# Patient Record
Sex: FEMALE | Race: WHITE | NOT HISPANIC OR LATINO | Employment: OTHER | URBAN - METROPOLITAN AREA
[De-identification: names, ages, dates, MRNs, and addresses within clinical notes are randomized per-mention and may not be internally consistent; named-entity substitution may affect disease eponyms.]

---

## 2018-06-21 DIAGNOSIS — Z12.31 SCREENING MAMMOGRAM, ENCOUNTER FOR: Primary | ICD-10-CM

## 2018-07-18 ENCOUNTER — HOSPITAL ENCOUNTER (OUTPATIENT)
Dept: RADIOLOGY | Facility: HOSPITAL | Age: 73
Discharge: HOME | End: 2018-07-18
Attending: FAMILY MEDICINE
Payer: MEDICARE

## 2018-07-18 DIAGNOSIS — Z12.31 SCREENING MAMMOGRAM, ENCOUNTER FOR: ICD-10-CM

## 2018-07-18 PROCEDURE — 77067 SCR MAMMO BI INCL CAD: CPT | Mod: TC

## 2018-07-18 PROCEDURE — 77063 BREAST TOMOSYNTHESIS BI: CPT | Mod: 26,,, | Performed by: RADIOLOGY

## 2018-07-18 PROCEDURE — 77067 SCR MAMMO BI INCL CAD: CPT | Mod: 26,,, | Performed by: RADIOLOGY

## 2018-08-14 ENCOUNTER — RESEARCH ENCOUNTER (OUTPATIENT)
Dept: RESEARCH | Facility: HOSPITAL | Age: 73
End: 2018-08-14

## 2018-08-14 ENCOUNTER — INITIAL CONSULT (OUTPATIENT)
Dept: CARDIOLOGY | Facility: CLINIC | Age: 73
End: 2018-08-14
Payer: MEDICARE

## 2018-08-14 VITALS
OXYGEN SATURATION: 98 % | WEIGHT: 143.31 LBS | HEIGHT: 63 IN | SYSTOLIC BLOOD PRESSURE: 213 MMHG | DIASTOLIC BLOOD PRESSURE: 72 MMHG | BODY MASS INDEX: 25.39 KG/M2 | HEART RATE: 51 BPM

## 2018-08-14 DIAGNOSIS — I35.0 AORTIC VALVE STENOSIS, ETIOLOGY OF CARDIAC VALVE DISEASE UNSPECIFIED: ICD-10-CM

## 2018-08-14 DIAGNOSIS — I10 ESSENTIAL HYPERTENSION: ICD-10-CM

## 2018-08-14 DIAGNOSIS — Z98.890 HISTORY OF THORACIC AORTIC ANEURYSM REPAIR: ICD-10-CM

## 2018-08-14 DIAGNOSIS — Z86.79 HISTORY OF THORACIC AORTIC ANEURYSM REPAIR: ICD-10-CM

## 2018-08-14 PROCEDURE — 99204 OFFICE O/P NEW MOD 45 MIN: CPT | Mod: S$PBB,,, | Performed by: INTERNAL MEDICINE

## 2018-08-14 PROCEDURE — 99999 PR PBB SHADOW E&M-EST. PATIENT-LVL III: CPT | Mod: PBBFAC,,, | Performed by: INTERNAL MEDICINE

## 2018-08-14 NOTE — ASSESSMENT & PLAN NOTE
BP is uncontrolled and resistant despite three medications, including CCB, ARB and HCTZ. She walks regularly. Avoids sodium.  She appears to be an appropriate candidate for the CALM study of endovascular baroreceptor therapy.     I discussed the potential risks and benefits of the  CALM trial. Patients with hypertension and systolic blood pressure > 145 mmHg on three drugs are eligible. Patients are randomized to device or standard medical therapy.

## 2018-08-14 NOTE — ASSESSMENT & PLAN NOTE
Murmur sounds like AS combined with AR. Patient with mild LEMONS and edema subjectively. Would consider echo.

## 2018-08-14 NOTE — PROGRESS NOTES
Date Consent signed: 14 August 2018    Sponsor: [employer]    Study Title/IRB Number: CALM-2 / 2017.454.A    Principle Investigator: Dr. Isaiah Wiggins, Dr. Denis Gonzalez    Present for Discussion: Patient, CRC     Is LAR Consenting for Subject: No    Prior to the Informed Consent (IC) being signed, or any study protocol required data collection, testing, procedure, or intervention being performed, the following was done and/or discussed:   Patient was given a copy of the IC for review    Purpose of the study and qualifications to participate    Study design, Follow up schedule, and tests or procedures done at each visit   Confidentiality and HIPAA Authorization for Release of Medical Records for the research trial/ subject's rights/research related injury   Risk, Benefits, Alternative Treatments, Compensation and Costs   Participation in the research trial is voluntary and patient may withdraw at anytime   Contact information for study related questions    Patient verbalizes understanding of the above: Yes  Contact information for CRC and PI given to patient: Yes  Patient able to adequately summarize: the purpose of the study, the risks associated with the study, and all procedures, testing, and follow-ups associated with the study: Yes    Mrs. Buffy Dominique  signed the informed consent form for the CALM-2 research study with an IRB approval date of 6/21/2018.  Each page of the consent form was reviewed with Mrs. Dominique and all questions answered satisfactorily. Mrs. Dominique signed the consent form and received a copy of same. The original consent was scanned into electronic medical records (EPIC) and filed into the subject's research study binder.

## 2018-08-14 NOTE — PROGRESS NOTES
Subjective:   Referring provider: Self, Aaareferral  Patient ID: Buffy Dominique is 72 y.o. female who presents for Evaluation of Hypertension        Ms. Dominique presents seeking information about clinical trial for her HTN. She has had HTN for years. She reports that after her thoracic aneurysm repair it got better. But it has climbed back up over a few years. She denies chest pain, palpitations, headache. Followed by Dr. Ames for her HTN. Plans to evaluate for KEE as cause for HTN.    She reports mild shortness of breath for the past month when walking more than 1/4 mile. She has also noted some foot swelling.    Hypertension   This is a chronic problem. The current episode started more than 1 year ago. The problem has been gradually worsening since onset. The problem is resistant. Pertinent negatives include no blurred vision, chest pain, headaches, malaise/fatigue, orthopnea, palpitations or shortness of breath. There are no associated agents to hypertension. Past treatments include calcium channel blockers, diuretics and angiotensin blockers. The current treatment provides no improvement. Hypertensive end-organ damage includes kidney disease. There is no history of angina, CAD/MI, CVA or heart failure. There is no history of chronic renal disease, coarctation of the aorta, hyperaldosteronism, hypercortisolism, hyperparathyroidism, a hypertension causing med, pheochromocytoma, renovascular disease, sleep apnea or a thyroid problem.     Review of Systems   Constitution: Negative for decreased appetite, fever, malaise/fatigue, weight gain and weight loss.   HENT: Negative for congestion, hoarse voice and sore throat.    Eyes: Negative for blurred vision, vision loss in left eye, vision loss in right eye, visual disturbance and visual halos.   Cardiovascular: Positive for dyspnea on exertion and leg swelling. Negative for chest pain, claudication, irregular heartbeat, near-syncope, orthopnea and palpitations.  "  Respiratory: Negative for cough, shortness of breath and snoring.    Hematologic/Lymphatic: Negative for bleeding problem. Does not bruise/bleed easily.   Skin: Negative for color change and itching.   Musculoskeletal: Negative for falls, muscle cramps and myalgias.   Gastrointestinal: Negative for abdominal pain, change in bowel habit, bowel incontinence, heartburn, nausea and vomiting.   Genitourinary: Negative for flank pain.   Neurological: Negative for excessive daytime sleepiness, dizziness, focal weakness, headaches, light-headedness, loss of balance, sensory change and vertigo.   Psychiatric/Behavioral: Negative for depression. The patient does not have insomnia.      Family History   Problem Relation Age of Onset    Cancer Father      Past Surgical History:   Procedure Laterality Date    CATARACT EXTRACTION, BILATERAL Bilateral 2014    cataract removal Right 2014    hiatial hernia  2017    HYSTERECTOMY      melanoma      on face    OOPHORECTOMY      THORACIC AORTIC ANEURYSM REPAIR  2011     Family History   Problem Relation Age of Onset    Cancer Father      BP (!) 213/72 (BP Location: Right arm, Patient Position: Sitting, BP Method: Large (Automatic))   Pulse (!) 51   Ht 5' 3" (1.6 m)   Wt 65 kg (143 lb 4.8 oz)   LMP  (LMP Unknown)   SpO2 98%   BMI 25.38 kg/m²     Objective:   Physical Exam   Constitutional: She is oriented to person, place, and time. She appears well-developed and well-nourished.   Eyes: Pupils are equal, round, and reactive to light.   Neck: Neck supple. No JVD present. Carotid bruit is not present.   Cardiovascular: Normal rate, regular rhythm, S1 normal, S2 normal and normal pulses. Exam reveals no gallop and no friction rub.   Murmur heard.   Harsh midsystolic murmur is present with a grade of 2/6 at the upper right sternal border radiating to the neck.  High-pitched blowing decrescendo early diastolic murmur is present with a grade of 2/6 at the upper right sternal " border radiating to the apex.  Pulses:       Femoral pulses are 2+ on the right side, and 2+ on the left side.       Popliteal pulses are 2+ on the right side, and 2+ on the left side.        Dorsalis pedis pulses are 2+ on the right side, and 2+ on the left side.        Posterior tibial pulses are 2+ on the right side, and 2+ on the left side.   Pulmonary/Chest: Effort normal and breath sounds normal. No respiratory distress. She has no wheezes. She has no rales.   Abdominal: Soft. Bowel sounds are normal.   Musculoskeletal:   No kyphoscoliosis   Neurological: She is alert and oriented to person, place, and time.   Skin: Skin is warm and dry.   Psychiatric: She has a normal mood and affect. Thought content normal.     Assessment:     1. Essential hypertension      Plan:     Essential hypertension  BP is uncontrolled and resistant despite three medications, including CCB, ARB and HCTZ. She walks regularly. Avoids sodium.  She appears to be an appropriate candidate for the CALM study of endovascular baroreceptor therapy.     I discussed the potential risks and benefits of the  CALM trial. Patients with hypertension and systolic blood pressure > 145 mmHg on three drugs are eligible. Patients are randomized to device or standard medical therapy.        AS (aortic stenosis)  Murmur sounds like AS combined with AR. Patient with mild LEMONS and edema subjectively. Would consider echo.      Follow-up in about 4 weeks (around 9/11/2018).

## 2018-08-20 DIAGNOSIS — Z00.6 EXAMINATION OF PARTICIPANT IN CLINICAL TRIAL: ICD-10-CM

## 2018-08-20 DIAGNOSIS — I10 ESSENTIAL HYPERTENSION: Primary | ICD-10-CM

## 2018-08-21 ENCOUNTER — CLINICAL SUPPORT (OUTPATIENT)
Dept: CARDIOLOGY | Facility: CLINIC | Age: 73
End: 2018-08-21
Attending: INTERNAL MEDICINE
Payer: MEDICARE

## 2018-08-21 ENCOUNTER — LAB VISIT (OUTPATIENT)
Dept: LAB | Facility: HOSPITAL | Age: 73
End: 2018-08-21
Attending: INTERNAL MEDICINE
Payer: MEDICARE

## 2018-08-21 ENCOUNTER — HOSPITAL ENCOUNTER (OUTPATIENT)
Dept: CARDIOLOGY | Facility: CLINIC | Age: 73
Discharge: HOME | End: 2018-08-21
Payer: MEDICARE

## 2018-08-21 DIAGNOSIS — I10 ESSENTIAL HYPERTENSION: ICD-10-CM

## 2018-08-21 DIAGNOSIS — Z00.6 EXAMINATION OF PARTICIPANT IN CLINICAL TRIAL: ICD-10-CM

## 2018-08-21 LAB
ALBUMIN/CREAT UR: 8.9 UG/MG
CREAT UR-MCNC: 45 MG/DL
MICROALBUMIN UR DL<=1MG/L-MCNC: 4 UG/ML
SODIUM UR-SCNC: 76 MMOL/L

## 2018-08-21 PROCEDURE — 93880 EXTRACRANIAL BILAT STUDY: CPT | Mod: ,,, | Performed by: INTERNAL MEDICINE

## 2018-08-21 PROCEDURE — 93010 ELECTROCARDIOGRAM REPORT: CPT | Mod: S$PBB,,, | Performed by: INTERNAL MEDICINE

## 2018-08-21 PROCEDURE — 93975 VASCULAR STUDY: CPT | Mod: ,,, | Performed by: INTERNAL MEDICINE

## 2018-08-21 PROCEDURE — 82043 UR ALBUMIN QUANTITATIVE: CPT

## 2018-08-21 PROCEDURE — 93005 ELECTROCARDIOGRAM TRACING: CPT | Mod: PBBFAC | Performed by: INTERNAL MEDICINE

## 2018-08-21 PROCEDURE — 84300 ASSAY OF URINE SODIUM: CPT

## 2018-08-22 LAB — INTERNAL CAROTID STENOSIS: ABNORMAL

## 2018-08-30 ENCOUNTER — RESEARCH ENCOUNTER (OUTPATIENT)
Dept: RESEARCH | Facility: HOSPITAL | Age: 73
End: 2018-08-30

## 2018-08-30 NOTE — PROGRESS NOTES
Date: 30 August 2018     Study Title/IRB Number: CALM-2 / 2017.454.A     Principle Investigator: CAMPBELL Wiggins MD & Denis Gonzalez MD     Visit: Screening Eligibility          Mrs. Buffy Dominique is enrolled in the CALM-2 research trial. All procedures were completed per protocol.       returned to the clinic on today to return the 24HR ABPM. Her overall 24HR blood pressure was 127/61. Per protocol, overall BP must be >/=145 to proceed in the research study.    Mrs. Dominique is considered a Screen Fail. She verbalized understanding.     All required data has been entered in the EDC.

## 2018-11-09 ENCOUNTER — DOCUMENTATION ONLY (OUTPATIENT)
Dept: INTERNAL MEDICINE | Facility: CLINIC | Age: 73
End: 2018-11-09

## 2018-11-09 ENCOUNTER — OFFICE VISIT (OUTPATIENT)
Dept: INTERNAL MEDICINE | Facility: CLINIC | Age: 73
End: 2018-11-09
Payer: MEDICARE

## 2018-11-09 VITALS
DIASTOLIC BLOOD PRESSURE: 68 MMHG | OXYGEN SATURATION: 95 % | WEIGHT: 141 LBS | SYSTOLIC BLOOD PRESSURE: 162 MMHG | HEIGHT: 63 IN | BODY MASS INDEX: 24.98 KG/M2 | HEART RATE: 51 BPM

## 2018-11-09 DIAGNOSIS — Z12.11 COLON CANCER SCREENING: ICD-10-CM

## 2018-11-09 DIAGNOSIS — R01.1 MURMUR: ICD-10-CM

## 2018-11-09 DIAGNOSIS — M89.9 DISORDER OF BONE: ICD-10-CM

## 2018-11-09 DIAGNOSIS — E78.00 PURE HYPERCHOLESTEROLEMIA: ICD-10-CM

## 2018-11-09 DIAGNOSIS — Z85.820 HISTORY OF MELANOMA: ICD-10-CM

## 2018-11-09 DIAGNOSIS — I10 ESSENTIAL HYPERTENSION: Primary | ICD-10-CM

## 2018-11-09 DIAGNOSIS — Z98.890 HISTORY OF THORACIC AORTIC ANEURYSM REPAIR: ICD-10-CM

## 2018-11-09 DIAGNOSIS — Z98.890 HISTORY OF REPAIR OF THORACIC AORTIC ANEURYSM: ICD-10-CM

## 2018-11-09 DIAGNOSIS — Z86.79 HISTORY OF REPAIR OF THORACIC AORTIC ANEURYSM: ICD-10-CM

## 2018-11-09 DIAGNOSIS — Z86.79 HISTORY OF THORACIC AORTIC ANEURYSM REPAIR: ICD-10-CM

## 2018-11-09 DIAGNOSIS — Z13.820 SCREENING FOR OSTEOPOROSIS: ICD-10-CM

## 2018-11-09 PROBLEM — E78.5 HLD (HYPERLIPIDEMIA): Status: ACTIVE | Noted: 2018-11-09

## 2018-11-09 PROCEDURE — 99499 UNLISTED E&M SERVICE: CPT | Mod: S$GLB,,, | Performed by: INTERNAL MEDICINE

## 2018-11-09 PROCEDURE — 3077F SYST BP >= 140 MM HG: CPT | Mod: CPTII,S$GLB,, | Performed by: INTERNAL MEDICINE

## 2018-11-09 PROCEDURE — 99204 OFFICE O/P NEW MOD 45 MIN: CPT | Mod: S$GLB,,, | Performed by: INTERNAL MEDICINE

## 2018-11-09 PROCEDURE — 1101F PT FALLS ASSESS-DOCD LE1/YR: CPT | Mod: CPTII,S$GLB,, | Performed by: INTERNAL MEDICINE

## 2018-11-09 PROCEDURE — 99999 PR PBB SHADOW E&M-EST. PATIENT-LVL IV: CPT | Mod: PBBFAC,,, | Performed by: INTERNAL MEDICINE

## 2018-11-09 PROCEDURE — 3078F DIAST BP <80 MM HG: CPT | Mod: CPTII,S$GLB,, | Performed by: INTERNAL MEDICINE

## 2018-11-09 RX ORDER — HYDROCHLOROTHIAZIDE 12.5 MG/1
25 CAPSULE ORAL DAILY
Qty: 180 CAPSULE | Refills: 3 | Status: SHIPPED | OUTPATIENT
Start: 2018-11-09 | End: 2019-02-01 | Stop reason: SDUPTHER

## 2018-11-09 NOTE — PROGRESS NOTES
"Subjective:       Patient ID: Buffy Dominique is a 73 y.o. female.    Chief Complaint: Establish Care (establishing care as a new patient.) and Spasms (she stated on Wed, may have a possible muscle strain in lower back area.)    Spasms   Pertinent negatives include no chest pain, headaches or neck pain.   Hypertension   This is a recurrent problem. The current episode started more than 1 year ago. The problem is unchanged. The problem is resistant. Pertinent negatives include no anxiety, blurred vision, chest pain, headaches, malaise/fatigue, neck pain, orthopnea, palpitations, peripheral edema, PND, shortness of breath or sweats. There are no associated agents to hypertension. There are no known risk factors for coronary artery disease. Past treatments include nothing. There are no compliance problems.       Buffy Dominique is a 73 y.o. female here to establish care for the following chronic issues:    HTN  amlod-olmesartan 1-40mg  bystolic 20mg  hctz 12.5mg  Saw nephrology, renal us ordered    HLD  atorva 40mg  Fenofibrate  Can't take either due to size of pill.    Previously saw Dr. Isaiah Mehta.    3 days ago pulled muscle in her back. Couldn't walk for 2 days but getting better.   Drove for a living x 40 years.    Aortic aneurysm repaired in 2011 done in [place].      Aortic stenosis listed in chart tentatively based on murmur aug 2018.    Hx melanoma  Derm Dr. Damon at .    Quit smoking 1/26/09. Was diagnosed with early stage copd but actually bronchtis. This scared her so quit.  Review of Systems   Constitutional: Negative for malaise/fatigue.   Eyes: Negative for blurred vision.   Respiratory: Negative for shortness of breath.    Cardiovascular: Negative for chest pain, palpitations, orthopnea and PND.   Musculoskeletal: Negative for neck pain.   Neurological: Negative for headaches.       Objective:   BP (!) 162/68   Pulse (!) 51   Ht 5' 3" (1.6 m)   Wt 64 kg (141 lb)   LMP  (LMP Unknown)   SpO2 95%   " BMI 24.98 kg/m²      Physical Exam   Constitutional: She is oriented to person, place, and time. She appears well-developed and well-nourished.   HENT:   Head: Normocephalic and atraumatic.   Eyes: Conjunctivae and EOM are normal. Pupils are equal, round, and reactive to light.   Neck: Neck supple. No thyromegaly present.   Cardiovascular: Normal rate and regular rhythm.   Murmur heard.  High-pitched blowing decrescendo early diastolic murmur is present with a grade of 2/6 at the upper right sternal border radiating to the apex.   Pulmonary/Chest: Effort normal and breath sounds normal. No respiratory distress. She has no wheezes.   Abdominal: Soft. Bowel sounds are normal. She exhibits no distension. There is no tenderness.   Musculoskeletal: Normal range of motion.   Neurological: She is alert and oriented to person, place, and time.   Skin: Skin is warm and dry. No rash noted.   Psychiatric: She has a normal mood and affect. Judgment and thought content normal.   Vitals reviewed.      Assessment:       1. Essential hypertension    2. Pure hypercholesterolemia    3. History of repair of thoracic aortic aneurysm    4. History of thoracic aortic aneurysm repair    5. History of melanoma    6. Murmur    7. Screening for osteoporosis    8. Colon cancer screening    9. Disorder of bone        Plan:       Buffy was seen today for establish care and spasms.    Diagnoses and all orders for this visit:    Essential hypertension  -     Increase hydroCHLOROthiazide (MICROZIDE) 12.5 mg capsule; Take 2 capsules (25 mg total) by mouth once daily.  amlod-olmesartan 1-40mg  bystolic 20mg    Pure hypercholesterolemia  Cannot tolerate statin due to size    History of repair of thoracic aortic aneurysm    History of melanoma  -     Ambulatory Referral to Dermatology    Murmur  -     TTE    Screening for osteoporosis  -     DXA Bone Density Spine And Hip; Future    Colon cancer screening  -     Fecal Immunochemical Test (iFOBT);  Future    Disorder of bone  -     DXA Bone Density Spine And Hip; Future

## 2018-11-16 ENCOUNTER — HOSPITAL ENCOUNTER (OUTPATIENT)
Dept: RADIOLOGY | Facility: CLINIC | Age: 73
Discharge: HOME | End: 2018-11-16
Attending: INTERNAL MEDICINE
Payer: MEDICARE

## 2018-11-16 DIAGNOSIS — M89.9 DISORDER OF BONE: ICD-10-CM

## 2018-11-16 DIAGNOSIS — Z13.820 SCREENING FOR OSTEOPOROSIS: ICD-10-CM

## 2018-11-16 PROCEDURE — 77080 DXA BONE DENSITY AXIAL: CPT | Mod: 26,,, | Performed by: INTERNAL MEDICINE

## 2018-11-16 PROCEDURE — 77080 DXA BONE DENSITY AXIAL: CPT | Mod: TC

## 2018-11-23 ENCOUNTER — HOSPITAL ENCOUNTER (OUTPATIENT)
Dept: CARDIOLOGY | Facility: CLINIC | Age: 73
Discharge: HOME | End: 2018-11-23
Attending: INTERNAL MEDICINE
Payer: MEDICARE

## 2018-11-23 VITALS
WEIGHT: 141 LBS | HEART RATE: 68 BPM | HEIGHT: 63 IN | BODY MASS INDEX: 24.98 KG/M2 | DIASTOLIC BLOOD PRESSURE: 60 MMHG | SYSTOLIC BLOOD PRESSURE: 144 MMHG

## 2018-11-23 DIAGNOSIS — I35.1 AORTIC VALVE INSUFFICIENCY, ETIOLOGY OF CARDIAC VALVE DISEASE UNSPECIFIED: Primary | ICD-10-CM

## 2018-11-23 DIAGNOSIS — R01.1 MURMUR: ICD-10-CM

## 2018-11-23 DIAGNOSIS — I35.1 NONRHEUMATIC AORTIC VALVE INSUFFICIENCY: ICD-10-CM

## 2018-11-23 LAB
ASCENDING AORTA: 3.07 CM
AV INDEX (PROSTH): 0.82
AV MEAN GRADIENT: 3.2 MMHG
AV PEAK GRADIENT: 7.84 MMHG
AV VALVE AREA: 2.59 CM2
BSA FOR ECHO PROCEDURE: 1.69 M2
CV ECHO LV RWT: 0.38 CM
DOP CALC AO PEAK VEL: 1.4 M/S
DOP CALC AO VTI: 33.17 CM
DOP CALC LVOT AREA: 3.14 CM2
DOP CALC LVOT DIAMETER: 2 CM
DOP CALC LVOT STROKE VOLUME: 85.85 CM3
DOP CALCLVOT PEAK VEL VTI: 27.34 CM
E WAVE DECELERATION TIME: 185.13 MSEC
E/A RATIO: 1.16
E/E' RATIO: 16.86
ECHO LV POSTERIOR WALL: 0.8 CM (ref 0.6–1.1)
FRACTIONAL SHORTENING: 40 % (ref 28–44)
INTERVENTRICULAR SEPTUM: 0.8 CM (ref 0.6–1.1)
IVRT: 0.07 MSEC
LA MAJOR: 5.35 CM
LA MINOR: 5.4 CM
LA WIDTH: 4.31 CM
LEFT ATRIUM SIZE: 3.8 CM
LEFT ATRIUM VOLUME INDEX: 44.3 ML/M2
LEFT ATRIUM VOLUME: 74.83 CM3
LEFT INTERNAL DIMENSION IN SYSTOLE: 2.5 CM (ref 2.1–4)
LEFT VENTRICLE DIASTOLIC VOLUME INDEX: 44.17 ML/M2
LEFT VENTRICLE DIASTOLIC VOLUME: 74.64 ML
LEFT VENTRICLE MASS INDEX: 59.9 G/M2
LEFT VENTRICLE SYSTOLIC VOLUME INDEX: 15.5 ML/M2
LEFT VENTRICLE SYSTOLIC VOLUME: 26.12 ML
LEFT VENTRICULAR INTERNAL DIMENSION IN DIASTOLE: 4.2 CM (ref 3.5–6)
LEFT VENTRICULAR MASS: 101.29 G
LV LATERAL E/E' RATIO: 14.75
LV SEPTAL E/E' RATIO: 19.67
MV PEAK A VEL: 1.02 M/S
MV PEAK E VEL: 1.18 M/S
PISA TR MAX VEL: 2.67 M/S
PULM VEIN S/D RATIO: 1.36
PV PEAK D VEL: 0.53 M/S
PV PEAK S VEL: 0.72 M/S
RA MAJOR: 4.53 CM
RA PRESSURE: 3 MMHG
RA WIDTH: 3.96 CM
RIGHT VENTRICULAR END-DIASTOLIC DIMENSION: 3.44 CM
RV TISSUE DOPPLER FREE WALL SYSTOLIC VELOCITY 1 (APICAL 4 CHAMBER VIEW): 11.77 M/S
SINUS: 3.36 CM
STJ: 2.48 CM
TDI LATERAL: 0.08
TDI SEPTAL: 0.06
TDI: 0.07
TR MAX PG: 28.52 MMHG
TRICUSPID ANNULAR PLANE SYSTOLIC EXCURSION: 2.08 CM
TV REST PULMONARY ARTERY PRESSURE: 31.52 MMHG

## 2018-11-23 PROCEDURE — 93306 TTE W/DOPPLER COMPLETE: CPT | Mod: S$GLB,,, | Performed by: INTERNAL MEDICINE

## 2018-11-30 ENCOUNTER — PATIENT MESSAGE (OUTPATIENT)
Dept: ADMINISTRATIVE | Facility: OTHER | Age: 73
End: 2018-11-30

## 2018-11-30 ENCOUNTER — OFFICE VISIT (OUTPATIENT)
Dept: CARDIOLOGY | Facility: CLINIC | Age: 73
End: 2018-11-30
Payer: MEDICARE

## 2018-11-30 ENCOUNTER — TELEPHONE (OUTPATIENT)
Dept: CARDIOLOGY | Facility: CLINIC | Age: 73
End: 2018-11-30

## 2018-11-30 ENCOUNTER — HOSPITAL ENCOUNTER (OUTPATIENT)
Dept: CARDIOLOGY | Facility: CLINIC | Age: 73
Discharge: HOME | End: 2018-11-30
Payer: MEDICARE

## 2018-11-30 VITALS
DIASTOLIC BLOOD PRESSURE: 69 MMHG | HEART RATE: 50 BPM | HEIGHT: 63 IN | BODY MASS INDEX: 25.39 KG/M2 | SYSTOLIC BLOOD PRESSURE: 155 MMHG | WEIGHT: 143.31 LBS

## 2018-11-30 DIAGNOSIS — I35.1 NONRHEUMATIC AORTIC VALVE INSUFFICIENCY: ICD-10-CM

## 2018-11-30 DIAGNOSIS — I10 HYPERTENSION, UNSPECIFIED TYPE: ICD-10-CM

## 2018-11-30 DIAGNOSIS — Z86.79 HISTORY OF THORACIC AORTIC ANEURYSM REPAIR: ICD-10-CM

## 2018-11-30 DIAGNOSIS — I10 ESSENTIAL HYPERTENSION: ICD-10-CM

## 2018-11-30 DIAGNOSIS — E78.00 PURE HYPERCHOLESTEROLEMIA: ICD-10-CM

## 2018-11-30 DIAGNOSIS — I10 HYPERTENSION, UNSPECIFIED TYPE: Primary | ICD-10-CM

## 2018-11-30 DIAGNOSIS — I08.0 MITRAL VALVE INSUFFICIENCY AND AORTIC VALVE INSUFFICIENCY: Primary | ICD-10-CM

## 2018-11-30 DIAGNOSIS — Z98.890 HISTORY OF THORACIC AORTIC ANEURYSM REPAIR: ICD-10-CM

## 2018-11-30 PROCEDURE — 93005 ELECTROCARDIOGRAM TRACING: CPT | Mod: S$GLB,,, | Performed by: INTERNAL MEDICINE

## 2018-11-30 PROCEDURE — 99499 UNLISTED E&M SERVICE: CPT | Mod: S$GLB,,, | Performed by: INTERNAL MEDICINE

## 2018-11-30 PROCEDURE — 99203 OFFICE O/P NEW LOW 30 MIN: CPT | Mod: S$GLB,,, | Performed by: INTERNAL MEDICINE

## 2018-11-30 PROCEDURE — 99999 PR PBB SHADOW E&M-EST. PATIENT-LVL III: CPT | Mod: PBBFAC,,, | Performed by: INTERNAL MEDICINE

## 2018-11-30 PROCEDURE — 3078F DIAST BP <80 MM HG: CPT | Mod: CPTII,S$GLB,, | Performed by: INTERNAL MEDICINE

## 2018-11-30 PROCEDURE — 3077F SYST BP >= 140 MM HG: CPT | Mod: CPTII,S$GLB,, | Performed by: INTERNAL MEDICINE

## 2018-11-30 PROCEDURE — 1101F PT FALLS ASSESS-DOCD LE1/YR: CPT | Mod: CPTII,S$GLB,, | Performed by: INTERNAL MEDICINE

## 2018-11-30 PROCEDURE — 93010 ELECTROCARDIOGRAM REPORT: CPT | Mod: S$GLB,,, | Performed by: INTERNAL MEDICINE

## 2018-11-30 NOTE — LETTER
November 30, 2018      Odalis Kim MD  [address]  [place] [address]           [facility] - Cardiology  [address]  [place] [address]  Phone: [phone]          Patient: Buffy Dominique   MR Number: [id number]   YOB: 1945   Date of Visit: 11/30/2018       Dear Dr. Odalis Kim:    Thank you for referring Buffy Dominique to me for evaluation. Attached you will find relevant portions of my assessment and plan of care.    If you have questions, please do not hesitate to call me. I look forward to following Buffy Dominique along with you.    Sincerely,    Yuan Cavazos III, MD    Enclosure  CC:  No Recipients    If you would like to receive this communication electronically, please contact [email or web address] or [phone] to request more information on [employer] access.    For providers and/or their staff who would like to refer a patient to [facility], please contact us through our one-stop-shop provider referral line, [facility], at [phone].    If you feel you have received this communication in error or would no longer like to receive these types of communications, please e-mail [email or web address]

## 2018-11-30 NOTE — PROGRESS NOTES
Cardiology Clinic Note  Reason for Visit: aortic stenosis    HPI:     Buffy Dominique is a 73 y.o. F with HTN, HLD, thoracic AAA s/p repair in 2011, and severe aortic regurgitation, who was referred for AI.    She reports that she is very active. She walks three miles per day and cleans the area around her [place]. She never has chest pain, shortness of breath, edema, orthopnea, PND, or syncopal episodes. She occasionally has lightheadedness with quick motions from sitting to standing. No palpitations.     She reports BP usually high in the morning before medications but 110/60s after they are taken. She does not regularly check her BP, but she is interested in starting the digital hypertension program.    Prior cardiovascular issues:  None    ROS:    Constitution: Negative for fever or chills.  HENT: Negative for  headaches.  Eyes: Negative for blurred vision.   Cardiovascular: See above  Pulmonary: Negative for SOB. Negative for cough.   Gastrointestinal: Negative for nausea/vomiting.   : Negative for dysuria.   Skin: Negative for rashes.  Neurological: Negative for focal weakness.  Psychological: Negative for depression.  PMH:     Past Medical History:   Diagnosis Date    CKD (chronic kidney disease) stage 3, GFR 30-59 ml/min     HLD (hyperlipidemia)     HTN (hypertension)     Hyperparathyroidism      Past Surgical History:   Procedure Laterality Date    CATARACT EXTRACTION, BILATERAL Bilateral 2014    cataract removal Right 2014    hiatial hernia  2017    HYSTERECTOMY      melanoma      on face    OOPHORECTOMY      THORACIC AORTIC ANEURYSM REPAIR  2011     Allergies:   Review of patient's allergies indicates:  No Known Allergies  Medications:     Current Outpatient Medications on File Prior to Visit   Medication Sig Dispense Refill    amlodipine-olmesartan (PATTIE) 10-40 mg per tablet 1 tablet once daily.      BYSTOLIC 5 mg Tab 20 mg once daily.       hydroCHLOROthiazide (MICROZIDE) 12.5  "mg capsule Take 2 capsules (25 mg total) by mouth once daily. 180 capsule 3     No current facility-administered medications on file prior to visit.      Social History:     Social History     Tobacco Use    Smoking status: Former Smoker     Packs/day: 1.00     Years: 30.00     Pack years: 30.00     Types: Cigarettes     Last attempt to quit: 2009     Years since quittin.8    Smokeless tobacco: Never Used   Substance Use Topics    Alcohol use: No     Comment: glass of wine once or twice a year     Family History:     Family History   Problem Relation Age of Onset    Cancer Father     Hypertension Maternal Grandmother      Physical Exam:   BP (!) 155/69 (BP Location: Left arm, Patient Position: Sitting, BP Method: Medium (Automatic))   Pulse (!) 50   Ht 5' 3" (1.6 m)   Wt 65 kg (143 lb 4.8 oz)   LMP  (LMP Unknown)   BMI 25.38 kg/m²      Constitutional: No apparent distress, conversant  HEENT: Sclera anicteric, extraocular movements intact  Neck: No jugular venous distension, no carotid bruits  CV: Regular rate and rhythm, II/VI holodiastolic murmur at LUSB, II/VI systolic murmur at LUSB  Pulm: Clear to auscultation bilaterally  GI: Abdomen soft, no palpable masses  Extremities: No lower extremity edema, warm with palpable pulses  Skin: No ecchymosis, erythema, or ulcers  Psych: Alert and oriented to person place location, appropriate affect  Neuro: No focal deficits    Labs:     Blood Tests:  Lab Results   Component Value Date     (L) 2018    K 3.9 2018    CL 98 2018    CO2 28 2018    BUN 21 2018    CREATININE 1.0 2018    GLU 96 2018    HGBA1C 5.3 2018    MG 1.9 2009    AST 20 2018    ALT 16 2018    ALBUMIN 4.2 2018    PROT 7.8 2018    BILITOT 1.5 (H) 2018    WBC 6.58 2018    HGB 14.0 2018    HCT 40.8 2018    MCV 92 2018     2018    INR 0.9 2018    TSH 1.20 2010 "       Lab Results   Component Value Date    CHOL 192 2018    HDL 59 2018    TRIG 110 2018       Lab Results   Component Value Date    LDLCALC 111.0 2018       Urine Tests:  Lab Results   Component Value Date    CREATRANDUR 45.0 2018       Imaging:     Echocardiogram  TTE 18  · Normal left ventricular systolic function. The estimated ejection fraction is 60%  · No wall motion abnormalities.  · Indeterminate left ventricular diastolic function.  · Severe aortic regurgitation.  · Mild mitral regurgitation.  · Normal right ventricular systolic function.  · Mild tricuspid regurgitation.  · The estimated PA systolic pressure is 31.52 mm Hg  · Normal central venous pressure (3 mm Hg).  · Moderate left atrial enlargement.    Stress testing  None    Cath Lab  None    Other  None    EK18 - sinus bradycardia, otherwise normal ECG    Assessment:     1. Mitral valve insufficiency and aortic valve insufficiency    2. Nonrheumatic aortic valve insufficiency    3. Essential hypertension    4. History of thoracic aortic aneurysm repair    5. Pure hypercholesterolemia        Plan:     Nonrheumatic aortic valve insufficiency  No symptoms. On echo in 2018, LVEF 60%, LVEDD 4.2cm, LVESD 2.5cm   With active lifestyle and no symptoms, normal LVEF, and no LV dilatation, continue to monitor.  Repeat echocardiogram in 6 months  Obtain records from cardiologist at     Essential hypertension  Enroll in digital hypertension program  Continue amlodipine-olmesartan 10-40mg daily, HCTZ 25mg daily, nebivolol 20mg daily    History of thoracic aortic aneurysm repair  Performed in [place]. Obtain records.    Pure hypercholesterolemia  Lipid panel in 2018 shows , , HDL 59,   ASCVD risk >20%. Should be on moderate to high intensity statin. Will discuss at next visit.    Signed:  Mickey Cavazos MD  Cardiology     2018 12:23 AM    Follow-up:     Future Appointments   Date Time  Provider Department Center   2/20/2019  9:45 AM Myesha Haddad MD [facility]

## 2019-01-07 ENCOUNTER — RESEARCH ENCOUNTER (OUTPATIENT)
Dept: RESEARCH | Facility: HOSPITAL | Age: 74
End: 2019-01-07

## 2019-01-07 NOTE — PROGRESS NOTES
Date Consent signed: 07 January 2019     Sponsor: [employer]     Study Title/IRB Number: CALM-2 / 2017.454.A     Principle Investigator: Dr. RAYSHAWN Collazo, Dr. Denis Gonzalez     Present for Discussion: Patient, CRC      Is LAR Consenting for Subject: No     Prior to the Informed Consent (IC) being signed, or any study protocol required data collection, testing, procedure, or intervention being performed, the following was done and/or discussed:  · Patient was given a copy of the IC for review   · Purpose of the study and qualifications to participate   · Study design, Follow up schedule, and tests or procedures done at each visit  · Confidentiality and HIPAA Authorization for Release of Medical Records for the research trial/ subject's rights/research related injury  · Risk, Benefits, Alternative Treatments, Compensation and Costs  · Participation in the research trial is voluntary and patient may withdraw at anytime  · Contact information for study related questions     Patient verbalizes understanding of the above: Yes  Contact information for CRC and PI given to patient: Yes  Patient able to adequately summarize: the purpose of the study, the risks associated with the study, and all procedures, testing, and follow-ups associated with the study: Yes     Mrs. Buffy Dominique  signed the informed consent form for the CALM-2 research study with an IRB approval date of 9/21/2018.  Each page of the consent form was reviewed with Mrs. Dominique and all questions answered satisfactorily. Mrs. Dominique signed the consent form and received a copy of same. The original consent was scanned into electronic medical records (EPIC) and filed into the subject's research study binder.

## 2019-01-09 ENCOUNTER — RESEARCH ENCOUNTER (OUTPATIENT)
Dept: RESEARCH | Facility: HOSPITAL | Age: 74
End: 2019-01-09

## 2019-01-09 NOTE — PROGRESS NOTES
Date: 09 January 2019     Study Title/IRB Number: CALM-2 / 2017.454.A     Principle Investigator: Pepito Collazo MD & Denis Gonzalez MD     Visit: Screening Eligibility          Mrs. Buffy Dominique is enrolled in the CALM-2 research trial. All procedures were completed per protocol.       returned to the clinic on today to return the 24HR ABPM. Her overall 24HR blood pressure was 140/60. Per protocol, overall BP must be >/=145 to proceed in the research study.     Mrs. Dominique is considered a Screen Fail. She verbalized understanding.     All required data has been entered in the EDC

## 2019-01-22 ENCOUNTER — TELEPHONE (OUTPATIENT)
Dept: INTERNAL MEDICINE | Facility: CLINIC | Age: 74
End: 2019-01-22

## 2019-01-22 NOTE — TELEPHONE ENCOUNTER
Spoke to patient. Patient states that she is currently having symptoms like excessive cough, stuffiness, nasal congestion and slight dyspnea (mainly at night when laying down). Patient is requesting a new Rx on prednisone since medicine has worked well for her in the past. Patient is currently taking Sudafed to help relieve but hardly no relief, patient says.    Please advise.

## 2019-01-22 NOTE — TELEPHONE ENCOUNTER
"----- Message from Carolyn Bridges sent at 1/22/2019  9:26 AM CST -----  Contact: self/[phone]  RX request - refill or new RX.  Is this a refill or new RX:  New rx  RX name and strength: Prednisone   Directions:   Is this a 30 day or 90 day RX:    Local pharmacy or mail order pharmacy:  Local pharmacy  Pharmacy name and phone # (DON'T enter "on file" or "in chart"): [facility] in [facility]. Ph #  Comments:  Patient stated that she have a bad cough        "

## 2019-01-23 NOTE — TELEPHONE ENCOUNTER
Patient stated that she does not feel she needs to come in at the moment. Patient refused to schedule appointment.  Just an FYI.

## 2019-02-01 DIAGNOSIS — I10 ESSENTIAL HYPERTENSION: ICD-10-CM

## 2019-02-01 RX ORDER — HYDROCHLOROTHIAZIDE 12.5 MG/1
25 CAPSULE ORAL DAILY
Qty: 180 CAPSULE | Refills: 3 | Status: SHIPPED | OUTPATIENT
Start: 2019-02-01 | End: 2020-02-04

## 2019-02-01 RX ORDER — AMLODIPINE AND OLMESARTAN MEDOXOMIL 10; 40 MG/1; MG/1
1 TABLET ORAL DAILY
Qty: 90 TABLET | Refills: 3 | Status: SHIPPED | OUTPATIENT
Start: 2019-02-01 | End: 2020-02-11

## 2019-02-01 NOTE — TELEPHONE ENCOUNTER
----- Message from Herminio Thorne sent at 2/1/2019 11:36 AM CST -----  Contact: self/[phone]  Type: Rx    Name of medication(s): amlodipine-olmesartan (PATTIE) 10-40 mg per tablet,BYSTOLIC 5 mg Tab,hydroCHLOROthiazide (MICROZIDE) 12.5 mg capsule    Is this a refill? New rx? Refill 90 day prescription     Who prescribed medication? Dr. Kim     Pharmacy Name, Phone, & Location:[facility] [address] [place] [address] [address] AT [facility]    Comments:

## 2019-02-01 NOTE — TELEPHONE ENCOUNTER
Spoke to patient and she was informed that the medications she needed to be refilled were, at her local pharmacy. Patient verbalized understanding.

## 2019-02-05 ENCOUNTER — PATIENT MESSAGE (OUTPATIENT)
Dept: INTERNAL MEDICINE | Facility: CLINIC | Age: 74
End: 2019-02-05

## 2019-02-06 ENCOUNTER — TELEPHONE (OUTPATIENT)
Dept: INTERNAL MEDICINE | Facility: CLINIC | Age: 74
End: 2019-02-06

## 2019-02-06 DIAGNOSIS — I10 ESSENTIAL HYPERTENSION: Primary | ICD-10-CM

## 2019-02-06 RX ORDER — NEBIVOLOL HYDROCHLORIDE 5 MG/1
20 TABLET ORAL DAILY
Qty: 120 TABLET | Refills: 11 | Status: SHIPPED | OUTPATIENT
Start: 2019-02-06 | End: 2019-07-02

## 2019-02-06 NOTE — TELEPHONE ENCOUNTER
Generally this is a once a day medication. Per last notes she is on 20mg once daily. Please clarify with patient.

## 2019-02-06 NOTE — TELEPHONE ENCOUNTER
Spoke with patient. Patient states that she is currently on Bystolic medication and is taking 20 mg once daily.

## 2019-02-06 NOTE — TELEPHONE ENCOUNTER
----- Message from Reema Robins sent at 2/6/2019  8:20 AM CST -----  Contact: self/[phone]  Pt called in regards to giving the dr the name of the dr that prescribed her Rx for Bystolic he was Dr Isaiah Mehta.      Please advise

## 2019-02-06 NOTE — TELEPHONE ENCOUNTER
Spoke to patient. Patient states that [facility] pharmacy did not give her the Bystolic tablets since the Rx was transferred there from [facility]. Pharmacy informed patient that the Rx was . Patient verbalized that pharmacy will need a new Rx sent over stating that it is 5 mg taken BID. They did refill the other 2 medications.     Please advise.

## 2019-02-10 ENCOUNTER — PATIENT MESSAGE (OUTPATIENT)
Dept: INTERNAL MEDICINE | Facility: CLINIC | Age: 74
End: 2019-02-10

## 2019-02-11 ENCOUNTER — TELEPHONE (OUTPATIENT)
Dept: INTERNAL MEDICINE | Facility: CLINIC | Age: 74
End: 2019-02-11

## 2019-02-11 NOTE — TELEPHONE ENCOUNTER
Spoke with patient. Patient informed that she has not received her Bystolic medication since patient claims it says, the Rx says 5 mg in which her insurance does not cover. She would like the Rx to say 20 mg (pharmacy will cover) taken once daily.     Please advise.

## 2019-02-11 NOTE — TELEPHONE ENCOUNTER
----- Message from Kalyn Garner sent at 2/11/2019  2:49 PM CST -----  Contact: self [phone]  Type: Rx Clarification/ Additional Information/ Questions    Medication: BYSTOLIC 5 mg Tab    What questions do you have about the medication, if any?     What information is needed? Requesting a new script to read Bystolic 20mg once a day    Pharmacy number: [facility] [id number]     Comments: Patient has not been able to get her medication because of the mg,  [facility] is saying that her insurance [employer] won't cover the 5mg  But will cover the 20mg.    Thank you    Thank you

## 2019-02-14 ENCOUNTER — TELEPHONE (OUTPATIENT)
Dept: INTERNAL MEDICINE | Facility: CLINIC | Age: 74
End: 2019-02-14

## 2019-02-14 NOTE — TELEPHONE ENCOUNTER
"----- Message from Farhana Mc sent at 2/14/2019 12:01 PM CST -----  Contact: [id number] [phone]      ----- Message -----  From: Farhana Mc  Sent: 2/14/2019  12:00 PM  To: [facility] Staff    Prior Authorization Needed    Rx: BYSTOLIC 5 mg Tab    To submit the PA:    1: Go to " [email or web address] " and click "Enter a Key"    2. Enter the patient's last name and date of birth and the key.      KEY: [id number]    3. Complete the forms and click "send to Plan"    Note chart when prior authorization has been submitted.    Please notify pharmacy when prior authorization has been approved.    Thank You    "

## 2019-02-15 NOTE — TELEPHONE ENCOUNTER
Spoke with patient's pharmacy. [facility] was informed that Rx was sent in for 5 mg (take 20 mg daily). Pharmacist stated that the Rx was verified. Informed pharmacy that pt verbalized that the 5 mg would not cove Rx for [facility] medicine.    Pharmacy verbalized understanding.

## 2019-02-18 ENCOUNTER — TELEPHONE (OUTPATIENT)
Dept: INTERNAL MEDICINE | Facility: CLINIC | Age: 74
End: 2019-02-18

## 2019-02-18 NOTE — TELEPHONE ENCOUNTER
----- Message from Farhana Mc sent at 2/18/2019  7:14 AM CST -----  Contact: Gege @ [facility] direct# [phone]       ----- Message -----  From: Mare Gong  Sent: 2/15/2019   1:57 PM  To: [facility] Staff    [facility] is calling in regards to wanting to get a prior authorization on the patient's medication for BYSTOLIC 5 mg Tab.

## 2019-04-24 ENCOUNTER — OFFICE VISIT (OUTPATIENT)
Dept: INTERNAL MEDICINE | Facility: CLINIC | Age: 74
End: 2019-04-24
Payer: MEDICARE

## 2019-04-24 ENCOUNTER — HOSPITAL ENCOUNTER (OUTPATIENT)
Dept: RADIOLOGY | Facility: HOSPITAL | Age: 74
Discharge: HOME | End: 2019-04-24
Attending: PHYSICIAN ASSISTANT
Payer: MEDICARE

## 2019-04-24 VITALS — DIASTOLIC BLOOD PRESSURE: 62 MMHG | TEMPERATURE: 98 F | SYSTOLIC BLOOD PRESSURE: 122 MMHG

## 2019-04-24 DIAGNOSIS — R68.89 FLU-LIKE SYMPTOMS: Primary | ICD-10-CM

## 2019-04-24 DIAGNOSIS — R05.9 COUGH: ICD-10-CM

## 2019-04-24 DIAGNOSIS — Z87.891 FORMER SMOKER: ICD-10-CM

## 2019-04-24 DIAGNOSIS — R06.02 SOB (SHORTNESS OF BREATH): ICD-10-CM

## 2019-04-24 DIAGNOSIS — R68.89 FLU-LIKE SYMPTOMS: ICD-10-CM

## 2019-04-24 LAB
INFLUENZA A, MOLECULAR: POSITIVE
INFLUENZA B, MOLECULAR: NEGATIVE
SPECIMEN SOURCE: ABNORMAL

## 2019-04-24 PROCEDURE — 3074F PR MOST RECENT SYSTOLIC BLOOD PRESSURE < 130 MM HG: ICD-10-PCS | Mod: CPTII,S$GLB,, | Performed by: PHYSICIAN ASSISTANT

## 2019-04-24 PROCEDURE — 1101F PT FALLS ASSESS-DOCD LE1/YR: CPT | Mod: CPTII,S$GLB,, | Performed by: PHYSICIAN ASSISTANT

## 2019-04-24 PROCEDURE — 3078F DIAST BP <80 MM HG: CPT | Mod: CPTII,S$GLB,, | Performed by: PHYSICIAN ASSISTANT

## 2019-04-24 PROCEDURE — 1101F PR PT FALLS ASSESS DOC 0-1 FALLS W/OUT INJ PAST YR: ICD-10-PCS | Mod: CPTII,S$GLB,, | Performed by: PHYSICIAN ASSISTANT

## 2019-04-24 PROCEDURE — 71046 X-RAY EXAM CHEST 2 VIEWS: CPT | Mod: 26,,, | Performed by: RADIOLOGY

## 2019-04-24 PROCEDURE — 71046 XR CHEST PA AND LATERAL: ICD-10-PCS | Mod: 26,,, | Performed by: RADIOLOGY

## 2019-04-24 PROCEDURE — 3074F SYST BP LT 130 MM HG: CPT | Mod: CPTII,S$GLB,, | Performed by: PHYSICIAN ASSISTANT

## 2019-04-24 PROCEDURE — 71046 X-RAY EXAM CHEST 2 VIEWS: CPT | Mod: TC

## 2019-04-24 PROCEDURE — 99214 OFFICE O/P EST MOD 30 MIN: CPT | Mod: S$GLB,,, | Performed by: PHYSICIAN ASSISTANT

## 2019-04-24 PROCEDURE — 87502 INFLUENZA DNA AMP PROBE: CPT

## 2019-04-24 PROCEDURE — 99999 PR PBB SHADOW E&M-EST. PATIENT-LVL III: ICD-10-PCS | Mod: PBBFAC,,, | Performed by: PHYSICIAN ASSISTANT

## 2019-04-24 PROCEDURE — 99999 PR PBB SHADOW E&M-EST. PATIENT-LVL III: CPT | Mod: PBBFAC,,, | Performed by: PHYSICIAN ASSISTANT

## 2019-04-24 PROCEDURE — 99214 PR OFFICE/OUTPT VISIT, EST, LEVL IV, 30-39 MIN: ICD-10-PCS | Mod: S$GLB,,, | Performed by: PHYSICIAN ASSISTANT

## 2019-04-24 PROCEDURE — 3078F PR MOST RECENT DIASTOLIC BLOOD PRESSURE < 80 MM HG: ICD-10-PCS | Mod: CPTII,S$GLB,, | Performed by: PHYSICIAN ASSISTANT

## 2019-04-24 RX ORDER — PROMETHAZINE HYDROCHLORIDE AND DEXTROMETHORPHAN HYDROBROMIDE 6.25; 15 MG/5ML; MG/5ML
5 SYRUP ORAL
Qty: 118 ML | Refills: 0 | Status: SHIPPED | OUTPATIENT
Start: 2019-04-24 | End: 2019-04-25 | Stop reason: RX

## 2019-04-24 RX ORDER — BENZONATATE 100 MG/1
100 CAPSULE ORAL 3 TIMES DAILY PRN
Qty: 20 CAPSULE | Refills: 0 | Status: SHIPPED | OUTPATIENT
Start: 2019-04-24 | End: 2019-05-04

## 2019-04-24 NOTE — PROGRESS NOTES
Subjective:       Patient ID: Buffy Dominique is a 73 y.o. female.    Chief Complaint: Cough; Fever; and Generalized Body Aches    HPI     Established pt of Odalis Kim MD (new to me)    Here for urgent care/same day visit.     C/o cough (thick white phlegm), congestion, mild sob d/t excessive coughing, low energy and body aches. She has tried only Aspirin and Aleve for the symptoms without much relief.     Review of patient's allergies indicates:  No Known Allergies    Past Medical History:   Diagnosis Date    CKD (chronic kidney disease) stage 3, GFR 30-59 ml/min     HLD (hyperlipidemia)     HTN (hypertension)     Hyperparathyroidism      Social History     Tobacco Use    Smoking status: Former Smoker     Packs/day: 1.00     Years: 30.00     Pack years: 30.00     Types: Cigarettes     Last attempt to quit: 1/26/2009     Years since quitting: 10.2    Smokeless tobacco: Never Used   Substance Use Topics    Alcohol use: No     Comment: glass of wine once or twice a year    Drug use: No       Patient Active Problem List   Diagnosis    Essential hypertension    AS (aortic stenosis)    History of thoracic aortic aneurysm repair    HLD (hyperlipidemia)    Nonrheumatic aortic valve insufficiency           Review of Systems   Constitutional: Negative for chills, fever (low grade 99F) and unexpected weight change.   HENT: Negative for sore throat and trouble swallowing.    Respiratory: Positive for cough and shortness of breath. Negative for wheezing.    Cardiovascular: Negative for chest pain and leg swelling.   Gastrointestinal: Negative for abdominal pain, nausea and vomiting.   Skin: Negative for rash.   Neurological: Negative for weakness, light-headedness and headaches.       Objective: /62   Pulse (!) (P) 54   Temp 97.7 °F (36.5 °C)   Wt (P) 59.2 kg (130 lb 8.2 oz)   LMP  (LMP Unknown)   SpO2 (!) (P) 93%   BMI (P) 23.12 kg/m²         Physical Exam   Constitutional: She appears  well-developed and well-nourished. No distress.   HENT:   Head: Normocephalic and atraumatic.   Nose: Right sinus exhibits no maxillary sinus tenderness. Left sinus exhibits no maxillary sinus tenderness.   Mouth/Throat: Oropharynx is clear and moist. No oropharyngeal exudate.   Cardiovascular: Normal rate and regular rhythm. Exam reveals no friction rub.   No murmur heard.  Pulmonary/Chest: Effort normal. She has no wheezes. She has rhonchi. She has no rales.   Abdominal: Soft. Bowel sounds are normal. There is no tenderness.   Lymphadenopathy:     She has no cervical adenopathy.   Neurological: She is alert.   Skin: Skin is warm and dry. No rash noted.   Vitals reviewed.      Assessment:       1. Flu-like symptoms    2. Cough    3. SOB (shortness of breath)    4. Former smoker        Plan:         Buffy was seen today for cough, fever and generalized body aches.    Diagnoses and all orders for this visit:    Flu-like symptoms  -     benzonatate (TESSALON) 100 MG capsule; Take 1 capsule (100 mg total) by mouth 3 (three) times daily as needed.  -     promethazine-dextromethorphan (PROMETHAZINE-DM) 6.25-15 mg/5 mL Syrp; Take 5 mLs by mouth every 6 to 8 hours as needed.  -     X-Ray Chest PA And Lateral; Future  -     Influenza A & B by Molecular    Cough  -     benzonatate (TESSALON) 100 MG capsule; Take 1 capsule (100 mg total) by mouth 3 (three) times daily as needed.  -     promethazine-dextromethorphan (PROMETHAZINE-DM) 6.25-15 mg/5 mL Syrp; Take 5 mLs by mouth every 6 to 8 hours as needed.  -     X-Ray Chest PA And Lateral; Future  -     Influenza A & B by Molecular    SOB (shortness of breath)  -     X-Ray Chest PA And Lateral; Future    Former smoker  -     X-Ray Chest PA And Lateral; Future      Will follow up CXR and flu swab  Rx as above for symptomatic relief  OTC tylenol prn  Stay well hydrated  RTC/ED Precautions discussed    Kelsey Gamez PA-C

## 2019-04-24 NOTE — PATIENT INSTRUCTIONS
I WILL CALL YOU ABOUT YOUR RESULTS.    COUGH MEDICATIONS HAVE BEEN SENT TO YOUR PHARMACY    TYLENOL AS NEED FOR BODY ACHES    I RECOMMEND OVER THE COUNTER MUCINEX AS WELL

## 2019-04-25 RX ORDER — PROMETHAZINE HYDROCHLORIDE AND CODEINE PHOSPHATE 6.25; 1 MG/5ML; MG/5ML
5 SOLUTION ORAL EVERY 6 HOURS PRN
Qty: 118 ML | Refills: 0 | Status: SHIPPED | OUTPATIENT
Start: 2019-04-25 | End: 2019-05-05

## 2019-04-25 NOTE — TELEPHONE ENCOUNTER
----- Message from Gertrudis Faust sent at 4/25/2019 12:39 PM CDT -----  Contact: Patient [phone]  Rx is on back order promethazine-dextromethorphan (PROMETHAZINE-DM) 6.25-15 mg/5 mL Syrp. At pharmacy    Patient is calling for status of substitute medication.  [facility] [address] [place] [address] [address] [facility] [address] [phone] (Phone)  [phone] (Fax)    Patient is asking for a callback    Please call and advise  Thank you

## 2019-04-26 ENCOUNTER — TELEPHONE (OUTPATIENT)
Dept: INTERNAL MEDICINE | Facility: CLINIC | Age: 74
End: 2019-04-26

## 2019-04-26 NOTE — TELEPHONE ENCOUNTER
----- Message from Jessica Brian MA sent at 4/25/2019  4:16 PM CDT -----  Contact: [phone]      ----- Message -----  From: Nawaf Veloz  Sent: 4/25/2019   4:07 PM  To: Julio Jacobo Staff    Patient  stated pharmacy called her to inform her insurance company wouldn't cover medication promethazine-codeine 6.25-10 mg/5 ml (PHENERGAN WITH CODEINE) 6.25-10 mg/5 mL syrup . Please call and advise, Thanks

## 2019-04-26 NOTE — TELEPHONE ENCOUNTER
Spoke with pt, she says she can not afford cough syrup for $25. Pt says tessalon is helping a little

## 2019-05-21 ENCOUNTER — PATIENT MESSAGE (OUTPATIENT)
Dept: CARDIOLOGY | Facility: CLINIC | Age: 74
End: 2019-05-21

## 2019-06-27 ENCOUNTER — TELEPHONE (OUTPATIENT)
Dept: INTERNAL MEDICINE | Facility: CLINIC | Age: 74
End: 2019-06-27

## 2019-06-27 DIAGNOSIS — Z12.31 BREAST CANCER SCREENING BY MAMMOGRAM: Primary | ICD-10-CM

## 2019-06-27 NOTE — TELEPHONE ENCOUNTER
----- Message from Krystal Salazar sent at 6/27/2019 10:54 AM CDT -----  Contact: [phone]  Caller is requesting to schedule their annual screening mammogram appointment. Order is not listed in Epic.  Please enter order and contact patient to schedule.  Where would they like the mammogram performed?:    Would the patient rather receive a phone call back or a response via [facility]?: call back    Additional information:  Please advise, thanks

## 2019-07-02 ENCOUNTER — PATIENT MESSAGE (OUTPATIENT)
Dept: ADMINISTRATIVE | Facility: OTHER | Age: 74
End: 2019-07-02

## 2019-07-02 ENCOUNTER — HOSPITAL ENCOUNTER (OUTPATIENT)
Dept: RADIOLOGY | Facility: HOSPITAL | Age: 74
Discharge: HOME | End: 2019-07-02
Attending: INTERNAL MEDICINE
Payer: MEDICARE

## 2019-07-02 ENCOUNTER — OFFICE VISIT (OUTPATIENT)
Dept: INTERNAL MEDICINE | Facility: CLINIC | Age: 74
End: 2019-07-02
Payer: MEDICARE

## 2019-07-02 VITALS
DIASTOLIC BLOOD PRESSURE: 58 MMHG | SYSTOLIC BLOOD PRESSURE: 172 MMHG | HEIGHT: 63 IN | BODY MASS INDEX: 23.39 KG/M2 | WEIGHT: 132 LBS

## 2019-07-02 DIAGNOSIS — R42 DIZZINESS: ICD-10-CM

## 2019-07-02 DIAGNOSIS — Z00.00 HEALTH CARE MAINTENANCE: ICD-10-CM

## 2019-07-02 DIAGNOSIS — M79.641 BILATERAL HAND PAIN: ICD-10-CM

## 2019-07-02 DIAGNOSIS — Z12.9 SCREENING FOR CANCER: ICD-10-CM

## 2019-07-02 DIAGNOSIS — Z87.891 PERSONAL HISTORY OF NICOTINE DEPENDENCE: ICD-10-CM

## 2019-07-02 DIAGNOSIS — M79.642 BILATERAL HAND PAIN: ICD-10-CM

## 2019-07-02 DIAGNOSIS — I35.1 SEVERE AORTIC REGURGITATION: ICD-10-CM

## 2019-07-02 DIAGNOSIS — I10 ESSENTIAL HYPERTENSION: Primary | ICD-10-CM

## 2019-07-02 PROCEDURE — 3078F DIAST BP <80 MM HG: CPT | Mod: CPTII,S$GLB,, | Performed by: INTERNAL MEDICINE

## 2019-07-02 PROCEDURE — 99999 PR PBB SHADOW E&M-EST. PATIENT-LVL IV: ICD-10-PCS | Mod: PBBFAC,,, | Performed by: INTERNAL MEDICINE

## 2019-07-02 PROCEDURE — 3077F SYST BP >= 140 MM HG: CPT | Mod: CPTII,S$GLB,, | Performed by: INTERNAL MEDICINE

## 2019-07-02 PROCEDURE — 99214 PR OFFICE/OUTPT VISIT, EST, LEVL IV, 30-39 MIN: ICD-10-PCS | Mod: S$GLB,,, | Performed by: INTERNAL MEDICINE

## 2019-07-02 PROCEDURE — 99499 RISK ADDL DX/OHS AUDIT: ICD-10-PCS | Mod: S$GLB,,, | Performed by: INTERNAL MEDICINE

## 2019-07-02 PROCEDURE — 3077F PR MOST RECENT SYSTOLIC BLOOD PRESSURE >= 140 MM HG: ICD-10-PCS | Mod: CPTII,S$GLB,, | Performed by: INTERNAL MEDICINE

## 2019-07-02 PROCEDURE — 99999 PR PBB SHADOW E&M-EST. PATIENT-LVL IV: CPT | Mod: PBBFAC,,, | Performed by: INTERNAL MEDICINE

## 2019-07-02 PROCEDURE — 1101F PT FALLS ASSESS-DOCD LE1/YR: CPT | Mod: CPTII,S$GLB,, | Performed by: INTERNAL MEDICINE

## 2019-07-02 PROCEDURE — 99214 OFFICE O/P EST MOD 30 MIN: CPT | Mod: S$GLB,,, | Performed by: INTERNAL MEDICINE

## 2019-07-02 PROCEDURE — 73130 X-RAY EXAM OF HAND: CPT | Mod: 26,50,, | Performed by: RADIOLOGY

## 2019-07-02 PROCEDURE — 3078F PR MOST RECENT DIASTOLIC BLOOD PRESSURE < 80 MM HG: ICD-10-PCS | Mod: CPTII,S$GLB,, | Performed by: INTERNAL MEDICINE

## 2019-07-02 PROCEDURE — 99499 UNLISTED E&M SERVICE: CPT | Mod: S$GLB,,, | Performed by: INTERNAL MEDICINE

## 2019-07-02 PROCEDURE — 73130 X-RAY EXAM OF HAND: CPT | Mod: 50,TC

## 2019-07-02 PROCEDURE — 1101F PR PT FALLS ASSESS DOC 0-1 FALLS W/OUT INJ PAST YR: ICD-10-PCS | Mod: CPTII,S$GLB,, | Performed by: INTERNAL MEDICINE

## 2019-07-02 PROCEDURE — 73130 XR HAND COMPLETE 3 VIEWS BILATERAL: ICD-10-PCS | Mod: 26,50,, | Performed by: RADIOLOGY

## 2019-07-02 RX ORDER — GABAPENTIN 100 MG/1
100 CAPSULE ORAL NIGHTLY
Qty: 90 CAPSULE | Refills: 2 | Status: SHIPPED | OUTPATIENT
Start: 2019-07-02 | End: 2020-11-20 | Stop reason: SDUPTHER

## 2019-07-02 RX ORDER — NEBIVOLOL HYDROCHLORIDE 20 MG/1
TABLET ORAL
Refills: 11 | COMMUNITY
Start: 2019-05-15 | End: 2019-11-09 | Stop reason: SDUPTHER

## 2019-07-02 NOTE — PROGRESS NOTES
"Subjective:       Patient ID: Buffy Dominique is a 73 y.o. female.    Chief Complaint: Arthritis (reports of arthritic pain in GAURAV hands (patient did have carpal tunnel surgery, 2 years), shoulders and ankles. is requesting muscle relaxant to take (mainly at night). pain intensifies at night. ); Follow-up (routine F/U on HTN. readings are normally 150-160s sometimes even 180s systolic. no unusual sx when BP is elevated.); and Medication Problem (every now & then, patient gets dizzy spells (recurrent) from after taking HTN medication. is it a possible side effect?)    HPI   74 yo F here for evaluation of HTN with elevated readings and dizzy spells when taking medication.    Amlodipine-olmesartan 10-40mg  bystolic 20mg daily   hctz 25mg daily (increased from 12.5mg in Nov 2018).    She has arthritis in hands, shoulders, and ankles. Takes aleve OTC about twice a day.  Hands are worse. Trouble closing fingers.   Left shoulder bothering her a lot last week.     Severe aortic regurg    Review of Systems   Constitutional: Negative for fever.   HENT: Negative.    Eyes: Negative.    Respiratory: Negative for shortness of breath.    Cardiovascular: Negative for chest pain and leg swelling.   Gastrointestinal: Negative for abdominal pain, diarrhea, nausea and vomiting.   Genitourinary: Negative.    Musculoskeletal: Negative for arthralgias.   Skin: Negative for rash.   Psychiatric/Behavioral: Negative.        Objective:   BP (!) 172/58   Ht 5' 3" (1.6 m)   Wt 59.9 kg (132 lb)   LMP  (LMP Unknown)   BMI 23.38 kg/m²      Physical Exam   Constitutional: She is oriented to person, place, and time. She appears well-developed and well-nourished. No distress.   HENT:   Head: Normocephalic and atraumatic.   Cardiovascular: Normal rate and regular rhythm.   Murmur heard.  Pulmonary/Chest: Effort normal. No respiratory distress. She has no wheezes. She has no rales.   Musculoskeletal:   Right digits 1-4 DIP swollen   Neurological: She is " "alert and oriented to person, place, and time.   Skin: Skin is warm and dry. She is not diaphoretic.   Psychiatric: She has a normal mood and affect. Her behavior is normal.       Assessment:       1. Essential hypertension    2. Dizziness    3. Health care maintenance    4. Severe aortic regurgitation    5. Bilateral hand pain    6. Screening for cancer    7. Personal history of nicotine dependence         Plan:       Buffy was seen today for arthritis, follow-up and medication problem.    Diagnoses and all orders for this visit:    Essential hypertension  -     CBC auto differential; Future  -     Comprehensive metabolic panel; Future  -     Magnesium; Future  -     Hypertension Digital Medicine (HDMP) Enrollment Order  -     Hypertension Digital Medicine ([facility]): Assign Onboarding Questionnaires    Dizziness  -     CBC auto differential; Future  -     Comprehensive metabolic panel; Future  -     Magnesium; Future    Health care maintenance  -     Hepatitis C antibody; Future    Severe aortic regurgitation    Bilateral hand pain  -     Sedimentation rate; Future  -     C-reactive protein; Future  -     X-Ray Hand 3 View Bilateral; Future  -     Ambulatory consult to Rheumatology  -     gabapentin (NEURONTIN) 100 MG capsule; Take 1 capsule (100 mg total) by mouth every evening. Increase by 100mg every 4 days to 300mg qhs.    Screening for cancer  Personal history of nicotine dependence   -     CT Chest Lung Screening Low Dose; Future          Will enroll in digital htn  Advised to stop aleve    Wants to donate body to [facility]; will file paperwork.  States she would not want to be kept alive artificially if "vegetable". Discussed importance of sharing wishes with family members and completing advance directive paperwork. Paperwork provided.   Having stress echo this week with Dr. Cavazos.   "

## 2019-07-05 ENCOUNTER — HOSPITAL ENCOUNTER (OUTPATIENT)
Dept: RADIOLOGY | Facility: HOSPITAL | Age: 74
Discharge: HOME | End: 2019-07-05
Attending: INTERNAL MEDICINE
Payer: MEDICARE

## 2019-07-05 ENCOUNTER — HOSPITAL ENCOUNTER (OUTPATIENT)
Dept: CARDIOLOGY | Facility: CLINIC | Age: 74
Discharge: HOME | End: 2019-07-05
Attending: INTERNAL MEDICINE
Payer: MEDICARE

## 2019-07-05 VITALS
DIASTOLIC BLOOD PRESSURE: 75 MMHG | WEIGHT: 132 LBS | BODY MASS INDEX: 23.39 KG/M2 | SYSTOLIC BLOOD PRESSURE: 181 MMHG | TEMPERATURE: 124 F | HEIGHT: 63 IN

## 2019-07-05 DIAGNOSIS — I08.0 MITRAL VALVE INSUFFICIENCY AND AORTIC VALVE INSUFFICIENCY: ICD-10-CM

## 2019-07-05 DIAGNOSIS — Z87.891 PERSONAL HISTORY OF NICOTINE DEPENDENCE: ICD-10-CM

## 2019-07-05 DIAGNOSIS — Z12.9 SCREENING FOR CANCER: ICD-10-CM

## 2019-07-05 LAB
ASCENDING AORTA: 2.79 CM
AV INDEX (PROSTH): 0.78
AV MEAN GRADIENT: 6 MMHG
AV PEAK GRADIENT: 10 MMHG
AV VALVE AREA: 2.35 CM2
AV VELOCITY RATIO: 0.8
BSA FOR ECHO PROCEDURE: 1.63 M2
CV ECHO LV RWT: 0.4 CM
DOP CALC AO PEAK VEL: 1.62 M/S
DOP CALC AO VTI: 41.28 CM
DOP CALC LVOT AREA: 3 CM2
DOP CALC LVOT DIAMETER: 1.96 CM
DOP CALC LVOT PEAK VEL: 1.3 M/S
DOP CALC LVOT STROKE VOLUME: 96.8 CM3
DOP CALCLVOT PEAK VEL VTI: 32.1 CM
E WAVE DECELERATION TIME: 180.71 MSEC
E/A RATIO: 1.21
E/E' RATIO: 15.6 M/S
ECHO LV POSTERIOR WALL: 0.91 CM (ref 0.6–1.1)
FRACTIONAL SHORTENING: 27 % (ref 28–44)
INTERVENTRICULAR SEPTUM: 0.98 CM (ref 0.6–1.1)
IVRT: 0.1 MSEC
LA MAJOR: 5.53 CM
LA MINOR: 5.54 CM
LA WIDTH: 3.78 CM
LEFT ATRIUM SIZE: 4.44 CM
LEFT ATRIUM VOLUME INDEX: 48.7 ML/M2
LEFT ATRIUM VOLUME: 78.96 CM3
LEFT INTERNAL DIMENSION IN SYSTOLE: 3.34 CM (ref 2.1–4)
LEFT VENTRICLE DIASTOLIC VOLUME INDEX: 59.46 ML/M2
LEFT VENTRICLE DIASTOLIC VOLUME: 96.37 ML
LEFT VENTRICLE MASS INDEX: 90 G/M2
LEFT VENTRICLE SYSTOLIC VOLUME INDEX: 27.9 ML/M2
LEFT VENTRICLE SYSTOLIC VOLUME: 45.29 ML
LEFT VENTRICULAR INTERNAL DIMENSION IN DIASTOLE: 4.58 CM (ref 3.5–6)
LEFT VENTRICULAR MASS: 146.01 G
LV LATERAL E/E' RATIO: 16.71 M/S
LV SEPTAL E/E' RATIO: 14.63 M/S
MV PEAK A VEL: 0.97 M/S
MV PEAK E VEL: 1.17 M/S
PISA TR MAX VEL: 3.03 M/S
PULM VEIN S/D RATIO: 1.31
PV PEAK D VEL: 0.49 M/S
PV PEAK S VEL: 0.64 M/S
RA MAJOR: 4.59 CM
RA PRESSURE: 3 MMHG
RA WIDTH: 3.38 CM
RIGHT VENTRICULAR END-DIASTOLIC DIMENSION: 3.49 CM
RV TISSUE DOPPLER FREE WALL SYSTOLIC VELOCITY 1 (APICAL 4 CHAMBER VIEW): 11.8 CM/S
SINUS: 3.02 CM
STJ: 2.83 CM
TDI LATERAL: 0.07 M/S
TDI SEPTAL: 0.08 M/S
TDI: 0.08 M/S
TR MAX PG: 37 MMHG
TRICUSPID ANNULAR PLANE SYSTOLIC EXCURSION: 2.41 CM
TV REST PULMONARY ARTERY PRESSURE: 40 MMHG

## 2019-07-05 PROCEDURE — G0297 LDCT FOR LUNG CA SCREEN: HCPCS | Mod: TC

## 2019-07-05 PROCEDURE — 93306 TTE W/DOPPLER COMPLETE: CPT | Mod: S$GLB,,, | Performed by: INTERNAL MEDICINE

## 2019-07-05 PROCEDURE — G0297 LDCT FOR LUNG CA SCREEN: HCPCS | Mod: 26,,, | Performed by: RADIOLOGY

## 2019-07-05 PROCEDURE — 93306 TRANSTHORACIC ECHO (TTE) COMPLETE (CUPID ONLY): ICD-10-PCS | Mod: S$GLB,,, | Performed by: INTERNAL MEDICINE

## 2019-07-05 PROCEDURE — G0297 CT CHEST LUNG SCREENING LOW DOSE: ICD-10-PCS | Mod: 26,,, | Performed by: RADIOLOGY

## 2019-07-08 ENCOUNTER — PATIENT OUTREACH (OUTPATIENT)
Dept: OTHER | Facility: OTHER | Age: 74
End: 2019-07-08

## 2019-07-08 ENCOUNTER — PATIENT MESSAGE (OUTPATIENT)
Dept: CARDIOLOGY | Facility: CLINIC | Age: 74
End: 2019-07-08

## 2019-07-08 ENCOUNTER — TELEPHONE (OUTPATIENT)
Dept: RHEUMATOLOGY | Facility: CLINIC | Age: 74
End: 2019-07-08

## 2019-07-08 NOTE — PROGRESS NOTES
Digital Medicine Enrollment Call    Introduced Mrs. Buffy Dominique to Digital Medicine.     Discussed program expectations and requirements.    Introduced digital medicine care team.     Reviewed the importance of self-monitoring for digital medicine participation.     Reviewed that the Digital Medicine team is not available for emergencies and instructed the patient to call 911 or [facility] On [facility] ([phone] or [phone]) if one arises.    Pt states that she takes her BP measurements before taking her BP medications. Pt advised of the importance of the proper BP technique. She states that she will take her BP medications first, have breakfast with her coffee, wait at least an hour, then take her BP measurements going forward.        Last 5 Patient Entered Readings                                      Current 30 Day Average: 143/65     Recent Readings 7/8/2019 7/7/2019 7/6/2019 7/6/2019 7/6/2019    SBP (mmHg) 164 160 113 138 141    DBP (mmHg) 64 70 52 57 54    Pulse 44 43 45 46 45

## 2019-07-08 NOTE — TELEPHONE ENCOUNTER
----- Message from Deirdre Rajan sent at 7/8/2019  9:36 AM CDT -----  Contact: Self  Pt stated that her insurance transportation does not run until 8:30 and would like to see if she can get her appointment on 07/12 pushed backed a little to allow time to get there, she stated that she could not reschedule for a an appt in October due to her hand pain.    Contact pt @ [phone]

## 2019-07-08 NOTE — LETTER
July 8, 2019     Buffy Dominique  [address]  [facility] [address]       Dear Buffy,    Welcome to [facility]! Our goal is to make care effective, proactive and convenient by using data you send us from home to better treat your chronic conditions.          My name is Rosana Veloz, and I am your dedicated Digital Medicine clinician. As an expert in medication management, I will help ensure that the medications you are taking continue to provide the intended benefits and help you reach your goals. You can reach me directly at [phone] or by sending me a message directly through your [facility] account.      I am Diana Robbins and I will be your health . My job is to help you identify lifestyle changes to improve your disease control. We will talk about nutrition, exercise, and other ways you may be able to adjust your current habits to better your health. Additionally, we will help ensure you are completing the tests and screenings that are necessary to help manage your conditions. You can reach me directly at  or by sending me a message directly through your [facility] account.    Most importantly, YOU are at the center of this team. Together, we will work to improve your overall health and encourage you to meet your goals for a healthier lifestyle.     What we expect from YOU:  · Please take frequent home blood pressure measurements. We ask that you take at least 1 blood pressure reading per week, but more information will better help us get you know you. Be sure you rest for a few minutes before taking the reading in a quiet, comfortable place.     Be available to receive phone calls or [facility] messages, when appropriate, from your care team. Please let us know if there are any specific days or times that work best for us to reach you via phone.     Complete routine tests and screenings. Dont worry, we will help keep you on track!           What you should expect from your  Digital Medicine Care Team:   We will work with you to create a personalized plan of care and provide you with encouragement and education, including regarding lifestyle changes, that could help you manage your disease states.     We will adjust your current medications, if needed, and continue to monitor your long-term progress.     We will provide you and your physician with monthly progress reports after you have been in the program for more than 30 days.     We will send you reminders through [facility] and text messages to help ensure you do not miss any testing deadlines to help manage your disease states.    You will be able to reach us by phone or through your [facility] account by clicking our names under Care Team on the right side of the home screen.    I look forward to working with you to achieve your blood pressure goals!    We look forward to working with you to help manage your health,    Sincerely,    Your Digital Medicine Team    Please visit our websites to learn more:   · Hypertension: [email or web address]      Remember, we are not available for emergencies. If you have an emergency, please contact your doctors office directly or call [facility] on-call ([phone] or [phone]) or 911.

## 2019-07-09 ENCOUNTER — PATIENT OUTREACH (OUTPATIENT)
Dept: OTHER | Facility: OTHER | Age: 74
End: 2019-07-09

## 2019-07-09 NOTE — PROGRESS NOTES
Last 5 Patient Entered Readings                                      Current 30 Day Average: 147/66     Recent Readings 7/9/2019 7/8/2019 7/8/2019 7/8/2019 7/7/2019    SBP (mmHg) 163 162 152 164 160    DBP (mmHg) 70 70 65 64 70    Pulse 45 46 46 44 43        Digital Medicine: Health  Introduction    Introduced Ms. Buffy Dominique to Digital Medicine. Discussed health  role and recommended lifestyle modifications.    Lifestyle Assessment:  Current Dietary Habits(i.e. low sodium, food labels, dining out): Ms. Dominique states that she typically eats frozen meals in the summer because turning on the oven makes her mobile home too hot. Discussed sodium guidelines and reading nutritional labels. Encouraged her to choose the lower sodium frozen meal options. She states that she does not add any salt to her food but does eat packaged foods and she is not sure how much salt they have in them. Discussed being cognizant of reading nutritional labels at the store to look out for sodium content.    Exercise: Ms. Dominique states that [place] and really loves to spend time outdoors so she does a good amount of walking throughout the park to  trash and litter throughout the day. Recently she reports that she has not been able to do this as much because of the heat. She states that her good friend/neighbor and her like to go to the [facility] [place] to go to swimming classes there as well as to take part in the social activities. However, she states that her friend has similar health problems and has been sick lately so they have not been in attendance recently. She does not drive anymore and therefore her friend is her transportation to the [facility] so she cannot go alone. She also described that she is not near any public transportation to get her there either. She does admit that she has been trying to stay busy and she does not like to sit around, so in the absence of these  activities at [place], she has been walking her [employer] and going to participate in her [employer] activities.   Alcohol/Tobacco: Deferred.   Medication Adherence: has been compliant with the medicaiton regimen      Reviewed AHA/AACE recommendations:  Limit sodium intake to <2000mg/day  Perform 150 minutes of physical activity per week    Reviewed the importance of self-monitoring, medication adherence, and that the health  can be used as a resource for lifestyle modifications to help reduce or maintain a healthy lifestyle.  Reviewed that the Digital Medicine team is not available for emergencies and instructed the patient to call 911 or [facility] On Call ([phone] or [phone]) if one arises.

## 2019-07-12 DIAGNOSIS — Z12.9 SCREENING FOR CANCER: ICD-10-CM

## 2019-07-12 DIAGNOSIS — Z87.891 PERSONAL HISTORY OF NICOTINE DEPENDENCE: ICD-10-CM

## 2019-07-18 ENCOUNTER — PATIENT OUTREACH (OUTPATIENT)
Dept: OTHER | Facility: OTHER | Age: 74
End: 2019-07-18

## 2019-07-18 NOTE — PROGRESS NOTES
Last 5 Patient Entered Readings                                      Current 30 Day Average: 146/69     Recent Readings 7/18/2019 7/18/2019 7/18/2019 7/17/2019 7/17/2019    SBP (mmHg) 135 167 190 152 160    DBP (mmHg) 88 70 63 67 71    Pulse 48 49 53 52 52        Digital Medicine: Health  Follow Up - High BP Reading Alert     Follow up with Ms. Dominique due to a high blood pressure reading alert.  She states that her high BP this morning (190/63 mmHg) was due to stress. She reports that her water line broke which is in the closet of her mobile home and she was dealing with fixing that and drying her clothes. She states that it is now fixed and she is much calmer and she even took a walk in the park to get some exercise. She reports that she will take another reading later today when she is more relaxed as well.   Will follow up as scheduled on 7/30 to discuss lifestyle modifications.

## 2019-07-19 ENCOUNTER — HOSPITAL ENCOUNTER (OUTPATIENT)
Dept: RADIOLOGY | Facility: HOSPITAL | Age: 74
Discharge: HOME | End: 2019-07-19
Attending: INTERNAL MEDICINE
Payer: MEDICARE

## 2019-07-19 DIAGNOSIS — Z12.31 BREAST CANCER SCREENING BY MAMMOGRAM: ICD-10-CM

## 2019-07-19 PROCEDURE — 77067 SCR MAMMO BI INCL CAD: CPT | Mod: 26,,, | Performed by: RADIOLOGY

## 2019-07-19 PROCEDURE — 77067 SCR MAMMO BI INCL CAD: CPT | Mod: TC

## 2019-07-19 PROCEDURE — 77063 MAMMO DIGITAL SCREENING BILAT WITH TOMOSYNTHESIS_CAD: ICD-10-PCS | Mod: 26,,, | Performed by: RADIOLOGY

## 2019-07-19 PROCEDURE — 77067 MAMMO DIGITAL SCREENING BILAT WITH TOMOSYNTHESIS_CAD: ICD-10-PCS | Mod: 26,,, | Performed by: RADIOLOGY

## 2019-07-19 PROCEDURE — 77063 BREAST TOMOSYNTHESIS BI: CPT | Mod: 26,,, | Performed by: RADIOLOGY

## 2019-07-30 ENCOUNTER — PATIENT OUTREACH (OUTPATIENT)
Dept: OTHER | Facility: OTHER | Age: 74
End: 2019-07-30

## 2019-07-30 NOTE — PROGRESS NOTES
Last 5 Patient Entered Readings                                      Current 30 Day Average: 143/68     Recent Readings 7/30/2019 7/29/2019 7/29/2019 7/28/2019 7/27/2019    SBP (mmHg) 141 126 159 141 139    DBP (mmHg) 61 57 72 77 63    Pulse 48 49 50 50 50        Digital Medicine: Health  Follow Up    Lifestyle Modifications:    1.Dietary Modifications (Sodium intake <2,000mg/day, food labels, dining out): Ms. Dominique states that she is trying to eat better and has been cutting out salt completely. She states that she has been trying her best with her diet and is happy to be seeing some of her readings coming down.     2.Physical Activity: Ms. Dominique states that she has been doing more physical activity lately as she has been walking with Mina in the early mornings. She states that her friend is doing okay but is still somewhat ill but hopefully she will recuperate soon so that they can return to their classes at [employer]. She states that she has been carrying mina during their walks so she is walking with a weight almost.     3.Medication Therapy: Patient has been compliant with the medication regimen.    4.Patient has the following medication side effects/concerns: None.   (Frequency/Alleviating factors/Precipitating factors, etc.)     Ms. 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 She states that this is always what she has done for work and that she really hopes to get this job because she does not like sitting around at home. She states that she is flying to [place] Monday for orientation and will be there through Friday. Discussed that I will follow up with Ms. Dominique to see how the job interview went.     Follow up with Ms. Buffy Dominique completed. No further questions or concerns. Will continue to follow up to achieve health goals.

## 2019-08-09 ENCOUNTER — PATIENT OUTREACH (OUTPATIENT)
Dept: OTHER | Facility: OTHER | Age: 74
End: 2019-08-09

## 2019-08-09 NOTE — PROGRESS NOTES
Last 5 Patient Entered Readings                                      Current 30 Day Average: 145/68     Recent Readings 8/9/2019 8/8/2019 8/7/2019 8/6/2019 8/6/2019    SBP (mmHg) 137 123 156 123 148    DBP (mmHg) 53 41 62 66 62    Pulse 51 50 53 51 53          Digital Medicine: Health  Follow Up    Left voicemail to follow up with Ms. Buffy Dominique regarding how her interview went for the new job.   Current BP average 145/68 mmHg is not at goal, <130/80 mmHg.  Will follow up on 8/23 to continue to discuss health goals.

## 2019-08-11 ENCOUNTER — PATIENT MESSAGE (OUTPATIENT)
Dept: INTERNAL MEDICINE | Facility: CLINIC | Age: 74
End: 2019-08-11

## 2019-08-12 ENCOUNTER — PATIENT MESSAGE (OUTPATIENT)
Dept: INTERNAL MEDICINE | Facility: CLINIC | Age: 74
End: 2019-08-12

## 2019-08-12 NOTE — TELEPHONE ENCOUNTER
Pt is unavailable to come on the date already scheduled. Wanted to come in on 8/28/19 but all you have is a 14 day change. Unsure if this is okay

## 2019-08-13 NOTE — TELEPHONE ENCOUNTER
Karen booked appointment, patient is scheduled for HTN F/U on 8/28. Appointment letter will be mailed as a reminder.

## 2019-08-23 NOTE — PROGRESS NOTES
"Last 5 Patient Entered Readings                                      Current 30 Day Average: 144/66     Recent Readings 8/23/2019 8/22/2019 8/21/2019 8/20/2019 8/19/2019    SBP (mmHg) 146 140 144 121 167    DBP (mmHg) 61 72 58 62 71    Pulse 58 55 48 60 47        Digital Medicine: Health  Follow Up    Lifestyle Modifications:    1.Dietary Modifications (Sodium intake <2,000mg/day, food labels, dining out): Ms. Dominique reports that she is not adding any salt at all to her foods. She states that she is "daniel getting the hang of it". She reports that she has been trying to look at her foods and the nutrition labels and google them to see the sodium content. She states that she knows she is not doing as well as she knows that she should. She states that she still cant do whole grains like she knows that she should. She reports that she would like to tackle the salt first and then work on the rest of her diet. She reports that she is doing this on her own but would like to at some point meet with a nutritionist. She describes that she has been trying very hard to continue focusing on her diet while she has been on the road.     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 She states that she is familiar with trying to get exercise in while she is on the road and has tried to keep active now that she is working again.     3.Medication Therapy: Patient has been compliant with the medication regimen.    4.Patient has the following medication side effects/concerns: None.   (Frequency/Alleviating factors/Precipitating factors, etc.)     Ms. 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 Congratulated her on getting the job that she really wanted. She reports that sometimes she messes up and eats something she knows she shouldn't. She states that she feels that eating better is hard sometimes. She " attributes her higher reading on 8/14 (186/82 mmHg) to the fact that she had eaten some chips that day. She states that she had a few days at home recently and is ready to go back and work now. She describes that she used to be afraid of her pressure because her biggest fear is having a stroke, but she feels more comfortable being in the program and knowing the her pressure is coming down.     Follow up with Ms. Buffy Trip completed. No further questions or concerns. Will continue to follow up to achieve health goals.

## 2019-09-13 ENCOUNTER — PATIENT OUTREACH (OUTPATIENT)
Dept: OTHER | Facility: OTHER | Age: 74
End: 2019-09-13

## 2019-09-23 NOTE — PROGRESS NOTES
"Digital Medicine: Health  Follow-Up    Ms. Dominique states that the couple higher readings she had were when she was on the road. She states that she is getting plenty of sleep but felt very fatigued earlier today. Encouraged calling [facility] on call if she experiences symptoms like this. She states that she will get her flu shot soon. She reports that she tried to go to [employer] but it was too busy. She states that she has been maintaining her weight. She requests an email with the healthy  resource. Told Ms. Dominique Happy Birthday as today is her birthday. She reports that she celebrated her birthday over the weekend and is just resting now before she goes out for another job.     The history is provided by the patient.     Follow Up  Follow-up reason(s): reading review and goal follow-up      Readings are trending up due to lifestyle change and eating "junk" on the road .    Routine Education Topics: meal planning            Diet:   Patient reports eating or drinking the following: chips     Ms. Dominique states that she is trying to say no to snacks, and she is drinking water a lot on the road. Discussed no salt chip options, she reports that she might go get ahead of time and get no salt chips to keep in the truck. She states that she will also eat peanuts with no salt.     Intervention(s): meal planning and reducing sodium intake    Assigning the following patient goals: maintain low sodium diet    Physical Activity:   When asked if exercising, patient responded: yes    Patient participates in the following activities: walking    Ms. Dominique states that her quinn Armendariz rides with her in the truck, she reports that she loves to ride and it is great for both of them as she will take walks with Kala.     Intervention(s): exercise ideas     Assigning the following patient goal(s): participate in exercise weekly    Medication Adherence:   She misses doses: never        SDOH    INTERVENTION(S)  recommended diet " modifications and encouragement/support    PLAN  patient amenable to changes    Ms. Dominique states that she will work on getting no salt snack options for when she is on the road. She reports that she would like to keep walking with Kala and will work on getting her flu shot soon before she has to go back out in the truck.       There are no preventive care reminders to display for this patient.    Last 5 Patient Entered Readings                                      Current 30 Day Average: 136/65     Recent Readings 9/23/2019 9/17/2019 9/15/2019 9/14/2019 9/12/2019    SBP (mmHg) 128 154 136 112 169    DBP (mmHg) 61 66 63 56 78    Pulse 51 49 47 52 52

## 2019-10-16 ENCOUNTER — PATIENT OUTREACH (OUTPATIENT)
Dept: OTHER | Facility: OTHER | Age: 74
End: 2019-10-16

## 2019-11-01 ENCOUNTER — PATIENT MESSAGE (OUTPATIENT)
Dept: INTERNAL MEDICINE | Facility: CLINIC | Age: 74
End: 2019-11-01

## 2019-11-02 ENCOUNTER — PATIENT MESSAGE (OUTPATIENT)
Dept: INTERNAL MEDICINE | Facility: CLINIC | Age: 74
End: 2019-11-02

## 2019-11-04 NOTE — TELEPHONE ENCOUNTER
Spoke to patient on earlier last week regarding appointment. Patient is scheduled for visit on 11-20 at 8:30 am. Will have Juanis book. Just a reminder.

## 2019-11-05 ENCOUNTER — PATIENT OUTREACH (OUTPATIENT)
Dept: ADMINISTRATIVE | Facility: HOSPITAL | Age: 74
End: 2019-11-05

## 2019-11-05 NOTE — PROGRESS NOTES
Spoke with patient, remains active and monitored by [facility]. She was instructed to take all scheduled antihypertensive medications the morning of her appointment.

## 2019-11-07 ENCOUNTER — PATIENT OUTREACH (OUTPATIENT)
Dept: OTHER | Facility: OTHER | Age: 74
End: 2019-11-07

## 2019-11-07 NOTE — PROGRESS NOTES
Digital Medicine: Health  Follow-Up    Ms. Dominique states that she has been doing more hard night driving lately which she admits has been a bit stressful but she states that she has had to due to the weather. She states that this might be why she has been feeling some more dizziness lately but states that this might also be a side effect of her hypertension drugs which she has dealt with before. She states that it is not too much of a bother because she just waits until the dizziness goes away before she drives. She states that she knows that this is a side effect but does not want to change the medications that she is on as she states that they are working well. She states that she has been trying very hard to take readings while on the road. She reports that she has been pretty happy overall with how her readings have been looking. She reports that she has an appointment with her primary care doctor on the 20th and looks forward to checking in then.     The history is provided by the patient.     Follow Up  Follow-up reason(s): reading review and goal follow-up      Readings are trending down due to lifestyle change.        INTERVENTION(S)  encouragement/support    PLAN  patient amenable to changes      There are no preventive care reminders to display for this patient.    Last 5 Patient Entered Readings                                      Current 30 Day Average: 137/65     Recent Readings 11/4/2019 11/2/2019 10/30/2019 10/27/2019 10/26/2019    SBP (mmHg) 137 100 133 152 113    DBP (mmHg) 67 61 64 64 56    Pulse 50 49 56 53 56                      Diet Screening   Patient reports eating or drinking the following: fresh vegetables and homemade soup     Ms. Dominique states that she is currently eating some homemade vegetable soup with no salt! She reports that she has been trying very hard with her diet and has been doing well overall eating more vegetables and less salt. She states that occasionally she will fall  off and have a few chips and will see the consequences in her readings but she states that she has been doing much better than before.     Assigning the following patient goals: maintain low sodium diet    Physical Activity Screening   When asked if exercising, patient responded: no    Ms. Dominique states that she is not doing as much activity right now but she has also not been gaining weight. 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 Discussed that she might want to start taking pictures of all of the cool places she is getting to visit while driving and that she might take a walk around when she stops to take those pictures.     Medication Adherence Screening   She misses doses: never

## 2019-11-11 RX ORDER — NEBIVOLOL HYDROCHLORIDE 20 MG/1
TABLET ORAL
Qty: 90 TABLET | Refills: 0 | Status: SHIPPED | OUTPATIENT
Start: 2019-11-11 | End: 2020-01-07 | Stop reason: SDUPTHER

## 2019-12-26 ENCOUNTER — PATIENT OUTREACH (OUTPATIENT)
Dept: OTHER | Facility: OTHER | Age: 74
End: 2019-12-26

## 2019-12-26 NOTE — PROGRESS NOTES
"Digital Medicine: Health  Follow-Up    Ms. Dominique states that she has some other issues that she would like to discuss with her primary care doctor. She states that she has a hemorrhoid that has been giving her a lot of issues lately. She thinks that she may have gained a few pounds over the holidays. She states that it has been very cold where she is lately. She states that she is trying to get in her regular readings everyday. She states that she has been trying to take her medicine early in the morning to be able to get in all of her readings. She would really like to get back to working on her health goals now that the holidays are over.     The history is provided by the patient. No  was used.     Follow Up  Follow-up reason(s): reading review      Readings are trending up due to lifestyle change and Holiday festivities.        INTERVENTION(S)  recommended diet modifications, encouragement/support and goal setting    PLAN  patient amenable to changes and continue monitoring    She states that she will start to work very hard again because "a stroke is her biggest fear".       There are no preventive care reminders to display for this patient.    Last 5 Patient Entered Readings                                      Current 30 Day Average: 142/73     Recent Readings 12/25/2019 12/23/2019 12/23/2019 12/22/2019 12/19/2019    SBP (mmHg) 166 154 163 166 141    DBP (mmHg) 74 63 77 79 70    Pulse 59 50 54 50 49                      Diet Screening       Barriers to a Healthy Diet: holidays/special events    Ms. Dominique states that she has been eating a lot of things that she "knows she should not eat". Holiday. She states that now that the holidays are over now and will start eating well again. She had a few drinks one night as well. She states that she has really been working on limiting her sodium lately and saw a huge difference in her readings because of this.     Intervention(s): reducing sodium " "intake    Assigning the following patient goals: maintain low sodium diet    Physical Activity Screening   When asked if exercising, patient responded: no    Ms. Dominique states that she is not getting back to the gym like she would like because of working. She states that she always boone far away so that she can get a walk in. She states that "she has always been a walker".         "

## 2020-01-07 ENCOUNTER — PATIENT OUTREACH (OUTPATIENT)
Dept: OTHER | Facility: OTHER | Age: 75
End: 2020-01-07

## 2020-01-07 DIAGNOSIS — I10 ESSENTIAL HYPERTENSION: Primary | ICD-10-CM

## 2020-01-07 RX ORDER — NEBIVOLOL 20 MG/1
1 TABLET ORAL DAILY
Qty: 90 TABLET | Refills: 1 | Status: SHIPPED | OUTPATIENT
Start: 2020-01-07 | End: 2020-08-28 | Stop reason: SDUPTHER

## 2020-01-07 NOTE — PROGRESS NOTES
Digital Medicine: Clinician Introduction    Buffy Dominique is a 74 y.o. female who is newly enrolled in the Digital Medicine Clinic.    The following information was reviewed and updated:  Preferred pharmacy   [id number] - [facility], [place] - [address]  [address] [facility] [address]  Phone: [phone] Fax: [phone]    [id number] - [place], [place] - [address] AT [facility]  [address] [facility] [address]  Phone: [phone] Fax: [phone]    [id number] - [place], [place] - [address] AT [facility] [address]  [address] [place] [address]  Phone: [phone] Fax: [phone]      Review of patient's allergies indicates:  No Known Allergies    Called patient to introduce myself as clinical pharmacist in the Hypertension Digital Medicine program. Ms. Baer reports that overall things are going well. She admits that she has not watched what she was eating during the holidays and plans to get back on track now that the holidays are over. She attributes this to the reason that her readings are higher than her past usual. She also expressed concerns that her pharmacy did not give her a 90-day refill on her last prescription. She travels a lot across the country and although she can request the refill when out of state it is not always convenient for her.    The history is provided by the patient. No  was used.     HYPERTENSION  Our goal is to get BP to consistently below 130/80mmHg and make the process convenient so patient can avoid extra trips to the office. Getting your blood pressure below 130/80mmHg (definition of control) will reduce your risk for heart attack, kidney failure, stroke and death (as well as kidney failure, eye disease, & dementia)      Reviewed non-pharmacologic therapies and impact on BP      Explained that we expect patient to obtain several blood pressures  per week at random times of day.  Instructed patient not to allow anyone else to use phone and monitoring device.  Confirmed appropriate BP monitoring technique.      Explained to patient that the digital medicine team is not available for emergencies.  Patient will call [facility] on-call ([phone] or [phone]) or 911 if needed.    Patient's BP goal is 130/80. Patients BP average is 142/72 mmHg, which is above goal, per 2017 ACC/AHA Hypertension Guidelines.      Med Review complete.    Allergies reviewed.      Last 5 Patient Entered Readings                                      Current 30 Day Average: 142/72     Recent Readings 1/4/2020 1/2/2020 1/2/2020 1/2/2020 1/2/2020    SBP (mmHg) 131 155 144 137 136    DBP (mmHg) 65 65 57 87 75    Pulse 49 49 120 49 49            INTERVENTION(S)  reviewed appropriate dose schedule and encouragement/support    PLAN  patient verbalizes understanding, Health  follow up and continue monitoring    Current 30-day BP avg of 142/72 is uncontrolled, does not meet goal of <130/80.  Reviewed readings: BP beginning to trend downwards. DBP is consistently controlled; SBP is consistently elevated. 15% of readings over past month have met BP goal.   Discussed impact of excess sodium on BP readings.   Verified BP Rxs are for 90-day supply.    Continue current regimen.  Patient implementing TLC for better BP control.   New Rx with refills for Bystolic sent to retail pharmacy.  Request Health  [place] follow-up on patient getting back on dietary track.    Follow-up in 4 weeks.      There are no preventive care reminders to display for this patient.    Current Medication Regimen:  Hypertension Medications             amlodipine-olmesartan (PATTIE) 10-40 mg per tablet Take 1 tablet by mouth once daily.    BYSTOLIC 20 mg Tab TAKE 1 TABLET BY MOUTH EVERY DAY    hydroCHLOROthiazide (MICROZIDE) 12.5 mg capsule Take 2 capsules (25 mg total) by mouth once daily.            Reviewed the  importance of self-monitoring, medication adherence, and that the health  can be used as a resource for lifestyle modifications to help reduce or maintain a healthy lifestyle.    Sent link to [facility] webpages and my contact information via [employer] for future questions. Follow up scheduled.         Screenings

## 2020-01-27 ENCOUNTER — PATIENT OUTREACH (OUTPATIENT)
Dept: OTHER | Facility: OTHER | Age: 75
End: 2020-01-27

## 2020-01-27 NOTE — PROGRESS NOTES
"Digital Medicine: Health  Follow-Up    Ms. Dominique states that she is still loving her work. She is feeling really good. She states that the higher readings might be due to drinking coffee while she has tested her BP. She drinks decaf coffee mostly. Advised her to wait an hour after drinking a beverage before taking her reading. She states that she is learning more about her body every day and understanding what she needs.     The history is provided by the patient. No  was used.     Follow Up  Follow-up reason(s): reading review and goal follow-up      Readings are trending up due to inaccurate technique.        INTERVENTION(S)  recommend physical activity, reviewed monitoring technique, encouragement/support and wristweights available at [facility] and exercises to use with them sent over email     PLAN  patient amenable to changes and continue monitoring    Ms. Dominique states that her goal is to gain some muscle strength. 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       There are no preventive care reminders to display for this patient.    Last 5 Patient Entered Readings                                      Current 30 Day Average: 143/74     Recent Readings 1/26/2020 1/25/2020 1/24/2020 1/23/2020 1/23/2020    SBP (mmHg) 137 141 149 146 141    DBP (mmHg) 70 67 69 71 76    Pulse 52 50 48 47 50                      Diet Screening       Ms. Dominique states that she knows when she eats "the wrong thing" and tries to avoid this. She states that she lost some weight recently. She reports that she feels like she has lost some strength as well.     Physical Activity Screening   When asked if exercising, patient responded: yes    Patient participates in the following activities: swimming/water aerobics    She identified the following barriers to physical activity: lack of time    Intermittently goes to [facility] to use the pool when she is home. Suggested using hand " weights to build up some muscle again.She is interested in wrist weights that she could use while she is driving. Will look on [employer] website and see if there are inexpensive wrist weight options and exercises and will send these over email for Ms. Trip.     Intervention(s): exercise ideas and exercise resources

## 2020-02-02 DIAGNOSIS — I10 ESSENTIAL HYPERTENSION: ICD-10-CM

## 2020-02-04 RX ORDER — HYDROCHLOROTHIAZIDE 12.5 MG/1
CAPSULE ORAL
Qty: 180 CAPSULE | Refills: 0 | Status: SHIPPED | OUTPATIENT
Start: 2020-02-04 | End: 2020-05-06

## 2020-02-04 NOTE — TELEPHONE ENCOUNTER
Please schedule patient for Labs (SCr/K/Na/LIPID)    Please also check with your provider if any further labs need to be added and scheduled together.    Thanks !

## 2020-02-04 NOTE — PROGRESS NOTES
Refill Authorization Note     is requesting a refill authorization.    Brief assessment and rationale for refill: APPROVE: needs labs     Medication-related problems identified: Requires labs    Medication Therapy Plan: NTBO(SCr/K/Na/LIPID)                              Comments:   Requested Prescriptions   Pending Prescriptions Disp Refills    hydroCHLOROthiazide (MICROZIDE) 12.5 mg capsule [Pharmacy Med Name: HYDROCHLOROTHIAZIDE 12.5MG CAPSULES] 180 capsule 0     Sig: TAKE 2 CAPSULES(25 MG) BY MOUTH EVERY DAY       Cardiovascular: Diuretics - Thiazide Failed - 2/2/2020  5:09 AM        Failed - Last BP in normal range within 360 days     BP Readings from Last 3 Encounters:   07/05/19 (!) 181/75   07/02/19 (!) 172/58   04/24/19 122/62              Failed - Ca in normal range and within 360 days     Calcium   Date Value Ref Range Status   07/02/2019 10.7 (H) 8.7 - 10.5 mg/dL Final   08/21/2018 10.4 8.7 - 10.5 mg/dL Final   01/01/2010 8.9 8.7 - 10.5 mg/dl Final              Failed - Cr is 1.4 or below and within 180 days     Creatinine   Date Value Ref Range Status   07/02/2019 1.0 0.5 - 1.4 mg/dL Final   08/21/2018 1.0 0.5 - 1.4 mg/dL Final   01/01/2010 0.8 0.5 - 1.4 mg/dl Final              Failed - K in normal range and within 180 days     Potassium   Date Value Ref Range Status   07/02/2019 4.8 3.5 - 5.1 mmol/L Final   08/21/2018 3.9 3.5 - 5.1 mmol/L Final   01/01/2010 3.9 3.5 - 5.1 mMol/l Final              Failed - Na in normal range and within 180 days     Sodium   Date Value Ref Range Status   07/02/2019 133 (L) 136 - 145 mmol/L Final   08/21/2018 135 (L) 136 - 145 mmol/L Final   01/01/2010 136 136 - 145 mMol/l Final              Failed - eGFR within 180 days     eGFR if non    Date Value Ref Range Status   07/02/2019 56 (A) >60 mL/min/1.73 m^2 Final     Comment:     Calculation used to obtain the estimated glomerular filtration  rate (eGFR) is the CKD-EPI equation.      08/21/2018 56.4  (A) >60 mL/min/1.73 m^2 Final     Comment:     Calculation used to obtain the estimated glomerular filtration  rate (eGFR) is the CKD-EPI equation.      01/01/2010 >60 >60 mL/min Final     eGFR if    Date Value Ref Range Status   07/02/2019 >60 >60 mL/min/1.73 m^2 Final   08/21/2018 >60.0 >60 mL/min/1.73 m^2 Final   01/01/2010 >60 >60 mL/min Final     Comment:     Estimated glomerular filtration rate (eGFR) is normalized to an  average body surface area of 1.73 square meters.  The calculation  used to obtain the eGFR is the adjusted MDRD equation, which factors  patient sex, age, race, and creatinine result.  Since race is unknown  in our information system, the eGFR values for -American  and Non--American patients are given for each creatinine  result.                Passed - Patient is at least 18 years old        Passed - Office visit in past 12 months or future 90 days     Recent Outpatient Visits            7 months ago Essential hypertension    [facility] Odalis Kim MD    9 months ago Flu-like symptoms    [facility] Kelsey Gamez PA-C    1 year ago Mitral valve insufficiency and aortic valve insufficiency    [facility] - Cardiology Yuan Cavazos III, MD    1 year ago Essential hypertension    [facility] Odalis Kim MD    1 year ago Essential hypertension    [facility] Jaison Ames MD

## 2020-02-10 ENCOUNTER — PATIENT OUTREACH (OUTPATIENT)
Dept: OTHER | Facility: OTHER | Age: 75
End: 2020-02-10

## 2020-02-11 RX ORDER — AMLODIPINE AND OLMESARTAN MEDOXOMIL 10; 40 MG/1; MG/1
TABLET ORAL
Qty: 90 TABLET | Refills: 1 | Status: SHIPPED | OUTPATIENT
Start: 2020-02-11 | End: 2020-04-09 | Stop reason: RX

## 2020-02-11 NOTE — PROGRESS NOTES
Refill Authorization Note     is requesting a refill authorization.    Brief assessment and rationale for refill: APPROVE: prr          Medication Therapy Plan: BP-elevated at Hospital Encounter(Cardiology/Radiology), BP-elevated at LOV, BP-wnl at OV(Franco-flu like symptoms); Approve 6 more months                              Comments:   Requested Prescriptions   Pending Prescriptions Disp Refills    amlodipine-olmesartan (PATTIE) 10-40 mg per tablet [Pharmacy Med Name: AMLODIPINE/OLMES MEDOXOM 10-40MG T] 90 tablet 1     Sig: TAKE 1 TABLET BY MOUTH EVERY DAY       Cardiovascular: CCB + ARB Combos Failed - 2/11/2020  3:38 PM        Failed - Last BP in normal range within 360 days.     BP Readings from Last 3 Encounters:   07/05/19 (!) 181/75   07/02/19 (!) 172/58   04/24/19 122/62              Passed - Patient is at least 18 years old        Passed - Office visit in past 12 months or future 90 days.     Recent Outpatient Visits            7 months ago Essential hypertension    [facility] - Internal Medicine Odalis Kim MD    9 months ago Flu-like symptoms    [facility] Internal Medicine Kelsey Gamez PA-C    1 year ago Mitral valve insufficiency and aortic valve insufficiency    [facility] - Cardiology Yuan Cavazos III, MD    1 year ago Essential hypertension    [facility] Medicine Odalis Kim MD    1 year ago Essential hypertension    [facility] - Candie Jaison Ames MD                    Passed - K in normal range and within 360 days     Potassium   Date Value Ref Range Status   07/02/2019 4.8 3.5 - 5.1 mmol/L Final   08/21/2018 3.9 3.5 - 5.1 mmol/L Final   01/01/2010 3.9 3.5 - 5.1 mMol/l Final              Passed - Cr is 1.4 or below and within 360 days     Creatinine   Date Value Ref Range Status   07/02/2019 1.0 0.5 - 1.4 mg/dL Final   08/21/2018 1.0 0.5 - 1.4 mg/dL Final   01/01/2010 0.8 0.5 - 1.4 mg/dl Final              Passed - eGFR within 360 days      eGFR if non    Date Value Ref Range Status   07/02/2019 56 (A) >60 mL/min/1.73 m^2 Final     Comment:     Calculation used to obtain the estimated glomerular filtration  rate (eGFR) is the CKD-EPI equation.      08/21/2018 56.4 (A) >60 mL/min/1.73 m^2 Final     Comment:     Calculation used to obtain the estimated glomerular filtration  rate (eGFR) is the CKD-EPI equation.      01/01/2010 >60 >60 mL/min Final     eGFR if    Date Value Ref Range Status   07/02/2019 >60 >60 mL/min/1.73 m^2 Final   08/21/2018 >60.0 >60 mL/min/1.73 m^2 Final   01/01/2010 >60 >60 mL/min Final     Comment:     Estimated glomerular filtration rate (eGFR) is normalized to an  average body surface area of 1.73 square meters.  The calculation  used to obtain the eGFR is the adjusted MDRD equation, which factors  patient sex, age, race, and creatinine result.  Since race is unknown  in our information system, the eGFR values for -American  and Non--American patients are given for each creatinine  result.

## 2020-02-24 ENCOUNTER — PATIENT OUTREACH (OUTPATIENT)
Dept: OTHER | Facility: OTHER | Age: 75
End: 2020-02-24

## 2020-02-24 NOTE — PROGRESS NOTES
"Digital Medicine: Clinician Follow-Up    Buffy Dominique submitted a reading of 86/43 at 2/23/2020 10:14 AM. "I'm alright, doing okay. I haven't checked it yet this morning cause I've been drinking coffee." She denies any signs and symptoms of hypotension - no lightheadedness, dizziness, nausea or fatigue. She shares that she took sinus medication on yesterday. She reports drinking water regularly. She is currently in [place].     The history is provided by the patient. No  was used.     Follow Up  Follow-up reason(s): reading review      Alert received.   Care Team received low BP alert.  Patient is not experiencing symptoms.      INTERVENTION(S)  encouragement/support    PLAN  patient verbalizes understanding and continue monitoring    Current 30-day BP avg of 134/69 is close to goal of <130/80.  Reviewed readings: SBP/DBP is starting to trend downwards. DBP remains well-controlled; SBP continues to vary.   Discussed adequate hydration due to diuretic use.    Continue current regimen.  Patient understands to contact [facility] if any additional questions or concerns.    Follow-up in 12 weeks.       There are no preventive care reminders to display for this patient.    Last 5 Patient Entered Readings                                      Current 30 Day Average: 134/69     Recent Readings 2/23/2020 2/23/2020 2/22/2020 2/21/2020 2/21/2020    SBP (mmHg) 86 85 115 159 151    DBP (mmHg) 43 65 64 104 84    Pulse 53 60 47 117 79             Hypertension Medications             amlodipine-olmesartan (PATTIE) 10-40 mg per tablet TAKE 1 TABLET BY MOUTH EVERY DAY    hydroCHLOROthiazide (MICROZIDE) 12.5 mg capsule TAKE 2 CAPSULES(25 MG) BY MOUTH EVERY DAY    nebivolol (BYSTOLIC) 20 mg Tab Take 1 tablet by mouth once daily.                 Screenings  "

## 2020-02-27 ENCOUNTER — PATIENT OUTREACH (OUTPATIENT)
Dept: OTHER | Facility: OTHER | Age: 75
End: 2020-02-27

## 2020-02-27 NOTE — PROGRESS NOTES
Digital Medicine: Health  Follow-Up    Ms. Dominique states that she ate food last night that raised her pressure, had pork and rice and gravy. She states that she knows that she should not have done this. She states that she is under a lot of stress right now at her work as she is still in [place], [place]. She reports that her phone has been giving her problems. She hopes that things will be better this afternoon. Inquired about how she manages stress. She says that she gets out and walks around to de stress, she also crochets. She states that she needs to call and make an appointment with her primary because she believes that she has a hemorrhoid which has been giving her gastrointestinal problems. She states that she bought over the counter creams but will call to make an appointment. She reports that she keeps a positive mindset about everything.     The history is provided by the patient. No  was used.     Follow Up  Follow-up reason(s): reading review and goal follow-up          INTERVENTION(S)  recommended diet modifications and encouragement/support    PLAN  continue monitoring      There are no preventive care reminders to display for this patient.    Last 5 Patient Entered Readings                                      Current 30 Day Average: 135/70     Recent Readings 2/27/2020 2/27/2020 2/26/2020 2/26/2020 2/24/2020    SBP (mmHg) 126 153 163 163 139    DBP (mmHg) 70 82 62 84 59    Pulse 46 47 45 46 50                      Diet Screening       She states that the food in [place] has been hard to resist. She had brisket and veggies last night at a [facility] place. She states that she tries to make sure not to do this too often.     Physical Activity Screening   When asked if exercising, patient responded: yes    Patient participates in the following activities: walking    She reports that the weather has been great in [place] so she has been able to walk around. She reports that she bought some hand  weights and feels that her hands are a bit stronger.     Intervention(s): goal tracking

## 2020-03-04 ENCOUNTER — PATIENT OUTREACH (OUTPATIENT)
Dept: OTHER | Facility: OTHER | Age: 75
End: 2020-03-04

## 2020-03-04 NOTE — PROGRESS NOTES
Ms. Dominique called and left a message saying that she had a high reading today but she is feeling fine and she is alright. She states that she ate a bunch of cheesy crackers to stay up and drove through the night last night. She states that she will take another reading later on today. Will follow up with patient tomorrow if readings continue to be high.

## 2020-03-18 ENCOUNTER — PATIENT OUTREACH (OUTPATIENT)
Dept: OTHER | Facility: OTHER | Age: 75
End: 2020-03-18

## 2020-03-18 NOTE — PROGRESS NOTES
Digital Medicine: Health  Follow-Up    Ms. 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 She reports that she was planning to come home sooner but she may have been exposed to someone who had the virus while they were loading her truck and she does not want to go home and be in contact with her son who has a weakened immune system since he was just treated for cancer. She reports that she feels alright and that her company gave her some extra money this month in case she has to get tested for the virus. She states that she is trying to stay positive. She states that she needs to call [facility] to  her amlodipine because she is almost out. Encouraged her to call [facility] soon to continue to adhere to her medications.     The history is provided by the patient. No  was used.     Follow Up  Follow-up reason(s): reading review and goal follow-up          INTERVENTION(S)  recommended diet modifications, recommend physical activity and encouragement/support    PLAN  patient amenable to changes and continue monitoring      There are no preventive care reminders to display for this patient.    Last 5 Patient Entered Readings                                      Current 30 Day Average: 135/70     Recent Readings 3/17/2020 3/17/2020 3/16/2020 3/15/2020 3/14/2020    SBP (mmHg) 145 157 100 133 168    DBP (mmHg) 73 85 52 54 75    Pulse 50 51 47 52 51                      Diet Screening   Patient reports eating or drinking the following: fast food, packaged/processed foods, restaurant food and chips     Ms. Dominique states that because of the virus, restaurants have been closing on the interstates so she has had more limited food options than usual and has not been able to eats as healthily as she would like. She states that she has tried to go to [employer] so she has more options of what to eat. Discussed choosing healthier options at [employer] such as  vegetables instead of deli meat and wheat bread rather than white bread.     Intervention(s): sodium reduction while dining out    Physical Activity Screening   When asked if exercising, patient responded: yes    Patient participates in the following activities: walking and using hand weights and tennis ball     Patient states that she has been trying to walk some while she has been out on the road, however she does walk less when she is driving than when she does at home. She reports that she has been using her hand weights and a tennis ball which has been improving her hand strength and she is happy with the results so far.     Intervention(s): goal tracking

## 2020-04-08 ENCOUNTER — PATIENT MESSAGE (OUTPATIENT)
Dept: ADMINISTRATIVE | Facility: OTHER | Age: 75
End: 2020-04-08

## 2020-04-08 ENCOUNTER — PATIENT OUTREACH (OUTPATIENT)
Dept: OTHER | Facility: OTHER | Age: 75
End: 2020-04-08

## 2020-04-08 DIAGNOSIS — I10 ESSENTIAL HYPERTENSION: Primary | ICD-10-CM

## 2020-04-09 RX ORDER — AMLODIPINE BESYLATE 10 MG/1
10 TABLET ORAL DAILY
Qty: 90 TABLET | Refills: 3 | Status: SHIPPED | OUTPATIENT
Start: 2020-04-09 | End: 2021-05-03 | Stop reason: SDUPTHER

## 2020-04-09 RX ORDER — OLMESARTAN MEDOXOMIL 40 MG/1
40 TABLET ORAL DAILY
Qty: 90 TABLET | Refills: 3 | Status: SHIPPED | OUTPATIENT
Start: 2020-04-09 | End: 2021-07-02 | Stop reason: SDUPTHER

## 2020-04-09 NOTE — PROGRESS NOTES
Called [id number] in [place], [place]. Patitent in need of hypertension medication amlodipine-olmesartan 10-40. Presently out for past 3 days. [facility] confirmed that they do not have combination tablet available as brand or generic. Updating Rx to send two separate Rxs in order for patient to resume therapy. Rx can be changed back to combination tablet when she returns to [facility].    Called patient and informed her that Rx updates have been sent directly to store. She will have 2 separate Rxs. Please resume therapy to get BP back under control. Patient understands to call if she has any other issues.

## 2020-04-23 NOTE — PROGRESS NOTES
4/23/20 chart review:  BP is moving back in the right direction with resuming her therapy of amlodipine and olmesartan. Follow-up in 12 weeks or sooner if needed.   Last 5 Patient Entered Readings                                      Current 30 Day Average: 132/73     Recent Readings 4/23/2020 4/23/2020 4/22/2020 4/21/2020 4/21/2020    SBP (mmHg) 125 143 136 111 130    DBP (mmHg) 68 77 64 56 80    Pulse 43 45 46 90 94

## 2020-05-02 DIAGNOSIS — I10 ESSENTIAL HYPERTENSION: ICD-10-CM

## 2020-05-06 RX ORDER — HYDROCHLOROTHIAZIDE 12.5 MG/1
CAPSULE ORAL
Qty: 180 CAPSULE | Refills: 0 | Status: SHIPPED | OUTPATIENT
Start: 2020-05-06 | End: 2020-08-03

## 2020-05-06 NOTE — TELEPHONE ENCOUNTER
Refill Routing Note    Medication(s) are not appropriate for processing by [facility]:       Required laboratory values are abnormal and Required vitals are abnormal        Medication Therapy Plan: Mild elevation in Ca and mild depression in Na 7/19; BP elevated at LOV but current 30-day average near goal; Defer to you in setting of abnormal vitals/labs  Medication reconciliation completed: No     Last 5 Patient Entered Readings                                      Current 30 Day Average: 135/74     Recent Readings 5/5/2020 5/5/2020 5/5/2020 5/3/2020 5/3/2020    SBP (mmHg) 142 135 176 137 136    DBP (mmHg) 66 65 127 65 98    Pulse 45 47 48 47 49             Automatic Epic Protocol Generated Data:    Requested Prescriptions   Pending Prescriptions Disp Refills    hydroCHLOROthiazide (MICROZIDE) 12.5 mg capsule [Pharmacy Med Name: HYDROCHLOROTHIAZIDE 12.5MG CAPSULES] 180 capsule 0     Sig: TAKE 2 CAPSULES(25 MG) BY MOUTH EVERY DAY       Cardiovascular: Diuretics - Thiazide Failed - 5/2/2020  5:20 AM        Failed - Last BP in normal range within 360 days.     BP Readings from Last 3 Encounters:   07/05/19 (!) 181/75   07/02/19 (!) 172/58   04/24/19 122/62              Failed - Ca in normal range and within 360 days     Calcium   Date Value Ref Range Status   07/02/2019 10.7 (H) 8.7 - 10.5 mg/dL Final   08/21/2018 10.4 8.7 - 10.5 mg/dL Final   01/01/2010 8.9 8.7 - 10.5 mg/dl Final              Failed - Cr is 1.3 or below and within 180 days     Creatinine   Date Value Ref Range Status   07/02/2019 1.0 0.5 - 1.4 mg/dL Final   08/21/2018 1.0 0.5 - 1.4 mg/dL Final   01/01/2010 0.8 0.5 - 1.4 mg/dl Final              Failed - K in normal range and within 180 days     Potassium   Date Value Ref Range Status   07/02/2019 4.8 3.5 - 5.1 mmol/L Final   08/21/2018 3.9 3.5 - 5.1 mmol/L Final   01/01/2010 3.9 3.5 - 5.1 mMol/l Final              Failed - Na is between 130 and 148 and within 180 days     Sodium   Date  Value Ref Range Status   07/02/2019 133 (L) 136 - 145 mmol/L Final   08/21/2018 135 (L) 136 - 145 mmol/L Final   01/01/2010 136 136 - 145 mMol/l Final              Failed - eGFR within 180 days     eGFR if non    Date Value Ref Range Status   07/02/2019 56 (A) >60 mL/min/1.73 m^2 Final     Comment:     Calculation used to obtain the estimated glomerular filtration  rate (eGFR) is the CKD-EPI equation.      08/21/2018 56.4 (A) >60 mL/min/1.73 m^2 Final     Comment:     Calculation used to obtain the estimated glomerular filtration  rate (eGFR) is the CKD-EPI equation.      01/01/2010 >60 >60 mL/min Final     eGFR if    Date Value Ref Range Status   07/02/2019 >60 >60 mL/min/1.73 m^2 Final   08/21/2018 >60.0 >60 mL/min/1.73 m^2 Final   01/01/2010 >60 >60 mL/min Final     Comment:     Estimated glomerular filtration rate (eGFR) is normalized to an  average body surface area of 1.73 square meters.  The calculation  used to obtain the eGFR is the adjusted MDRD equation, which factors  patient sex, age, race, and creatinine result.  Since race is unknown  in our information system, the eGFR values for -American  and Non--American patients are given for each creatinine  result.                Passed - Patient is at least 18 years old        Passed - Office visit in past 12 months or future 90 days.     Recent Outpatient Visits            10 months ago Essential hypertension    [facility] - Internal Medicine Odalis Kim MD    1 year ago Flu-like symptoms    [facility] - Internal Medicine Kelsey Gamez PA-C    1 year ago Mitral valve insufficiency and aortic valve insufficiency    [facility] - Cardiology Yuan Cavazos III, MD    1 year ago Essential hypertension    [facility] - Internal Medicine Odalis Kim MD    1 year ago Essential hypertension    [facility] - Candie Ames MD                       Appointments  past 12m or future 3m with  PCP    Date Provider   Last Visit   7/2/2019 Odalis Kim MD   Next Visit   Visit date not found Odalis Kim MD   ED visits in past 90 days: [unfilled]     Note composed:9:26 PM 05/05/2020

## 2020-05-11 ENCOUNTER — PATIENT OUTREACH (OUTPATIENT)
Dept: OTHER | Facility: OTHER | Age: 75
End: 2020-05-11

## 2020-05-11 NOTE — PROGRESS NOTES
Digital Medicine: Clinician Follow-Up    Called Buffy Dominique for hypertension follow-up. She left a message stating she was having issues with her device connecting on yesterday. She reports that her internet connection is sporadic due to traveling. She is currently in the [place] area. She confirmed that device worked later that evening and reports that everything is fine. She also confirmed picking up her prescription and having everything that she needed.    The history is provided by the patient. No  was used.     Follow Up  Follow-up reason(s): reading review and routine education          INTERVENTION(S)  encouragement/support    PLAN  continue monitoring    Current BP avg of 133/72 is close to goal of <130/80.    Continue current regimen.     Follow-up in 12 weeks or sooner if needed.       There are no preventive care reminders to display for this patient.    Last 5 Patient Entered Readings                                      Current 30 Day Average: 133/72     Recent Readings 5/10/2020 5/10/2020 5/9/2020 5/9/2020 5/8/2020    SBP (mmHg) 124 150 159 173 122    DBP (mmHg) 54 129 73 79 57    Pulse 50 50 45 46 48             Hypertension Medications             amLODIPine (NORVASC) 10 MG tablet Take 1 tablet (10 mg total) by mouth once daily.    hydroCHLOROthiazide (MICROZIDE) 12.5 mg capsule TAKE 2 CAPSULES(25 MG) BY MOUTH EVERY DAY    nebivolol (BYSTOLIC) 20 mg Tab Take 1 tablet by mouth once daily.    olmesartan (BENICAR) 40 MG tablet Take 1 tablet (40 mg total) by mouth once daily. (For BLOOD PRESSURE)                 Screenings

## 2020-05-12 ENCOUNTER — PATIENT OUTREACH (OUTPATIENT)
Dept: OTHER | Facility: OTHER | Age: 75
End: 2020-05-12

## 2020-05-12 NOTE — PROGRESS NOTES
Digital Medicine: Clinician Follow-Up    Buffy Dominique called to report that she is having issues with her [employer] BP device connecting to her cellular phone. The device is fully charged and bluetooth is enabled on her cellular. Requesting help to resolve.     The history is provided by the patient. No  was used.     Follow Up  Follow-up reason(s): reading review          INTERVENTION(S)  encouragement/support    PLAN  patient verbalizes understanding and continue monitoring    Submitted CRM for Technical Support to resolve connectivity issue of [employer] BP device.     Follow-up in 2-4 weeks.       There are no preventive care reminders to display for this patient.    Last 5 Patient Entered Readings                                      Current 30 Day Average: 132/72     Recent Readings 5/10/2020 5/10/2020 5/9/2020 5/9/2020 5/8/2020    SBP (mmHg) 124 150 159 173 122    DBP (mmHg) 54 129 73 79 57    Pulse 50 50 45 46 48             Hypertension Medications             amLODIPine (NORVASC) 10 MG tablet Take 1 tablet (10 mg total) by mouth once daily.    hydroCHLOROthiazide (MICROZIDE) 12.5 mg capsule TAKE 2 CAPSULES(25 MG) BY MOUTH EVERY DAY    nebivolol (BYSTOLIC) 20 mg Tab Take 1 tablet by mouth once daily.    olmesartan (BENICAR) 40 MG tablet Take 1 tablet (40 mg total) by mouth once daily. (For BLOOD PRESSURE)                 Screenings

## 2020-06-01 ENCOUNTER — PATIENT OUTREACH (OUTPATIENT)
Dept: OTHER | Facility: OTHER | Age: 75
End: 2020-06-01

## 2020-06-01 NOTE — PROGRESS NOTES
"Digital Medicine: Clinician Follow-Up    Buffy Dominique returned missed call for hypertension follow-up.     The history is provided by the patient. No  was used.     Follow Up  Follow-up reason(s): reading review      Readings are missing.   tech support needed.Patient reports that she cannot connect BP device to phone. Bluetooth is on but device will not connect. "It just keeps on circling." She is not comfortable with removing application then re-installing it. 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 Requesting assistance to resolve issues with device.       INTERVENTION(S)  encouragement/support    PLAN  patient verbalizes understanding and continue monitoring    Current BP avg of 133/70 is close to goal of <130/80.    Submitted CRM ticket for Technical Support. Informed patient that it may take 1-2 weeks for someone to reach her due to backlog.     Follow-up in 4 weeks.       There are no preventive care reminders to display for this patient.    Last 5 Patient Entered Readings                                      Current 30 Day Average: 133/70     Recent Readings 5/27/2020 5/24/2020 5/23/2020 5/22/2020 5/20/2020    SBP (mmHg) 115 131 128 125 158    DBP (mmHg) 53 56 62 58 73    Pulse 43 46 48 44 48             Hypertension Medications             amLODIPine (NORVASC) 10 MG tablet Take 1 tablet (10 mg total) by mouth once daily.    hydroCHLOROthiazide (MICROZIDE) 12.5 mg capsule TAKE 2 CAPSULES(25 MG) BY MOUTH EVERY DAY    nebivolol (BYSTOLIC) 20 mg Tab Take 1 tablet by mouth once daily.    olmesartan (BENICAR) 40 MG tablet Take 1 tablet (40 mg total) by mouth once daily. (For BLOOD PRESSURE)                 Screenings  "

## 2020-07-01 ENCOUNTER — PATIENT OUTREACH (OUTPATIENT)
Dept: OTHER | Facility: OTHER | Age: 75
End: 2020-07-01

## 2020-07-01 NOTE — PROGRESS NOTES
Digital Medicine: Clinician Follow-Up    Called Buffy Dominique for hypertension follow-up: Patient left voice mail requesting call back.    The history is provided by the patient. No  was used.   Follow Up  Follow-up reason(s): reading review and routine education    - Patient states that she has a new phone number and her old number should be removed. She provides [phone] as her primary mobile number which should be used to contact her. She is currently in [place] for work. She reports taking a reading on yesterday, 6/30/20 of 109/56 which did not crossover to program. She admits that her MyChart [facility] application has not been uploaded to her new phone yet. She plans to do that today. Everything with her BP medications are fine.       INTERVENTION(S)  encouragement/support    PLAN  patient verbalizes understanding and continue monitoring    Current 30-day BP avg of 128/68 is meeting BP goal of <130/80.  Reviewed readings: intermittently controlled, 36% of readings are meeting goal.   Advised patient to download MyChart [facility] application on new phone.   Placed CRM in regards to data crossover.     Continue current regimen.    Follow-up in 12 weeks or sooner if needed.       There are no preventive care reminders to display for this patient.    Last 5 Patient Entered Readings                                      Current 30 Day Average: 128/68     Recent Readings 6/28/2020 6/26/2020 6/24/2020 6/21/2020 6/21/2020    SBP (mmHg) 117 141 119 132 137    DBP (mmHg) 52 65 60 42 75    Pulse 47 44 47 52 51             Hypertension Medications             amLODIPine (NORVASC) 10 MG tablet Take 1 tablet (10 mg total) by mouth once daily.    hydroCHLOROthiazide (MICROZIDE) 12.5 mg capsule TAKE 2 CAPSULES(25 MG) BY MOUTH EVERY DAY    nebivolol (BYSTOLIC) 20 mg Tab Take 1 tablet by mouth once daily.    olmesartan (BENICAR) 40 MG tablet Take 1 tablet (40 mg total) by mouth once daily. (For BLOOD PRESSURE)                  Screenings

## 2020-08-17 ENCOUNTER — PATIENT OUTREACH (OUTPATIENT)
Dept: OTHER | Facility: OTHER | Age: 75
End: 2020-08-17

## 2020-08-17 NOTE — PROGRESS NOTES
Digital Medicine: Clinician Follow-Up    Patient left voicemail requesting callback in regards to her BP medications. Called Buffy Dominique for hypertension follow-up: /72.    The history is provided by the patient.   Follow-up reason(s): addressing patient questions/concerns and routine follow up.     Hypertension    Patient readings are stable   Additional Follow-up details: Buffy states that she has not received an alert from her local [facility] pharmacy to inform her that her medications are ready for pick-up. She is concerned that they do not have her new cellular phone number. She is presently in [place]. Confirmed that our system has the correct cellular number on file to reach her. Instructed patient to contact her pharmacy to make the update to her phone number on file to ensure they have the correct information for her. She also inquired about Jose Alfredo, combination amlodipine-olmesartan BP medication. She previously used this combination tablet but was switch to two separate tablets due to Jose Alfredo not being available in her [place] pharmacy. She would like to go back to the combination tablet. Instructed patient to ask her pharmacy where she plans to pick-up her BP medications if they have the combination tablet available. If so, call [facility] Clinician back and let her know so an updated prescription can be sent to her pharmacy. Confirmed that all of her current prescriptions on file are up to date with refills. She can request any [facility] to fill based on their uniform system to see active prescriptions across their database. Patient expressed understanding and agreed.      Last 5 Patient Entered Readings                                      Current 30 Day Average: 131/72     Recent Readings 8/13/2020 8/13/2020 8/13/2020 8/12/2020 8/12/2020    SBP (mmHg) 127 128 106 134 134    DBP (mmHg) 70 106 78 61 67    Pulse 48 159 75 49 48               Depression Screening  Did not address depression screening.    Sleep  Apnea Screening    Did not address sleep apnea screening.     Medication Affordability Screening  Did not address medication affordability screening.     Medication Adherence-Medication adherence was assessed.          ASSESSMENT(S)  Patients BP average is 131/72 mmHg, which is at goal. Patient's BP goal is less than or equal to 130/80 per 2017 ACC/AHA Hypertension Guidelines.    BP avg continues to meet BP goal; however, daily readings are meeting goal 36% of the time where SBP is intermittently controlled.       Hypertension Plan  Continue current therapy. Patient will advise on combination tablet after checking with her local pharmacy in [place].       Addressed any questions or concerns and patient has my contact information if needed prior to next outreach. Patient verbalizes understanding.            There are no preventive care reminders to display for this patient.      Hypertension Medications             amLODIPine (NORVASC) 10 MG tablet Take 1 tablet (10 mg total) by mouth once daily.    hydroCHLOROthiazide (MICROZIDE) 12.5 mg capsule TAKE 2 CAPSULES(25 MG) BY MOUTH EVERY DAY    nebivolol (BYSTOLIC) 20 mg Tab Take 1 tablet by mouth once daily.    olmesartan (BENICAR) 40 MG tablet Take 1 tablet (40 mg total) by mouth once daily. (For BLOOD PRESSURE)

## 2020-08-28 ENCOUNTER — PATIENT OUTREACH (OUTPATIENT)
Dept: OTHER | Facility: OTHER | Age: 75
End: 2020-08-28

## 2020-08-28 DIAGNOSIS — I10 ESSENTIAL HYPERTENSION: ICD-10-CM

## 2020-08-28 RX ORDER — NEBIVOLOL HYDROCHLORIDE 20 MG/1
1 TABLET ORAL DAILY
Qty: 90 TABLET | Refills: 1 | Status: SHIPPED | OUTPATIENT
Start: 2020-08-28 | End: 2021-04-08 | Stop reason: SDUPTHER

## 2020-08-28 NOTE — PROGRESS NOTES
Digital Medicine: Clinician Follow-Up    Patient left voice expressing concern about prescription refill needed for a hypertension medication. She is presently in [place]. Called Buffy Dominique for clarification on medication needed.     The history is provided by the patient.      Review of patient's allergies indicates:  No Known Allergies  Follow-up reason(s): addressing patient questions/concerns and routine follow up.     Hypertension    Patient readings are stable   Additional Follow-up details: Patient reports she is attempting to refill her prescription for Bystolic and is out of refills. She is presently in [place], [place] and is planning to have medications refilled at pharmacy there. She is requesting updated prescription be sent to her [facility] pharmacy.      Last 5 Patient Entered Readings                                      Current 30 Day Average: 130/74     Recent Readings 8/26/2020 8/25/2020 8/23/2020 8/20/2020 8/19/2020    SBP (mmHg) 140 114 129 96 130    DBP (mmHg) 66 49 60 58 54    Pulse 114 52 47 87 45               Depression Screening  Did not address depression screening.    Sleep Apnea Screening    Did not address sleep apnea screening.     Medication Affordability Screening  Did not address medication affordability screening.     Medication Adherence-Medication adherence was assessed.          ASSESSMENT(S)  Patients BP average is 130/74 mmHg, which is at goal. Patient's BP goal is less than or equal to 130/80 per 2017 ACC/AHA Hypertension Guidelines.    Updated prescription for 90-day supply, 1 refill sent to pharmacy. Home BP readings meeting goal 38% of the time. No medication changes recommended at this time.      Hypertension Plan  Continue current therapy.       Addressed any questions or concerns and patient has my contact information if needed prior to next outreach. Patient verbalizes understanding.            There are no preventive care reminders to display for this  patient.      Hypertension Medications             amLODIPine (NORVASC) 10 MG tablet Take 1 tablet (10 mg total) by mouth once daily.    hydroCHLOROthiazide (MICROZIDE) 12.5 mg capsule TAKE 2 CAPSULES(25 MG) BY MOUTH EVERY DAY    nebivoloL (BYSTOLIC) 20 mg Tab Take 1 tablet by mouth once daily.    olmesartan (BENICAR) 40 MG tablet Take 1 tablet (40 mg total) by mouth once daily. (For BLOOD PRESSURE)

## 2020-10-07 ENCOUNTER — PATIENT OUTREACH (OUTPATIENT)
Dept: OTHER | Facility: OTHER | Age: 75
End: 2020-10-07

## 2020-10-07 NOTE — PROGRESS NOTES
Digital Medicine: Health  Follow-Up    The history is provided by the patient.             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 She reports that she still loves her job and has enjoyed seeing the change in the seasons.             Diet-no change to diet  No 24 hour dietary recall  No change to diet.  Patient reports eating or drinking the following: Patient states that she has been trying to eat healthy but every once in a while has a salty snack.       Physical Activity-no change to routine  No change to exercise routine.     Medication Adherence-Medication adherence was asssessed.  Patient continue taking medication as prescribed.          Patient states that she has improved on taking medication, she now has a routine with taking medicine that she sticks to.   Substance, Sleep, Stress-Not assessed      Continue current diet/physical activity routine.       Addressed patient questions and patient has my contact information if needed prior to next outreach. Patient verbalizes understanding.      Explained the importance of self-monitoring and medication adherence. Encouraged the patient to communicate with their health  for lifestyle modifications to help improve or maintain a healthy lifestyle.               There are no preventive care reminders to display for this patient.      Last 5 Patient Entered Readings                                      Current 30 Day Average: 138/68     Recent Readings 10/5/2020 10/3/2020 10/3/2020 10/1/2020 10/1/2020    SBP (mmHg) 132 145 132 141 102    DBP (mmHg) 64 66 73 68 85    Pulse 49 46 45 48 48

## 2020-10-30 ENCOUNTER — PATIENT MESSAGE (OUTPATIENT)
Dept: ADMINISTRATIVE | Facility: HOSPITAL | Age: 75
End: 2020-10-30

## 2020-11-04 ENCOUNTER — PATIENT OUTREACH (OUTPATIENT)
Dept: OTHER | Facility: OTHER | Age: 75
End: 2020-11-04

## 2020-11-04 NOTE — PROGRESS NOTES
Digital Medicine: Clinician Follow-Up    Buffy Dominique called to say that she received her flu and shingles shots today. She plans to go back and get her second shingles shot in 4 weeks.     Completed hypertension Digital medicine follow-up as well: BP avg 134/71.     She reports compliance to her blood pressure regimen. She is liking her single pills versus the combination tablets for her blood pressure. She has no desire to have combination tablets prescribed at this time. She expressed gratitude for my help and her appreciation of the program.     The history is provided by the patient.   Follow-up reason(s): routine follow up.     Hypertension    Patient's blood pressure is stable.         Last 5 Patient Entered Readings                                      Current 30 Day Average: 134/71     Recent Readings 10/31/2020 10/31/2020 10/31/2020 10/31/2020 10/28/2020    SBP (mmHg) 141 142 143 141 143    DBP (mmHg) 54 74 90 102 66    Pulse 48 46 46 140 51                 Depression Screening  Did not address depression screening.    Sleep Apnea Screening    Did not address sleep apnea screening.     Medication Affordability Screening  Did not address medication affordability screening.     Medication Adherence-Medication adherence was assessed.          ASSESSMENT(S)  Patients BP average is 134/71 mmHg, which is above goal. Patient's BP goal is less than or equal to 130/80.     Home BP readings are meeting goal 17%. BG avg is near goal. No medication changes suggested at this time to avoid causing hypotension. Continue to monitor - f/u in 12 weeks.       Hypertension Plan  Continue current therapy.       Addressed patient questions and patient has my contact information if needed prior to next outreach. Patient verbalizes understanding.             There are no preventive care reminders to display for this patient.  There are no preventive care reminders to display for this patient.      Hypertension Medications              amLODIPine (NORVASC) 10 MG tablet Take 1 tablet (10 mg total) by mouth once daily.    hydroCHLOROthiazide (MICROZIDE) 12.5 mg capsule TAKE 2 CAPSULES(25 MG) BY MOUTH EVERY DAY    nebivoloL (BYSTOLIC) 20 mg Tab Take 1 tablet by mouth once daily.    olmesartan (BENICAR) 40 MG tablet Take 1 tablet (40 mg total) by mouth once daily. (For BLOOD PRESSURE)

## 2020-11-20 ENCOUNTER — PATIENT OUTREACH (OUTPATIENT)
Dept: OTHER | Facility: OTHER | Age: 75
End: 2020-11-20

## 2020-11-20 DIAGNOSIS — M79.642 BILATERAL HAND PAIN: ICD-10-CM

## 2020-11-20 DIAGNOSIS — M79.641 BILATERAL HAND PAIN: ICD-10-CM

## 2020-11-20 DIAGNOSIS — I10 ESSENTIAL HYPERTENSION: ICD-10-CM

## 2020-11-20 RX ORDER — HYDROCHLOROTHIAZIDE 12.5 MG/1
25 CAPSULE ORAL DAILY
Qty: 180 CAPSULE | Refills: 1 | Status: SHIPPED | OUTPATIENT
Start: 2020-11-20 | End: 2021-04-08

## 2020-11-20 NOTE — TELEPHONE ENCOUNTER
----- Message from Rosana Veloz PharmD sent at 11/20/2020  4:39 PM CST -----  Hi Dr. Kim,    Patient is requesting a renewed prescription for gabapentin.    Kind regards,  Rosana Veloz, PharmD   Digital Medicine Clinical Pharmacist  [phone]

## 2020-11-20 NOTE — PROGRESS NOTES
Digital Medicine: Clinician Follow-Up    Called Buffy Enriquezal for Digital Medicine Hypertension routine follow-up: BP avg 134/69. Patient left voice mail stating her HCTZ prescription was  and requesting a new one be sent to her pharmacy.     Patient reports HCTZ prescription is  and she needs a renewed one. She has about 2 weeks supply remaining. She is presently in [place], [place] with plans to travel to [place]. Patient is a .     Patient is also requesting a renewed prescription for gabapentin from PCP Julio.     The history is provided by the patient.      Review of patient's allergies indicates:  No Known Allergies  Completed Medication Reconciliation    Follow-up reason(s): addressing patient questions/concerns and routine follow up.     Hypertension    Readings are trending down         Last 5 Patient Entered Readings                                      Current 30 Day Average: 134/69     Recent Readings 2020 2020 2020 11/10/2020 2020    SBP (mmHg) 130 136 140 140 127    DBP (mmHg) 49 60 78 64 63    Pulse 50 52 54 52 49                 Depression Screening  Did not address depression screening.    Sleep Apnea Screening    Did not address sleep apnea screening.     Medication Affordability Screening  Did not address medication affordability screening.     Medication Adherence-Medication adherence was assessed.          ASSESSMENT(S)  Patients BP average is 134/69 mmHg, which is above goal. Patient's BP goal is less than or equal to 130/80.     Home BP readings meeting goal 16% of the time. Trending downwards, intermittently controlled. BP avg at goal. No medication changes suggested at this time. Continue to monitor.    - Renewed HCTZ prescription.  - Sent message to PCP regarding request for gabapentin.    F/u in 3-6 months.       Hypertension Plan  Continue current therapy.  Continue current diet/physical activity routine.  Await MD intervention.       Addressed  patient questions and patient has my contact information if needed prior to next outreach. Patient verbalizes understanding.             There are no preventive care reminders to display for this patient.  There are no preventive care reminders to display for this patient.      Hypertension Medications             amLODIPine (NORVASC) 10 MG tablet Take 1 tablet (10 mg total) by mouth once daily.    hydroCHLOROthiazide (MICROZIDE) 12.5 mg capsule Take 2 capsules (25 mg total) by mouth once daily.    nebivoloL (BYSTOLIC) 20 mg Tab Take 1 tablet by mouth once daily.    olmesartan (BENICAR) 40 MG tablet Take 1 tablet (40 mg total) by mouth once daily. (For BLOOD PRESSURE)

## 2020-11-23 RX ORDER — GABAPENTIN 100 MG/1
100 CAPSULE ORAL NIGHTLY
Qty: 90 CAPSULE | Refills: 2 | Status: SHIPPED | OUTPATIENT
Start: 2020-11-23 | End: 2021-10-20

## 2020-12-01 ENCOUNTER — PATIENT OUTREACH (OUTPATIENT)
Dept: OTHER | Facility: OTHER | Age: 75
End: 2020-12-01

## 2020-12-01 NOTE — PROGRESS NOTES
"Digital Medicine: Health  Follow-Up    The history is provided by the patient.             Reason for review: Blood pressure not at goal        Topics Covered on Call: physical activity and Diet    Additional Follow-up details: Patient is at home right now. She states that the BP was high a few times due to dietary indiscretions. Patient states that she has started taking a daily multivitamin. She states that she lost a few pounds recently. She reports that she got her flu shot and shingles shot at [facility] in [facility]. She states that she has been very cautious with COVID and has been isolating and wearing her mask.             Diet-Change      Dietary Improvements:Patient states that she has not been putting salt on her food. She reports that she has been staying away from sausage. 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 Patient states that she has been drinking more water.           Dietary Indiscretions:Patient states that she had some ham over thanksgiving which she states she "knows I'm not supposed to have". She states that she tries to stay away from ham the rest of the time.       Physical Activity-no change to routine  No change to exercise routine.       Additional physical activity details: Patient states that she was started back on gabapentin which helps her. She states that she has not been exercising much.       Medication Adherence-Medication Adherence not addressed.      Substance, Sleep, Stress-Not assessed      Continue current diet/physical activity routine.       Addressed patient questions and patient has my contact information if needed prior to next outreach. Patient verbalizes understanding.      Explained the importance of self-monitoring and medication adherence. Encouraged the patient to communicate with their health  for lifestyle modifications to help improve or maintain a " healthy lifestyle.               There are no preventive care reminders to display for this patient.      Last 5 Patient Entered Readings                                      Current 30 Day Average: 136/70     Recent Readings 12/1/2020 12/1/2020 12/1/2020 11/30/2020 11/30/2020    SBP (mmHg) 114 131 141 156 165    DBP (mmHg) 54 63 73 69 74    Pulse 47 47 48 51 51

## 2020-12-18 ENCOUNTER — PATIENT MESSAGE (OUTPATIENT)
Dept: ADMINISTRATIVE | Facility: OTHER | Age: 75
End: 2020-12-18

## 2020-12-18 ENCOUNTER — PATIENT MESSAGE (OUTPATIENT)
Dept: ADMINISTRATIVE | Facility: HOSPITAL | Age: 75
End: 2020-12-18

## 2021-01-07 ENCOUNTER — PATIENT MESSAGE (OUTPATIENT)
Dept: INTERNAL MEDICINE | Facility: CLINIC | Age: 76
End: 2021-01-07

## 2021-01-12 ENCOUNTER — TELEPHONE (OUTPATIENT)
Dept: INTERNAL MEDICINE | Facility: CLINIC | Age: 76
End: 2021-01-12

## 2021-01-12 ENCOUNTER — OFFICE VISIT (OUTPATIENT)
Dept: INTERNAL MEDICINE | Facility: CLINIC | Age: 76
End: 2021-01-12
Payer: MEDICARE

## 2021-01-12 DIAGNOSIS — Z12.31 ENCOUNTER FOR SCREENING MAMMOGRAM FOR BREAST CANCER: ICD-10-CM

## 2021-01-12 DIAGNOSIS — I10 ESSENTIAL HYPERTENSION: Primary | ICD-10-CM

## 2021-01-12 DIAGNOSIS — K64.9 HEMORRHOIDS, UNSPECIFIED HEMORRHOID TYPE: ICD-10-CM

## 2021-01-12 DIAGNOSIS — I35.0 AORTIC VALVE STENOSIS, ETIOLOGY OF CARDIAC VALVE DISEASE UNSPECIFIED: ICD-10-CM

## 2021-01-12 DIAGNOSIS — E78.2 MIXED HYPERLIPIDEMIA: ICD-10-CM

## 2021-01-12 PROCEDURE — 99499 NO LOS: ICD-10-PCS | Mod: 95,,, | Performed by: INTERNAL MEDICINE

## 2021-01-12 PROCEDURE — 99499 UNLISTED E&M SERVICE: CPT | Mod: 95,,, | Performed by: INTERNAL MEDICINE

## 2021-01-27 ENCOUNTER — TELEPHONE (OUTPATIENT)
Dept: ORTHOPEDICS | Facility: CLINIC | Age: 76
End: 2021-01-27

## 2021-01-27 ENCOUNTER — PATIENT OUTREACH (OUTPATIENT)
Dept: ADMINISTRATIVE | Facility: OTHER | Age: 76
End: 2021-01-27

## 2021-01-27 ENCOUNTER — OFFICE VISIT (OUTPATIENT)
Dept: ORTHOPEDICS | Facility: CLINIC | Age: 76
End: 2021-01-27
Payer: MEDICARE

## 2021-01-27 ENCOUNTER — HOSPITAL ENCOUNTER (OUTPATIENT)
Dept: RADIOLOGY | Facility: HOSPITAL | Age: 76
Discharge: HOME | End: 2021-01-27
Attending: PHYSICIAN ASSISTANT
Payer: MEDICARE

## 2021-01-27 VITALS — WEIGHT: 127 LBS | HEIGHT: 63 IN | BODY MASS INDEX: 22.5 KG/M2

## 2021-01-27 DIAGNOSIS — M25.512 LEFT SHOULDER PAIN, UNSPECIFIED CHRONICITY: ICD-10-CM

## 2021-01-27 DIAGNOSIS — M25.512 LEFT SHOULDER PAIN, UNSPECIFIED CHRONICITY: Primary | ICD-10-CM

## 2021-01-27 PROCEDURE — 73030 XR SHOULDER TRAUMA 3 VIEW LEFT: ICD-10-PCS | Mod: 26,LT,, | Performed by: RADIOLOGY

## 2021-01-27 PROCEDURE — 20610 DRAIN/INJ JOINT/BURSA W/O US: CPT | Mod: LT,S$GLB,, | Performed by: PHYSICIAN ASSISTANT

## 2021-01-27 PROCEDURE — 99999 PR PBB SHADOW E&M-EST. PATIENT-LVL III: CPT | Mod: PBBFAC,,, | Performed by: PHYSICIAN ASSISTANT

## 2021-01-27 PROCEDURE — 73030 X-RAY EXAM OF SHOULDER: CPT | Mod: TC,LT

## 2021-01-27 PROCEDURE — 99203 OFFICE O/P NEW LOW 30 MIN: CPT | Mod: 25,S$GLB,, | Performed by: PHYSICIAN ASSISTANT

## 2021-01-27 PROCEDURE — 73030 X-RAY EXAM OF SHOULDER: CPT | Mod: 26,LT,, | Performed by: RADIOLOGY

## 2021-01-27 PROCEDURE — 1159F MED LIST DOCD IN RCRD: CPT | Mod: S$GLB,,, | Performed by: PHYSICIAN ASSISTANT

## 2021-01-27 PROCEDURE — 20610 PR DRAIN/INJECT LARGE JOINT/BURSA: ICD-10-PCS | Mod: LT,S$GLB,, | Performed by: PHYSICIAN ASSISTANT

## 2021-01-27 PROCEDURE — 99203 PR OFFICE/OUTPT VISIT, NEW, LEVL III, 30-44 MIN: ICD-10-PCS | Mod: 25,S$GLB,, | Performed by: PHYSICIAN ASSISTANT

## 2021-01-27 PROCEDURE — 1125F AMNT PAIN NOTED PAIN PRSNT: CPT | Mod: S$GLB,,, | Performed by: PHYSICIAN ASSISTANT

## 2021-01-27 PROCEDURE — 99999 PR PBB SHADOW E&M-EST. PATIENT-LVL III: ICD-10-PCS | Mod: PBBFAC,,, | Performed by: PHYSICIAN ASSISTANT

## 2021-01-27 PROCEDURE — 1125F PR PAIN SEVERITY QUANTIFIED, PAIN PRESENT: ICD-10-PCS | Mod: S$GLB,,, | Performed by: PHYSICIAN ASSISTANT

## 2021-01-27 PROCEDURE — 1159F PR MEDICATION LIST DOCUMENTED IN MEDICAL RECORD: ICD-10-PCS | Mod: S$GLB,,, | Performed by: PHYSICIAN ASSISTANT

## 2021-01-27 RX ORDER — BETAMETHASONE SODIUM PHOSPHATE AND BETAMETHASONE ACETATE 3; 3 MG/ML; MG/ML
6 INJECTION, SUSPENSION INTRA-ARTICULAR; INTRALESIONAL; INTRAMUSCULAR; SOFT TISSUE
Status: COMPLETED | OUTPATIENT
Start: 2021-01-27 | End: 2021-01-27

## 2021-01-27 RX ORDER — A/SINGAPORE/GP1908/2015 IVR-180 (AN A/MICHIGAN/45/2015 (H1N1)PDM09-LIKE VIRUS, A/HONG KONG/4801/2014, NYMC X-263B (H3N2) (AN A/HONG KONG/4801/2014-LIKE VIRUS), AND B/BRISBANE/60/2008, WILD TYPE (A B/BRISBANE/60/2008-LIKE VIRUS) 15; 15; 15 UG/.5ML; UG/.5ML; UG/.5ML
INJECTION, SUSPENSION INTRAMUSCULAR
COMMUNITY
Start: 2020-11-04 | End: 2021-10-20

## 2021-01-27 RX ADMIN — BETAMETHASONE SODIUM PHOSPHATE AND BETAMETHASONE ACETATE 6 MG: 3; 3 INJECTION, SUSPENSION INTRA-ARTICULAR; INTRALESIONAL; INTRAMUSCULAR; SOFT TISSUE at 07:01

## 2021-02-24 ENCOUNTER — TELEPHONE (OUTPATIENT)
Dept: SURGERY | Facility: CLINIC | Age: 76
End: 2021-02-24

## 2021-04-16 ENCOUNTER — PATIENT MESSAGE (OUTPATIENT)
Dept: RESEARCH | Facility: HOSPITAL | Age: 76
End: 2021-04-16

## 2021-05-03 DIAGNOSIS — I10 ESSENTIAL HYPERTENSION: ICD-10-CM

## 2021-05-03 RX ORDER — AMLODIPINE BESYLATE 10 MG/1
10 TABLET ORAL DAILY
Qty: 90 TABLET | Refills: 3 | Status: SHIPPED | OUTPATIENT
Start: 2021-05-03 | End: 2021-11-09 | Stop reason: SDUPTHER

## 2021-05-18 ENCOUNTER — PATIENT MESSAGE (OUTPATIENT)
Dept: ADMINISTRATIVE | Facility: HOSPITAL | Age: 76
End: 2021-05-18

## 2021-05-18 ENCOUNTER — TELEPHONE (OUTPATIENT)
Dept: INTERNAL MEDICINE | Facility: CLINIC | Age: 76
End: 2021-05-18

## 2021-06-16 ENCOUNTER — OFFICE VISIT (OUTPATIENT)
Dept: ORTHOPEDICS | Facility: CLINIC | Age: 76
End: 2021-06-16
Payer: MEDICARE

## 2021-06-16 ENCOUNTER — PATIENT OUTREACH (OUTPATIENT)
Dept: ADMINISTRATIVE | Facility: OTHER | Age: 76
End: 2021-06-16

## 2021-06-16 ENCOUNTER — HOSPITAL ENCOUNTER (OUTPATIENT)
Dept: RADIOLOGY | Facility: HOSPITAL | Age: 76
Discharge: HOME | End: 2021-06-16
Attending: PHYSICIAN ASSISTANT
Payer: MEDICARE

## 2021-06-16 VITALS — HEIGHT: 63 IN | BODY MASS INDEX: 22.46 KG/M2 | WEIGHT: 126.75 LBS

## 2021-06-16 DIAGNOSIS — M25.511 RIGHT SHOULDER PAIN, UNSPECIFIED CHRONICITY: Primary | ICD-10-CM

## 2021-06-16 DIAGNOSIS — M25.511 RIGHT SHOULDER PAIN, UNSPECIFIED CHRONICITY: ICD-10-CM

## 2021-06-16 DIAGNOSIS — Z12.11 ENCOUNTER FOR FIT (FECAL IMMUNOCHEMICAL TEST) SCREENING: Primary | ICD-10-CM

## 2021-06-16 DIAGNOSIS — M77.8 TENDINITIS OF RIGHT SHOULDER: Primary | ICD-10-CM

## 2021-06-16 PROCEDURE — 20610 PR DRAIN/INJECT LARGE JOINT/BURSA: ICD-10-PCS | Mod: RT,S$GLB,, | Performed by: PHYSICIAN ASSISTANT

## 2021-06-16 PROCEDURE — 73030 X-RAY EXAM OF SHOULDER: CPT | Mod: TC,RT

## 2021-06-16 PROCEDURE — 99213 PR OFFICE/OUTPT VISIT, EST, LEVL III, 20-29 MIN: ICD-10-PCS | Mod: 25,S$GLB,, | Performed by: PHYSICIAN ASSISTANT

## 2021-06-16 PROCEDURE — 99999 PR PBB SHADOW E&M-EST. PATIENT-LVL III: ICD-10-PCS | Mod: PBBFAC,,, | Performed by: PHYSICIAN ASSISTANT

## 2021-06-16 PROCEDURE — 1159F MED LIST DOCD IN RCRD: CPT | Mod: S$GLB,,, | Performed by: PHYSICIAN ASSISTANT

## 2021-06-16 PROCEDURE — 99999 PR PBB SHADOW E&M-EST. PATIENT-LVL III: CPT | Mod: PBBFAC,,, | Performed by: PHYSICIAN ASSISTANT

## 2021-06-16 PROCEDURE — 20610 DRAIN/INJ JOINT/BURSA W/O US: CPT | Mod: RT,S$GLB,, | Performed by: PHYSICIAN ASSISTANT

## 2021-06-16 PROCEDURE — 1125F PR PAIN SEVERITY QUANTIFIED, PAIN PRESENT: ICD-10-PCS | Mod: S$GLB,,, | Performed by: PHYSICIAN ASSISTANT

## 2021-06-16 PROCEDURE — 73030 X-RAY EXAM OF SHOULDER: CPT | Mod: 26,RT,, | Performed by: RADIOLOGY

## 2021-06-16 PROCEDURE — 73030 XR SHOULDER COMPLETE 2 OR MORE VIEWS RIGHT: ICD-10-PCS | Mod: 26,RT,, | Performed by: RADIOLOGY

## 2021-06-16 PROCEDURE — 1125F AMNT PAIN NOTED PAIN PRSNT: CPT | Mod: S$GLB,,, | Performed by: PHYSICIAN ASSISTANT

## 2021-06-16 PROCEDURE — 3288F FALL RISK ASSESSMENT DOCD: CPT | Mod: CPTII,S$GLB,, | Performed by: PHYSICIAN ASSISTANT

## 2021-06-16 PROCEDURE — 1101F PR PT FALLS ASSESS DOC 0-1 FALLS W/OUT INJ PAST YR: ICD-10-PCS | Mod: CPTII,S$GLB,, | Performed by: PHYSICIAN ASSISTANT

## 2021-06-16 PROCEDURE — 3288F PR FALLS RISK ASSESSMENT DOCUMENTED: ICD-10-PCS | Mod: CPTII,S$GLB,, | Performed by: PHYSICIAN ASSISTANT

## 2021-06-16 PROCEDURE — 99213 OFFICE O/P EST LOW 20 MIN: CPT | Mod: 25,S$GLB,, | Performed by: PHYSICIAN ASSISTANT

## 2021-06-16 PROCEDURE — 1159F PR MEDICATION LIST DOCUMENTED IN MEDICAL RECORD: ICD-10-PCS | Mod: S$GLB,,, | Performed by: PHYSICIAN ASSISTANT

## 2021-06-16 PROCEDURE — 1101F PT FALLS ASSESS-DOCD LE1/YR: CPT | Mod: CPTII,S$GLB,, | Performed by: PHYSICIAN ASSISTANT

## 2021-06-16 RX ORDER — METHYLPREDNISOLONE ACETATE 80 MG/ML
80 INJECTION, SUSPENSION INTRA-ARTICULAR; INTRALESIONAL; INTRAMUSCULAR; SOFT TISSUE
Status: COMPLETED | OUTPATIENT
Start: 2021-06-16 | End: 2021-06-16

## 2021-06-16 RX ADMIN — METHYLPREDNISOLONE ACETATE 80 MG: 80 INJECTION, SUSPENSION INTRA-ARTICULAR; INTRALESIONAL; INTRAMUSCULAR; SOFT TISSUE at 03:06

## 2021-07-06 ENCOUNTER — PATIENT MESSAGE (OUTPATIENT)
Dept: ADMINISTRATIVE | Facility: HOSPITAL | Age: 76
End: 2021-07-06

## 2021-08-10 ENCOUNTER — TELEPHONE (OUTPATIENT)
Dept: INTERNAL MEDICINE | Facility: CLINIC | Age: 76
End: 2021-08-10

## 2021-08-11 ENCOUNTER — TELEPHONE (OUTPATIENT)
Dept: INTERNAL MEDICINE | Facility: CLINIC | Age: 76
End: 2021-08-11

## 2021-09-22 ENCOUNTER — LAB VISIT (OUTPATIENT)
Dept: LAB | Facility: HOSPITAL | Age: 76
End: 2021-09-22
Attending: INTERNAL MEDICINE
Payer: MEDICARE

## 2021-09-22 DIAGNOSIS — E78.2 MIXED HYPERLIPIDEMIA: ICD-10-CM

## 2021-09-22 DIAGNOSIS — I10 ESSENTIAL HYPERTENSION: ICD-10-CM

## 2021-09-22 LAB
ALBUMIN SERPL BCP-MCNC: 3.5 G/DL (ref 3.5–5.2)
ALP SERPL-CCNC: 68 U/L (ref 55–135)
ALT SERPL W/O P-5'-P-CCNC: 8 U/L (ref 10–44)
ANION GAP SERPL CALC-SCNC: 12 MMOL/L (ref 8–16)
AST SERPL-CCNC: 16 U/L (ref 10–40)
BASOPHILS # BLD AUTO: 0.05 K/UL (ref 0–0.2)
BASOPHILS NFR BLD: 0.6 % (ref 0–1.9)
BILIRUB SERPL-MCNC: 1.5 MG/DL (ref 0.1–1)
BUN SERPL-MCNC: 16 MG/DL (ref 8–23)
CALCIUM SERPL-MCNC: 9.7 MG/DL (ref 8.7–10.5)
CHLORIDE SERPL-SCNC: 93 MMOL/L (ref 95–110)
CHOLEST SERPL-MCNC: 152 MG/DL (ref 120–199)
CHOLEST/HDLC SERPL: 2.7 {RATIO} (ref 2–5)
CO2 SERPL-SCNC: 23 MMOL/L (ref 23–29)
CREAT SERPL-MCNC: 1.1 MG/DL (ref 0.5–1.4)
DIFFERENTIAL METHOD: ABNORMAL
EOSINOPHIL # BLD AUTO: 0.3 K/UL (ref 0–0.5)
EOSINOPHIL NFR BLD: 3.6 % (ref 0–8)
ERYTHROCYTE [DISTWIDTH] IN BLOOD BY AUTOMATED COUNT: 12.1 % (ref 11.5–14.5)
EST. GFR  (AFRICAN AMERICAN): 56.8 ML/MIN/1.73 M^2
EST. GFR  (NON AFRICAN AMERICAN): 49.2 ML/MIN/1.73 M^2
GLUCOSE SERPL-MCNC: 117 MG/DL (ref 70–110)
HCT VFR BLD AUTO: 31.3 % (ref 37–48.5)
HDLC SERPL-MCNC: 57 MG/DL (ref 40–75)
HDLC SERPL: 37.5 % (ref 20–50)
HGB BLD-MCNC: 10.9 G/DL (ref 12–16)
IMM GRANULOCYTES # BLD AUTO: 0.04 K/UL (ref 0–0.04)
IMM GRANULOCYTES NFR BLD AUTO: 0.4 % (ref 0–0.5)
LDLC SERPL CALC-MCNC: 85.2 MG/DL (ref 63–159)
LYMPHOCYTES # BLD AUTO: 1.5 K/UL (ref 1–4.8)
LYMPHOCYTES NFR BLD: 16.5 % (ref 18–48)
MCH RBC QN AUTO: 31.7 PG (ref 27–31)
MCHC RBC AUTO-ENTMCNC: 34.8 G/DL (ref 32–36)
MCV RBC AUTO: 91 FL (ref 82–98)
MONOCYTES # BLD AUTO: 0.9 K/UL (ref 0.3–1)
MONOCYTES NFR BLD: 10.5 % (ref 4–15)
NEUTROPHILS # BLD AUTO: 6.1 K/UL (ref 1.8–7.7)
NEUTROPHILS NFR BLD: 68.4 % (ref 38–73)
NONHDLC SERPL-MCNC: 95 MG/DL
NRBC BLD-RTO: 0 /100 WBC
PLATELET # BLD AUTO: 267 K/UL (ref 150–450)
PMV BLD AUTO: 8.9 FL (ref 9.2–12.9)
POTASSIUM SERPL-SCNC: 4.9 MMOL/L (ref 3.5–5.1)
PROT SERPL-MCNC: 6.6 G/DL (ref 6–8.4)
RBC # BLD AUTO: 3.44 M/UL (ref 4–5.4)
SODIUM SERPL-SCNC: 128 MMOL/L (ref 136–145)
TRIGL SERPL-MCNC: 49 MG/DL (ref 30–150)
TSH SERPL DL<=0.005 MIU/L-ACNC: 1.32 UIU/ML (ref 0.4–4)
WBC # BLD AUTO: 8.93 K/UL (ref 3.9–12.7)

## 2021-09-22 PROCEDURE — 85025 COMPLETE CBC W/AUTO DIFF WBC: CPT | Performed by: INTERNAL MEDICINE

## 2021-09-22 PROCEDURE — 84443 ASSAY THYROID STIM HORMONE: CPT | Performed by: INTERNAL MEDICINE

## 2021-09-22 PROCEDURE — 36415 COLL VENOUS BLD VENIPUNCTURE: CPT | Performed by: INTERNAL MEDICINE

## 2021-09-22 PROCEDURE — 80053 COMPREHEN METABOLIC PANEL: CPT | Performed by: INTERNAL MEDICINE

## 2021-09-22 PROCEDURE — 80061 LIPID PANEL: CPT | Performed by: INTERNAL MEDICINE

## 2021-09-23 ENCOUNTER — TELEPHONE (OUTPATIENT)
Dept: INTERNAL MEDICINE | Facility: CLINIC | Age: 76
End: 2021-09-23

## 2021-09-23 DIAGNOSIS — E87.1 HYPONATREMIA: Primary | ICD-10-CM

## 2021-09-23 NOTE — TELEPHONE ENCOUNTER
Please call pt. Labs show her sodium level is low and blood counts are low. How much fluid is she drinking daily? How is she feeling? Any changes to her medications lately?

## 2021-09-24 ENCOUNTER — LAB VISIT (OUTPATIENT)
Dept: LAB | Facility: HOSPITAL | Age: 76
End: 2021-09-24
Attending: INTERNAL MEDICINE
Payer: MEDICARE

## 2021-09-24 DIAGNOSIS — E87.1 HYPONATREMIA: ICD-10-CM

## 2021-09-24 PROCEDURE — 83935 ASSAY OF URINE OSMOLALITY: CPT | Performed by: INTERNAL MEDICINE

## 2021-09-24 PROCEDURE — 84300 ASSAY OF URINE SODIUM: CPT | Performed by: INTERNAL MEDICINE

## 2021-09-24 NOTE — TELEPHONE ENCOUNTER
Low sodium might be due to her hctz medication or due to low fluid intake. I  Would like to repeat along with some additional labs - blood and urine - and please schedule appt after to further evaluate. Increase fluid intake. OK to see Berenice or convert virtual to in person.

## 2021-09-24 NOTE — TELEPHONE ENCOUNTER
Pt says she does not drink much fluids at all. She says she may drink 30 oz of water a day. Pt says she feels fine. The only med adjustment is bystolic, it was lowered to 5mg.

## 2021-09-24 NOTE — TELEPHONE ENCOUNTER
Pt says she travels for work and is leaving morales morning. Pt is not aware of where she is going. She will not know until morales morning. Pt says normally when her job requires her to travel it is for 3 weeks.

## 2021-09-25 LAB
OSMOLALITY UR: 432 MOSM/KG (ref 50–1200)
SODIUM UR-SCNC: 39 MMOL/L (ref 20–250)

## 2021-10-08 ENCOUNTER — LAB VISIT (OUTPATIENT)
Dept: LAB | Facility: HOSPITAL | Age: 76
End: 2021-10-08
Attending: INTERNAL MEDICINE
Payer: MEDICARE

## 2021-10-08 ENCOUNTER — PATIENT MESSAGE (OUTPATIENT)
Dept: INTERNAL MEDICINE | Facility: CLINIC | Age: 76
End: 2021-10-08

## 2021-10-08 DIAGNOSIS — I10 ESSENTIAL HYPERTENSION: ICD-10-CM

## 2021-10-08 LAB
ANION GAP SERPL CALC-SCNC: 11 MMOL/L (ref 8–16)
BUN SERPL-MCNC: 11 MG/DL (ref 8–23)
CALCIUM SERPL-MCNC: 10 MG/DL (ref 8.7–10.5)
CHLORIDE SERPL-SCNC: 96 MMOL/L (ref 95–110)
CO2 SERPL-SCNC: 25 MMOL/L (ref 23–29)
CREAT SERPL-MCNC: 0.9 MG/DL (ref 0.5–1.4)
EST. GFR  (AFRICAN AMERICAN): >60 ML/MIN/1.73 M^2
EST. GFR  (NON AFRICAN AMERICAN): >60 ML/MIN/1.73 M^2
GLUCOSE SERPL-MCNC: 88 MG/DL (ref 70–110)
POTASSIUM SERPL-SCNC: 4.3 MMOL/L (ref 3.5–5.1)
SODIUM SERPL-SCNC: 132 MMOL/L (ref 136–145)

## 2021-10-08 PROCEDURE — 80048 BASIC METABOLIC PNL TOTAL CA: CPT | Performed by: INTERNAL MEDICINE

## 2021-10-08 PROCEDURE — 36415 COLL VENOUS BLD VENIPUNCTURE: CPT | Performed by: INTERNAL MEDICINE

## 2021-10-11 ENCOUNTER — OFFICE VISIT (OUTPATIENT)
Dept: URGENT CARE | Facility: CLINIC | Age: 76
End: 2021-10-11
Payer: MEDICARE

## 2021-10-11 VITALS
HEIGHT: 63 IN | HEART RATE: 67 BPM | WEIGHT: 125 LBS | SYSTOLIC BLOOD PRESSURE: 174 MMHG | OXYGEN SATURATION: 96 % | DIASTOLIC BLOOD PRESSURE: 62 MMHG | RESPIRATION RATE: 16 BRPM | TEMPERATURE: 98 F | BODY MASS INDEX: 22.15 KG/M2

## 2021-10-11 DIAGNOSIS — R60.9 EDEMA, UNSPECIFIED TYPE: Primary | ICD-10-CM

## 2021-10-11 LAB
BILIRUB UR QL STRIP: NEGATIVE
GLUCOSE SERPL-MCNC: NORMAL MG/DL (ref 70–110)
GLUCOSE UR QL STRIP: NEGATIVE
KETONES UR QL STRIP: NEGATIVE
LEUKOCYTE ESTERASE UR QL STRIP: NEGATIVE
PH, POC UA: 5 (ref 5–8)
POC ANION GAP: NORMAL MMOL/L (ref 10–20)
POC BLOOD, URINE: NEGATIVE
POC BUN: NORMAL MMOL/L (ref 8–26)
POC CHLORIDE: NORMAL MMOL/L (ref 98–109)
POC CREATININE: NORMAL MG/DL (ref 0.6–1.3)
POC HEMATOCRIT: NORMAL %PCV (ref 37–47)
POC HEMOGLOBIN: NORMAL G/DL (ref 12.5–16)
POC ICA: NORMAL MMOL/L (ref 1.12–1.32)
POC NITRATES, URINE: NEGATIVE
POC POTASSIUM: NORMAL MMOL/L (ref 3.5–4.9)
POC SODIUM: NORMAL MMOL/L (ref 138–146)
POC TCO2: NORMAL MMOL/L (ref 24–29)
PROT UR QL STRIP: NEGATIVE
SP GR UR STRIP: 1.01 (ref 1–1.03)
UROBILINOGEN UR STRIP-ACNC: NORMAL (ref 0.1–1.1)

## 2021-10-11 PROCEDURE — 80047 BASIC METABLC PNL IONIZED CA: CPT | Mod: QW,S$GLB,, | Performed by: FAMILY MEDICINE

## 2021-10-11 PROCEDURE — 81003 URINALYSIS AUTO W/O SCOPE: CPT | Mod: QW,S$GLB,, | Performed by: FAMILY MEDICINE

## 2021-10-11 PROCEDURE — 3078F PR MOST RECENT DIASTOLIC BLOOD PRESSURE < 80 MM HG: ICD-10-PCS | Mod: CPTII,S$GLB,, | Performed by: FAMILY MEDICINE

## 2021-10-11 PROCEDURE — 1160F PR REVIEW ALL MEDS BY PRESCRIBER/CLIN PHARMACIST DOCUMENTED: ICD-10-PCS | Mod: CPTII,S$GLB,, | Performed by: FAMILY MEDICINE

## 2021-10-11 PROCEDURE — 1159F PR MEDICATION LIST DOCUMENTED IN MEDICAL RECORD: ICD-10-PCS | Mod: CPTII,S$GLB,, | Performed by: FAMILY MEDICINE

## 2021-10-11 PROCEDURE — 3077F SYST BP >= 140 MM HG: CPT | Mod: CPTII,S$GLB,, | Performed by: FAMILY MEDICINE

## 2021-10-11 PROCEDURE — 99214 OFFICE O/P EST MOD 30 MIN: CPT | Mod: 25,S$GLB,, | Performed by: FAMILY MEDICINE

## 2021-10-11 PROCEDURE — 99214 PR OFFICE/OUTPT VISIT, EST, LEVL IV, 30-39 MIN: ICD-10-PCS | Mod: 25,S$GLB,, | Performed by: FAMILY MEDICINE

## 2021-10-11 PROCEDURE — 1160F RVW MEDS BY RX/DR IN RCRD: CPT | Mod: CPTII,S$GLB,, | Performed by: FAMILY MEDICINE

## 2021-10-11 PROCEDURE — 3077F PR MOST RECENT SYSTOLIC BLOOD PRESSURE >= 140 MM HG: ICD-10-PCS | Mod: CPTII,S$GLB,, | Performed by: FAMILY MEDICINE

## 2021-10-11 PROCEDURE — 3078F DIAST BP <80 MM HG: CPT | Mod: CPTII,S$GLB,, | Performed by: FAMILY MEDICINE

## 2021-10-11 PROCEDURE — 1159F MED LIST DOCD IN RCRD: CPT | Mod: CPTII,S$GLB,, | Performed by: FAMILY MEDICINE

## 2021-10-11 PROCEDURE — 80047 POCT CHEMISTRY PANEL: ICD-10-PCS | Mod: QW,S$GLB,, | Performed by: FAMILY MEDICINE

## 2021-10-11 PROCEDURE — 81003 POCT URINALYSIS, DIPSTICK, AUTOMATED, W/O SCOPE: ICD-10-PCS | Mod: QW,S$GLB,, | Performed by: FAMILY MEDICINE

## 2021-10-12 ENCOUNTER — TELEPHONE (OUTPATIENT)
Dept: INTERNAL MEDICINE | Facility: CLINIC | Age: 76
End: 2021-10-12

## 2021-10-12 DIAGNOSIS — I10 ESSENTIAL HYPERTENSION: Primary | ICD-10-CM

## 2021-10-14 RX ORDER — SPIRONOLACTONE 25 MG/1
12.5 TABLET ORAL DAILY
Qty: 45 TABLET | Refills: 3 | Status: SHIPPED | OUTPATIENT
Start: 2021-10-14 | End: 2021-10-20

## 2021-10-16 ENCOUNTER — PATIENT MESSAGE (OUTPATIENT)
Dept: INTERNAL MEDICINE | Facility: CLINIC | Age: 76
End: 2021-10-16

## 2021-10-20 ENCOUNTER — LAB VISIT (OUTPATIENT)
Dept: LAB | Facility: HOSPITAL | Age: 76
End: 2021-10-20
Attending: INTERNAL MEDICINE
Payer: MEDICARE

## 2021-10-20 ENCOUNTER — OFFICE VISIT (OUTPATIENT)
Dept: INTERNAL MEDICINE | Facility: CLINIC | Age: 76
End: 2021-10-20
Payer: MEDICARE

## 2021-10-20 VITALS
SYSTOLIC BLOOD PRESSURE: 156 MMHG | BODY MASS INDEX: 22.11 KG/M2 | HEIGHT: 63 IN | WEIGHT: 124.75 LBS | OXYGEN SATURATION: 97 % | HEART RATE: 67 BPM | DIASTOLIC BLOOD PRESSURE: 64 MMHG

## 2021-10-20 DIAGNOSIS — I10 ESSENTIAL HYPERTENSION: ICD-10-CM

## 2021-10-20 DIAGNOSIS — M06.4 INFLAMMATORY POLYARTHRITIS: ICD-10-CM

## 2021-10-20 DIAGNOSIS — M06.4 INFLAMMATORY POLYARTHRITIS: Primary | ICD-10-CM

## 2021-10-20 LAB
ALBUMIN SERPL BCP-MCNC: 3.3 G/DL (ref 3.5–5.2)
ALP SERPL-CCNC: 79 U/L (ref 55–135)
ALT SERPL W/O P-5'-P-CCNC: 13 U/L (ref 10–44)
ANION GAP SERPL CALC-SCNC: 11 MMOL/L (ref 8–16)
AST SERPL-CCNC: 24 U/L (ref 10–40)
BASOPHILS # BLD AUTO: 0.07 K/UL (ref 0–0.2)
BASOPHILS NFR BLD: 0.6 % (ref 0–1.9)
BILIRUB SERPL-MCNC: 0.5 MG/DL (ref 0.1–1)
BUN SERPL-MCNC: 10 MG/DL (ref 8–23)
CALCIUM SERPL-MCNC: 9.7 MG/DL (ref 8.7–10.5)
CHLORIDE SERPL-SCNC: 98 MMOL/L (ref 95–110)
CO2 SERPL-SCNC: 25 MMOL/L (ref 23–29)
CREAT SERPL-MCNC: 0.8 MG/DL (ref 0.5–1.4)
CRP SERPL-MCNC: 54.1 MG/L (ref 0–8.2)
DIFFERENTIAL METHOD: ABNORMAL
EOSINOPHIL # BLD AUTO: 1.6 K/UL (ref 0–0.5)
EOSINOPHIL NFR BLD: 13.8 % (ref 0–8)
ERYTHROCYTE [DISTWIDTH] IN BLOOD BY AUTOMATED COUNT: 12.3 % (ref 11.5–14.5)
ERYTHROCYTE [SEDIMENTATION RATE] IN BLOOD BY WESTERGREN METHOD: 59 MM/HR (ref 0–36)
EST. GFR  (AFRICAN AMERICAN): >60 ML/MIN/1.73 M^2
EST. GFR  (NON AFRICAN AMERICAN): >60 ML/MIN/1.73 M^2
GLUCOSE SERPL-MCNC: 86 MG/DL (ref 70–110)
HCT VFR BLD AUTO: 33.9 % (ref 37–48.5)
HGB BLD-MCNC: 11 G/DL (ref 12–16)
IMM GRANULOCYTES # BLD AUTO: 0.06 K/UL (ref 0–0.04)
IMM GRANULOCYTES NFR BLD AUTO: 0.5 % (ref 0–0.5)
LYMPHOCYTES # BLD AUTO: 1.8 K/UL (ref 1–4.8)
LYMPHOCYTES NFR BLD: 15.4 % (ref 18–48)
MCH RBC QN AUTO: 30.2 PG (ref 27–31)
MCHC RBC AUTO-ENTMCNC: 32.4 G/DL (ref 32–36)
MCV RBC AUTO: 93 FL (ref 82–98)
MONOCYTES # BLD AUTO: 1 K/UL (ref 0.3–1)
MONOCYTES NFR BLD: 8.6 % (ref 4–15)
NEUTROPHILS # BLD AUTO: 7.1 K/UL (ref 1.8–7.7)
NEUTROPHILS NFR BLD: 61.1 % (ref 38–73)
NRBC BLD-RTO: 0 /100 WBC
PLATELET # BLD AUTO: 332 K/UL (ref 150–450)
PMV BLD AUTO: 8.3 FL (ref 9.2–12.9)
POTASSIUM SERPL-SCNC: 4.3 MMOL/L (ref 3.5–5.1)
PROT SERPL-MCNC: 6.8 G/DL (ref 6–8.4)
RBC # BLD AUTO: 3.64 M/UL (ref 4–5.4)
SODIUM SERPL-SCNC: 134 MMOL/L (ref 136–145)
URATE SERPL-MCNC: 6.5 MG/DL (ref 2.4–5.7)
WBC # BLD AUTO: 11.62 K/UL (ref 3.9–12.7)

## 2021-10-20 PROCEDURE — 84550 ASSAY OF BLOOD/URIC ACID: CPT | Performed by: INTERNAL MEDICINE

## 2021-10-20 PROCEDURE — 99999 PR PBB SHADOW E&M-EST. PATIENT-LVL III: CPT | Mod: PBBFAC,,, | Performed by: INTERNAL MEDICINE

## 2021-10-20 PROCEDURE — 3288F PR FALLS RISK ASSESSMENT DOCUMENTED: ICD-10-PCS | Mod: CPTII,S$GLB,, | Performed by: INTERNAL MEDICINE

## 2021-10-20 PROCEDURE — 86235 NUCLEAR ANTIGEN ANTIBODY: CPT | Mod: 59 | Performed by: INTERNAL MEDICINE

## 2021-10-20 PROCEDURE — 1159F PR MEDICATION LIST DOCUMENTED IN MEDICAL RECORD: ICD-10-PCS | Mod: CPTII,S$GLB,, | Performed by: INTERNAL MEDICINE

## 2021-10-20 PROCEDURE — 1159F MED LIST DOCD IN RCRD: CPT | Mod: CPTII,S$GLB,, | Performed by: INTERNAL MEDICINE

## 2021-10-20 PROCEDURE — 99214 PR OFFICE/OUTPT VISIT, EST, LEVL IV, 30-39 MIN: ICD-10-PCS | Mod: S$GLB,,, | Performed by: INTERNAL MEDICINE

## 2021-10-20 PROCEDURE — 85652 RBC SED RATE AUTOMATED: CPT | Performed by: INTERNAL MEDICINE

## 2021-10-20 PROCEDURE — 99999 PR PBB SHADOW E&M-EST. PATIENT-LVL III: ICD-10-PCS | Mod: PBBFAC,,, | Performed by: INTERNAL MEDICINE

## 2021-10-20 PROCEDURE — 80053 COMPREHEN METABOLIC PANEL: CPT | Performed by: INTERNAL MEDICINE

## 2021-10-20 PROCEDURE — 86038 ANTINUCLEAR ANTIBODIES: CPT | Performed by: INTERNAL MEDICINE

## 2021-10-20 PROCEDURE — 3078F DIAST BP <80 MM HG: CPT | Mod: CPTII,S$GLB,, | Performed by: INTERNAL MEDICINE

## 2021-10-20 PROCEDURE — 1101F PR PT FALLS ASSESS DOC 0-1 FALLS W/OUT INJ PAST YR: ICD-10-PCS | Mod: CPTII,S$GLB,, | Performed by: INTERNAL MEDICINE

## 2021-10-20 PROCEDURE — 86039 ANTINUCLEAR ANTIBODIES (ANA): CPT | Performed by: INTERNAL MEDICINE

## 2021-10-20 PROCEDURE — 85025 COMPLETE CBC W/AUTO DIFF WBC: CPT | Performed by: INTERNAL MEDICINE

## 2021-10-20 PROCEDURE — 36415 COLL VENOUS BLD VENIPUNCTURE: CPT | Performed by: INTERNAL MEDICINE

## 2021-10-20 PROCEDURE — 3077F SYST BP >= 140 MM HG: CPT | Mod: CPTII,S$GLB,, | Performed by: INTERNAL MEDICINE

## 2021-10-20 PROCEDURE — 3077F PR MOST RECENT SYSTOLIC BLOOD PRESSURE >= 140 MM HG: ICD-10-PCS | Mod: CPTII,S$GLB,, | Performed by: INTERNAL MEDICINE

## 2021-10-20 PROCEDURE — 3078F PR MOST RECENT DIASTOLIC BLOOD PRESSURE < 80 MM HG: ICD-10-PCS | Mod: CPTII,S$GLB,, | Performed by: INTERNAL MEDICINE

## 2021-10-20 PROCEDURE — 3288F FALL RISK ASSESSMENT DOCD: CPT | Mod: CPTII,S$GLB,, | Performed by: INTERNAL MEDICINE

## 2021-10-20 PROCEDURE — 86140 C-REACTIVE PROTEIN: CPT | Performed by: INTERNAL MEDICINE

## 2021-10-20 PROCEDURE — 1101F PT FALLS ASSESS-DOCD LE1/YR: CPT | Mod: CPTII,S$GLB,, | Performed by: INTERNAL MEDICINE

## 2021-10-20 PROCEDURE — 1125F PR PAIN SEVERITY QUANTIFIED, PAIN PRESENT: ICD-10-PCS | Mod: CPTII,S$GLB,, | Performed by: INTERNAL MEDICINE

## 2021-10-20 PROCEDURE — 1125F AMNT PAIN NOTED PAIN PRSNT: CPT | Mod: CPTII,S$GLB,, | Performed by: INTERNAL MEDICINE

## 2021-10-20 PROCEDURE — 99214 OFFICE O/P EST MOD 30 MIN: CPT | Mod: S$GLB,,, | Performed by: INTERNAL MEDICINE

## 2021-10-20 RX ORDER — CHLORTHALIDONE 25 MG/1
25 TABLET ORAL DAILY
Qty: 30 TABLET | Refills: 11 | Status: SHIPPED | OUTPATIENT
Start: 2021-10-20 | End: 2021-11-15

## 2021-10-21 LAB
ANA PATTERN 1: NORMAL
ANA SER QL IF: POSITIVE
ANA TITR SER IF: NORMAL {TITER}

## 2021-10-25 ENCOUNTER — PATIENT MESSAGE (OUTPATIENT)
Dept: INTERNAL MEDICINE | Facility: CLINIC | Age: 76
End: 2021-10-25
Payer: MEDICARE

## 2021-10-25 ENCOUNTER — TELEPHONE (OUTPATIENT)
Dept: INTERNAL MEDICINE | Facility: CLINIC | Age: 76
End: 2021-10-25
Payer: MEDICARE

## 2021-10-25 DIAGNOSIS — M13.0 POLYARTHRITIS: Primary | ICD-10-CM

## 2021-10-25 DIAGNOSIS — I10 ESSENTIAL HYPERTENSION: ICD-10-CM

## 2021-10-25 LAB
ANTI SM ANTIBODY: 0.1 RATIO (ref 0–0.99)
ANTI SM/RNP ANTIBODY: 0.1 RATIO (ref 0–0.99)
ANTI-SM INTERPRETATION: NEGATIVE
ANTI-SM/RNP INTERPRETATION: NEGATIVE
ANTI-SSA ANTIBODY: 0.12 RATIO (ref 0–0.99)
ANTI-SSA INTERPRETATION: NEGATIVE
ANTI-SSB ANTIBODY: 0.04 RATIO (ref 0–0.99)
ANTI-SSB INTERPRETATION: NEGATIVE
DSDNA AB SER-ACNC: NORMAL [IU]/ML

## 2021-10-25 RX ORDER — NEBIVOLOL 5 MG/1
5 TABLET ORAL DAILY
Qty: 90 TABLET | Refills: 2 | Status: SHIPPED | OUTPATIENT
Start: 2021-10-25 | End: 2022-07-28

## 2021-11-03 ENCOUNTER — TELEPHONE (OUTPATIENT)
Dept: INTERNAL MEDICINE | Facility: CLINIC | Age: 76
End: 2021-11-03
Payer: MEDICARE

## 2021-11-15 ENCOUNTER — TELEPHONE (OUTPATIENT)
Dept: INTERNAL MEDICINE | Facility: CLINIC | Age: 76
End: 2021-11-15
Payer: MEDICARE

## 2021-11-15 RX ORDER — SPIRONOLACTONE 25 MG/1
12.5 TABLET ORAL DAILY
Qty: 30 TABLET | Refills: 11 | Status: SHIPPED | OUTPATIENT
Start: 2021-11-15 | End: 2021-11-23

## 2021-12-01 ENCOUNTER — PATIENT OUTREACH (OUTPATIENT)
Dept: ADMINISTRATIVE | Facility: OTHER | Age: 76
End: 2021-12-01
Payer: MEDICARE

## 2021-12-02 ENCOUNTER — OFFICE VISIT (OUTPATIENT)
Dept: RHEUMATOLOGY | Facility: CLINIC | Age: 76
End: 2021-12-02
Payer: MEDICARE

## 2021-12-02 ENCOUNTER — LAB VISIT (OUTPATIENT)
Dept: LAB | Facility: HOSPITAL | Age: 76
End: 2021-12-02
Attending: INTERNAL MEDICINE
Payer: MEDICARE

## 2021-12-02 VITALS
SYSTOLIC BLOOD PRESSURE: 131 MMHG | BODY MASS INDEX: 21.28 KG/M2 | DIASTOLIC BLOOD PRESSURE: 58 MMHG | HEART RATE: 56 BPM | WEIGHT: 120.13 LBS

## 2021-12-02 DIAGNOSIS — I10 ESSENTIAL HYPERTENSION: ICD-10-CM

## 2021-12-02 DIAGNOSIS — M13.0 POLYARTHRITIS: ICD-10-CM

## 2021-12-02 LAB
ANION GAP SERPL CALC-SCNC: 11 MMOL/L (ref 8–16)
BUN SERPL-MCNC: 21 MG/DL (ref 8–23)
CALCIUM SERPL-MCNC: 9.8 MG/DL (ref 8.7–10.5)
CHLORIDE SERPL-SCNC: 97 MMOL/L (ref 95–110)
CO2 SERPL-SCNC: 25 MMOL/L (ref 23–29)
CREAT SERPL-MCNC: 0.9 MG/DL (ref 0.5–1.4)
EST. GFR  (AFRICAN AMERICAN): >60 ML/MIN/1.73 M^2
EST. GFR  (NON AFRICAN AMERICAN): >60 ML/MIN/1.73 M^2
GLUCOSE SERPL-MCNC: 86 MG/DL (ref 70–110)
POTASSIUM SERPL-SCNC: 4.6 MMOL/L (ref 3.5–5.1)
SODIUM SERPL-SCNC: 133 MMOL/L (ref 136–145)

## 2021-12-02 PROCEDURE — 80048 BASIC METABOLIC PNL TOTAL CA: CPT | Performed by: INTERNAL MEDICINE

## 2021-12-02 PROCEDURE — 99999 PR PBB SHADOW E&M-EST. PATIENT-LVL IV: CPT | Mod: PBBFAC,GC,, | Performed by: STUDENT IN AN ORGANIZED HEALTH CARE EDUCATION/TRAINING PROGRAM

## 2021-12-02 PROCEDURE — 99204 PR OFFICE/OUTPT VISIT, NEW, LEVL IV, 45-59 MIN: ICD-10-PCS | Mod: GC,S$GLB,, | Performed by: STUDENT IN AN ORGANIZED HEALTH CARE EDUCATION/TRAINING PROGRAM

## 2021-12-02 PROCEDURE — 36415 COLL VENOUS BLD VENIPUNCTURE: CPT | Performed by: INTERNAL MEDICINE

## 2021-12-02 PROCEDURE — 99999 PR PBB SHADOW E&M-EST. PATIENT-LVL IV: ICD-10-PCS | Mod: PBBFAC,GC,, | Performed by: STUDENT IN AN ORGANIZED HEALTH CARE EDUCATION/TRAINING PROGRAM

## 2021-12-02 PROCEDURE — 99204 OFFICE O/P NEW MOD 45 MIN: CPT | Mod: GC,S$GLB,, | Performed by: STUDENT IN AN ORGANIZED HEALTH CARE EDUCATION/TRAINING PROGRAM

## 2021-12-02 RX ORDER — PREDNISONE 5 MG/1
15 TABLET ORAL DAILY
Qty: 90 TABLET | Refills: 0 | Status: SHIPPED | OUTPATIENT
Start: 2021-12-02 | End: 2021-12-13 | Stop reason: SDUPTHER

## 2021-12-03 ENCOUNTER — HOSPITAL ENCOUNTER (OUTPATIENT)
Dept: RADIOLOGY | Facility: HOSPITAL | Age: 76
Discharge: HOME | End: 2021-12-03
Attending: STUDENT IN AN ORGANIZED HEALTH CARE EDUCATION/TRAINING PROGRAM
Payer: MEDICARE

## 2021-12-03 DIAGNOSIS — M13.0 POLYARTHRITIS: ICD-10-CM

## 2021-12-03 PROCEDURE — 73620 X-RAY EXAM OF FOOT: CPT | Mod: TC,50

## 2021-12-03 PROCEDURE — 73620 XR FOOT AP BILAT: ICD-10-PCS | Mod: 26,50,, | Performed by: RADIOLOGY

## 2021-12-03 PROCEDURE — 73110 XR WRIST COMPLETE 3 VIEWS BILATERAL: ICD-10-PCS | Mod: 26,50,, | Performed by: RADIOLOGY

## 2021-12-03 PROCEDURE — 73110 X-RAY EXAM OF WRIST: CPT | Mod: 26,50,, | Performed by: RADIOLOGY

## 2021-12-03 PROCEDURE — 73130 X-RAY EXAM OF HAND: CPT | Mod: TC,50

## 2021-12-03 PROCEDURE — 73130 X-RAY EXAM OF HAND: CPT | Mod: 26,50,, | Performed by: RADIOLOGY

## 2021-12-03 PROCEDURE — 73620 X-RAY EXAM OF FOOT: CPT | Mod: 26,50,, | Performed by: RADIOLOGY

## 2021-12-03 PROCEDURE — 73110 X-RAY EXAM OF WRIST: CPT | Mod: TC,50

## 2021-12-03 PROCEDURE — 73130 XR HAND COMPLETE 3 VIEWS BILATERAL: ICD-10-PCS | Mod: 26,50,, | Performed by: RADIOLOGY

## 2021-12-06 ENCOUNTER — PATIENT MESSAGE (OUTPATIENT)
Dept: ADMINISTRATIVE | Facility: OTHER | Age: 76
End: 2021-12-06
Payer: MEDICARE

## 2021-12-06 PROBLEM — M13.0 POLYARTHRITIS: Status: ACTIVE | Noted: 2021-12-06

## 2021-12-06 NOTE — PROGRESS NOTES
"Digital Medicine: Clinician Follow-Up    Called patient for hypertension follow-up. She sent the following message: "My B/P was really high yesterday and the reason is because I am out of one of my meds Amlodpine. Can't seem to get it out here on the road because these [facility] don't stock it and have to order it. This takes 2 days and I am never in one place for 2 days. 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 She has been on the road for over a month and was unable to refill her medication before leaving due to insurance restrictions. She denies s/sx of hypertension with elevated reading - no SOB, CP, blurred vision or severe headache. "I feel just fine. I have only been without it for 2 days." She does not want to switch from combination to single tablets for her BP medication but is willing to do so temporarily if needed to ensure she can get it refilled. She feels safer in the [place] area where she feels "the COVID-19 situation is not as bad as it is in [place]." She plans to remain in that area for a while as opposed to coming back until things get better.     The history is provided by the patient. No  was used.     Follow Up  Follow-up reason(s): reading review and routine education      Readings are trending up due to medication adherence and Out of amlodipine-olmesartan tablet.    Medication adherence issues: missed doses        INTERVENTION(S)  encouragement/support    PLAN  patient verbalizes understanding and continue monitoring    Explained to patient that although she is out of one tablet she is actually missing 2 BP medications - amlodipine and olmesartan for her BP management.   Offered to call pharmacy in present area to switch her from combination tablet to single tablets to ensure she can continue her regimen. Patient refused - she does not want to take " [facility]    Dr. Sergio Dennis, DO     Your procedure is scheduled for 12/15/2021 at 0830 AM. [facility] 0[facility] AM The Pre-Surgical staff at [facility] will review your health history, perform an assessment and answer any questions you have.    PROCEDURE INSTRUCTIONS  1. Do not eat any solid food or drink liquids for at least 6 hours prior to your procedure.    2. Stop the following medication: None    3. If you are going home following your procedure, you CANNOT drive after surgery. Make arrangements for transportation home.    4. Notify your doctor if you develop signs of illness such as fever, or cold/flu symptoms.    5. Wear comfortable clothing that is also appropriate for discharge.    6. Day of Procedure please take any Blood Pressure Medication and/or Pain Medication with a small sip of water.    7. Blood sugar must be under 200 to proceed with procedure, your blood sugar will be checked right before scheduled procedure time.         single tabs as she likes the one tab for 2 medications. Explained the concerns of elevated BP while on the road and that switch would be temporary. Medication could be switched back before the next refill. Patient was more willing to this concept. Once she determines her next destination, she will call [facility] in advance to determine if they have medication in stock. If they do, she will activate refill there for pick-up. However if they do not, she will alert [facility] clinician in order for clinician to contact [facility] and modify Rx to 2 separate tablets in order for Rx to be refilled. We will convert Rx back to combination tablet prior to next refill.    Patient was advised NOT to take 2 Bystolic tablets for BP management due to existing low HR with BP readings. Patient expressed understanding and agreed not to take increased dose of Bystolic.       There are no preventive care reminders to display for this patient.    Last 5 Patient Entered Readings                                      Current 30 Day Average: 134/72     Recent Readings 4/7/2020 4/7/2020 4/6/2020 4/6/2020 4/6/2020    SBP (mmHg) 210 120 139 150 197    DBP (mmHg) 82 80 58 73 80    Pulse 57 80 49 50 126             Hypertension Medications             amlodipine-olmesartan (PATTIE) 10-40 mg per tablet TAKE 1 TABLET BY MOUTH EVERY DAY    hydroCHLOROthiazide (MICROZIDE) 12.5 mg capsule TAKE 2 CAPSULES(25 MG) BY MOUTH EVERY DAY    nebivolol (BYSTOLIC) 20 mg Tab Take 1 tablet by mouth once daily.                         Medication Affordability Screening    This is preventing medication adherence.

## 2021-12-09 ENCOUNTER — PATIENT MESSAGE (OUTPATIENT)
Dept: RHEUMATOLOGY | Facility: CLINIC | Age: 76
End: 2021-12-09
Payer: MEDICARE

## 2021-12-13 DIAGNOSIS — M05.79 RHEUMATOID ARTHRITIS INVOLVING MULTIPLE SITES WITH POSITIVE RHEUMATOID FACTOR: Primary | ICD-10-CM

## 2021-12-13 RX ORDER — FOLIC ACID 1 MG/1
1 TABLET ORAL DAILY
Qty: 90 TABLET | Refills: 3 | Status: SHIPPED | OUTPATIENT
Start: 2021-12-13 | End: 2022-07-28

## 2021-12-13 RX ORDER — METHOTREXATE 2.5 MG/1
TABLET ORAL
Qty: 60 TABLET | Refills: 3 | Status: SHIPPED | OUTPATIENT
Start: 2021-12-13 | End: 2022-01-26

## 2021-12-13 RX ORDER — PREDNISONE 2.5 MG/1
2.5 TABLET ORAL DAILY
Qty: 30 TABLET | Refills: 0 | Status: SHIPPED | OUTPATIENT
Start: 2021-12-13 | End: 2022-01-26

## 2021-12-13 RX ORDER — PREDNISONE 5 MG/1
TABLET ORAL
Qty: 100 TABLET | Refills: 0 | Status: SHIPPED | OUTPATIENT
Start: 2021-12-13 | End: 2022-01-26

## 2021-12-26 ENCOUNTER — PATIENT MESSAGE (OUTPATIENT)
Dept: RHEUMATOLOGY | Facility: CLINIC | Age: 76
End: 2021-12-26
Payer: MEDICARE

## 2021-12-27 ENCOUNTER — PATIENT MESSAGE (OUTPATIENT)
Dept: ADMINISTRATIVE | Facility: OTHER | Age: 76
End: 2021-12-27
Payer: MEDICARE

## 2021-12-30 ENCOUNTER — PATIENT MESSAGE (OUTPATIENT)
Dept: RHEUMATOLOGY | Facility: CLINIC | Age: 76
End: 2021-12-30
Payer: MEDICARE

## 2022-01-03 ENCOUNTER — PATIENT MESSAGE (OUTPATIENT)
Dept: RHEUMATOLOGY | Facility: CLINIC | Age: 77
End: 2022-01-03
Payer: MEDICARE

## 2022-01-05 ENCOUNTER — HOSPITAL ENCOUNTER (EMERGENCY)
Facility: HOSPITAL | Age: 77
Discharge: HOME | End: 2022-01-05
Attending: EMERGENCY MEDICINE
Payer: MEDICARE

## 2022-01-05 VITALS
WEIGHT: 178 LBS | SYSTOLIC BLOOD PRESSURE: 151 MMHG | DIASTOLIC BLOOD PRESSURE: 65 MMHG | BODY MASS INDEX: 31.54 KG/M2 | RESPIRATION RATE: 20 BRPM | HEART RATE: 68 BPM | HEIGHT: 63 IN | OXYGEN SATURATION: 99 % | TEMPERATURE: 99 F

## 2022-01-05 DIAGNOSIS — R53.83 FATIGUE, UNSPECIFIED TYPE: Primary | ICD-10-CM

## 2022-01-05 DIAGNOSIS — K12.1 STOMATITIS: ICD-10-CM

## 2022-01-05 DIAGNOSIS — N30.00 ACUTE CYSTITIS WITHOUT HEMATURIA: ICD-10-CM

## 2022-01-05 DIAGNOSIS — R07.89 CHEST HEAVINESS: ICD-10-CM

## 2022-01-05 LAB
ALBUMIN SERPL BCP-MCNC: 3.6 G/DL (ref 3.5–5.2)
ALP SERPL-CCNC: 53 U/L (ref 55–135)
ALT SERPL W/O P-5'-P-CCNC: 17 U/L (ref 10–44)
ANION GAP SERPL CALC-SCNC: 12 MMOL/L (ref 8–16)
AST SERPL-CCNC: 26 U/L (ref 10–40)
BACTERIA #/AREA URNS HPF: ABNORMAL /HPF
BASOPHILS # BLD AUTO: 0.03 K/UL (ref 0–0.2)
BASOPHILS NFR BLD: 0.6 % (ref 0–1.9)
BILIRUB SERPL-MCNC: 1.4 MG/DL (ref 0.1–1)
BILIRUB UR QL STRIP: ABNORMAL
BUN SERPL-MCNC: 22 MG/DL (ref 8–23)
CALCIUM SERPL-MCNC: 9.1 MG/DL (ref 8.7–10.5)
CHLORIDE SERPL-SCNC: 96 MMOL/L (ref 95–110)
CLARITY UR: ABNORMAL
CO2 SERPL-SCNC: 22 MMOL/L (ref 23–29)
COLOR UR: YELLOW
CREAT SERPL-MCNC: 1 MG/DL (ref 0.5–1.4)
CTP QC/QA: YES
CTP QC/QA: YES
DIFFERENTIAL METHOD: ABNORMAL
EOSINOPHIL # BLD AUTO: 0.2 K/UL (ref 0–0.5)
EOSINOPHIL NFR BLD: 3.5 % (ref 0–8)
ERYTHROCYTE [DISTWIDTH] IN BLOOD BY AUTOMATED COUNT: 15.8 % (ref 11.5–14.5)
EST. GFR  (AFRICAN AMERICAN): >60 ML/MIN/1.73 M^2
EST. GFR  (NON AFRICAN AMERICAN): 55 ML/MIN/1.73 M^2
GLUCOSE SERPL-MCNC: 84 MG/DL (ref 70–110)
GLUCOSE UR QL STRIP: NEGATIVE
HCT VFR BLD AUTO: 38.7 % (ref 37–48.5)
HGB BLD-MCNC: 13 G/DL (ref 12–16)
HGB UR QL STRIP: NEGATIVE
HYALINE CASTS #/AREA URNS LPF: 5 /LPF
IMM GRANULOCYTES # BLD AUTO: 0.02 K/UL (ref 0–0.04)
IMM GRANULOCYTES NFR BLD AUTO: 0.4 % (ref 0–0.5)
KETONES UR QL STRIP: ABNORMAL
LEUKOCYTE ESTERASE UR QL STRIP: ABNORMAL
LYMPHOCYTES # BLD AUTO: 1.5 K/UL (ref 1–4.8)
LYMPHOCYTES NFR BLD: 29.8 % (ref 18–48)
MCH RBC QN AUTO: 32.5 PG (ref 27–31)
MCHC RBC AUTO-ENTMCNC: 33.6 G/DL (ref 32–36)
MCV RBC AUTO: 97 FL (ref 82–98)
MICROSCOPIC COMMENT: ABNORMAL
MONOCYTES # BLD AUTO: 0.7 K/UL (ref 0.3–1)
MONOCYTES NFR BLD: 12.8 % (ref 4–15)
NEUTROPHILS # BLD AUTO: 2.7 K/UL (ref 1.8–7.7)
NEUTROPHILS NFR BLD: 52.9 % (ref 38–73)
NITRITE UR QL STRIP: NEGATIVE
NRBC BLD-RTO: 0 /100 WBC
PH UR STRIP: 5 [PH] (ref 5–8)
PLATELET # BLD AUTO: 335 K/UL (ref 150–450)
PMV BLD AUTO: 9.6 FL (ref 9.2–12.9)
POC MOLECULAR INFLUENZA A AGN: NEGATIVE
POC MOLECULAR INFLUENZA B AGN: NEGATIVE
POTASSIUM SERPL-SCNC: 5 MMOL/L (ref 3.5–5.1)
PROT SERPL-MCNC: 7 G/DL (ref 6–8.4)
PROT UR QL STRIP: ABNORMAL
RBC # BLD AUTO: 4 M/UL (ref 4–5.4)
SARS-COV-2 RDRP RESP QL NAA+PROBE: NEGATIVE
SODIUM SERPL-SCNC: 130 MMOL/L (ref 136–145)
SP GR UR STRIP: 1.02 (ref 1–1.03)
SQUAMOUS #/AREA URNS HPF: 15 /HPF
TROPONIN I SERPL DL<=0.01 NG/ML-MCNC: 0.01 NG/ML (ref 0–0.03)
URN SPEC COLLECT METH UR: ABNORMAL
UROBILINOGEN UR STRIP-ACNC: ABNORMAL EU/DL
WBC # BLD AUTO: 5.17 K/UL (ref 3.9–12.7)
WBC #/AREA URNS HPF: 23 /HPF (ref 0–5)

## 2022-01-05 PROCEDURE — 87088 URINE BACTERIA CULTURE: CPT | Performed by: NURSE PRACTITIONER

## 2022-01-05 PROCEDURE — 87186 SC STD MICRODIL/AGAR DIL: CPT | Performed by: NURSE PRACTITIONER

## 2022-01-05 PROCEDURE — 81000 URINALYSIS NONAUTO W/SCOPE: CPT | Performed by: NURSE PRACTITIONER

## 2022-01-05 PROCEDURE — 96360 HYDRATION IV INFUSION INIT: CPT

## 2022-01-05 PROCEDURE — 87086 URINE CULTURE/COLONY COUNT: CPT | Performed by: NURSE PRACTITIONER

## 2022-01-05 PROCEDURE — 87077 CULTURE AEROBIC IDENTIFY: CPT | Performed by: NURSE PRACTITIONER

## 2022-01-05 PROCEDURE — 99284 EMERGENCY DEPT VISIT MOD MDM: CPT | Mod: 25

## 2022-01-05 PROCEDURE — U0002 COVID-19 LAB TEST NON-CDC: HCPCS | Performed by: PHYSICIAN ASSISTANT

## 2022-01-05 PROCEDURE — 80053 COMPREHEN METABOLIC PANEL: CPT | Performed by: EMERGENCY MEDICINE

## 2022-01-05 PROCEDURE — 25000003 PHARM REV CODE 250: Performed by: NURSE PRACTITIONER

## 2022-01-05 PROCEDURE — 85025 COMPLETE CBC W/AUTO DIFF WBC: CPT | Performed by: NURSE PRACTITIONER

## 2022-01-05 PROCEDURE — 84484 ASSAY OF TROPONIN QUANT: CPT | Performed by: NURSE PRACTITIONER

## 2022-01-05 RX ORDER — CEPHALEXIN 500 MG/1
500 CAPSULE ORAL EVERY 12 HOURS
Qty: 20 CAPSULE | Refills: 0 | Status: SHIPPED | OUTPATIENT
Start: 2022-01-05 | End: 2022-01-12

## 2022-01-05 RX ORDER — SUCRALFATE 1 G/1
1 TABLET ORAL 4 TIMES DAILY
Qty: 20 TABLET | Refills: 0 | Status: ON HOLD | OUTPATIENT
Start: 2022-01-05 | End: 2022-05-08 | Stop reason: HOSPADM

## 2022-01-05 RX ADMIN — SODIUM CHLORIDE 500 ML: 0.9 INJECTION, SOLUTION INTRAVENOUS at 12:01

## 2022-01-05 NOTE — ED PROVIDER NOTES
"Encounter Date: 1/5/2022       History     Chief Complaint   Patient presents with    COVID-19 Concerns     Headache nausea and weakness x 1 day. Patient requested to be tested.     Mouth Lesions     Patient stated that she is taking methotrexate and it is causing mouth sores and pain with eating     76-year-old female with past medical history chronic kidney disease, hypertension hyperlipidemia, hyperparathyroidism presents the for generalized weakness. She reports that starting yesterday she has had intermittent headache, nausea, and generalized weakness.  She states that she has not been eating or drinking very much due to mouth ulcerations.  The patient was recently prescribed methotrexate for polyarthralgia but discontinued it about a week ago due to the oral lesions.  She denies n/v/d and fever.  No cough.  She has felt intermittent chest "heaviness."  No SOB. No exacerbating or relieving modifiers of the chest heaviness.  She last experienced chest heaviness this morning while at rest. No current chest "heaviness."   has been prescribed magic mouthwash but states that after using it once, it did not help so she quit using it.  No known sick contacts.  She denies urinary symptoms.  No other complaints at this time.      The history is provided by the patient and medical records.     Review of patient's allergies indicates:  No Known Allergies  Past Medical History:   Diagnosis Date    CKD (chronic kidney disease) stage 3, GFR 30-59 ml/min     HLD (hyperlipidemia)     HTN (hypertension)     Hyperparathyroidism      Past Surgical History:   Procedure Laterality Date    CATARACT EXTRACTION, BILATERAL Bilateral 2014    cataract removal Right 2014    hiatial hernia  2017    HYSTERECTOMY      melanoma      on face    OOPHORECTOMY      THORACIC AORTIC ANEURYSM REPAIR  2011     Family History   Problem Relation Age of Onset    Cancer Father     Hypertension Maternal Grandmother      Social " History     Tobacco Use    Smoking status: Former Smoker     Packs/day: 1.00     Years: 30.00     Pack years: 30.00     Types: Cigarettes     Quit date: 2009     Years since quittin.9    Smokeless tobacco: Never Used   Substance Use Topics    Alcohol use: No     Comment: glass of wine once or twice a year    Drug use: No     Review of Systems   Constitutional: Positive for appetite change and fatigue. Negative for fever.   HENT: Positive for mouth sores. Negative for congestion.    Respiratory: Negative for cough and shortness of breath.    Cardiovascular: Positive for chest pain. Negative for palpitations.   Gastrointestinal: Positive for nausea. Negative for abdominal pain, diarrhea and vomiting.   Genitourinary: Negative for decreased urine volume and dysuria.   Musculoskeletal: Negative for back pain.   Skin: Negative for rash.   Allergic/Immunologic: Positive for immunocompromised state (recently on methotrexate).   Neurological: Positive for weakness (generalized) and headaches. Negative for dizziness, light-headedness and numbness.   Psychiatric/Behavioral: Negative for confusion.       Physical Exam     Initial Vitals [22 1022]   BP Pulse Resp Temp SpO2   122/81 80 20 98.7 °F (37.1 °C) 97 %      MAP       --         Physical Exam    Nursing note and vitals reviewed.  Constitutional: Vital signs are normal. She appears well-developed and well-nourished. She is active and cooperative. She is easily aroused.  Non-toxic appearance. She does not have a sickly appearance. She does not appear ill. No distress.   HENT:   Head: Normocephalic and atraumatic.   Right Ear: External ear normal.   Left Ear: External ear normal.   Nose: Nose normal.   Mouth/Throat: Uvula is midline and mucous membranes are normal. Oral lesions present. No posterior oropharyngeal edema, posterior oropharyngeal erythema or tonsillar abscesses.   Oral ulcerations on buccal mucosa and soft palate. No bleeding.    Eyes:  Conjunctivae and EOM are normal.   Neck: Neck supple.   Normal range of motion.  Cardiovascular: Normal rate, regular rhythm and normal heart sounds.   Pulmonary/Chest: Effort normal and breath sounds normal.   Abdominal: Abdomen is soft. Normal appearance and bowel sounds are normal.   Musculoskeletal:      Cervical back: Normal range of motion and neck supple.     Neurological: She is alert, oriented to person, place, and time and easily aroused. She has normal strength. GCS eye subscore is 4. GCS verbal subscore is 5. GCS motor subscore is 6.   Skin: Skin is warm, dry and intact. No bruising and no rash noted. No pallor.   Psychiatric: She has a normal mood and affect. Her speech is normal and behavior is normal. Judgment and thought content normal. Cognition and memory are normal.         ED Course   Procedures  Labs Reviewed   CBC W/ AUTO DIFFERENTIAL - Abnormal; Notable for the following components:       Result Value    MCH 32.5 (*)     RDW 15.8 (*)     All other components within normal limits   URINALYSIS, REFLEX TO URINE CULTURE - Abnormal; Notable for the following components:    Appearance, UA Hazy (*)     Protein, UA Trace (*)     Ketones, UA Trace (*)     Bilirubin (UA) 1+ (*)     Urobilinogen, UA 4.0-6.0 (*)     Leukocytes, UA 1+ (*)     All other components within normal limits    Narrative:     Specimen Source->Urine   URINALYSIS MICROSCOPIC - Abnormal; Notable for the following components:    WBC, UA 23 (*)     Bacteria Many (*)     Hyaline Casts, UA 5 (*)     All other components within normal limits    Narrative:     Specimen Source->Urine   COMPREHENSIVE METABOLIC PANEL - Abnormal; Notable for the following components:    Sodium 130 (*)     CO2 22 (*)     Total Bilirubin 1.4 (*)     Alkaline Phosphatase 53 (*)     eGFR if non  55 (*)     All other components within normal limits   CULTURE, URINE   TROPONIN I   SARS-COV-2 RDRP GENE    Narrative:     This test utilizes isothermal  nucleic acid amplification   technology to detect the SARS-CoV-2 RdRp nucleic acid segment.   The analytical sensitivity (limit of detection) is 125 genome   equivalents/mL.   A POSITIVE result implies infection with the SARS-CoV-2 virus;   the patient is presumed to be contagious.     A NEGATIVE result means that SARS-CoV-2 nucleic acids are not   present above the limit of detection. A NEGATIVE result should be   treated as presumptive. It does not rule out the possibility of   COVID-19 and should not be the sole basis for treatment decisions.   If COVID-19 is strongly suspected based on clinical and exposure   history, re-testing using an alternate molecular assay should be   considered.   This test is only for use under the Food and Drug   Administration s Emergency Use Authorization (EUA).   Commercial kits are provided by [employer].   Performance characteristics of the EUA have been independently   verified by [facility] Department of   Pathology and Laboratory Medicine.   _________________________________________________________________   The authorized Fact Sheet for Healthcare Providers and the authorized Fact   Sheet for Patients of the ID NOW COVID-19 are available on the FDA   website:     https://www.fda.gov/media/932391/download  https://www.fda.gov/media/932670/download       POCT INFLUENZA A/B MOLECULAR          Imaging Results    None          Medications   sodium chloride 0.9% bolus 500 mL (0 mLs Intravenous Stopped 1/5/22 1245)     Medical Decision Making:   Clinical Tests:   Lab Tests: Ordered and Reviewed  Medical Tests: Ordered and Reviewed  ED Management:  Labs, EKG, NS bolus.     The patient had no report of chest heaviness/pain while in the ED.  UA reveals infection. Culture sent. WBC normal. Pt is afebrile and generally well-appearing. I do not suspect sepsis or UTI.  WBC normal.  Normal renal function. Troponin negative.  I feel the patient's fatigue is likely due to  decreased oral intake due to the ulcerations in her mouth.  She has stopped taking the methotrexate at the advice of her physician.  I will prescribe carafate to swish and swallow to hopefully help with pain.  Pt reports no relief from the magic mouthwash.  Pt is tolerating oral fluids while in the ED and she reports that she would like to be discharged.   Encouraged increased fluid intake.  Pt is to f/u with her PCP within 3 days.  Reviewed return precautions including fever, inability to tolerate oral fluids, worsening of today's complaints, or if her CP returns.  The patient felt comfortable with this plan and reported that she is ready for discharge.  Advised orajel for the ulceration on her lip.   RX Keflex and Carafate.                       Clinical Impression:   Final diagnoses:  [R07.89] Chest heaviness  [R53.83] Fatigue, unspecified type (Primary)  [K12.1] Stomatitis  [N30.00] Acute cystitis without hematuria          ED Disposition Condition    Discharge Stable        ED Prescriptions     Medication Sig Dispense Start Date End Date Auth. Provider    sucralfate (CARAFATE) 1 gram tablet Take 1 tablet (1 g total) by mouth 4 (four) times daily. Crush in water. Swish and swallow. 20 tablet 1/5/2022  JORDAN Stanley    cephALEXin (KEFLEX) 500 MG capsule Take 1 capsule (500 mg total) by mouth every 12 (twelve) hours. for 7 days 20 capsule 1/5/2022 1/12/2022 JORDAN Stanley        Follow-up Information     Follow up With Specialties Details Why Contact Info    Odalis Kim MD Internal Medicine Schedule an appointment as soon as possible for a visit in 3 days  [address]  [place] [address]  [phone]             JORDAN Stanley  01/05/22 3848

## 2022-01-05 NOTE — DISCHARGE INSTRUCTIONS
Increase your water intake.      Return to the ED if your condition changes, progresses, or if you have any concerns.      Thank you for choosing [facility]! We appreciate you coming to us for your medical care. We hope you feel better soon! Please come back to [facility] for all of your future medical needs.      Our goal in the emergency department is to always give you outstanding care and exceptional service. You may receive a survey by mail or e-mail in the next week regarding your experience in our ED. We would greatly appreciate your completing and returning the survey. Your feedback provides us with a way to recognize our staff who give very good care and it helps us learn how to improve when your experience was below our aspiration of excellence.      Sincerely,  MIQUEL Bloom  [facility]

## 2022-01-06 RX ORDER — LEUCOVORIN CALCIUM 15 MG/1
15 TABLET ORAL EVERY 6 HOURS
Qty: 10 TABLET | Refills: 0 | Status: SHIPPED | OUTPATIENT
Start: 2022-01-06 | End: 2022-01-09

## 2022-01-06 NOTE — TELEPHONE ENCOUNTER
She is still having mouth blisters, worsening. Increase folic acid to 5 mg daily and add leucovorin 15 mg q6h for 10 doses.

## 2022-01-07 ENCOUNTER — PATIENT MESSAGE (OUTPATIENT)
Dept: ADMINISTRATIVE | Facility: OTHER | Age: 77
End: 2022-01-07
Payer: MEDICARE

## 2022-01-08 LAB — BACTERIA UR CULT: ABNORMAL

## 2022-01-14 ENCOUNTER — PATIENT MESSAGE (OUTPATIENT)
Dept: RHEUMATOLOGY | Facility: CLINIC | Age: 77
End: 2022-01-14
Payer: MEDICARE

## 2022-01-18 ENCOUNTER — HOSPITAL ENCOUNTER (EMERGENCY)
Facility: HOSPITAL | Age: 77
Discharge: HOME | End: 2022-01-18
Attending: EMERGENCY MEDICINE
Payer: MEDICARE

## 2022-01-18 ENCOUNTER — OFFICE VISIT (OUTPATIENT)
Dept: SURGERY | Facility: CLINIC | Age: 77
End: 2022-01-18
Payer: MEDICARE

## 2022-01-18 ENCOUNTER — TELEPHONE (OUTPATIENT)
Dept: INTERNAL MEDICINE | Facility: CLINIC | Age: 77
End: 2022-01-18
Payer: MEDICARE

## 2022-01-18 VITALS
BODY MASS INDEX: 20.68 KG/M2 | WEIGHT: 116.69 LBS | DIASTOLIC BLOOD PRESSURE: 63 MMHG | HEIGHT: 63 IN | HEART RATE: 65 BPM | SYSTOLIC BLOOD PRESSURE: 139 MMHG

## 2022-01-18 VITALS
OXYGEN SATURATION: 97 % | HEART RATE: 67 BPM | DIASTOLIC BLOOD PRESSURE: 64 MMHG | RESPIRATION RATE: 20 BRPM | SYSTOLIC BLOOD PRESSURE: 154 MMHG | BODY MASS INDEX: 19.49 KG/M2 | HEIGHT: 63 IN | WEIGHT: 110 LBS | TEMPERATURE: 98 F

## 2022-01-18 DIAGNOSIS — K64.4 EXTERNAL HEMORRHOID: ICD-10-CM

## 2022-01-18 DIAGNOSIS — K62.3 RECTAL PROLAPSE: Primary | ICD-10-CM

## 2022-01-18 DIAGNOSIS — Z87.891 PERSONAL HISTORY OF NICOTINE DEPENDENCE: ICD-10-CM

## 2022-01-18 DIAGNOSIS — K62.3 RECTAL PROLAPSE: ICD-10-CM

## 2022-01-18 PROCEDURE — 1159F MED LIST DOCD IN RCRD: CPT | Mod: CPTII,S$GLB,, | Performed by: STUDENT IN AN ORGANIZED HEALTH CARE EDUCATION/TRAINING PROGRAM

## 2022-01-18 PROCEDURE — 3078F PR MOST RECENT DIASTOLIC BLOOD PRESSURE < 80 MM HG: ICD-10-PCS | Mod: CPTII,S$GLB,, | Performed by: STUDENT IN AN ORGANIZED HEALTH CARE EDUCATION/TRAINING PROGRAM

## 2022-01-18 PROCEDURE — 3288F PR FALLS RISK ASSESSMENT DOCUMENTED: ICD-10-PCS | Mod: CPTII,S$GLB,, | Performed by: STUDENT IN AN ORGANIZED HEALTH CARE EDUCATION/TRAINING PROGRAM

## 2022-01-18 PROCEDURE — 3075F SYST BP GE 130 - 139MM HG: CPT | Mod: CPTII,S$GLB,, | Performed by: STUDENT IN AN ORGANIZED HEALTH CARE EDUCATION/TRAINING PROGRAM

## 2022-01-18 PROCEDURE — 99284 PR EMERGENCY DEPT VISIT,LEVEL IV: ICD-10-PCS | Mod: ,,, | Performed by: EMERGENCY MEDICINE

## 2022-01-18 PROCEDURE — 3075F PR MOST RECENT SYSTOLIC BLOOD PRESS GE 130-139MM HG: ICD-10-PCS | Mod: CPTII,S$GLB,, | Performed by: STUDENT IN AN ORGANIZED HEALTH CARE EDUCATION/TRAINING PROGRAM

## 2022-01-18 PROCEDURE — 3288F FALL RISK ASSESSMENT DOCD: CPT | Mod: CPTII,S$GLB,, | Performed by: STUDENT IN AN ORGANIZED HEALTH CARE EDUCATION/TRAINING PROGRAM

## 2022-01-18 PROCEDURE — 1101F PR PT FALLS ASSESS DOC 0-1 FALLS W/OUT INJ PAST YR: ICD-10-PCS | Mod: CPTII,S$GLB,, | Performed by: STUDENT IN AN ORGANIZED HEALTH CARE EDUCATION/TRAINING PROGRAM

## 2022-01-18 PROCEDURE — 1125F PR PAIN SEVERITY QUANTIFIED, PAIN PRESENT: ICD-10-PCS | Mod: CPTII,S$GLB,, | Performed by: STUDENT IN AN ORGANIZED HEALTH CARE EDUCATION/TRAINING PROGRAM

## 2022-01-18 PROCEDURE — 1159F PR MEDICATION LIST DOCUMENTED IN MEDICAL RECORD: ICD-10-PCS | Mod: CPTII,S$GLB,, | Performed by: STUDENT IN AN ORGANIZED HEALTH CARE EDUCATION/TRAINING PROGRAM

## 2022-01-18 PROCEDURE — 99204 OFFICE O/P NEW MOD 45 MIN: CPT | Mod: S$GLB,,, | Performed by: STUDENT IN AN ORGANIZED HEALTH CARE EDUCATION/TRAINING PROGRAM

## 2022-01-18 PROCEDURE — 99284 EMERGENCY DEPT VISIT MOD MDM: CPT | Mod: ,,, | Performed by: EMERGENCY MEDICINE

## 2022-01-18 PROCEDURE — 1101F PT FALLS ASSESS-DOCD LE1/YR: CPT | Mod: CPTII,S$GLB,, | Performed by: STUDENT IN AN ORGANIZED HEALTH CARE EDUCATION/TRAINING PROGRAM

## 2022-01-18 PROCEDURE — 99282 EMERGENCY DEPT VISIT SF MDM: CPT

## 2022-01-18 PROCEDURE — 3078F DIAST BP <80 MM HG: CPT | Mod: CPTII,S$GLB,, | Performed by: STUDENT IN AN ORGANIZED HEALTH CARE EDUCATION/TRAINING PROGRAM

## 2022-01-18 PROCEDURE — 99999 PR PBB SHADOW E&M-EST. PATIENT-LVL IV: CPT | Mod: PBBFAC,,, | Performed by: STUDENT IN AN ORGANIZED HEALTH CARE EDUCATION/TRAINING PROGRAM

## 2022-01-18 PROCEDURE — 1125F AMNT PAIN NOTED PAIN PRSNT: CPT | Mod: CPTII,S$GLB,, | Performed by: STUDENT IN AN ORGANIZED HEALTH CARE EDUCATION/TRAINING PROGRAM

## 2022-01-18 PROCEDURE — 99999 PR PBB SHADOW E&M-EST. PATIENT-LVL IV: ICD-10-PCS | Mod: PBBFAC,,, | Performed by: STUDENT IN AN ORGANIZED HEALTH CARE EDUCATION/TRAINING PROGRAM

## 2022-01-18 PROCEDURE — 99204 PR OFFICE/OUTPT VISIT, NEW, LEVL IV, 45-59 MIN: ICD-10-PCS | Mod: S$GLB,,, | Performed by: STUDENT IN AN ORGANIZED HEALTH CARE EDUCATION/TRAINING PROGRAM

## 2022-01-18 NOTE — TELEPHONE ENCOUNTER
----- Message from Selina Schwarz RN sent at 1/18/2022  2:21 PM CST -----  Regarding: Lung Screening  This patient was seen today in the colon-rectal surgery clinic. Her lung screening order was entered, please schedule when appropriate. Thank you.

## 2022-01-18 NOTE — ED NOTES
Patient discharged home  Discharge instructions given  Patient verbalizes understanding   Patient denies pain, chest pain and shortness of breath   All belongings sent home with patient   NAD  Ambulatory on d/c

## 2022-01-18 NOTE — ED TRIAGE NOTES
Buffy Dominique, a 76 y.o. female presents to the ED w/ complaint of bleeding hemorrhoid worsening x1 week. Pt reports slight bleeding started this morning when wiping. Pt reports one episode of diarrhea this morning.     Triage note:  Chief Complaint   Patient presents with    Rectal Pain     Review of patient's allergies indicates:  No Known Allergies  Past Medical History:   Diagnosis Date    CKD (chronic kidney disease) stage 3, GFR 30-59 ml/min     HLD (hyperlipidemia)     HTN (hypertension)     Hyperparathyroidism      LOC: The patient is awake, alert, and oriented to self, place, time, and situation. Pt is calm and cooperative. Affect is appropriate.  Speech is appropriate and clear.     APPEARANCE: Patient resting comfortably in no acute distress.  Patient is clean and well groomed.    SKIN: The skin is warm and dry; color consistent with ethnicity.  Patient has normal skin turgor and moist mucus membranes.  Skin intact; no breakdown or bruising noted.     MUSCULOSKELETAL: Patient moving upper and lower extremities without difficulty; denies pain in the extremities or back.  Denies weakness. Stable gait.    RESPIRATORY: Airway is open and patent. Respirations spontaneous, even, easy, and non-labored.  Patient has a normal effort and rate.  No accessory muscle use noted. Denies cough and SOB.     CARDIAC:  Normal rate noted.  No peripheral edema noted. No complaints of chest pain.     ABDOMEN: Soft and non tender to palpation.  No distention noted. Pt denies abdominal pain; denies nausea, vomiting, diarrhea, or constipation.    NEUROLOGIC: Eyes open spontaneously.  Behavior appropriate to situation.  Follows commands; facial expression symmetrical.  Purposeful motor response noted; normal sensation in all extremities. Pt denies headache; denies lightheadedness or dizziness; denies visual disturbances; denies loss of balance; denies unilateral weakness.

## 2022-01-18 NOTE — ED PROVIDER NOTES
"Encounter Date: 2022    SCRIBE #1 NOTE: IDev, am scribing for, and in the presence of, Ton Dunn Jr., MD.       History     Chief Complaint   Patient presents with    Rectal Pain     Buffy Dominique is a 76 y.o. female with a PMHx of hemorrhoid and HTN who presents with a chief complaint of rectal pain onset one week ago. Patient notes she has a hemorrhoid " as big as an apple." She reports she had blood in her underwear with a gel liquid substance with one episode of diarrhea  this AM. Patient has not seen a colorectal surgeon in the past although se was referred to one.Patient reports no other identifying, alleviating, or exacerbating factors and denies fever, chills, nausea, vomiting, or any other associated symptoms at this time.    The history is provided by the patient and medical records. No  was used.     Review of patient's allergies indicates:   Allergen Reactions    Methotrexate analogues      Mouth Ulcers      Past Medical History:   Diagnosis Date    CKD (chronic kidney disease) stage 3, GFR 30-59 ml/min     HLD (hyperlipidemia)     HTN (hypertension)     Hyperparathyroidism      Past Surgical History:   Procedure Laterality Date    CATARACT EXTRACTION, BILATERAL Bilateral 2014    cataract removal Right 2014    hiatial hernia  2017    HYSTERECTOMY      melanoma      on face    OOPHORECTOMY      THORACIC AORTIC ANEURYSM REPAIR  2011     Family History   Problem Relation Age of Onset    Cancer Father     Hypertension Maternal Grandmother     Colon cancer Neg Hx     Inflammatory bowel disease Neg Hx      Social History     Tobacco Use    Smoking status: Former Smoker     Packs/day: 1.00     Years: 30.00     Pack years: 30.00     Types: Cigarettes     Quit date: 2009     Years since quittin.9    Smokeless tobacco: Never Used   Substance Use Topics    Alcohol use: No     Comment: glass of wine once or twice a year    Drug use: No "     Review of Systems    Physical Exam     Initial Vitals [01/18/22 0946]   BP Pulse Resp Temp SpO2   (!) 154/64 67 20 98.4 °F (36.9 °C) 97 %      MAP       --         Physical Exam    Constitutional: She appears well-developed and well-nourished.   HENT:   Head: Normocephalic and atraumatic.   Eyes: Conjunctivae, EOM and lids are normal. Pupils are equal, round, and reactive to light.   Neck: Trachea normal.   Normal range of motion.   Full passive range of motion without pain.     Abdominal: Normal appearance.   Genitourinary:    Genitourinary Comments: External hemorrhoid and rectal prolapse with scant of blood non tender to palpation.         Musculoskeletal:      Cervical back: Full passive range of motion without pain and normal range of motion.     Neurological: She is alert.   Skin: Skin is intact.   Psychiatric: She has a normal mood and affect. Her speech is normal and behavior is normal. Judgment and thought content normal.         ED Course   Procedures  Labs Reviewed - No data to display       Imaging Results    None          Medications - No data to display  Medical Decision Making:   History:   Old Medical Records: I decided to obtain old medical records.  Initial Assessment:   Buffy Dominique is a 76 y.o. female with a PMHx of hemorrhoid and HTN who presents with a chief complaint of rectal pain onset one week ago. /64. PE noted for external hemorrhoid and rectal prolapse with secant of blood non tender to palpation.      Differential Diagnosis:   DDx includes but is not limited to:Dx hemorrhoid and rectal prolapse.    ED Management:  Plan: I was able to reduce rectal prolapse at bedside. Patient tolerated well and reports of improvement. I will discharge and follow up.              Scribe Attestation:   Scribe #1: I performed the above scribed service and the documentation accurately describes the services I performed. I attest to the accuracy of the note.                 Clinical Impression:    Final diagnoses:  [K64.4] External hemorrhoid  [K62.3] Rectal prolapse (Primary)          ED Disposition Condition    Discharge Stable        ED Prescriptions     None        Follow-up Information     Follow up With Specialties Details Why Contact Info    Odalis Kim MD Internal Medicine In 1 week  [address]  [place] [address]  [phone]      [facility] - Emergency Dept Emergency Medicine  If symptoms worsen [address] [facility] [id number] [phone]           Ton Dunn Jr., MD  01/19/22 7744

## 2022-01-18 NOTE — PROGRESS NOTES
[facility]  Colon and Rectal Surgery    [address]  [place], [place]  Tel: [phone]  Fax: [phone]  [email or web address]   MD Saturnino Urena MD Brian Kann, MD W. Forrest Johnston, MD Matthew Giglia, MD Jennifer Paruch, MD William Kethman, MD     Patient name: Buffy Dominique   YOB: 1945   MRN: [id number]  Date of visit: 01/18/2022    Dear Ivory Dunn and Julio,    It was a pleasure seeing Ms. Dominique in the Colon and Rectal Surgery clinic here at [facility].     As you know, Ms. Dominique is a 76 year old woman with a history of HTN, HLD, and CKD who presents for evaluation of rectal prolapse. She was seen in the ED earlier today for rectal pain for 1 week with blood and mucus in her underwear. She denies any fevers, chills, nausea, vomiting, chest pain, or shortness of breath. The prolapse was reduced in the ED prior to discharge with follow-up with us and it is back out. She had a hysterectomy in the early 1990s and before a week ago has never had this issue. She had 3 vaginal deliveries, she denies any prolapse of tissue from her vagina. She has had issues with fecal incontinence - less control. She does have some urinary leakage but not a significant amount. She has had issues with hemorrhoids in the past. She denies a family history of IBD or CRC. She used to smoke tobacco but quit in 2009. She does not take any fiber or stool softeners. She was in the ER 2 weeks ago due to oral ulcers related to Methotrexate - she was asymptomatic but was started on antibiotics for this. The oral ulcers are much better but not completely resolved. She feels like her bowel movements are soft but firm - doesn't feel like anything in particular exacerbated her prolapse. She was on Prednisone with Methotrexate/Folic acid for arthritis - Prednisone was last stopped about a month ago. She has had unintentional weight loss over the past several years -      Laparoscopic hiatal hernia repair and hysterectomy    Last colonoscopy: 5-7 years ago maybe - doesn't recall exactly when but it was reportedly normal    The patient was informed of the availability of a certified  without charge. A certified  was not necessary for this visit.    Review of Systems  See pertinent review of systems above    Past Medical History:   Diagnosis Date    CKD (chronic kidney disease) stage 3, GFR 30-59 ml/min     HLD (hyperlipidemia)     HTN (hypertension)     Hyperparathyroidism      Past Surgical History:   Procedure Laterality Date    CATARACT EXTRACTION, BILATERAL Bilateral 2014    cataract removal Right 2014    hiatial hernia  2017    HYSTERECTOMY      melanoma      on face    OOPHORECTOMY      THORACIC AORTIC ANEURYSM REPAIR  2011     Family History   Problem Relation Age of Onset    Cancer Father     Hypertension Maternal Grandmother     Colon cancer Neg Hx     Inflammatory bowel disease Neg Hx      Social History     Tobacco Use    Smoking status: Former Smoker     Packs/day: 1.00     Years: 30.00     Pack years: 30.00     Types: Cigarettes     Quit date: 2009     Years since quittin.9    Smokeless tobacco: Never Used   Substance Use Topics    Alcohol use: No     Comment: glass of wine once or twice a year    Drug use: No     Review of patient's allergies indicates:   Allergen Reactions    Methotrexate analogues      Mouth Ulcers        Current Outpatient Medications on File Prior to Visit   Medication Sig Dispense Refill    amLODIPine (NORVASC) 10 MG tablet Take 1 tablet (10 mg total) by mouth once daily. 90 tablet 1    folic acid (FOLVITE) 1 MG tablet Take 1 tablet (1 mg total) by mouth once daily. 90 tablet 3    multivit-min/iron/folic/lutein (CENTRUM SILVER WOMEN ORAL) Take 1 tablet by mouth once daily.      nebivoloL (BYSTOLIC) 5 MG Tab Take 1 tablet (5 mg total) by mouth once daily. 90 tablet 2    olmesartan  "(BENICAR) 40 MG tablet TAKE 1 TABLET(40 MG) BY MOUTH EVERY DAY FOR BLOOD PRESSURE 90 tablet 3    predniSONE (DELTASONE) 2.5 MG tablet Take 1 tablet (2.5 mg total) by mouth once daily. 30 tablet 0    predniSONE (DELTASONE) 5 MG tablet Along with 2.5mg tabs. Take 15mg daily for 4 weeks. Then 12.5mg daily for 1 week. Then 10mg daily for 1 week. Then 7.5mg daily for 1 week. Then  mg daily for 1 week. Then 2.5mg daily for 1 week. Then stop. 100 tablet 0    spironolactone (ALDACTONE) 25 MG tablet Take 1 tablet (25 mg total) by mouth once daily. 90 tablet 0    sucralfate (CARAFATE) 1 gram tablet Take 1 tablet (1 g total) by mouth 4 (four) times daily. Crush in water. Swish and swallow. 20 tablet 0    methotrexate 2.5 MG Tab Start 5 tabs weekly for 4 weeks. Then 7 tabs weekly for 4 weeks. Then 9 tabs weekly and continue. (Patient not taking: Reported on 1/18/2022) 60 tablet 3     No current facility-administered medications on file prior to visit.     Physical Examination  /63 (BP Location: Left arm, Patient Position: Sitting, BP Method: Large (Automatic))   Pulse 65   Ht 5' 3" (1.6 m)   Wt 52.9 kg (116 lb 11.2 oz)   LMP  (LMP Unknown)   BMI 20.67 kg/m²      A chaperone was present for the physical examination.    Constitutional: well developed, no cough, no dyspnea, alert, and no acute distress    Head: Normocephalic, no lesions, without obvious abnormality  Eye: Normal external eye, conjunctiva, and lids  Cardiovascular: regular rate and regular rhythm  Respiratory: normal air entry  Gastrointestinal: soft, non-tender, without masses or organomegaly    Perianal Skin: Reduced tone visible actinic keratosis (have been there for a long time) around the perianal skin, no evidence of prolapse on exam today, vaginal atrophy present   Vaginal exam: no masses, mild rectocele present  Digital Rectal Exam:  Low resting tone  Low squeeze pressure   Relaxation with bear down is present  Mass(es) appreciated: " none  Rectocele is present  Tenderness is absent    Musculoskeletal: full range of motion without pain  Neurologic: alert, oriented, normal speech, no focal findings or movement disorder noted  Psychiatric: appropriate, normal mood    Assessment and Plan of Care    Thank you again for referring Ms. Dominique to my care. In summary, Ms. Dominique is a 76 year old woman presenting with rectal prolapse - reduced. We discussed treatment options and have provided the following recommendations:    1. Discussed techniques to reduce recurrence of her prolapse - recommended reduced straining, fiber supplementation, hydration   2. Concurrent urinary symptoms are minimally bothersome for her - declines referral to Urogynecology  3. Case request for colonoscopy placed - recommend this before proceeding with repair  4. Refer for PFPT placed  5. CT Chest for lung cancer screening recommended at 6 months (in 2019) - ordered for her  6. Follow-up in 1 month to discuss options    Please do not hesitate to contact me if you have any questions.      Sharath Myles MD - Staff Surgeon  Department of Colon & Rectal Surgery  [facility]

## 2022-01-19 ENCOUNTER — HOSPITAL ENCOUNTER (OUTPATIENT)
Dept: RADIOLOGY | Facility: HOSPITAL | Age: 77
Discharge: HOME | End: 2022-01-19
Attending: STUDENT IN AN ORGANIZED HEALTH CARE EDUCATION/TRAINING PROGRAM
Payer: MEDICARE

## 2022-01-19 DIAGNOSIS — K62.3 RECTAL PROLAPSE: ICD-10-CM

## 2022-01-19 DIAGNOSIS — Z87.891 PERSONAL HISTORY OF NICOTINE DEPENDENCE: ICD-10-CM

## 2022-01-19 PROCEDURE — 71271 CT CHEST LUNG SCREENING LOW DOSE: ICD-10-PCS | Mod: 26,,, | Performed by: RADIOLOGY

## 2022-01-19 PROCEDURE — 71271 CT THORAX LUNG CANCER SCR C-: CPT | Mod: 26,,, | Performed by: RADIOLOGY

## 2022-01-19 PROCEDURE — 71271 CT THORAX LUNG CANCER SCR C-: CPT | Mod: TC

## 2022-01-23 ENCOUNTER — PATIENT MESSAGE (OUTPATIENT)
Dept: RHEUMATOLOGY | Facility: CLINIC | Age: 77
End: 2022-01-23
Payer: MEDICARE

## 2022-01-24 ENCOUNTER — TELEPHONE (OUTPATIENT)
Dept: SURGERY | Facility: CLINIC | Age: 77
End: 2022-01-24
Payer: MEDICARE

## 2022-01-24 ENCOUNTER — LAB VISIT (OUTPATIENT)
Dept: LAB | Facility: HOSPITAL | Age: 77
End: 2022-01-24
Payer: MEDICARE

## 2022-01-24 DIAGNOSIS — M05.79 RHEUMATOID ARTHRITIS INVOLVING MULTIPLE SITES WITH POSITIVE RHEUMATOID FACTOR: ICD-10-CM

## 2022-01-24 DIAGNOSIS — R91.1 PULMONARY NODULE: Primary | ICD-10-CM

## 2022-01-24 LAB
ALBUMIN SERPL BCP-MCNC: 3.8 G/DL (ref 3.5–5.2)
ALP SERPL-CCNC: 75 U/L (ref 55–135)
ALT SERPL W/O P-5'-P-CCNC: 12 U/L (ref 10–44)
ANION GAP SERPL CALC-SCNC: 10 MMOL/L (ref 8–16)
AST SERPL-CCNC: 19 U/L (ref 10–40)
BASOPHILS # BLD AUTO: 0.09 K/UL (ref 0–0.2)
BASOPHILS NFR BLD: 0.9 % (ref 0–1.9)
BILIRUB SERPL-MCNC: 0.7 MG/DL (ref 0.1–1)
BUN SERPL-MCNC: 13 MG/DL (ref 8–23)
CALCIUM SERPL-MCNC: 9.8 MG/DL (ref 8.7–10.5)
CHLORIDE SERPL-SCNC: 98 MMOL/L (ref 95–110)
CO2 SERPL-SCNC: 24 MMOL/L (ref 23–29)
CREAT SERPL-MCNC: 0.8 MG/DL (ref 0.5–1.4)
DIFFERENTIAL METHOD: ABNORMAL
EOSINOPHIL # BLD AUTO: 0.3 K/UL (ref 0–0.5)
EOSINOPHIL NFR BLD: 3.3 % (ref 0–8)
ERYTHROCYTE [DISTWIDTH] IN BLOOD BY AUTOMATED COUNT: 13.6 % (ref 11.5–14.5)
EST. GFR  (AFRICAN AMERICAN): >60 ML/MIN/1.73 M^2
EST. GFR  (NON AFRICAN AMERICAN): >60 ML/MIN/1.73 M^2
GLUCOSE SERPL-MCNC: 94 MG/DL (ref 70–110)
HCT VFR BLD AUTO: 38.6 % (ref 37–48.5)
HGB BLD-MCNC: 12.7 G/DL (ref 12–16)
IMM GRANULOCYTES # BLD AUTO: 0.1 K/UL (ref 0–0.04)
IMM GRANULOCYTES NFR BLD AUTO: 1 % (ref 0–0.5)
LYMPHOCYTES # BLD AUTO: 2.2 K/UL (ref 1–4.8)
LYMPHOCYTES NFR BLD: 22.2 % (ref 18–48)
MCH RBC QN AUTO: 32 PG (ref 27–31)
MCHC RBC AUTO-ENTMCNC: 32.9 G/DL (ref 32–36)
MCV RBC AUTO: 97 FL (ref 82–98)
MONOCYTES # BLD AUTO: 0.9 K/UL (ref 0.3–1)
MONOCYTES NFR BLD: 9.2 % (ref 4–15)
NEUTROPHILS # BLD AUTO: 6.4 K/UL (ref 1.8–7.7)
NEUTROPHILS NFR BLD: 63.4 % (ref 38–73)
NRBC BLD-RTO: 0 /100 WBC
PLATELET # BLD AUTO: 303 K/UL (ref 150–450)
PMV BLD AUTO: 8.2 FL (ref 9.2–12.9)
POTASSIUM SERPL-SCNC: 3.8 MMOL/L (ref 3.5–5.1)
PROT SERPL-MCNC: 7.3 G/DL (ref 6–8.4)
RBC # BLD AUTO: 3.97 M/UL (ref 4–5.4)
SODIUM SERPL-SCNC: 132 MMOL/L (ref 136–145)
WBC # BLD AUTO: 10.05 K/UL (ref 3.9–12.7)

## 2022-01-24 PROCEDURE — 36415 COLL VENOUS BLD VENIPUNCTURE: CPT | Performed by: STUDENT IN AN ORGANIZED HEALTH CARE EDUCATION/TRAINING PROGRAM

## 2022-01-24 PROCEDURE — 85025 COMPLETE CBC W/AUTO DIFF WBC: CPT | Performed by: STUDENT IN AN ORGANIZED HEALTH CARE EDUCATION/TRAINING PROGRAM

## 2022-01-24 PROCEDURE — 80053 COMPREHEN METABOLIC PANEL: CPT | Performed by: STUDENT IN AN ORGANIZED HEALTH CARE EDUCATION/TRAINING PROGRAM

## 2022-01-26 RX ORDER — PREDNISONE 10 MG/1
10 TABLET ORAL DAILY PRN
Qty: 20 TABLET | Refills: 0 | Status: SHIPPED | OUTPATIENT
Start: 2022-01-26 | End: 2022-03-10

## 2022-01-26 RX ORDER — ADALIMUMAB 40MG/0.8ML
40 KIT SUBCUTANEOUS
Qty: 6 PEN | Refills: 0 | Status: SHIPPED | OUTPATIENT
Start: 2022-01-26 | End: 2022-03-10 | Stop reason: SDUPTHER

## 2022-01-26 NOTE — TELEPHONE ENCOUNTER
Called patient back. She had questions about the logistics of taking Humira every 2 weeks and how the pharmacy would get this medication to her. All questions answered and she would like to start Humira. She is having some pain and stiffness in the hands this morning so I advised she take 10 mg prednisone today.    Ordered Humira to [facility]  Ordered a few tablets of prednisone 10 mg.

## 2022-01-27 ENCOUNTER — TELEPHONE (OUTPATIENT)
Dept: INTERNAL MEDICINE | Facility: CLINIC | Age: 77
End: 2022-01-27

## 2022-01-27 NOTE — TELEPHONE ENCOUNTER
Can you please reach out to patient to help schedule pulmonary. It is very important that she follow up with them for her abnormal CT scan.

## 2022-02-03 ENCOUNTER — OFFICE VISIT (OUTPATIENT)
Dept: PULMONOLOGY | Facility: CLINIC | Age: 77
End: 2022-02-03
Payer: MEDICARE

## 2022-02-03 VITALS
HEIGHT: 63 IN | HEART RATE: 64 BPM | WEIGHT: 116.63 LBS | BODY MASS INDEX: 20.66 KG/M2 | OXYGEN SATURATION: 96 % | DIASTOLIC BLOOD PRESSURE: 82 MMHG | SYSTOLIC BLOOD PRESSURE: 140 MMHG

## 2022-02-03 DIAGNOSIS — Z87.891 FORMER TOBACCO USE: ICD-10-CM

## 2022-02-03 DIAGNOSIS — K62.3 RECTAL PROLAPSE: ICD-10-CM

## 2022-02-03 DIAGNOSIS — M05.9 SEROPOSITIVE RHEUMATOID ARTHRITIS: ICD-10-CM

## 2022-02-03 DIAGNOSIS — R91.8 MULTIPLE PULMONARY NODULES: Primary | ICD-10-CM

## 2022-02-03 PROCEDURE — 1126F AMNT PAIN NOTED NONE PRSNT: CPT | Mod: CPTII,S$GLB,, | Performed by: STUDENT IN AN ORGANIZED HEALTH CARE EDUCATION/TRAINING PROGRAM

## 2022-02-03 PROCEDURE — 1101F PT FALLS ASSESS-DOCD LE1/YR: CPT | Mod: CPTII,S$GLB,, | Performed by: STUDENT IN AN ORGANIZED HEALTH CARE EDUCATION/TRAINING PROGRAM

## 2022-02-03 PROCEDURE — 3077F PR MOST RECENT SYSTOLIC BLOOD PRESSURE >= 140 MM HG: ICD-10-PCS | Mod: CPTII,S$GLB,, | Performed by: STUDENT IN AN ORGANIZED HEALTH CARE EDUCATION/TRAINING PROGRAM

## 2022-02-03 PROCEDURE — 99204 PR OFFICE/OUTPT VISIT, NEW, LEVL IV, 45-59 MIN: ICD-10-PCS | Mod: S$GLB,,, | Performed by: STUDENT IN AN ORGANIZED HEALTH CARE EDUCATION/TRAINING PROGRAM

## 2022-02-03 PROCEDURE — 3288F PR FALLS RISK ASSESSMENT DOCUMENTED: ICD-10-PCS | Mod: CPTII,S$GLB,, | Performed by: STUDENT IN AN ORGANIZED HEALTH CARE EDUCATION/TRAINING PROGRAM

## 2022-02-03 PROCEDURE — 1126F PR PAIN SEVERITY QUANTIFIED, NO PAIN PRESENT: ICD-10-PCS | Mod: CPTII,S$GLB,, | Performed by: STUDENT IN AN ORGANIZED HEALTH CARE EDUCATION/TRAINING PROGRAM

## 2022-02-03 PROCEDURE — 3288F FALL RISK ASSESSMENT DOCD: CPT | Mod: CPTII,S$GLB,, | Performed by: STUDENT IN AN ORGANIZED HEALTH CARE EDUCATION/TRAINING PROGRAM

## 2022-02-03 PROCEDURE — 1101F PR PT FALLS ASSESS DOC 0-1 FALLS W/OUT INJ PAST YR: ICD-10-PCS | Mod: CPTII,S$GLB,, | Performed by: STUDENT IN AN ORGANIZED HEALTH CARE EDUCATION/TRAINING PROGRAM

## 2022-02-03 PROCEDURE — 3079F PR MOST RECENT DIASTOLIC BLOOD PRESSURE 80-89 MM HG: ICD-10-PCS | Mod: CPTII,S$GLB,, | Performed by: STUDENT IN AN ORGANIZED HEALTH CARE EDUCATION/TRAINING PROGRAM

## 2022-02-03 PROCEDURE — 99204 OFFICE O/P NEW MOD 45 MIN: CPT | Mod: S$GLB,,, | Performed by: STUDENT IN AN ORGANIZED HEALTH CARE EDUCATION/TRAINING PROGRAM

## 2022-02-03 PROCEDURE — 99999 PR PBB SHADOW E&M-EST. PATIENT-LVL IV: ICD-10-PCS | Mod: PBBFAC,,, | Performed by: STUDENT IN AN ORGANIZED HEALTH CARE EDUCATION/TRAINING PROGRAM

## 2022-02-03 PROCEDURE — 3079F DIAST BP 80-89 MM HG: CPT | Mod: CPTII,S$GLB,, | Performed by: STUDENT IN AN ORGANIZED HEALTH CARE EDUCATION/TRAINING PROGRAM

## 2022-02-03 PROCEDURE — 99999 PR PBB SHADOW E&M-EST. PATIENT-LVL IV: CPT | Mod: PBBFAC,,, | Performed by: STUDENT IN AN ORGANIZED HEALTH CARE EDUCATION/TRAINING PROGRAM

## 2022-02-03 PROCEDURE — 3077F SYST BP >= 140 MM HG: CPT | Mod: CPTII,S$GLB,, | Performed by: STUDENT IN AN ORGANIZED HEALTH CARE EDUCATION/TRAINING PROGRAM

## 2022-02-03 PROCEDURE — 1159F PR MEDICATION LIST DOCUMENTED IN MEDICAL RECORD: ICD-10-PCS | Mod: CPTII,S$GLB,, | Performed by: STUDENT IN AN ORGANIZED HEALTH CARE EDUCATION/TRAINING PROGRAM

## 2022-02-03 PROCEDURE — 1159F MED LIST DOCD IN RCRD: CPT | Mod: CPTII,S$GLB,, | Performed by: STUDENT IN AN ORGANIZED HEALTH CARE EDUCATION/TRAINING PROGRAM

## 2022-02-03 RX ORDER — CHLORTHALIDONE 25 MG/1
25 TABLET ORAL DAILY
Status: ON HOLD | COMMUNITY
Start: 2022-01-14 | End: 2022-05-08 | Stop reason: HOSPADM

## 2022-02-03 NOTE — ASSESSMENT & PLAN NOTE
Substantial tobacco use history.  Quit in 2009. Undergoing screening CT chest.  Now 13 years to post smoking, so if nodules are stable, will no longer need screening CT chest after 15 years post cessation.  Congratulated on quitting encouraged ongoing cessation

## 2022-02-03 NOTE — ASSESSMENT & PLAN NOTE
Adverse reaction to methotrexate with blisters and skin manifestations.  Now on adalimumab.  His this focal nodule would be an unusual appearance of methotrexate induced lung injury.  Would also be atypical of RA associated lung disease

## 2022-02-03 NOTE — ASSESSMENT & PLAN NOTE
Reports surgical planning.  No dates secured at this time.  Wall there are no PFTs on this patient, her lack of functional limitation and impressive exercise capacity given her tobacco use history place her in a lower risk category.  That being says she has not done investigated supracentimter pulmonary nodule.  Follow-up imaging and pulmonary function test ordered which will better help quantify risk

## 2022-02-03 NOTE — PROGRESS NOTES
"Subjective:   Patient ID:  Buffy Dominique is a 76 y.o. female    Reason for visit:  pulmonary nodules on screening CT and preoperative clearance for surgery    76-year-old female former smoker with history of HTN, CKD and seropositive RA here in clinic for evaluation pulmonary nodules discovered on screening CT scan and for preoperative clearance for surgical intervention of rectal prolapse.  Patient quit smoking in 2009 after extensive history of tobacco abuse.  She has no respiratory complaints currently, stating that she is able to walk around her cul-de-sac (0.25 miles) about 5 times before her legs get tired.  Additionally, she is able to traverse 2 flights of stairs before stopping due to leg fatigue.  Neither of those instances that she experienced dyspnea.  Denies chronic cough, sputum production or chest pain.  Does endorse seasonal allergies with symptoms of sinus congestion that resolved spontaneously.  No postnasal symptoms.  Patient does endorse chronic diarrhea but attributes this to her rectal prolapse.       Review of Systems   Constitutional: Negative for chills, diaphoresis, fever, malaise/fatigue and weight loss.   HENT: Negative for congestion, sinus pain and sore throat.    Eyes: Negative for redness.   Respiratory: Negative for cough, hemoptysis, sputum production, shortness of breath and wheezing.    Cardiovascular: Negative for chest pain, palpitations, orthopnea, leg swelling and PND.   Gastrointestinal: Positive for diarrhea. Negative for abdominal pain, constipation, heartburn, nausea and vomiting.   Skin: Negative for rash.   Neurological: Negative for dizziness, weakness and headaches.   Endo/Heme/Allergies: Positive for environmental allergies.      Objective:     Vitals:    02/03/22 1302   BP: (!) 140/82   BP Location: Right arm   Patient Position: Sitting   Pulse: 64   SpO2: 96%   Weight: 52.9 kg (116 lb 10 oz)   Height: 5' 3" (1.6 m)         Physical Exam  Vitals and nursing note " reviewed.   Constitutional:       General: She is not in acute distress.     Appearance: Normal appearance. She is normal weight. She is not ill-appearing, toxic-appearing or diaphoretic.   HENT:      Head: Normocephalic and atraumatic.      Nose: No congestion or rhinorrhea.      Mouth/Throat:      Mouth: Mucous membranes are moist.      Pharynx: Oropharynx is clear. No oropharyngeal exudate or posterior oropharyngeal erythema.   Eyes:      General: No scleral icterus.     Extraocular Movements: Extraocular movements intact.      Conjunctiva/sclera: Conjunctivae normal.   Cardiovascular:      Rate and Rhythm: Normal rate and regular rhythm.      Heart sounds: No murmur heard.      Pulmonary:      Effort: No tachypnea, accessory muscle usage, prolonged expiration, respiratory distress or retractions.      Breath sounds: No stridor. No decreased breath sounds, wheezing, rhonchi or rales.   Abdominal:      General: There is no distension.      Palpations: Abdomen is soft.      Tenderness: There is no abdominal tenderness. There is no guarding.   Musculoskeletal:         General: No swelling.      Right lower leg: No edema.      Left lower leg: No edema.   Skin:     General: Skin is warm and dry.      Coloration: Skin is not jaundiced.      Findings: No rash.   Neurological:      General: No focal deficit present.      Mental Status: She is alert and oriented to person, place, and time. Mental status is at baseline.      Cranial Nerves: No cranial nerve deficit.          Personal Diagnostic Review and Interpretation  01/19/2022 CT chest:  Similarly appearing bilateral solid pulmonary nodules; new sub solid 1.2 x 0.6 cm opacity in the RLL    07/05/2019 CT chest:  Scattered bilateral, solid, subcentimeter pulmonary nodules; largest nodule 0.7 cm in LLL    Pertinent Studies Reviewed and Interpreted:     Pulmonary Function Tests:   No PFTs in epic    6 Minute Walk Tests:   No 6 minute walk in epic    Echocardiograms:    07/05/2019 echo:  LVEF 60% local WM a, G2 DD with severe LAE; PASP 40 mmHg    Assessment:     1. Multiple pulmonary nodules  Ambulatory referral/consult to Pulmonology    CT Chest Without Contrast    Complete PFT w/ bronchodilator   2. Seropositive rheumatoid arthritis  CT Chest Without Contrast   3. Former tobacco use  CT Chest Without Contrast    Complete PFT w/ bronchodilator   4. Rectal prolapse         Current Outpatient Medications   Medication Instructions    amLODIPine (NORVASC) 10 mg, Oral, Daily    chlorthalidone (HYGROTEN) 25 mg, Oral, Daily    folic acid (FOLVITE) 1 mg, Oral, Daily    HUMIRA PEN 40 mg, Subcutaneous, Every 14 days    multivit-min/iron/folic/lutein (CENTRUM SILVER WOMEN ORAL) 1 tablet, Oral, Daily    nebivoloL (BYSTOLIC) 5 mg, Oral, Daily    olmesartan (BENICAR) 40 MG tablet TAKE 1 TABLET(40 MG) BY MOUTH EVERY DAY FOR BLOOD PRESSURE    predniSONE (DELTASONE) 10 mg, Oral, Daily PRN    spironolactone (ALDACTONE) 25 mg, Oral, Daily    sucralfate (CARAFATE) 1 g, Oral, 4 times daily, Crush in water. Swish and swallow.      Buffy Dominique had no medications administered during this visit.     Orders Placed This Encounter   Procedures    CT Chest Without Contrast     Standing Status:   Future     Standing Expiration Date:   5/3/2022     Order Specific Question:   May the Radiologist modify the order per protocol to meet the clinical needs of the patient?     Answer:   Yes    Complete PFT w/ bronchodilator     Standing Status:   Future     Standing Expiration Date:   2/3/2023     Order Specific Question:   Release to patient     Answer:   Immediate      Plan:       Rectal prolapse  Reports surgical planning.  No dates secured at this time.  Wall there are no PFTs on this patient, her lack of functional limitation and impressive exercise capacity given her tobacco use history place her in a lower risk category.  That being says she has not done investigated supracentimter pulmonary  nodule.  Follow-up imaging and pulmonary function test ordered which will better help quantify risk    Seropositive rheumatoid arthritis  Adverse reaction to methotrexate with blisters and skin manifestations.  Now on adalimumab.  His this focal nodule would be an unusual appearance of methotrexate induced lung injury.  Would also be atypical of RA associated lung disease    Former tobacco use  Substantial tobacco use history.  Quit in 2009. Undergoing screening CT chest.  Now 13 years to post smoking, so if nodules are stable, will no longer need screening CT chest after 15 years post cessation.  Congratulated on quitting encouraged ongoing cessation    Multiple pulmonary nodules  Scattered subcentimeter nodules bilaterally which appear largely stable between her 2 screening CTs.  New appearance of 1.2 cm partially solid nodule.  Repeat CT chest in 3 months.  May need invasive sampling at that time and this was discussed with patient      45 minutes of total time spent on the encounter, which includes face-to-face time and non-face-to-face time preparing to see the patient (eg, review of tests), obtaining and/or reviewing separately obtained history, documenting clinical information in the electronic or other health record, independently interpreting results (not separately reported), and communicating results to the patient/family/caregiver, or care coordination (not separately reported).     Portions of the record may have been created with voice-recognition software. Occasional wrong-word or sound-a-like substitutions may have occurred due to the inherent limitations of voice-recognition software. Read the chart carefully and recognize, using context, where substitutions have occurred.

## 2022-02-09 ENCOUNTER — PATIENT MESSAGE (OUTPATIENT)
Dept: RHEUMATOLOGY | Facility: CLINIC | Age: 77
End: 2022-02-09
Payer: MEDICARE

## 2022-02-10 ENCOUNTER — SPECIALTY PHARMACY (OUTPATIENT)
Dept: PHARMACY | Facility: CLINIC | Age: 77
End: 2022-02-10
Payer: MEDICARE

## 2022-02-10 NOTE — TELEPHONE ENCOUNTER
DOCUMENTATION ONLY:  Prior authorization for [id number] approved from 2/10/2022 to 12/31/2022    Case Id: [id number]    Co-pay: 0.00    BI complete. LIS level 1. FA not required.

## 2022-02-10 NOTE — TELEPHONE ENCOUNTER
Urgent Humira PA submitted via [facility]. Key: [id number] - PA Case ID: [id number]

## 2022-02-14 ENCOUNTER — TELEPHONE (OUTPATIENT)
Dept: SURGERY | Facility: CLINIC | Age: 77
End: 2022-02-14
Payer: MEDICARE

## 2022-02-16 ENCOUNTER — SPECIALTY PHARMACY (OUTPATIENT)
Dept: PHARMACY | Facility: CLINIC | Age: 77
End: 2022-02-16
Payer: MEDICARE

## 2022-02-16 NOTE — TELEPHONE ENCOUNTER
Reached patient regarding Humira initial. Patient is out of town for work, asked for call back in 2-3 weeks. Will pend out accordingly. Patient aware she can call [facility] if she wants to set up sooner.

## 2022-02-21 ENCOUNTER — TELEPHONE (OUTPATIENT)
Dept: SURGERY | Facility: CLINIC | Age: 77
End: 2022-02-21
Payer: MEDICARE

## 2022-02-21 DIAGNOSIS — R19.7 DIARRHEA, UNSPECIFIED TYPE: Primary | ICD-10-CM

## 2022-02-21 NOTE — TELEPHONE ENCOUNTER
Pt states that they have been having diarrhea for three days. Pt states that they are taking imodium but it is not helping. Pt denies signs and symptoms such as pain or fever.

## 2022-02-22 ENCOUNTER — TELEPHONE (OUTPATIENT)
Dept: SURGERY | Facility: CLINIC | Age: 77
End: 2022-02-22
Payer: MEDICARE

## 2022-02-22 ENCOUNTER — PATIENT MESSAGE (OUTPATIENT)
Dept: SURGERY | Facility: CLINIC | Age: 77
End: 2022-02-22
Payer: MEDICARE

## 2022-02-22 RX ORDER — DIPHENOXYLATE HYDROCHLORIDE AND ATROPINE SULFATE 2.5; .025 MG/1; MG/1
1 TABLET ORAL 4 TIMES DAILY PRN
Qty: 10 TABLET | Refills: 0 | Status: SHIPPED | OUTPATIENT
Start: 2022-02-22 | End: 2022-02-23 | Stop reason: SDUPTHER

## 2022-02-22 NOTE — TELEPHONE ENCOUNTER
Called the pt to let them know that Dr. Myles recommends a colonoscopy as soon as possible. Pt will call to make an appt with endoscopy.

## 2022-02-23 DIAGNOSIS — R19.7 DIARRHEA, UNSPECIFIED TYPE: ICD-10-CM

## 2022-02-23 RX ORDER — DIPHENOXYLATE HYDROCHLORIDE AND ATROPINE SULFATE 2.5; .025 MG/1; MG/1
1 TABLET ORAL 4 TIMES DAILY PRN
Qty: 10 TABLET | Refills: 0 | OUTPATIENT
Start: 2022-02-23 | End: 2022-03-05

## 2022-02-23 RX ORDER — DIPHENOXYLATE HYDROCHLORIDE AND ATROPINE SULFATE 2.5; .025 MG/1; MG/1
1 TABLET ORAL 4 TIMES DAILY PRN
Qty: 10 TABLET | Refills: 0 | Status: SHIPPED | OUTPATIENT
Start: 2022-02-23 | End: 2022-03-05

## 2022-02-23 NOTE — TELEPHONE ENCOUNTER
Refilled to pharmacy in [place]    ----- Message from Xiomara Merlos sent at 2/23/2022  8:28 AM CST -----  Contact: [phone]  Pt is calling to get her medication of  diphenoxylate-atropine 2.5-0.025 mg (LOMOTIL) .      [phone]    Pt states that she is in [place] and she would need it sent there.    [facility]  Address: [address] [place] [address], [place] [address]  Phone: [phone]

## 2022-02-24 PROBLEM — M62.89 PELVIC FLOOR DYSFUNCTION: Status: ACTIVE | Noted: 2022-02-24

## 2022-03-07 ENCOUNTER — SPECIALTY PHARMACY (OUTPATIENT)
Dept: PHARMACY | Facility: CLINIC | Age: 77
End: 2022-03-07
Payer: MEDICARE

## 2022-03-07 ENCOUNTER — LAB VISIT (OUTPATIENT)
Dept: LAB | Facility: HOSPITAL | Age: 77
End: 2022-03-07
Payer: MEDICARE

## 2022-03-07 ENCOUNTER — PATIENT MESSAGE (OUTPATIENT)
Dept: PHARMACY | Facility: CLINIC | Age: 77
End: 2022-03-07
Payer: MEDICARE

## 2022-03-07 DIAGNOSIS — M05.79 RHEUMATOID ARTHRITIS INVOLVING MULTIPLE SITES WITH POSITIVE RHEUMATOID FACTOR: ICD-10-CM

## 2022-03-07 LAB
ALBUMIN SERPL BCP-MCNC: 3.8 G/DL (ref 3.5–5.2)
ALP SERPL-CCNC: 54 U/L (ref 55–135)
ALT SERPL W/O P-5'-P-CCNC: 11 U/L (ref 10–44)
ANION GAP SERPL CALC-SCNC: 10 MMOL/L (ref 8–16)
AST SERPL-CCNC: 16 U/L (ref 10–40)
BASOPHILS # BLD AUTO: 0.04 K/UL (ref 0–0.2)
BASOPHILS NFR BLD: 0.6 % (ref 0–1.9)
BILIRUB SERPL-MCNC: 1.1 MG/DL (ref 0.1–1)
BUN SERPL-MCNC: 24 MG/DL (ref 8–23)
CALCIUM SERPL-MCNC: 9.8 MG/DL (ref 8.7–10.5)
CHLORIDE SERPL-SCNC: 100 MMOL/L (ref 95–110)
CO2 SERPL-SCNC: 26 MMOL/L (ref 23–29)
CREAT SERPL-MCNC: 1 MG/DL (ref 0.5–1.4)
DIFFERENTIAL METHOD: ABNORMAL
EOSINOPHIL # BLD AUTO: 0.1 K/UL (ref 0–0.5)
EOSINOPHIL NFR BLD: 1.7 % (ref 0–8)
ERYTHROCYTE [DISTWIDTH] IN BLOOD BY AUTOMATED COUNT: 12.2 % (ref 11.5–14.5)
EST. GFR  (AFRICAN AMERICAN): >60 ML/MIN/1.73 M^2
EST. GFR  (NON AFRICAN AMERICAN): 54.9 ML/MIN/1.73 M^2
GLUCOSE SERPL-MCNC: 99 MG/DL (ref 70–110)
HCT VFR BLD AUTO: 36.8 % (ref 37–48.5)
HGB BLD-MCNC: 12 G/DL (ref 12–16)
IMM GRANULOCYTES # BLD AUTO: 0.03 K/UL (ref 0–0.04)
IMM GRANULOCYTES NFR BLD AUTO: 0.5 % (ref 0–0.5)
LYMPHOCYTES # BLD AUTO: 1.5 K/UL (ref 1–4.8)
LYMPHOCYTES NFR BLD: 23.2 % (ref 18–48)
MCH RBC QN AUTO: 32 PG (ref 27–31)
MCHC RBC AUTO-ENTMCNC: 32.6 G/DL (ref 32–36)
MCV RBC AUTO: 98 FL (ref 82–98)
MONOCYTES # BLD AUTO: 0.6 K/UL (ref 0.3–1)
MONOCYTES NFR BLD: 8.8 % (ref 4–15)
NEUTROPHILS # BLD AUTO: 4.2 K/UL (ref 1.8–7.7)
NEUTROPHILS NFR BLD: 65.2 % (ref 38–73)
NRBC BLD-RTO: 0 /100 WBC
PLATELET # BLD AUTO: 245 K/UL (ref 150–450)
PMV BLD AUTO: 8.4 FL (ref 9.2–12.9)
POTASSIUM SERPL-SCNC: 4.6 MMOL/L (ref 3.5–5.1)
PROT SERPL-MCNC: 6.5 G/DL (ref 6–8.4)
RBC # BLD AUTO: 3.75 M/UL (ref 4–5.4)
SODIUM SERPL-SCNC: 136 MMOL/L (ref 136–145)
WBC # BLD AUTO: 6.47 K/UL (ref 3.9–12.7)

## 2022-03-07 PROCEDURE — 80053 COMPREHEN METABOLIC PANEL: CPT | Performed by: STUDENT IN AN ORGANIZED HEALTH CARE EDUCATION/TRAINING PROGRAM

## 2022-03-07 PROCEDURE — 36415 COLL VENOUS BLD VENIPUNCTURE: CPT | Performed by: STUDENT IN AN ORGANIZED HEALTH CARE EDUCATION/TRAINING PROGRAM

## 2022-03-07 PROCEDURE — 85025 COMPLETE CBC W/AUTO DIFF WBC: CPT | Performed by: STUDENT IN AN ORGANIZED HEALTH CARE EDUCATION/TRAINING PROGRAM

## 2022-03-07 NOTE — TELEPHONE ENCOUNTER
Patient misfired first Humira pen. Pen is unusable and she did not get any of the dose. Patient will use remaining pen to inject dose today.     Reached out to [employer] to see if patient is eligible for replacement [[phone]]. Initiated replacement with sade Grigsby. Reference #: [id number].     Sent Snaps message to patient with update.  should reach out to patient to complete replacement process. Patient aware she can reach out to [facility] in the meantime with any questions or concerns. Will follow up with patient in about one week.

## 2022-03-07 NOTE — TELEPHONE ENCOUNTER
Specialty Pharmacy - Initial Clinical Assessment    Specialty Medication Orders Linked to Encounter    Flowsheet Row Most Recent Value   Medication #1 adalimumab (HUMIRA PEN) PnKt injection (Order#[id number], Rx#[id number])        Patient Diagnosis   M05.9 - Seropositive rheumatoid arthritis    Subjective    Buffy Dominique is a 76 y.o. female, who is followed by the specialty pharmacy service for management and education.    Recent Encounters     Date Type Provider Description    02/16/2022 Specialty Pharmacy Janeen Lui PharmD Initial Clinical Assessment    02/10/2022 Specialty Pharmacy Clyde Patricio Referral Authorization        Clinical call attempts since last clinical assessment   No call attempts found.     Current Outpatient Medications   Medication Sig    adalimumab (HUMIRA PEN) PnKt injection Inject 1 pen (40 mg total) into the skin every 14 (fourteen) days.    amLODIPine (NORVASC) 10 MG tablet Take 1 tablet (10 mg total) by mouth once daily.    chlorthalidone (HYGROTEN) 25 MG Tab Take 25 mg by mouth once daily.    folic acid (FOLVITE) 1 MG tablet Take 1 tablet (1 mg total) by mouth once daily.    multivit-min/iron/folic/lutein (CENTRUM SILVER WOMEN ORAL) Take 1 tablet by mouth once daily.    nebivoloL (BYSTOLIC) 5 MG Tab Take 1 tablet (5 mg total) by mouth once daily.    olmesartan (BENICAR) 40 MG tablet TAKE 1 TABLET(40 MG) BY MOUTH EVERY DAY FOR BLOOD PRESSURE    predniSONE (DELTASONE) 10 MG tablet Take 1 tablet (10 mg total) by mouth daily as needed (for days when you have extra joint pain and stiffness).    spironolactone (ALDACTONE) 25 MG tablet Take 1 tablet (25 mg total) by mouth once daily.    sucralfate (CARAFATE) 1 gram tablet Take 1 tablet (1 g total) by mouth 4 (four) times daily. Crush in water. Swish and swallow.   Last reviewed on 2/3/2022  1:04 PM by Jessica Zayas MA    Review of patient's allergies indicates:   Allergen Reactions    Methotrexate analogues      Mouth Ulcers     Last reviewed on  2/3/2022 1:03 PM by Jessica Zayas    Drug Interactions    Clinically relevant drug interactions identified: no         Adverse Effects    Arthralgias: Pos  Joint swelling: Pos       Assessment Questions - Documented Responses    Flowsheet Row Most Recent Value   Assessment    Medication Reconciliation completed for patient Yes   During the past 4 weeks, has patient missed any activities due to condition or medication? No   During the past 4 weeks, did patient have any of the following urgent care visits? None   Goals of Therapy Status Discussed (new start)   Status of the patients ability to self-administer: Is Able   All education points have been covered with patient? Yes, supplemental printed education provided   Welcome packet contents reviewed and discussed with patient? Yes   Assesment completed? Yes   Plan Therapy being initiated   Do you need to open a clinical intervention (i-vent)? No   Do you want to schedule first shipment? Yes   Medication #1 Assessment Info    Patient status New medication, New to [facility]   Is this medication appropriate for the patient? Yes   Is this medication effective? Not yet started        Refill Questions - Documented Responses    Flowsheet Row Most Recent Value   Patient Availability and HIPAA Verification    Does patient want to proceed with activity? Yes   HIPAA/medical authority confirmed? Yes   Relationship to patient of person spoken to? Self   Refill Screening Questions    When does the patient need to receive the medication? 03/07/22   Refill Delivery Questions    How will the patient receive the medication? Pickup   When does the patient need to receive the medication? 03/07/22   Expected Copay ($) 0   Is the patient able to afford the medication copay? Yes   Payment Method zero copay   Days supply of Refill 28   Supplies needed? No supplies needed   Refill activity completed? Yes   Refill activity plan Refill scheduled   Shipment/Pickup Date: 03/07/22     "      Objective    She has a past medical history of CKD (chronic kidney disease) stage 3, GFR 30-59 ml/min, HLD (hyperlipidemia), HTN (hypertension), and Hyperparathyroidism.    Tried/failed medications: MTX    BP Readings from Last 4 Encounters:   02/03/22 (!) 140/82   01/18/22 139/63   01/18/22 (!) 154/64   01/05/22 (!) 151/65     Ht Readings from Last 4 Encounters:   02/03/22 5' 3" (1.6 m)   01/18/22 5' 3" (1.6 m)   01/18/22 5' 3" (1.6 m)   01/05/22 5' 3" (1.6 m)     Wt Readings from Last 4 Encounters:   02/03/22 52.9 kg (116 lb 10 oz)   01/18/22 52.9 kg (116 lb 11.2 oz)   01/18/22 49.9 kg (110 lb)   01/05/22 80.7 kg (178 lb)     Recent Labs   Lab Result Units 03/07/22  0857 01/24/22  0939 01/05/22  1325   Creatinine mg/dL 1.0 0.8 1.0   ALT U/L 11 12 17   AST U/L 16 19 26     The goals of rheumatoid arthritis treatment include:  · Relieving pain and suppressing inflammation  · Achieving remission and preventing joint and organ damage  · Increasing joint mobility and strength  · Preventing infection and other complications of treatment  · Reducing long term complications of rheumatoid arthritis  · Improving or maintaining physical function and optimal well-being  · Improving or maintaining quality of life  · Maintaining optimal therapy adherence  · Minimizing and managing side effects    Goals of Therapy Status: Discussed (new start)    Assessment/Plan  Patient plans to start therapy on 03/07/22      Indication, dosage, appropriateness, effectiveness, safety and convenience of her specialty medication(s) were reviewed today.     Patient Education   Patient received education on the following:    Expectations and possible outcomes of therapy   Proper use, timely administration, and missed dose management   Duration of therapy   Side effects, including prevention, minimization, and management   Contraindications and safety precautions   New or changed medications, including prescribe and over the counter " medications and supplements   Reviews recommended vaccinations, as appropriate   Storage, safe handling, and disposal        Tasks added this encounter   No tasks added.   Tasks due within next 3 months   3/7/2022 - Clinical - Initial Assessment     Janeen Lui, PharmD  [facility]  [address]  [facility] [address]  Phone: [phone]  Fax: [phone]

## 2022-03-10 ENCOUNTER — OFFICE VISIT (OUTPATIENT)
Dept: RHEUMATOLOGY | Facility: CLINIC | Age: 77
End: 2022-03-10
Payer: MEDICARE

## 2022-03-10 VITALS
HEART RATE: 55 BPM | BODY MASS INDEX: 20.66 KG/M2 | WEIGHT: 116.63 LBS | SYSTOLIC BLOOD PRESSURE: 131 MMHG | DIASTOLIC BLOOD PRESSURE: 63 MMHG

## 2022-03-10 DIAGNOSIS — M81.0 AGE-RELATED OSTEOPOROSIS WITHOUT CURRENT PATHOLOGICAL FRACTURE: ICD-10-CM

## 2022-03-10 DIAGNOSIS — M05.9 SEROPOSITIVE RHEUMATOID ARTHRITIS: Primary | ICD-10-CM

## 2022-03-10 PROCEDURE — 99214 OFFICE O/P EST MOD 30 MIN: CPT | Mod: GC,S$GLB,, | Performed by: STUDENT IN AN ORGANIZED HEALTH CARE EDUCATION/TRAINING PROGRAM

## 2022-03-10 PROCEDURE — 99999 PR PBB SHADOW E&M-EST. PATIENT-LVL III: CPT | Mod: PBBFAC,GC,, | Performed by: STUDENT IN AN ORGANIZED HEALTH CARE EDUCATION/TRAINING PROGRAM

## 2022-03-10 PROCEDURE — 99499 UNLISTED E&M SERVICE: CPT | Mod: S$GLB,,, | Performed by: INTERNAL MEDICINE

## 2022-03-10 PROCEDURE — 99499 RISK ADDL DX/OHS AUDIT: ICD-10-PCS | Mod: S$GLB,,, | Performed by: INTERNAL MEDICINE

## 2022-03-10 PROCEDURE — 99999 PR PBB SHADOW E&M-EST. PATIENT-LVL III: ICD-10-PCS | Mod: PBBFAC,GC,, | Performed by: STUDENT IN AN ORGANIZED HEALTH CARE EDUCATION/TRAINING PROGRAM

## 2022-03-10 PROCEDURE — 99214 PR OFFICE/OUTPT VISIT, EST, LEVL IV, 30-39 MIN: ICD-10-PCS | Mod: GC,S$GLB,, | Performed by: STUDENT IN AN ORGANIZED HEALTH CARE EDUCATION/TRAINING PROGRAM

## 2022-03-10 RX ORDER — PREDNISONE 2.5 MG/1
2.5 TABLET ORAL DAILY PRN
Qty: 60 TABLET | Refills: 0 | Status: ON HOLD | OUTPATIENT
Start: 2022-03-10 | End: 2022-05-08 | Stop reason: SDUPTHER

## 2022-03-10 RX ORDER — ADALIMUMAB 40MG/0.8ML
40 KIT SUBCUTANEOUS
Qty: 6 PEN | Refills: 3 | OUTPATIENT
Start: 2022-03-10 | End: 2022-03-17

## 2022-03-10 NOTE — PROGRESS NOTES
76 year old female/former smoker with    4 months of left ankle,knee,wrist,hand,elbow pain  Right shoulder,hands,finger pain    Tylenol bid doesn't help  HTN and aneurysm -so cant take NSAIDs    Steroid shots in shoulders helped-only to return in few weeks     Swelling-left ankle,hand,wrist,knee  Sausage ? Digits     No muscle weakness    Rheumatologic ROS  Dry eyes  Chest pain-radiating to the back    Labs    KAY positive,1: 80,profile negative  homogenous  H/h 11/33.9  nml white count  nml plts  ESR 59  CRP 54  Uric acid 6.5    ,CCP 7.7    Seropositive Rheumatoid arthritis  Intolerant to mtx  Humira-just started    Pulmonary nodules  CT and Pulm and biopsy all pending  We will wait for recs    CDAI 4    Plan    Continue humira  rtc in 3 months  pulm-if anything malignant on the nodules,we need to know  Labs next time       MIRELA  Vit d  Not sure if her OP was ever treated    Vaccines  Flu,pneumonia,shingles,covid- we counselled                   Answers for HPI/ROS submitted by the patient on 3/4/2022  fever: No  eye redness: No  mouth sores: No  headaches: No  shortness of breath: No  chest pain: No  trouble swallowing: No  diarrhea: No  constipation: No  unexpected weight change: No  genital sore: No  dysuria: No  During the last 3 days, have you had a skin rash?: No  Bruises or bleeds easily: No  cough: No

## 2022-03-10 NOTE — PROGRESS NOTES
Subjective:       Patient ID: Buffy Dominique is a 76 y.o. female.    Chief Complaint: rheumatoid arthritis w/positive rheumatoid factor    HPI   Interval Hx:  Did not tolerate MTX. Changed to Humira. She took her first dose on Monday (4 days ago). Not noticed improvement yet. She was on prednisone 10 mg PRN (taking about twice a week) since 12/2021 but ran out 2 weeks ago. She complains of mostly shoulder pain which is worse in the morning and better with hot showers. She gets pain the hands L>R, describes pain mostly in left DIPs. Denies swelling. Denies morning stiffness but gets stiff in the shoulders and back with prolonged sitting. No symptoms in the hips or lower extremities.  She has pulmonary nodules w/ new 1.2 cm partially solid nodule. Followed by Pulm. Needs repeat CT in 2 months with possible invasive sampling at that time.     INITIAL HX:  Patient is a 76-year-old female with HTN, aortic stenosis, hx of thoracic aortic aneurysm s/p surgery, who presents for Rheumatology consultation for polyarthritis. Patient complains that 4 months ago, she woke up one morning with pain in the shoulders. She denies history of trauma. Now patient has pain in left ankle, left knee, left wrist, left elbow. She also has pain in both hands and both shoulders. Tylenol BID doesn't help. She can't take NSAIDs due to HTN. Ice and heat don't help. Fast Freeze ointment doesn't help. She has had 2 steroid shots in the left shoulder 3 months apart - she got relief from this but the pain came back in a couple weeks. She has never taken oral steroids.  She has morning stiffness 20 minutes, improves as the day progresses. Pain and stiffness are worse with cold weather. Pain is worse with use.  She endorses swelling in left ankle, left knee, left hand and left wrist.    She has a history of HTN and thoracic aortic aneurysm s/p surgery 2011.  In 2015 and 2016, she had carpal tunnel surgery in both hands.    Family hx: no autoimmune  disease  Social hx: smoked 1.5-2 PPD for 25 years, quit in 2009. 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    No skin rashes, malar rash, photosensitivity   No telangiectasias   No calcinosis   No psoriasis   No patchy alopecia   No oral or nasal ulcers   No dry eyes or dry mouth   No pleurisy or any cardiopulmonary complaints   +chest pain radiating to back which sometimes comes on at rest  No dysphagia, diplopia, dysphonia  No muscle weakness   No n/v/d/c   No acid reflux  No Raynaud's  No digital ulcers   No cytopenias   No renal issues   No blood clots   No fever, chills, night sweats, weight loss (5lbs in 2 months), or loss of appetite   No pregnancy losses, pre-term deliveries, or pregnancy complications   No new onset headaches   No recurrent conjunctivitis, uveitis, scleritis, or episcleritis   No chronic or bloody diarrhea. No Ulcerative Colitis or Chron's (IBD)  No vaginal or penile and urethral  discharge/STDs/ulcers   No unexplained lymphadenopathy  No parotitis   No seizures, strokes, psychosis  No sclerodactyly  No puffy hands  No perioral tightness         Review of Systems   Constitutional: Negative for fever and unexpected weight change.   HENT: Negative for mouth sores and trouble swallowing.    Eyes: Negative for redness.   Respiratory: Negative for cough and shortness of breath.    Cardiovascular: Negative for chest pain.   Gastrointestinal: Negative for constipation and diarrhea.   Genitourinary: Negative for dysuria and genital sores.   Musculoskeletal:        +shoulder pain. Hand pain L>R   Skin: Negative for rash.   Neurological: Negative for headaches.   Hematological: Does not bruise/bleed easily.         Objective:   /63   Pulse (!) 55   Wt 52.9 kg (116 lb 10 oz)   LMP  (LMP Unknown)   BMI 20.66 kg/m²      Physical Exam   Constitutional: She is oriented to person, place, and time. No distress.   HENT:   Head: Normocephalic and atraumatic.    Eyes: Pupils are equal, round, and reactive to light.   Cardiovascular: Normal rate and regular rhythm.   No murmur heard.  Pulmonary/Chest: Effort normal and breath sounds normal. No respiratory distress. She has no wheezes.   Abdominal: Soft. Bowel sounds are normal. There is no abdominal tenderness.   Musculoskeletal:         General: No deformity.      Right shoulder: Normal.      Left shoulder: Normal.      Right elbow: Normal.      Left elbow: Normal.      Right wrist: Normal.      Left wrist: Normal.      Cervical back: Normal range of motion.      Right knee: Normal.      Left knee: Normal.      Comments: No synovitis or joint tenderness. Normal ROM without pain. Normal strength in proximal and distal upper and lower extremities. Can get out of chair without using arms.   Neurological: She is alert and oriented to person, place, and time.   Skin: Skin is warm and dry.   Psychiatric: Affect and judgment normal.       Right Side Rheumatological Exam     Examination finds the shoulder, elbow, wrist, knee, 1st PIP, 1st MCP, 2nd PIP, 2nd MCP, 3rd PIP, 3rd MCP, 4th PIP, 4th MCP, 5th PIP and 5th MCP normal.    Muscle Strength (0-5 scale):  Neck Flexion:  5  Neck Extension: 5  Deltoid:  5  Biceps: 5/5   Triceps:  5  : 5/5   Iliopsoas: 5  Quadriceps:  5   Distal Lower Extremity: 5    Left Side Rheumatological Exam     Examination finds the shoulder, elbow, wrist, knee, 1st PIP, 1st MCP, 2nd PIP, 2nd MCP, 3rd PIP, 3rd MCP, 4th PIP, 4th MCP, 5th PIP and 5th MCP normal.    Muscle Strength (0-5 scale):  Neck Flexion:  5  Neck Extension: 5  Deltoid:  5  Biceps: 5/5   Triceps:  5  :  5/5   Iliopsoas: 5  Quadriceps:  5   Distal Lower Extremity: 5                  12/2/2021 3/10/2022   Tender (HENDRICKS-28) 15 / 28  0 / 28    Swollen (HENDRICKS-28) 3 / 28  0 / 28    Provider Global -- 10 mm   Patient Global -- 30 mm   ESR -- --   CRP -- --   HENDRICKS-28 (ESR) -- --   HENDRICKS-28 (CRP) -- --   CDAI Score -- 4         Assessment:       1.  Seropositive rheumatoid arthritis    2. Age-related osteoporosis without current pathological fracture            Plan:       Problem List Items Addressed This Visit        Orthopedic    Seropositive rheumatoid arthritis - Primary    Relevant Orders    CBC Auto Differential    Comprehensive Metabolic Panel    C-Reactive Protein    Sedimentation rate      Other Visit Diagnoses     Age-related osteoporosis without current pathological fracture        Relevant Orders    DXA Bone Density Spine And Hip    Vitamin D          Patient is a 76-year-old female with HTN, aortic stenosis, hx of thoracic aortic aneurysm s/p surgery, who presents for Rheumatology follow up for seropositive RA.     Presented with arthralgias and synovitis on exam.    CCP 7.7  Medications tried: MTX (oral ulcers, had to get leucovorin). Currently on Humira.  Monitoring labs reviewed from 3/7/22 and stable.  Vaccinations up to date except for COVID which she declines.    DXA 11/2018 showed osteoporosis.  She has never been on any osteoporosis medications.    KAY 1:80 low titer, homogenous.  Remainder of KAY profile is negative. Normal WBC count. No thrombocytopenia. No signs or symptoms of lupus.     Uric acid 6.5.        Plan:  1. Continue Humira  2. Can take prednisone 2.5 mg daily PRN for joint flares  3. DXA scan ordered  4. Check vitamin D level with next labs  5. Follow up with Pulmonology for nodules  6. RTC 3 months with standing labs (CBC, CMP, ESR, CRP)       Angle Rich MD  Rheumatology Fellow  PGY-4

## 2022-03-10 NOTE — PATIENT INSTRUCTIONS
Continue Humira  Prednisone as needed for flares  DXA bone density scan  Labs in 3 months  Follow up in 3 months

## 2022-03-15 RX ORDER — ADALIMUMAB 40MG/0.8ML
40 KIT SUBCUTANEOUS
Qty: 2 PEN | Refills: 0 | Status: SHIPPED | OUTPATIENT
Start: 2022-03-15 | End: 2022-03-17

## 2022-03-17 ENCOUNTER — SPECIALTY PHARMACY (OUTPATIENT)
Dept: PHARMACY | Facility: CLINIC | Age: 77
End: 2022-03-17
Payer: MEDICARE

## 2022-03-17 RX ORDER — ADALIMUMAB 40MG/0.8ML
40 KIT SUBCUTANEOUS
Qty: 6 PEN | Refills: 3 | Status: SHIPPED | OUTPATIENT
Start: 2022-03-17 | End: 2022-07-28

## 2022-03-17 NOTE — TELEPHONE ENCOUNTER
[facility] when travelling for work. Patient aware to reach out to [facility] with any questions or concerns in the future. Closing patient out at this time.

## 2022-03-28 ENCOUNTER — TELEPHONE (OUTPATIENT)
Dept: SURGERY | Facility: CLINIC | Age: 77
End: 2022-03-28
Payer: MEDICARE

## 2022-03-28 NOTE — TELEPHONE ENCOUNTER
Spoke with Ms. Baer after speaking with Dr. Myles, I informed she definitely needs to get the colonoscopy and also Pelvic floor therapy. I told her to try calling the endo unit again and if she has any problems to let us know. Pt already has number on who to call. Pt verbalized understanding. Future appointment was cancelled until further notice.

## 2022-04-05 ENCOUNTER — PES CALL (OUTPATIENT)
Dept: ADMINISTRATIVE | Facility: CLINIC | Age: 77
End: 2022-04-05
Payer: MEDICARE

## 2022-04-18 ENCOUNTER — PATIENT MESSAGE (OUTPATIENT)
Dept: ADMINISTRATIVE | Facility: OTHER | Age: 77
End: 2022-04-18
Payer: MEDICARE

## 2022-04-28 ENCOUNTER — TELEPHONE (OUTPATIENT)
Dept: PULMONOLOGY | Facility: CLINIC | Age: 77
End: 2022-04-28
Payer: MEDICARE

## 2022-04-28 NOTE — TELEPHONE ENCOUNTER
----- Message from Marce Barragan sent at 4/28/2022  9:25 AM CDT -----  Contact: Pt  Pt requesting a call back regarding rescheduling appt to either 5/2 or 5/3 pt stats that she is a  and will be out of town on 5/9..    Confirmed contact info below:  Contact Name: Buffy Dominique  Phone Number: [phone]

## 2022-05-02 ENCOUNTER — HOSPITAL ENCOUNTER (OUTPATIENT)
Dept: RADIOLOGY | Facility: HOSPITAL | Age: 77
Discharge: HOME | DRG: 872 | End: 2022-05-02
Attending: STUDENT IN AN ORGANIZED HEALTH CARE EDUCATION/TRAINING PROGRAM
Payer: MEDICARE

## 2022-05-02 DIAGNOSIS — R91.8 MULTIPLE PULMONARY NODULES: ICD-10-CM

## 2022-05-02 DIAGNOSIS — Z87.891 FORMER TOBACCO USE: ICD-10-CM

## 2022-05-02 DIAGNOSIS — M05.9 SEROPOSITIVE RHEUMATOID ARTHRITIS: ICD-10-CM

## 2022-05-02 PROCEDURE — 71250 CT THORAX DX C-: CPT | Mod: 26,,, | Performed by: RADIOLOGY

## 2022-05-02 PROCEDURE — 71250 CT THORAX DX C-: CPT | Mod: TC

## 2022-05-02 PROCEDURE — 71250 CT CHEST WITHOUT CONTRAST: ICD-10-PCS | Mod: 26,,, | Performed by: RADIOLOGY

## 2022-05-03 ENCOUNTER — RESEARCH ENCOUNTER (OUTPATIENT)
Dept: RESEARCH | Facility: HOSPITAL | Age: 77
End: 2022-05-03
Payer: MEDICARE

## 2022-05-03 ENCOUNTER — HOSPITAL ENCOUNTER (INPATIENT)
Facility: HOSPITAL | Age: 77
LOS: 6 days | Discharge: HOME | DRG: 872 | End: 2022-05-09
Attending: EMERGENCY MEDICINE | Admitting: STUDENT IN AN ORGANIZED HEALTH CARE EDUCATION/TRAINING PROGRAM
Payer: MEDICARE

## 2022-05-03 DIAGNOSIS — N30.00 ACUTE CYSTITIS WITHOUT HEMATURIA: ICD-10-CM

## 2022-05-03 DIAGNOSIS — E87.1 HYPONATREMIA: ICD-10-CM

## 2022-05-03 DIAGNOSIS — M67.911 ROTATOR CUFF DISORDER, RIGHT: ICD-10-CM

## 2022-05-03 DIAGNOSIS — M62.89 PELVIC FLOOR DYSFUNCTION: ICD-10-CM

## 2022-05-03 DIAGNOSIS — N12 PYELONEPHRITIS: Primary | ICD-10-CM

## 2022-05-03 DIAGNOSIS — M25.511 CHRONIC RIGHT SHOULDER PAIN: ICD-10-CM

## 2022-05-03 DIAGNOSIS — G89.29 CHRONIC RIGHT SHOULDER PAIN: ICD-10-CM

## 2022-05-03 DIAGNOSIS — K62.3 RECTAL PROLAPSE: ICD-10-CM

## 2022-05-03 DIAGNOSIS — R11.0 NAUSEA: ICD-10-CM

## 2022-05-03 DIAGNOSIS — A41.9 SEPSIS, DUE TO UNSPECIFIED ORGANISM, UNSPECIFIED WHETHER ACUTE ORGAN DYSFUNCTION PRESENT: ICD-10-CM

## 2022-05-03 PROBLEM — N17.9 ACUTE KIDNEY INJURY SUPERIMPOSED ON CHRONIC KIDNEY DISEASE: Status: ACTIVE | Noted: 2022-05-03

## 2022-05-03 PROBLEM — N18.9 ACUTE KIDNEY INJURY SUPERIMPOSED ON CHRONIC KIDNEY DISEASE: Status: ACTIVE | Noted: 2022-05-03

## 2022-05-03 PROBLEM — R65.20 SEVERE SEPSIS: Status: ACTIVE | Noted: 2022-05-03

## 2022-05-03 LAB
ALBUMIN SERPL BCP-MCNC: 3.5 G/DL (ref 3.5–5.2)
ALP SERPL-CCNC: 81 U/L (ref 55–135)
ALT SERPL W/O P-5'-P-CCNC: 10 U/L (ref 10–44)
ANION GAP SERPL CALC-SCNC: 13 MMOL/L (ref 8–16)
ANION GAP SERPL CALC-SCNC: 8 MMOL/L (ref 8–16)
AST SERPL-CCNC: 20 U/L (ref 10–40)
BACTERIA #/AREA URNS AUTO: ABNORMAL /HPF
BACTERIA #/AREA URNS AUTO: ABNORMAL /HPF
BASOPHILS # BLD AUTO: 0.04 K/UL (ref 0–0.2)
BASOPHILS NFR BLD: 0.3 % (ref 0–1.9)
BILIRUB SERPL-MCNC: 1.7 MG/DL (ref 0.1–1)
BILIRUB UR QL STRIP: NEGATIVE
BILIRUB UR QL STRIP: NEGATIVE
BUN SERPL-MCNC: 22 MG/DL (ref 8–23)
BUN SERPL-MCNC: 23 MG/DL (ref 8–23)
BUN SERPL-MCNC: 24 MG/DL (ref 6–30)
CALCIUM SERPL-MCNC: 9.7 MG/DL (ref 8.7–10.5)
CALCIUM SERPL-MCNC: 9.9 MG/DL (ref 8.7–10.5)
CHLORIDE SERPL-SCNC: 88 MMOL/L (ref 95–110)
CHLORIDE SERPL-SCNC: 90 MMOL/L (ref 95–110)
CHLORIDE SERPL-SCNC: 91 MMOL/L (ref 95–110)
CLARITY UR REFRACT.AUTO: ABNORMAL
CLARITY UR REFRACT.AUTO: ABNORMAL
CO2 SERPL-SCNC: 24 MMOL/L (ref 23–29)
CO2 SERPL-SCNC: 26 MMOL/L (ref 23–29)
COLOR UR AUTO: ABNORMAL
COLOR UR AUTO: YELLOW
CREAT SERPL-MCNC: 1.3 MG/DL (ref 0.5–1.4)
CREAT SERPL-MCNC: 1.4 MG/DL (ref 0.5–1.4)
CREAT SERPL-MCNC: 1.5 MG/DL (ref 0.5–1.4)
CREAT UR-MCNC: 122 MG/DL (ref 15–325)
CTP QC/QA: YES
DIFFERENTIAL METHOD: ABNORMAL
EOSINOPHIL # BLD AUTO: 0 K/UL (ref 0–0.5)
EOSINOPHIL NFR BLD: 0 % (ref 0–8)
ERYTHROCYTE [DISTWIDTH] IN BLOOD BY AUTOMATED COUNT: 11.5 % (ref 11.5–14.5)
EST. GFR  (AFRICAN AMERICAN): 42.1 ML/MIN/1.73 M^2
EST. GFR  (AFRICAN AMERICAN): 46 ML/MIN/1.73 M^2
EST. GFR  (NON AFRICAN AMERICAN): 36.5 ML/MIN/1.73 M^2
EST. GFR  (NON AFRICAN AMERICAN): 39.9 ML/MIN/1.73 M^2
GLUCOSE SERPL-MCNC: 104 MG/DL (ref 70–110)
GLUCOSE SERPL-MCNC: 117 MG/DL (ref 70–110)
GLUCOSE SERPL-MCNC: 122 MG/DL (ref 70–110)
GLUCOSE UR QL STRIP: NEGATIVE
GLUCOSE UR QL STRIP: NEGATIVE
HCT VFR BLD AUTO: 38.8 % (ref 37–48.5)
HCT VFR BLD CALC: 41 %PCV (ref 36–54)
HGB BLD-MCNC: 13.1 G/DL (ref 12–16)
HGB UR QL STRIP: ABNORMAL
HGB UR QL STRIP: ABNORMAL
HYALINE CASTS UR QL AUTO: 0 /LPF
HYALINE CASTS UR QL AUTO: 0 /LPF
IMM GRANULOCYTES # BLD AUTO: 0.08 K/UL (ref 0–0.04)
IMM GRANULOCYTES NFR BLD AUTO: 0.6 % (ref 0–0.5)
KETONES UR QL STRIP: NEGATIVE
KETONES UR QL STRIP: NEGATIVE
LACTATE SERPL-SCNC: 0.9 MMOL/L (ref 0.5–2.2)
LEUKOCYTE ESTERASE UR QL STRIP: ABNORMAL
LEUKOCYTE ESTERASE UR QL STRIP: ABNORMAL
LIPASE SERPL-CCNC: 13 U/L (ref 4–60)
LYMPHOCYTES # BLD AUTO: 1.9 K/UL (ref 1–4.8)
LYMPHOCYTES NFR BLD: 14.8 % (ref 18–48)
MCH RBC QN AUTO: 32.4 PG (ref 27–31)
MCHC RBC AUTO-ENTMCNC: 33.8 G/DL (ref 32–36)
MCV RBC AUTO: 96 FL (ref 82–98)
MICROSCOPIC COMMENT: ABNORMAL
MICROSCOPIC COMMENT: ABNORMAL
MONOCYTES # BLD AUTO: 2 K/UL (ref 0.3–1)
MONOCYTES NFR BLD: 15.6 % (ref 4–15)
NEUTROPHILS # BLD AUTO: 8.9 K/UL (ref 1.8–7.7)
NEUTROPHILS NFR BLD: 68.7 % (ref 38–73)
NITRITE UR QL STRIP: NEGATIVE
NITRITE UR QL STRIP: NEGATIVE
NON-SQ EPI CELLS #/AREA URNS AUTO: 3 /HPF
NON-SQ EPI CELLS #/AREA URNS AUTO: 4 /HPF
NRBC BLD-RTO: 0 /100 WBC
OSMOLALITY SERPL: 273 MOSM/KG (ref 275–295)
OSMOLALITY UR: 548 MOSM/KG (ref 50–1200)
PH UR STRIP: 6 [PH] (ref 5–8)
PH UR STRIP: 7 [PH] (ref 5–8)
PLATELET # BLD AUTO: 225 K/UL (ref 150–450)
PMV BLD AUTO: 8.2 FL (ref 9.2–12.9)
POC IONIZED CALCIUM: 1.14 MMOL/L (ref 1.06–1.42)
POC TCO2 (MEASURED): 23 MMOL/L (ref 23–29)
POTASSIUM BLD-SCNC: 4.9 MMOL/L (ref 3.5–5.1)
POTASSIUM SERPL-SCNC: 4.7 MMOL/L (ref 3.5–5.1)
POTASSIUM SERPL-SCNC: 4.9 MMOL/L (ref 3.5–5.1)
PROT SERPL-MCNC: 6.9 G/DL (ref 6–8.4)
PROT UR QL STRIP: ABNORMAL
PROT UR QL STRIP: ABNORMAL
PROT UR-MCNC: 18 MG/DL (ref 0–15)
PROT/CREAT UR: 0.15 MG/G{CREAT} (ref 0–0.2)
RBC # BLD AUTO: 4.04 M/UL (ref 4–5.4)
RBC #/AREA URNS AUTO: 10 /HPF (ref 0–4)
RBC #/AREA URNS AUTO: 3 /HPF (ref 0–4)
SAMPLE: ABNORMAL
SARS-COV-2 RDRP RESP QL NAA+PROBE: NEGATIVE
SODIUM BLD-SCNC: 124 MMOL/L (ref 136–145)
SODIUM SERPL-SCNC: 125 MMOL/L (ref 136–145)
SODIUM SERPL-SCNC: 125 MMOL/L (ref 136–145)
SODIUM UR-SCNC: 86 MMOL/L (ref 20–250)
SP GR UR STRIP: 1.01 (ref 1–1.03)
SP GR UR STRIP: 1.01 (ref 1–1.03)
SQUAMOUS #/AREA URNS AUTO: 13 /HPF
SQUAMOUS #/AREA URNS AUTO: 30 /HPF
TSH SERPL DL<=0.005 MIU/L-ACNC: 0.93 UIU/ML (ref 0.4–4)
URN SPEC COLLECT METH UR: ABNORMAL
URN SPEC COLLECT METH UR: ABNORMAL
WBC # BLD AUTO: 12.94 K/UL (ref 3.9–12.7)
WBC #/AREA URNS AUTO: >100 /HPF (ref 0–5)
WBC #/AREA URNS AUTO: >100 /HPF (ref 0–5)

## 2022-05-03 PROCEDURE — 99291 CRITICAL CARE FIRST HOUR: CPT | Mod: CS,,, | Performed by: EMERGENCY MEDICINE

## 2022-05-03 PROCEDURE — 83935 ASSAY OF URINE OSMOLALITY: CPT | Performed by: EMERGENCY MEDICINE

## 2022-05-03 PROCEDURE — 84156 ASSAY OF PROTEIN URINE: CPT | Performed by: EMERGENCY MEDICINE

## 2022-05-03 PROCEDURE — 25000003 PHARM REV CODE 250

## 2022-05-03 PROCEDURE — 80047 BASIC METABLC PNL IONIZED CA: CPT

## 2022-05-03 PROCEDURE — 96360 HYDRATION IV INFUSION INIT: CPT

## 2022-05-03 PROCEDURE — 93010 EKG 12-LEAD: ICD-10-PCS | Mod: ,,, | Performed by: INTERNAL MEDICINE

## 2022-05-03 PROCEDURE — 80053 COMPREHEN METABOLIC PANEL: CPT | Performed by: EMERGENCY MEDICINE

## 2022-05-03 PROCEDURE — U0002 COVID-19 LAB TEST NON-CDC: HCPCS | Performed by: EMERGENCY MEDICINE

## 2022-05-03 PROCEDURE — 99291 PR CRITICAL CARE, E/M 30-74 MINUTES: ICD-10-PCS | Mod: CS,,, | Performed by: EMERGENCY MEDICINE

## 2022-05-03 PROCEDURE — 93005 ELECTROCARDIOGRAM TRACING: CPT

## 2022-05-03 PROCEDURE — 93010 ELECTROCARDIOGRAM REPORT: CPT | Mod: ,,, | Performed by: INTERNAL MEDICINE

## 2022-05-03 PROCEDURE — 99223 PR INITIAL HOSPITAL CARE,LEVL III: ICD-10-PCS | Mod: AI,GC,, | Performed by: STUDENT IN AN ORGANIZED HEALTH CARE EDUCATION/TRAINING PROGRAM

## 2022-05-03 PROCEDURE — 11000001 HC ACUTE MED/SURG PRIVATE ROOM

## 2022-05-03 PROCEDURE — 87186 SC STD MICRODIL/AGAR DIL: CPT | Mod: 59 | Performed by: EMERGENCY MEDICINE

## 2022-05-03 PROCEDURE — 80048 BASIC METABOLIC PNL TOTAL CA: CPT | Mod: XB | Performed by: STUDENT IN AN ORGANIZED HEALTH CARE EDUCATION/TRAINING PROGRAM

## 2022-05-03 PROCEDURE — 63600175 PHARM REV CODE 636 W HCPCS

## 2022-05-03 PROCEDURE — 63600175 PHARM REV CODE 636 W HCPCS: Performed by: EMERGENCY MEDICINE

## 2022-05-03 PROCEDURE — 63600175 PHARM REV CODE 636 W HCPCS: Performed by: STUDENT IN AN ORGANIZED HEALTH CARE EDUCATION/TRAINING PROGRAM

## 2022-05-03 PROCEDURE — 87040 BLOOD CULTURE FOR BACTERIA: CPT | Performed by: EMERGENCY MEDICINE

## 2022-05-03 PROCEDURE — 87086 URINE CULTURE/COLONY COUNT: CPT | Mod: 59 | Performed by: EMERGENCY MEDICINE

## 2022-05-03 PROCEDURE — 99223 1ST HOSP IP/OBS HIGH 75: CPT | Mod: AI,GC,, | Performed by: STUDENT IN AN ORGANIZED HEALTH CARE EDUCATION/TRAINING PROGRAM

## 2022-05-03 PROCEDURE — 83605 ASSAY OF LACTIC ACID: CPT | Performed by: EMERGENCY MEDICINE

## 2022-05-03 PROCEDURE — 81001 URINALYSIS AUTO W/SCOPE: CPT | Mod: 91 | Performed by: EMERGENCY MEDICINE

## 2022-05-03 PROCEDURE — 87077 CULTURE AEROBIC IDENTIFY: CPT | Mod: 59 | Performed by: EMERGENCY MEDICINE

## 2022-05-03 PROCEDURE — 84300 ASSAY OF URINE SODIUM: CPT | Performed by: EMERGENCY MEDICINE

## 2022-05-03 PROCEDURE — 87088 URINE BACTERIA CULTURE: CPT | Performed by: EMERGENCY MEDICINE

## 2022-05-03 PROCEDURE — 83690 ASSAY OF LIPASE: CPT | Performed by: EMERGENCY MEDICINE

## 2022-05-03 PROCEDURE — 84443 ASSAY THYROID STIM HORMONE: CPT | Performed by: EMERGENCY MEDICINE

## 2022-05-03 PROCEDURE — 25000003 PHARM REV CODE 250: Performed by: EMERGENCY MEDICINE

## 2022-05-03 PROCEDURE — 83930 ASSAY OF BLOOD OSMOLALITY: CPT

## 2022-05-03 PROCEDURE — 85025 COMPLETE CBC W/AUTO DIFF WBC: CPT | Performed by: EMERGENCY MEDICINE

## 2022-05-03 PROCEDURE — 99291 CRITICAL CARE FIRST HOUR: CPT | Mod: 25

## 2022-05-03 RX ORDER — SUCRALFATE 1 G/1
1 TABLET ORAL 4 TIMES DAILY
Status: DISCONTINUED | OUTPATIENT
Start: 2022-05-03 | End: 2022-05-05

## 2022-05-03 RX ORDER — TALC
6 POWDER (GRAM) TOPICAL NIGHTLY PRN
Status: DISCONTINUED | OUTPATIENT
Start: 2022-05-03 | End: 2022-05-09 | Stop reason: HOSPADM

## 2022-05-03 RX ORDER — ACETAMINOPHEN 325 MG/1
650 TABLET ORAL
Status: COMPLETED | OUTPATIENT
Start: 2022-05-03 | End: 2022-05-03

## 2022-05-03 RX ORDER — SODIUM CHLORIDE 0.9 % (FLUSH) 0.9 %
10 SYRINGE (ML) INJECTION
Status: DISCONTINUED | OUTPATIENT
Start: 2022-05-03 | End: 2022-05-09 | Stop reason: HOSPADM

## 2022-05-03 RX ORDER — ACETAMINOPHEN 500 MG
1000 TABLET ORAL EVERY 8 HOURS PRN
Status: DISCONTINUED | OUTPATIENT
Start: 2022-05-03 | End: 2022-05-03

## 2022-05-03 RX ORDER — ACETAMINOPHEN 325 MG/1
650 TABLET ORAL EVERY 4 HOURS PRN
Status: DISCONTINUED | OUTPATIENT
Start: 2022-05-03 | End: 2022-05-09 | Stop reason: HOSPADM

## 2022-05-03 RX ORDER — ENOXAPARIN SODIUM 100 MG/ML
30 INJECTION SUBCUTANEOUS EVERY 24 HOURS
Status: DISCONTINUED | OUTPATIENT
Start: 2022-05-03 | End: 2022-05-05

## 2022-05-03 RX ORDER — PROCHLORPERAZINE MALEATE 5 MG
10 TABLET ORAL EVERY 6 HOURS PRN
Status: DISCONTINUED | OUTPATIENT
Start: 2022-05-03 | End: 2022-05-06

## 2022-05-03 RX ADMIN — CEFTRIAXONE 2 G: 2 INJECTION, SOLUTION INTRAVENOUS at 02:05

## 2022-05-03 RX ADMIN — SUCRALFATE 1 G: 1 TABLET ORAL at 06:05

## 2022-05-03 RX ADMIN — SODIUM CHLORIDE, SODIUM LACTATE, POTASSIUM CHLORIDE, AND CALCIUM CHLORIDE 500 ML: .6; .31; .03; .02 INJECTION, SOLUTION INTRAVENOUS at 04:05

## 2022-05-03 RX ADMIN — ACETAMINOPHEN 650 MG: 325 TABLET ORAL at 11:05

## 2022-05-03 RX ADMIN — ACETAMINOPHEN 650 MG: 325 TABLET ORAL at 06:05

## 2022-05-03 RX ADMIN — ENOXAPARIN SODIUM 30 MG: 30 INJECTION, SOLUTION INTRAVENOUS; SUBCUTANEOUS at 06:05

## 2022-05-03 RX ADMIN — SODIUM CHLORIDE, SODIUM LACTATE, POTASSIUM CHLORIDE, AND CALCIUM CHLORIDE 500 ML: .6; .31; .03; .02 INJECTION, SOLUTION INTRAVENOUS at 11:05

## 2022-05-03 NOTE — ASSESSMENT & PLAN NOTE
This patient does have evidence of infective focus and end-organ dysfunction (ASHLYN).  My overall impression is severe sepsis. Vital signs were reviewed and noted in progress note.  Antibiotics given-   Antibiotics (From admission, onward)            Start     Stop Route Frequency Ordered    05/04/22 1500  cefTRIAXone (ROCEPHIN) 2 g/50 mL D5W IVPB         05/08 1459 IV Every 24 hours (non-standard times) 05/03/22 1541        Cultures were taken-   Microbiology Results (last 7 days)     Procedure Component Value Units Date/Time    Blood Culture #1 **CANNOT BE ORDERED STAT** [[id number]] Collected: 05/03/22 1351    Order Status: Sent Specimen: Blood from Peripheral, Antecubital, Left Updated: 05/03/22 1523    Blood Culture #2 **CANNOT BE ORDERED STAT** [[id number]] Collected: 05/03/22 1408    Order Status: Sent Specimen: Blood from Peripheral, Forearm, Right Updated: 05/03/22 1421    Urine culture [[id number]] Collected: 05/03/22 1234    Order Status: No result Specimen: Urine Updated: 05/03/22 1308    Urine culture [[id number]] Collected: 05/03/22 1116    Order Status: No result Specimen: Urine Updated: 05/03/22 1140        Latest lactate reviewed, they are-  Recent Labs   Lab 05/03/22  1350   LACTATE 0.9     Source- acute cystitis    Source control Achieved by- IV Ceftriaxone 2 mg daily for 5 days

## 2022-05-03 NOTE — ASSESSMENT & PLAN NOTE
"Hypertension:  -BP today: BP (!) 111/52 (BP Location: Right arm, Patient Position: Sitting)   Pulse 62   Temp 99.5 °F (37.5 °C) (Oral)   Resp 16   Ht 5' 3" (1.6 m)   Wt 54.4 kg (120 lb)   LMP  (LMP Unknown)   SpO2 96%   Breastfeeding No   BMI 21.26 kg/m²   -HTN well controlled on home amlodipine 10 mg PO daily, Hygroten 25 mg PO daily, nebivolol 5 mg PO daily, olmesartan 40 mg PO daily.  Reports she no longer takes spironolactone 25 mg PO.    PLAN:  - Holding home BP meds on admission in the setting of /50 and T99.5F recorded in the ER, as well as her admitted recent reduction in PO intake and UOP  - Resume home BP meds when she is hemodynamically stable and there is no concern for sepsis    "

## 2022-05-03 NOTE — PROGRESS NOTES
T2 Resistance Panel 510 (k) Study  IRB: 2021.289  PI: Dr. Kurt Martin    Date: 05/03/2022  Subject Number:     After the informed consent process was completed, paired (in-house) blood cultures were drawn by the RN. The time of the blood draw was 13:46. The site of the blood draw was left AC. The T2 research samples were drawn immediately following the paired blood cultures using the same site/needlestick.     Tube A was drawn at 13:46.   Tube B was drawn at 13:46.   Tube C was drawn at 13:47.    Accession number of paired blood culture is [id number].    The subject did not have any Adverse Events.        The subject's research blood sample was brought to the research lab for testing. Results will not be shared with the subject.      Oumou Brooks MD  [facility] Infectious Diseases  T2 Resistance Panel Clinical Trial

## 2022-05-03 NOTE — SUBJECTIVE & OBJECTIVE
Past Medical History:   Diagnosis Date    CKD (chronic kidney disease) stage 3, GFR 30-59 ml/min     HLD (hyperlipidemia)     HTN (hypertension)     Hyperparathyroidism        Past Surgical History:   Procedure Laterality Date    CATARACT EXTRACTION, BILATERAL Bilateral 2014    cataract removal Right 2014    hiatial hernia  2017    HYSTERECTOMY      melanoma      on face    OOPHORECTOMY      THORACIC AORTIC ANEURYSM REPAIR  2011       Review of patient's allergies indicates:   Allergen Reactions    Methotrexate analogues      Mouth Ulcers        No current facility-administered medications on file prior to encounter.     Current Outpatient Medications on File Prior to Encounter   Medication Sig    adalimumab (HUMIRA PEN) PnKt injection Inject 1 pen (40 mg total) into the skin every 14 (fourteen) days.    amLODIPine (NORVASC) 10 MG tablet Take 1 tablet (10 mg total) by mouth once daily.    chlorthalidone (HYGROTEN) 25 MG Tab Take 25 mg by mouth once daily.    multivit-min/iron/folic/lutein (CENTRUM SILVER WOMEN ORAL) Take 1 tablet by mouth once daily.    nebivoloL (BYSTOLIC) 5 MG Tab Take 1 tablet (5 mg total) by mouth once daily.    olmesartan (BENICAR) 40 MG tablet TAKE 1 TABLET(40 MG) BY MOUTH EVERY DAY FOR BLOOD PRESSURE    sucralfate (CARAFATE) 1 gram tablet Take 1 tablet (1 g total) by mouth 4 (four) times daily. Crush in water. Swish and swallow.    folic acid (FOLVITE) 1 MG tablet Take 1 tablet (1 mg total) by mouth once daily.    predniSONE (DELTASONE) 2.5 MG tablet Take 1 tablet (2.5 mg total) by mouth daily as needed (for joint flare ups).    spironolactone (ALDACTONE) 25 MG tablet Take 1 tablet (25 mg total) by mouth once daily.     Family History       Problem Relation (Age of Onset)    Cancer Father    Hypertension Maternal Grandmother          Tobacco Use    Smoking status: Former Smoker     Packs/day: 1.00     Years: 30.00     Pack years: 30.00     Types: Cigarettes     Quit date: 1/26/2009     Years  since quittin.2    Smokeless tobacco: Never Used   Substance and Sexual Activity    Alcohol use: No     Comment: glass of wine once or twice a year    Drug use: No    Sexual activity: Not on file     Review of Systems   Constitutional:  Negative for activity change, appetite change, chills, fatigue and fever.   HENT:  Negative for congestion, nosebleeds and sore throat.    Eyes:  Negative for pain.   Respiratory:  Negative for cough, chest tightness, shortness of breath and wheezing.    Cardiovascular:  Negative for chest pain and palpitations.   Gastrointestinal:  Negative for abdominal pain, diarrhea, nausea and vomiting.   Genitourinary:  Positive for difficulty urinating, enuresis and flank pain. Negative for dysuria, frequency, hematuria, pelvic pain and urgency.   Musculoskeletal:  Negative for back pain.   Skin:  Negative for rash.   Neurological:  Negative for dizziness, weakness and headaches.   Objective:     Vital Signs (Most Recent):  Temp: 99.5 °F (37.5 °C) (22 1024)  Pulse: 62 (22 1434)  Resp: 16 (22 1434)  BP: (!) 111/52 (22 1434)  SpO2: 96 % (22 1434)   Vital Signs (24h Range):  Temp:  [99.5 °F (37.5 °C)] 99.5 °F (37.5 °C)  Pulse:  [] 62  Resp:  [16] 16  SpO2:  [96 %] 96 %  BP: (107-133)/(52-62) 111/52     Weight: 54.4 kg (120 lb)  Body mass index is 21.26 kg/m².    Physical Exam  Constitutional:       General: She is not in acute distress.     Appearance: Normal appearance. She is not ill-appearing, toxic-appearing or diaphoretic.   HENT:      Head: Normocephalic and atraumatic.      Right Ear: External ear normal.      Left Ear: External ear normal.      Nose: Nose normal.      Mouth/Throat:      Mouth: Mucous membranes are moist.      Pharynx: Oropharynx is clear.   Eyes:      Extraocular Movements: Extraocular movements intact.      Conjunctiva/sclera: Conjunctivae normal.   Cardiovascular:      Rate and Rhythm: Normal rate and regular rhythm.      Pulses:  Normal pulses.      Heart sounds: Murmur (systolic crescendo-decrescendo at R sternal border) heard.   Pulmonary:      Effort: Pulmonary effort is normal.      Breath sounds: Normal breath sounds.   Abdominal:      General: Abdomen is flat. There is no distension.      Palpations: Abdomen is soft. There is no mass.      Tenderness: There is no abdominal tenderness. There is no right CVA tenderness, left CVA tenderness, guarding or rebound.      Comments: No pain elicited on ballottement of either kidney   Musculoskeletal:         General: Normal range of motion.      Cervical back: Normal range of motion and neck supple.   Skin:     General: Skin is warm and dry.      Capillary Refill: Capillary refill takes less than 2 seconds.   Neurological:      General: No focal deficit present.      Mental Status: She is alert and oriented to person, place, and time.   Psychiatric:         Mood and Affect: Mood normal.         Behavior: Behavior normal.           Significant Labs: All pertinent labs within the past 24 hours have been reviewed.  CBC:   Recent Labs   Lab 05/03/22  1132 05/03/22  1137   WBC 12.94*  --    HGB 13.1  --    HCT 38.8 41     --      CMP:   Recent Labs   Lab 05/03/22  1132   *   K 4.9   CL 91*   CO2 26   *   BUN 22   CREATININE 1.3   CALCIUM 9.9   PROT 6.9   ALBUMIN 3.5   BILITOT 1.7*   ALKPHOS 81   AST 20   ALT 10   ANIONGAP 8   EGFRNONAA 39.9*     Lactic Acid:   Recent Labs   Lab 05/03/22  1350   LACTATE 0.9     Lipase:   Recent Labs   Lab 05/03/22  1132   LIPASE 13       Urine Studies:   Recent Labs   Lab 05/03/22  1234   COLORU Edna   APPEARANCEUA Cloudy*   PHUR 6.0   SPECGRAV 1.015   PROTEINUA 1+*   GLUCUA Negative   KETONESU Negative   BILIRUBINUA Negative   OCCULTUA 1+*   NITRITE Negative   LEUKOCYTESUR 3+*   RBCUA 3   WBCUA >100*   BACTERIA Few*   SQUAMEPITHEL 13   HYALINECASTS 0       Significant Imaging: I have reviewed all pertinent imaging results/findings within the  past 24 hours.  CT: I have reviewed all pertinent results/findings within the past 24 hours and my personal findings are:  CT A/P: bladder wall thickening c/w cystitis.

## 2022-05-03 NOTE — ED PROVIDER NOTES
Encounter Date: 5/3/2022       History     Chief Complaint   Patient presents with    Flank Pain     Urine dark and smells     77 yo W with pmhx Ra on humira, HTN, CKD III, HLD, hyperparathyroidism, aortic stenosis, thoracic aortic aneurysm s/p repair () presents with a chief complaint of right flank pain and dark urine.  Patient reports 2-3 days of pain in her right flank.  It radiates to bilateral upper shoulders.  Pain is intermittent.  It is described as a dull, ache.  /10 currently which is at its peak.  She has not taken any medication for the pain.  No history of similar symptoms.  She reports it is worsened with certain positions, in particular when she is in bed.  No trauma or increased use.  She reports dark and foul smelling urine during that time.  Some nausea today.  No dysuria, urinary frequency, fever, chest pain, shortness of breath, abdominal pain, vomiting.  No history of similar symptoms.        Review of patient's allergies indicates:   Allergen Reactions    Methotrexate analogues      Mouth Ulcers      Past Medical History:   Diagnosis Date    CKD (chronic kidney disease) stage 3, GFR 30-59 ml/min     HLD (hyperlipidemia)     HTN (hypertension)     Hyperparathyroidism      Past Surgical History:   Procedure Laterality Date    CATARACT EXTRACTION, BILATERAL Bilateral 2014    cataract removal Right 2014    hiatial hernia  2017    HYSTERECTOMY      melanoma      on face    OOPHORECTOMY      THORACIC AORTIC ANEURYSM REPAIR       Family History   Problem Relation Age of Onset    Cancer Father     Hypertension Maternal Grandmother     Colon cancer Neg Hx     Inflammatory bowel disease Neg Hx      Social History     Tobacco Use    Smoking status: Former Smoker     Packs/day: 1.00     Years: 30.00     Pack years: 30.00     Types: Cigarettes     Quit date: 2009     Years since quittin.2    Smokeless tobacco: Never Used   Substance Use Topics    Alcohol use: No      Comment: glass of wine once or twice a year    Drug use: No     Review of Systems   Constitutional: Negative for fever.   HENT: Negative for sore throat.    Respiratory: Negative for shortness of breath.    Cardiovascular: Negative for chest pain.   Gastrointestinal: Positive for nausea. Negative for abdominal pain and vomiting.   Genitourinary: Positive for flank pain. Negative for decreased urine volume, dysuria, frequency, hematuria and urgency.   Musculoskeletal: Positive for arthralgias (bilat shoulders). Negative for back pain.   Skin: Negative for rash.   Neurological: Negative for weakness.   Hematological: Does not bruise/bleed easily.       Physical Exam     Initial Vitals [05/03/22 1024]   BP Pulse Resp Temp SpO2   133/62 110 16 99.5 °F (37.5 °C) 96 %      MAP       --         Physical Exam    Nursing note and vitals reviewed.  Constitutional: She appears well-developed and well-nourished. She is not diaphoretic. No distress.   HENT:   Head: Normocephalic and atraumatic.   Eyes: EOM are normal. Right eye exhibits no discharge. Left eye exhibits no discharge. No scleral icterus.   Neck: Neck supple. No JVD present.   Normal range of motion.  Cardiovascular: Normal rate, regular rhythm and intact distal pulses. Exam reveals no gallop and no friction rub.    Murmur heard.  Not tachycardic despite as noted at triage   Pulmonary/Chest: Breath sounds normal. No respiratory distress. She has no wheezes. She has no rhonchi. She has no rales. She exhibits no tenderness.   Abdominal: Abdomen is soft. Bowel sounds are normal. She exhibits no distension and no mass. There is no abdominal tenderness.   No right CVA tenderness.  No left CVA tenderness. There is no rebound and no guarding.   Musculoskeletal:         General: No tenderness or edema. Normal range of motion.      Cervical back: Normal range of motion and neck supple.      Thoracic back: No tenderness.      Lumbar back: No tenderness.     Neurological: She  is alert and oriented to person, place, and time. She has normal strength. No sensory deficit.   Skin: Skin is warm and dry. Capillary refill takes less than 2 seconds.   Psychiatric: She has a normal mood and affect.         ED Course   Procedures  Labs Reviewed   URINALYSIS, REFLEX TO URINE CULTURE - Abnormal; Notable for the following components:       Result Value    Appearance, UA Cloudy (*)     Protein, UA 1+ (*)     Occult Blood UA 1+ (*)     Leukocytes, UA 3+ (*)     All other components within normal limits    Narrative:     Specimen Source->Urine   CBC W/ AUTO DIFFERENTIAL - Abnormal; Notable for the following components:    WBC 12.94 (*)     MCH 32.4 (*)     MPV 8.2 (*)     Immature Granulocytes 0.6 (*)     Gran # (ANC) 8.9 (*)     Immature Grans (Abs) 0.08 (*)     Mono # 2.0 (*)     Lymph % 14.8 (*)     Mono % 15.6 (*)     All other components within normal limits   COMPREHENSIVE METABOLIC PANEL - Abnormal; Notable for the following components:    Sodium 125 (*)     Chloride 91 (*)     Glucose 117 (*)     Total Bilirubin 1.7 (*)     eGFR if  46.0 (*)     eGFR if non  39.9 (*)     All other components within normal limits   URINALYSIS MICROSCOPIC - Abnormal; Notable for the following components:    RBC, UA 10 (*)     WBC, UA >100 (*)     Bacteria Moderate (*)     Non-Squam Epith 4 (*)     All other components within normal limits    Narrative:     Specimen Source->Urine   URINALYSIS, REFLEX TO URINE CULTURE - Abnormal; Notable for the following components:    Appearance, UA Cloudy (*)     Protein, UA 1+ (*)     Occult Blood UA 1+ (*)     Leukocytes, UA 3+ (*)     All other components within normal limits    Narrative:     Specimen Source->Urine   URINALYSIS MICROSCOPIC - Abnormal; Notable for the following components:    WBC, UA >100 (*)     Bacteria Few (*)     Non-Squam Epith 3 (*)     All other components within normal limits    Narrative:     Specimen Source->Urine    ISTAT PROCEDURE - Abnormal; Notable for the following components:    POC Glucose 122 (*)     POC Creatinine 1.5 (*)     POC Sodium 124 (*)     POC Chloride 90 (*)     All other components within normal limits   CULTURE, URINE   CULTURE, URINE   CULTURE, BLOOD   CULTURE, BLOOD   LIPASE   TSH    Narrative:     add on tsh per Chris Narayanan MD order# [id number]  05/03/2022 @ 12:16    LACTIC ACID, PLASMA   SARS-COV-2 RDRP GENE          Imaging Results          CT Abdomen Pelvis  Without Contrast (Final result)  Result time 05/03/22 12:24:49    Final result by Pepito Moon MD (05/03/22 12:24:49)                 Impression:      1. No definite evidence of radiopaque urolithiasis or obstructive uropathy, as questioned.  2. Nonspecific circumferential urinary bladder wall thickening, finding which may be seen in setting of cystitis.  3. Additional details as provided in the body of report.      Electronically signed by: Pepito Moon  Date:    05/03/2022  Time:    12:24             Narrative:    EXAMINATION:  CT ABDOMEN PELVIS WITHOUT CONTRAST    CLINICAL HISTORY:  Flank pain, kidney stone suspected;    TECHNIQUE:  Low dose axial images, sagittal and coronal reformations were obtained from the lung bases to the pubic symphysis.    COMPARISON:  None    FINDINGS:  This examination is limited due to lack of intravenous contrast.    Lower chest: Atelectasis and scar suggested at the dependent lung bases.  Patchy ground-glass type attenuation is noted in the left lower lobe.    Liver: Normal contour.  Calcified granuloma are suggested.    Gallbladder and bile ducts: Unremarkable.    Pancreas: Normal contour.    Spleen: Calcified granuloma suggested.    Adrenals: Normal contour.    Kidneys: 16 mm simple cysts mid to lower pole left kidney.    Lymph nodes: No definite pathologically enlarged lymph nodes.  Calcified lymph nodes may reflect sequela of prior granulomatous process.    Bowel and mesentery: Small hiatal hernia.   No definite evidence of acute bowel obstruction.  Colonic diverticulosis is noted without definite evidence of acute diverticulitis.  Normal caliber appendix.    Abdominal aorta: Normal caliber.  Atherosclerotic calcifications.    Inferior vena cava: Unremarkable.    Free fluid or free air: None.    Pelvis: Unremarkable.    Urinary bladder: Mild nonspecific circumferential bladder wall thickening.    Body wall: Unremarkable.    Bones: No acute findings.  Osseous demineralization is suggested.  Degenerative findings are noted involving the spine and hip joints.                                 Medications   cefTRIAXone (ROCEPHIN) 2 g/50 mL D5W IVPB (has no administration in time range)   lactated ringers bolus 500 mL (0 mLs Intravenous Stopped 5/3/22 1226)   acetaminophen tablet 650 mg (650 mg Oral Given 5/3/22 1146)     Medical Decision Making:   History:   I obtained history from: someone other than patient.  Old Medical Records: I decided to obtain old medical records.  Initial Assessment:   75 yo W with pmhx Ra on humira, HTN, CKD III, HLD, hyperparathyroidism, aortic stenosis, thoracic aortic aneurysm s/p repair (2011) presents with a chief complaint of right flank pain and dark urine.  Differential Diagnosis:   Pyelonephritis, nephrolithiasis, UTI, ASHLYN, muscular strain, electrolyte abnormalities  Clinical Tests:   Lab Tests: Ordered  Radiological Study: Ordered  ED Management:  Patient has low-grade temperature of 99.5.  She denies any fever.  She was mildly tachycardic at triage but this improved on my exam.  She does not appear septic.  However, given humira use, will investigate for any infectious etiology.  Will administer 500 cc bolus and acetaminophen.  Will obtain labs and CT renal stone.    Reassessment:  UA contaminated by squamous cells.  She was counseled on appropriate collection.  Chem 8 with hyponatremia of 124. Will continue to monitor.    Reassessment:  CBC with leukocytosis of 12.94.  CMP with  hyponatremia of 129. There is hypokalemia of 91.  Creatinine slightly worsened from baseline at 1.3.  Lipase normal.  TSH normal.  CT negative for acute processes.  Heart rate improved to 80. Blood pressure 107/53.  Repeat urinalysis consistent with UTI given 3+ leukocytes, greater than 100 wbc's, few bacteria.  They are still 13 squamous cells.  Given symptoms, will send culture and treat.  Patient has a low-grade temperature but she had tachycardia and leukocytosis which are 2/4 SIRS criteria concerning for sepsis. Source of infection: pyelonephritis.  She does not appear to be in septic shock and 30 cc/kg was not administered.  On repeat assessment, she remains well appearing.  She is being volume resuscitated and has received antibiotics.  Inpatient per case management.              Attending Attestation:         Attending Critical Care:   Critical Care Times:   Direct Patient Care (initial evaluation, reassessments, and time considering the case)................................................................15 minutes.   Additional History from reviewing old medical records or taking additional history from the family, EMS, PCP, etc.......................5 minutes.   Ordering, Reviewing, and Interpreting Diagnostic Studies...............................................................................................................5 minutes.   Documentation..................................................................................................................................................................................5 minutes.   Consultation with other Physicians. .................................................................................................................................................5 minutes.   ==============================================================  · Total Critical Care Time - exclusive of procedural time: 35  minutes.  ==============================================================  Critical care was necessary to treat or prevent imminent or life-threatening deterioration of the following conditions: sepsis.                    Clinical Impression:   Final diagnoses:  [N12] Pyelonephritis (Primary)  [E87.1] Hyponatremia  [A41.9] Sepsis, due to unspecified organism, unspecified whether acute organ dysfunction present          ED Disposition Condition    Admit               Chris Narayanan MD  05/03/22 [id number]

## 2022-05-03 NOTE — ASSESSMENT & PLAN NOTE
CKD3    Creatinine 1.3 on admit, baseline around 0.8-1.0.  Patient reports minimal UOP over last 24 hrs.  Although we don't have enough data to definitively diagnose ASHLYN per KDIGO/AKIN/RIFLE standards, her clinical picture indicates an ASHLYN is present.  Suspect it to be pre-renal in the setting of reduced PO intake due to cystitis-induced nausea.    PLAN:  - Follow up urine studies and repeat BMP; initiate light IVF if pre-renal ASHLYN and hypovolemic hyponatremia confirmed.  - Avoid nephrotoxic agents such as NSAIDs, gadolinium and IV radiocontrast.  - Renally dose meds to current GFR.  - Maintain MAP > 65.

## 2022-05-03 NOTE — HPI
"Buffy Dominique is a 76 y.o. female with  RA, AS, HTN, HLD, and CKD who presented to [facility] on 05/03/2022 for evaluation and treatment of R flank pain.  They describe their pain as dull aches, 7/10, located on their R flank with no radiation to the shoulder, although she does complain of shoulder pain since falling 2 months ago.  Her flank pain started 2-3 days ago and has been worsening since, with symptom frequency described as constant.  She says she was unable to walk this morning due to the pain.  There is associated urine described as reduced, dark, and smelly; and nausea.  They deny associated fever, chills, fatigue, diaphoresis, abdominal pain, V/D/C.  Symptoms are alleviated by nothing.  Symptoms are worsened by movement.    Of note, she has had multiple recent presentation to [facility] facilities for similar complaints.  In October she presented to [facility] urgent care where a UTI was diagnosed but not treated.  She then sought treatment at [facility] a few weeks later for the similar symptoms, and was "prescribed an antibiotic that I can not remember."  She says she has remained free of symptoms since, until her flank pain returned a few days ago.    [facility] Course (summarized here for the purpose of efficient chart review; not intended as part of HPI):    On presentation to [facility], the patient was unable to walk and felt ill.  VS notable for T 99.5, .  Initial exam unremarkable.  Initial labs notable for WBC 11.9, Crt 1.3 (baseline 0.8-1.0).  Imaging: CT A/P with L renal cyst, and thickening of bladder wall c/w cystitis.  Therapy/stabilization measures were started, notable for  bolus.  On my interview 10 minutes after 2g IV ceftriaxone had been given, the patient was feeling better.    "

## 2022-05-03 NOTE — H&P
"[facility] - [facility] Medicine  History & Physical    Patient Name: Buffy Dominique  MRN: [id number]  Patient Class: IP- Inpatient  Admission Date: 5/3/2022  Attending Physician: Chris Narayanan MD   Primary Care Provider: Odalis Kim MD         Patient information was obtained from patient, past medical records and ER records.     Subjective:     Principal Problem:Acute cystitis without hematuria    Chief Complaint:   Chief Complaint   Patient presents with    Flank Pain     Urine dark and smells        HPI: Buffy Dominique is a 76 y.o. female with  RA, AS, HTN, HLD, and CKD who presented to [facility] on 05/03/2022 for evaluation and treatment of R flank pain.  They describe their pain as dull aches, 7/10, located on their R flank with no radiation to the shoulder, although she does complain of shoulder pain since falling 2 months ago.  Her flank pain started 2-3 days ago and has been worsening since, with symptom frequency described as constant.  She says she was unable to walk this morning due to the pain.  There is associated urine described as reduced, dark, and smelly; and nausea.  They deny associated fever, chills, fatigue, diaphoresis, abdominal pain, V/D/C.  Symptoms are alleviated by nothing.  Symptoms are worsened by movement.    Of note, she has had multiple recent presentation to [facility] facilities for similar complaints.  In October she presented to [facility] urgent care where a UTI was diagnosed but not treated.  She then sought treatment at [facility] a few weeks later for the similar symptoms, and was "prescribed an antibiotic that I can not remember."  She says she has remained free of symptoms since, until her flank pain returned a few days ago.    [facility] Course (summarized here for the purpose of efficient chart review; not intended as part of HPI):    On presentation to [facility], the patient was unable to walk and felt ill.  VS notable for T 99.5, .  Initial exam unremarkable.  " Initial labs notable for WBC 11.9, Crt 1.3 (baseline 0.8-1.0).  Imaging: CT A/P with L renal cyst, and thickening of bladder wall c/w cystitis.  Therapy/stabilization measures were started, notable for  bolus.  On my interview 10 minutes after 2g IV ceftriaxone had been given, the patient was feeling better.        Past Medical History:   Diagnosis Date    CKD (chronic kidney disease) stage 3, GFR 30-59 ml/min     HLD (hyperlipidemia)     HTN (hypertension)     Hyperparathyroidism        Past Surgical History:   Procedure Laterality Date    CATARACT EXTRACTION, BILATERAL Bilateral 2014    cataract removal Right 2014    hiatial hernia  2017    HYSTERECTOMY      melanoma      on face    OOPHORECTOMY      THORACIC AORTIC ANEURYSM REPAIR  2011       Review of patient's allergies indicates:   Allergen Reactions    Methotrexate analogues      Mouth Ulcers        No current facility-administered medications on file prior to encounter.     Current Outpatient Medications on File Prior to Encounter   Medication Sig    adalimumab (HUMIRA PEN) PnKt injection Inject 1 pen (40 mg total) into the skin every 14 (fourteen) days.    amLODIPine (NORVASC) 10 MG tablet Take 1 tablet (10 mg total) by mouth once daily.    chlorthalidone (HYGROTEN) 25 MG Tab Take 25 mg by mouth once daily.    multivit-min/iron/folic/lutein (CENTRUM SILVER WOMEN ORAL) Take 1 tablet by mouth once daily.    nebivoloL (BYSTOLIC) 5 MG Tab Take 1 tablet (5 mg total) by mouth once daily.    olmesartan (BENICAR) 40 MG tablet TAKE 1 TABLET(40 MG) BY MOUTH EVERY DAY FOR BLOOD PRESSURE    sucralfate (CARAFATE) 1 gram tablet Take 1 tablet (1 g total) by mouth 4 (four) times daily. Crush in water. Swish and swallow.    folic acid (FOLVITE) 1 MG tablet Take 1 tablet (1 mg total) by mouth once daily.    predniSONE (DELTASONE) 2.5 MG tablet Take 1 tablet (2.5 mg total) by mouth daily as needed (for joint flare ups).    spironolactone  (ALDACTONE) 25 MG tablet Take 1 tablet (25 mg total) by mouth once daily.     Family History       Problem Relation (Age of Onset)    Cancer Father    Hypertension Maternal Grandmother          Tobacco Use    Smoking status: Former Smoker     Packs/day: 1.00     Years: 30.00     Pack years: 30.00     Types: Cigarettes     Quit date: 2009     Years since quittin.2    Smokeless tobacco: Never Used   Substance and Sexual Activity    Alcohol use: No     Comment: glass of wine once or twice a year    Drug use: No    Sexual activity: Not on file     Review of Systems   Constitutional:  Negative for activity change, appetite change, chills, fatigue and fever.   HENT:  Negative for congestion, nosebleeds and sore throat.    Eyes:  Negative for pain.   Respiratory:  Negative for cough, chest tightness, shortness of breath and wheezing.    Cardiovascular:  Negative for chest pain and palpitations.   Gastrointestinal:  Negative for abdominal pain, diarrhea, nausea and vomiting.   Genitourinary:  Positive for difficulty urinating, enuresis and flank pain. Negative for dysuria, frequency, hematuria, pelvic pain and urgency.   Musculoskeletal:  Negative for back pain.   Skin:  Negative for rash.   Neurological:  Negative for dizziness, weakness and headaches.   Objective:     Vital Signs (Most Recent):  Temp: 99.5 °F (37.5 °C) (22 1024)  Pulse: 62 (22 1434)  Resp: 16 (22 1434)  BP: (!) 111/52 (22 1434)  SpO2: 96 % (22 1434)   Vital Signs (24h Range):  Temp:  [99.5 °F (37.5 °C)] 99.5 °F (37.5 °C)  Pulse:  [] 62  Resp:  [16] 16  SpO2:  [96 %] 96 %  BP: (107-133)/(52-62) 111/52     Weight: 54.4 kg (120 lb)  Body mass index is 21.26 kg/m².    Physical Exam  Constitutional:       General: She is not in acute distress.     Appearance: Normal appearance. She is not ill-appearing, toxic-appearing or diaphoretic.   HENT:      Head: Normocephalic and atraumatic.      Right Ear: External  ear normal.      Left Ear: External ear normal.      Nose: Nose normal.      Mouth/Throat:      Mouth: Mucous membranes are moist.      Pharynx: Oropharynx is clear.   Eyes:      Extraocular Movements: Extraocular movements intact.      Conjunctiva/sclera: Conjunctivae normal.   Cardiovascular:      Rate and Rhythm: Normal rate and regular rhythm.      Pulses: Normal pulses.      Heart sounds: Murmur (systolic crescendo-decrescendo at R sternal border) heard.   Pulmonary:      Effort: Pulmonary effort is normal.      Breath sounds: Normal breath sounds.   Abdominal:      General: Abdomen is flat. There is no distension.      Palpations: Abdomen is soft. There is no mass.      Tenderness: There is no abdominal tenderness. There is no right CVA tenderness, left CVA tenderness, guarding or rebound.      Comments: No pain elicited on ballottement of either kidney   Musculoskeletal:         General: Normal range of motion.      Cervical back: Normal range of motion and neck supple.   Skin:     General: Skin is warm and dry.      Capillary Refill: Capillary refill takes less than 2 seconds.   Neurological:      General: No focal deficit present.      Mental Status: She is alert and oriented to person, place, and time.   Psychiatric:         Mood and Affect: Mood normal.         Behavior: Behavior normal.           Significant Labs: All pertinent labs within the past 24 hours have been reviewed.  CBC:   Recent Labs   Lab 05/03/22  1132 05/03/22  1137   WBC 12.94*  --    HGB 13.1  --    HCT 38.8 41     --      CMP:   Recent Labs   Lab 05/03/22  1132   *   K 4.9   CL 91*   CO2 26   *   BUN 22   CREATININE 1.3   CALCIUM 9.9   PROT 6.9   ALBUMIN 3.5   BILITOT 1.7*   ALKPHOS 81   AST 20   ALT 10   ANIONGAP 8   EGFRNONAA 39.9*     Lactic Acid:   Recent Labs   Lab 05/03/22  1350   LACTATE 0.9     Lipase:   Recent Labs   Lab 05/03/22  1132   LIPASE 13       Urine Studies:   Recent Labs   Lab 05/03/22  1234    COLORU Edna   APPEARANCEUA Cloudy*   PHUR 6.0   SPECGRAV 1.015   PROTEINUA 1+*   GLUCUA Negative   KETONESU Negative   BILIRUBINUA Negative   OCCULTUA 1+*   NITRITE Negative   LEUKOCYTESUR 3+*   RBCUA 3   WBCUA >100*   BACTERIA Few*   SQUAMEPITHEL 13   HYALINECASTS 0       Significant Imaging: I have reviewed all pertinent imaging results/findings within the past 24 hours.  CT: I have reviewed all pertinent results/findings within the past 24 hours and my personal findings are:  CT A/P: bladder wall thickening c/w cystitis.    Assessment/Plan:     * Acute cystitis without hematuria  This 76 y.o. F with multiple recent presentations to [facility] facilities for dysuria and flank pain now presents to [facility] with similar symptoms.   T 99.5F, , no CVA tenderness, euvolemic, WBC 11.9.  Bladder wall thickening on CT.  Suspect acute cystitis, likely recurrent.  T 99 HR 80 following  bolus.  Although she presented with 2/4 SIRS, she is responding well to     PLAN:  - Admit to inpatient for IV ABx and monitoring of sepsis  - Ceftriaxone 2g IV until BCx returns  - Follow up BCx for speciation and sensitivities and tailor ABX accordingly  - Gentle IVF replenishment    Severe sepsis  This patient does have evidence of infective focus and end-organ dysfunction (ASHLYN).  My overall impression is severe sepsis. Vital signs were reviewed and noted in progress note.  Antibiotics given-   Antibiotics (From admission, onward)            Start     Stop Route Frequency Ordered    05/04/22 1500  cefTRIAXone (ROCEPHIN) 2 g/50 mL D5W IVPB         05/08 1459 IV Every 24 hours (non-standard times) 05/03/22 1541        Cultures were taken-   Microbiology Results (last 7 days)     Procedure Component Value Units Date/Time    Blood Culture #1 **CANNOT BE ORDERED STAT** [[id number]] Collected: 05/03/22 1351    Order Status: Sent Specimen: Blood from Peripheral, Antecubital, Left Updated: 05/03/22 1523    Blood Culture #2 **CANNOT BE  "ORDERED STAT** [[id number]] Collected: 05/03/22 1408    Order Status: Sent Specimen: Blood from Peripheral, Forearm, Right Updated: 05/03/22 1421    Urine culture [[id number]] Collected: 05/03/22 1234    Order Status: No result Specimen: Urine Updated: 05/03/22 1308    Urine culture [[id number]] Collected: 05/03/22 1116    Order Status: No result Specimen: Urine Updated: 05/03/22 1140        Latest lactate reviewed, they are-  Recent Labs   Lab 05/03/22  1350   LACTATE 0.9     Source- acute cystitis    Source control Achieved by- IV Ceftriaxone 2 mg daily for 5 days    Essential hypertension  Hypertension:  -BP today: BP (!) 111/52 (BP Location: Right arm, Patient Position: Sitting)   Pulse 62   Temp 99.5 °F (37.5 °C) (Oral)   Resp 16   Ht 5' 3" (1.6 m)   Wt 54.4 kg (120 lb)   LMP  (LMP Unknown)   SpO2 96%   Breastfeeding No   BMI 21.26 kg/m²   -HTN well controlled on home amlodipine 10 mg PO daily, Hygroten 25 mg PO daily, nebivolol 5 mg PO daily, olmesartan 40 mg PO daily.  Reports she no longer takes spironolactone 25 mg PO.    PLAN:  - Holding home BP meds on admission in the setting of /50 and T99.5F recorded in the ER, as well as her admitted recent reduction in PO intake and UOP  - Resume home BP meds when she is hemodynamically stable and there is no concern for sepsis      Acute kidney injury superimposed on chronic kidney disease  CKD3    Creatinine 1.3 on admit, baseline around 0.8-1.0.  Patient reports minimal UOP over last 24 hrs.  Although we don't have enough data to definitively diagnose ASHLYN per KDIGO/AKIN/RIFLE standards, her clinical picture indicates an ASHLYN is present.  Suspect it to be pre-renal in the setting of reduced PO intake due to cystitis-induced nausea.    PLAN:  - Follow up urine studies and repeat BMP; initiate light IVF if pre-renal ASHLYN and hypovolemic hyponatremia confirmed.  - Avoid nephrotoxic agents such as NSAIDs, gadolinium and IV radiocontrast.  - Renally dose meds " to current GFR.  - Maintain MAP > 65.    Hyponatremia  Likely secondary to poor PO intake in setting of acute cystitis-driven nausea.  However, her other lab values are relatively normal and she is clinically euvolemic.  Despite low suspicion of alternate etiologies, will get urine studies to rule out primary polydipsia vs SIADH.  She is now s/p  bolus in the ED.    PLAN:  - F/u serum Na, serum osm, urine osm, urine Na   - Initiate fluid replenishment or restriction as warranted by lab results and clinical status      CKD (chronic kidney disease) stage 3, GFR 30-59 ml/min        Rectal prolapse  Continue home Carafate.      Seropositive rheumatoid arthritis  Well-controlled on q2w Humira; next dose due 5/12/22.      VTE Risk Mitigation (From admission, onward)         Ordered     enoxaparin injection 30 mg  Daily         05/03/22 1536     IP VTE HIGH RISK PATIENT  Once         05/03/22 1536     Place sequential compression device  Until discontinued         05/03/22 1536                 Hospital Medicine Daily Checklist:      Fluids / Electrolytes: lactated Ringer's     Dietary Orders (From admission, onward)     Start     Ordered    05/03/22 1532  Diet renal  (Diet/Nutrition)  Diet effective now         05/03/22 1536              Lines/Drains/Airways     Peripheral Intravenous Line  Duration                Peripheral IV - Single Lumen 05/03/22 1133 20 G Left Antecubital <1 day                VTE PPx:   Anticoagulants     Ordered     Route Frequency Start Stop    05/03/22 1536  enoxaparin  (VTE Prophylaxis Orders - High Risk)         SubQ Daily 05/03/22 1700 --          Wounds: Wound Care Consult: No      Goals of Care: The patient's current code status is Full Code.    Contact: Extended Emergency Contact Information  Primary Emergency Contact: Anatoly Rosado  Address: [facility] [address] [facility] [address]           [place] [address] [facility] [place]  Mobile Phone: [phone]  Relation: Son    Anticipated  Disposition: Home or Self Care    Follow Up:   Future Appointments   Date Time Provider Department Center   5/9/2022  1:00 PM PULMONARY FUNCTION [facility] PULMLAB [facility]   5/9/2022  2:00 PM Vahid Gomez MD [facility] PULMSVC [facility]   5/31/2022  1:30 PM LAB, APPOINTMENT [facility] LAB VNP [facility]   6/2/2022 11:00 AM Angel Rich MD [facility]  Department of Hospital Medicine   [facility] - Emergency Dept

## 2022-05-03 NOTE — ASSESSMENT & PLAN NOTE
Likely secondary to poor PO intake in setting of acute cystitis-driven nausea.  However, her other lab values are relatively normal and she is clinically euvolemic.  Despite low suspicion of alternate etiologies, will get urine studies to rule out primary polydipsia vs SIADH.  She is now s/p  bolus in the ED.    PLAN:  - F/u serum Na, serum osm, urine osm, urine Na   - Initiate fluid replenishment or restriction as warranted by lab results and clinical status

## 2022-05-03 NOTE — ASSESSMENT & PLAN NOTE
This 76 y.o. F with multiple recent presentations to [facility] facilities for dysuria and flank pain now presents to [facility] with similar symptoms.   T 99.5F, , no CVA tenderness, euvolemic, WBC 11.9.  Bladder wall thickening on CT.  Suspect acute cystitis, likely recurrent.  T 99 HR 80 following  bolus.  Although she presented with 2/4 SIRS, she is responding well to     PLAN:  - Admit to inpatient for IV ABx and monitoring of sepsis  - Ceftriaxone 2g IV until BCx returns  - Follow up BCx for speciation and sensitivities and tailor ABX accordingly  - Gentle IVF replenishment

## 2022-05-03 NOTE — ED NOTES
I-STAT Chem-8+ Results:    Value Reference Range   Sodium 124 136-145 mmol/L   Potassium  4.9 3.5-5.1 mmol/L   Chloride 90  mmol/L   Ionized Calcium 1.14 1.06-1.42 mmol/L   CO2 (measured) 23 23-29 mmol/L   Glucose 122  mg/dL   BUN 24 6-30 mg/dL   Creatinine 1.5 0.5-1.4 mg/dL   Hematocrit 41 36-54%

## 2022-05-04 LAB
ANION GAP SERPL CALC-SCNC: 9 MMOL/L (ref 8–16)
ANION GAP SERPL CALC-SCNC: 9 MMOL/L (ref 8–16)
ANISOCYTOSIS BLD QL SMEAR: SLIGHT
BASOPHILS # BLD AUTO: 0.05 K/UL (ref 0–0.2)
BASOPHILS NFR BLD: 0.4 % (ref 0–1.9)
BUN SERPL-MCNC: 23 MG/DL (ref 8–23)
BUN SERPL-MCNC: 23 MG/DL (ref 8–23)
CALCIUM SERPL-MCNC: 8.8 MG/DL (ref 8.7–10.5)
CALCIUM SERPL-MCNC: 9 MG/DL (ref 8.7–10.5)
CHLORIDE SERPL-SCNC: 89 MMOL/L (ref 95–110)
CHLORIDE SERPL-SCNC: 91 MMOL/L (ref 95–110)
CO2 SERPL-SCNC: 20 MMOL/L (ref 23–29)
CO2 SERPL-SCNC: 24 MMOL/L (ref 23–29)
CORTIS SERPL-MCNC: 15.1 UG/DL (ref 4.3–22.4)
CREAT SERPL-MCNC: 1.1 MG/DL (ref 0.5–1.4)
CREAT SERPL-MCNC: 1.1 MG/DL (ref 0.5–1.4)
DIFFERENTIAL METHOD: ABNORMAL
EOSINOPHIL # BLD AUTO: 0 K/UL (ref 0–0.5)
EOSINOPHIL NFR BLD: 0.1 % (ref 0–8)
ERYTHROCYTE [DISTWIDTH] IN BLOOD BY AUTOMATED COUNT: 11.4 % (ref 11.5–14.5)
EST. GFR  (AFRICAN AMERICAN): 56.4 ML/MIN/1.73 M^2
EST. GFR  (AFRICAN AMERICAN): 56.4 ML/MIN/1.73 M^2
EST. GFR  (NON AFRICAN AMERICAN): 48.9 ML/MIN/1.73 M^2
EST. GFR  (NON AFRICAN AMERICAN): 48.9 ML/MIN/1.73 M^2
GLUCOSE SERPL-MCNC: 89 MG/DL (ref 70–110)
GLUCOSE SERPL-MCNC: 91 MG/DL (ref 70–110)
HCT VFR BLD AUTO: 33.9 % (ref 37–48.5)
HGB BLD-MCNC: 11.8 G/DL (ref 12–16)
HYPOCHROMIA BLD QL SMEAR: ABNORMAL
IMM GRANULOCYTES # BLD AUTO: 0.11 K/UL (ref 0–0.04)
IMM GRANULOCYTES NFR BLD AUTO: 0.8 % (ref 0–0.5)
LYMPHOCYTES # BLD AUTO: 2.3 K/UL (ref 1–4.8)
LYMPHOCYTES NFR BLD: 16.2 % (ref 18–48)
MCH RBC QN AUTO: 32.8 PG (ref 27–31)
MCHC RBC AUTO-ENTMCNC: 34.8 G/DL (ref 32–36)
MCV RBC AUTO: 94 FL (ref 82–98)
MONOCYTES # BLD AUTO: 2.1 K/UL (ref 0.3–1)
MONOCYTES NFR BLD: 15 % (ref 4–15)
NEUTROPHILS # BLD AUTO: 9.5 K/UL (ref 1.8–7.7)
NEUTROPHILS NFR BLD: 67.5 % (ref 38–73)
NRBC BLD-RTO: 0 /100 WBC
OVALOCYTES BLD QL SMEAR: ABNORMAL
PLATELET # BLD AUTO: 244 K/UL (ref 150–450)
PMV BLD AUTO: 8.4 FL (ref 9.2–12.9)
POIKILOCYTOSIS BLD QL SMEAR: SLIGHT
POLYCHROMASIA BLD QL SMEAR: ABNORMAL
POTASSIUM SERPL-SCNC: 4.2 MMOL/L (ref 3.5–5.1)
POTASSIUM SERPL-SCNC: 4.5 MMOL/L (ref 3.5–5.1)
RBC # BLD AUTO: 3.6 M/UL (ref 4–5.4)
SODIUM SERPL-SCNC: 120 MMOL/L (ref 136–145)
SODIUM SERPL-SCNC: 122 MMOL/L (ref 136–145)
SPHEROCYTES BLD QL SMEAR: ABNORMAL
WBC # BLD AUTO: 14.01 K/UL (ref 3.9–12.7)

## 2022-05-04 PROCEDURE — 99232 SBSQ HOSP IP/OBS MODERATE 35: CPT | Mod: ,,, | Performed by: STUDENT IN AN ORGANIZED HEALTH CARE EDUCATION/TRAINING PROGRAM

## 2022-05-04 PROCEDURE — 25000003 PHARM REV CODE 250

## 2022-05-04 PROCEDURE — 99232 PR SUBSEQUENT HOSPITAL CARE,LEVL II: ICD-10-PCS | Mod: ,,, | Performed by: STUDENT IN AN ORGANIZED HEALTH CARE EDUCATION/TRAINING PROGRAM

## 2022-05-04 PROCEDURE — 63600175 PHARM REV CODE 636 W HCPCS

## 2022-05-04 PROCEDURE — 11000001 HC ACUTE MED/SURG PRIVATE ROOM

## 2022-05-04 PROCEDURE — 85025 COMPLETE CBC W/AUTO DIFF WBC: CPT

## 2022-05-04 PROCEDURE — 36415 COLL VENOUS BLD VENIPUNCTURE: CPT

## 2022-05-04 PROCEDURE — 82533 TOTAL CORTISOL: CPT | Performed by: STUDENT IN AN ORGANIZED HEALTH CARE EDUCATION/TRAINING PROGRAM

## 2022-05-04 PROCEDURE — 80048 BASIC METABOLIC PNL TOTAL CA: CPT | Mod: 91 | Performed by: STUDENT IN AN ORGANIZED HEALTH CARE EDUCATION/TRAINING PROGRAM

## 2022-05-04 PROCEDURE — 80048 BASIC METABOLIC PNL TOTAL CA: CPT

## 2022-05-04 PROCEDURE — 25000003 PHARM REV CODE 250: Performed by: STUDENT IN AN ORGANIZED HEALTH CARE EDUCATION/TRAINING PROGRAM

## 2022-05-04 RX ORDER — EPINEPHRINE 0.22MG
1 AEROSOL WITH ADAPTER (ML) INHALATION DAILY
Status: DISCONTINUED | OUTPATIENT
Start: 2022-05-04 | End: 2022-05-09 | Stop reason: HOSPADM

## 2022-05-04 RX ADMIN — SODIUM CHLORIDE 500 ML: 0.9 INJECTION, SOLUTION INTRAVENOUS at 12:05

## 2022-05-04 RX ADMIN — ACETAMINOPHEN 650 MG: 325 TABLET ORAL at 08:05

## 2022-05-04 RX ADMIN — SUCRALFATE 1 G: 1 TABLET ORAL at 08:05

## 2022-05-04 RX ADMIN — SUCRALFATE 1 G: 1 TABLET ORAL at 05:05

## 2022-05-04 RX ADMIN — CEFTRIAXONE 2 G: 2 INJECTION, SOLUTION INTRAVENOUS at 03:05

## 2022-05-04 RX ADMIN — SUCRALFATE 1 G: 1 TABLET ORAL at 12:05

## 2022-05-04 RX ADMIN — MELATONIN TAB 3 MG 6 MG: 3 TAB at 08:05

## 2022-05-04 RX ADMIN — ENOXAPARIN SODIUM 30 MG: 30 INJECTION, SOLUTION INTRAVENOUS; SUBCUTANEOUS at 06:05

## 2022-05-04 RX ADMIN — Medication 100 MG: at 08:05

## 2022-05-04 RX ADMIN — THERA TABS 1 TABLET: TAB at 08:05

## 2022-05-04 NOTE — ASSESSMENT & PLAN NOTE
Likely secondary to poor PO intake in setting of acute cystitis-driven nausea.  However, her other lab values are relatively normal and she is clinically euvolemic.  Despite low suspicion of alternate etiologies, given 500cc LR in ED, on second day of admission Na was lower, 122 from 125, taking chlorthalidone which could be exacerbating her Na in the setting of hypovolemia and confounding the urine studies, as they are not consistent with hypovolemic hyponatremia. Continue to hold chlorthalidone and consider dc at discharge. She had a similar presentation in 9/2021 with HCTZ.    PLAN:  - BID BMP  - s/p 500cc NS on 5/4  - Hold chlorthalidone

## 2022-05-04 NOTE — ASSESSMENT & PLAN NOTE
CKD3    Creatinine 1.3 on admit, baseline around 0.8-1.0.  Patient reports minimal UOP over last 24 hrs.  Although we don't have enough data to definitively diagnose ASHLYN per KDIGO/AKIN/RIFLE standards, her clinical picture indicates an ASHLYN is present.  Suspect it to be pre-renal in the setting of reduced PO intake due to cystitis-induced nausea.    PLAN:  - Follow up urine studies and repeat BMP; initiate light IVF if pre-renal ASHLYN and hypovolemic hyponatremia confirmed.  - Avoid nephrotoxic agents such as NSAIDs, gadolinium and IV radiocontrast.  - Renally dose meds to current GFR.  - Maintain MAP > 65  - Improved on second day of admission

## 2022-05-04 NOTE — PLAN OF CARE
[facility] - Med Surg  Initial Discharge Assessment       Primary Care Provider: Odalis Kim MD    Admission Diagnosis: Hyponatremia [E87.1]  Nausea [R11.0]  Pyelonephritis [N12]  Sepsis, due to unspecified organism, unspecified whether acute organ dysfunction present [A41.9]    Admission Date: 5/3/2022  Expected Discharge Date: 5/5/2022    Discharge Barriers Identified: None    Payor: [employer]ICARE / Plan: [employer] [facility] HEALTH / Product Type: Medicare Advantage /     Extended Emergency Contact Information  Primary Emergency Contact: Anatoly Rosado  Address: [address] [place] [address] [address]           [place] [address] [place]  Mobile Phone: [phone]  Relation: Son    Discharge Plan A: Home  Discharge Plan B: Home Health      [id number] - [place], [place] - [address] [facility]  [address] [facility] [address]  Phone: [phone] Fax: [phone]    [id number] - [facility] [address] [address]  Phone: [phone] Fax: [phone]    [id number] - [facility] [place] [address] & [address] [address]  Phone: [phone] Fax: [phone]    [id number] - [place] - [address] AT [facility] [address]  [address]  [facility] [address]  Phone: [phone] Fax: [phone]    [facility] #[id number] - [place], [place] - [facility]  [facility] [address]  Phone: [phone] Fax: [phone]      Initial Assessment (most recent)     Adult Discharge Assessment - 05/04/22 1251        Discharge Assessment    Assessment Type Discharge Planning Assessment     Confirmed/corrected address, phone number and insurance Yes     Confirmed Demographics Correct on Facesheet     Source of Information patient      Communicated MARCELLA with patient/caregiver Yes     Reason For Admission pyelonephritis     Lives With alone     Do you expect to return to your current living situation? Yes     Do you have help at home or someone to help you manage your care at home? Yes     Who are your caregiver(s) and their phone number(s)? Anatoly Rosado (son) [phone]     Prior to hospitilization cognitive status: Alert/Oriented     Current cognitive status: Alert/Oriented     Walking or Climbing Stairs Difficulty none     Dressing/Bathing Difficulty none     Equipment Currently Used at Home none     Readmission within 30 days? No     Patient currently being followed by outpatient case management? No     Do you currently have service(s) that help you manage your care at home? No     Do you take prescription medications? Yes     Do you have prescription coverage? Yes     Do you have any problems affording any of your prescribed medications? No     Is the patient taking medications as prescribed? yes     How do you get to doctors appointments? car, drives self;family or friend will provide     Are you on dialysis? No     Do you take coumadin? No     Discharge Plan A Home     Discharge Plan B Home Health     DME Needed Upon Discharge  none     Discharge Plan discussed with: Patient     Discharge Barriers Identified None                  Leslie Lu RN  Ext [phone]

## 2022-05-04 NOTE — NURSING
Notified attending physician via secure chat that the pt verbalized that she notice a streak of blood in her stool.

## 2022-05-04 NOTE — PROGRESS NOTES
"[facility] Medicine  Progress Note    Patient Name: Buffy Dominique  MRN: [id number]  Patient Class: IP- Inpatient   Admission Date: 5/3/2022  Length of Stay: 1 days  Attending Physician: Elmer Pollock MD  Primary Care Provider: Odalis Kim MD        Subjective:     Principal Problem:Acute cystitis without hematuria        HPI:  Buffy Dominique is a 76 y.o. female with  RA, AS, HTN, HLD, and CKD who presented to [facility] on 05/03/2022 for evaluation and treatment of R flank pain.  They describe their pain as dull aches, 7/10, located on their R flank with no radiation to the shoulder, although she does complain of shoulder pain since falling 2 months ago.  Her flank pain started 2-3 days ago and has been worsening since, with symptom frequency described as constant.  She says she was unable to walk this morning due to the pain.  There is associated urine described as reduced, dark, and smelly; and nausea.  They deny associated fever, chills, fatigue, diaphoresis, abdominal pain, V/D/C.  Symptoms are alleviated by nothing.  Symptoms are worsened by movement.    Of note, she has had multiple recent presentation to [facility] facilities for similar complaints.  In October she presented to [facility] urgent care where a UTI was diagnosed but not treated.  She then sought treatment at [facility] a few weeks later for the similar symptoms, and was "prescribed an antibiotic that I can not remember."  She says she has remained free of symptoms since, until her flank pain returned a few days ago.    [facility] Course (summarized here for the purpose of efficient chart review; not intended as part of HPI):    On presentation to [facility], the patient was unable to walk and felt ill.  VS notable for T 99.5, .  Initial exam unremarkable.  Initial labs notable for WBC 11.9, Crt 1.3 (baseline 0.8-1.0).  Imaging: CT A/P with L renal cyst, and thickening of bladder wall c/w cystitis.  Therapy/stabilization measures were " started, notable for  bolus.  On my interview 10 minutes after 2g IV ceftriaxone had been given, the patient was feeling better.        Overview/Hospital Course:  Patient's sodium worsened on second day of admission. Labs not consistent with hypovolemic hyponatremia, cortisol was normal, TSH was normal. Given additional IVF and trended sodium. Awaiting Ucx results, continued on IV ceftriaxone.      Interval History: NAEON, na worsened in AM, leukocytosis persists    Review of Systems   Constitutional:  Negative for fatigue and fever.   Respiratory:  Negative for cough and shortness of breath.    Cardiovascular:  Negative for chest pain and leg swelling.   Gastrointestinal:  Negative for abdominal pain.   Genitourinary:  Negative for dysuria and frequency.   Musculoskeletal:         R shoulder pain   Objective:     Vital Signs (Most Recent):  Temp: 98 °F (36.7 °C) (05/04/22 1121)  Pulse: 60 (05/04/22 1121)  Resp: 18 (05/04/22 1121)  BP: (!) 110/56 (05/04/22 1121)  SpO2: (!) 93 % (05/04/22 1121)   Vital Signs (24h Range):  Temp:  [98 °F (36.7 °C)-100.7 °F (38.2 °C)] 98 °F (36.7 °C)  Pulse:  [60-97] 60  Resp:  [16-18] 18  SpO2:  [92 %-97 %] 93 %  BP: (107-172)/(52-68) 110/56     Weight: 54.4 kg (120 lb)  Body mass index is 21.26 kg/m².    Intake/Output Summary (Last 24 hours) at 5/4/2022 1242  Last data filed at 5/3/2022 1805  Gross per 24 hour   Intake 500 ml   Output --   Net 500 ml      Physical Exam  Constitutional:       General: She is not in acute distress.     Appearance: She is not ill-appearing.   Cardiovascular:      Rate and Rhythm: Normal rate and regular rhythm.      Pulses: Normal pulses.      Heart sounds: Normal heart sounds.   Pulmonary:      Effort: Pulmonary effort is normal. No respiratory distress.      Breath sounds: Normal breath sounds.   Abdominal:      General: Abdomen is flat. Bowel sounds are normal. There is no distension.      Palpations: Abdomen is soft.   Musculoskeletal:       Right lower leg: No edema.      Left lower leg: No edema.   Skin:     General: Skin is warm.      Capillary Refill: Capillary refill takes less than 2 seconds.   Neurological:      General: No focal deficit present.      Mental Status: She is alert and oriented to person, place, and time.   Psychiatric:         Mood and Affect: Mood normal.         Behavior: Behavior normal.       Significant Labs: All pertinent labs within the past 24 hours have been reviewed.  CBC:   Recent Labs   Lab 05/03/22  1132 05/03/22  1137 05/04/22  0246   WBC 12.94*  --  14.01*   HGB 13.1  --  11.8*   HCT 38.8 41 33.9*     --  244     CMP:   Recent Labs   Lab 05/03/22  1132 05/03/22  1624 05/04/22  0246   * 125* 122*   K 4.9 4.7 4.5   CL 91* 88* 89*   CO2 26 24 24   * 104 91   BUN 22 23 23   CREATININE 1.3 1.4 1.1   CALCIUM 9.9 9.7 9.0   PROT 6.9  --   --    ALBUMIN 3.5  --   --    BILITOT 1.7*  --   --    ALKPHOS 81  --   --    AST 20  --   --    ALT 10  --   --    ANIONGAP 8 13 9   EGFRNONAA 39.9* 36.5* 48.9*       Significant Imaging: I have reviewed all pertinent imaging results/findings within the past 24 hours.      Assessment/Plan:      * Acute cystitis without hematuria  This 76 y.o. F with multiple recent presentations to [facility] facilities for dysuria and flank pain now presents to [facility] with similar symptoms, admitted for UTI and hyponatremia    UTI  T 99.5F, , no CVA tenderness, euvolemic, WBC 11.9.  Bladder wall thickening on CT.  Suspect acute cystitis, likely recurrent.  T 99 HR 80 following  bolus.  Although she presented with 2/4 SIRS, she is responding well to CTX  - Ceftriaxone 2g IV until BCx returns  - Follow up BCx for speciation and sensitivities and tailor ABX accordingly  - Gentle IVF replenishment    Severe sepsis  This patient does have evidence of infective focus and end-organ dysfunction (ASHLYN).  My overall impression is severe sepsis. Vital signs were reviewed and noted in  progress note.  Antibiotics given-   Antibiotics (From admission, onward)            Start     Stop Route Frequency Ordered    05/04/22 1500  cefTRIAXone (ROCEPHIN) 2 g/50 mL D5W IVPB         05/08 1459 IV Every 24 hours (non-standard times) 05/03/22 1541        Cultures were taken-   Microbiology Results (last 7 days)     Procedure Component Value Units Date/Time    Blood Culture #1 **CANNOT BE ORDERED STAT** [[id number]] Collected: 05/03/22 1351    Order Status: Sent Specimen: Blood from Peripheral, Antecubital, Left Updated: 05/03/22 1523    Blood Culture #2 **CANNOT BE ORDERED STAT** [[id number]] Collected: 05/03/22 1408    Order Status: Sent Specimen: Blood from Peripheral, Forearm, Right Updated: 05/03/22 1421    Urine culture [[id number]] Collected: 05/03/22 1234    Order Status: No result Specimen: Urine Updated: 05/03/22 1308    Urine culture [[id number]] Collected: 05/03/22 1116    Order Status: No result Specimen: Urine Updated: 05/03/22 1140        Latest lactate reviewed, they are-  Recent Labs   Lab 05/03/22  1350   LACTATE 0.9     Source- acute cystitis    Source control Achieved by- IV Ceftriaxone 2 mg daily for 5 days    Acute kidney injury superimposed on chronic kidney disease  CKD3    Creatinine 1.3 on admit, baseline around 0.8-1.0.  Patient reports minimal UOP over last 24 hrs.  Although we don't have enough data to definitively diagnose ASHLYN per KDIGO/AKIN/RIFLE standards, her clinical picture indicates an ASHLYN is present.  Suspect it to be pre-renal in the setting of reduced PO intake due to cystitis-induced nausea.    PLAN:  - Follow up urine studies and repeat BMP; initiate light IVF if pre-renal ASHLYN and hypovolemic hyponatremia confirmed.  - Avoid nephrotoxic agents such as NSAIDs, gadolinium and IV radiocontrast.  - Renally dose meds to current GFR.  - Maintain MAP > 65  - Improved on second day of admission    Hyponatremia  Likely secondary to poor PO intake in setting of acute cystitis-driven  "nausea.  However, her other lab values are relatively normal and she is clinically euvolemic.  Despite low suspicion of alternate etiologies, given 500cc LR in ED, on second day of admission Na was lower, 122 from 125, taking chlorthalidone which could be exacerbating her Na in the setting of hypovolemia and confounding the urine studies, as they are not consistent with hypovolemic hyponatremia. Continue to hold chlorthalidone and consider dc at discharge. She had a similar presentation in 9/2021 with HCTZ.    PLAN:  - BID BMP  - s/p 500cc NS on 5/4  - Hold chlorthalidone      CKD (chronic kidney disease) stage 3, GFR 30-59 ml/min        Rectal prolapse  Continue home Carafate.      Seropositive rheumatoid arthritis  Well-controlled on q2w Humira; next dose due 5/12/22.    Essential hypertension  Hypertension:  -BP today: BP (!) 110/56 (Patient Position: Sitting)   Pulse 60   Temp 98 °F (36.7 °C) (Oral)   Resp 18   Ht 5' 3" (1.6 m)   Wt 54.4 kg (120 lb)   LMP  (LMP Unknown)   SpO2 (!) 93%   Breastfeeding No   BMI 21.26 kg/m²   -HTN well controlled on home amlodipine 10 mg PO daily, Hygroten 25 mg PO daily, nebivolol 5 mg PO daily, olmesartan 40 mg PO daily.  Reports she no longer takes spironolactone 25 mg PO.    PLAN:  - Holding home BP meds on admission in the setting of /50 and T99.5F recorded in the ER, as well as her admitted recent reduction in PO intake and UOP  - Resume home BP meds when she is hemodynamically stable and there is no concern for sepsis        VTE Risk Mitigation (From admission, onward)         Ordered     enoxaparin injection 30 mg  Daily         05/03/22 1536     IP VTE HIGH RISK PATIENT  Once         05/03/22 1536     Place sequential compression device  Until discontinued         05/03/22 1536                Discharge Planning   MARCELLA: 5/5/2022     Code Status: Full Code   Is the patient medically ready for discharge?: No    Reason for patient still in hospital (select all " that apply): Patient trending condition, Laboratory test and Pending disposition                     Elmer Pollock MD  Department of Hospital Medicine   [facility]

## 2022-05-04 NOTE — ASSESSMENT & PLAN NOTE
This 76 y.o. F with multiple recent presentations to [facility] facilities for dysuria and flank pain now presents to [facility] with similar symptoms, admitted for UTI and hyponatremia    UTI  T 99.5F, , no CVA tenderness, euvolemic, WBC 11.9.  Bladder wall thickening on CT.  Suspect acute cystitis, likely recurrent.  T 99 HR 80 following  bolus.  Although she presented with 2/4 SIRS, she is responding well to CTX  - Ceftriaxone 2g IV until BCx returns  - Follow up BCx for speciation and sensitivities and tailor ABX accordingly  - Gentle IVF replenishment

## 2022-05-04 NOTE — ASSESSMENT & PLAN NOTE
"Hypertension:  -BP today: BP (!) 110/56 (Patient Position: Sitting)   Pulse 60   Temp 98 °F (36.7 °C) (Oral)   Resp 18   Ht 5' 3" (1.6 m)   Wt 54.4 kg (120 lb)   LMP  (LMP Unknown)   SpO2 (!) 93%   Breastfeeding No   BMI 21.26 kg/m²   -HTN well controlled on home amlodipine 10 mg PO daily, Hygroten 25 mg PO daily, nebivolol 5 mg PO daily, olmesartan 40 mg PO daily.  Reports she no longer takes spironolactone 25 mg PO.    PLAN:  - Holding home BP meds on admission in the setting of /50 and T99.5F recorded in the ER, as well as her admitted recent reduction in PO intake and UOP  - Resume home BP meds when she is hemodynamically stable and there is no concern for sepsis    "

## 2022-05-04 NOTE — HOSPITAL COURSE
Patient's sodium worsened on second day of admission. Labs not consistent with hypovolemic hyponatremia, cortisol was normal, TSH was normal. Given additional IVF and trended sodium. Awaiting Ucx results, continued on IV ceftriaxone.

## 2022-05-04 NOTE — SUBJECTIVE & OBJECTIVE
Interval History: charlene GALARZA worsened in AM, leukocytosis persists    Review of Systems   Constitutional:  Negative for fatigue and fever.   Respiratory:  Negative for cough and shortness of breath.    Cardiovascular:  Negative for chest pain and leg swelling.   Gastrointestinal:  Negative for abdominal pain.   Genitourinary:  Negative for dysuria and frequency.   Musculoskeletal:         R shoulder pain   Objective:     Vital Signs (Most Recent):  Temp: 98 °F (36.7 °C) (05/04/22 1121)  Pulse: 60 (05/04/22 1121)  Resp: 18 (05/04/22 1121)  BP: (!) 110/56 (05/04/22 1121)  SpO2: (!) 93 % (05/04/22 1121)   Vital Signs (24h Range):  Temp:  [98 °F (36.7 °C)-100.7 °F (38.2 °C)] 98 °F (36.7 °C)  Pulse:  [60-97] 60  Resp:  [16-18] 18  SpO2:  [92 %-97 %] 93 %  BP: (107-172)/(52-68) 110/56     Weight: 54.4 kg (120 lb)  Body mass index is 21.26 kg/m².    Intake/Output Summary (Last 24 hours) at 5/4/2022 1242  Last data filed at 5/3/2022 1805  Gross per 24 hour   Intake 500 ml   Output --   Net 500 ml      Physical Exam  Constitutional:       General: She is not in acute distress.     Appearance: She is not ill-appearing.   Cardiovascular:      Rate and Rhythm: Normal rate and regular rhythm.      Pulses: Normal pulses.      Heart sounds: Normal heart sounds.   Pulmonary:      Effort: Pulmonary effort is normal. No respiratory distress.      Breath sounds: Normal breath sounds.   Abdominal:      General: Abdomen is flat. Bowel sounds are normal. There is no distension.      Palpations: Abdomen is soft.   Musculoskeletal:      Right lower leg: No edema.      Left lower leg: No edema.   Skin:     General: Skin is warm.      Capillary Refill: Capillary refill takes less than 2 seconds.   Neurological:      General: No focal deficit present.      Mental Status: She is alert and oriented to person, place, and time.   Psychiatric:         Mood and Affect: Mood normal.         Behavior: Behavior normal.       Significant Labs: All  pertinent labs within the past 24 hours have been reviewed.  CBC:   Recent Labs   Lab 05/03/22  1132 05/03/22  1137 05/04/22  0246   WBC 12.94*  --  14.01*   HGB 13.1  --  11.8*   HCT 38.8 41 33.9*     --  244     CMP:   Recent Labs   Lab 05/03/22  1132 05/03/22  1624 05/04/22  0246   * 125* 122*   K 4.9 4.7 4.5   CL 91* 88* 89*   CO2 26 24 24   * 104 91   BUN 22 23 23   CREATININE 1.3 1.4 1.1   CALCIUM 9.9 9.7 9.0   PROT 6.9  --   --    ALBUMIN 3.5  --   --    BILITOT 1.7*  --   --    ALKPHOS 81  --   --    AST 20  --   --    ALT 10  --   --    ANIONGAP 8 13 9   EGFRNONAA 39.9* 36.5* 48.9*       Significant Imaging: I have reviewed all pertinent imaging results/findings within the past 24 hours.

## 2022-05-04 NOTE — PLAN OF CARE
Problem: Adult Inpatient Plan of Care  Goal: Plan of Care Review  Outcome: Ongoing, Progressing  Goal: Patient-Specific Goal (Individualized)  Outcome: Ongoing, Progressing  Goal: Absence of Hospital-Acquired Illness or Injury  Outcome: Ongoing, Progressing  Goal: Optimal Comfort and Wellbeing  Outcome: Ongoing, Progressing  Goal: Readiness for Transition of Care  Outcome: Ongoing, Progressing     Problem: Adjustment to Illness (Sepsis/Septic Shock)  Goal: Optimal Coping  Outcome: Ongoing, Progressing     Problem: Bleeding (Sepsis/Septic Shock)  Goal: Absence of Bleeding  Outcome: Ongoing, Progressing     Problem: Glycemic Control Impaired (Sepsis/Septic Shock)  Goal: Blood Glucose Level Within Desired Range  Outcome: Ongoing, Progressing     Problem: Infection Progression (Sepsis/Septic Shock)  Goal: Absence of Infection Signs and Symptoms  Outcome: Ongoing, Progressing     Problem: Oral Intake Inadequate (Acute Kidney Injury/Impairment)  Goal: Optimal Nutrition Intake  Outcome: Ongoing, Progressing     Problem: Renal Function Impairment (Acute Kidney Injury/Impairment)  Goal: Effective Renal Function  Outcome: Ongoing, Progressing     Problem: Fall Injury Risk  Goal: Absence of Fall and Fall-Related Injury  Outcome: Ongoing, Progressing   Patient understand importance of treatment. Educated on alternating position while in bed to prevent pressure injuries.

## 2022-05-05 ENCOUNTER — PATIENT MESSAGE (OUTPATIENT)
Dept: RESEARCH | Facility: HOSPITAL | Age: 77
End: 2022-05-05
Payer: MEDICARE

## 2022-05-05 LAB
ANION GAP SERPL CALC-SCNC: 10 MMOL/L (ref 8–16)
BACTERIA UR CULT: ABNORMAL
BACTERIA UR CULT: ABNORMAL
BASOPHILS # BLD AUTO: 0.05 K/UL (ref 0–0.2)
BASOPHILS NFR BLD: 0.6 % (ref 0–1.9)
BUN SERPL-MCNC: 20 MG/DL (ref 8–23)
CALCIUM SERPL-MCNC: 9.1 MG/DL (ref 8.7–10.5)
CHLORIDE SERPL-SCNC: 89 MMOL/L (ref 95–110)
CO2 SERPL-SCNC: 24 MMOL/L (ref 23–29)
CREAT SERPL-MCNC: 1.1 MG/DL (ref 0.5–1.4)
DIFFERENTIAL METHOD: ABNORMAL
EOSINOPHIL # BLD AUTO: 0.1 K/UL (ref 0–0.5)
EOSINOPHIL NFR BLD: 1.6 % (ref 0–8)
ERYTHROCYTE [DISTWIDTH] IN BLOOD BY AUTOMATED COUNT: 11.2 % (ref 11.5–14.5)
EST. GFR  (AFRICAN AMERICAN): 56.4 ML/MIN/1.73 M^2
EST. GFR  (NON AFRICAN AMERICAN): 48.9 ML/MIN/1.73 M^2
GLUCOSE SERPL-MCNC: 85 MG/DL (ref 70–110)
HCT VFR BLD AUTO: 33.2 % (ref 37–48.5)
HGB BLD-MCNC: 11.4 G/DL (ref 12–16)
IMM GRANULOCYTES # BLD AUTO: 0.11 K/UL (ref 0–0.04)
IMM GRANULOCYTES NFR BLD AUTO: 1.2 % (ref 0–0.5)
LYMPHOCYTES # BLD AUTO: 2.7 K/UL (ref 1–4.8)
LYMPHOCYTES NFR BLD: 29.8 % (ref 18–48)
MCH RBC QN AUTO: 32.6 PG (ref 27–31)
MCHC RBC AUTO-ENTMCNC: 34.3 G/DL (ref 32–36)
MCV RBC AUTO: 95 FL (ref 82–98)
MONOCYTES # BLD AUTO: 1.6 K/UL (ref 0.3–1)
MONOCYTES NFR BLD: 17.2 % (ref 4–15)
NEUTROPHILS # BLD AUTO: 4.5 K/UL (ref 1.8–7.7)
NEUTROPHILS NFR BLD: 49.6 % (ref 38–73)
NRBC BLD-RTO: 0 /100 WBC
PLATELET # BLD AUTO: 236 K/UL (ref 150–450)
PMV BLD AUTO: 8.4 FL (ref 9.2–12.9)
POTASSIUM SERPL-SCNC: 4.2 MMOL/L (ref 3.5–5.1)
RBC # BLD AUTO: 3.5 M/UL (ref 4–5.4)
SODIUM SERPL-SCNC: 123 MMOL/L (ref 136–145)
WBC # BLD AUTO: 8.99 K/UL (ref 3.9–12.7)

## 2022-05-05 PROCEDURE — 99233 PR SUBSEQUENT HOSPITAL CARE,LEVL III: ICD-10-PCS | Mod: ,,, | Performed by: INTERNAL MEDICINE

## 2022-05-05 PROCEDURE — 80048 BASIC METABOLIC PNL TOTAL CA: CPT

## 2022-05-05 PROCEDURE — 97161 PT EVAL LOW COMPLEX 20 MIN: CPT

## 2022-05-05 PROCEDURE — 36415 COLL VENOUS BLD VENIPUNCTURE: CPT

## 2022-05-05 PROCEDURE — 97530 THERAPEUTIC ACTIVITIES: CPT

## 2022-05-05 PROCEDURE — 25000003 PHARM REV CODE 250: Performed by: STUDENT IN AN ORGANIZED HEALTH CARE EDUCATION/TRAINING PROGRAM

## 2022-05-05 PROCEDURE — 99233 SBSQ HOSP IP/OBS HIGH 50: CPT | Mod: ,,, | Performed by: INTERNAL MEDICINE

## 2022-05-05 PROCEDURE — 11000001 HC ACUTE MED/SURG PRIVATE ROOM

## 2022-05-05 PROCEDURE — 63600175 PHARM REV CODE 636 W HCPCS

## 2022-05-05 PROCEDURE — 85025 COMPLETE CBC W/AUTO DIFF WBC: CPT

## 2022-05-05 PROCEDURE — 63600175 PHARM REV CODE 636 W HCPCS: Performed by: INTERNAL MEDICINE

## 2022-05-05 PROCEDURE — 25000003 PHARM REV CODE 250

## 2022-05-05 RX ORDER — ENOXAPARIN SODIUM 100 MG/ML
40 INJECTION SUBCUTANEOUS EVERY 24 HOURS
Status: DISCONTINUED | OUTPATIENT
Start: 2022-05-05 | End: 2022-05-09 | Stop reason: HOSPADM

## 2022-05-05 RX ADMIN — ACETAMINOPHEN 650 MG: 325 TABLET ORAL at 09:05

## 2022-05-05 RX ADMIN — SUCRALFATE 1 G: 1 TABLET ORAL at 01:05

## 2022-05-05 RX ADMIN — ACETAMINOPHEN 650 MG: 325 TABLET ORAL at 08:05

## 2022-05-05 RX ADMIN — Medication 100 MG: at 06:05

## 2022-05-05 RX ADMIN — CEFTRIAXONE 2 G: 2 INJECTION, SOLUTION INTRAVENOUS at 03:05

## 2022-05-05 RX ADMIN — ACETAMINOPHEN 650 MG: 325 TABLET ORAL at 04:05

## 2022-05-05 RX ADMIN — SUCRALFATE 1 G: 1 TABLET ORAL at 08:05

## 2022-05-05 RX ADMIN — MELATONIN TAB 3 MG 6 MG: 3 TAB at 09:05

## 2022-05-05 RX ADMIN — ENOXAPARIN SODIUM 40 MG: 100 INJECTION SUBCUTANEOUS at 05:05

## 2022-05-05 RX ADMIN — THERA TABS 1 TABLET: TAB at 08:05

## 2022-05-05 NOTE — PT/OT/SLP EVAL
Physical Therapy Evaluation, Tx and Discharge Note    Patient Name:  Buffy Dominique   MRN:  [id number]    Recommendations:     Discharge Recommendations:  home   Discharge Equipment Recommendations: none   Barriers to discharge: None    Assessment:     Buffy Dominique is a 76 y.o. female admitted with a medical diagnosis of Acute cystitis without hematuria. .  At this time, patient is functioning at their prior level of function and does not require further acute PT services.     Recent Surgery: * No surgery found *      Plan:     During this hospitalization, patient does not require further acute PT services.  Please re-consult if situation changes.      Subjective     Chief Complaint: none  Patient/Family Comments/goals: to go home  Pain/Comfort:  · Pain Rating 1: 0/10  · Pain Rating Post-Intervention 1: 0/10    Patients cultural, spiritual, Yarsani conflicts given the current situation: no    Living Environment:  Pt lives alone in a mobile home with 4 LANDEN and right sided handrail. WIS no chair.  Prior to admission, patients level of function was (I); works full time as a .  Equipment used at home: none.  Upon discharge, patient will have assistance from neighbor, son; limited.    Objective:     Communicated with RN prior to session.  Patient found HOB elevated with Other (comments) (no active lines) upon PT entry to room.    General Precautions: Standard, fall   Orthopedic Precautions:N/A   Braces: N/A   Respiratory Status: Room air    Exams:  · Cognitive Exam:  Patient is oriented to Person, Place, Time and Situation  · Postural Exam:  Patient presented with the following abnormalities:    · -       No postural abnormalities identified  · Sensation:    · -       Intact  · Skin Integrity/Edema:      · -       Skin integrity: Visible skin intact  · RLE ROM: WFL  · RLE Strength: WFL  · LLE ROM: WFL  · LLE Strength: WFL    Functional Mobility:  · Bed Mobility:     · Supine to Sit: independence  · Transfers:      · Sit to Stand:  independence with no AD  · Gait: Pt ambulated ~200 ft with SPV, no AD. Narrow ESTEBAN noted but no other gait deviations noted.   · Balance: good    AM-PAC 6 CLICK MOBILITY  Total Score:24       Therapeutic Activities and Exercises:   Role of PT in POC  Pt encouraged to continue to move around room and ambulate in unit during hospitalization.     AM-PAC 6 CLICK MOBILITY  Total Score:24     Patient left sitting edge of bed with call button in reach and RN notified.    GOALS:   Multidisciplinary Problems     Physical Therapy Goals     Not on file                History:     Past Medical History:   Diagnosis Date    CKD (chronic kidney disease) stage 3, GFR 30-59 ml/min     HLD (hyperlipidemia)     HTN (hypertension)     Hyperparathyroidism        Past Surgical History:   Procedure Laterality Date    CATARACT EXTRACTION, BILATERAL Bilateral 2014    cataract removal Right 2014    hiatial hernia  2017    HYSTERECTOMY      melanoma      on face    OOPHORECTOMY      THORACIC AORTIC ANEURYSM REPAIR  2011       Time Tracking:     PT Received On: 05/05/22  PT Start Time: 1304     PT Stop Time: 1324  PT Total Time (min): 20 min     Billable Minutes: Evaluation 10 and Therapeutic Activity 10      05/05/2022

## 2022-05-05 NOTE — ASSESSMENT & PLAN NOTE
CKD3  -Scr 1.3 --> 1.1. with gentle hydration  - Avoid nephrotoxic agents such as NSAIDs, gadolinium and IV radiocontrast.  - Renally dose meds to current GFR.  - Maintain MAP > 65  - Improved on second day of admission

## 2022-05-05 NOTE — ASSESSMENT & PLAN NOTE
Probable SIADH  Stop chlorthalidone. Not improved with fluids. Urine studies consistent with SIADH. Will restrict fluids to 800 mL. Need to put thiazides on allergy list. Not improved with strict fluid restriction thus yet. She has not taken compazine. Nephrology consulted. Started patient on sodium chloride 1 g TID

## 2022-05-05 NOTE — PROGRESS NOTES
Hospital Medicine  Progress note    Team: [facility] HOSP MED Z Dory Cedillo MD  Admit Date: 5/3/2022    Principal Problem:  Acute cystitis without hematuria    Interval hx:  No new complaints    ROS   Respiratory: neg for cough neg for shortness of breath  Cardiovascular: neg for chest pain neg for palpitations  Gastrointestinal: neg for nausea neg for vomiting, neg for abdominal pain neg for diarrhea neg for constipation   Behavioral/Psych: neg for depression neg for anxiety    PEx  Temp:  [97 °F (36.1 °C)-98.2 °F (36.8 °C)]   Pulse:  [60-81]   Resp:  [18]   BP: (111-145)/(56-67)   SpO2:  [92 %-94 %]     Intake/Output Summary (Last 24 hours) at 5/5/2022 1535  Last data filed at 5/5/2022 0600  Gross per 24 hour   Intake 240 ml   Output --   Net 240 ml       General Appearance: no acute distress, WD, elderly and frail appearing  Heart: regular rate and rhythm, no heave  Respiratory: Normal respiratory effort, symmetric excursion, bilateral vesicular breath sounds   Abdomen: Soft, non-tender; bowel sounds active  Skin: intact, no rash, no ulcers  Neurologic:  No focal numbness or weakness  Mental status: Alert, oriented x 4, affect appropriate     Recent Labs   Lab 05/03/22  1132 05/03/22  1137 05/04/22  0246 05/05/22  0326   WBC 12.94*  --  14.01* 8.99   HGB 13.1  --  11.8* 11.4*   HCT 38.8 41 33.9* 33.2*     --  244 236     Recent Labs   Lab 05/03/22  1132 05/03/22  1624 05/04/22  0246 05/04/22  1525 05/05/22  0326   *   < > 122* 120* 123*   K 4.9   < > 4.5 4.2 4.2   CL 91*   < > 89* 91* 89*   CO2 26   < > 24 20* 24   BUN 22   < > 23 23 20   CREATININE 1.3   < > 1.1 1.1 1.1   *   < > 91 89 85   CALCIUM 9.9   < > 9.0 8.8 9.1   LIPASE 13  --   --   --   --     < > = values in this interval not displayed.     Recent Labs   Lab 05/03/22  1132   ALKPHOS 81   ALT 10   AST 20   ALBUMIN 3.5   PROT 6.9   BILITOT 1.7*      Scheduled Meds:   cefTRIAXone (ROCEPHIN) IVPB  2 g Intravenous Q24H    coenzyme Q10  1  "capsule Oral Daily    And    multivitamin  1 tablet Oral Daily    enoxaparin  40 mg Subcutaneous Daily    sucralfate  1 g Oral QID     Continuous Infusions:  As Needed:  acetaminophen, melatonin, prochlorperazine, sodium chloride 0.9%    Assessment and Plan  / Problems managed today    * Acute cystitis without hematuria  This 76 y.o. F with multiple recent presentations to [facility] facilities for dysuria and flank pain now presents to [facility] with similar symptoms, admitted for UTI and hyponatremia    sepsis due to E. Coli UTI  Tmax 100.7, , no CVA tenderness, euvolemic, WBC 11.9.  Bladder wall thickening on CT.  Suspect acute cystitis, likely recurrent.  T 99 HR 80 following  bolus. Responded well to ceftriaxone  - Ceftriaxone 2g IV for five days  - Gentle IVF replenishment  -sepsis resolved    Acute kidney injury superimposed on chronic kidney disease  CKD3  -Scr 1.3 --> 1.1. with gentle hydration  - Avoid nephrotoxic agents such as NSAIDs, gadolinium and IV radiocontrast.  - Renally dose meds to current GFR.  - Maintain MAP > 65  - Improved on second day of admission    Hyponatremia  Probable SIADH  Stop chlorthalidone. Not improved with fluids. Urine studies consistent with SIADH. Will restrict fluids to 800 mL. Need to put thiazides on allergy list.     CKD (chronic kidney disease) stage 3, GFR 30-59 ml/min        Rectal prolapse  Continue home Carafate.      Seropositive rheumatoid arthritis  Well-controlled on q2w Humira; next dose due 5/12/22.    Essential hypertension  Hypertension:  -BP today: BP (!) 110/56 (Patient Position: Sitting)   Pulse 60   Temp 98 °F (36.7 °C) (Oral)   Resp 18   Ht 5' 3" (1.6 m)   Wt 54.4 kg (120 lb)   LMP  (LMP Unknown)   SpO2 (!) 93%   Breastfeeding No   BMI 21.26 kg/m²   -HTN well controlled on home amlodipine 10 mg PO daily, Hygroten 25 mg PO daily, nebivolol 5 mg PO daily, olmesartan 40 mg PO daily.  Reports she no longer takes spironolactone 25 mg " PO.    PLAN:  - Holding home BP meds on admission in the setting of /50 and T99.5F recorded in the ER, as well as her admitted recent reduction in PO intake and UOP  - Resume home BP meds when she is hemodynamically stable and there is no concern for sepsis    Discharge Planning   MARCELLA: 5/6/2022     Code Status: Full Code   Is the patient medically ready for discharge?: No    Reason for patient still in hospital (select all that apply): Patient trending condition and Treatment  Discharge Plan A: Home      Diet:  high protein diet  GI PPx: note needed. carafate discontinued as prescribed for mucositis from methotrexate  DVT PPx:  enoxaparin  Airways: room air  Wounds: none    Goals of Care:  Return to prior functional status     Time (minutes) spent in care of the patient (Greater than 1/2 spent in direct face-to-face contact and care coordination on unit)  35 min    Dory Cedillo MD

## 2022-05-05 NOTE — PLAN OF CARE
Problem: Adult Inpatient Plan of Care  Goal: Plan of Care Review  Outcome: Ongoing, Progressing  Goal: Patient-Specific Goal (Individualized)  Outcome: Ongoing, Progressing  Goal: Absence of Hospital-Acquired Illness or Injury  Outcome: Ongoing, Progressing  Goal: Optimal Comfort and Wellbeing  Outcome: Ongoing, Progressing  Goal: Readiness for Transition of Care  Outcome: Ongoing, Progressing     Pt aao x 4. VS stable. Per orders pt on fluid restriction 800 ml. Strict I/O. Pt lying in bed with head of the bed slightly elevated. Pt complained of shoulder pain, tylenol administered. Relief noted. Bed low, wheels locked, bed alarm set. Call light within reach. Will continue to monitor.

## 2022-05-05 NOTE — ASSESSMENT & PLAN NOTE
This patient does have evidence of infective focus and end-organ dysfunction (ASHLYN).  My overall impression is severe sepsis. Vital signs were reviewed and noted in progress note.  Antibiotics given-   Antibiotics (From admission, onward)            Start     Stop Route Frequency Ordered    05/06/22 1500  cefTRIAXone (ROCEPHIN) 2 g/50 mL D5W IVPB         05/07 1459 IV Every 24 hours (non-standard times) 05/05/22 1543        Cultures were taken-   Microbiology Results (last 7 days)     Procedure Component Value Units Date/Time    Urine culture [[id number]]  (Abnormal)  (Susceptibility) Collected: 05/03/22 1234    Order Status: Completed Specimen: Urine Updated: 05/05/22 1044     Urine Culture, Routine ESCHERICHIA COLI  >100,000 cfu/ml      Narrative:      ADD-ON UPRCR, UROSM,UNAR PER SHAY SOMMER MD  05/03/2022  22:25   Specimen Source->Urine    Urine culture [[id number]]  (Abnormal)  (Susceptibility) Collected: 05/03/22 1116    Order Status: Completed Specimen: Urine Updated: 05/05/22 1044     Urine Culture, Routine ESCHERICHIA COLI  >100,000 cfu/ml      Narrative:      Specimen Source->Urine    Blood Culture #1 **CANNOT BE ORDERED STAT** [[id number]] Collected: 05/03/22 1351    Order Status: Completed Specimen: Blood from Peripheral, Antecubital, Left Updated: 05/04/22 1612     Blood Culture, Routine No Growth to date      No Growth to date    Blood Culture #2 **CANNOT BE ORDERED STAT** [[id number]] Collected: 05/03/22 1408    Order Status: Completed Specimen: Blood from Peripheral, Forearm, Right Updated: 05/04/22 1612     Blood Culture, Routine No Growth to date      No Growth to date    Blood culture [[id number]]     Order Status: Canceled Specimen: Blood     Blood culture [[id number]]     Order Status: Canceled Specimen: Blood         Latest lactate reviewed, they are-  Recent Labs   Lab 05/03/22  1350   LACTATE 0.9     Source- acute cystitis    Source control Achieved by- IV Ceftriaxone 2 mg daily for 5 days

## 2022-05-05 NOTE — ASSESSMENT & PLAN NOTE
This 76 y.o. F with multiple recent presentations to [facility] facilities for dysuria and flank pain now presents to [facility] [place] [facility] with similar symptoms, admitted for UTI and hyponatremia    sepsis due to E. Coli UTI  Tmax 100.7, , no CVA tenderness, euvolemic, WBC 11.9.  Bladder wall thickening on CT.  Suspect acute cystitis, likely recurrent.  T 99 HR 80 following  bolus. Responded well to ceftriaxone  - Ceftriaxone 2g IV for five days - transition to cefpodoxime orally for remainder of tratment  - Gentle IVF replenishment  -sepsis resolved  -refer to gyn-urology as outpatient for recurring infection

## 2022-05-06 PROBLEM — M25.511 RIGHT SHOULDER PAIN: Status: ACTIVE | Noted: 2022-05-06

## 2022-05-06 LAB
ANION GAP SERPL CALC-SCNC: 10 MMOL/L (ref 8–16)
ANION GAP SERPL CALC-SCNC: 9 MMOL/L (ref 8–16)
ANION GAP SERPL CALC-SCNC: 9 MMOL/L (ref 8–16)
BASOPHILS # BLD AUTO: 0.03 K/UL (ref 0–0.2)
BASOPHILS NFR BLD: 0.5 % (ref 0–1.9)
BUN SERPL-MCNC: 13 MG/DL (ref 8–23)
BUN SERPL-MCNC: 13 MG/DL (ref 8–23)
BUN SERPL-MCNC: 16 MG/DL (ref 8–23)
CALCIUM SERPL-MCNC: 10.2 MG/DL (ref 8.7–10.5)
CALCIUM SERPL-MCNC: 9.1 MG/DL (ref 8.7–10.5)
CALCIUM SERPL-MCNC: 9.4 MG/DL (ref 8.7–10.5)
CHLORIDE SERPL-SCNC: 86 MMOL/L (ref 95–110)
CHLORIDE SERPL-SCNC: 87 MMOL/L (ref 95–110)
CHLORIDE SERPL-SCNC: 88 MMOL/L (ref 95–110)
CO2 SERPL-SCNC: 23 MMOL/L (ref 23–29)
CO2 SERPL-SCNC: 27 MMOL/L (ref 23–29)
CO2 SERPL-SCNC: 27 MMOL/L (ref 23–29)
CREAT SERPL-MCNC: 0.8 MG/DL (ref 0.5–1.4)
DIFFERENTIAL METHOD: ABNORMAL
EOSINOPHIL # BLD AUTO: 0.2 K/UL (ref 0–0.5)
EOSINOPHIL NFR BLD: 3 % (ref 0–8)
ERYTHROCYTE [DISTWIDTH] IN BLOOD BY AUTOMATED COUNT: 11.2 % (ref 11.5–14.5)
EST. GFR  (AFRICAN AMERICAN): >60 ML/MIN/1.73 M^2
EST. GFR  (NON AFRICAN AMERICAN): >60 ML/MIN/1.73 M^2
GLUCOSE SERPL-MCNC: 102 MG/DL (ref 70–110)
GLUCOSE SERPL-MCNC: 104 MG/DL (ref 70–110)
GLUCOSE SERPL-MCNC: 85 MG/DL (ref 70–110)
HCT VFR BLD AUTO: 32.3 % (ref 37–48.5)
HGB BLD-MCNC: 11.5 G/DL (ref 12–16)
IMM GRANULOCYTES # BLD AUTO: 0.08 K/UL (ref 0–0.04)
IMM GRANULOCYTES NFR BLD AUTO: 1.3 % (ref 0–0.5)
LYMPHOCYTES # BLD AUTO: 2.4 K/UL (ref 1–4.8)
LYMPHOCYTES NFR BLD: 39.1 % (ref 18–48)
MCH RBC QN AUTO: 31.7 PG (ref 27–31)
MCHC RBC AUTO-ENTMCNC: 35.6 G/DL (ref 32–36)
MCV RBC AUTO: 89 FL (ref 82–98)
MONOCYTES # BLD AUTO: 0.8 K/UL (ref 0.3–1)
MONOCYTES NFR BLD: 12.8 % (ref 4–15)
NEUTROPHILS # BLD AUTO: 2.7 K/UL (ref 1.8–7.7)
NEUTROPHILS NFR BLD: 43.3 % (ref 38–73)
NRBC BLD-RTO: 0 /100 WBC
OSMOLALITY SERPL: 260 MOSM/KG (ref 275–295)
OSMOLALITY UR: 311 MOSM/KG (ref 50–1200)
PLATELET # BLD AUTO: 244 K/UL (ref 150–450)
PMV BLD AUTO: 8.5 FL (ref 9.2–12.9)
POTASSIUM SERPL-SCNC: 3.6 MMOL/L (ref 3.5–5.1)
POTASSIUM SERPL-SCNC: 4.1 MMOL/L (ref 3.5–5.1)
POTASSIUM SERPL-SCNC: 4.3 MMOL/L (ref 3.5–5.1)
RBC # BLD AUTO: 3.63 M/UL (ref 4–5.4)
SODIUM SERPL-SCNC: 120 MMOL/L (ref 136–145)
SODIUM SERPL-SCNC: 122 MMOL/L (ref 136–145)
SODIUM SERPL-SCNC: 124 MMOL/L (ref 136–145)
SODIUM UR-SCNC: 62 MMOL/L (ref 20–250)
WBC # BLD AUTO: 6.24 K/UL (ref 3.9–12.7)

## 2022-05-06 PROCEDURE — 25000003 PHARM REV CODE 250: Performed by: INTERNAL MEDICINE

## 2022-05-06 PROCEDURE — 84300 ASSAY OF URINE SODIUM: CPT | Performed by: INTERNAL MEDICINE

## 2022-05-06 PROCEDURE — 11000001 HC ACUTE MED/SURG PRIVATE ROOM

## 2022-05-06 PROCEDURE — 25000003 PHARM REV CODE 250

## 2022-05-06 PROCEDURE — 63600175 PHARM REV CODE 636 W HCPCS: Performed by: INTERNAL MEDICINE

## 2022-05-06 PROCEDURE — 99233 PR SUBSEQUENT HOSPITAL CARE,LEVL III: ICD-10-PCS | Mod: ,,, | Performed by: INTERNAL MEDICINE

## 2022-05-06 PROCEDURE — 25000003 PHARM REV CODE 250: Performed by: STUDENT IN AN ORGANIZED HEALTH CARE EDUCATION/TRAINING PROGRAM

## 2022-05-06 PROCEDURE — 25000242 PHARM REV CODE 250 ALT 637 W/ HCPCS: Performed by: INTERNAL MEDICINE

## 2022-05-06 PROCEDURE — 99222 PR INITIAL HOSPITAL CARE,LEVL II: ICD-10-PCS | Mod: ,,, | Performed by: INTERNAL MEDICINE

## 2022-05-06 PROCEDURE — 80048 BASIC METABOLIC PNL TOTAL CA: CPT | Mod: 91 | Performed by: INTERNAL MEDICINE

## 2022-05-06 PROCEDURE — 99233 SBSQ HOSP IP/OBS HIGH 50: CPT | Mod: ,,, | Performed by: INTERNAL MEDICINE

## 2022-05-06 PROCEDURE — 83935 ASSAY OF URINE OSMOLALITY: CPT | Performed by: INTERNAL MEDICINE

## 2022-05-06 PROCEDURE — 83930 ASSAY OF BLOOD OSMOLALITY: CPT | Performed by: INTERNAL MEDICINE

## 2022-05-06 PROCEDURE — 36415 COLL VENOUS BLD VENIPUNCTURE: CPT

## 2022-05-06 PROCEDURE — 80048 BASIC METABOLIC PNL TOTAL CA: CPT

## 2022-05-06 PROCEDURE — 99222 1ST HOSP IP/OBS MODERATE 55: CPT | Mod: ,,, | Performed by: INTERNAL MEDICINE

## 2022-05-06 PROCEDURE — 85025 COMPLETE CBC W/AUTO DIFF WBC: CPT

## 2022-05-06 RX ORDER — CEFPODOXIME PROXETIL 100 MG/1
100 TABLET, FILM COATED ORAL EVERY 12 HOURS
Status: COMPLETED | OUTPATIENT
Start: 2022-05-06 | End: 2022-05-07

## 2022-05-06 RX ORDER — IBUPROFEN 400 MG/1
400 TABLET ORAL ONCE
Status: COMPLETED | OUTPATIENT
Start: 2022-05-06 | End: 2022-05-06

## 2022-05-06 RX ADMIN — CEFPODOXIME PROXETIL 100 MG: 100 TABLET, FILM COATED ORAL at 10:05

## 2022-05-06 RX ADMIN — SODIUM CHLORIDE 1 G: 1 TABLET ORAL at 10:05

## 2022-05-06 RX ADMIN — MENTHOL, METHYL SALICYLATE: 10; 15 CREAM TOPICAL at 04:05

## 2022-05-06 RX ADMIN — MELATONIN TAB 3 MG 6 MG: 3 TAB at 10:05

## 2022-05-06 RX ADMIN — MENTHOL, METHYL SALICYLATE: 10; 15 CREAM TOPICAL at 12:05

## 2022-05-06 RX ADMIN — Medication 100 MG: at 05:05

## 2022-05-06 RX ADMIN — ENOXAPARIN SODIUM 40 MG: 100 INJECTION SUBCUTANEOUS at 04:05

## 2022-05-06 RX ADMIN — CEFPODOXIME PROXETIL 100 MG: 100 TABLET, FILM COATED ORAL at 12:05

## 2022-05-06 RX ADMIN — THERA TABS 1 TABLET: TAB at 08:05

## 2022-05-06 RX ADMIN — MENTHOL, METHYL SALICYLATE: 10; 15 CREAM TOPICAL at 10:05

## 2022-05-06 RX ADMIN — ACETAMINOPHEN 650 MG: 325 TABLET ORAL at 10:05

## 2022-05-06 RX ADMIN — IBUPROFEN 400 MG: 400 TABLET, FILM COATED ORAL at 08:05

## 2022-05-06 RX ADMIN — SODIUM CHLORIDE 1 G: 1 TABLET ORAL at 04:05

## 2022-05-06 NOTE — PLAN OF CARE
Pt c/o shoulder pain, pain management  Problem: Adult Inpatient Plan of Care  Goal: Plan of Care Review  Outcome: Ongoing, Progressing  Goal: Patient-Specific Goal (Individualized)  Outcome: Ongoing, Progressing  Goal: Absence of Hospital-Acquired Illness or Injury  Outcome: Ongoing, Progressing  Goal: Optimal Comfort and Wellbeing  Outcome: Ongoing, Progressing  Goal: Readiness for Transition of Care  Outcome: Ongoing, Progressing     Problem: Adjustment to Illness (Sepsis/Septic Shock)  Goal: Optimal Coping  Outcome: Ongoing, Progressing     Problem: Bleeding (Sepsis/Septic Shock)  Goal: Absence of Bleeding  Outcome: Ongoing, Progressing     Problem: Glycemic Control Impaired (Sepsis/Septic Shock)  Goal: Blood Glucose Level Within Desired Range  Outcome: Ongoing, Progressing     Problem: Infection Progression (Sepsis/Septic Shock)  Goal: Absence of Infection Signs and Symptoms  Outcome: Ongoing, Progressing     Problem: Nutrition Impaired (Sepsis/Septic Shock)  Goal: Optimal Nutrition Intake  Outcome: Ongoing, Progressing     Problem: Fluid and Electrolyte Imbalance (Acute Kidney Injury/Impairment)  Goal: Fluid and Electrolyte Balance  Outcome: Ongoing, Progressing     Problem: Oral Intake Inadequate (Acute Kidney Injury/Impairment)  Goal: Optimal Nutrition Intake  Outcome: Ongoing, Progressing     Problem: Renal Function Impairment (Acute Kidney Injury/Impairment)  Goal: Effective Renal Function  Outcome: Ongoing, Progressing     Problem: Fall Injury Risk  Goal: Absence of Fall and Fall-Related Injury  Outcome: Ongoing, Progressing

## 2022-05-06 NOTE — ASSESSMENT & PLAN NOTE
Right shoulder x-ray pain - no acute fracture  Right shoulder MRI showed insufficiency fracture of humeral head and numerous rotator cuff tears  Orthopedic surgery state can be followed up as outpatient

## 2022-05-06 NOTE — MEDICAL/APP STUDENT
[facility] Surg  Consult Note    Patient Name: Buffy Dominique  MRN: [id number]  Admission Date: 5/3/2022  Hospital Length of Stay: 3 days  Attending Physician: Dory Cedillo MD   Primary Care Provider: Odalis Kim MD     Patient information was obtained from patient and ER records.     Consults  Subjective:     Principal Problem: Acute cystitis without hematuria    Chief Complaint:   Chief Complaint   Patient presents with    Flank Pain     Urine dark and smells       HPI: 75 y/o female with past medical history of RA, AS, HTN, HLD, and CKD who presented to [facility] on 05/03/2022 for evaluation and treatment of R back pain 7/10 with no radiation that stated 5 days ago associated with urine described as smelly and dark. The patient denies associated fever, chills, fatigue, diaphoresis and abdominal pain. In the admission labs UTI and hyponatremia were documented and nephrology was consulted.     Baseline creatinine 0.8 (01/24/2022)      Hospital Course: No notes on file    Interval History: Patient reports felling well. Denies symptoms of UTI, fever, chills and headache.         Past Medical History:   Diagnosis Date    CKD (chronic kidney disease) stage 3, GFR 30-59 ml/min     HLD (hyperlipidemia)     HTN (hypertension)     Hyperparathyroidism        Past Surgical History:   Procedure Laterality Date    CATARACT EXTRACTION, BILATERAL Bilateral 2014    cataract removal Right 2014    hiatial hernia  2017    HYSTERECTOMY      melanoma      on face    OOPHORECTOMY      THORACIC AORTIC ANEURYSM REPAIR  2011       Review of patient's allergies indicates:   Allergen Reactions    Methotrexate analogues      Mouth Ulcers     Thiazides Other (See Comments)     hyponatremia       No current facility-administered medications on file prior to encounter.     Current Outpatient Medications on File Prior to Encounter   Medication Sig    adalimumab (HUMIRA PEN) PnKt injection Inject 1 pen (40 mg total) into the  skin every 14 (fourteen) days.    amLODIPine (NORVASC) 10 MG tablet Take 1 tablet (10 mg total) by mouth once daily.    chlorthalidone (HYGROTEN) 25 MG Tab Take 25 mg by mouth once daily.    multivit-min/iron/folic/lutein (CENTRUM SILVER WOMEN ORAL) Take 1 tablet by mouth once daily.    nebivoloL (BYSTOLIC) 5 MG Tab Take 1 tablet (5 mg total) by mouth once daily.    olmesartan (BENICAR) 40 MG tablet TAKE 1 TABLET(40 MG) BY MOUTH EVERY DAY FOR BLOOD PRESSURE    sucralfate (CARAFATE) 1 gram tablet Take 1 tablet (1 g total) by mouth 4 (four) times daily. Crush in water. Swish and swallow.    folic acid (FOLVITE) 1 MG tablet Take 1 tablet (1 mg total) by mouth once daily.    predniSONE (DELTASONE) 2.5 MG tablet Take 1 tablet (2.5 mg total) by mouth daily as needed (for joint flare ups).    spironolactone (ALDACTONE) 25 MG tablet Take 1 tablet (25 mg total) by mouth once daily.     Family History     Problem Relation (Age of Onset)    Cancer Father    Hypertension Maternal Grandmother        Tobacco Use    Smoking status: Former Smoker     Packs/day: 1.00     Years: 30.00     Pack years: 30.00     Types: Cigarettes     Quit date: 2009     Years since quittin.2    Smokeless tobacco: Never Used   Substance and Sexual Activity    Alcohol use: No     Comment: glass of wine once or twice a year    Drug use: No    Sexual activity: Not on file     Review of Systems  Objective:     Vital Signs (Most Recent):  Temp: 97.8 °F (36.6 °C) (22 1104)  Pulse: 68 (22 1104)  Resp: 16 (22 0723)  BP: 134/62 (22 1104)  SpO2: (!) 93 % (22 1104) Vital Signs (24h Range):  Temp:  [96.2 °F (35.7 °C)-97.8 °F (36.6 °C)] 97.8 °F (36.6 °C)  Pulse:  [57-68] 68  Resp:  [16-20] 16  SpO2:  [90 %-93 %] 93 %  BP: (130-150)/(60-67) 134/62     Weight: 54.4 kg (120 lb)  Body mass index is 21.26 kg/m².    Intake/Output Summary (Last 24 hours) at 2022 1516  Last data filed at 2022 0500  Gross per 24  hour   Intake 150 ml   Output 500 ml   Net -350 ml      Physical Exam  Constitutional:       Appearance: She is normal weight.   HENT:      Head: Normocephalic.      Mouth/Throat:      Mouth: Mucous membranes are moist.   Eyes:      Extraocular Movements: Extraocular movements intact.      Conjunctiva/sclera: Conjunctivae normal.      Pupils: Pupils are equal, round, and reactive to light.   Cardiovascular:      Rate and Rhythm: Normal rate and regular rhythm.   Pulmonary:      Effort: Pulmonary effort is normal.      Breath sounds: Normal breath sounds.   Abdominal:      General: Abdomen is flat. Bowel sounds are normal.      Palpations: Abdomen is soft.   Musculoskeletal:         General: Normal range of motion.      Cervical back: Normal range of motion.   Skin:     General: Skin is warm.   Neurological:      General: No focal deficit present.      Mental Status: She is alert and oriented to person, place, and time.         Significant Labs:   All pertinent labs within the past 24 hours have been reviewed.  BMP:   Recent Labs   Lab 05/06/22  0303      *   K 3.6   CL 87*   CO2 23   BUN 16   CREATININE 0.8   CALCIUM 9.1     CBC:   Recent Labs   Lab 05/05/22  0326 05/06/22  0303   WBC 8.99 6.24   HGB 11.4* 11.5*   HCT 33.2* 32.3*    244     CMP:   Recent Labs   Lab 05/04/22  1525 05/05/22  0326 05/06/22  0303   * 123* 120*   K 4.2 4.2 3.6   CL 91* 89* 87*   CO2 20* 24 23   GLU 89 85 104   BUN 23 20 16   CREATININE 1.1 1.1 0.8   CALCIUM 8.8 9.1 9.1   ANIONGAP 9 10 10   EGFRNONAA 48.9* 48.9* >60.0     Urine Culture: No results for input(s): LABURIN in the last 48 hours.  Urine Studies: No results for input(s): COLORU, APPEARANCEUA, PHUR, SPECGRAV, PROTEINUA, GLUCUA, KETONESU, BILIRUBINUA, OCCULTUA, NITRITE, UROBILINOGEN, LEUKOCYTESUR, RBCUA, WBCUA, BACTERIA, SQUAMEPITHEL, HYALINECASTS in the last 48 hours.    Invalid input(s): WRIGHTSUR    Significant Imaging: I have reviewed all pertinent  imaging results/findings within the past 24 hours.        Assessment/Plan:     Hyponatremia   Admission Na at 125 > Currently Na at 120  Serum osmolarity 25, Urine osmolarity 548, Urine sodium 86  POCUS exam revealed collapsed IJV and IVC suggesting hypovolemia     Ddx include: Euvolemic hyponatremia   Etiology most likely due to chronic SIADH exacerbated by Prochlorperazine     - Volume status: appears Euvolemic  - Electrolytes:  K  3.6     Plan:  · No need for RRT at this time.  · We recommend Fluid restriction to 800 cc  · Sodium chloride tablet 1 g TID   · Daily BMP  · Monitor electrolytes closely  · Monitor metal status closely           Active Diagnoses:    Diagnosis Date Noted POA    PRINCIPAL PROBLEM:  Acute cystitis without hematuria [N30.00] 05/03/2022 Yes    Hyponatremia [E87.1] 05/03/2022 Yes    Acute kidney injury superimposed on chronic kidney disease [N17.9, N18.9] 05/03/2022 Yes    CKD (chronic kidney disease) stage 3, GFR 30-59 ml/min [N18.30]  Yes    Seropositive rheumatoid arthritis [M05.9] 02/03/2022 Yes    Rectal prolapse [K62.3] 02/03/2022 Yes    Essential hypertension [I10] 08/14/2018 Yes      Problems Resolved During this Admission:     VTE Risk Mitigation (From admission, onward)         Ordered     enoxaparin injection 40 mg  Daily         05/05/22 1108     IP VTE HIGH RISK PATIENT  Once         05/03/22 1536     Place sequential compression device  Until discontinued         05/03/22 1536                Thank you for your consult. I will follow-up with patient. Please contact us if you have any additional questions.    [facility] - Med Surg

## 2022-05-06 NOTE — PLAN OF CARE
Problem: Adult Inpatient Plan of Care  Goal: Plan of Care Review  5/6/2022 0531 by Peggy Mann RN  Outcome: Ongoing, Progressing  5/6/2022 0531 by Peggy Mann RN  Outcome: Ongoing, Progressing  Goal: Patient-Specific Goal (Individualized)  5/6/2022 0531 by Peggy Mann RN  Outcome: Ongoing, Progressing  5/6/2022 0531 by Peggy Mann RN  Outcome: Ongoing, Progressing  Goal: Absence of Hospital-Acquired Illness or Injury  5/6/2022 0531 by Peggy Mann RN  Outcome: Ongoing, Progressing  5/6/2022 0531 by Peggy Mann RN  Outcome: Ongoing, Progressing  Goal: Optimal Comfort and Wellbeing  5/6/2022 0531 by Peggy Mann RN  Outcome: Ongoing, Progressing  5/6/2022 0531 by Peggy Mann RN  Outcome: Ongoing, Progressing   ALert and orient times 4. Slept part of this shift after taking melatonin. VSS. No fall. Drink 150 ML of fluids this shift

## 2022-05-06 NOTE — PROGRESS NOTES
Hospital Medicine  Progress note    Team: [facility] HOSP MED Z Dory Cedillo MD  Admit Date: 5/3/2022    Principal Problem:  Acute cystitis without hematuria    Interval hx:  Feeling improved. Complains of right shoulder pain. It has been present for two months. States it has been keep her from working as a .    ROS   Respiratory: neg for cough neg for shortness of breath  Cardiovascular: neg for chest pain neg for palpitations  Gastrointestinal: neg for nausea neg for vomiting, neg for abdominal pain neg for diarrhea neg for constipation   Behavioral/Psych: neg for depression neg for anxiety    PEx  Temp:  [96.2 °F (35.7 °C)-97.8 °F (36.6 °C)]   Pulse:  [57-68]   Resp:  [16-20]   BP: (130-158)/(60-72)   SpO2:  [90 %-93 %]     Intake/Output Summary (Last 24 hours) at 5/6/2022 1747  Last data filed at 5/6/2022 0500  Gross per 24 hour   Intake 150 ml   Output 500 ml   Net -350 ml     General Appearance: no acute distress, WDWN  Heart: regular rate and rhythm, no heave  Respiratory: Normal respiratory effort, symmetric excursion, bilateral vesicular breath sounds   Abdomen: Soft, non-tender; bowel sounds active  Skin: intact, no rash, no ulcers  Neurologic:  No focal numbness or weakness  Mental status: Alert, oriented x 4, affect appropriate     Recent Labs   Lab 05/04/22  0246 05/05/22  0326 05/06/22  0303   WBC 14.01* 8.99 6.24   HGB 11.8* 11.4* 11.5*   HCT 33.9* 33.2* 32.3*    236 244     Recent Labs   Lab 05/03/22  1132 05/03/22  1624 05/04/22  1525 05/05/22  0326 05/06/22  0303   *   < > 120* 123* 120*   K 4.9   < > 4.2 4.2 3.6   CL 91*   < > 91* 89* 87*   CO2 26   < > 20* 24 23   BUN 22   < > 23 20 16   CREATININE 1.3   < > 1.1 1.1 0.8   *   < > 89 85 104   CALCIUM 9.9   < > 8.8 9.1 9.1   LIPASE 13  --   --   --   --     < > = values in this interval not displayed.     Recent Labs   Lab 05/03/22  1132   ALKPHOS 81   ALT 10   AST 20   ALBUMIN 3.5   PROT 6.9   BILITOT 1.7*       Scheduled Meds:   cefpodoxime  100 mg Oral Q12H    coenzyme Q10  1 capsule Oral Daily    And    multivitamin  1 tablet Oral Daily    enoxaparin  40 mg Subcutaneous Daily    methyl salicylate-menthol 15-10%   Topical (Top) QID    sodium chloride  1 g Oral TID     Continuous Infusions:  As Needed:  acetaminophen, melatonin, sodium chloride 0.9%    Assessment and Plan  / Problems managed today    * Acute cystitis without hematuria  This 76 y.o. F with multiple recent presentations to [facility] facilities for dysuria and flank pain now [facility] with similar symptoms, admitted for UTI and hyponatremia    sepsis due to E. Coli UTI  Tmax 100.7, , no CVA tenderness, euvolemic, WBC 11.9.  Bladder wall thickening on CT.  Suspect acute cystitis, likely recurrent.  T 99 HR 80 following  bolus. Responded well to ceftriaxone  - Ceftriaxone 2g IV for five days - transition to cefpodoxime orally for remainder of tratment  - Gentle IVF replenishment  -sepsis resolved  -refer to gyn-urology as outpatient for recurring infection    Right shoulder pain  Right shoulder x-ray pain - no acute fracture  Right shoulder MRI    Acute kidney injury superimposed on chronic kidney disease  CKD3  -Scr 1.3 --> 1.1. with gentle hydration  - Avoid nephrotoxic agents such as NSAIDs, gadolinium and IV radiocontrast.  - Renally dose meds to current GFR.  - Maintain MAP > 65  - Improved on second day of admission    Hyponatremia  Probable SIADH  Stop chlorthalidone. Not improved with fluids. Urine studies consistent with SIADH. Will restrict fluids to 800 mL. Need to put thiazides on allergy list. Not improved with strict fluid restriction thus yet. She has not taken compazine. Nephrology consulted.     CKD (chronic kidney disease) stage 3, GFR 30-59 ml/min        Rectal prolapse  Continue home Carafate.      Seropositive rheumatoid arthritis  Well-controlled on q2w Humira; next dose due 5/12/22.    Essential  "hypertension  Hypertension:  -BP today: BP (!) 110/56 (Patient Position: Sitting)   Pulse 60   Temp 98 °F (36.7 °C) (Oral)   Resp 18   Ht 5' 3" (1.6 m)   Wt 54.4 kg (120 lb)   LMP  (LMP Unknown)   SpO2 (!) 93%   Breastfeeding No   BMI 21.26 kg/m²   -HTN well controlled on home amlodipine 10 mg PO daily, Hygroten 25 mg PO daily, nebivolol 5 mg PO daily, olmesartan 40 mg PO daily.  Reports she no longer takes spironolactone 25 mg PO.    PLAN:  - Holding home BP meds on admission in the setting of /50 and T99.5F recorded in the ER, as well as her admitted recent reduction in PO intake and UOP  - Resume home BP meds when she is hemodynamically stable and there is no concern for sepsis    Discharge Planning   MARCELLA: 5/9/2022     Code Status: Full Code   Is the patient medically ready for discharge?: No    Reason for patient still in hospital (select all that apply): Patient trending condition  Discharge Plan A: Home      Diet: regular diet  GI PPx: not needed  DVT PPx:  enoxaparin  Airways: room air  Wounds: none    Goals of Care:  Return to prior functional status    Time (minutes) spent in care of the patient (Greater than 1/2 spent in direct face-to-face contact and care coordination on unit)  35 min    Dory Cedillo MD    "

## 2022-05-06 NOTE — HPI
77 y/o female with past medical history of RA, AS, HTN, HLD, and CKD who presented to [facility] on 05/03/2022 for evaluation and treatment of R back pain 7/10 with no radiation that stated 5 days ago associated with urine described as smelly and dark. The patient denies associated fever, chills, fatigue, diaphoresis and abdominal pain. In the admission labs UTI and hyponatremia were documented and nephrology was consulted.     Baseline creatinine 0.8 (01/24/2022)

## 2022-05-06 NOTE — CONSULTS
Consult acknowledged.    Please see note by Branden Morrison, attested by Dr Yuen.      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 Has received LR 500cc x2 and NS x1 with no improvement.   No Neurologic symptoms on exam.    Plan:  - Please start salt tabs 1 TID.  - Fluid restriction 800cc.  - BMP every 4 hours.  - If Sodium continues to drop or develops symptoms, please consult ICU for 3% saline.      Rex Vigil MD.  Nephrology fellow

## 2022-05-06 NOTE — MED STUDENT ASSESSMENT & PLAN
Hyponatremia   Admission Na at 125 > Currently Na at 120  Serum osmolarity 25, Urine osmolarity 548, Urine sodium 86  POCUS exam revealed collapsed IJV and IVC suggesting hypovolemia     Ddx include: Euvolemic hyponatremia   Etiology most likely due to chronic SIADH exacerbated by Prochlorperazine     - Volume status: appears Euvolemic  - Electrolytes:  K  3.6     Plan:  No need for RRT at this time.  We recommend Fluid restriction to 800 cc  Sodium chloride tablet 1 g TID   Daily BMP  Monitor electrolytes closely  Monitor metal status closely

## 2022-05-06 NOTE — MED STUDENT SUBJECTIVE & OBJECTIVE
Medical Student Subjective & Objective     Principal Problem: Acute cystitis without hematuria    Chief Complaint:   Chief Complaint   Patient presents with    Flank Pain     Urine dark and smells       HPI: 75 y/o female with past medical history of RA, AS, HTN, HLD, and CKD who presented to [facility] on 05/03/2022 for evaluation and treatment of R back pain 7/10 with no radiation that stated 5 days ago associated with urine described as smelly and dark. The patient denies associated fever, chills, fatigue, diaphoresis and abdominal pain. In the admission labs UTI and hyponatremia were documented and nephrology was consulted.     Baseline creatinine 0.8 (01/24/2022)      Hospital Course: No notes on file    Interval History: Patient reports felling well. Denies symptoms of UTI, fever, chills and headache.         Past Medical History:   Diagnosis Date    CKD (chronic kidney disease) stage 3, GFR 30-59 ml/min     HLD (hyperlipidemia)     HTN (hypertension)     Hyperparathyroidism        Past Surgical History:   Procedure Laterality Date    CATARACT EXTRACTION, BILATERAL Bilateral 2014    cataract removal Right 2014    hiatial hernia  2017    HYSTERECTOMY      melanoma      on face    OOPHORECTOMY      THORACIC AORTIC ANEURYSM REPAIR  2011       Review of patient's allergies indicates:   Allergen Reactions    Methotrexate analogues      Mouth Ulcers     Thiazides Other (See Comments)     hyponatremia       No current facility-administered medications on file prior to encounter.     Current Outpatient Medications on File Prior to Encounter   Medication Sig    adalimumab (HUMIRA PEN) PnKt injection Inject 1 pen (40 mg total) into the skin every 14 (fourteen) days.    amLODIPine (NORVASC) 10 MG tablet Take 1 tablet (10 mg total) by mouth once daily.    chlorthalidone (HYGROTEN) 25 MG Tab Take 25 mg by mouth once daily.    multivit-min/iron/folic/lutein (CENTRUM SILVER WOMEN ORAL) Take 1 tablet by mouth  once daily.    nebivoloL (BYSTOLIC) 5 MG Tab Take 1 tablet (5 mg total) by mouth once daily.    olmesartan (BENICAR) 40 MG tablet TAKE 1 TABLET(40 MG) BY MOUTH EVERY DAY FOR BLOOD PRESSURE    sucralfate (CARAFATE) 1 gram tablet Take 1 tablet (1 g total) by mouth 4 (four) times daily. Crush in water. Swish and swallow.    folic acid (FOLVITE) 1 MG tablet Take 1 tablet (1 mg total) by mouth once daily.    predniSONE (DELTASONE) 2.5 MG tablet Take 1 tablet (2.5 mg total) by mouth daily as needed (for joint flare ups).    spironolactone (ALDACTONE) 25 MG tablet Take 1 tablet (25 mg total) by mouth once daily.     Family History     Problem Relation (Age of Onset)    Cancer Father    Hypertension Maternal Grandmother        Tobacco Use    Smoking status: Former Smoker     Packs/day: 1.00     Years: 30.00     Pack years: 30.00     Types: Cigarettes     Quit date: 2009     Years since quittin.2    Smokeless tobacco: Never Used   Substance and Sexual Activity    Alcohol use: No     Comment: glass of wine once or twice a year    Drug use: No    Sexual activity: Not on file     Review of Systems  Objective:     Vital Signs (Most Recent):  Temp: 97.8 °F (36.6 °C) (22 1104)  Pulse: 68 (22 1104)  Resp: 16 (22 0723)  BP: 134/62 (22 1104)  SpO2: (!) 93 % (22 1104) Vital Signs (24h Range):  Temp:  [96.2 °F (35.7 °C)-97.8 °F (36.6 °C)] 97.8 °F (36.6 °C)  Pulse:  [57-68] 68  Resp:  [16-20] 16  SpO2:  [90 %-93 %] 93 %  BP: (130-150)/(60-67) 134/62     Weight: 54.4 kg (120 lb)  Body mass index is 21.26 kg/m².    Intake/Output Summary (Last 24 hours) at 2022 1516  Last data filed at 2022 0500  Gross per 24 hour   Intake 150 ml   Output 500 ml   Net -350 ml      Physical Exam  Constitutional:       Appearance: She is normal weight.   HENT:      Head: Normocephalic.      Mouth/Throat:      Mouth: Mucous membranes are moist.   Eyes:      Extraocular Movements: Extraocular movements  intact.      Conjunctiva/sclera: Conjunctivae normal.      Pupils: Pupils are equal, round, and reactive to light.   Cardiovascular:      Rate and Rhythm: Normal rate and regular rhythm.   Pulmonary:      Effort: Pulmonary effort is normal.      Breath sounds: Normal breath sounds.   Abdominal:      General: Abdomen is flat. Bowel sounds are normal.      Palpations: Abdomen is soft.   Musculoskeletal:         General: Normal range of motion.      Cervical back: Normal range of motion.   Skin:     General: Skin is warm.   Neurological:      General: No focal deficit present.      Mental Status: She is alert and oriented to person, place, and time.         Significant Labs:   All pertinent labs within the past 24 hours have been reviewed.  BMP:   Recent Labs   Lab 05/06/22  0303      *   K 3.6   CL 87*   CO2 23   BUN 16   CREATININE 0.8   CALCIUM 9.1     CBC:   Recent Labs   Lab 05/05/22  0326 05/06/22  0303   WBC 8.99 6.24   HGB 11.4* 11.5*   HCT 33.2* 32.3*    244     CMP:   Recent Labs   Lab 05/04/22  1525 05/05/22  0326 05/06/22  0303   * 123* 120*   K 4.2 4.2 3.6   CL 91* 89* 87*   CO2 20* 24 23   GLU 89 85 104   BUN 23 20 16   CREATININE 1.1 1.1 0.8   CALCIUM 8.8 9.1 9.1   ANIONGAP 9 10 10   EGFRNONAA 48.9* 48.9* >60.0     Urine Culture: No results for input(s): LABURIN in the last 48 hours.  Urine Studies: No results for input(s): COLORU, APPEARANCEUA, PHUR, SPECGRAV, PROTEINUA, GLUCUA, KETONESU, BILIRUBINUA, OCCULTUA, NITRITE, UROBILINOGEN, LEUKOCYTESUR, RBCUA, WBCUA, BACTERIA, SQUAMEPITHEL, HYALINECASTS in the last 48 hours.    Invalid input(s): SHARON    Significant Imaging: I have reviewed all pertinent imaging results/findings within the past 24 hours.    Medical Student Subjective & Objective

## 2022-05-07 LAB
ANION GAP SERPL CALC-SCNC: 10 MMOL/L (ref 8–16)
ANION GAP SERPL CALC-SCNC: 9 MMOL/L (ref 8–16)
BASOPHILS # BLD AUTO: 0.04 K/UL (ref 0–0.2)
BASOPHILS NFR BLD: 0.8 % (ref 0–1.9)
BUN SERPL-MCNC: 14 MG/DL (ref 8–23)
BUN SERPL-MCNC: 15 MG/DL (ref 8–23)
CALCIUM SERPL-MCNC: 10.1 MG/DL (ref 8.7–10.5)
CALCIUM SERPL-MCNC: 9.2 MG/DL (ref 8.7–10.5)
CHLORIDE SERPL-SCNC: 86 MMOL/L (ref 95–110)
CHLORIDE SERPL-SCNC: 87 MMOL/L (ref 95–110)
CO2 SERPL-SCNC: 27 MMOL/L (ref 23–29)
CO2 SERPL-SCNC: 27 MMOL/L (ref 23–29)
CREAT SERPL-MCNC: 0.8 MG/DL (ref 0.5–1.4)
CREAT SERPL-MCNC: 0.9 MG/DL (ref 0.5–1.4)
DIFFERENTIAL METHOD: ABNORMAL
EOSINOPHIL # BLD AUTO: 0.2 K/UL (ref 0–0.5)
EOSINOPHIL NFR BLD: 3.4 % (ref 0–8)
ERYTHROCYTE [DISTWIDTH] IN BLOOD BY AUTOMATED COUNT: 11.1 % (ref 11.5–14.5)
EST. GFR  (AFRICAN AMERICAN): >60 ML/MIN/1.73 M^2
EST. GFR  (AFRICAN AMERICAN): >60 ML/MIN/1.73 M^2
EST. GFR  (NON AFRICAN AMERICAN): >60 ML/MIN/1.73 M^2
EST. GFR  (NON AFRICAN AMERICAN): >60 ML/MIN/1.73 M^2
GLUCOSE SERPL-MCNC: 101 MG/DL (ref 70–110)
GLUCOSE SERPL-MCNC: 91 MG/DL (ref 70–110)
HCT VFR BLD AUTO: 32.9 % (ref 37–48.5)
HGB BLD-MCNC: 11.5 G/DL (ref 12–16)
IMM GRANULOCYTES # BLD AUTO: 0.04 K/UL (ref 0–0.04)
IMM GRANULOCYTES NFR BLD AUTO: 0.8 % (ref 0–0.5)
LYMPHOCYTES # BLD AUTO: 2.5 K/UL (ref 1–4.8)
LYMPHOCYTES NFR BLD: 47.2 % (ref 18–48)
MCH RBC QN AUTO: 32.3 PG (ref 27–31)
MCHC RBC AUTO-ENTMCNC: 35 G/DL (ref 32–36)
MCV RBC AUTO: 92 FL (ref 82–98)
MONOCYTES # BLD AUTO: 0.7 K/UL (ref 0.3–1)
MONOCYTES NFR BLD: 13.3 % (ref 4–15)
NEUTROPHILS # BLD AUTO: 1.8 K/UL (ref 1.8–7.7)
NEUTROPHILS NFR BLD: 34.5 % (ref 38–73)
NRBC BLD-RTO: 0 /100 WBC
PLATELET # BLD AUTO: 279 K/UL (ref 150–450)
PMV BLD AUTO: 8.5 FL (ref 9.2–12.9)
POTASSIUM SERPL-SCNC: 3.9 MMOL/L (ref 3.5–5.1)
POTASSIUM SERPL-SCNC: 4.2 MMOL/L (ref 3.5–5.1)
RBC # BLD AUTO: 3.56 M/UL (ref 4–5.4)
SODIUM SERPL-SCNC: 123 MMOL/L (ref 136–145)
SODIUM SERPL-SCNC: 123 MMOL/L (ref 136–145)
WBC # BLD AUTO: 5.27 K/UL (ref 3.9–12.7)

## 2022-05-07 PROCEDURE — 99232 PR SUBSEQUENT HOSPITAL CARE,LEVL II: ICD-10-PCS | Mod: ,,, | Performed by: INTERNAL MEDICINE

## 2022-05-07 PROCEDURE — 99233 PR SUBSEQUENT HOSPITAL CARE,LEVL III: ICD-10-PCS | Mod: ,,, | Performed by: INTERNAL MEDICINE

## 2022-05-07 PROCEDURE — 80048 BASIC METABOLIC PNL TOTAL CA: CPT | Mod: 91 | Performed by: INTERNAL MEDICINE

## 2022-05-07 PROCEDURE — 25000003 PHARM REV CODE 250: Performed by: STUDENT IN AN ORGANIZED HEALTH CARE EDUCATION/TRAINING PROGRAM

## 2022-05-07 PROCEDURE — 36415 COLL VENOUS BLD VENIPUNCTURE: CPT

## 2022-05-07 PROCEDURE — 99232 SBSQ HOSP IP/OBS MODERATE 35: CPT | Mod: ,,, | Performed by: INTERNAL MEDICINE

## 2022-05-07 PROCEDURE — 63600175 PHARM REV CODE 636 W HCPCS: Performed by: INTERNAL MEDICINE

## 2022-05-07 PROCEDURE — 99233 SBSQ HOSP IP/OBS HIGH 50: CPT | Mod: ,,, | Performed by: INTERNAL MEDICINE

## 2022-05-07 PROCEDURE — 25000003 PHARM REV CODE 250

## 2022-05-07 PROCEDURE — 25000003 PHARM REV CODE 250: Performed by: INTERNAL MEDICINE

## 2022-05-07 PROCEDURE — 25000242 PHARM REV CODE 250 ALT 637 W/ HCPCS: Performed by: INTERNAL MEDICINE

## 2022-05-07 PROCEDURE — 85025 COMPLETE CBC W/AUTO DIFF WBC: CPT

## 2022-05-07 PROCEDURE — 11000001 HC ACUTE MED/SURG PRIVATE ROOM

## 2022-05-07 RX ADMIN — MENTHOL, METHYL SALICYLATE: 10; 15 CREAM TOPICAL at 05:05

## 2022-05-07 RX ADMIN — SODIUM CHLORIDE 1 G: 1 TABLET ORAL at 08:05

## 2022-05-07 RX ADMIN — MENTHOL, METHYL SALICYLATE: 10; 15 CREAM TOPICAL at 08:05

## 2022-05-07 RX ADMIN — CEFPODOXIME PROXETIL 100 MG: 100 TABLET, FILM COATED ORAL at 08:05

## 2022-05-07 RX ADMIN — ENOXAPARIN SODIUM 40 MG: 100 INJECTION SUBCUTANEOUS at 05:05

## 2022-05-07 RX ADMIN — MELATONIN TAB 3 MG 6 MG: 3 TAB at 08:05

## 2022-05-07 RX ADMIN — SODIUM CHLORIDE 1 G: 1 TABLET ORAL at 03:05

## 2022-05-07 RX ADMIN — ACETAMINOPHEN 650 MG: 325 TABLET ORAL at 08:05

## 2022-05-07 RX ADMIN — MENTHOL, METHYL SALICYLATE: 10; 15 CREAM TOPICAL at 12:05

## 2022-05-07 RX ADMIN — THERA TABS 1 TABLET: TAB at 08:05

## 2022-05-07 RX ADMIN — Medication 100 MG: at 05:05

## 2022-05-07 NOTE — NURSING
0750 Pt lying in bed A&O x4 resp even and unlabored, reports shoulder feeling better with current treatment, reported some diarrhea states is something that she has off and on  Per baseline, messaged doctor to see if can get immodium awaiting response, communication board updated, vitals done, bed in low locked position, call light in reach    0841 Pt sitting up in bed stated food was good but cold, offered to heat it and she appreciated the food heated, scheduled meds given, no response about diarrhea medication but pt voiced improving on its own , bed in low locked positon

## 2022-05-07 NOTE — PLAN OF CARE
Pt continues with right shoulder pain, Fluid restriction with sodium replacement  Problem: Adult Inpatient Plan of Care  Goal: Plan of Care Review  Outcome: Ongoing, Progressing  Goal: Patient-Specific Goal (Individualized)  Outcome: Ongoing, Progressing  Goal: Absence of Hospital-Acquired Illness or Injury  Outcome: Ongoing, Progressing  Goal: Optimal Comfort and Wellbeing  Outcome: Ongoing, Progressing  Goal: Readiness for Transition of Care  Outcome: Ongoing, Progressing     Problem: Adjustment to Illness (Sepsis/Septic Shock)  Goal: Optimal Coping  Outcome: Ongoing, Progressing     Problem: Bleeding (Sepsis/Septic Shock)  Goal: Absence of Bleeding  Outcome: Ongoing, Progressing     Problem: Glycemic Control Impaired (Sepsis/Septic Shock)  Goal: Blood Glucose Level Within Desired Range  Outcome: Ongoing, Progressing     Problem: Infection Progression (Sepsis/Septic Shock)  Goal: Absence of Infection Signs and Symptoms  Outcome: Ongoing, Progressing     Problem: Nutrition Impaired (Sepsis/Septic Shock)  Goal: Optimal Nutrition Intake  Outcome: Ongoing, Progressing     Problem: Fluid and Electrolyte Imbalance (Acute Kidney Injury/Impairment)  Goal: Fluid and Electrolyte Balance  Outcome: Ongoing, Progressing     Problem: Oral Intake Inadequate (Acute Kidney Injury/Impairment)  Goal: Optimal Nutrition Intake  Outcome: Ongoing, Progressing     Problem: Renal Function Impairment (Acute Kidney Injury/Impairment)  Goal: Effective Renal Function  Outcome: Ongoing, Progressing     Problem: Fall Injury Risk  Goal: Absence of Fall and Fall-Related Injury  Outcome: Ongoing, Progressing

## 2022-05-07 NOTE — PLAN OF CARE
Problem: Adult Inpatient Plan of Care  Goal: Plan of Care Review  Outcome: Ongoing, Progressing  Goal: Patient-Specific Goal (Individualized)  Outcome: Ongoing, Progressing  Goal: Absence of Hospital-Acquired Illness or Injury  Outcome: Ongoing, Progressing  Goal: Optimal Comfort and Wellbeing  Outcome: Ongoing, Progressing  Goal: Readiness for Transition of Care  Outcome: Ongoing, Progressing     Problem: Adjustment to Illness (Sepsis/Septic Shock)  Goal: Optimal Coping  Outcome: Ongoing, Progressing     Problem: Bleeding (Sepsis/Septic Shock)  Goal: Absence of Bleeding  Outcome: Ongoing, Progressing     Problem: Glycemic Control Impaired (Sepsis/Septic Shock)  Goal: Blood Glucose Level Within Desired Range  Outcome: Ongoing, Progressing     Problem: Infection Progression (Sepsis/Septic Shock)  Goal: Absence of Infection Signs and Symptoms  Outcome: Ongoing, Progressing     Problem: Nutrition Impaired (Sepsis/Septic Shock)  Goal: Optimal Nutrition Intake  Outcome: Ongoing, Progressing     Problem: Fluid and Electrolyte Imbalance (Acute Kidney Injury/Impairment)  Goal: Fluid and Electrolyte Balance  Outcome: Ongoing, Progressing     Problem: Oral Intake Inadequate (Acute Kidney Injury/Impairment)  Goal: Optimal Nutrition Intake  Outcome: Ongoing, Progressing     Problem: Renal Function Impairment (Acute Kidney Injury/Impairment)  Goal: Effective Renal Function  Outcome: Ongoing, Progressing     Problem: Fall Injury Risk  Goal: Absence of Fall and Fall-Related Injury  Outcome: Ongoing, Progressing       Plan of review with pt. VSS Pt had MRI of rt shoulder . Pt given prn tyneol  for psin. Bed in lowest position and call light within reach.

## 2022-05-08 LAB
ANION GAP SERPL CALC-SCNC: 10 MMOL/L (ref 8–16)
ANION GAP SERPL CALC-SCNC: 10 MMOL/L (ref 8–16)
BACTERIA BLD CULT: NORMAL
BACTERIA BLD CULT: NORMAL
BASOPHILS # BLD AUTO: 0.04 K/UL (ref 0–0.2)
BASOPHILS NFR BLD: 0.7 % (ref 0–1.9)
BUN SERPL-MCNC: 17 MG/DL (ref 8–23)
BUN SERPL-MCNC: 18 MG/DL (ref 8–23)
CALCIUM SERPL-MCNC: 9.3 MG/DL (ref 8.7–10.5)
CALCIUM SERPL-MCNC: 9.5 MG/DL (ref 8.7–10.5)
CHLORIDE SERPL-SCNC: 91 MMOL/L (ref 95–110)
CHLORIDE SERPL-SCNC: 92 MMOL/L (ref 95–110)
CO2 SERPL-SCNC: 23 MMOL/L (ref 23–29)
CO2 SERPL-SCNC: 27 MMOL/L (ref 23–29)
CREAT SERPL-MCNC: 0.9 MG/DL (ref 0.5–1.4)
CREAT SERPL-MCNC: 0.9 MG/DL (ref 0.5–1.4)
DIFFERENTIAL METHOD: ABNORMAL
EOSINOPHIL # BLD AUTO: 0.2 K/UL (ref 0–0.5)
EOSINOPHIL NFR BLD: 2.8 % (ref 0–8)
ERYTHROCYTE [DISTWIDTH] IN BLOOD BY AUTOMATED COUNT: 11.3 % (ref 11.5–14.5)
EST. GFR  (AFRICAN AMERICAN): >60 ML/MIN/1.73 M^2
EST. GFR  (AFRICAN AMERICAN): >60 ML/MIN/1.73 M^2
EST. GFR  (NON AFRICAN AMERICAN): >60 ML/MIN/1.73 M^2
EST. GFR  (NON AFRICAN AMERICAN): >60 ML/MIN/1.73 M^2
GLUCOSE SERPL-MCNC: 106 MG/DL (ref 70–110)
GLUCOSE SERPL-MCNC: 136 MG/DL (ref 70–110)
HCT VFR BLD AUTO: 32.6 % (ref 37–48.5)
HGB BLD-MCNC: 11.4 G/DL (ref 12–16)
IMM GRANULOCYTES # BLD AUTO: 0.06 K/UL (ref 0–0.04)
IMM GRANULOCYTES NFR BLD AUTO: 1 % (ref 0–0.5)
LYMPHOCYTES # BLD AUTO: 2.7 K/UL (ref 1–4.8)
LYMPHOCYTES NFR BLD: 47 % (ref 18–48)
MCH RBC QN AUTO: 32 PG (ref 27–31)
MCHC RBC AUTO-ENTMCNC: 35 G/DL (ref 32–36)
MCV RBC AUTO: 92 FL (ref 82–98)
MONOCYTES # BLD AUTO: 0.7 K/UL (ref 0.3–1)
MONOCYTES NFR BLD: 12 % (ref 4–15)
NEUTROPHILS # BLD AUTO: 2.1 K/UL (ref 1.8–7.7)
NEUTROPHILS NFR BLD: 36.5 % (ref 38–73)
NRBC BLD-RTO: 0 /100 WBC
OSMOLALITY UR: 390 MOSM/KG (ref 50–1200)
PLATELET # BLD AUTO: 279 K/UL (ref 150–450)
PMV BLD AUTO: 8.1 FL (ref 9.2–12.9)
POTASSIUM SERPL-SCNC: 3.9 MMOL/L (ref 3.5–5.1)
POTASSIUM SERPL-SCNC: 4.2 MMOL/L (ref 3.5–5.1)
POTASSIUM UR-SCNC: 37 MMOL/L (ref 15–95)
RBC # BLD AUTO: 3.56 M/UL (ref 4–5.4)
SODIUM SERPL-SCNC: 125 MMOL/L (ref 136–145)
SODIUM SERPL-SCNC: 128 MMOL/L (ref 136–145)
SODIUM UR-SCNC: 87 MMOL/L (ref 20–250)
WBC # BLD AUTO: 5.77 K/UL (ref 3.9–12.7)

## 2022-05-08 PROCEDURE — 25000003 PHARM REV CODE 250

## 2022-05-08 PROCEDURE — 25000003 PHARM REV CODE 250: Performed by: STUDENT IN AN ORGANIZED HEALTH CARE EDUCATION/TRAINING PROGRAM

## 2022-05-08 PROCEDURE — 99232 SBSQ HOSP IP/OBS MODERATE 35: CPT | Mod: ,,, | Performed by: INTERNAL MEDICINE

## 2022-05-08 PROCEDURE — 25000003 PHARM REV CODE 250: Performed by: INTERNAL MEDICINE

## 2022-05-08 PROCEDURE — 36415 COLL VENOUS BLD VENIPUNCTURE: CPT

## 2022-05-08 PROCEDURE — 85025 COMPLETE CBC W/AUTO DIFF WBC: CPT

## 2022-05-08 PROCEDURE — 11000001 HC ACUTE MED/SURG PRIVATE ROOM

## 2022-05-08 PROCEDURE — 99233 SBSQ HOSP IP/OBS HIGH 50: CPT | Mod: ,,, | Performed by: INTERNAL MEDICINE

## 2022-05-08 PROCEDURE — 84133 ASSAY OF URINE POTASSIUM: CPT | Performed by: INTERNAL MEDICINE

## 2022-05-08 PROCEDURE — 80048 BASIC METABOLIC PNL TOTAL CA: CPT | Mod: 91 | Performed by: INTERNAL MEDICINE

## 2022-05-08 PROCEDURE — 84300 ASSAY OF URINE SODIUM: CPT | Performed by: INTERNAL MEDICINE

## 2022-05-08 PROCEDURE — 99233 PR SUBSEQUENT HOSPITAL CARE,LEVL III: ICD-10-PCS | Mod: ,,, | Performed by: INTERNAL MEDICINE

## 2022-05-08 PROCEDURE — 99232 PR SUBSEQUENT HOSPITAL CARE,LEVL II: ICD-10-PCS | Mod: ,,, | Performed by: INTERNAL MEDICINE

## 2022-05-08 PROCEDURE — 63600175 PHARM REV CODE 636 W HCPCS: Performed by: INTERNAL MEDICINE

## 2022-05-08 PROCEDURE — 83935 ASSAY OF URINE OSMOLALITY: CPT | Performed by: INTERNAL MEDICINE

## 2022-05-08 RX ORDER — PREDNISONE 2.5 MG/1
2.5 TABLET ORAL DAILY PRN
Qty: 60 TABLET | Refills: 0 | Status: SHIPPED | OUTPATIENT
Start: 2022-05-08 | End: 2022-07-28

## 2022-05-08 RX ORDER — AMLODIPINE BESYLATE 10 MG/1
10 TABLET ORAL DAILY
Status: DISCONTINUED | OUTPATIENT
Start: 2022-05-08 | End: 2022-05-09 | Stop reason: HOSPADM

## 2022-05-08 RX ADMIN — SODIUM CHLORIDE 1 G: 1 TABLET ORAL at 08:05

## 2022-05-08 RX ADMIN — THERA TABS 1 TABLET: TAB at 08:05

## 2022-05-08 RX ADMIN — ACETAMINOPHEN 650 MG: 325 TABLET ORAL at 08:05

## 2022-05-08 RX ADMIN — MENTHOL, METHYL SALICYLATE: 10; 15 CREAM TOPICAL at 12:05

## 2022-05-08 RX ADMIN — SODIUM CHLORIDE 1 G: 1 TABLET ORAL at 02:05

## 2022-05-08 RX ADMIN — Medication 100 MG: at 05:05

## 2022-05-08 RX ADMIN — ENOXAPARIN SODIUM 40 MG: 100 INJECTION SUBCUTANEOUS at 04:05

## 2022-05-08 RX ADMIN — MENTHOL, METHYL SALICYLATE: 10; 15 CREAM TOPICAL at 04:05

## 2022-05-08 RX ADMIN — MELATONIN TAB 3 MG 6 MG: 3 TAB at 08:05

## 2022-05-08 RX ADMIN — MENTHOL, METHYL SALICYLATE: 10; 15 CREAM TOPICAL at 08:05

## 2022-05-08 RX ADMIN — ACETAMINOPHEN 650 MG: 325 TABLET ORAL at 10:05

## 2022-05-08 NOTE — PROGRESS NOTES
Hospital Medicine  Progress note    [place] [facility] Dory Cedillo MD  Admit Date: 5/3/2022    Principal Problem:  Acute cystitis without hematuria    Interval hx:  Feeling improved. Complains of right shoulder pain. It has been present for two months. States it has been keep her from working as a .    ROS   Respiratory: neg for cough neg for shortness of breath  Cardiovascular: neg for chest pain neg for palpitations  Gastrointestinal: neg for nausea neg for vomiting, neg for abdominal pain neg for diarrhea neg for constipation   Behavioral/Psych: neg for depression neg for anxiety    PEx  Temp:  [96.1 °F (35.6 °C)-97.9 °F (36.6 °C)]   Pulse:  [55-76]   Resp:  [18-20]   BP: (147-171)/(58-73)   SpO2:  [93 %-97 %]   No intake or output data in the 24 hours ending 05/08/22 1143  General Appearance: no acute distress, WDWN  Heart: regular rate and rhythm, no heave  Respiratory: Normal respiratory effort, symmetric excursion, bilateral vesicular breath sounds   Abdomen: Soft, non-tender; bowel sounds active  Skin: intact, no rash, no ulcers  Neurologic:  No focal numbness or weakness  Mental status: Alert, oriented x 4, affect appropriate     Recent Labs   Lab 05/06/22  0303 05/07/22  0457 05/08/22  0401   WBC 6.24 5.27 5.77   HGB 11.5* 11.5* 11.4*   HCT 32.3* 32.9* 32.6*    279 279     Recent Labs   Lab 05/03/22  1132 05/03/22  1624 05/07/22  1036 05/07/22  1754 05/08/22  0932   *   < > 123* 123* 125*   K 4.9   < > 4.2 3.9 4.2   CL 91*   < > 87* 86* 92*   CO2 26   < > 27 27 23   BUN 22   < > 15 14 18   CREATININE 1.3   < > 0.8 0.9 0.9   *   < > 101 91 136*   CALCIUM 9.9   < > 9.2 10.1 9.3   LIPASE 13  --   --   --   --     < > = values in this interval not displayed.     Recent Labs   Lab 05/03/22  1132   ALKPHOS 81   ALT 10   AST 20   ALBUMIN 3.5   PROT 6.9   BILITOT 1.7*      Scheduled Meds:   coenzyme Q10  1 capsule Oral Daily    And    multivitamin  1 tablet Oral Daily     enoxaparin  40 mg Subcutaneous Daily    methyl salicylate-menthol 15-10%   Topical (Top) QID    sodium chloride  1 g Oral TID     Continuous Infusions:  As Needed:  acetaminophen, melatonin, sodium chloride 0.9%    Assessment and Plan  / Problems managed today    * Acute cystitis without hematuria  This 76 y.o. F with multiple recent presentations to [facility] facilities for dysuria and flank pain now presents to [facility] [place] [facility] with similar symptoms, admitted for UTI and hyponatremia    sepsis due to E. Coli UTI  Tmax 100.7, , no CVA tenderness, euvolemic, WBC 11.9.  Bladder wall thickening on CT.  Suspect acute cystitis, likely recurrent.  T 99 HR 80 following  bolus. Responded well to ceftriaxone  - Ceftriaxone 2g IV for five days - transition to cefpodoxime orally for remainder of tratment  - Gentle IVF replenishment  -sepsis resolved  -refer to gyn-urology as outpatient for recurring infection    Right shoulder pain  Right shoulder x-ray pain - no acute fracture  Right shoulder MRI showed insufficiency fracture of humeral head and numerous rotator cuff tears  Orthopedic surgery consult    Acute kidney injury superimposed on chronic kidney disease  CKD3  -Scr 1.3 --> 1.1. with gentle hydration  - Avoid nephrotoxic agents such as NSAIDs, gadolinium and IV radiocontrast.  - Renally dose meds to current GFR.  - Maintain MAP > 65  - Improved on second day of admission    Hyponatremia  Probable SIADH  Stop chlorthalidone. Not improved with fluids. Urine studies consistent with SIADH. Will restrict fluids to 800 mL. Need to put thiazides on allergy list. Not improved with strict fluid restriction thus yet. She has not taken compazine. Nephrology consulted. Started patient on sodium chloride 1 g TID    CKD (chronic kidney disease) stage 3, GFR 30-59 ml/min        Rectal prolapse  Continue home Carafate.      Seropositive rheumatoid arthritis  Well-controlled on q2w Humira; next dose due 5/12/22.    Essential  "hypertension  Hypertension:  -BP today: BP (!) 110/56 (Patient Position: Sitting)   Pulse 60   Temp 98 °F (36.7 °C) (Oral)   Resp 18   Ht 5' 3" (1.6 m)   Wt 54.4 kg (120 lb)   LMP  (LMP Unknown)   SpO2 (!) 93%   Breastfeeding No   BMI 21.26 kg/m²   -HTN well controlled on home amlodipine 10 mg PO daily, Hygroten 25 mg PO daily, nebivolol 5 mg PO daily, olmesartan 40 mg PO daily.  Reports she no longer takes spironolactone 25 mg PO.    PLAN:  - Holding home BP meds on admission in the setting of /50 and T99.5F recorded in the ER, as well as her admitted recent reduction in PO intake and UOP  - Resume home BP meds when she is hemodynamically stable and there is no concern for sepsis    Discharge Planning   MARCELLA: 5/9/2022     Code Status: Full Code   Is the patient medically ready for discharge?: No    Reason for patient still in hospital (select all that apply): Patient trending condition  Discharge Plan A: Home      Diet: regular diet  GI PPx: not needed  DVT PPx:  enoxaparin  Airways: room air  Wounds: none    Goals of Care:  Return to prior functional status    Time (minutes) spent in care of the patient (Greater than 1/2 spent in direct face-to-face contact and care coordination on unit)  35 min    Dory Cedillo MD    "

## 2022-05-08 NOTE — NURSING
0741 Pt in bed A&O x4 resp even and unlabored, still some discomfort to right shoulder, communication board updated, bed in low locked position, call light in reach, vitals done    0859 Pt Just returned from ambulating to bathroom, assessment done, scheduled meds given , muscle cream applied and patient voices still helping with discomfort    1015 Pt requested Tylenol states she thinks she must have moved shoulder wrong as a little more discomfort will continue to monitor

## 2022-05-08 NOTE — PROGRESS NOTES
Hospital Medicine  Progress note    Team: [facility] Z Dory Cedillo MD  Admit Date: 5/3/2022    Principal Problem:  Acute cystitis without hematuria    Interval hx:  Feeling improved. Complains of right shoulder pain. It has been present for two months. States it has been keep her from working as a .    ROS   Respiratory: neg for cough neg for shortness of breath  Cardiovascular: neg for chest pain neg for palpitations  Gastrointestinal: neg for nausea neg for vomiting, neg for abdominal pain neg for diarrhea neg for constipation   Behavioral/Psych: neg for depression neg for anxiety    PEx  Temp:  [96.1 °F (35.6 °C)-97.7 °F (36.5 °C)]   Pulse:  [55-76]   Resp:  [16-20]   BP: (152-171)/(58-73)   SpO2:  [93 %-97 %]   No intake or output data in the 24 hours ending 05/08/22 1709  General Appearance: no acute distress, WDWN  Heart: regular rate and rhythm, no heave  Respiratory: Normal respiratory effort, symmetric excursion, bilateral vesicular breath sounds   Abdomen: Soft, non-tender; bowel sounds active  Skin: intact, no rash, no ulcers  Neurologic:  No focal numbness or weakness  Mental status: Alert, oriented x 4, affect appropriate     Recent Labs   Lab 05/06/22  0303 05/07/22  0457 05/08/22  0401   WBC 6.24 5.27 5.77   HGB 11.5* 11.5* 11.4*   HCT 32.3* 32.9* 32.6*    279 279     Recent Labs   Lab 05/03/22  1132 05/03/22  1624 05/07/22  1036 05/07/22  1754 05/08/22  0932   *   < > 123* 123* 125*   K 4.9   < > 4.2 3.9 4.2   CL 91*   < > 87* 86* 92*   CO2 26   < > 27 27 23   BUN 22   < > 15 14 18   CREATININE 1.3   < > 0.8 0.9 0.9   *   < > 101 91 136*   CALCIUM 9.9   < > 9.2 10.1 9.3   LIPASE 13  --   --   --   --     < > = values in this interval not displayed.     Recent Labs   Lab 05/03/22  1132   ALKPHOS 81   ALT 10   AST 20   ALBUMIN 3.5   PROT 6.9   BILITOT 1.7*      Scheduled Meds:   coenzyme Q10  1 capsule Oral Daily    And    multivitamin  1 tablet Oral Daily     enoxaparin  40 mg Subcutaneous Daily    methyl salicylate-menthol 15-10%   Topical (Top) QID    sodium chloride  1 g Oral TID     Continuous Infusions:  As Needed:  acetaminophen, melatonin, sodium chloride 0.9%    Assessment and Plan  / Problems managed today    * Acute cystitis without hematuria  This 76 y.o. F with multiple recent presentations to [facility] facilities for dysuria and flank pain now presents to [facility] with similar symptoms, admitted for UTI and hyponatremia    sepsis due to E. Coli UTI  Tmax 100.7, , no CVA tenderness, euvolemic, WBC 11.9.  Bladder wall thickening on CT.  Suspect acute cystitis, likely recurrent.  T 99 HR 80 following  bolus. Responded well to ceftriaxone  - Ceftriaxone 2g IV for five days - transition to cefpodoxime orally for remainder of tratment  - Gentle IVF replenishment  -sepsis resolved  -refer to gyn-urology as outpatient for recurring infection    Right shoulder pain  Right shoulder x-ray pain - no acute fracture  Right shoulder MRI showed insufficiency fracture of humeral head and numerous rotator cuff tears  Orthopedic surgery state can be followed up as outpatient    Acute kidney injury superimposed on chronic kidney disease  CKD3  -Scr 1.3 --> 1.1. with gentle hydration  - Avoid nephrotoxic agents such as NSAIDs, gadolinium and IV radiocontrast.  - Renally dose meds to current GFR.  - Maintain MAP > 65  - Improved on second day of admission    Hyponatremia  Probable SIADH  Stop chlorthalidone. Not improved with fluids. Urine studies consistent with SIADH. Will restrict fluids to 800 mL. Need to put thiazides on allergy list. Not improved with strict fluid restriction thus yet. She has not taken compazine. Nephrology consulted. Started patient on sodium chloride 1 g TID    CKD (chronic kidney disease) stage 3, GFR 30-59 ml/min       Rectal prolapse       Seropositive rheumatoid arthritis  Well-controlled on q2w Humira; next dose due  "5/12/22.    Essential hypertension  Hypertension:  -BP today: BP (!) 110/56 (Patient Position: Sitting)   Pulse 60   Temp 98 °F (36.7 °C) (Oral)   Resp 18   Ht 5' 3" (1.6 m)   Wt 54.4 kg (120 lb)   LMP  (LMP Unknown)   SpO2 (!) 93%   Breastfeeding No   BMI 21.26 kg/m²   -HTN well controlled on home amlodipine 10 mg PO daily, Hygroten 25 mg PO daily, nebivolol 5 mg PO daily, olmesartan 40 mg PO daily.  Reports she no longer takes spironolactone 25 mg PO.    PLAN:  - Holding home BP meds on admission in the setting of /50 and T99.5F recorded in the ER, as well as her admitted recent reduction in PO intake and UOP  - Resume home BP meds when she is hemodynamically stable and there is no concern for sepsis    Discharge Planning   MARCELLA: 5/9/2022     Code Status: Full Code   Is the patient medically ready for discharge?: No    Reason for patient still in hospital (select all that apply): Patient trending condition  Discharge Plan A: Home      Diet: regular diet  GI PPx: not needed  DVT PPx:  enoxaparin  Airways: room air  Wounds: none    Goals of Care:  Return to prior functional status    Time (minutes) spent in care of the patient (Greater than 1/2 spent in direct face-to-face contact and care coordination on unit)  35 min    Dory Cedillo MD    "

## 2022-05-08 NOTE — PLAN OF CARE
Pt continues with pain management and sodium replacement with improvement in both  Problem: Adult Inpatient Plan of Care  Goal: Plan of Care Review  Outcome: Ongoing, Progressing  Goal: Patient-Specific Goal (Individualized)  Outcome: Ongoing, Progressing  Goal: Absence of Hospital-Acquired Illness or Injury  Outcome: Ongoing, Progressing  Goal: Optimal Comfort and Wellbeing  Outcome: Ongoing, Progressing  Goal: Readiness for Transition of Care  Outcome: Ongoing, Progressing     Problem: Adjustment to Illness (Sepsis/Septic Shock)  Goal: Optimal Coping  Outcome: Ongoing, Progressing     Problem: Bleeding (Sepsis/Septic Shock)  Goal: Absence of Bleeding  Outcome: Ongoing, Progressing     Problem: Glycemic Control Impaired (Sepsis/Septic Shock)  Goal: Blood Glucose Level Within Desired Range  Outcome: Ongoing, Progressing     Problem: Infection Progression (Sepsis/Septic Shock)  Goal: Absence of Infection Signs and Symptoms  Outcome: Ongoing, Progressing     Problem: Nutrition Impaired (Sepsis/Septic Shock)  Goal: Optimal Nutrition Intake  Outcome: Ongoing, Progressing     Problem: Fluid and Electrolyte Imbalance (Acute Kidney Injury/Impairment)  Goal: Fluid and Electrolyte Balance  Outcome: Ongoing, Progressing     Problem: Oral Intake Inadequate (Acute Kidney Injury/Impairment)  Goal: Optimal Nutrition Intake  Outcome: Ongoing, Progressing     Problem: Renal Function Impairment (Acute Kidney Injury/Impairment)  Goal: Effective Renal Function  Outcome: Ongoing, Progressing     Problem: Fall Injury Risk  Goal: Absence of Fall and Fall-Related Injury  Outcome: Ongoing, Progressing

## 2022-05-08 NOTE — PLAN OF CARE
Problem: Adult Inpatient Plan of Care  Goal: Plan of Care Review  Outcome: Ongoing, Progressing  Goal: Patient-Specific Goal (Individualized)  Outcome: Ongoing, Progressing  Goal: Absence of Hospital-Acquired Illness or Injury  Outcome: Ongoing, Progressing  Goal: Optimal Comfort and Wellbeing  Outcome: Ongoing, Progressing  Goal: Readiness for Transition of Care  Outcome: Ongoing, Progressing     Problem: Adjustment to Illness (Sepsis/Septic Shock)  Goal: Optimal Coping  Outcome: Ongoing, Progressing     Problem: Bleeding (Sepsis/Septic Shock)  Goal: Absence of Bleeding  Outcome: Ongoing, Progressing     Problem: Glycemic Control Impaired (Sepsis/Septic Shock)  Goal: Blood Glucose Level Within Desired Range  Outcome: Ongoing, Progressing     Problem: Infection Progression (Sepsis/Septic Shock)  Goal: Absence of Infection Signs and Symptoms  Outcome: Ongoing, Progressing     Problem: Nutrition Impaired (Sepsis/Septic Shock)  Goal: Optimal Nutrition Intake  Outcome: Ongoing, Progressing     Problem: Fluid and Electrolyte Imbalance (Acute Kidney Injury/Impairment)  Goal: Fluid and Electrolyte Balance  Outcome: Ongoing, Progressing     Problem: Oral Intake Inadequate (Acute Kidney Injury/Impairment)  Goal: Optimal Nutrition Intake  Outcome: Ongoing, Progressing     Problem: Renal Function Impairment (Acute Kidney Injury/Impairment)  Goal: Effective Renal Function  Outcome: Ongoing, Progressing     Problem: Fall Injury Risk  Goal: Absence of Fall and Fall-Related Injury  Outcome: Ongoing, Progressing       Plan of care reviewed with pt. VSS, Pt give prn pain med. Pt is free of falls and injuries. Bed in lowest position and call light within reach.

## 2022-05-08 NOTE — PROGRESS NOTES
[facility] Surg  Consult Note     Patient Name: Buffy Dominique  MRN: [id number]  Admission Date: 5/3/2022  Hospital Length of Stay: 3 days  Attending Physician: Dory Cedillo MD   Primary Care Provider: Odalis Kim MD      Patient information was obtained from patient and ER records.      Consults  Subjective:      Principal Problem: Acute cystitis without hematuria     Chief Complaint:        Chief Complaint   Patient presents with    Flank Pain       Urine dark and smells         HPI: 75 y/o female with past medical history of RA, AS, HTN, HLD, and CKD who presented to [facility] [place] on 05/03/2022 for evaluation and treatment of R back pain 7/10 with no radiation that stated 5 days ago associated with urine described as smelly and dark. The patient denies associated fever, chills, fatigue, diaphoresis and abdominal pain. In the admission labs UTI and hyponatremia were documented and nephrology was consulted.      Baseline creatinine 0.8 (01/24/2022)        Hospital Course: No notes on file     Interval History: Patient reports felling well. Denies symptoms of UTI, fever, chills and headache.                 Past Medical History:   Diagnosis Date    CKD (chronic kidney disease) stage 3, GFR 30-59 ml/min      HLD (hyperlipidemia)      HTN (hypertension)      Hyperparathyroidism                 Past Surgical History:   Procedure Laterality Date    CATARACT EXTRACTION, BILATERAL Bilateral 2014    cataract removal Right 2014    hiatial hernia   2017    HYSTERECTOMY        melanoma         on face    OOPHORECTOMY        THORACIC AORTIC ANEURYSM REPAIR   2011               Review of patient's allergies indicates:   Allergen Reactions    Methotrexate analogues         Mouth Ulcers     Thiazides Other (See Comments)       hyponatremia         No current facility-administered medications on file prior to encounter.           Current Outpatient Medications on File Prior to Encounter   Medication Sig     adalimumab (HUMIRA PEN) PnKt injection Inject 1 pen (40 mg total) into the skin every 14 (fourteen) days.    amLODIPine (NORVASC) 10 MG tablet Take 1 tablet (10 mg total) by mouth once daily.    chlorthalidone (HYGROTEN) 25 MG Tab Take 25 mg by mouth once daily.    multivit-min/iron/folic/lutein (CENTRUM SILVER WOMEN ORAL) Take 1 tablet by mouth once daily.    nebivoloL (BYSTOLIC) 5 MG Tab Take 1 tablet (5 mg total) by mouth once daily.    olmesartan (BENICAR) 40 MG tablet TAKE 1 TABLET(40 MG) BY MOUTH EVERY DAY FOR BLOOD PRESSURE    sucralfate (CARAFATE) 1 gram tablet Take 1 tablet (1 g total) by mouth 4 (four) times daily. Crush in water. Swish and swallow.    folic acid (FOLVITE) 1 MG tablet Take 1 tablet (1 mg total) by mouth once daily.    predniSONE (DELTASONE) 2.5 MG tablet Take 1 tablet (2.5 mg total) by mouth daily as needed (for joint flare ups).    spironolactone (ALDACTONE) 25 MG tablet Take 1 tablet (25 mg total) by mouth once daily.           Family History      Problem Relation (Age of Onset)     Cancer Father     Hypertension Maternal Grandmother                Tobacco Use    Smoking status: Former Smoker       Packs/day: 1.00       Years: 30.00       Pack years: 30.00       Types: Cigarettes       Quit date: 2009       Years since quittin.2    Smokeless tobacco: Never Used   Substance and Sexual Activity    Alcohol use: No       Comment: glass of wine once or twice a year    Drug use: No    Sexual activity: Not on file      Review of Systems  Objective:      Vital Signs (Most Recent):  Temp: 97.8 °F (36.6 °C) (22 1104)  Pulse: 68 (22 1104)  Resp: 16 (22 0723)  BP: 134/62 (22 1104)  SpO2: (!) 93 % (22 1104) Vital Signs (24h Range):  Temp:  [96.2 °F (35.7 °C)-97.8 °F (36.6 °C)] 97.8 °F (36.6 °C)  Pulse:  [57-68] 68  Resp:  [16-20] 16  SpO2:  [90 %-93 %] 93 %  BP: (130-150)/(60-67) 134/62      Weight: 54.4 kg (120 lb)  Body mass index is 21.26  kg/m².     Intake/Output Summary (Last 24 hours) at 5/6/2022 1516  Last data filed at 5/6/2022 0500      Gross per 24 hour   Intake 150 ml   Output 500 ml   Net -350 ml      Physical Exam  Constitutional:       Appearance: She is normal weight.   HENT:      Head: Normocephalic.      Mouth/Throat:      Mouth: Mucous membranes are moist.   Eyes:      Extraocular Movements: Extraocular movements intact.      Conjunctiva/sclera: Conjunctivae normal.      Pupils: Pupils are equal, round, and reactive to light.   Cardiovascular:      Rate and Rhythm: Normal rate and regular rhythm.   Pulmonary:      Effort: Pulmonary effort is normal.      Breath sounds: Normal breath sounds.   Abdominal:      General: Abdomen is flat. Bowel sounds are normal.      Palpations: Abdomen is soft.   Musculoskeletal:         General: Normal range of motion.      Cervical back: Normal range of motion.   Skin:     General: Skin is warm.   Neurological:      General: No focal deficit present.      Mental Status: She is alert and oriented to person, place, and time.            Significant Labs:   All pertinent labs within the past 24 hours have been reviewed.  BMP:       Recent Labs   Lab 05/06/22  0303      *   K 3.6   CL 87*   CO2 23   BUN 16   CREATININE 0.8   CALCIUM 9.1      CBC:        Recent Labs   Lab 05/05/22  0326 05/06/22  0303   WBC 8.99 6.24   HGB 11.4* 11.5*   HCT 33.2* 32.3*    244      CMP:         Recent Labs   Lab 05/04/22  1525 05/05/22  0326 05/06/22  0303   * 123* 120*   K 4.2 4.2 3.6   CL 91* 89* 87*   CO2 20* 24 23   GLU 89 85 104   BUN 23 20 16   CREATININE 1.1 1.1 0.8   CALCIUM 8.8 9.1 9.1   ANIONGAP 9 10 10   EGFRNONAA 48.9* 48.9* >60.0      Urine Culture: No results for input(s): LABURIN in the last 48 hours.  Urine Studies: No results for input(s): COLORU, APPEARANCEUA, PHUR, SPECGRAV, PROTEINUA, GLUCUA, KETONESU, BILIRUBINUA, OCCULTUA, NITRITE, UROBILINOGEN, LEUKOCYTESUR, RBCUA, WBCUA,  BACTERIA, SQUAMEPITHEL, HYALINECASTS in the last 48 hours.     Invalid input(s): SHARON     Significant Imaging: I have reviewed all pertinent imaging results/findings within the past 24 hours.           Assessment/Plan:      Asymptomatic Hyponatremia    POCUS exam showed no JVP and collapsed IVC favouring hypo vs euvolemic hyponatremia. Pt being on chlorthalidone makes interpreting urine studies complicated. Would hold of IV fluids as initial trial with them caused some worsening of hyponatremia. Would start with free water restriction and salt tablets.      - ordered repeat urine studies  - If no improvement tomorrow will try 500 ml of NS at 100 cc/hr.

## 2022-05-09 VITALS
HEART RATE: 85 BPM | WEIGHT: 120 LBS | DIASTOLIC BLOOD PRESSURE: 77 MMHG | TEMPERATURE: 98 F | HEIGHT: 63 IN | BODY MASS INDEX: 21.26 KG/M2 | SYSTOLIC BLOOD PRESSURE: 176 MMHG | OXYGEN SATURATION: 95 % | RESPIRATION RATE: 18 BRPM

## 2022-05-09 LAB
ANION GAP SERPL CALC-SCNC: 9 MMOL/L (ref 8–16)
BASOPHILS # BLD AUTO: 0.05 K/UL (ref 0–0.2)
BASOPHILS NFR BLD: 0.8 % (ref 0–1.9)
BUN SERPL-MCNC: 14 MG/DL (ref 8–23)
CALCIUM SERPL-MCNC: 9.5 MG/DL (ref 8.7–10.5)
CHLORIDE SERPL-SCNC: 93 MMOL/L (ref 95–110)
CO2 SERPL-SCNC: 27 MMOL/L (ref 23–29)
CREAT SERPL-MCNC: 0.9 MG/DL (ref 0.5–1.4)
DIFFERENTIAL METHOD: ABNORMAL
EOSINOPHIL # BLD AUTO: 0.2 K/UL (ref 0–0.5)
EOSINOPHIL NFR BLD: 2.7 % (ref 0–8)
ERYTHROCYTE [DISTWIDTH] IN BLOOD BY AUTOMATED COUNT: 11.2 % (ref 11.5–14.5)
EST. GFR  (AFRICAN AMERICAN): >60 ML/MIN/1.73 M^2
EST. GFR  (NON AFRICAN AMERICAN): >60 ML/MIN/1.73 M^2
GLUCOSE SERPL-MCNC: 137 MG/DL (ref 70–110)
HCT VFR BLD AUTO: 31.5 % (ref 37–48.5)
HGB BLD-MCNC: 11 G/DL (ref 12–16)
IMM GRANULOCYTES # BLD AUTO: 0.05 K/UL (ref 0–0.04)
IMM GRANULOCYTES NFR BLD AUTO: 0.8 % (ref 0–0.5)
LYMPHOCYTES # BLD AUTO: 2.8 K/UL (ref 1–4.8)
LYMPHOCYTES NFR BLD: 46.6 % (ref 18–48)
MCH RBC QN AUTO: 32.4 PG (ref 27–31)
MCHC RBC AUTO-ENTMCNC: 34.9 G/DL (ref 32–36)
MCV RBC AUTO: 93 FL (ref 82–98)
MONOCYTES # BLD AUTO: 0.8 K/UL (ref 0.3–1)
MONOCYTES NFR BLD: 12.7 % (ref 4–15)
NEUTROPHILS # BLD AUTO: 2.2 K/UL (ref 1.8–7.7)
NEUTROPHILS NFR BLD: 36.4 % (ref 38–73)
NRBC BLD-RTO: 0 /100 WBC
PLATELET # BLD AUTO: 290 K/UL (ref 150–450)
PMV BLD AUTO: 8.5 FL (ref 9.2–12.9)
POTASSIUM SERPL-SCNC: 4.1 MMOL/L (ref 3.5–5.1)
RBC # BLD AUTO: 3.39 M/UL (ref 4–5.4)
SODIUM SERPL-SCNC: 129 MMOL/L (ref 136–145)
WBC # BLD AUTO: 5.92 K/UL (ref 3.9–12.7)

## 2022-05-09 PROCEDURE — 80048 BASIC METABOLIC PNL TOTAL CA: CPT | Performed by: NURSE PRACTITIONER

## 2022-05-09 PROCEDURE — 36415 COLL VENOUS BLD VENIPUNCTURE: CPT

## 2022-05-09 PROCEDURE — 25000003 PHARM REV CODE 250

## 2022-05-09 PROCEDURE — 25000003 PHARM REV CODE 250: Performed by: INTERNAL MEDICINE

## 2022-05-09 PROCEDURE — 99239 PR HOSPITAL DISCHARGE DAY,>30 MIN: ICD-10-PCS | Mod: ,,, | Performed by: INTERNAL MEDICINE

## 2022-05-09 PROCEDURE — 85025 COMPLETE CBC W/AUTO DIFF WBC: CPT

## 2022-05-09 PROCEDURE — 99239 HOSP IP/OBS DSCHRG MGMT >30: CPT | Mod: ,,, | Performed by: INTERNAL MEDICINE

## 2022-05-09 PROCEDURE — 99232 SBSQ HOSP IP/OBS MODERATE 35: CPT | Mod: ,,, | Performed by: INTERNAL MEDICINE

## 2022-05-09 PROCEDURE — 99232 PR SUBSEQUENT HOSPITAL CARE,LEVL II: ICD-10-PCS | Mod: ,,, | Performed by: INTERNAL MEDICINE

## 2022-05-09 PROCEDURE — 25000003 PHARM REV CODE 250: Performed by: STUDENT IN AN ORGANIZED HEALTH CARE EDUCATION/TRAINING PROGRAM

## 2022-05-09 RX ADMIN — THERA TABS 1 TABLET: TAB at 09:05

## 2022-05-09 RX ADMIN — AMLODIPINE BESYLATE 10 MG: 10 TABLET ORAL at 12:05

## 2022-05-09 RX ADMIN — AMLODIPINE BESYLATE 10 MG: 10 TABLET ORAL at 09:05

## 2022-05-09 RX ADMIN — MENTHOL, METHYL SALICYLATE: 10; 15 CREAM TOPICAL at 01:05

## 2022-05-09 RX ADMIN — MENTHOL, METHYL SALICYLATE: 10; 15 CREAM TOPICAL at 09:05

## 2022-05-09 RX ADMIN — ACETAMINOPHEN 650 MG: 325 TABLET ORAL at 12:05

## 2022-05-09 RX ADMIN — SODIUM CHLORIDE 1 G: 1 TABLET ORAL at 09:05

## 2022-05-09 RX ADMIN — Medication 100 MG: at 05:05

## 2022-05-09 NOTE — ASSESSMENT & PLAN NOTE
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 Received LR 500cc x2 and NS x1 with no improvement. No Neurologic symptoms on exam.     POCUS exam showed no JVP and collapsed IVC favoring hypo vs euvolemic hyponatremia. Pt being on chlorthalidone makes interpreting urine studies complicated     5/9- NA improved 129     Plan/Reccomendations     - ok to discharge home from nephrology standpoint   - continue 1 L fluid restrictions  - agree with stopping thiazides   - Decrease salt tabs to 1g once daily   - follow up with nephrology Friday 5/13      Plan discussed with staff

## 2022-05-09 NOTE — PLAN OF CARE
Problem: Adult Inpatient Plan of Care  Goal: Plan of Care Review  Outcome: Ongoing, Progressing  Goal: Patient-Specific Goal (Individualized)  Outcome: Ongoing, Progressing  Goal: Absence of Hospital-Acquired Illness or Injury  Outcome: Ongoing, Progressing  Goal: Optimal Comfort and Wellbeing  Outcome: Ongoing, Progressing  Goal: Readiness for Transition of Care  Outcome: Ongoing, Progressing     Problem: Adjustment to Illness (Sepsis/Septic Shock)  Goal: Optimal Coping  Outcome: Ongoing, Progressing     Problem: Bleeding (Sepsis/Septic Shock)  Goal: Absence of Bleeding  Outcome: Ongoing, Progressing     Problem: Glycemic Control Impaired (Sepsis/Septic Shock)  Goal: Blood Glucose Level Within Desired Range  Outcome: Ongoing, Progressing     Problem: Infection Progression (Sepsis/Septic Shock)  Goal: Absence of Infection Signs and Symptoms  Outcome: Ongoing, Progressing     Problem: Nutrition Impaired (Sepsis/Septic Shock)  Goal: Optimal Nutrition Intake  Outcome: Ongoing, Progressing     Problem: Fluid and Electrolyte Imbalance (Acute Kidney Injury/Impairment)  Goal: Fluid and Electrolyte Balance  Outcome: Ongoing, Progressing     Problem: Oral Intake Inadequate (Acute Kidney Injury/Impairment)  Goal: Optimal Nutrition Intake  Outcome: Ongoing, Progressing     Problem: Renal Function Impairment (Acute Kidney Injury/Impairment)  Goal: Effective Renal Function  Outcome: Ongoing, Progressing     Problem: Fall Injury Risk  Goal: Absence of Fall and Fall-Related Injury  Outcome: Ongoing, Progressing    Plan of care reviewed with pt . VSS. Pt given prn pain medcation for rt .shoulder . Pt is free of falls and injuries. Bed in lowest position and call light within reach.

## 2022-05-09 NOTE — PROGRESS NOTES
[facility] Surg  Consult Note     Patient Name: Buffy Dominique  MRN: [id number]  Admission Date: 5/3/2022  Hospital Length of Stay: 3 days  Attending Physician: Dory Cedillo MD   Primary Care Provider: Odalis Kim MD      Patient information was obtained from patient and ER records.      Consults  Subjective:      Principal Problem: Acute cystitis without hematuria     Chief Complaint:        Chief Complaint   Patient presents with    Flank Pain       Urine dark and smells         HPI: 75 y/o female with past medical history of RA, AS, HTN, HLD, and CKD who presented to [facility] on 05/03/2022 for evaluation and treatment of R back pain 7/10 with no radiation that stated 5 days ago associated with urine described as smelly and dark. The patient denies associated fever, chills, fatigue, diaphoresis and abdominal pain. In the admission labs UTI and hyponatremia were documented and nephrology was consulted.      Baseline creatinine 0.8 (01/24/2022)        Hospital Course: No notes on file     Interval History: Patient reports felling well. Denies symptoms of UTI, fever, chills and headache.              Past Medical History:   Diagnosis Date    CKD (chronic kidney disease) stage 3, GFR 30-59 ml/min      HLD (hyperlipidemia)      HTN (hypertension)      Hyperparathyroidism                 Past Surgical History:   Procedure Laterality Date    CATARACT EXTRACTION, BILATERAL Bilateral 2014    cataract removal Right 2014    hiatial hernia   2017    HYSTERECTOMY        melanoma         on face    OOPHORECTOMY        THORACIC AORTIC ANEURYSM REPAIR   2011               Review of patient's allergies indicates:   Allergen Reactions    Methotrexate analogues         Mouth Ulcers     Thiazides Other (See Comments)       hyponatremia         No current facility-administered medications on file prior to encounter.           Current Outpatient Medications on File Prior to Encounter   Medication Sig    adalimumab  (HUMIRA PEN) PnKt injection Inject 1 pen (40 mg total) into the skin every 14 (fourteen) days.    amLODIPine (NORVASC) 10 MG tablet Take 1 tablet (10 mg total) by mouth once daily.    chlorthalidone (HYGROTEN) 25 MG Tab Take 25 mg by mouth once daily.    multivit-min/iron/folic/lutein (CENTRUM SILVER WOMEN ORAL) Take 1 tablet by mouth once daily.    nebivoloL (BYSTOLIC) 5 MG Tab Take 1 tablet (5 mg total) by mouth once daily.    olmesartan (BENICAR) 40 MG tablet TAKE 1 TABLET(40 MG) BY MOUTH EVERY DAY FOR BLOOD PRESSURE    sucralfate (CARAFATE) 1 gram tablet Take 1 tablet (1 g total) by mouth 4 (four) times daily. Crush in water. Swish and swallow.    folic acid (FOLVITE) 1 MG tablet Take 1 tablet (1 mg total) by mouth once daily.    predniSONE (DELTASONE) 2.5 MG tablet Take 1 tablet (2.5 mg total) by mouth daily as needed (for joint flare ups).    spironolactone (ALDACTONE) 25 MG tablet Take 1 tablet (25 mg total) by mouth once daily.           Family History      Problem Relation (Age of Onset)     Cancer Father     Hypertension Maternal Grandmother                Tobacco Use    Smoking status: Former Smoker       Packs/day: 1.00       Years: 30.00       Pack years: 30.00       Types: Cigarettes       Quit date: 2009       Years since quittin.2    Smokeless tobacco: Never Used   Substance and Sexual Activity    Alcohol use: No       Comment: glass of wine once or twice a year    Drug use: No    Sexual activity: Not on file      Review of Systems  Objective:      Vital Signs (Most Recent):  Temp: 97.8 °F (36.6 °C) (22 1104)  Pulse: 68 (22 1104)  Resp: 16 (22 0723)  BP: 134/62 (22 1104)  SpO2: (!) 93 % (22 1104) Vital Signs (24h Range):  Temp:  [96.2 °F (35.7 °C)-97.8 °F (36.6 °C)] 97.8 °F (36.6 °C)  Pulse:  [57-68] 68  Resp:  [16-20] 16  SpO2:  [90 %-93 %] 93 %  BP: (130-150)/(60-67) 134/62      Weight: 54.4 kg (120 lb)  Body mass index is 21.26  kg/m².     Intake/Output Summary (Last 24 hours) at 5/6/2022 1516  Last data filed at 5/6/2022 0500      Gross per 24 hour   Intake 150 ml   Output 500 ml   Net -350 ml      Physical Exam  Constitutional:       Appearance: She is normal weight.   HENT:      Head: Normocephalic.      Mouth/Throat:      Mouth: Mucous membranes are moist.   Eyes:      Extraocular Movements: Extraocular movements intact.      Conjunctiva/sclera: Conjunctivae normal.      Pupils: Pupils are equal, round, and reactive to light.   Cardiovascular:      Rate and Rhythm: Normal rate and regular rhythm.   Pulmonary:      Effort: Pulmonary effort is normal.      Breath sounds: Normal breath sounds.   Abdominal:      General: Abdomen is flat. Bowel sounds are normal.      Palpations: Abdomen is soft.   Musculoskeletal:         General: Normal range of motion.      Cervical back: Normal range of motion.   Skin:     General: Skin is warm.   Neurological:      General: No focal deficit present.      Mental Status: She is alert and oriented to person, place, and time.            Significant Labs:   All pertinent labs within the past 24 hours have been reviewed.  BMP:       Recent Labs   Lab 05/06/22  0303      *   K 3.6   CL 87*   CO2 23   BUN 16   CREATININE 0.8   CALCIUM 9.1      CBC:        Recent Labs   Lab 05/05/22  0326 05/06/22  0303   WBC 8.99 6.24   HGB 11.4* 11.5*   HCT 33.2* 32.3*    244      CMP:         Recent Labs   Lab 05/04/22  1525 05/05/22  0326 05/06/22  0303   * 123* 120*   K 4.2 4.2 3.6   CL 91* 89* 87*   CO2 20* 24 23   GLU 89 85 104   BUN 23 20 16   CREATININE 1.1 1.1 0.8   CALCIUM 8.8 9.1 9.1   ANIONGAP 9 10 10   EGFRNONAA 48.9* 48.9* >60.0      Urine Culture: No results for input(s): LABURIN in the last 48 hours.  Urine Studies: No results for input(s): COLORU, APPEARANCEUA, PHUR, SPECGRAV, PROTEINUA, GLUCUA, KETONESU, BILIRUBINUA, OCCULTUA, NITRITE, UROBILINOGEN, LEUKOCYTESUR, RBCUA, WBCUA,  BACTERIA, SQUAMEPITHEL, HYALINECASTS in the last 48 hours.     Invalid input(s): SHARON     Significant Imaging: I have reviewed all pertinent imaging results/findings within the past 24 hours.           Assessment/Plan:      Asymptomatic Hyponatremia, SIADH    POCUS exam showed no JVP and collapsed IVC favouring hypo vs euvolemic hyponatremia. Pt being on chlorthalidone makes interpreting urine studies complicated. Would hold of IV fluids as initial trial with them caused some worsening of hyponatremia.       - Continue free water restriction and salt tablets.

## 2022-05-09 NOTE — PLAN OF CARE
Patient in bed awake and denied any acute distress, call button and other personal items within reach. Discharge order noted, will complete paper work and await splint delivery at the bedside.  Problem: Adult Inpatient Plan of Care  Goal: Plan of Care Review  Outcome: Adequate for Care Transition  Goal: Patient-Specific Goal (Individualized)  Outcome: Adequate for Care Transition  Goal: Absence of Hospital-Acquired Illness or Injury  Outcome: Adequate for Care Transition  Goal: Optimal Comfort and Wellbeing  Outcome: Adequate for Care Transition  Goal: Readiness for Transition of Care  Outcome: Adequate for Care Transition     Problem: Adjustment to Illness (Sepsis/Septic Shock)  Goal: Optimal Coping  Outcome: Adequate for Care Transition     Problem: Bleeding (Sepsis/Septic Shock)  Goal: Absence of Bleeding  Outcome: Adequate for Care Transition     Problem: Glycemic Control Impaired (Sepsis/Septic Shock)  Goal: Blood Glucose Level Within Desired Range  Outcome: Adequate for Care Transition     Problem: Infection Progression (Sepsis/Septic Shock)  Goal: Absence of Infection Signs and Symptoms  Outcome: Adequate for Care Transition     Problem: Nutrition Impaired (Sepsis/Septic Shock)  Goal: Optimal Nutrition Intake  Outcome: Adequate for Care Transition     Problem: Fluid and Electrolyte Imbalance (Acute Kidney Injury/Impairment)  Goal: Fluid and Electrolyte Balance  Outcome: Adequate for Care Transition     Problem: Oral Intake Inadequate (Acute Kidney Injury/Impairment)  Goal: Optimal Nutrition Intake  Outcome: Adequate for Care Transition     Problem: Renal Function Impairment (Acute Kidney Injury/Impairment)  Goal: Effective Renal Function  Outcome: Adequate for Care Transition     Problem: Fall Injury Risk  Goal: Absence of Fall and Fall-Related Injury  Outcome: Adequate for Care Transition

## 2022-05-09 NOTE — DISCHARGE INSTRUCTIONS
Patient in bed awake and denied any acute distress, call button and other personal items within reach. Patient reminded to use splint as instructed, take medications as ordered and keep appointments as scheduled. Patient verbalized understanding.

## 2022-05-09 NOTE — PROGRESS NOTES
[facility] Surg  Nephrology  Progress Note    Patient Name: Buffy Dominique  MRN: [id number]  Admission Date: 5/3/2022  Hospital Length of Stay: 6 days  Attending Provider: Dory Cedillo MD   Primary Care Physician: Odalis Kim MD  Principal Problem:Acute cystitis without hematuria    Subjective:     HPI: 75 y/o female with past medical history of RA, AS, HTN, HLD, and CKD who presented to [facility] on 05/03/2022 for evaluation and treatment of R back pain 7/10 with no radiation that stated 5 days ago associated with urine described as smelly and dark. The patient denies associated fever, chills, fatigue, diaphoresis and abdominal pain. In the admission labs UTI and hyponatremia were documented and nephrology was consulted.     Baseline creatinine 0.8 (01/24/2022)      Interval History: Na 129  this am. Continues on 800 cc fluid restriction .    Review of patient's allergies indicates:   Allergen Reactions    Methotrexate analogues      Mouth Ulcers     Thiazides Other (See Comments)     hyponatremia     Current Facility-Administered Medications   Medication Frequency    acetaminophen tablet 650 mg Q4H PRN    amLODIPine tablet 10 mg Daily    coenzyme Q10 100 mg Daily    And    multivitamin tablet Daily    enoxaparin injection 40 mg Daily    melatonin tablet 6 mg Nightly PRN    methyl salicylate-menthol 15-10% cream QID    sodium chloride 0.9% flush 10 mL PRN    sodium chloride tablet 1 g TID       Objective:     Vital Signs (Most Recent):  Temp: 98.2 °F (36.8 °C) (05/09/22 1119)  Pulse: 85 (05/09/22 1119)  Resp: 18 (05/09/22 1119)  BP: (!) 176/77 (05/09/22 1119)  SpO2: 95 % (05/09/22 1119)  O2 Device (Oxygen Therapy): room air (05/09/22 0810)   Vital Signs (24h Range):  Temp:  [96.5 °F (35.8 °C)-98.4 °F (36.9 °C)] 98.2 °F (36.8 °C)  Pulse:  [56-85] 85  Resp:  [16-20] 18  SpO2:  [93 %-96 %] 95 %  BP: (154-182)/(68-78) 176/77     Weight: 54.4 kg (120 lb) (05/03/22 1024)  Body mass index is 21.26  kg/m².  Body surface area is 1.56 meters squared.    I/O last 3 completed shifts:  In: 120 [P.O.:120]  Out: -     Physical Exam  Vitals and nursing note reviewed.   HENT:      Head: Normocephalic.      Mouth/Throat:      Pharynx: Oropharynx is clear.   Eyes:      Conjunctiva/sclera: Conjunctivae normal.   Cardiovascular:      Rate and Rhythm: Normal rate.      Pulses: Normal pulses.   Pulmonary:      Effort: Pulmonary effort is normal.   Abdominal:      Palpations: Abdomen is soft.   Musculoskeletal:      Cervical back: Neck supple.      Right lower leg: No edema.      Left lower leg: No edema.   Neurological:      Mental Status: She is alert and oriented to person, place, and time.   Psychiatric:         Mood and Affect: Mood normal.       Significant Labs:  CBC:   Recent Labs   Lab 05/09/22  0230   WBC 5.92   RBC 3.39*   HGB 11.0*   HCT 31.5*      MCV 93   MCH 32.4*   MCHC 34.9     CMP:   Recent Labs   Lab 05/03/22  1132 05/03/22  1624 05/09/22  0907   *   < > 137*   CALCIUM 9.9   < > 9.5   ALBUMIN 3.5  --   --    PROT 6.9  --   --    *   < > 129*   K 4.9   < > 4.1   CO2 26   < > 27   CL 91*   < > 93*   BUN 22   < > 14   CREATININE 1.3   < > 0.9   ALKPHOS 81  --   --    ALT 10  --   --    AST 20  --   --    BILITOT 1.7*  --   --     < > = values in this interval not displayed.     All labs within the past 24 hours have been reviewed.       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 Received LR 500cc x2 and NS x1 with no improvement. No Neurologic symptoms on exam.     POCUS exam showed no JVP and collapsed IVC favoring hypo vs euvolemic hyponatremia. Pt being on chlorthalidone makes interpreting urine studies complicated     5/9- NA improved 129     Plan/Reccomendations     - ok to discharge home from nephrology standpoint   - continue 1 L fluid  restrictions  - agree with stopping thiazides   - Decrease salt tabs to 1g once daily   - follow up with nephrology Friday 5/13      Plan discussed with staff         Thank you for your consult. I will follow-up with patient. Please contact us if you have any additional questions.    Hina Damon DNP  Nephrology  [facility] Surg

## 2022-05-09 NOTE — PLAN OF CARE
[facility] - Med Surg  Discharge Final Note    Primary Care Provider: Odalis Kim MD    Expected Discharge Date: 5/9/2022       Future Appointments   Date Time Provider Department Center   5/17/2022 11:00 AM Kelsey Gamez PA-C [facility] IM [facility]   5/31/2022  1:30 PM LAB, APPOINTMENT [facility] LAB [facility]   6/2/2022 11:00 AM Angel Rich MD [facility]         Final Discharge Note (most recent)     Final Note - 05/09/22 1420        Final Note    Assessment Type Final Discharge Note     Anticipated Discharge Disposition Home or Self Care     What phone number can be called within the next 1-3 days to see how you are doing after discharge? [phone]     Hospital Resources/Appts/Education Provided Appointments scheduled and added to AVS   Ambulatory referrals sent to Nephrology and OB/GYN.       Post-Acute Status    Post-Acute Authorization Other     Other Status No Post-Acute Service Needs     Discharge Delays None known at this time                 Important Message from Medicare  Important Message from Medicare regarding Discharge Appeal Rights: Given to patient/caregiver, Explained to patient/caregiver, Signed/date by patient/caregiver     Date IMM was signed: 05/09/22  Time IMM was signed: 0903       Leslie Lu RN  Ext [phone]

## 2022-05-09 NOTE — SUBJECTIVE & OBJECTIVE
Interval History: Na 129  this am. Continues on 800 cc fluid restriction .    Review of patient's allergies indicates:   Allergen Reactions    Methotrexate analogues      Mouth Ulcers     Thiazides Other (See Comments)     hyponatremia     Current Facility-Administered Medications   Medication Frequency    acetaminophen tablet 650 mg Q4H PRN    amLODIPine tablet 10 mg Daily    coenzyme Q10 100 mg Daily    And    multivitamin tablet Daily    enoxaparin injection 40 mg Daily    melatonin tablet 6 mg Nightly PRN    methyl salicylate-menthol 15-10% cream QID    sodium chloride 0.9% flush 10 mL PRN    sodium chloride tablet 1 g TID       Objective:     Vital Signs (Most Recent):  Temp: 98.2 °F (36.8 °C) (05/09/22 1119)  Pulse: 85 (05/09/22 1119)  Resp: 18 (05/09/22 1119)  BP: (!) 176/77 (05/09/22 1119)  SpO2: 95 % (05/09/22 1119)  O2 Device (Oxygen Therapy): room air (05/09/22 0810)   Vital Signs (24h Range):  Temp:  [96.5 °F (35.8 °C)-98.4 °F (36.9 °C)] 98.2 °F (36.8 °C)  Pulse:  [56-85] 85  Resp:  [16-20] 18  SpO2:  [93 %-96 %] 95 %  BP: (154-182)/(68-78) 176/77     Weight: 54.4 kg (120 lb) (05/03/22 1024)  Body mass index is 21.26 kg/m².  Body surface area is 1.56 meters squared.    I/O last 3 completed shifts:  In: 120 [P.O.:120]  Out: -     Physical Exam  Vitals and nursing note reviewed.   HENT:      Head: Normocephalic.      Mouth/Throat:      Pharynx: Oropharynx is clear.   Eyes:      Conjunctiva/sclera: Conjunctivae normal.   Cardiovascular:      Rate and Rhythm: Normal rate.      Pulses: Normal pulses.   Pulmonary:      Effort: Pulmonary effort is normal.   Abdominal:      Palpations: Abdomen is soft.   Musculoskeletal:      Cervical back: Neck supple.      Right lower leg: No edema.      Left lower leg: No edema.   Neurological:      Mental Status: She is alert and oriented to person, place, and time.   Psychiatric:         Mood and Affect: Mood normal.       Significant Labs:  CBC:   Recent Labs   Lab  05/09/22  0230   WBC 5.92   RBC 3.39*   HGB 11.0*   HCT 31.5*      MCV 93   MCH 32.4*   MCHC 34.9     CMP:   Recent Labs   Lab 05/03/22  1132 05/03/22  1624 05/09/22  0907   *   < > 137*   CALCIUM 9.9   < > 9.5   ALBUMIN 3.5  --   --    PROT 6.9  --   --    *   < > 129*   K 4.9   < > 4.1   CO2 26   < > 27   CL 91*   < > 93*   BUN 22   < > 14   CREATININE 1.3   < > 0.9   ALKPHOS 81  --   --    ALT 10  --   --    AST 20  --   --    BILITOT 1.7*  --   --     < > = values in this interval not displayed.     All labs within the past 24 hours have been reviewed.

## 2022-05-12 NOTE — DISCHARGE SUMMARY
"Discharge Summary  Hospital Medicine    Attending Provider on Discharge: Dory Cedillo MD  Hospital Medicine Team: [facility]  Date of Admission:  5/3/2022     Date of Discharge:  5/9/2022  Code status: Full Code    Active Hospital Problems    Diagnosis  POA    *Acute cystitis without hematuria [N30.00]  Yes     Priority: 1 - High    Right shoulder pain [M25.511]  Yes    Hyponatremia [E87.1]  Yes    Acute kidney injury superimposed on chronic kidney disease [N17.9, N18.9]  Yes    CKD (chronic kidney disease) stage 3, GFR 30-59 ml/min [N18.30]  Yes    Seropositive rheumatoid arthritis [M05.9]  Yes    Rectal prolapse [K62.3]  Yes    Essential hypertension [I10]  Yes      Resolved Hospital Problems   No resolved problems to display.     HERMELINDO Dominique is a 76 y.o. female with  RA, AS, HTN, HLD, and CKD who presented to [facility] on 05/03/2022 for evaluation and treatment of R flank pain.  They describe their pain as dull aches, 7/10, located on their R flank with no radiation to the shoulder, although she does complain of shoulder pain since falling 2 months ago.  Her flank pain started 2-3 days ago and has been worsening since, with symptom frequency described as constant.  She says she was unable to walk this morning due to the pain.  There is associated urine described as reduced, dark, and smelly; and nausea.  They deny associated fever, chills, fatigue, diaphoresis, abdominal pain, V/D/C.  Symptoms are alleviated by nothing.  Symptoms are worsened by movement.    Of note, she has had multiple recent presentation to [facility] facilities for similar complaints.  In October she presented to [facility] urgent care where a UTI was diagnosed but not treated.  She then sought treatment at [facility] a few weeks later for the similar symptoms, and was "prescribed an antibiotic that I can not remember."  She says she has remained free of symptoms since, until her flank pain returned a few days ago.    [facility] Course " (summarized here for the purpose of efficient chart review; not intended as part of HPI):    On presentation to [facility], the patient was unable to walk and felt ill.  VS notable for T 99.5, .  Initial exam unremarkable.  Initial labs notable for WBC 11.9, Crt 1.3 (baseline 0.8-1.0).  Imaging: CT A/P with L renal cyst, and thickening of bladder wall c/w cystitis.  Therapy/stabilization measures were started, notable for  bolus.  On my interview 10 minutes after 2g IV ceftriaxone had been given, the patient was feeling better.      Hospital Course  * Acute cystitis without hematuria  This 76 y.o. F with multiple recent presentations to [facility] facilities for dysuria and flank pain now presents to [facility] with similar symptoms, admitted for UTI and hyponatremia    sepsis due to E. Coli UTI  Tmax 100.7, , no CVA tenderness, euvolemic, WBC 11.9.  Bladder wall thickening on CT.  Suspect acute cystitis, likely recurrent.  T 99 HR 80 following  bolus. Responded well to ceftriaxone  - Ceftriaxone 2g IV for five days - transition to cefpodoxime orally for remainder of tratment  - Gentle IVF replenishment  -sepsis resolved  -refer to gyn-urology as outpatient for recurring infection    Right shoulder pain  Right shoulder x-ray pain - no acute fracture  Right shoulder MRI showed insufficiency fracture of humeral head and numerous rotator cuff tears  Orthopedic surgery state can be followed up as outpatient  Sling for comfort  Acetaminophen prn and methyl salicylate prn    Acute kidney injury superimposed on chronic kidney disease  CKD3  -Scr 1.3 --> 1.1. with gentle hydration  - Avoid nephrotoxic agents such as NSAIDs, gadolinium and IV radiocontrast.  - Renally dose meds to current GFR.  - Maintain MAP > 65  - Improved on second day of admission    Hyponatremia  Probable SIADH  Stop chlorthalidone. Not improved with fluids. Urine studies consistent with SIADH. Will restrict fluids to 800 mL. Need to put  "thiazides on allergy list. Not improved with strict fluid restriction thus yet. She has not taken compazine. Nephrology consulted. Started patient on sodium chloride 1 g TID. Follow up with PCP and nephrology as outpatient    CKD (chronic kidney disease) stage 3, GFR 30-59 ml/min       Rectal prolapse   gyencology follow up    Seropositive rheumatoid arthritis  Well-controlled on q2w Humira; next dose due 5/12/22.    Essential hypertension  Hypertension:  -BP today: BP (!) 110/56 (Patient Position: Sitting)   Pulse 60   Temp 98 °F (36.7 °C) (Oral)   Resp 18   Ht 5' 3" (1.6 m)   Wt 54.4 kg (120 lb)   LMP  (LMP Unknown)   SpO2 (!) 93%   Breastfeeding No   BMI 21.26 kg/m²   -HTN well controlled on home amlodipine 10 mg PO daily, Hygroten 25 mg PO daily, nebivolol 5 mg PO daily, olmesartan 40 mg PO daily.  Reports she no longer takes spironolactone 25 mg PO.    PLAN:  - Holding home BP meds on admission in the setting of /50 and T99.5F recorded in the ER, as well as her admitted recent reduction in PO intake and UOP  - Resume home BP meds when she is hemodynamically stable and there is no concern for sepsis    Procedures: none  Consultants: nephrology    Discharge Medication List as of 5/9/2022  1:22 PM      START taking these medications    Details   methyl salicylate-menthol 15-10% 15-10 % Crea Apply topically 4 (four) times daily., Starting Mon 5/9/2022, Normal      sodium chloride 1 gram tablet Take 1 tablet (1 g total) by mouth 3 (three) times daily., Starting Mon 5/9/2022, Normal         CONTINUE these medications which have CHANGED    Details   predniSONE (DELTASONE) 2.5 MG tablet Take 1 tablet (2.5 mg total) by mouth daily as needed (for joint flare ups)., Starting Sun 5/8/2022, Normal         CONTINUE these medications which have NOT CHANGED    Details   adalimumab (HUMIRA PEN) PnKt injection Inject 1 pen (40 mg total) into the skin every 14 (fourteen) days., Starting Thu 3/17/2022, Normal    "   amLODIPine (NORVASC) 10 MG tablet Take 1 tablet (10 mg total) by mouth once daily., Starting Mon 5/2/2022, Until Tue 5/2/2023, Normal      multivit-min/iron/folic/lutein (CENTRUM SILVER WOMEN ORAL) Take 1 tablet by mouth once daily., Historical Med      nebivoloL (BYSTOLIC) 5 MG Tab Take 1 tablet (5 mg total) by mouth once daily., Starting Mon 10/25/2021, Normal      olmesartan (BENICAR) 40 MG tablet TAKE 1 TABLET(40 MG) BY MOUTH EVERY DAY FOR BLOOD PRESSURE, Normal      folic acid (FOLVITE) 1 MG tablet Take 1 tablet (1 mg total) by mouth once daily., Starting Mon 12/13/2021, Normal         STOP taking these medications       chlorthalidone (HYGROTEN) 25 MG Tab Comments:   Reason for Stopping:         sucralfate (CARAFATE) 1 gram tablet Comments:   Reason for Stopping:         spironolactone (ALDACTONE) 25 MG tablet Comments:   Reason for Stopping:               Discharge Diet:regular diet     Activity: activity as tolerated    Discharge Condition: Good    Disposition: Home or Self Care    Tests pending at the time of discharge: none      Time spent  on the discharge of the patient including review of hospital course with the patient. reviewing discharge medications and arranging follow-up care 35 min    Discharge examination Patient was seen and examined on the date of discharge and determined to be suitable for discharge.    Discharge plan     Future Appointments   Date Time Provider Department Center   5/17/2022 11:00 AM Kelsey Gamez PA-C [facility] IM [facility] PCW   5/19/2022  1:30 PM Thierno Espinoza MD [facility] NEPHRO [facility]   5/31/2022  1:30 PM LAB, APPOINTMENT [facility] LAB VNP [facility]   6/2/2022 11:00 AM Angel Rich MD [facility] RHEUM [facility]illo MD

## 2022-05-17 ENCOUNTER — OFFICE VISIT (OUTPATIENT)
Dept: INTERNAL MEDICINE | Facility: CLINIC | Age: 77
End: 2022-05-17
Payer: MEDICARE

## 2022-05-17 ENCOUNTER — LAB VISIT (OUTPATIENT)
Dept: LAB | Facility: HOSPITAL | Age: 77
End: 2022-05-17
Payer: MEDICARE

## 2022-05-17 VITALS
RESPIRATION RATE: 16 BRPM | BODY MASS INDEX: 21.21 KG/M2 | DIASTOLIC BLOOD PRESSURE: 60 MMHG | SYSTOLIC BLOOD PRESSURE: 128 MMHG | HEIGHT: 63 IN | HEART RATE: 82 BPM | OXYGEN SATURATION: 95 % | WEIGHT: 119.69 LBS

## 2022-05-17 DIAGNOSIS — N18.32 STAGE 3B CHRONIC KIDNEY DISEASE: ICD-10-CM

## 2022-05-17 DIAGNOSIS — E87.1 HYPONATREMIA: ICD-10-CM

## 2022-05-17 DIAGNOSIS — I10 HYPERTENSION, UNSPECIFIED TYPE: ICD-10-CM

## 2022-05-17 DIAGNOSIS — Z09 HOSPITAL DISCHARGE FOLLOW-UP: Primary | ICD-10-CM

## 2022-05-17 DIAGNOSIS — M25.511 CHRONIC RIGHT SHOULDER PAIN: ICD-10-CM

## 2022-05-17 DIAGNOSIS — G89.29 CHRONIC RIGHT SHOULDER PAIN: ICD-10-CM

## 2022-05-17 LAB
ANION GAP SERPL CALC-SCNC: 8 MMOL/L (ref 8–16)
BUN SERPL-MCNC: 16 MG/DL (ref 8–23)
CALCIUM SERPL-MCNC: 9.5 MG/DL (ref 8.7–10.5)
CHLORIDE SERPL-SCNC: 97 MMOL/L (ref 95–110)
CO2 SERPL-SCNC: 27 MMOL/L (ref 23–29)
CREAT SERPL-MCNC: 1 MG/DL (ref 0.5–1.4)
EST. GFR  (AFRICAN AMERICAN): >60 ML/MIN/1.73 M^2
EST. GFR  (NON AFRICAN AMERICAN): 54.9 ML/MIN/1.73 M^2
GLUCOSE SERPL-MCNC: 124 MG/DL (ref 70–110)
POTASSIUM SERPL-SCNC: 4.3 MMOL/L (ref 3.5–5.1)
SODIUM SERPL-SCNC: 132 MMOL/L (ref 136–145)

## 2022-05-17 PROCEDURE — 3288F FALL RISK ASSESSMENT DOCD: CPT | Mod: CPTII,S$GLB,, | Performed by: PHYSICIAN ASSISTANT

## 2022-05-17 PROCEDURE — 1159F PR MEDICATION LIST DOCUMENTED IN MEDICAL RECORD: ICD-10-PCS | Mod: CPTII,S$GLB,, | Performed by: PHYSICIAN ASSISTANT

## 2022-05-17 PROCEDURE — 1111F PR DISCHARGE MEDS RECONCILED W/ CURRENT OUTPATIENT MED LIST: ICD-10-PCS | Mod: CPTII,S$GLB,, | Performed by: PHYSICIAN ASSISTANT

## 2022-05-17 PROCEDURE — 3074F SYST BP LT 130 MM HG: CPT | Mod: CPTII,S$GLB,, | Performed by: PHYSICIAN ASSISTANT

## 2022-05-17 PROCEDURE — 1125F AMNT PAIN NOTED PAIN PRSNT: CPT | Mod: CPTII,S$GLB,, | Performed by: PHYSICIAN ASSISTANT

## 2022-05-17 PROCEDURE — 3074F PR MOST RECENT SYSTOLIC BLOOD PRESSURE < 130 MM HG: ICD-10-PCS | Mod: CPTII,S$GLB,, | Performed by: PHYSICIAN ASSISTANT

## 2022-05-17 PROCEDURE — 1111F DSCHRG MED/CURRENT MED MERGE: CPT | Mod: CPTII,S$GLB,, | Performed by: PHYSICIAN ASSISTANT

## 2022-05-17 PROCEDURE — 1159F MED LIST DOCD IN RCRD: CPT | Mod: CPTII,S$GLB,, | Performed by: PHYSICIAN ASSISTANT

## 2022-05-17 PROCEDURE — 1160F PR REVIEW ALL MEDS BY PRESCRIBER/CLIN PHARMACIST DOCUMENTED: ICD-10-PCS | Mod: CPTII,S$GLB,, | Performed by: PHYSICIAN ASSISTANT

## 2022-05-17 PROCEDURE — 3288F PR FALLS RISK ASSESSMENT DOCUMENTED: ICD-10-PCS | Mod: CPTII,S$GLB,, | Performed by: PHYSICIAN ASSISTANT

## 2022-05-17 PROCEDURE — 99214 PR OFFICE/OUTPT VISIT, EST, LEVL IV, 30-39 MIN: ICD-10-PCS | Mod: S$GLB,,, | Performed by: PHYSICIAN ASSISTANT

## 2022-05-17 PROCEDURE — 3078F DIAST BP <80 MM HG: CPT | Mod: CPTII,S$GLB,, | Performed by: PHYSICIAN ASSISTANT

## 2022-05-17 PROCEDURE — 1101F PT FALLS ASSESS-DOCD LE1/YR: CPT | Mod: CPTII,S$GLB,, | Performed by: PHYSICIAN ASSISTANT

## 2022-05-17 PROCEDURE — 3078F PR MOST RECENT DIASTOLIC BLOOD PRESSURE < 80 MM HG: ICD-10-PCS | Mod: CPTII,S$GLB,, | Performed by: PHYSICIAN ASSISTANT

## 2022-05-17 PROCEDURE — 99999 PR PBB SHADOW E&M-EST. PATIENT-LVL IV: CPT | Mod: PBBFAC,,, | Performed by: PHYSICIAN ASSISTANT

## 2022-05-17 PROCEDURE — 99499 RISK ADDL DX/OHS AUDIT: ICD-10-PCS | Mod: S$GLB,,, | Performed by: PHYSICIAN ASSISTANT

## 2022-05-17 PROCEDURE — 99214 OFFICE O/P EST MOD 30 MIN: CPT | Mod: S$GLB,,, | Performed by: PHYSICIAN ASSISTANT

## 2022-05-17 PROCEDURE — 99499 UNLISTED E&M SERVICE: CPT | Mod: S$GLB,,, | Performed by: PHYSICIAN ASSISTANT

## 2022-05-17 PROCEDURE — 1125F PR PAIN SEVERITY QUANTIFIED, PAIN PRESENT: ICD-10-PCS | Mod: CPTII,S$GLB,, | Performed by: PHYSICIAN ASSISTANT

## 2022-05-17 PROCEDURE — 80048 BASIC METABOLIC PNL TOTAL CA: CPT | Performed by: PHYSICIAN ASSISTANT

## 2022-05-17 PROCEDURE — 1101F PR PT FALLS ASSESS DOC 0-1 FALLS W/OUT INJ PAST YR: ICD-10-PCS | Mod: CPTII,S$GLB,, | Performed by: PHYSICIAN ASSISTANT

## 2022-05-17 PROCEDURE — 1160F RVW MEDS BY RX/DR IN RCRD: CPT | Mod: CPTII,S$GLB,, | Performed by: PHYSICIAN ASSISTANT

## 2022-05-17 PROCEDURE — 36415 COLL VENOUS BLD VENIPUNCTURE: CPT | Performed by: PHYSICIAN ASSISTANT

## 2022-05-17 PROCEDURE — 99999 PR PBB SHADOW E&M-EST. PATIENT-LVL IV: ICD-10-PCS | Mod: PBBFAC,,, | Performed by: PHYSICIAN ASSISTANT

## 2022-05-17 NOTE — PROGRESS NOTES
Subjective:       Patient ID: Buffy Dominique is a 76 y.o. female with PMH of HTN, CKD, RA(on humira, prednisone prn), pulmonary nodules, thoracic AAA s/p repair in 2011 and aortic valve insuffiencey.     Chief Complaint: Follow-up    HPI     Established pt of Odalis Kim MD     Here for hospital discharge after admission for right flank pain, found to have pyelonephritis and hyponatremia. Treated with IV ceftriaxone. Eval'd by nephrology while inpatient in regards to hyponatremia, chronic, started on salt tablets and diuretics discontinued.    She also c/o of chronic right shoulder pain while inpatient, had a fall in April, MRI revealed possible subchondral insufficiency fracture, severe OA, multiple rotator cuff tendon tears, and large effusion. Currently in a sling and discharged with recommendatin for outpatient Ortho f/u      Hospital Course:   Date of Admission:  5/3/2022     Date of Discharge:  5/9/2022  On presentation to [facility], the patient was unable to walk and felt ill.  VS notable for T 99.5, .  Initial exam unremarkable.  Initial labs notable for WBC 11.9, Crt 1.3 (baseline 0.8-1.0).  Imaging: CT A/P with L renal cyst, and thickening of bladder wall c/w cystitis.  Therapy/stabilization measures were started, notable for  bolus.  On my interview 10 minutes after 2g IV ceftriaxone had been given, the patient was feeling better.         Today pt states she is feeling better. Flank pain has resolved. No urinary symptoms. No confusion, dizziness, fevers, cp, sob or abdominal pain. Wearing sling RUE, managing shoulder pian with topical angelics.     BP has been elevated at home(SBP 160s), in digital HTN prgm. Not taking olmesartan or bystolic. Only taking amlodipine 10mg.     Notes adherence with sodium tablets and fluid restriction.       Past Medical History:   Diagnosis Date    CKD (chronic kidney disease) stage 3, GFR 30-59 ml/min     HLD (hyperlipidemia)     HTN (hypertension)      "Hyperparathyroidism      Social History     Tobacco Use    Smoking status: Former Smoker     Packs/day: 1.00     Years: 30.00     Pack years: 30.00     Types: Cigarettes     Quit date: 2009     Years since quittin.3    Smokeless tobacco: Never Used   Substance Use Topics    Alcohol use: No     Comment: glass of wine once or twice a year    Drug use: No     Review of patient's allergies indicates:   Allergen Reactions    Methotrexate analogues      Mouth Ulcers     Thiazides Other (See Comments)     hyponatremia         Review of Systems   Constitutional: Negative for chills, diaphoresis and fever.   Respiratory: Negative for cough and shortness of breath.    Cardiovascular: Negative for chest pain and leg swelling.   Gastrointestinal: Negative for abdominal pain, constipation, diarrhea, nausea and vomiting.   Genitourinary: Negative for dysuria, flank pain and hematuria.   Musculoskeletal: Positive for arthralgias.   Integumentary:  Negative for rash.   Neurological: Negative for weakness and headaches.         Objective:/60 (BP Location: Left arm, Patient Position: Sitting, BP Method: Medium (Manual))   Pulse 82   Resp 16   Ht 5' 3" (1.6 m)   Wt 54.3 kg (119 lb 11.4 oz)   LMP  (LMP Unknown)   SpO2 95%   BMI 21.21 kg/m²         Physical Exam  Constitutional:       Appearance: She is normal weight. She is not ill-appearing.   HENT:      Head: Normocephalic and atraumatic.   Cardiovascular:      Rate and Rhythm: Normal rate and regular rhythm.      Heart sounds: Murmur heard.   Pulmonary:      Effort: Pulmonary effort is normal. No respiratory distress.      Breath sounds: Normal breath sounds. No wheezing.   Musculoskeletal:      Right lower leg: No edema.      Left lower leg: No edema.      Comments: Wearing sleeve   Neurological:      Mental Status: She is alert.         Assessment:       Problem List Items Addressed This Visit        Renal/    CKD (chronic kidney disease) stage 3, " GFR 30-59 ml/min    Relevant Orders    Basic Metabolic Panel    Hyponatremia    Relevant Orders    Basic Metabolic Panel       Orthopedic    Right shoulder pain      Other Visit Diagnoses     Hospital discharge follow-up    -  Primary    Hypertension, unspecified type        Relevant Orders    Basic Metabolic Panel          Plan:         Buffy was seen today for follow-up.    Diagnoses and all orders for this visit:    Hospital discharge follow-up  Doing well since d/c    Chronic right shoulder pain  Will have staff help arrange Ortho follow up    Stage 3b chronic kidney disease  Hyponatremia  Repeat lab today  Continue sodium tabs per nephro  Keep nephro f/u on 5/19  -     Basic Metabolic Panel; Future    Hypertension, unspecified type  Restart olmesartan 40mg  Continue to send readings via digital HTN prgm  -     Basic Metabolic Panel; Future      Kelsey Gamez PA-C      Pt also due for follow up with PCP, will have staff schedule    Future Appointments   Date Time Provider Department Center   5/19/2022  1:30 PM Thierno Espinoza MD [facility] NEPHRO [facility]   5/31/2022  1:30 PM LAB, APPOINTMENT [facility] LAB VNP [facility]   6/2/2022 11:00 AM Angel Rich MD [facility] RHEUM [facility]

## 2022-05-17 NOTE — PATIENT INSTRUCTIONS
Restart olmesartan.     Keep nephrology appt in 2 days    Continue to submit BP reading to digital prgm    Schedule Ortho follow up at check out for your shoulder     I will message you about your results.

## 2022-05-17 NOTE — Clinical Note
Please help schedule Ortho appt (referral placed while inpatient) Next available appt with Dr. Kim for routine follow up.

## 2022-05-18 ENCOUNTER — PATIENT OUTREACH (OUTPATIENT)
Dept: ADMINISTRATIVE | Facility: OTHER | Age: 77
End: 2022-05-18
Payer: MEDICARE

## 2022-05-18 NOTE — PROGRESS NOTES
Health Maintenance Due   Topic Date Due    COVID-19 Vaccine (1) Never done    TETANUS VACCINE  Never done    Pneumococcal Vaccines (Age 65+) (2 - PCV) 12/02/2015    Mammogram  07/19/2020    DEXA Scan  11/16/2020    Shingles Vaccine (2 of 2) 12/30/2020     Updates were requested from care everywhere.  Chart was reviewed for overdue Proactive [facility] Encounters (IMAN) topics (CRS, Breast Cancer Screening, Eye exam)  Health Maintenance has been updated.  LINKS immunization registry triggered.  Immunizations were reconciled.

## 2022-05-19 ENCOUNTER — OFFICE VISIT (OUTPATIENT)
Dept: ORTHOPEDICS | Facility: CLINIC | Age: 77
End: 2022-05-19
Payer: MEDICARE

## 2022-05-19 ENCOUNTER — PATIENT MESSAGE (OUTPATIENT)
Dept: RESEARCH | Facility: HOSPITAL | Age: 77
End: 2022-05-19
Payer: MEDICARE

## 2022-05-19 ENCOUNTER — OFFICE VISIT (OUTPATIENT)
Dept: NEPHROLOGY | Facility: CLINIC | Age: 77
End: 2022-05-19
Payer: MEDICARE

## 2022-05-19 VITALS
WEIGHT: 123.44 LBS | DIASTOLIC BLOOD PRESSURE: 68 MMHG | BODY MASS INDEX: 21.87 KG/M2 | HEIGHT: 63 IN | HEART RATE: 74 BPM | SYSTOLIC BLOOD PRESSURE: 152 MMHG

## 2022-05-19 VITALS
HEIGHT: 63 IN | BODY MASS INDEX: 21.68 KG/M2 | HEART RATE: 66 BPM | SYSTOLIC BLOOD PRESSURE: 160 MMHG | WEIGHT: 122.38 LBS | DIASTOLIC BLOOD PRESSURE: 60 MMHG | OXYGEN SATURATION: 96 %

## 2022-05-19 DIAGNOSIS — S42.291A CLOSED FRACTURE OF HEAD OF RIGHT HUMERUS, INITIAL ENCOUNTER: Primary | ICD-10-CM

## 2022-05-19 DIAGNOSIS — M77.8 TENDINITIS OF RIGHT SHOULDER: ICD-10-CM

## 2022-05-19 DIAGNOSIS — E87.1 HYPONATREMIA: ICD-10-CM

## 2022-05-19 DIAGNOSIS — M25.511 RIGHT SHOULDER PAIN, UNSPECIFIED CHRONICITY: ICD-10-CM

## 2022-05-19 PROCEDURE — 3077F PR MOST RECENT SYSTOLIC BLOOD PRESSURE >= 140 MM HG: ICD-10-PCS | Mod: CPTII,S$GLB,, | Performed by: PHYSICIAN ASSISTANT

## 2022-05-19 PROCEDURE — 3288F PR FALLS RISK ASSESSMENT DOCUMENTED: ICD-10-PCS | Mod: CPTII,S$GLB,, | Performed by: PHYSICIAN ASSISTANT

## 2022-05-19 PROCEDURE — 1111F DSCHRG MED/CURRENT MED MERGE: CPT | Mod: CPTII,S$GLB,, | Performed by: PHYSICIAN ASSISTANT

## 2022-05-19 PROCEDURE — 99999 PR PBB SHADOW E&M-EST. PATIENT-LVL III: ICD-10-PCS | Mod: PBBFAC,GC,, | Performed by: INTERNAL MEDICINE

## 2022-05-19 PROCEDURE — 99213 OFFICE O/P EST LOW 20 MIN: CPT | Mod: S$GLB,,, | Performed by: PHYSICIAN ASSISTANT

## 2022-05-19 PROCEDURE — 1159F MED LIST DOCD IN RCRD: CPT | Mod: CPTII,S$GLB,, | Performed by: PHYSICIAN ASSISTANT

## 2022-05-19 PROCEDURE — 3288F FALL RISK ASSESSMENT DOCD: CPT | Mod: CPTII,S$GLB,, | Performed by: PHYSICIAN ASSISTANT

## 2022-05-19 PROCEDURE — 1111F DSCHRG MED/CURRENT MED MERGE: CPT | Mod: CPTII,GC,S$GLB, | Performed by: INTERNAL MEDICINE

## 2022-05-19 PROCEDURE — 1111F PR DISCHARGE MEDS RECONCILED W/ CURRENT OUTPATIENT MED LIST: ICD-10-PCS | Mod: CPTII,GC,S$GLB, | Performed by: INTERNAL MEDICINE

## 2022-05-19 PROCEDURE — 99213 PR OFFICE/OUTPT VISIT, EST, LEVL III, 20-29 MIN: ICD-10-PCS | Mod: S$GLB,,, | Performed by: PHYSICIAN ASSISTANT

## 2022-05-19 PROCEDURE — 99999 PR PBB SHADOW E&M-EST. PATIENT-LVL IV: ICD-10-PCS | Mod: PBBFAC,,, | Performed by: PHYSICIAN ASSISTANT

## 2022-05-19 PROCEDURE — 1111F PR DISCHARGE MEDS RECONCILED W/ CURRENT OUTPATIENT MED LIST: ICD-10-PCS | Mod: CPTII,S$GLB,, | Performed by: PHYSICIAN ASSISTANT

## 2022-05-19 PROCEDURE — 1100F PTFALLS ASSESS-DOCD GE2>/YR: CPT | Mod: CPTII,S$GLB,, | Performed by: PHYSICIAN ASSISTANT

## 2022-05-19 PROCEDURE — 3077F SYST BP >= 140 MM HG: CPT | Mod: CPTII,S$GLB,, | Performed by: PHYSICIAN ASSISTANT

## 2022-05-19 PROCEDURE — 99214 OFFICE O/P EST MOD 30 MIN: CPT | Mod: GC,S$GLB,, | Performed by: INTERNAL MEDICINE

## 2022-05-19 PROCEDURE — 99999 PR PBB SHADOW E&M-EST. PATIENT-LVL IV: CPT | Mod: PBBFAC,,, | Performed by: PHYSICIAN ASSISTANT

## 2022-05-19 PROCEDURE — 3078F PR MOST RECENT DIASTOLIC BLOOD PRESSURE < 80 MM HG: ICD-10-PCS | Mod: CPTII,S$GLB,, | Performed by: PHYSICIAN ASSISTANT

## 2022-05-19 PROCEDURE — 1100F PR PT FALLS ASSESS DOC 2+ FALLS/FALL W/INJURY/YR: ICD-10-PCS | Mod: CPTII,S$GLB,, | Performed by: PHYSICIAN ASSISTANT

## 2022-05-19 PROCEDURE — 1125F PR PAIN SEVERITY QUANTIFIED, PAIN PRESENT: ICD-10-PCS | Mod: CPTII,S$GLB,, | Performed by: PHYSICIAN ASSISTANT

## 2022-05-19 PROCEDURE — 1159F PR MEDICATION LIST DOCUMENTED IN MEDICAL RECORD: ICD-10-PCS | Mod: CPTII,S$GLB,, | Performed by: PHYSICIAN ASSISTANT

## 2022-05-19 PROCEDURE — 3078F DIAST BP <80 MM HG: CPT | Mod: CPTII,S$GLB,, | Performed by: PHYSICIAN ASSISTANT

## 2022-05-19 PROCEDURE — 1125F AMNT PAIN NOTED PAIN PRSNT: CPT | Mod: CPTII,S$GLB,, | Performed by: PHYSICIAN ASSISTANT

## 2022-05-19 PROCEDURE — 99214 PR OFFICE/OUTPT VISIT, EST, LEVL IV, 30-39 MIN: ICD-10-PCS | Mod: GC,S$GLB,, | Performed by: INTERNAL MEDICINE

## 2022-05-19 PROCEDURE — 99999 PR PBB SHADOW E&M-EST. PATIENT-LVL III: CPT | Mod: PBBFAC,GC,, | Performed by: INTERNAL MEDICINE

## 2022-05-19 PROCEDURE — 1160F RVW MEDS BY RX/DR IN RCRD: CPT | Mod: CPTII,S$GLB,, | Performed by: PHYSICIAN ASSISTANT

## 2022-05-19 PROCEDURE — 1160F PR REVIEW ALL MEDS BY PRESCRIBER/CLIN PHARMACIST DOCUMENTED: ICD-10-PCS | Mod: CPTII,S$GLB,, | Performed by: PHYSICIAN ASSISTANT

## 2022-05-19 NOTE — PROGRESS NOTES
Nephrology Clinic Note   5/19/2022    CC: follow up for hyponatremia post hospital discharge      History of present illness:  Patient is a 76 y.o. female. Patient here for follow up for hyponatremia post hospital discharge. Was 120 early may and on day of discharge was 129. Was attributed to chlorthalidone and possible SIADH. gurdeep osm 390 on may 8th and urine na was 87. Was treated with Na restriction and salt tabs. Labs were from 5/17 and Na was 132. She has been taking salt tabs 1 g TID since discharge. Denies any symptoms. Her BP at home has been 150-160 she takes benicar 40 mg qday and norvasc 10 mg qday.       Review of Systems   Constitutional: Negative.    HENT: Negative.    Eyes: Negative.    Respiratory: Negative.    Cardiovascular: Negative.    Gastrointestinal: Negative.    Genitourinary: Negative.    Musculoskeletal: Negative.    Skin: Negative.    Neurological: Negative.    Endo/Heme/Allergies: Negative.    Psychiatric/Behavioral: Negative.    All other systems reviewed and are negative.      History:  Past Medical History:   Diagnosis Date    CKD (chronic kidney disease) stage 3, GFR 30-59 ml/min     HLD (hyperlipidemia)     HTN (hypertension)     Hyperparathyroidism       Past Surgical History:   Procedure Laterality Date    CATARACT EXTRACTION, BILATERAL Bilateral 2014    cataract removal Right 2014    hiatial hernia  2017    HYSTERECTOMY      melanoma      on face    OOPHORECTOMY      THORACIC AORTIC ANEURYSM REPAIR  2011        Current Outpatient Medications:     adalimumab (HUMIRA PEN) PnKt injection, Inject 1 pen (40 mg total) into the skin every 14 (fourteen) days., Disp: 6 pen, Rfl: 3    amLODIPine (NORVASC) 10 MG tablet, Take 1 tablet (10 mg total) by mouth once daily., Disp: 90 tablet, Rfl: 1    folic acid (FOLVITE) 1 MG tablet, Take 1 tablet (1 mg total) by mouth once daily., Disp: 90 tablet, Rfl: 3    methyl salicylate-menthol 15-10% 15-10 % Crea, Apply topically 4 (four)  times daily., Disp: 57 g, Rfl: PRN    multivit-min/iron/folic/lutein (CENTRUM SILVER WOMEN ORAL), Take 1 tablet by mouth once daily., Disp: , Rfl:     nebivoloL (BYSTOLIC) 5 MG Tab, Take 1 tablet (5 mg total) by mouth once daily., Disp: 90 tablet, Rfl: 2    olmesartan (BENICAR) 40 MG tablet, TAKE 1 TABLET(40 MG) BY MOUTH EVERY DAY FOR BLOOD PRESSURE, Disp: 90 tablet, Rfl: 3    predniSONE (DELTASONE) 2.5 MG tablet, Take 1 tablet (2.5 mg total) by mouth daily as needed (for joint flare ups)., Disp: 60 tablet, Rfl: 0    sodium chloride 1 gram tablet, Take 1 tablet (1 g total) by mouth 3 (three) times daily., Disp: 90 tablet, Rfl: 3  Review of patient's allergies indicates:   Allergen Reactions    Methotrexate analogues      Mouth Ulcers     Thiazides Other (See Comments)     hyponatremia      Social History     Tobacco Use    Smoking status: Former Smoker     Packs/day: 1.00     Years: 30.00     Pack years: 30.00     Types: Cigarettes     Quit date: 2009     Years since quittin.3    Smokeless tobacco: Never Used   Substance Use Topics    Alcohol use: No     Comment: glass of wine once or twice a year      Family History   Problem Relation Age of Onset    Cancer Father     Hypertension Maternal Grandmother     Colon cancer Neg Hx     Inflammatory bowel disease Neg Hx         Physical Exam :  There were no vitals filed for this visit.  Physical Exam  Constitutional:       Appearance: Normal appearance.   HENT:      Head: Normocephalic and atraumatic.      Mouth/Throat:      Mouth: Mucous membranes are moist.   Eyes:      Conjunctiva/sclera: Conjunctivae normal.      Pupils: Pupils are equal, round, and reactive to light.   Cardiovascular:      Rate and Rhythm: Normal rate and regular rhythm.      Heart sounds: Normal heart sounds.   Pulmonary:      Effort: Pulmonary effort is normal.      Breath sounds: Normal breath sounds.   Abdominal:      General: Bowel sounds are normal.      Palpations:  Abdomen is soft.      Tenderness: There is no abdominal tenderness. There is no right CVA tenderness, left CVA tenderness or guarding.   Musculoskeletal:         General: No swelling.      Right lower leg: No edema.      Left lower leg: No edema.   Skin:     Coloration: Skin is not jaundiced.      Findings: No rash.   Neurological:      General: No focal deficit present.   Psychiatric:         Mood and Affect: Mood normal.         Behavior: Behavior normal.         Labs reviewed   Images Reviewed    Assessment and plan     1. Hyponatremia      Was 120 early may and on day of discharge was 129. Was attributed to chlorthalidone and possible SIADH. urien osm 390 on may 8th and urine na was 87. Was treated with Na restriction and salt tabs. Labs were from 5/17 and Na was 132. Will d/c SALT tabs and recheck labs on Monday 5/23/22. And patient advised to continue with free water restriction       2.HTN:   Has been 150-160 at home   Will stop salt tabs and patient will send as her BP reads next week and if needed will add 3rd agent for BP. She is taking norvasc 10 mg qday and benicar 40 mg qday. Does not take bystolic anymore per patient and also chlorthalidone was stopped in hospital for hyponatremia       There are no discontinued medications.   Future Appointments   Date Time Provider Department Center   5/19/2022  1:30 PM Thierno Espinoza MD [facility] NEPHRO [facility]   5/31/2022  9:00 AM Evelia Montiel, PT [facility]   5/31/2022  1:30 PM LAB, APPOINTMENT [facility] LAB [facility]   6/2/2022 11:00 AM Angel Rich MD [facility] RHEUM [facility]   6/16/2022 10:00 AM Wiley Benavides PA-C [facility] ORTHO [facility]   6/23/2022 10:30 AM Odalis Kim MD [facility] IM [facility] PCW

## 2022-05-19 NOTE — PROGRESS NOTES
SUBJECTIVE:     Chief Complaint & History of Present Illness:  Buffy Dominique is a  Established  patient 76 y.o. female who is seen here today with a complaint of    Chief Complaint   Patient presents with    Right Shoulder - Pain, Injury    .  He has patient well-known to me was last seen treated the clinic 06/16/2021 for his right shoulder.  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  Patient is here today for continuing care and treatment.  Reports his pain has subsided substantially since initial injury and he maintains good range of motion and strength  On a scale of 1-10, with 10 being worst pain imaginable, he rates this pain as 3 on good days and 5 on bad days.  she describes the pain as sore and tender.    Review of patient's allergies indicates:   Allergen Reactions    Methotrexate analogues      Mouth Ulcers     Thiazides Other (See Comments)     hyponatremia         Current Outpatient Medications   Medication Sig Dispense Refill    adalimumab (HUMIRA PEN) PnKt injection Inject 1 pen (40 mg total) into the skin every 14 (fourteen) days. 6 pen 3    amLODIPine (NORVASC) 10 MG tablet Take 1 tablet (10 mg total) by mouth once daily. 90 tablet 1    folic acid (FOLVITE) 1 MG tablet Take 1 tablet (1 mg total) by mouth once daily. 90 tablet 3    methyl salicylate-menthol 15-10% 15-10 % Crea Apply topically 4 (four) times daily. 57 g PRN    multivit-min/iron/folic/lutein (CENTRUM SILVER WOMEN ORAL) Take 1 tablet by mouth once daily.      nebivoloL (BYSTOLIC) 5 MG Tab Take 1 tablet (5 mg total) by mouth once daily. (Patient not taking: Reported on 5/19/2022) 90 tablet 2    olmesartan (BENICAR) 40 MG tablet TAKE 1 TABLET(40 MG) BY MOUTH EVERY DAY FOR BLOOD PRESSURE 90 tablet 3    predniSONE (DELTASONE) 2.5 MG tablet Take 1 tablet (2.5 mg total) by mouth daily as needed  "(for joint flare ups). 60 tablet 0     No current facility-administered medications for this visit.       Past Medical History:   Diagnosis Date    CKD (chronic kidney disease) stage 3, GFR 30-59 ml/min     HLD (hyperlipidemia)     HTN (hypertension)     Hyperparathyroidism        Past Surgical History:   Procedure Laterality Date    CATARACT EXTRACTION, BILATERAL Bilateral 2014    cataract removal Right 2014    hiatial hernia  2017    HYSTERECTOMY      melanoma      on face    OOPHORECTOMY      THORACIC AORTIC ANEURYSM REPAIR  2011       Vital Signs (Most Recent)  Vitals:    05/19/22 0954   BP: (!) 152/68   Pulse: 74       Review of Systems:  ROS:  Constitutional: no fever or chills  Eyes: no visual changes  ENT: no nasal congestion or sore throat  Respiratory: no cough or shortness of breath, Positive multiple pulmonary nodules  Cardiovascular: no chest pain or palpitations, Positive aortic stenosis, nonrheumatic aortic valve insufficiency history of thoracic aortic aneurysm with repair  Gastrointestinal: no nausea or vomiting, tolerating diet  Genitourinary: no hematuria or dysuria, Positive pelvic floor dysfunction, CKD stage 3 hyponatremia  Integument/Breast: no rash or pruritis  Hematologic/Lymphatic: no easy bruising or lymphadenopathy  Musculoskeletal: no arthralgias or myalgias  Neurological: no seizures or tremors  Behavioral/Psych: no auditory or visual hallucinations  Endocrine: no heat or cold intolerance, Positive hyperparathyroidism      OBJECTIVE:     PHYSICAL EXAM:  Height: 5' 3" (160 cm) Weight: 56 kg (123 lb 7.3 oz), General Appearance: Well nourished, well developed, in no acute distress.  Neurological: Mood & affect are normal.  Shoulder exam: right  Tenderness: globally  ROM: forward flexion 120 / 120, extension 80 / 80, full abduction 120 / 120, external rotation 30 / 30  Shoulder Strength: biceps 5/5, triceps 5/5, abduction 4/5, adduction 5/5  positive for tenderness about the " glenohumeral joint, positive for tenderness over the acromioclavicular joint and negative for impingement sign  Special Tests:Cross-chest abduction: diffuse pain and Christian's test: pain greater with pronation                     RADIOGRAPHS:  X-rays and MRI of the shoulder from 05/06/2022 demonstrate moderate to severe arthritic changes throughout the shoulder with sclerotic changes and concern for AVN a chondral defect at the humeral head concerning for small insufficiency fracture.  MRI confirms this.    ASSESSMENT/PLAN:       ICD-10-CM ICD-9-CM   1. Closed fracture of head of right humerus, initial encounter  S42.291A 812.09   2. Right shoulder pain, unspecified chronicity  M25.511 719.41   3. Tendinitis of right shoulder  M77.8 726.10       Plan: We discussed with the patient at length all the different treatment options available for his right shoulder including anti-inflammatories, acetaminophen, rest, ice, Physical therapy to include strengthening exercise, occasional cortisone injections for temporary relief, arthroscopic surgical repair, and finally shoulder arthroplasty.   Patient is maintaining good range of motion and activity despite his radiographic findings with this in mind we will schedule physical therapy for gentle range of motion strengthening and ADL exercises follow-up in 4 weeks for reassessment sooner symptoms dictate

## 2022-05-20 NOTE — PROGRESS NOTES
I have reviewed the notes, assessments, and/or procedures performed by Dr Pa and  I concur with her/his documentation of Buffy Dominique.      Moderate hyponatremia likely secondary to chlorthalidone use    Currently off chlorthalidone and on salt tablets    Current sodium > 130   Would hold off on salt tablets given her hypertension and continue fluid restriction   Labs on Monday 5/23, with worsening Hypernatremia, might re introduce salt tablets with additional medications for blood pressures.    Alba Gann MD  Nephrology  [facility].

## 2022-05-21 NOTE — ED NOTES
Patient reports difficulty drinking and swallowing secondary to painful sores in her mouth for the past 2 weeks. The patient is awake/ alert with no other complaint at this time.    APPEARANCE: Alert, oriented and in no acute distress.  HEENT: Speaks without hoarseness. Patient has painful yellow/white sores to mouth.   CARDIAC: Normal rate and rhythm.    PERIPHERAL VASCULAR: peripheral pulses present. Normal cap refill. No edema. Warm to touch.    RESPIRATORY:Normal rate and effort. Respirations are equal and unlabored no obvious signs of distress.  GASTRO: soft, nondistended, nontender. Denies nausea, vomiting, or diarrhea.  : voids spontaneously and without difficulty.   MUSC: Full ROM. No obvious deformity. Ambulatory with a steady gait  SKIN: Skin is warm and dry, without discoloration. Mucous membranes moist.  NEURO: Pt is awake, alert, aware of environment. No neurologic deficits noted.     No

## 2022-05-23 ENCOUNTER — PATIENT MESSAGE (OUTPATIENT)
Dept: NEPHROLOGY | Facility: CLINIC | Age: 77
End: 2022-05-23
Payer: MEDICARE

## 2022-05-23 ENCOUNTER — LAB VISIT (OUTPATIENT)
Dept: LAB | Facility: HOSPITAL | Age: 77
End: 2022-05-23
Payer: MEDICARE

## 2022-05-23 DIAGNOSIS — E87.1 HYPONATREMIA: ICD-10-CM

## 2022-05-23 LAB
ALBUMIN SERPL BCP-MCNC: 3.7 G/DL (ref 3.5–5.2)
ANION GAP SERPL CALC-SCNC: 9 MMOL/L (ref 8–16)
BUN SERPL-MCNC: 17 MG/DL (ref 8–23)
CALCIUM SERPL-MCNC: 9.6 MG/DL (ref 8.7–10.5)
CHLORIDE SERPL-SCNC: 98 MMOL/L (ref 95–110)
CO2 SERPL-SCNC: 25 MMOL/L (ref 23–29)
CREAT SERPL-MCNC: 0.9 MG/DL (ref 0.5–1.4)
EST. GFR  (AFRICAN AMERICAN): >60 ML/MIN/1.73 M^2
EST. GFR  (NON AFRICAN AMERICAN): >60 ML/MIN/1.73 M^2
GLUCOSE SERPL-MCNC: 100 MG/DL (ref 70–110)
PHOSPHATE SERPL-MCNC: 3.4 MG/DL (ref 2.7–4.5)
POTASSIUM SERPL-SCNC: 4.1 MMOL/L (ref 3.5–5.1)
SODIUM SERPL-SCNC: 132 MMOL/L (ref 136–145)

## 2022-05-23 PROCEDURE — 80069 RENAL FUNCTION PANEL: CPT | Performed by: INTERNAL MEDICINE

## 2022-05-23 PROCEDURE — 36415 COLL VENOUS BLD VENIPUNCTURE: CPT | Performed by: INTERNAL MEDICINE

## 2022-05-31 ENCOUNTER — CLINICAL SUPPORT (OUTPATIENT)
Dept: REHABILITATION | Facility: HOSPITAL | Age: 77
End: 2022-05-31
Payer: MEDICARE

## 2022-05-31 DIAGNOSIS — M25.511 RIGHT SHOULDER PAIN, UNSPECIFIED CHRONICITY: ICD-10-CM

## 2022-05-31 DIAGNOSIS — M25.511 CHRONIC RIGHT SHOULDER PAIN: ICD-10-CM

## 2022-05-31 DIAGNOSIS — G89.29 CHRONIC RIGHT SHOULDER PAIN: ICD-10-CM

## 2022-05-31 DIAGNOSIS — M25.611 DECREASED ROM OF RIGHT SHOULDER: ICD-10-CM

## 2022-05-31 DIAGNOSIS — R29.898 WEAKNESS OF SHOULDER: ICD-10-CM

## 2022-05-31 DIAGNOSIS — M77.8 TENDINITIS OF RIGHT SHOULDER: ICD-10-CM

## 2022-05-31 DIAGNOSIS — S42.291A CLOSED FRACTURE OF HEAD OF RIGHT HUMERUS, INITIAL ENCOUNTER: ICD-10-CM

## 2022-05-31 PROCEDURE — 97110 THERAPEUTIC EXERCISES: CPT

## 2022-05-31 PROCEDURE — 97161 PT EVAL LOW COMPLEX 20 MIN: CPT

## 2022-05-31 NOTE — PLAN OF CARE
"[facility]   Physical Therapy Initial Evaluation      Date: 5/31/2022   Name: Buffy Dominique  Clinic Number: [id number]    Therapy Diagnosis:   Encounter Diagnoses   Name Primary?    Right shoulder pain, unspecified chronicity     Tendinitis of right shoulder     Closed fracture of head of right humerus, initial encounter     Chronic right shoulder pain     Weakness of shoulder     Decreased ROM of right shoulder      Physician: Wiley Benavides PA*    Physician Orders: PT Eval and Treat   Medical Diagnosis from Referral:   M25.511 (ICD-10-CM) - Right shoulder pain, unspecified chronicity   M77.8 (ICD-10-CM) - Tendinitis of right shoulder   S42.291A (ICD-10-CM) - Closed fracture of head of right humerus, initial encounter   Evaluation Date: 5/31/2022  Authorization Period Expiration: TBD  Plan of Care Expiration: 8/26/2022  Progress Note Due: 6/31/2022  Visit # / Visits authorized: 1/ 1   FOTO: 1/5    Precautions: Standard     Time In: 845  Time Out: 930  Total Appointment Time (timed & untimed codes): 10 minutes      SUBJECTIVE     Date of onset: April 13th    History of current condition - Buffy reports: she fell on April 13th and didn't realize she hurt her shoulder. Patient reports she was having aches and pains in her R shoulder, but didn't think anything of it. As of two weeks ago she got sent into the hospital because she had a UTI and had difficulty walking. Patient was in the hospital for 8 days and realized her shoulder was hurting. Patient had an MRI which showed extensive damage. Patient reports she had difficulty reaching overhead due to pain. Patient works often outside of the state and does not think she will be able to partake in therapy.     Falls: April 13th    Imaging, MRI studies: " Impression:     1. Extensive bone marrow edema within the superomedial humeral head with articular surface flattening and thickening of the subchondral plate, findings that are favored " "to represent sequela of a subchondral insufficiency fracture.  Early avascular necrosis can present with similar findings and is possible.  2. Severe glenohumeral osteoarthritis with global complex tearing of the glenoid labrum.  3. Supraspinatus and infraspinatus tendinosis with a large high-grade partial-thickness articular surface tear with probable full-thickness microperforation and mild retraction.  4. Subscapularis tendinosis with high-grade partial-thickness tear of the superior fibers.  5. Severe biceps tendinosis/tenosynovitis with high-grade partial-thickness interstitial tearing.  6. Large joint effusion with extensive synovitis and scattered loose bodies, likely reactive or inflammatory in nature.  Clinical considerations will determine the need for further evaluation with arthrocentesis to rule out an underlying infectious process.  7. Mild AC joint arthrosis.  8. Subacromial/subdeltoid bursitis.  This report was flagged in Epic as abnormal. 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   Description: Aching and Throbbing  Aggravating Factors: Reaching and lifting   Easing Factors: rest and rub shoulder     Patients goals: To learn some exercises while she is away for work     Medical History:   Past Medical History:   Diagnosis Date    CKD (chronic kidney disease) stage 3, GFR 30-59 ml/min     HLD (hyperlipidemia)     HTN (hypertension)     Hyperparathyroidism        Surgical History:   Buffy Dominique  has a past surgical history that includes cataract removal (Right, 2014); Cataract extraction, bilateral (Bilateral, 2014); hiatial hernia (2017); Hysterectomy; melanoma; Oophorectomy; " and Thoracic aortic aneurysm repair (2011).    Medications:   Buffy has a current medication list which includes the following prescription(s): humira pen, amlodipine, folic acid, methyl salicylate-menthol 15-10%, multivit-min/iron/folic/lutein, nebivolol, olmesartan, prednisone, and [DISCONTINUED] spironolactone.    Allergies:   Review of patient's allergies indicates:   Allergen Reactions    Methotrexate analogues      Mouth Ulcers     Thiazides Other (See Comments)     hyponatremia          OBJECTIVE     Shoulder Right Right Right Left Left Pain/Dysfunction with Movement    AROM PROM MMT AROM MMT  Pain = !    Flexion 120 ! 150 !  4-/5 !  180 4/5    Abduction 120 ! 180 4-/5 ! 180 4/5    Internal rotation WNL WNL 4-/5 ! WNL 4/5    External Rotation 40 ! 45 ! 4-/5 ! WNL 4/5          Infraspinatus Test: Positive on R     Lift Off Belly Test: Positive on R         Limitation/Restriction for FOTO Shoulder Survey    Therapist reviewed FOTO scores for Buffy Dominique on 5/31/2022.   FOTO documents entered into [employer] - see Media section.    Limitation Score: **%         TREATMENT     Total Treatment time (time-based codes) separate from Evaluation: 10 minutes      Buffy received the treatments listed below:      therapeutic exercises to develop strength, endurance, ROM and flexibility for 10 minutes including:    Wand press ups  - 2x10  Sidelying ER   - 2x10  Scapular Retractions   5 sec x 20  Wall Slides   - 2x10         PATIENT EDUCATION AND HOME EXERCISES     Education provided:   - HEP    Written Home Exercises Provided: yes. Exercises were reviewed and Buffy was able to demonstrate them prior to the end of the session.  Buffy demonstrated good  understanding of the education provided. See EMR under Patient Instructions for exercises provided during therapy sessions.    ASSESSMENT     Buffy is a 76 y.o. female referred to outpatient Physical Therapy with a medical diagnosis of right shoulder pain. Patient presents with R  shoulder pain that has been ongoing since April. Patient presents with R shoulder pain, decreased R shoulder range of motion, and decreased R shoulder strength. Patient is not able to consistently come to PT, so she will receive a home program while she is away. Patient received therex today to address shoulder range of motion and strength deficits. Patient did well today with no adverse effects. Patients signs and symptoms present similarly to rotator cuff pathology.     Patient prognosis is Fair.   Patient will benefit from skilled outpatient Physical Therapy to address the deficits stated above and in the chart below, provide patient /family education, and to maximize patientt's level of independence.     Plan of care discussed with patient: Yes  Patient's spiritual, cultural and educational needs considered and patient is agreeable to the plan of care and goals as stated below:     Anticipated Barriers for therapy: Advanced shoulder pathology     Medical Necessity is demonstrated by the following  History  Co-morbidities and personal factors that may impact the plan of care Co-morbidities:   CKD stage 3 and HTN    Personal Factors:   no deficits     moderate   Examination  Body Structures and Functions, activity limitations and participation restrictions that may impact the plan of care Body Regions:   upper extremities    Body Systems:    ROM  strength    Participation Restrictions:   None    Activity limitations:   Learning and applying knowledge  no deficits    General Tasks and Commands  no deficits    Communication  no deficits    Mobility  no deficits    Self care  no deficits    Domestic Life  no deficits    Interactions/Relationships  no deficits    Life Areas  no deficits    Community and Social Life  recreation and leisure         moderate   Clinical Presentation stable and uncomplicated low   Decision Making/ Complexity Score: low     Goals:  Short Term Goals (2 Weeks):   1. Pt will be independent  with HEP to supplement PT in improving functional use of R UE.    Long Term Goals (8 Weeks):   1. Pt will increase R shoulder PROM to WNL in all planes to improve functional use of R RUE.  2. Pt will increase R shoulder AROM to WFL in all planes to improve functional use of R RUE.  3. Pt will improve R shoulder MMTs to = L to promote equal use of B UEs in performing functional tasks.  4. Pt will lift 10 lb objects without pain to promote functional QOL.  5. Pt to report pain </= 0/10 with ADLs and IADLs using R UE to improve functional QOL.      PLAN   Plan of care Certification: 5/31/2022 to 8/26/2022.    Outpatient Physical Therapy 1 times weekly for 6 weeks to include the following interventions: Manual Therapy, Moist Heat/ Ice, Neuromuscular Re-ed, Patient Education, Therapeutic Activities and Therapeutic Exercise.     Tim Suresh, PT      I CERTIFY THE NEED FOR THESE SERVICES FURNISHED UNDER THIS PLAN OF TREATMENT AND WHILE UNDER MY CARE   Physician's comments:     Physician's Signature: ___________________________________________________

## 2022-06-23 ENCOUNTER — TELEPHONE (OUTPATIENT)
Dept: NEPHROLOGY | Facility: CLINIC | Age: 77
End: 2022-06-23
Payer: MEDICARE

## 2022-07-01 ENCOUNTER — TELEPHONE (OUTPATIENT)
Dept: ADMINISTRATIVE | Facility: CLINIC | Age: 77
End: 2022-07-01
Payer: MEDICARE

## 2022-07-05 ENCOUNTER — TELEPHONE (OUTPATIENT)
Dept: ADMINISTRATIVE | Facility: CLINIC | Age: 77
End: 2022-07-05
Payer: MEDICARE

## 2022-07-05 NOTE — TELEPHONE ENCOUNTER
Called pt; informed pt I was just making a reminder call for her virtual visit today at 7:00am and to see if she needed any help; pt stated she wanted to reschedule to an in office visit; pt stated she tried to complete e-pre check but could not and did not feel like trying to complete it while on the phone with me; appt rescheduled to 7/27/22

## 2022-07-12 ENCOUNTER — PATIENT MESSAGE (OUTPATIENT)
Dept: SURGERY | Facility: CLINIC | Age: 77
End: 2022-07-12
Payer: MEDICARE

## 2022-07-12 ENCOUNTER — TELEPHONE (OUTPATIENT)
Dept: SURGERY | Facility: CLINIC | Age: 77
End: 2022-07-12
Payer: MEDICARE

## 2022-07-12 NOTE — TELEPHONE ENCOUNTER
Attempted to call Ms. Baer in regards to her c/s and PFT but there was no answer. I left a voicemail to call me back.

## 2022-07-12 NOTE — TELEPHONE ENCOUNTER
----- Message from Sharath Myles MD sent at 2022  7:43 AM CDT -----  Can we reach back out to her and see about her colonoscopy and PFPT - her order     Just want to make sure she is doing ok and doesn't need help (another order)

## 2022-07-12 NOTE — TELEPHONE ENCOUNTER
Spoke with arpita after speaking with dr. Myles, informed her that she can call and get scheduled for her colonoscopy whenever she is ready.

## 2022-07-14 ENCOUNTER — PATIENT MESSAGE (OUTPATIENT)
Dept: RHEUMATOLOGY | Facility: CLINIC | Age: 77
End: 2022-07-14
Payer: MEDICARE

## 2022-07-27 ENCOUNTER — LAB VISIT (OUTPATIENT)
Dept: LAB | Facility: HOSPITAL | Age: 77
End: 2022-07-27
Attending: INTERNAL MEDICINE
Payer: MEDICARE

## 2022-07-27 DIAGNOSIS — M05.9 SEROPOSITIVE RHEUMATOID ARTHRITIS: ICD-10-CM

## 2022-07-27 LAB
ALBUMIN SERPL BCP-MCNC: 4.2 G/DL (ref 3.5–5.2)
ALP SERPL-CCNC: 65 U/L (ref 55–135)
ALT SERPL W/O P-5'-P-CCNC: 8 U/L (ref 10–44)
ANION GAP SERPL CALC-SCNC: 8 MMOL/L (ref 8–16)
AST SERPL-CCNC: 16 U/L (ref 10–40)
BASOPHILS # BLD AUTO: 0.06 K/UL (ref 0–0.2)
BASOPHILS NFR BLD: 0.9 % (ref 0–1.9)
BILIRUB SERPL-MCNC: 0.5 MG/DL (ref 0.1–1)
BUN SERPL-MCNC: 43 MG/DL (ref 8–23)
CALCIUM SERPL-MCNC: 9.9 MG/DL (ref 8.7–10.5)
CHLORIDE SERPL-SCNC: 96 MMOL/L (ref 95–110)
CO2 SERPL-SCNC: 24 MMOL/L (ref 23–29)
CREAT SERPL-MCNC: 1.7 MG/DL (ref 0.5–1.4)
CRP SERPL-MCNC: 2.2 MG/L (ref 0–8.2)
DIFFERENTIAL METHOD: ABNORMAL
EOSINOPHIL # BLD AUTO: 0.8 K/UL (ref 0–0.5)
EOSINOPHIL NFR BLD: 12.1 % (ref 0–8)
ERYTHROCYTE [DISTWIDTH] IN BLOOD BY AUTOMATED COUNT: 11.9 % (ref 11.5–14.5)
ERYTHROCYTE [SEDIMENTATION RATE] IN BLOOD BY PHOTOMETRIC METHOD: 4 MM/HR (ref 0–36)
EST. GFR  (AFRICAN AMERICAN): 33.3 ML/MIN/1.73 M^2
EST. GFR  (NON AFRICAN AMERICAN): 28.9 ML/MIN/1.73 M^2
GLUCOSE SERPL-MCNC: 85 MG/DL (ref 70–110)
HCT VFR BLD AUTO: 36.4 % (ref 37–48.5)
HGB BLD-MCNC: 12.6 G/DL (ref 12–16)
IMM GRANULOCYTES # BLD AUTO: 0.02 K/UL (ref 0–0.04)
IMM GRANULOCYTES NFR BLD AUTO: 0.3 % (ref 0–0.5)
LYMPHOCYTES # BLD AUTO: 3 K/UL (ref 1–4.8)
LYMPHOCYTES NFR BLD: 43.9 % (ref 18–48)
MCH RBC QN AUTO: 33 PG (ref 27–31)
MCHC RBC AUTO-ENTMCNC: 34.6 G/DL (ref 32–36)
MCV RBC AUTO: 95 FL (ref 82–98)
MONOCYTES # BLD AUTO: 0.6 K/UL (ref 0.3–1)
MONOCYTES NFR BLD: 9 % (ref 4–15)
NEUTROPHILS # BLD AUTO: 2.3 K/UL (ref 1.8–7.7)
NEUTROPHILS NFR BLD: 33.8 % (ref 38–73)
NRBC BLD-RTO: 0 /100 WBC
PLATELET # BLD AUTO: 289 K/UL (ref 150–450)
PMV BLD AUTO: 8.6 FL (ref 9.2–12.9)
POTASSIUM SERPL-SCNC: 4.7 MMOL/L (ref 3.5–5.1)
PROT SERPL-MCNC: 7.3 G/DL (ref 6–8.4)
RBC # BLD AUTO: 3.82 M/UL (ref 4–5.4)
SODIUM SERPL-SCNC: 128 MMOL/L (ref 136–145)
WBC # BLD AUTO: 6.75 K/UL (ref 3.9–12.7)

## 2022-07-27 PROCEDURE — 36415 COLL VENOUS BLD VENIPUNCTURE: CPT | Performed by: STUDENT IN AN ORGANIZED HEALTH CARE EDUCATION/TRAINING PROGRAM

## 2022-07-27 PROCEDURE — 85652 RBC SED RATE AUTOMATED: CPT | Performed by: STUDENT IN AN ORGANIZED HEALTH CARE EDUCATION/TRAINING PROGRAM

## 2022-07-27 PROCEDURE — 85025 COMPLETE CBC W/AUTO DIFF WBC: CPT | Performed by: STUDENT IN AN ORGANIZED HEALTH CARE EDUCATION/TRAINING PROGRAM

## 2022-07-27 PROCEDURE — 80053 COMPREHEN METABOLIC PANEL: CPT | Performed by: STUDENT IN AN ORGANIZED HEALTH CARE EDUCATION/TRAINING PROGRAM

## 2022-07-27 PROCEDURE — 86140 C-REACTIVE PROTEIN: CPT | Performed by: STUDENT IN AN ORGANIZED HEALTH CARE EDUCATION/TRAINING PROGRAM

## 2022-07-28 ENCOUNTER — HOSPITAL ENCOUNTER (EMERGENCY)
Facility: HOSPITAL | Age: 77
Discharge: HOME | End: 2022-07-28
Attending: EMERGENCY MEDICINE
Payer: MEDICARE

## 2022-07-28 ENCOUNTER — OFFICE VISIT (OUTPATIENT)
Dept: RHEUMATOLOGY | Facility: CLINIC | Age: 77
End: 2022-07-28
Payer: MEDICARE

## 2022-07-28 VITALS
HEART RATE: 77 BPM | HEIGHT: 63 IN | WEIGHT: 116.19 LBS | BODY MASS INDEX: 20.59 KG/M2 | SYSTOLIC BLOOD PRESSURE: 131 MMHG | DIASTOLIC BLOOD PRESSURE: 74 MMHG | TEMPERATURE: 98 F

## 2022-07-28 VITALS
DIASTOLIC BLOOD PRESSURE: 68 MMHG | RESPIRATION RATE: 14 BRPM | BODY MASS INDEX: 20.19 KG/M2 | TEMPERATURE: 98 F | HEART RATE: 67 BPM | WEIGHT: 114 LBS | SYSTOLIC BLOOD PRESSURE: 151 MMHG | OXYGEN SATURATION: 95 %

## 2022-07-28 DIAGNOSIS — I10 HYPERTENSION, UNSPECIFIED TYPE: ICD-10-CM

## 2022-07-28 DIAGNOSIS — R19.7 DIARRHEA, UNSPECIFIED TYPE: ICD-10-CM

## 2022-07-28 DIAGNOSIS — N17.9 AKI (ACUTE KIDNEY INJURY): ICD-10-CM

## 2022-07-28 DIAGNOSIS — E22.2 SIADH (SYNDROME OF INAPPROPRIATE ADH PRODUCTION): ICD-10-CM

## 2022-07-28 DIAGNOSIS — M05.9 SEROPOSITIVE RHEUMATOID ARTHRITIS: Primary | ICD-10-CM

## 2022-07-28 DIAGNOSIS — M81.0 AGE-RELATED OSTEOPOROSIS WITHOUT CURRENT PATHOLOGICAL FRACTURE: ICD-10-CM

## 2022-07-28 DIAGNOSIS — K92.1 FLECKS OF BLOOD IN STOOL: ICD-10-CM

## 2022-07-28 DIAGNOSIS — R19.7 DIARRHEA, UNSPECIFIED TYPE: Primary | ICD-10-CM

## 2022-07-28 DIAGNOSIS — K92.1 MELENA: ICD-10-CM

## 2022-07-28 LAB
ALBUMIN SERPL BCP-MCNC: 4.6 G/DL (ref 3.5–5.2)
ALP SERPL-CCNC: 76 U/L (ref 55–135)
ALT SERPL W/O P-5'-P-CCNC: 9 U/L (ref 10–44)
ANION GAP SERPL CALC-SCNC: 11 MMOL/L (ref 8–16)
AST SERPL-CCNC: 21 U/L (ref 10–40)
BASOPHILS # BLD AUTO: 0.05 K/UL (ref 0–0.2)
BASOPHILS NFR BLD: 0.6 % (ref 0–1.9)
BILIRUB SERPL-MCNC: 0.8 MG/DL (ref 0.1–1)
BILIRUB UR QL STRIP: NEGATIVE
BUN SERPL-MCNC: 38 MG/DL (ref 6–30)
BUN SERPL-MCNC: 42 MG/DL (ref 8–23)
CALCIUM SERPL-MCNC: 10.6 MG/DL (ref 8.7–10.5)
CHLORIDE SERPL-SCNC: 100 MMOL/L (ref 95–110)
CHLORIDE SERPL-SCNC: 105 MMOL/L (ref 95–110)
CLARITY UR REFRACT.AUTO: ABNORMAL
CO2 SERPL-SCNC: 21 MMOL/L (ref 23–29)
COLOR UR AUTO: ABNORMAL
CREAT SERPL-MCNC: 1.3 MG/DL (ref 0.5–1.4)
CREAT SERPL-MCNC: 1.4 MG/DL (ref 0.5–1.4)
DIFFERENTIAL METHOD: ABNORMAL
EOSINOPHIL # BLD AUTO: 0.7 K/UL (ref 0–0.5)
EOSINOPHIL NFR BLD: 8.5 % (ref 0–8)
ERYTHROCYTE [DISTWIDTH] IN BLOOD BY AUTOMATED COUNT: 11.9 % (ref 11.5–14.5)
EST. GFR  (AFRICAN AMERICAN): 42.1 ML/MIN/1.73 M^2
EST. GFR  (NON AFRICAN AMERICAN): 36.5 ML/MIN/1.73 M^2
GLUCOSE SERPL-MCNC: 96 MG/DL (ref 70–110)
GLUCOSE SERPL-MCNC: 96 MG/DL (ref 70–110)
GLUCOSE UR QL STRIP: NEGATIVE
HCT VFR BLD AUTO: 39.8 % (ref 37–48.5)
HCT VFR BLD CALC: 39 %PCV (ref 36–54)
HGB BLD-MCNC: 13.7 G/DL (ref 12–16)
HGB UR QL STRIP: NEGATIVE
IMM GRANULOCYTES # BLD AUTO: 0.02 K/UL (ref 0–0.04)
IMM GRANULOCYTES NFR BLD AUTO: 0.2 % (ref 0–0.5)
KETONES UR QL STRIP: NEGATIVE
LEUKOCYTE ESTERASE UR QL STRIP: NEGATIVE
LYMPHOCYTES # BLD AUTO: 3.2 K/UL (ref 1–4.8)
LYMPHOCYTES NFR BLD: 38.8 % (ref 18–48)
MCH RBC QN AUTO: 33 PG (ref 27–31)
MCHC RBC AUTO-ENTMCNC: 34.4 G/DL (ref 32–36)
MCV RBC AUTO: 96 FL (ref 82–98)
MONOCYTES # BLD AUTO: 0.6 K/UL (ref 0.3–1)
MONOCYTES NFR BLD: 7.2 % (ref 4–15)
NEUTROPHILS # BLD AUTO: 3.7 K/UL (ref 1.8–7.7)
NEUTROPHILS NFR BLD: 44.7 % (ref 38–73)
NITRITE UR QL STRIP: NEGATIVE
NRBC BLD-RTO: 0 /100 WBC
PH UR STRIP: 5 [PH] (ref 5–8)
PLATELET # BLD AUTO: 267 K/UL (ref 150–450)
PMV BLD AUTO: 8.5 FL (ref 9.2–12.9)
POC IONIZED CALCIUM: 1.24 MMOL/L (ref 1.06–1.42)
POC TCO2 (MEASURED): 20 MMOL/L (ref 23–29)
POTASSIUM BLD-SCNC: 4.6 MMOL/L (ref 3.5–5.1)
POTASSIUM SERPL-SCNC: 4.8 MMOL/L (ref 3.5–5.1)
PROT SERPL-MCNC: 8.3 G/DL (ref 6–8.4)
PROT UR QL STRIP: NEGATIVE
RBC # BLD AUTO: 4.15 M/UL (ref 4–5.4)
SAMPLE: ABNORMAL
SODIUM BLD-SCNC: 134 MMOL/L (ref 136–145)
SODIUM SERPL-SCNC: 132 MMOL/L (ref 136–145)
SP GR UR STRIP: 1.01 (ref 1–1.03)
URN SPEC COLLECT METH UR: ABNORMAL
WBC # BLD AUTO: 8.24 K/UL (ref 3.9–12.7)

## 2022-07-28 PROCEDURE — 99215 OFFICE O/P EST HI 40 MIN: CPT | Mod: GC,S$GLB,, | Performed by: STUDENT IN AN ORGANIZED HEALTH CARE EDUCATION/TRAINING PROGRAM

## 2022-07-28 PROCEDURE — 81003 URINALYSIS AUTO W/O SCOPE: CPT | Performed by: EMERGENCY MEDICINE

## 2022-07-28 PROCEDURE — 99499 UNLISTED E&M SERVICE: CPT | Mod: S$GLB,,, | Performed by: INTERNAL MEDICINE

## 2022-07-28 PROCEDURE — 1160F PR REVIEW ALL MEDS BY PRESCRIBER/CLIN PHARMACIST DOCUMENTED: ICD-10-PCS | Mod: CPTII,GC,S$GLB, | Performed by: STUDENT IN AN ORGANIZED HEALTH CARE EDUCATION/TRAINING PROGRAM

## 2022-07-28 PROCEDURE — 3078F DIAST BP <80 MM HG: CPT | Mod: CPTII,GC,S$GLB, | Performed by: STUDENT IN AN ORGANIZED HEALTH CARE EDUCATION/TRAINING PROGRAM

## 2022-07-28 PROCEDURE — 25000003 PHARM REV CODE 250: Performed by: EMERGENCY MEDICINE

## 2022-07-28 PROCEDURE — 1159F MED LIST DOCD IN RCRD: CPT | Mod: CPTII,GC,S$GLB, | Performed by: STUDENT IN AN ORGANIZED HEALTH CARE EDUCATION/TRAINING PROGRAM

## 2022-07-28 PROCEDURE — 99999 PR PBB SHADOW E&M-EST. PATIENT-LVL IV: ICD-10-PCS | Mod: PBBFAC,GC,, | Performed by: STUDENT IN AN ORGANIZED HEALTH CARE EDUCATION/TRAINING PROGRAM

## 2022-07-28 PROCEDURE — 85025 COMPLETE CBC W/AUTO DIFF WBC: CPT | Performed by: EMERGENCY MEDICINE

## 2022-07-28 PROCEDURE — 99999 PR PBB SHADOW E&M-EST. PATIENT-LVL IV: CPT | Mod: PBBFAC,GC,, | Performed by: STUDENT IN AN ORGANIZED HEALTH CARE EDUCATION/TRAINING PROGRAM

## 2022-07-28 PROCEDURE — 99284 EMERGENCY DEPT VISIT MOD MDM: CPT | Mod: 25

## 2022-07-28 PROCEDURE — 3078F PR MOST RECENT DIASTOLIC BLOOD PRESSURE < 80 MM HG: ICD-10-PCS | Mod: CPTII,GC,S$GLB, | Performed by: STUDENT IN AN ORGANIZED HEALTH CARE EDUCATION/TRAINING PROGRAM

## 2022-07-28 PROCEDURE — 3075F SYST BP GE 130 - 139MM HG: CPT | Mod: CPTII,GC,S$GLB, | Performed by: STUDENT IN AN ORGANIZED HEALTH CARE EDUCATION/TRAINING PROGRAM

## 2022-07-28 PROCEDURE — 99499 RISK ADDL DX/OHS AUDIT: ICD-10-PCS | Mod: S$GLB,,, | Performed by: INTERNAL MEDICINE

## 2022-07-28 PROCEDURE — 99285 PR EMERGENCY DEPT VISIT,LEVEL V: ICD-10-PCS | Mod: ,,, | Performed by: EMERGENCY MEDICINE

## 2022-07-28 PROCEDURE — 86803 HEPATITIS C AB TEST: CPT | Performed by: EMERGENCY MEDICINE

## 2022-07-28 PROCEDURE — 80053 COMPREHEN METABOLIC PANEL: CPT | Performed by: EMERGENCY MEDICINE

## 2022-07-28 PROCEDURE — 1125F AMNT PAIN NOTED PAIN PRSNT: CPT | Mod: CPTII,GC,S$GLB, | Performed by: STUDENT IN AN ORGANIZED HEALTH CARE EDUCATION/TRAINING PROGRAM

## 2022-07-28 PROCEDURE — 1160F RVW MEDS BY RX/DR IN RCRD: CPT | Mod: CPTII,GC,S$GLB, | Performed by: STUDENT IN AN ORGANIZED HEALTH CARE EDUCATION/TRAINING PROGRAM

## 2022-07-28 PROCEDURE — 99215 PR OFFICE/OUTPT VISIT, EST, LEVL V, 40-54 MIN: ICD-10-PCS | Mod: GC,S$GLB,, | Performed by: STUDENT IN AN ORGANIZED HEALTH CARE EDUCATION/TRAINING PROGRAM

## 2022-07-28 PROCEDURE — 1159F PR MEDICATION LIST DOCUMENTED IN MEDICAL RECORD: ICD-10-PCS | Mod: CPTII,GC,S$GLB, | Performed by: STUDENT IN AN ORGANIZED HEALTH CARE EDUCATION/TRAINING PROGRAM

## 2022-07-28 PROCEDURE — 1125F PR PAIN SEVERITY QUANTIFIED, PAIN PRESENT: ICD-10-PCS | Mod: CPTII,GC,S$GLB, | Performed by: STUDENT IN AN ORGANIZED HEALTH CARE EDUCATION/TRAINING PROGRAM

## 2022-07-28 PROCEDURE — 3075F PR MOST RECENT SYSTOLIC BLOOD PRESS GE 130-139MM HG: ICD-10-PCS | Mod: CPTII,GC,S$GLB, | Performed by: STUDENT IN AN ORGANIZED HEALTH CARE EDUCATION/TRAINING PROGRAM

## 2022-07-28 PROCEDURE — 99285 EMERGENCY DEPT VISIT HI MDM: CPT | Mod: ,,, | Performed by: EMERGENCY MEDICINE

## 2022-07-28 PROCEDURE — 96360 HYDRATION IV INFUSION INIT: CPT

## 2022-07-28 RX ORDER — ADALIMUMAB 40MG/0.8ML
40 KIT SUBCUTANEOUS
Qty: 6 PEN | Refills: 3 | Status: SHIPPED | OUTPATIENT
Start: 2022-07-28 | End: 2022-11-03 | Stop reason: SDUPTHER

## 2022-07-28 RX ADMIN — SODIUM CHLORIDE 1000 ML: 0.9 INJECTION, SOLUTION INTRAVENOUS at 12:07

## 2022-07-28 ASSESSMENT — ROUTINE ASSESSMENT OF PATIENT INDEX DATA (RAPID3)
AM STIFFNESS SCORE: 1, YES
MDHAQ FUNCTION SCORE: 0.2
TOTAL RAPID3 SCORE: 2.06
PATIENT GLOBAL ASSESSMENT SCORE: 3
WHEN YOU AWAKENED IN THE MORNING OVER THE LAST WEEK, PLEASE INDICATE THE AMOUNT OF TIME IT TAKES UNTIL YOU ARE AS LIMBER AS YOU WILL BE FOR THE DAY: 1 HOUR
PAIN SCORE: 2.5
PSYCHOLOGICAL DISTRESS SCORE: 0
FATIGUE SCORE: 2

## 2022-07-28 NOTE — ED PROVIDER NOTES
Encounter Date: 2022       History     Chief Complaint   Patient presents with    Diarrhea     4 episodes last night, twice this a.m., appears watery and black. Denies dizziness and weakness.      Pt is a 77 yo F with PMH of  CKD, HLD, HTN, hyperparathyroidism, rectal prolapse who presents with black specked, watery diarrhea that started last night  No abdominal pain, fever, chills, nausea, vomiting  Did take pepto bismol but black stools started before that  Recent travel but no camping, drinking from streams etc, no international travel  No sick contacts  Not on any AC but had been taking ibuprofen for shoulder pain  She is overdue for colonoscopy and is following with [facility] for rectal prolapse repair but is supposed to have a colonoscopy prior to this  Is on humira for RA, no longer taking methotrexate    The history is provided by the patient.     Review of patient's allergies indicates:   Allergen Reactions    Methotrexate analogues      Mouth Ulcers     Thiazides Other (See Comments)     hyponatremia     Past Medical History:   Diagnosis Date    CKD (chronic kidney disease) stage 3, GFR 30-59 ml/min     HLD (hyperlipidemia)     HTN (hypertension)     Hyperparathyroidism      Past Surgical History:   Procedure Laterality Date    CATARACT EXTRACTION, BILATERAL Bilateral 2014    cataract removal Right 2014    hiatial hernia  2017    HYSTERECTOMY      melanoma      on face    OOPHORECTOMY      THORACIC AORTIC ANEURYSM REPAIR  2011     Family History   Problem Relation Age of Onset    Cancer Father     Hypertension Maternal Grandmother     Colon cancer Neg Hx     Inflammatory bowel disease Neg Hx      Social History     Tobacco Use    Smoking status: Former Smoker     Packs/day: 1.00     Years: 30.00     Pack years: 30.00     Types: Cigarettes     Quit date: 2009     Years since quittin.5    Smokeless tobacco: Never Used   Substance Use Topics    Alcohol use: No     Comment: glass  of wine once or twice a year    Drug use: No     Review of Systems  General: No fever.  No chills.  Eyes: No visual changes.  Head: No headache.    Integument: No rashes or lesions.  Chest: No shortness of breath.  Cardiovascular: No chest pain.  Abdomen: No abdominal pain.  No nausea or vomiting.  Urinary: No abnormal urination.  Neurologic: No focal weakness.  No numbness.  Hematologic: No easy bruising.  Endocrine: No excessive thirst or urination.    Physical Exam     Initial Vitals [07/28/22 1031]   BP Pulse Resp Temp SpO2   133/65 91 18 97.7 °F (36.5 °C) 97 %      MAP       --         Physical Exam    Nursing note and vitals reviewed.  Constitutional: She appears well-developed and well-nourished. She is not diaphoretic. No distress.   HENT:   Head: Normocephalic and atraumatic.   Eyes: Conjunctivae and EOM are normal.   Neck:   Normal range of motion.  Cardiovascular: Normal rate and regular rhythm.   Pulmonary/Chest: No respiratory distress.   Abdominal: Abdomen is soft. She exhibits no distension. There is no abdominal tenderness.   Genitourinary: Rectum:      Guaiac result positive (small black clots are heme positive).   Guaiac positive stool (small black clots are heme positive). : Acceptable.   Genitourinary Comments: Nurse chaperone present  No external hemorrhoids or fissure seen  There is watery stool with small black clots     Musculoskeletal:         General: Normal range of motion.      Cervical back: Normal range of motion.     Neurological: She is alert and oriented to person, place, and time. GCS score is 15. GCS eye subscore is 4. GCS verbal subscore is 5. GCS motor subscore is 6.   Skin: Skin is warm and dry. No pallor.   Psychiatric: She has a normal mood and affect. Her behavior is normal. Judgment and thought content normal.         ED Course   Procedures  Labs Reviewed   CBC W/ AUTO DIFFERENTIAL - Abnormal; Notable for the following components:       Result Value    MCH  33.0 (*)     MPV 8.5 (*)     Eos # 0.7 (*)     Eosinophil % 8.5 (*)     All other components within normal limits    Narrative:     Release to patient->Immediate   COMPREHENSIVE METABOLIC PANEL - Abnormal; Notable for the following components:    Sodium 132 (*)     CO2 21 (*)     BUN 42 (*)     Calcium 10.6 (*)     ALT 9 (*)     eGFR if  42.1 (*)     eGFR if non  36.5 (*)     All other components within normal limits    Narrative:     Release to patient->Immediate   URINALYSIS, REFLEX TO URINE CULTURE - Abnormal; Notable for the following components:    Appearance, UA Hazy (*)     All other components within normal limits    Narrative:     Specimen Source->Urine   ISTAT PROCEDURE - Abnormal; Notable for the following components:    POC BUN 38 (*)     POC Sodium 134 (*)     POC TCO2 (MEASURED) 20 (*)     All other components within normal limits   HEPATITIS C ANTIBODY          Imaging Results    None          Medications   sodium chloride 0.9% bolus 1,000 mL (0 mLs Intravenous Stopped 7/28/22 1346)     Medical Decision Making:   History:   Old Medical Records: I decided to obtain old medical records.  Old Records Summarized: records from previous admission(s), records from another hospital and records from clinic visits.  Differential Diagnosis:   Diverticulosis, rectal prolapse, PUD, neoplasm, internal hemorrhoids, colitis  Clinical Tests:   Lab Tests: Ordered and Reviewed  ED Management:  Pt with normal H/H  Tiny clots in loose stool, I think could be bleeding related to known rectal prolapse  She has no abdominal pain  She is due for a colonoscopy so recommended she contact [facility]  Also with ASHLYN yesterday that is improving, recommend close follow up with her pcp for repeat blood work  Return to ED if any worsening, decreased urination or not urinating  Discharged to home in stable condition, return to ED warnings given, follow up and patient care instructions given.    Other:   I have  discussed this case with another health care provider.       <> Summary of the Discussion: Discussed with rheumatologist Dr. Magalie Matthews who reports it is okay for patient to continue Humira             ED Course as of 07/28/22 2245   Thu Jul 28, 2022   1243 Creatinine: 1.4  Baseline is 0.9 but is improved from 1.7 yesterday [GK]   1453 Messaged rheumatology about  [GK]      ED Course User Index  [GK] Mary Rojas MD             Clinical Impression:   Final diagnoses:  [R19.7] Diarrhea, unspecified type (Primary)  [K92.1] Flecks of blood in stool  [N17.9] ASHLYN (acute kidney injury)          ED Disposition Condition    Discharge Stable        ED Prescriptions     None        Follow-up Information     Follow up With Specialties Details Why Contact Info    Odalis Kim MD Internal Medicine Schedule an appointment as soon as possible for a visit   [address]  [place] [address]  [phone]      [facility] - Emergency Dept Emergency Medicine  As needed, If symptoms worsen [address] [facility] [id number] [phone]           Mary Rojas MD  07/28/22 7619

## 2022-07-28 NOTE — PROGRESS NOTES
76 year old female/former smoker with    4 months of left ankle,knee,wrist,hand,elbow pain  Right shoulder,hands,finger pain    Tylenol bid doesn't help  HTN and aneurysm -so cant take NSAIDs    Steroid shots in shoulders helped-only to return in few weeks   Swelling-left ankle,hand,wrist,knee  Sausage ? Digits     No muscle weakness    Rheumatologic ROS  Dry eyes  Chest pain-radiating to the back    Labs    KAY positive,1: 80,profile negative  homogenous  H/h 11/33.9  nml white count  nml plts  ESR 59  CRP 54  Uric acid 6.5    ,CCP 7.7    Seropositive Rheumatoid arthritis  Intolerant to mtx-oral ulcers  Humira-offered-STARTED MARCH 2022    Right shoulder MRI :   Extensive bone marrow edema within the superomedial humeral head with articular surface flattening and thickening of the subchondral plate, findings that are favored to represent sequela of a subchondral insufficiency fracture.  Early avascular necrosis can present with similar findings and is possible.  2. Severe glenohumeral osteoarthritis with global complex tearing of the glenoid labrum.  3. Supraspinatus and infraspinatus tendinosis with a large high-grade partial-thickness articular surface tear with probable full-thickness microperforation and mild retraction.  4. Subscapularis tendinosis with high-grade partial-thickness tear of the superior fibers.  5. Severe biceps tendinosis/tenosynovitis with high-grade partial-thickness interstitial tearing.  6. Large joint effusion with extensive synovitis and scattered loose bodies, likely reactive or inflammatory in nature.  Clinical considerations will determine the need for further evaluation with arthrocentesis to rule out an underlying infectious process.  7. Mild AC joint arthrosis.  8. Subacromial/subdeltoid bursitis.    CDAI 6 LDA today    Right shoulder : cant say if RA vs infections vs reactive process  Aspiration and further management after ruling out infections      Pul nodules-big ones  gone  Small ones stable    Creat went up  In the sun a lot/may be dehydrated  May- hyponatremia-SIADH,spironolactone and chlorthalidone stopped  On NSAIDs 2 tabs bid   On fluid restriction   Diarrhea +severe watery,black,oily,painless  /74  Send to ER for hydration and renal function monitoring      DEXA  Vit d  Not sure if her OP was ever treated    Vaccines  Flu,pneumonia,shingles,covid- we counselled                     Answers for HPI/ROS submitted by the patient on 7/27/2022  fever: No  eye redness: Yes  mouth sores: No  headaches: No  shortness of breath: No  chest pain: No  trouble swallowing: No  diarrhea: No  constipation: No  unexpected weight change: No  genital sore: No  dysuria: No  During the last 3 days, have you had a skin rash?: No  Bruises or bleeds easily: No  cough: No

## 2022-07-28 NOTE — DISCHARGE INSTRUCTIONS
I think some of the blood in your diarrhea is from your rectal prolapse. I recommend following up with colorectal surgery and scheduling a colonoscopy.  You have an acute kidney injury today which maybe related to dehydration. Try to stay hydrated and drink plenty of fluids.  Your blood count today is normal.  I recommend close follow up with your primary doctor in a few days/next week for labs to re-evaluate your kidney function.    Seek immediate medical attention if you have new or worsening symptoms, fever, decreased urination, are not urinating, increased blood in stool, lightheadedness, dizziness, or shortness of breath, or for any other concern.

## 2022-07-28 NOTE — ED TRIAGE NOTES
Buffy Dominique, a 76 y.o. female presents to the ED w/ c/o diarrhea x4 times since last night, pt describes stool as black and loose, denies any n/v.    Triage note:  Chief Complaint   Patient presents with    Diarrhea     4 episodes last night, twice this a.m., appears watery and black. Denies dizziness and weakness.      Review of patient's allergies indicates:   Allergen Reactions    Methotrexate analogues      Mouth Ulcers     Thiazides Other (See Comments)     hyponatremia     Past Medical History:   Diagnosis Date    CKD (chronic kidney disease) stage 3, GFR 30-59 ml/min     HLD (hyperlipidemia)     HTN (hypertension)     Hyperparathyroidism

## 2022-07-28 NOTE — ED NOTES
I-STAT Chem-8+ Results:   Value Reference Range   Sodium 134 136-145 mmol/L   Potassium  4.6 3.5-5.1 mmol/L   Chloride 105  mmol/L   Ionized Calcium 1.24 1.06-1.42 mmol/L   CO2 (measured) 20 23-29 mmol/L   Glucose 96  mg/dL   BUN 38 6-30 mg/dL   Creatinine 1.3 0.5-1.4 mg/dL   Hematocrit 39 36-54%

## 2022-07-28 NOTE — PROGRESS NOTES
Subjective:       Patient ID: Buffy Dominique is a 76 y.o. female.    Chief Complaint: rheumatoid arthritis w/positive rheumatoid factor    HPI   Interval Hx:  Last night she endorses severe watery, black, oily diarrhea. She had at least 4 episodes last night and continues this morning and is not stopping. No abdominal pain, nausea, vomiting, hematochezia. Continues with Humira. Her monitoring labs done yesterday showed creatinine of 1.7, previously 0.9. She was hospitalized in May for hyponatremia. She was diagnosed with SIADH, and her medications were changed including stopping spironolactone and chlorthalidone according to the discharge med-rec. However she was still taking these. She endorses taking 4 tablets of ibuprofen daily. She has been restricting fluids but also going out in the hot sun a lot. I had called her yesterday and advised her to stop the chlorthalidone, spironolactone, and ibuprofen. She is now only taking olmesartan, amlodipine, and Humira (next dose due August 1). Arthritis is doing well, no morning stiffness, no joint swelling.     Repeat CT chest 5/2022 no longer shows the 1.2 partially solid nodules and remaining small nodules are stable.  Right shoulder pain after fall. She had MRI which showed extensive inflammation, synovitis, partial thickness tear, edema. She saw Orthopedics and is doing PT which she started 2 months ago.      INITIAL HX:  Patient is a 76-year-old female with HTN, aortic stenosis, hx of thoracic aortic aneurysm s/p surgery, who presents for Rheumatology consultation for polyarthritis. Patient complains that 4 months ago, she woke up one morning with pain in the shoulders. She denies history of trauma. Now patient has pain in left ankle, left knee, left wrist, left elbow. She also has pain in both hands and both shoulders. Tylenol BID doesn't help. She can't take NSAIDs due to HTN. Ice and heat don't help. Fast Freeze ointment doesn't help. She has had 2 steroid shots in the  left shoulder 3 months apart - she got relief from this but the pain came back in a couple weeks. She has never taken oral steroids.  She has morning stiffness 20 minutes, improves as the day progresses. Pain and stiffness are worse with cold weather. Pain is worse with use.  She endorses swelling in left ankle, left knee, left hand and left wrist.    She has a history of HTN and thoracic aortic aneurysm s/p surgery 2011.  In 2015 and 2016, she had carpal tunnel surgery in both hands.    Family hx: no autoimmune disease  Social hx: smoked 1.5-2 PPD for 25 years, quit in 2009. 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    No skin rashes, malar rash, photosensitivity   No telangiectasias   No calcinosis   No psoriasis   No patchy alopecia   No oral or nasal ulcers   No dry eyes or dry mouth   No pleurisy or any cardiopulmonary complaints   +chest pain radiating to back which sometimes comes on at rest  No dysphagia, diplopia, dysphonia  No muscle weakness   No n/v/d/c   No acid reflux  No Raynaud's  No digital ulcers   No cytopenias   No renal issues   No blood clots   No fever, chills, night sweats, weight loss (5lbs in 2 months), or loss of appetite   No pregnancy losses, pre-term deliveries, or pregnancy complications   No new onset headaches   No recurrent conjunctivitis, uveitis, scleritis, or episcleritis   No chronic or bloody diarrhea. No Ulcerative Colitis or Chron's (IBD)  No vaginal or penile and urethral  discharge/STDs/ulcers   No unexplained lymphadenopathy  No parotitis   No seizures, strokes, psychosis  No sclerodactyly  No puffy hands  No perioral tightness         Review of Systems   Constitutional: Negative for fever and unexpected weight change.   HENT: Negative for mouth sores and trouble swallowing.    Eyes: Negative for redness.   Respiratory: Negative for cough and shortness of breath.    Cardiovascular: Negative for chest pain.   Gastrointestinal:  "Negative for constipation and diarrhea.   Genitourinary: Negative for dysuria and genital sores.   Skin: Negative for rash.   Neurological: Negative for headaches.   Hematological: Does not bruise/bleed easily.         Objective:   /74 (BP Location: Left arm)   Pulse 77   Temp 97.8 °F (36.6 °C)   Ht 5' 3" (1.6 m)   Wt 52.7 kg (116 lb 2.9 oz)   LMP  (LMP Unknown)   BMI 20.58 kg/m²      Physical Exam   Constitutional: She is oriented to person, place, and time. No distress.   HENT:   Head: Normocephalic and atraumatic.   Eyes: Pupils are equal, round, and reactive to light.   Cardiovascular: Normal rate and regular rhythm.   No murmur heard.  Pulmonary/Chest: Effort normal and breath sounds normal. No respiratory distress. She has no wheezes.   Abdominal: Soft. Bowel sounds are normal. There is no abdominal tenderness.   Musculoskeletal:         General: No deformity.      Right shoulder: Normal.      Left shoulder: Normal.      Right elbow: Normal.      Left elbow: Normal.      Right wrist: Normal.      Left wrist: Normal.      Cervical back: Normal range of motion.      Right knee: Normal.      Left knee: Normal.      Comments: No joint swelling or synovitis   Neurological: She is alert and oriented to person, place, and time.   Skin: Skin is warm and dry.   Psychiatric: Affect and judgment normal.       Right Side Rheumatological Exam     Examination finds the shoulder, elbow, wrist, knee, 1st PIP, 1st MCP, 2nd PIP, 2nd MCP, 3rd PIP, 3rd MCP, 4th PIP, 4th MCP, 5th PIP and 5th MCP normal.    Muscle Strength (0-5 scale):  Neck Flexion:  5  Neck Extension: 5  Deltoid:  5  Biceps: 5/5   Triceps:  5  : 5/5   Iliopsoas: 5  Quadriceps:  5   Distal Lower Extremity: 5    Left Side Rheumatological Exam     Examination finds the shoulder, elbow, wrist, knee, 1st PIP, 1st MCP, 2nd PIP, 2nd MCP, 3rd PIP, 3rd MCP, 4th PIP, 4th MCP, 5th PIP and 5th MCP normal.    Muscle Strength (0-5 scale):  Neck Flexion:  " 5  Neck Extension: 5  Deltoid:  5  Biceps: 5/5   Triceps:  5  :  5/5   Iliopsoas: 5  Quadriceps:  5   Distal Lower Extremity: 5                12/2/2021 3/10/2022 7/28/2022   Tender (HENDRICKS-28) 15 / 28  0 / 28 1 / 28    Swollen (HENDRICKS-28) 3 / 28  0 / 28  0 / 28    Provider Global -- 10 mm 20 mm   Patient Global -- 30 mm 30 mm   ESR -- -- 4 mm/hr   CRP -- -- 2.2 mg/L   HENDRICKS-28 (ESR) -- -- 1.95 (Remission)   HENDRICKS-28 (CRP) -- -- 2.36 (Remission)   CDAI Score -- 4  6         Assessment:       1. Seropositive rheumatoid arthritis    2. Age-related osteoporosis without current pathological fracture    3. SIADH (syndrome of inappropriate ADH production)    4. Hypertension, unspecified type    5. Diarrhea, unspecified type    6. Melena    7. ASHLYN (acute kidney injury)            Plan:       Problem List Items Addressed This Visit        Immunology/Multi System    Seropositive rheumatoid arthritis - Primary      Other Visit Diagnoses     Age-related osteoporosis without current pathological fracture        SIADH (syndrome of inappropriate ADH production)        Hypertension, unspecified type        Diarrhea, unspecified type        Melena        ASHLYN (acute kidney injury)              Patient is a 76-year-old female with HTN, aortic stenosis, hx of thoracic aortic aneurysm s/p surgery, who presents for Rheumatology follow up for seropositive RA.     Presented with arthralgias and synovitis on exam.  , CCP 7.7  Medications tried: MTX (oral ulcers, had to get leucovorin). Currently on Humira (started 3/6/22).  Vaccinations up to date except for COVID which she declines.  CDAI 6 (low activity)    DXA 11/2018 showed osteoporosis. Repeat DXA ordered but not yet done.  She has never been on any osteoporosis medications.    KAY 1:80 low titer, homogenous.  Remainder of KAY profile is negative. Normal WBC count. No thrombocytopenia. No signs or symptoms of lupus.     Uric acid 6.5.    She had shoulder MRI due to pain in her right  shoulder after fall:  1. Extensive bone marrow edema within the superomedial humeral head with articular surface flattening and thickening of the subchondral plate, findings that are favored to represent sequela of a subchondral insufficiency fracture.  Early avascular necrosis can present with similar findings and is possible.  2. Severe glenohumeral osteoarthritis with global complex tearing of the glenoid labrum.  3. Supraspinatus and infraspinatus tendinosis with a large high-grade partial-thickness articular surface tear with probable full-thickness microperforation and mild retraction.  4. Subscapularis tendinosis with high-grade partial-thickness tear of the superior fibers.  5. Severe biceps tendinosis/tenosynovitis with high-grade partial-thickness interstitial tearing.  6. Large joint effusion with extensive synovitis and scattered loose bodies, likely reactive or inflammatory in nature.  Clinical considerations will determine the need for further evaluation with arthrocentesis to rule out an underlying infectious process.  7. Mild AC joint arthrosis.  8. Subacromial/subdeltoid bursitis.  Right shoulder: RA vs infection vs reactive process. Would like to get an aspirate.    Given the new severe diarrhea and her worsened creatinine yesterday (1.7 from baseline 0.9), will send to ED today.        Plan:  1. To ED today for evaluation of her kidney function and melena/diarrhea  2. Continue Humira - rheumatoid arthritis is doing well  3. DXA scan ordered, need to schedule  4. Check vitamin D level with next labs  5. Follow up with Ortho - will discuss with them if they can aspirate the shoulder  6. RTC 3 months with standing labs (CBC, CMP, ESR, CRP)       Angel Rich MD  Rheumatology Fellow  PGY-5

## 2022-07-29 LAB — HCV AB SERPL QL IA: NEGATIVE

## 2022-08-19 ENCOUNTER — PATIENT MESSAGE (OUTPATIENT)
Dept: ADMINISTRATIVE | Facility: CLINIC | Age: 77
End: 2022-08-19
Payer: MEDICARE

## 2022-08-19 ENCOUNTER — TELEPHONE (OUTPATIENT)
Dept: ADMINISTRATIVE | Facility: CLINIC | Age: 77
End: 2022-08-19
Payer: MEDICARE

## 2022-08-19 NOTE — TELEPHONE ENCOUNTER
Called pt; informed pt I was calling to confirm her virtual EAWV on 8/22/22 at 9:00am and to see if she needed any help; pt stated she did not need any help and would complete e-pre check later today; pt informed to login 15 minutes prior to appt time; sent message through portal

## 2022-08-22 ENCOUNTER — TELEPHONE (OUTPATIENT)
Dept: ADMINISTRATIVE | Facility: CLINIC | Age: 77
End: 2022-08-22
Payer: MEDICARE

## 2022-08-22 NOTE — TELEPHONE ENCOUNTER
Called pt; informed pt I was calling because provider stated they could not completed her virtual awv appt because she was not in the [facility] at the time of the appt; another appt was scheduled for 10/28/22 per pt request and was approved to be scheduled by Ms. Caba.

## 2022-08-26 ENCOUNTER — TELEPHONE (OUTPATIENT)
Dept: INTERNAL MEDICINE | Facility: CLINIC | Age: 77
End: 2022-08-26
Payer: MEDICARE

## 2022-08-26 VITALS — DIASTOLIC BLOOD PRESSURE: 65 MMHG | SYSTOLIC BLOOD PRESSURE: 138 MMHG

## 2022-08-29 ENCOUNTER — TELEPHONE (OUTPATIENT)
Dept: INTERNAL MEDICINE | Facility: CLINIC | Age: 77
End: 2022-08-29
Payer: MEDICARE

## 2022-08-29 NOTE — TELEPHONE ENCOUNTER
----- Message from Tania Villarreal sent at 8/29/2022 11:43 AM CDT -----  Regarding: Pt called to request a work in appt for her annual physical appt while she is in town 10-28 - 11/2/2022 and pt would like a call back today  Appointment Access Request:    Pt called to request a work in appt for her annual physical appt while she is in [facility] 10-28 - 11/2/2022 and pt would like a call back today. Pt states that she is having problems with her blood pressure and her feet are swelling.    Pt can be reached at [phone]

## 2022-10-26 ENCOUNTER — TELEPHONE (OUTPATIENT)
Dept: ADMINISTRATIVE | Facility: CLINIC | Age: 77
End: 2022-10-26
Payer: MEDICARE

## 2022-10-27 RX ORDER — CHLORTHALIDONE 25 MG/1
25 TABLET ORAL DAILY
COMMUNITY
Start: 2022-07-12 | End: 2023-05-11 | Stop reason: SDUPTHER

## 2022-10-27 RX ORDER — SODIUM CHLORIDE 1 G/1
TABLET ORAL
COMMUNITY
Start: 2022-06-07 | End: 2022-10-28

## 2022-10-28 ENCOUNTER — HOSPITAL ENCOUNTER (OUTPATIENT)
Dept: RADIOLOGY | Facility: CLINIC | Age: 77
Discharge: HOME | End: 2022-10-28
Attending: STUDENT IN AN ORGANIZED HEALTH CARE EDUCATION/TRAINING PROGRAM
Payer: MEDICARE

## 2022-10-28 ENCOUNTER — OFFICE VISIT (OUTPATIENT)
Dept: ORTHOPEDICS | Facility: CLINIC | Age: 77
End: 2022-10-28
Payer: MEDICARE

## 2022-10-28 ENCOUNTER — OFFICE VISIT (OUTPATIENT)
Dept: INTERNAL MEDICINE | Facility: CLINIC | Age: 77
End: 2022-10-28
Payer: MEDICARE

## 2022-10-28 ENCOUNTER — IMMUNIZATION (OUTPATIENT)
Dept: INTERNAL MEDICINE | Facility: CLINIC | Age: 77
End: 2022-10-28
Payer: MEDICARE

## 2022-10-28 VITALS
WEIGHT: 124.69 LBS | WEIGHT: 121.56 LBS | HEART RATE: 70 BPM | BODY MASS INDEX: 22.09 KG/M2 | DIASTOLIC BLOOD PRESSURE: 77 MMHG | SYSTOLIC BLOOD PRESSURE: 130 MMHG | SYSTOLIC BLOOD PRESSURE: 183 MMHG | BODY MASS INDEX: 21.54 KG/M2 | HEIGHT: 63 IN | OXYGEN SATURATION: 96 % | HEART RATE: 84 BPM | DIASTOLIC BLOOD PRESSURE: 68 MMHG | HEIGHT: 63 IN

## 2022-10-28 VITALS
HEIGHT: 63 IN | SYSTOLIC BLOOD PRESSURE: 140 MMHG | OXYGEN SATURATION: 98 % | HEART RATE: 80 BPM | RESPIRATION RATE: 18 BRPM | BODY MASS INDEX: 22.11 KG/M2 | DIASTOLIC BLOOD PRESSURE: 70 MMHG | WEIGHT: 124.75 LBS

## 2022-10-28 DIAGNOSIS — N18.30 STAGE 3 CHRONIC KIDNEY DISEASE, UNSPECIFIED WHETHER STAGE 3A OR 3B CKD: ICD-10-CM

## 2022-10-28 DIAGNOSIS — I35.0 AORTIC VALVE STENOSIS, ETIOLOGY OF CARDIAC VALVE DISEASE UNSPECIFIED: ICD-10-CM

## 2022-10-28 DIAGNOSIS — Z98.890 HISTORY OF THORACIC AORTIC ANEURYSM REPAIR: ICD-10-CM

## 2022-10-28 DIAGNOSIS — E78.2 MIXED HYPERLIPIDEMIA: ICD-10-CM

## 2022-10-28 DIAGNOSIS — S42.291A CLOSED FRACTURE OF HEAD OF RIGHT HUMERUS, INITIAL ENCOUNTER: ICD-10-CM

## 2022-10-28 DIAGNOSIS — Z00.00 ENCOUNTER FOR PREVENTIVE HEALTH EXAMINATION: Primary | ICD-10-CM

## 2022-10-28 DIAGNOSIS — I10 ESSENTIAL HYPERTENSION: ICD-10-CM

## 2022-10-28 DIAGNOSIS — Z12.31 ENCOUNTER FOR SCREENING MAMMOGRAM FOR MALIGNANT NEOPLASM OF BREAST: ICD-10-CM

## 2022-10-28 DIAGNOSIS — Z23 NEED FOR SHINGLES VACCINE: ICD-10-CM

## 2022-10-28 DIAGNOSIS — M05.9 SEROPOSITIVE RHEUMATOID ARTHRITIS: ICD-10-CM

## 2022-10-28 DIAGNOSIS — R91.8 MULTIPLE PULMONARY NODULES: ICD-10-CM

## 2022-10-28 DIAGNOSIS — M81.0 AGE-RELATED OSTEOPOROSIS WITHOUT CURRENT PATHOLOGICAL FRACTURE: ICD-10-CM

## 2022-10-28 DIAGNOSIS — Z23 NEED FOR INFLUENZA VACCINATION: ICD-10-CM

## 2022-10-28 DIAGNOSIS — I10 ESSENTIAL HYPERTENSION: Primary | ICD-10-CM

## 2022-10-28 DIAGNOSIS — I35.1 NONRHEUMATIC AORTIC VALVE INSUFFICIENCY: ICD-10-CM

## 2022-10-28 DIAGNOSIS — Z23 NEED FOR PNEUMOCOCCAL VACCINATION: ICD-10-CM

## 2022-10-28 DIAGNOSIS — Z86.79 HISTORY OF THORACIC AORTIC ANEURYSM REPAIR: ICD-10-CM

## 2022-10-28 DIAGNOSIS — M87.021 AVASCULAR NECROSIS OF RIGHT HUMERAL HEAD: Primary | ICD-10-CM

## 2022-10-28 PROCEDURE — 1101F PT FALLS ASSESS-DOCD LE1/YR: CPT | Mod: CPTII,S$GLB,, | Performed by: INTERNAL MEDICINE

## 2022-10-28 PROCEDURE — 77080 DXA BONE DENSITY AXIAL: CPT | Mod: TC

## 2022-10-28 PROCEDURE — 1125F AMNT PAIN NOTED PAIN PRSNT: CPT | Mod: CPTII,S$GLB,, | Performed by: PHYSICIAN ASSISTANT

## 2022-10-28 PROCEDURE — 90677 PNEUMOCOCCAL CONJUGATE VACCINE 20-VALENT: ICD-10-PCS | Mod: S$GLB,,, | Performed by: NURSE PRACTITIONER

## 2022-10-28 PROCEDURE — 3077F SYST BP >= 140 MM HG: CPT | Mod: CPTII,S$GLB,, | Performed by: PHYSICIAN ASSISTANT

## 2022-10-28 PROCEDURE — G0439 PPPS, SUBSEQ VISIT: HCPCS | Mod: S$GLB,,, | Performed by: NURSE PRACTITIONER

## 2022-10-28 PROCEDURE — 3288F PR FALLS RISK ASSESSMENT DOCUMENTED: ICD-10-PCS | Mod: CPTII,S$GLB,, | Performed by: PHYSICIAN ASSISTANT

## 2022-10-28 PROCEDURE — 3077F SYST BP >= 140 MM HG: CPT | Mod: CPTII,S$GLB,, | Performed by: NURSE PRACTITIONER

## 2022-10-28 PROCEDURE — 99213 OFFICE O/P EST LOW 20 MIN: CPT | Mod: S$GLB,,, | Performed by: PHYSICIAN ASSISTANT

## 2022-10-28 PROCEDURE — 3078F DIAST BP <80 MM HG: CPT | Mod: CPTII,S$GLB,, | Performed by: PHYSICIAN ASSISTANT

## 2022-10-28 PROCEDURE — 3078F DIAST BP <80 MM HG: CPT | Mod: CPTII,S$GLB,, | Performed by: INTERNAL MEDICINE

## 2022-10-28 PROCEDURE — 3288F PR FALLS RISK ASSESSMENT DOCUMENTED: ICD-10-PCS | Mod: CPTII,S$GLB,, | Performed by: INTERNAL MEDICINE

## 2022-10-28 PROCEDURE — G0008 ADMIN INFLUENZA VIRUS VAC: HCPCS | Mod: S$GLB,,, | Performed by: NURSE PRACTITIONER

## 2022-10-28 PROCEDURE — 99999 PR PBB SHADOW E&M-EST. PATIENT-LVL III: CPT | Mod: PBBFAC,,, | Performed by: PHYSICIAN ASSISTANT

## 2022-10-28 PROCEDURE — G0439 PR MEDICARE ANNUAL WELLNESS SUBSEQUENT VISIT: ICD-10-PCS | Mod: S$GLB,,, | Performed by: NURSE PRACTITIONER

## 2022-10-28 PROCEDURE — G0009 PNEUMOCOCCAL CONJUGATE VACCINE 20-VALENT: ICD-10-PCS | Mod: S$GLB,,, | Performed by: NURSE PRACTITIONER

## 2022-10-28 PROCEDURE — 1159F MED LIST DOCD IN RCRD: CPT | Mod: CPTII,S$GLB,, | Performed by: NURSE PRACTITIONER

## 2022-10-28 PROCEDURE — 1170F PR FUNCTIONAL STATUS ASSESSED: ICD-10-PCS | Mod: CPTII,S$GLB,, | Performed by: NURSE PRACTITIONER

## 2022-10-28 PROCEDURE — 1101F PT FALLS ASSESS-DOCD LE1/YR: CPT | Mod: CPTII,S$GLB,, | Performed by: NURSE PRACTITIONER

## 2022-10-28 PROCEDURE — 99999 PR PBB SHADOW E&M-EST. PATIENT-LVL III: ICD-10-PCS | Mod: PBBFAC,,, | Performed by: PHYSICIAN ASSISTANT

## 2022-10-28 PROCEDURE — 90677 PCV20 VACCINE IM: CPT | Mod: S$GLB,,, | Performed by: NURSE PRACTITIONER

## 2022-10-28 PROCEDURE — 99999 PR PBB SHADOW E&M-EST. PATIENT-LVL IV: ICD-10-PCS | Mod: PBBFAC,,, | Performed by: NURSE PRACTITIONER

## 2022-10-28 PROCEDURE — 1159F PR MEDICATION LIST DOCUMENTED IN MEDICAL RECORD: ICD-10-PCS | Mod: CPTII,S$GLB,, | Performed by: INTERNAL MEDICINE

## 2022-10-28 PROCEDURE — 3078F PR MOST RECENT DIASTOLIC BLOOD PRESSURE < 80 MM HG: ICD-10-PCS | Mod: CPTII,S$GLB,, | Performed by: INTERNAL MEDICINE

## 2022-10-28 PROCEDURE — 1160F RVW MEDS BY RX/DR IN RCRD: CPT | Mod: CPTII,S$GLB,, | Performed by: INTERNAL MEDICINE

## 2022-10-28 PROCEDURE — 1126F AMNT PAIN NOTED NONE PRSNT: CPT | Mod: CPTII,S$GLB,, | Performed by: INTERNAL MEDICINE

## 2022-10-28 PROCEDURE — 1159F PR MEDICATION LIST DOCUMENTED IN MEDICAL RECORD: ICD-10-PCS | Mod: CPTII,S$GLB,, | Performed by: NURSE PRACTITIONER

## 2022-10-28 PROCEDURE — 1126F PR PAIN SEVERITY QUANTIFIED, NO PAIN PRESENT: ICD-10-PCS | Mod: CPTII,S$GLB,, | Performed by: INTERNAL MEDICINE

## 2022-10-28 PROCEDURE — 1170F FXNL STATUS ASSESSED: CPT | Mod: CPTII,S$GLB,, | Performed by: NURSE PRACTITIONER

## 2022-10-28 PROCEDURE — 99214 OFFICE O/P EST MOD 30 MIN: CPT | Mod: S$GLB,,, | Performed by: INTERNAL MEDICINE

## 2022-10-28 PROCEDURE — 1126F PR PAIN SEVERITY QUANTIFIED, NO PAIN PRESENT: ICD-10-PCS | Mod: CPTII,S$GLB,, | Performed by: NURSE PRACTITIONER

## 2022-10-28 PROCEDURE — 1160F RVW MEDS BY RX/DR IN RCRD: CPT | Mod: CPTII,S$GLB,, | Performed by: NURSE PRACTITIONER

## 2022-10-28 PROCEDURE — 99999 PR PBB SHADOW E&M-EST. PATIENT-LVL IV: ICD-10-PCS | Mod: PBBFAC,,, | Performed by: INTERNAL MEDICINE

## 2022-10-28 PROCEDURE — 1101F PR PT FALLS ASSESS DOC 0-1 FALLS W/OUT INJ PAST YR: ICD-10-PCS | Mod: CPTII,S$GLB,, | Performed by: NURSE PRACTITIONER

## 2022-10-28 PROCEDURE — 99999 PR PBB SHADOW E&M-EST. PATIENT-LVL IV: CPT | Mod: PBBFAC,,, | Performed by: NURSE PRACTITIONER

## 2022-10-28 PROCEDURE — 3077F PR MOST RECENT SYSTOLIC BLOOD PRESSURE >= 140 MM HG: ICD-10-PCS | Mod: CPTII,S$GLB,, | Performed by: PHYSICIAN ASSISTANT

## 2022-10-28 PROCEDURE — 1160F PR REVIEW ALL MEDS BY PRESCRIBER/CLIN PHARMACIST DOCUMENTED: ICD-10-PCS | Mod: CPTII,S$GLB,, | Performed by: INTERNAL MEDICINE

## 2022-10-28 PROCEDURE — 3288F PR FALLS RISK ASSESSMENT DOCUMENTED: ICD-10-PCS | Mod: CPTII,S$GLB,, | Performed by: NURSE PRACTITIONER

## 2022-10-28 PROCEDURE — 3288F FALL RISK ASSESSMENT DOCD: CPT | Mod: CPTII,S$GLB,, | Performed by: NURSE PRACTITIONER

## 2022-10-28 PROCEDURE — 3078F DIAST BP <80 MM HG: CPT | Mod: CPTII,S$GLB,, | Performed by: NURSE PRACTITIONER

## 2022-10-28 PROCEDURE — 1125F PR PAIN SEVERITY QUANTIFIED, PAIN PRESENT: ICD-10-PCS | Mod: CPTII,S$GLB,, | Performed by: PHYSICIAN ASSISTANT

## 2022-10-28 PROCEDURE — 99213 PR OFFICE/OUTPT VISIT, EST, LEVL III, 20-29 MIN: ICD-10-PCS | Mod: S$GLB,,, | Performed by: PHYSICIAN ASSISTANT

## 2022-10-28 PROCEDURE — 1101F PT FALLS ASSESS-DOCD LE1/YR: CPT | Mod: CPTII,S$GLB,, | Performed by: PHYSICIAN ASSISTANT

## 2022-10-28 PROCEDURE — 1101F PR PT FALLS ASSESS DOC 0-1 FALLS W/OUT INJ PAST YR: ICD-10-PCS | Mod: CPTII,S$GLB,, | Performed by: INTERNAL MEDICINE

## 2022-10-28 PROCEDURE — 3075F SYST BP GE 130 - 139MM HG: CPT | Mod: CPTII,S$GLB,, | Performed by: INTERNAL MEDICINE

## 2022-10-28 PROCEDURE — 99999 PR PBB SHADOW E&M-EST. PATIENT-LVL IV: CPT | Mod: PBBFAC,,, | Performed by: INTERNAL MEDICINE

## 2022-10-28 PROCEDURE — 3078F PR MOST RECENT DIASTOLIC BLOOD PRESSURE < 80 MM HG: ICD-10-PCS | Mod: CPTII,S$GLB,, | Performed by: PHYSICIAN ASSISTANT

## 2022-10-28 PROCEDURE — 3075F PR MOST RECENT SYSTOLIC BLOOD PRESS GE 130-139MM HG: ICD-10-PCS | Mod: CPTII,S$GLB,, | Performed by: INTERNAL MEDICINE

## 2022-10-28 PROCEDURE — 3078F PR MOST RECENT DIASTOLIC BLOOD PRESSURE < 80 MM HG: ICD-10-PCS | Mod: CPTII,S$GLB,, | Performed by: NURSE PRACTITIONER

## 2022-10-28 PROCEDURE — 1160F PR REVIEW ALL MEDS BY PRESCRIBER/CLIN PHARMACIST DOCUMENTED: ICD-10-PCS | Mod: CPTII,S$GLB,, | Performed by: NURSE PRACTITIONER

## 2022-10-28 PROCEDURE — 3288F FALL RISK ASSESSMENT DOCD: CPT | Mod: CPTII,S$GLB,, | Performed by: INTERNAL MEDICINE

## 2022-10-28 PROCEDURE — 77080 DEXA BONE DENSITY SPINE HIP: ICD-10-PCS | Mod: 26,,, | Performed by: INTERNAL MEDICINE

## 2022-10-28 PROCEDURE — 99214 PR OFFICE/OUTPT VISIT, EST, LEVL IV, 30-39 MIN: ICD-10-PCS | Mod: S$GLB,,, | Performed by: INTERNAL MEDICINE

## 2022-10-28 PROCEDURE — 77080 DXA BONE DENSITY AXIAL: CPT | Mod: 26,,, | Performed by: INTERNAL MEDICINE

## 2022-10-28 PROCEDURE — 3288F FALL RISK ASSESSMENT DOCD: CPT | Mod: CPTII,S$GLB,, | Performed by: PHYSICIAN ASSISTANT

## 2022-10-28 PROCEDURE — 1126F AMNT PAIN NOTED NONE PRSNT: CPT | Mod: CPTII,S$GLB,, | Performed by: NURSE PRACTITIONER

## 2022-10-28 PROCEDURE — 90694 VACC AIIV4 NO PRSRV 0.5ML IM: CPT | Mod: S$GLB,,, | Performed by: NURSE PRACTITIONER

## 2022-10-28 PROCEDURE — 1101F PR PT FALLS ASSESS DOC 0-1 FALLS W/OUT INJ PAST YR: ICD-10-PCS | Mod: CPTII,S$GLB,, | Performed by: PHYSICIAN ASSISTANT

## 2022-10-28 PROCEDURE — 3077F PR MOST RECENT SYSTOLIC BLOOD PRESSURE >= 140 MM HG: ICD-10-PCS | Mod: CPTII,S$GLB,, | Performed by: NURSE PRACTITIONER

## 2022-10-28 PROCEDURE — 1159F MED LIST DOCD IN RCRD: CPT | Mod: CPTII,S$GLB,, | Performed by: INTERNAL MEDICINE

## 2022-10-28 PROCEDURE — 90694 FLU VACCINE - QUADRIVALENT - ADJUVANTED: ICD-10-PCS | Mod: S$GLB,,, | Performed by: NURSE PRACTITIONER

## 2022-10-28 PROCEDURE — G0008 FLU VACCINE - QUADRIVALENT - ADJUVANTED: ICD-10-PCS | Mod: S$GLB,,, | Performed by: NURSE PRACTITIONER

## 2022-10-28 PROCEDURE — G0009 ADMIN PNEUMOCOCCAL VACCINE: HCPCS | Mod: S$GLB,,, | Performed by: NURSE PRACTITIONER

## 2022-10-28 RX ORDER — AMLODIPINE BESYLATE 10 MG/1
10 TABLET ORAL DAILY
Qty: 90 TABLET | Refills: 3 | Status: SHIPPED | OUTPATIENT
Start: 2022-10-28 | End: 2023-10-24

## 2022-10-28 NOTE — PATIENT INSTRUCTIONS
Counseling and Referral of Other Preventative  (Italic type indicates deductible and co-insurance are waived)    Patient Name: Buffy Dominique  Today's Date: 10/28/2022    Health Maintenance         Date Due Completion Date    COVID-19 Vaccine (1) declined declined    Pneumococcal Vaccines (Age 65+) (2 - PCV) Given today 12/2/2014    Mammogram Ordered today 7/19/2019    DEXA Scan Had today 11/16/2018    Shingles Vaccine (2 of 2) Hold off for now 11/4/2020    Influenza Vaccine (1) Given today 11/4/2020    TETANUS VACCINE 10/27/2023 (Originally 9/23/1963) ---    LDCT Lung Screen 05/02/2023 5/2/2022    Lipid Panel 09/22/2026 9/22/2021          No orders of the defined types were placed in this encounter.    The following information is provided to all patients.  This information is to help you find resources for any of the problems found today that may be affecting your health:                Living healthy guide: [email or web address].[email or web address].[email or web address]      Understanding Diabetes: www.diabetes.org      Eating healthy: www.cdc.gov/healthyweight      [facility] home safety checklist: www.cdc.gov/steadi/patient.html      Agency on Aging: www.goea.[place].gov      Alcoholics anonymous (AA): www.aa.org      Physical Activity: www.khushboo.nih.gov/wl4djsa      Tobacco use: www.quitwithusla.org

## 2022-10-28 NOTE — PROGRESS NOTES
SUBJECTIVE:     Chief Complaint & History of Present Illness:  Buffy Dominique is a  Established  patient 77 y.o. female who is seen here today with a complaint of    Chief Complaint   Patient presents with    Right Shoulder - Pain    .  She has patient well-known to me was last seen treated the clinic for this condition 05/19/2022.  That time MRI did demonstrate insufficiency fracture of the humeral head.  It was recommended that time we consult to Sports Medicine for evaluation for shoulder replacement surgery.  She reports she was not in a condition at that point to pursue these options.  She has continued to struggle with increasing pain and decreasing range of motion since her last visit.  She is now ready to have the discussion with Sports Medicine surgeon.  On a scale of 1-10, with 10 being worst pain imaginable, he rates this pain as 4 on good days and 8 on bad days.  she describes the pain as sore and tender.    Review of patient's allergies indicates:   Allergen Reactions    Methotrexate analogues      Mouth Ulcers     Thiazides Other (See Comments)     hyponatremia         Current Outpatient Medications   Medication Sig Dispense Refill    adalimumab (HUMIRA PEN) PnKt injection Inject 1 pen (40 mg total) into the skin every 14 (fourteen) days. 6 pen 3    amLODIPine (NORVASC) 10 MG tablet Take 1 tablet (10 mg total) by mouth once daily. 90 tablet 3    chlorthalidone (HYGROTEN) 25 MG Tab Take 25 mg by mouth once daily.      hydrALAZINE (APRESOLINE) 25 MG tablet Take 1 tablet (25 mg total) by mouth every 12 (twelve) hours. 60 tablet 2    olmesartan (BENICAR) 40 MG tablet Take 1 tablet (40 mg total) by mouth once daily. 90 tablet 1     No current facility-administered medications for this visit.       Past Medical History:   Diagnosis Date    CKD (chronic kidney disease) stage 3, GFR 30-59 ml/min     HLD (hyperlipidemia)     HTN (hypertension)     Hyperparathyroidism        Past Surgical History:   Procedure  "Laterality Date    CATARACT EXTRACTION, BILATERAL Bilateral 2014    cataract removal Right 2014    hiatial hernia  2017    HYSTERECTOMY      melanoma      on face    OOPHORECTOMY      THORACIC AORTIC ANEURYSM REPAIR  2011       Vital Signs (Most Recent)  Vitals:    10/28/22 1329   BP: (!) 183/77   Pulse: 84       Review of Systems:  ROS:  Constitutional: no fever or chills  Eyes: no visual changes  ENT: no nasal congestion or sore throat  Respiratory: no cough or shortness of breath, Positive multiple pulmonary nodules  Cardiovascular: no chest pain or palpitations, Positive aortic stenosis, nonrheumatic aortic valve insufficiency history of thoracic aortic aneurysm with repair  Gastrointestinal: no nausea or vomiting, tolerating diet  Genitourinary: no hematuria or dysuria, Positive pelvic floor dysfunction, CKD stage 3 hyponatremia  Integument/Breast: no rash or pruritis  Hematologic/Lymphatic: no easy bruising or lymphadenopathy  Musculoskeletal: no arthralgias or myalgias  Neurological: no seizures or tremors  Behavioral/Psych: no auditory or visual hallucinations  Endocrine: no heat or cold intolerance, Positive hyperparathyroidism      OBJECTIVE:     PHYSICAL EXAM:  Height: 5' 3" (160 cm) Weight: 55.2 kg (121 lb 9.3 oz), General Appearance: Well nourished, well developed, in no acute distress.  Neurological: Mood & affect are normal.  Shoulder exam: right  Tenderness: globally  ROM: forward flexion 110 / 110, extension 80 / 80, full abduction 110 / 110, external rotation 30 / 30  Shoulder Strength: biceps 5/5, triceps 5/5, abduction 4/5, adduction 5/5  positive for tenderness about the glenohumeral joint, positive for tenderness over the acromioclavicular joint and negative for impingement sign  Special Tests:Cross-chest abduction: diffuse pain and [place]'s test: pain greater with pronation                     RADIOGRAPHS:  Review of MRI from previous visit demonstrates  1. Extensive bone marrow edema within " the superomedial humeral head with articular surface flattening and thickening of the subchondral plate, findings that are favored to represent sequela of a subchondral insufficiency fracture.  Early avascular necrosis can present with similar findings and is possible.  2. Severe glenohumeral osteoarthritis with global complex tearing of the glenoid labrum.  3. Supraspinatus and infraspinatus tendinosis with a large high-grade partial-thickness articular surface tear with probable full-thickness microperforation and mild retraction.  4. Subscapularis tendinosis with high-grade partial-thickness tear of the superior fibers.  5. Severe biceps tendinosis/tenosynovitis with high-grade partial-thickness interstitial tearing.  6. Large joint effusion with extensive synovitis and scattered loose bodies, likely reactive or inflammatory in nature.  Clinical considerations will determine the need for further evaluation with arthrocentesis to rule out an underlying infectious process.  7. Mild AC joint arthrosis.  8. Subacromial/subdeltoid bursitis.      ASSESSMENT/PLAN:       ICD-10-CM ICD-9-CM   1. Avascular necrosis of right humeral head  M87.021 733.41   2. Closed fracture of head of right humerus, initial encounter  S42.291A 812.09       Plan: We discussed with the patient at length all the different treatment options available for her right shoulder including anti-inflammatories, acetaminophen, rest, ice, Physical therapy to include strengthening exercise, occasional cortisone injections for temporary relief, arthroscopic surgical repair, and finally shoulder arthroplasty.   Will consult to Sports Medicine for further conversation evaluation to consider surgical repair

## 2022-10-28 NOTE — PROGRESS NOTES
"      Buffy Dominique presented for a  Medicare AWV and comprehensive Health Risk Assessment today. The following components were reviewed and updated:    Medical history  Family History  Social history  Allergies and Current Medications  Health Risk Assessment  Health Maintenance  Care Team         ** See Completed Assessments for Annual Wellness Visit within the encounter summary.**      The following assessments were completed:  Living Situation  CAGE  Depression Screening  Timed Get Up and Go  Whisper Test  Cognitive Function Screening      Nutrition Screening  ADL Screening  PAQ Screening    Vitals:    10/28/22 1036   BP: (!) 140/70   Pulse: 80   Resp: 18   SpO2: 98%   Weight: 56.6 kg (124 lb 12.5 oz)   Height: 5' 3" (1.6 m)     Body mass index is 22.1 kg/m².  Physical Exam  Vitals and nursing note reviewed.   Constitutional:       Appearance: Normal appearance. She is normal weight.   HENT:      Head: Normocephalic.      Nose: Nose normal.      Mouth/Throat:      Mouth: Mucous membranes are moist.   Eyes:      Conjunctiva/sclera: Conjunctivae normal.   Cardiovascular:      Rate and Rhythm: Normal rate.   Pulmonary:      Effort: Pulmonary effort is normal.   Musculoskeletal:         General: Normal range of motion.      Cervical back: Normal range of motion.   Skin:     General: Skin is warm.   Neurological:      General: No focal deficit present.      Mental Status: She is alert and oriented to person, place, and time.   Psychiatric:         Mood and Affect: Mood normal.         Behavior: Behavior normal.         Thought Content: Thought content normal.         Judgment: Judgment normal.       Diagnoses and health risks identified today and associated recommendations/orders:    1. Encounter for preventive health examination  Exam done    Health Maintenance updated    Records reviewed    2. Seropositive rheumatoid arthritis  Chronic, followed by PCP    3. Stage 3 chronic kidney disease, unspecified whether stage 3a " or 3b CKD  Chronic, followed by PCP    Goal BP < 140/80, LDL goal < 100, low salt diet, avoid otc NSAIDs.    4. Nonrheumatic aortic valve insufficiency  Chronic, followed by PCP    5. Mixed hyperlipidemia  Chronic, followed by PCP    Continue cholesterol med, low fat diet, and exercise.    6. Essential hypertension  Chronic, followed by PCP    Take medications as prescribed.    Monitor BP at home, goal BP < or = 140/80, call office if consistently above this range.    Follow low salt DASH diet and exercise.    BMI reviewed.    Go to ED if Headaches, blurred vision, chest pain, or SOB occurs along with elevated readings > or = 160/90.    7. Multiple pulmonary nodules  Chronic, followed by PCP    8. History of thoracic aortic aneurysm repair  Resolved    9. Aortic valve stenosis, etiology of cardiac valve disease unspecified  Chronic, followed by PCP    10. Need for influenza vaccination  Given today    11. Need for shingles vaccine  Declined for now will get later    12. Need for pneumococcal vaccination  - (In Office Administered) Pneumococcal Conjugate Vaccine (20 Valent) (IM)    13. Encounter for screening mammogram for malignant neoplasm of breast  - Mammo Digital Screening Bilat; Future    14. BMI 22.0-22.9, adult  BMI reviewed      Provided Buffy with a 5-10 year written screening schedule and personal prevention plan. Recommendations were developed using the USPSTF age appropriate recommendations. Education, counseling, and referrals were provided as needed. After Visit Summary printed and given to patient which includes a list of additional screenings\tests needed.    Follow up today (on 10/28/2022) for at 130pm today with PCP Dr. Kim as scheduled.    Susan Gamboa DNP  I offered to discuss advanced care planning, including how to pick a person who would make decisions for you if you were unable to make them for yourself, called a health care power of , and what kind of decisions you might make  such as use of life sustaining treatments such as ventilators and tube feeding when faced with a life limiting illness recorded on a living will that they will need to know. (How you want to be cared for as you near the end of your natural life)     X Patient is interested in learning more about how to make advanced directives.  I provided them paperwork and offered to discuss this with them.

## 2022-10-28 NOTE — PROGRESS NOTES
"Subjective:       Patient ID: Buffy Dominique is a 77 y.o. female.    Chief Complaint: Annual Exam, Blood Pressure Check, and Joint Swelling    HPI  Buffy Dominique is a 77 y.o. female here for routine follow up of the following chronic issues:    HTN  Amlodipine 10mg  Chlorthalidone 25mg  Olmesartan 40mg  Hydralazine 25mg q12h     10/27/2022 10/27/2022 10/27/2022 10/26/2022 10/26/2022   SBP (mmHg) 130 131 140 168 176   DBP (mmHg) 68 71 70 76 84   Pulse 76 86 84 74 76       She recently restarted her chlorthalidone a week ago. Was previously stopped due to low sodium. She developed ankle swelling SINCE starting the chlorthalidone. Has been pretty inactive this week compared to usual.     Looking at [place] living apartment. Continues to work, probably for two more years.  Review of Systems   Constitutional:  Negative for fever.   Respiratory:  Negative for shortness of breath.    Cardiovascular:  Negative for chest pain.   Musculoskeletal: Negative.    Skin: Negative.      Objective:   /68   Pulse 70   Ht 5' 3" (1.6 m)   Wt 56.5 kg (124 lb 10.7 oz)   LMP  (LMP Unknown)   SpO2 96%   BMI 22.08 kg/m²      Physical Exam  Vitals reviewed.   Constitutional:       Appearance: She is well-developed.   HENT:      Head: Normocephalic and atraumatic.   Eyes:      Conjunctiva/sclera: Conjunctivae normal.      Pupils: Pupils are equal, round, and reactive to light.   Neck:      Thyroid: No thyromegaly.   Cardiovascular:      Rate and Rhythm: Normal rate and regular rhythm.      Heart sounds: Normal heart sounds. No murmur heard.  Pulmonary:      Effort: Pulmonary effort is normal. No respiratory distress.      Breath sounds: Normal breath sounds. No wheezing.   Abdominal:      General: There is no distension.      Palpations: Abdomen is soft.      Tenderness: There is no abdominal tenderness. There is no rebound.   Musculoskeletal:         General: No swelling or deformity. Normal range of motion.      Cervical back: Neck " supple.   Lymphadenopathy:      Cervical: No cervical adenopathy.   Skin:     General: Skin is warm and dry.      Findings: No rash.   Neurological:      Mental Status: She is alert and oriented to person, place, and time.   Psychiatric:         Thought Content: Thought content normal.         Judgment: Judgment normal.       Assessment:       1. Essential hypertension    2. Mixed hyperlipidemia    3. Seropositive rheumatoid arthritis        Plan:       Buffy was seen today for annual exam, blood pressure check and joint swelling.    Diagnoses and all orders for this visit:    Essential hypertension  -     amLODIPine (NORVASC) 10 MG tablet; Take 1 tablet (10 mg total) by mouth once daily.  Chlorthalidone just added last week; continue  Olmesartan 40mg  Hydralazine 25mg q12h    Ankle swelling likely related to recent inactivity, will monitor and if persists please let us know.     Mixed hyperlipidemia    Seropositive rheumatoid arthritis        Mmg  BP  Shignrx in a few weeks

## 2022-11-03 ENCOUNTER — HOSPITAL ENCOUNTER (OUTPATIENT)
Dept: CARDIOLOGY | Facility: HOSPITAL | Age: 77
Discharge: HOME | End: 2022-11-03
Attending: STUDENT IN AN ORGANIZED HEALTH CARE EDUCATION/TRAINING PROGRAM
Payer: MEDICARE

## 2022-11-03 ENCOUNTER — HOSPITAL ENCOUNTER (OUTPATIENT)
Dept: RADIOLOGY | Facility: HOSPITAL | Age: 77
Discharge: HOME | End: 2022-11-03
Attending: ORTHOPAEDIC SURGERY
Payer: MEDICARE

## 2022-11-03 ENCOUNTER — OFFICE VISIT (OUTPATIENT)
Dept: SPORTS MEDICINE | Facility: CLINIC | Age: 77
End: 2022-11-03
Payer: MEDICARE

## 2022-11-03 ENCOUNTER — OFFICE VISIT (OUTPATIENT)
Dept: RHEUMATOLOGY | Facility: CLINIC | Age: 77
End: 2022-11-03
Payer: MEDICARE

## 2022-11-03 ENCOUNTER — SPECIALTY PHARMACY (OUTPATIENT)
Dept: PHARMACY | Facility: CLINIC | Age: 77
End: 2022-11-03
Payer: MEDICARE

## 2022-11-03 VITALS
SYSTOLIC BLOOD PRESSURE: 160 MMHG | DIASTOLIC BLOOD PRESSURE: 79 MMHG | HEART RATE: 70 BPM | WEIGHT: 125 LBS | HEIGHT: 63 IN | BODY MASS INDEX: 22.15 KG/M2

## 2022-11-03 VITALS
WEIGHT: 125 LBS | HEIGHT: 63 IN | DIASTOLIC BLOOD PRESSURE: 79 MMHG | BODY MASS INDEX: 22.15 KG/M2 | HEART RATE: 79 BPM | SYSTOLIC BLOOD PRESSURE: 160 MMHG

## 2022-11-03 VITALS
HEIGHT: 63 IN | SYSTOLIC BLOOD PRESSURE: 156 MMHG | DIASTOLIC BLOOD PRESSURE: 68 MMHG | BODY MASS INDEX: 22.27 KG/M2 | HEART RATE: 84 BPM | WEIGHT: 125.69 LBS

## 2022-11-03 DIAGNOSIS — M81.0 AGE-RELATED OSTEOPOROSIS WITHOUT CURRENT PATHOLOGICAL FRACTURE: ICD-10-CM

## 2022-11-03 DIAGNOSIS — R93.1 ABNORMAL ECHOCARDIOGRAM: ICD-10-CM

## 2022-11-03 DIAGNOSIS — M05.79 RHEUMATOID ARTHRITIS INVOLVING MULTIPLE SITES WITH POSITIVE RHEUMATOID FACTOR: Primary | ICD-10-CM

## 2022-11-03 DIAGNOSIS — M25.511 RIGHT SHOULDER PAIN, UNSPECIFIED CHRONICITY: Primary | ICD-10-CM

## 2022-11-03 DIAGNOSIS — M25.511 RIGHT SHOULDER PAIN, UNSPECIFIED CHRONICITY: ICD-10-CM

## 2022-11-03 DIAGNOSIS — R06.09 DYSPNEA ON EXERTION: ICD-10-CM

## 2022-11-03 LAB
ASCENDING AORTA: 3.23 CM
AV INDEX (PROSTH): 0.86
AV MEAN GRADIENT: 5 MMHG
AV PEAK GRADIENT: 10 MMHG
AV VALVE AREA: 2.97 CM2
AV VELOCITY RATIO: 0.83
BSA FOR ECHO PROCEDURE: 1.59 M2
CV ECHO LV RWT: 0.51 CM
DOP CALC AO PEAK VEL: 1.62 M/S
DOP CALC AO VTI: 34.62 CM
DOP CALC LVOT AREA: 3.5 CM2
DOP CALC LVOT DIAMETER: 2.1 CM
DOP CALC LVOT PEAK VEL: 1.35 M/S
DOP CALC LVOT STROKE VOLUME: 102.96 CM3
DOP CALCLVOT PEAK VEL VTI: 29.74 CM
E WAVE DECELERATION TIME: 157.47 MSEC
E/A RATIO: 0.86
E/E' RATIO: 10.13 M/S
ECHO LV POSTERIOR WALL: 1.03 CM (ref 0.6–1.1)
EJECTION FRACTION: 60 %
FRACTIONAL SHORTENING: 35 % (ref 28–44)
INTERVENTRICULAR SEPTUM: 1.01 CM (ref 0.6–1.1)
IVRT: 65.65 MSEC
LA MAJOR: 4.58 CM
LA MINOR: 4.54 CM
LA WIDTH: 4.43 CM
LEFT ATRIUM SIZE: 3.43 CM
LEFT ATRIUM VOLUME INDEX MOD: 50.5 ML/M2
LEFT ATRIUM VOLUME INDEX: 37.3 ML/M2
LEFT ATRIUM VOLUME MOD: 79.77 CM3
LEFT ATRIUM VOLUME: 58.89 CM3
LEFT INTERNAL DIMENSION IN SYSTOLE: 2.6 CM (ref 2.1–4)
LEFT VENTRICLE DIASTOLIC VOLUME INDEX: 44.8 ML/M2
LEFT VENTRICLE DIASTOLIC VOLUME: 70.79 ML
LEFT VENTRICLE MASS INDEX: 83 G/M2
LEFT VENTRICLE SYSTOLIC VOLUME INDEX: 15.5 ML/M2
LEFT VENTRICLE SYSTOLIC VOLUME: 24.55 ML
LEFT VENTRICULAR INTERNAL DIMENSION IN DIASTOLE: 4.02 CM (ref 3.5–6)
LEFT VENTRICULAR MASS: 131.71 G
LV LATERAL E/E' RATIO: 9.5 M/S
LV SEPTAL E/E' RATIO: 10.86 M/S
MV A" WAVE DURATION": 15.7 MSEC
MV PEAK A VEL: 0.88 M/S
MV PEAK E VEL: 0.76 M/S
MV STENOSIS PRESSURE HALF TIME: 45.67 MS
MV VALVE AREA P 1/2 METHOD: 4.82 CM2
PISA TR MAX VEL: 2.67 M/S
PULM VEIN S/D RATIO: 1.07
PV PEAK D VEL: 0.44 M/S
PV PEAK S VEL: 0.47 M/S
RA MAJOR: 3.98 CM
RA PRESSURE: 3 MMHG
RA WIDTH: 3.81 CM
RIGHT VENTRICULAR END-DIASTOLIC DIMENSION: 3.66 CM
RV TISSUE DOPPLER FREE WALL SYSTOLIC VELOCITY 1 (APICAL 4 CHAMBER VIEW): 12.45 CM/S
SINUS: 3.48 CM
STJ: 2.59 CM
TDI LATERAL: 0.08 M/S
TDI SEPTAL: 0.07 M/S
TDI: 0.08 M/S
TR MAX PG: 29 MMHG
TRICUSPID ANNULAR PLANE SYSTOLIC EXCURSION: 2.11 CM
TV REST PULMONARY ARTERY PRESSURE: 32 MMHG

## 2022-11-03 PROCEDURE — 73030 XR SHOULDER COMPLETE 2 OR MORE VIEWS RIGHT: ICD-10-PCS | Mod: 26,RT,, | Performed by: RADIOLOGY

## 2022-11-03 PROCEDURE — 1100F PTFALLS ASSESS-DOCD GE2>/YR: CPT | Mod: CPTII,S$GLB,, | Performed by: ORTHOPAEDIC SURGERY

## 2022-11-03 PROCEDURE — 93306 TTE W/DOPPLER COMPLETE: CPT | Mod: 26,,, | Performed by: INTERNAL MEDICINE

## 2022-11-03 PROCEDURE — 1125F PR PAIN SEVERITY QUANTIFIED, PAIN PRESENT: ICD-10-PCS | Mod: CPTII,GC,S$GLB, | Performed by: STUDENT IN AN ORGANIZED HEALTH CARE EDUCATION/TRAINING PROGRAM

## 2022-11-03 PROCEDURE — 99214 PR OFFICE/OUTPT VISIT, EST, LEVL IV, 30-39 MIN: ICD-10-PCS | Mod: GC,S$GLB,, | Performed by: STUDENT IN AN ORGANIZED HEALTH CARE EDUCATION/TRAINING PROGRAM

## 2022-11-03 PROCEDURE — 1159F MED LIST DOCD IN RCRD: CPT | Mod: CPTII,S$GLB,, | Performed by: ORTHOPAEDIC SURGERY

## 2022-11-03 PROCEDURE — 93306 TTE W/DOPPLER COMPLETE: CPT

## 2022-11-03 PROCEDURE — 99203 OFFICE O/P NEW LOW 30 MIN: CPT | Mod: S$GLB,,, | Performed by: ORTHOPAEDIC SURGERY

## 2022-11-03 PROCEDURE — 99999 PR PBB SHADOW E&M-EST. PATIENT-LVL III: ICD-10-PCS | Mod: PBBFAC,,, | Performed by: ORTHOPAEDIC SURGERY

## 2022-11-03 PROCEDURE — 3288F FALL RISK ASSESSMENT DOCD: CPT | Mod: CPTII,S$GLB,, | Performed by: ORTHOPAEDIC SURGERY

## 2022-11-03 PROCEDURE — 93306 ECHO (CUPID ONLY): ICD-10-PCS | Mod: 26,,, | Performed by: INTERNAL MEDICINE

## 2022-11-03 PROCEDURE — 3077F PR MOST RECENT SYSTOLIC BLOOD PRESSURE >= 140 MM HG: ICD-10-PCS | Mod: CPTII,S$GLB,, | Performed by: ORTHOPAEDIC SURGERY

## 2022-11-03 PROCEDURE — 99499 RISK ADDL DX/OHS AUDIT: ICD-10-PCS | Mod: S$GLB,,, | Performed by: INTERNAL MEDICINE

## 2022-11-03 PROCEDURE — 3077F SYST BP >= 140 MM HG: CPT | Mod: CPTII,GC,S$GLB, | Performed by: STUDENT IN AN ORGANIZED HEALTH CARE EDUCATION/TRAINING PROGRAM

## 2022-11-03 PROCEDURE — 99214 OFFICE O/P EST MOD 30 MIN: CPT | Mod: GC,S$GLB,, | Performed by: STUDENT IN AN ORGANIZED HEALTH CARE EDUCATION/TRAINING PROGRAM

## 2022-11-03 PROCEDURE — 99999 PR PBB SHADOW E&M-EST. PATIENT-LVL III: CPT | Mod: PBBFAC,,, | Performed by: ORTHOPAEDIC SURGERY

## 2022-11-03 PROCEDURE — 99999 PR PBB SHADOW E&M-EST. PATIENT-LVL IV: ICD-10-PCS | Mod: PBBFAC,GC,, | Performed by: STUDENT IN AN ORGANIZED HEALTH CARE EDUCATION/TRAINING PROGRAM

## 2022-11-03 PROCEDURE — 1159F PR MEDICATION LIST DOCUMENTED IN MEDICAL RECORD: ICD-10-PCS | Mod: CPTII,S$GLB,, | Performed by: ORTHOPAEDIC SURGERY

## 2022-11-03 PROCEDURE — 1125F AMNT PAIN NOTED PAIN PRSNT: CPT | Mod: CPTII,GC,S$GLB, | Performed by: STUDENT IN AN ORGANIZED HEALTH CARE EDUCATION/TRAINING PROGRAM

## 2022-11-03 PROCEDURE — 3078F DIAST BP <80 MM HG: CPT | Mod: CPTII,GC,S$GLB, | Performed by: STUDENT IN AN ORGANIZED HEALTH CARE EDUCATION/TRAINING PROGRAM

## 2022-11-03 PROCEDURE — 99499 UNLISTED E&M SERVICE: CPT | Mod: S$GLB,,, | Performed by: INTERNAL MEDICINE

## 2022-11-03 PROCEDURE — 1125F PR PAIN SEVERITY QUANTIFIED, PAIN PRESENT: ICD-10-PCS | Mod: CPTII,S$GLB,, | Performed by: ORTHOPAEDIC SURGERY

## 2022-11-03 PROCEDURE — 99999 PR PBB SHADOW E&M-EST. PATIENT-LVL IV: CPT | Mod: PBBFAC,GC,, | Performed by: STUDENT IN AN ORGANIZED HEALTH CARE EDUCATION/TRAINING PROGRAM

## 2022-11-03 PROCEDURE — 3077F PR MOST RECENT SYSTOLIC BLOOD PRESSURE >= 140 MM HG: ICD-10-PCS | Mod: CPTII,GC,S$GLB, | Performed by: STUDENT IN AN ORGANIZED HEALTH CARE EDUCATION/TRAINING PROGRAM

## 2022-11-03 PROCEDURE — 3078F PR MOST RECENT DIASTOLIC BLOOD PRESSURE < 80 MM HG: ICD-10-PCS | Mod: CPTII,S$GLB,, | Performed by: ORTHOPAEDIC SURGERY

## 2022-11-03 PROCEDURE — 73030 X-RAY EXAM OF SHOULDER: CPT | Mod: 26,RT,, | Performed by: RADIOLOGY

## 2022-11-03 PROCEDURE — 73030 X-RAY EXAM OF SHOULDER: CPT | Mod: TC,RT

## 2022-11-03 PROCEDURE — 99499 RISK ADDL DX/OHS AUDIT: ICD-10-PCS | Mod: S$GLB,,, | Performed by: STUDENT IN AN ORGANIZED HEALTH CARE EDUCATION/TRAINING PROGRAM

## 2022-11-03 PROCEDURE — 99499 UNLISTED E&M SERVICE: CPT | Mod: S$GLB,,, | Performed by: STUDENT IN AN ORGANIZED HEALTH CARE EDUCATION/TRAINING PROGRAM

## 2022-11-03 PROCEDURE — 3077F SYST BP >= 140 MM HG: CPT | Mod: CPTII,S$GLB,, | Performed by: ORTHOPAEDIC SURGERY

## 2022-11-03 PROCEDURE — 99203 PR OFFICE/OUTPT VISIT, NEW, LEVL III, 30-44 MIN: ICD-10-PCS | Mod: S$GLB,,, | Performed by: ORTHOPAEDIC SURGERY

## 2022-11-03 PROCEDURE — 3078F PR MOST RECENT DIASTOLIC BLOOD PRESSURE < 80 MM HG: ICD-10-PCS | Mod: CPTII,GC,S$GLB, | Performed by: STUDENT IN AN ORGANIZED HEALTH CARE EDUCATION/TRAINING PROGRAM

## 2022-11-03 PROCEDURE — 1125F AMNT PAIN NOTED PAIN PRSNT: CPT | Mod: CPTII,S$GLB,, | Performed by: ORTHOPAEDIC SURGERY

## 2022-11-03 PROCEDURE — 3288F PR FALLS RISK ASSESSMENT DOCUMENTED: ICD-10-PCS | Mod: CPTII,S$GLB,, | Performed by: ORTHOPAEDIC SURGERY

## 2022-11-03 PROCEDURE — 1100F PR PT FALLS ASSESS DOC 2+ FALLS/FALL W/INJURY/YR: ICD-10-PCS | Mod: CPTII,S$GLB,, | Performed by: ORTHOPAEDIC SURGERY

## 2022-11-03 PROCEDURE — 3078F DIAST BP <80 MM HG: CPT | Mod: CPTII,S$GLB,, | Performed by: ORTHOPAEDIC SURGERY

## 2022-11-03 RX ORDER — ADALIMUMAB 40MG/0.8ML
40 KIT SUBCUTANEOUS
Qty: 6 PEN | Refills: 3 | Status: SHIPPED | OUTPATIENT
Start: 2022-11-03 | End: 2023-09-06 | Stop reason: SDUPTHER

## 2022-11-03 RX ORDER — ASPIRIN 325 MG
50000 TABLET, DELAYED RELEASE (ENTERIC COATED) ORAL WEEKLY
Qty: 12 CAPSULE | Refills: 0 | Status: SHIPPED | OUTPATIENT
Start: 2022-11-03 | End: 2023-01-20

## 2022-11-03 RX ORDER — ALENDRONATE SODIUM 70 MG/1
70 TABLET ORAL
Qty: 4 TABLET | Refills: 11 | Status: SHIPPED | OUTPATIENT
Start: 2022-11-03 | End: 2023-03-29

## 2022-11-03 RX ORDER — DICLOFENAC SODIUM 10 MG/G
2 GEL TOPICAL 2 TIMES DAILY
Qty: 20 G | Refills: 0 | Status: CANCELLED | OUTPATIENT
Start: 2022-11-03

## 2022-11-03 NOTE — PROGRESS NOTES
Rapid3 Question Responses and Scores 10/30/2022   MDHAQ Score 0.1   Psychologic Score 0   Pain Score 6.5   When you awakened in the morning OVER THE LAST WEEK, did you feel stiff? No   If Yes, please indicate the number of hours until you are as limber as you will be for the day -   Fatigue Score 0   Global Health Score 1.5   RAPID3 Score 2.78     Answers submitted by the patient for this visit:  Rheumatology Questionnaire (Submitted on 10/30/2022)  fever: No  eye redness: Yes  mouth sores: No  headaches: No  shortness of breath: No  chest pain: No  trouble swallowing: No  diarrhea: No  constipation: No  unexpected weight change: No  genital sore: No  dysuria: No  During the last 3 days, have you had a skin rash?: No  Bruises or bleeds easily: No  cough: No

## 2022-11-03 NOTE — TELEPHONE ENCOUNTER
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     Plan max day supply of 30. Called rx help desk [[phone]], - Amy. States that there is no override for day supply, but [facility] can submit a PA for a quantity limit exception. If approved, patient can get 3 month supply.     PA submitted for 3 month supply via [facility]  (Key: [id number])    Called patient with update. Patient voiced understanding and will wait for update from [facility]. Just received 1 month supply from [facility] on 11/2, so has doses for November.

## 2022-11-03 NOTE — PROGRESS NOTES
77 year old female/former smoker with    4 months of left ankle,knee,wrist,hand,elbow pain  Right shoulder,hands,finger pain    Tylenol bid doesn't help  HTN and aneurysm -so cant take NSAIDs    Steroid shots in shoulders helped-only to return in few weeks   Swelling-left ankle,hand,wrist,knee  Sausage ? Digits     No muscle weakness    Rheumatologic ROS  Dry eyes  Chest pain-radiating to the back    Labs    KAY positive,1: 80,profile negative  homogenous  H/h 11/33.9  nml white count  nml plts  ESR 59  CRP 54  Uric acid 6.5    ,CCP 7.7    Seropositive Rheumatoid arthritis  Intolerant to mtx-oral ulcers  Humira-offered-STARTED MARCH 2022-doing well    Has had right shoulder pain  Right shoulder MRI :   Extensive bone marrow edema within the superomedial humeral head with articular surface flattening and thickening of the subchondral plate, findings that are favored to represent sequela of a subchondral insufficiency fracture.  Early avascular necrosis can present with similar findings and is possible.  2. Severe glenohumeral osteoarthritis with global complex tearing of the glenoid labrum.  3. Supraspinatus and infraspinatus tendinosis with a large high-grade partial-thickness articular surface tear with probable full-thickness microperforation and mild retraction.  4. Subscapularis tendinosis with high-grade partial-thickness tear of the superior fibers.  5. Severe biceps tendinosis/tenosynovitis with high-grade partial-thickness interstitial tearing.  6. Large joint effusion with extensive synovitis and scattered loose bodies, likely reactive or inflammatory in nature.  Clinical considerations will determine the need for further evaluation with arthrocentesis to rule out an underlying infectious process.  7. Mild AC joint arthrosis.  8. Subacromial/subdeltoid bursitis.  Right shoulder : cant say if RA vs infections vs reactive process  Our suggestion was - Aspiration and further management after ruling out  infections  Looks like Physical therapy is helping with the shoulder  Ortho and sports medicine is managing her   Humira has helped her with this as well  So may be this is not an infectious process    Pul nodules-big ones gone  Small ones stable  SOB with exertion- will refer to pulm  PA pressure 40- ECHO rpt and pul htn clinic    Renal function and sodium levels-improved    DEXA-OP-  Vit d deficient -replacement  Start fosamax -please   Calcium 1200 mg and vit d 1000 IU daily  Vit d 50,000 vit for 12 weeks    Vaccines  Flu,pneumonia,shingles,covid- we counselled                   Answers submitted by the patient for this visit:  Rheumatology Questionnaire (Submitted on 10/30/2022)  fever: No  eye redness: Yes  mouth sores: No  headaches: No  shortness of breath: No  chest pain: No  trouble swallowing: No  diarrhea: No  constipation: No  unexpected weight change: No  genital sore: No  dysuria: No  During the last 3 days, have you had a skin rash?: No  Bruises or bleeds easily: No  cough: No

## 2022-11-03 NOTE — PATIENT INSTRUCTIONS
Start vitamin D 1000 units daily and calcium 1200 mg daily over the counter  Start vitamin D 50,000 units weekly for 12 weeks, prescription sent  Start alendronate 70 mg weekly  Continue Humira 40 mg every 2 weeks  Ordered echo  Referred to Pulmonary Hypertension clinic  Follow up in 3 months with standing labs prior

## 2022-11-03 NOTE — PROGRESS NOTES
CC: RIGHT shoulder pain     77 y.o. Female with a history of right shoulder pain who is referred to me by Wiley Benavides PA-C. She works as an [employer] . Patient reports pain from a fall that occurred in her living room. Some pain reported with lifting more than 10 pounds.    She states that the pain is severe and not responding to any conservative care.  Patient has tried bengay, CSI, and PT for moderate relief.     She reports that the pain and weakness is worse with overhead activity. It also bothers her at night.    Is affecting ADLs.  Pain is 1/10 at it's worst.      Past Medical History:   Diagnosis Date    CKD (chronic kidney disease) stage 3, GFR 30-59 ml/min     HLD (hyperlipidemia)     HTN (hypertension)     Hyperparathyroidism        Past Surgical History:   Procedure Laterality Date    CATARACT EXTRACTION, BILATERAL Bilateral 2014    cataract removal Right 2014    hiatial hernia  2017    HYSTERECTOMY      melanoma      on face    OOPHORECTOMY      THORACIC AORTIC ANEURYSM REPAIR  2011       Family History   Problem Relation Age of Onset    Cancer Father     Hypertension Maternal Grandmother     Colon cancer Neg Hx     Inflammatory bowel disease Neg Hx          Current Outpatient Medications:     adalimumab (HUMIRA PEN) PnKt injection, Inject 1 pen (40 mg total) into the skin every 14 (fourteen) days., Disp: 6 pen, Rfl: 3    alendronate (FOSAMAX) 70 MG tablet, Take 1 tablet (70 mg total) by mouth every 7 days., Disp: 4 tablet, Rfl: 11    amLODIPine (NORVASC) 10 MG tablet, Take 1 tablet (10 mg total) by mouth once daily., Disp: 90 tablet, Rfl: 3    chlorthalidone (HYGROTEN) 25 MG Tab, Take 25 mg by mouth once daily., Disp: , Rfl:     cholecalciferol, vitamin D3, 1,250 mcg (50,000 unit) capsule, Take 1 capsule (50,000 Units total) by mouth once a week. for 12 doses, Disp: 12 capsule, Rfl: 0    hydrALAZINE (APRESOLINE) 25 MG tablet, Take 1 tablet (25 mg total) by mouth every 12 (twelve) hours., Disp:  "60 tablet, Rfl: 2    olmesartan (BENICAR) 40 MG tablet, Take 1 tablet (40 mg total) by mouth once daily., Disp: 90 tablet, Rfl: 1    Review of patient's allergies indicates:   Allergen Reactions    Methotrexate analogues      Mouth Ulcers     Thiazides Other (See Comments)     hyponatremia          REVIEW OF SYSTEMS:  Constitution: Negative. Negative for chills, fever and night sweats.   HENT: Negative for congestion and headaches.    Eyes: Negative for blurred vision, left vision loss and right vision loss.   Cardiovascular: Negative for chest pain and syncope.   Respiratory: Negative for cough and shortness of breath.    Endocrine: Negative for polydipsia, polyphagia and polyuria.   Hematologic/Lymphatic: Negative for bleeding problem. Does not bruise/bleed easily.   Skin: Negative for dry skin, itching and rash.   Musculoskeletal: Negative for falls.  Positive for right shoulder pain and muscle weakness.   Gastrointestinal: Negative for abdominal pain and bowel incontinence.   Genitourinary: Negative for bladder incontinence and nocturia.   Neurological: Negative for disturbances in coordination, loss of balance and seizures.   Psychiatric/Behavioral: Negative for depression. The patient does not have insomnia.    Allergic/Immunologic: Negative for hives and persistent infections.      PHYSICAL EXAMINATION:  Vitals:  BP (!) 160/79   Pulse 79   Ht 5' 3" (1.6 m)   Wt 56.7 kg (125 lb)   LMP  (LMP Unknown)   BMI 22.14 kg/m²    General: The patient is alert and oriented x 3.  Mood is pleasant.  Observation of ears, eyes and nose reveal no gross abnormalities.  No labored breathing observed.  Gait is coordinated. Patient can toe walk and heel walk without difficulty.      RIGHT SHOULDER / UPPER EXTREMITY EXAM    OBSERVATION:     Swelling  none  Deformity  none   Discoloration  none   Scapular winging none   Scars   none  Atrophy  none    TENDERNESS / CREPITUS (T/C):          T/C      T/C   Clavicle "   -/-  SUPRAspinatus    -/-     AC Jt.    +/-  INFRAspinatus  -/-    SC Jt.    -/-  Deltoid    -/-      G. Tuberosity  -/-  LH BICEP groove  +/-   Acromion:  -/-  Midline Neck   -/-     Scapular Spine -/-  Trapezium   -/-   SMA Scapula  -/-  GH jt. line - post  -/-     Scapulothoracic  -/-         ROM: (* = with pain)  Left shoulder   Right shoulder        AROM (PROM)   AROM (PROM)   FE    170° (175°)     170° (175°)     ER at 0°    60°  (65°)    60°  (65°)   ER at 90° ABD  90°  (90°)    90°  (90°)   IR (spine level)   T10     T10    STRENGTH: (* = with pain) Left shoulder   Right shoulder   SCAPTION   5/5    5/5    IR    5/5    5/5   ER    5/5    5/5   BICEPS   5/5    5/5   Deltoid    5/5    5/5     SIGNS:  Painful side       NEER   +   OJASSS  neg    VICTORIA   +   SPEEDS  neg     DROP ARM   -   BELLY PRESS neg   Superior escape none    LIFT-OFF  neg   X-Body ADD    neg    MOVING VALGUS neg        STABILITY TESTING    Left shoulder   Right shoulder    Translation     Anterior  up face     up face    Posterior  up face    up face    Sulcus   < 10mm    < 10 mm     Signs   Apprehension   neg      neg       Relocation   no change     no change      Jerk test  neg     neg    EXTREMITY NEURO-VASCULAR EXAM:    Sensation grossly intact to light touch all dermatomal regions.    DTR 2+ Biceps, Triceps, BR and Negative Butchs sign   Grossly intact motor function at Elbow, Wrist and Hand   Distal pulses radial and ulnar 2+, brisk cap refill, symmetric.      NECK:  Painless FROM and spinous processes non-tender. Negative Spurlings sign.      OTHER FINDINGS:      XRAYS:  Xrays including AP, Outlet and Axillary Lateral of shoulder are ordered / images reviewed by me:   FINDINGS:  Shoulder trauma three views right: There is postoperative change of the heart mediastinum.  There is DJD.  No fracture dislocation bone destruction seen.     MRI right shoulder -   Impression:     1. Extensive bone marrow edema within the  superomedial humeral head with articular surface flattening and thickening of the subchondral plate, findings that are favored to represent sequela of a subchondral insufficiency fracture.  Early avascular necrosis can present with similar findings and is possible.  2. Severe glenohumeral osteoarthritis with global complex tearing of the glenoid labrum.  3. Supraspinatus and infraspinatus tendinosis with a large high-grade partial-thickness articular surface tear with probable full-thickness microperforation and mild retraction.  4. Subscapularis tendinosis with high-grade partial-thickness tear of the superior fibers.  5. Severe biceps tendinosis/tenosynovitis with high-grade partial-thickness interstitial tearing.  6. Large joint effusion with extensive synovitis and scattered loose bodies, likely reactive or inflammatory in nature.  Clinical considerations will determine the need for further evaluation with arthrocentesis to rule out an underlying infectious process.  7. Mild AC joint arthrosis.  8. Subacromial/subdeltoid bursitis.          ASSESSMENT:   Right shoulder pain:  1.    2.    3.    PLAN:      HEP  F/u PRN     All questions were answered, patient will contact us for questions or concerns in the interim.

## 2022-11-03 NOTE — PROGRESS NOTES
Subjective:       Patient ID: Buffy Dominique is a 77 y.o. female.    Chief Complaint: rheumatoid arthritis w/positive rheumatoid factor    HPI   Interval Hx:  She feels like the shoulder is doing better. She saw Ortho PA and they recommended she get evaluated by [facility] for surgery. She has been doing exercises for the shoulder. She believes the Humira is helping. She did not yet get an aspirate of the joint. Other joints are doing well. She has some ankle edema but no other joint swelling. No morning stiffness.  She gets short of breath with exertion for the past few months. She complains of lower extremity edema. She's having issues getting her medication on time from [facility] because they tend to only carry the Humira 80 mg and not the 40 mg. 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      INITIAL HX:  Patient is a 76-year-old female with HTN, aortic stenosis, hx of thoracic aortic aneurysm s/p surgery, who presents for Rheumatology consultation for polyarthritis. Patient complains that 4 months ago, she woke up one morning with pain in the shoulders. She denies history of trauma. Now patient has pain in left ankle, left knee, left wrist, left elbow. She also has pain in both hands and both shoulders. Tylenol BID doesn't help. She can't take NSAIDs due to HTN. Ice and heat don't help. Fast Freeze ointment doesn't help. She has had 2 steroid shots in the left shoulder 3 months apart - she got relief from this but the pain came back in a couple weeks. She has never taken oral steroids.  She has morning stiffness 20 minutes, improves as the day progresses. Pain and stiffness are worse with cold weather. Pain is worse with use.  She endorses swelling in left ankle, left knee, left hand and left wrist.    She has a history of HTN and thoracic aortic aneurysm s/p surgery 2011.  In 2015 and 2016, she had carpal tunnel surgery in both hands.    Family hx:  "no autoimmune disease  Social hx: smoked 1.5-2 PPD for 25 years, quit in 2009. 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    No skin rashes, malar rash, photosensitivity   No telangiectasias   No calcinosis   No psoriasis   No patchy alopecia   No oral or nasal ulcers   No dry eyes or dry mouth   No pleurisy or any cardiopulmonary complaints   +chest pain radiating to back which sometimes comes on at rest  No dysphagia, diplopia, dysphonia  No muscle weakness   No n/v/d/c   No acid reflux  No Raynaud's  No digital ulcers   No cytopenias   No renal issues   No blood clots   No fever, chills, night sweats, weight loss (5lbs in 2 months), or loss of appetite   No pregnancy losses, pre-term deliveries, or pregnancy complications   No new onset headaches   No recurrent conjunctivitis, uveitis, scleritis, or episcleritis   No chronic or bloody diarrhea. No Ulcerative Colitis or Chron's (IBD)  No vaginal or penile and urethral  discharge/STDs/ulcers   No unexplained lymphadenopathy  No parotitis   No seizures, strokes, psychosis  No sclerodactyly  No puffy hands  No perioral tightness         Review of Systems   Constitutional:  Negative for fever and unexpected weight change.   HENT:  Negative for mouth sores and trouble swallowing.    Respiratory:  Negative for cough and shortness of breath.    Cardiovascular:  Negative for chest pain.   Gastrointestinal:  Negative for constipation and diarrhea.   Genitourinary:  Negative for dysuria and genital sores.   Skin:  Negative for rash.   Neurological:  Negative for headaches.   Hematological:  Does not bruise/bleed easily.       Objective:   BP (!) 156/68   Pulse 84   Ht 5' 3" (1.6 m)   Wt 57 kg (125 lb 10.6 oz)   LMP  (LMP Unknown)   BMI 22.26 kg/m²      Physical Exam   Constitutional: She is oriented to person, place, and time. No distress.   HENT:   Head: Normocephalic and atraumatic.   Eyes: Pupils are equal, round, and " reactive to light.   Cardiovascular: Normal rate and regular rhythm.   No murmur heard.  Pulmonary/Chest: Effort normal and breath sounds normal. No respiratory distress. She has no wheezes.   Abdominal: Soft. Bowel sounds are normal. There is no abdominal tenderness.   Musculoskeletal:         General: No deformity.      Right shoulder: Normal.      Left shoulder: Normal.      Right elbow: Normal.      Left elbow: Normal.      Right wrist: Normal.      Left wrist: Normal.      Cervical back: Normal range of motion.      Right knee: Normal.      Left knee: Normal.      Comments: No joint swelling or synovitis. ROM of right shoulder is significantly improved. Some pain with internal rotation.   Neurological: She is alert and oriented to person, place, and time.   Skin: Skin is warm and dry.   Psychiatric: Affect and judgment normal.       Right Side Rheumatological Exam     Examination finds the shoulder, elbow, wrist, knee, 1st PIP, 1st MCP, 2nd PIP, 2nd MCP, 3rd PIP, 3rd MCP, 4th PIP, 4th MCP, 5th PIP and 5th MCP normal.    Muscle Strength (0-5 scale):  Neck Flexion:  5  Neck Extension: 5  Deltoid:  5  Biceps: 5/5   Triceps:  5  : 5/5   Iliopsoas: 5  Quadriceps:  5   Distal Lower Extremity: 5    Left Side Rheumatological Exam     Examination finds the shoulder, elbow, wrist, knee, 1st PIP, 1st MCP, 2nd PIP, 2nd MCP, 3rd PIP, 3rd MCP, 4th PIP, 4th MCP, 5th PIP and 5th MCP normal.    Muscle Strength (0-5 scale):  Neck Flexion:  5  Neck Extension: 5  Deltoid:  5  Biceps: 5/5   Triceps:  5  :  5/5   Iliopsoas: 5  Quadriceps:  5   Distal Lower Extremity: 5              12/2/2021 3/10/2022 7/28/2022 11/3/2022   Tender (HENDRICKS-28) 15 / 28  0 / 28 1 / 28  0 / 28    Swollen (HENDRICKS-28) 3 / 28  0 / 28  0 / 28  0 / 28    Provider Global -- 10 mm 20 mm 10 mm   Patient Global -- 30 mm 30 mm 15 mm   ESR -- -- 4 mm/hr 23 mm/hr   CRP -- -- 2.2 mg/L 4.1 mg/L   HENDRICKS-28 (ESR) -- -- 1.95 (Remission) 2.4 (Remission)   HENDRICKS-28 (CRP) --  -- 2.36 (Remission) 1.76 (Remission)   CDAI Score -- 4  6  2.5         Assessment:       1. Rheumatoid arthritis involving multiple sites with positive rheumatoid factor    2. Age-related osteoporosis without current pathological fracture    3. Dyspnea on exertion    4. Abnormal echocardiogram              Plan:       Problem List Items Addressed This Visit    None  Visit Diagnoses       Rheumatoid arthritis involving multiple sites with positive rheumatoid factor    -  Primary    Age-related osteoporosis without current pathological fracture        Dyspnea on exertion        Relevant Orders    Echo    Ambulatory referral/consult to Pulmonary Hypertension    Abnormal echocardiogram        Relevant Orders    Echo    Ambulatory referral/consult to Pulmonary Hypertension            Patient is a 76-year-old female with HTN, aortic stenosis, hx of thoracic aortic aneurysm s/p surgery, who presents for Rheumatology follow up for seropositive RA.     Presented with arthralgias and synovitis on exam.  , CCP 7.7  Medications tried: MTX (oral ulcers, had to get leucovorin). Currently on Humira (started 3/6/22).  Vaccinations up to date except for COVID which she declines.  CDAI 2.5 (remission)    KAY 1:80 low titer, homogenous.  Remainder of KAY profile is negative. Normal WBC count. No thrombocytopenia. No signs or symptoms of lupus.     Uric acid 6.5.      Standing labs look good including normal kidney function now and no proteinuria. She has lower extremity edema. She has been getting dyspnea on exertion for the past few months. Echo 2018 shows PA pressure 40 mmHg. Significant smoking hx. Last CT this year did not show COPD.    DEXA shows T scores <-3 and FRAX 25% major and 11% hip so this warrants treatment with bisphosphonate  Vitamin D low at 16    She's having issues getting her medication on time from [facility] because they tend to only carry the Humira 80 mg and not the 40 mg. 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        Plan:  Start vitamin D 1000 IU daily OTC and calcium 1200 mg daily  Start vitamin D 50,000 IU weekly for 12 weeks  Start alendronate 70 mg weekly  Continue Humira 40 mg every 2 weeks  Ordered echo  Referred to Pulmonary Hypertension clinic  RTC 3 months with standing labs (CBC, CMP, ESR, CRP)       Angel Rich MD  Rheumatology Fellow  PGY-5

## 2022-11-04 NOTE — TELEPHONE ENCOUNTER
PA was closed because medication was already approved, did not review quantity limit. Called PA dept, Rep- Peggy. Able to submit quantity-specific request. Turn around time is 72 hours. Case ID [id number]. Will continue to follow up for status.

## 2022-11-07 NOTE — TELEPHONE ENCOUNTER
Incoming call from patient inquiring on the status of Humira PA. Advised that PA was re-submitted for quantity exception and is pending determination. Assigned pharmacist to follow up with updates. She voiced understanding.

## 2022-11-07 NOTE — TELEPHONE ENCOUNTER
Fosamax and vitamin D forwarded to patient's requested [facility].   PA denied. Plan will not allow patient to fill over a 90 day supply. Advised patient to call member services to see if she could speak to anyone that can help.

## 2022-11-11 NOTE — TELEPHONE ENCOUNTER
Unable to get patient 90 day supply approved. MD and patient informed. Forwarded to [facility] at patient' request and closing out at [facility].

## 2022-11-15 ENCOUNTER — TELEPHONE (OUTPATIENT)
Dept: TRANSPLANT | Facility: CLINIC | Age: 77
End: 2022-11-15
Payer: MEDICARE

## 2022-11-15 DIAGNOSIS — I27.9 CHRONIC PULMONARY HEART DISEASE: ICD-10-CM

## 2022-11-15 DIAGNOSIS — Z79.899 POLYPHARMACY: Primary | ICD-10-CM

## 2022-11-15 DIAGNOSIS — R06.82 TACHYPNEA: ICD-10-CM

## 2023-03-29 ENCOUNTER — LAB VISIT (OUTPATIENT)
Dept: LAB | Facility: HOSPITAL | Age: 78
End: 2023-03-29
Attending: INTERNAL MEDICINE
Payer: MEDICARE

## 2023-03-29 ENCOUNTER — OFFICE VISIT (OUTPATIENT)
Dept: RHEUMATOLOGY | Facility: CLINIC | Age: 78
End: 2023-03-29
Payer: MEDICARE

## 2023-03-29 VITALS
HEART RATE: 6 BPM | HEIGHT: 63 IN | BODY MASS INDEX: 21.45 KG/M2 | DIASTOLIC BLOOD PRESSURE: 83 MMHG | WEIGHT: 121.06 LBS | SYSTOLIC BLOOD PRESSURE: 165 MMHG

## 2023-03-29 DIAGNOSIS — M70.22 OLECRANON BURSITIS OF LEFT ELBOW: ICD-10-CM

## 2023-03-29 DIAGNOSIS — M05.79 RHEUMATOID ARTHRITIS INVOLVING MULTIPLE SITES WITH POSITIVE RHEUMATOID FACTOR: Primary | ICD-10-CM

## 2023-03-29 DIAGNOSIS — M05.79 RHEUMATOID ARTHRITIS INVOLVING MULTIPLE SITES WITH POSITIVE RHEUMATOID FACTOR: ICD-10-CM

## 2023-03-29 LAB
ALBUMIN SERPL BCP-MCNC: 4.3 G/DL (ref 3.5–5.2)
ALP SERPL-CCNC: 102 U/L (ref 55–135)
ALT SERPL W/O P-5'-P-CCNC: 9 U/L (ref 10–44)
ANION GAP SERPL CALC-SCNC: 11 MMOL/L (ref 8–16)
APPEARANCE FLD: NORMAL
AST SERPL-CCNC: 18 U/L (ref 10–40)
BASOPHILS # BLD AUTO: 0.05 K/UL (ref 0–0.2)
BASOPHILS NFR BLD: 0.7 % (ref 0–1.9)
BILIRUB SERPL-MCNC: 0.7 MG/DL (ref 0.1–1)
BODY FLD TYPE: NORMAL
BODY FLD TYPE: NORMAL
BUN SERPL-MCNC: 14 MG/DL (ref 8–23)
CALCIUM SERPL-MCNC: 10.5 MG/DL (ref 8.7–10.5)
CHLORIDE SERPL-SCNC: 96 MMOL/L (ref 95–110)
CO2 SERPL-SCNC: 25 MMOL/L (ref 23–29)
COLOR FLD: YELLOW
CREAT SERPL-MCNC: 0.9 MG/DL (ref 0.5–1.4)
CRP SERPL-MCNC: 5 MG/L (ref 0–8.2)
CRYSTALS FLD MICRO: NEGATIVE
DIFFERENTIAL METHOD: ABNORMAL
EOSINOPHIL # BLD AUTO: 0.3 K/UL (ref 0–0.5)
EOSINOPHIL NFR BLD: 3.8 % (ref 0–8)
ERYTHROCYTE [DISTWIDTH] IN BLOOD BY AUTOMATED COUNT: 12.3 % (ref 11.5–14.5)
ERYTHROCYTE [SEDIMENTATION RATE] IN BLOOD BY PHOTOMETRIC METHOD: <2 MM/HR (ref 0–36)
EST. GFR  (NO RACE VARIABLE): >60 ML/MIN/1.73 M^2
GLUCOSE SERPL-MCNC: 94 MG/DL (ref 70–110)
HCT VFR BLD AUTO: 39.6 % (ref 37–48.5)
HGB BLD-MCNC: 13.2 G/DL (ref 12–16)
IMM GRANULOCYTES # BLD AUTO: 0.02 K/UL (ref 0–0.04)
IMM GRANULOCYTES NFR BLD AUTO: 0.3 % (ref 0–0.5)
LYMPHOCYTES # BLD AUTO: 3.3 K/UL (ref 1–4.8)
LYMPHOCYTES NFR BLD: 44.3 % (ref 18–48)
LYMPHOCYTES NFR FLD MANUAL: 19 %
MCH RBC QN AUTO: 31.9 PG (ref 27–31)
MCHC RBC AUTO-ENTMCNC: 33.3 G/DL (ref 32–36)
MCV RBC AUTO: 96 FL (ref 82–98)
MONOCYTES # BLD AUTO: 0.7 K/UL (ref 0.3–1)
MONOCYTES NFR BLD: 9.5 % (ref 4–15)
MONOS+MACROS NFR FLD MANUAL: 74 %
NEUTROPHILS # BLD AUTO: 3.1 K/UL (ref 1.8–7.7)
NEUTROPHILS NFR BLD: 41.4 % (ref 38–73)
NEUTROPHILS NFR FLD MANUAL: 7 %
NRBC BLD-RTO: 0 /100 WBC
PLATELET # BLD AUTO: 263 K/UL (ref 150–450)
PMV BLD AUTO: 8.2 FL (ref 9.2–12.9)
POTASSIUM SERPL-SCNC: 4.7 MMOL/L (ref 3.5–5.1)
PROT SERPL-MCNC: 8.2 G/DL (ref 6–8.4)
RBC # BLD AUTO: 4.14 M/UL (ref 4–5.4)
SODIUM SERPL-SCNC: 132 MMOL/L (ref 136–145)
WBC # BLD AUTO: 7.36 K/UL (ref 3.9–12.7)
WBC # FLD: 87 /CU MM

## 2023-03-29 PROCEDURE — 99214 OFFICE O/P EST MOD 30 MIN: CPT | Mod: 25,S$GLB,, | Performed by: INTERNAL MEDICINE

## 2023-03-29 PROCEDURE — 20605 PR DRAIN/INJECT INTERMEDIATE JOINT/BURSA: ICD-10-PCS | Mod: LT,S$GLB,, | Performed by: INTERNAL MEDICINE

## 2023-03-29 PROCEDURE — 85025 COMPLETE CBC W/AUTO DIFF WBC: CPT | Performed by: INTERNAL MEDICINE

## 2023-03-29 PROCEDURE — 1159F MED LIST DOCD IN RCRD: CPT | Mod: CPTII,S$GLB,, | Performed by: INTERNAL MEDICINE

## 2023-03-29 PROCEDURE — 99999 PR PBB SHADOW E&M-EST. PATIENT-LVL III: ICD-10-PCS | Mod: PBBFAC,,, | Performed by: INTERNAL MEDICINE

## 2023-03-29 PROCEDURE — 20605 DRAIN/INJ JOINT/BURSA W/O US: CPT | Mod: LT,S$GLB,, | Performed by: INTERNAL MEDICINE

## 2023-03-29 PROCEDURE — 3079F DIAST BP 80-89 MM HG: CPT | Mod: CPTII,S$GLB,, | Performed by: INTERNAL MEDICINE

## 2023-03-29 PROCEDURE — 85652 RBC SED RATE AUTOMATED: CPT | Performed by: INTERNAL MEDICINE

## 2023-03-29 PROCEDURE — 87070 CULTURE OTHR SPECIMN AEROBIC: CPT | Performed by: STUDENT IN AN ORGANIZED HEALTH CARE EDUCATION/TRAINING PROGRAM

## 2023-03-29 PROCEDURE — 1160F PR REVIEW ALL MEDS BY PRESCRIBER/CLIN PHARMACIST DOCUMENTED: ICD-10-PCS | Mod: CPTII,S$GLB,, | Performed by: INTERNAL MEDICINE

## 2023-03-29 PROCEDURE — 3288F PR FALLS RISK ASSESSMENT DOCUMENTED: ICD-10-PCS | Mod: CPTII,S$GLB,, | Performed by: INTERNAL MEDICINE

## 2023-03-29 PROCEDURE — 89060 EXAM SYNOVIAL FLUID CRYSTALS: CPT | Performed by: STUDENT IN AN ORGANIZED HEALTH CARE EDUCATION/TRAINING PROGRAM

## 2023-03-29 PROCEDURE — 1101F PT FALLS ASSESS-DOCD LE1/YR: CPT | Mod: CPTII,S$GLB,, | Performed by: INTERNAL MEDICINE

## 2023-03-29 PROCEDURE — 1125F PR PAIN SEVERITY QUANTIFIED, PAIN PRESENT: ICD-10-PCS | Mod: CPTII,S$GLB,, | Performed by: INTERNAL MEDICINE

## 2023-03-29 PROCEDURE — 89051 BODY FLUID CELL COUNT: CPT | Performed by: STUDENT IN AN ORGANIZED HEALTH CARE EDUCATION/TRAINING PROGRAM

## 2023-03-29 PROCEDURE — 87205 SMEAR GRAM STAIN: CPT | Performed by: STUDENT IN AN ORGANIZED HEALTH CARE EDUCATION/TRAINING PROGRAM

## 2023-03-29 PROCEDURE — 3077F PR MOST RECENT SYSTOLIC BLOOD PRESSURE >= 140 MM HG: ICD-10-PCS | Mod: CPTII,S$GLB,, | Performed by: INTERNAL MEDICINE

## 2023-03-29 PROCEDURE — 1101F PR PT FALLS ASSESS DOC 0-1 FALLS W/OUT INJ PAST YR: ICD-10-PCS | Mod: CPTII,S$GLB,, | Performed by: INTERNAL MEDICINE

## 2023-03-29 PROCEDURE — 36415 COLL VENOUS BLD VENIPUNCTURE: CPT | Performed by: INTERNAL MEDICINE

## 2023-03-29 PROCEDURE — 1159F PR MEDICATION LIST DOCUMENTED IN MEDICAL RECORD: ICD-10-PCS | Mod: CPTII,S$GLB,, | Performed by: INTERNAL MEDICINE

## 2023-03-29 PROCEDURE — 99999 PR PBB SHADOW E&M-EST. PATIENT-LVL III: CPT | Mod: PBBFAC,,, | Performed by: INTERNAL MEDICINE

## 2023-03-29 PROCEDURE — 1160F RVW MEDS BY RX/DR IN RCRD: CPT | Mod: CPTII,S$GLB,, | Performed by: INTERNAL MEDICINE

## 2023-03-29 PROCEDURE — 3288F FALL RISK ASSESSMENT DOCD: CPT | Mod: CPTII,S$GLB,, | Performed by: INTERNAL MEDICINE

## 2023-03-29 PROCEDURE — 80053 COMPREHEN METABOLIC PANEL: CPT | Performed by: INTERNAL MEDICINE

## 2023-03-29 PROCEDURE — 3079F PR MOST RECENT DIASTOLIC BLOOD PRESSURE 80-89 MM HG: ICD-10-PCS | Mod: CPTII,S$GLB,, | Performed by: INTERNAL MEDICINE

## 2023-03-29 PROCEDURE — 86140 C-REACTIVE PROTEIN: CPT | Performed by: INTERNAL MEDICINE

## 2023-03-29 PROCEDURE — 1125F AMNT PAIN NOTED PAIN PRSNT: CPT | Mod: CPTII,S$GLB,, | Performed by: INTERNAL MEDICINE

## 2023-03-29 PROCEDURE — 3077F SYST BP >= 140 MM HG: CPT | Mod: CPTII,S$GLB,, | Performed by: INTERNAL MEDICINE

## 2023-03-29 PROCEDURE — 99214 PR OFFICE/OUTPT VISIT, EST, LEVL IV, 30-39 MIN: ICD-10-PCS | Mod: 25,S$GLB,, | Performed by: INTERNAL MEDICINE

## 2023-03-29 RX ORDER — ADALIMUMAB 40MG/0.8ML
KIT SUBCUTANEOUS
COMMUNITY
Start: 2023-01-25 | End: 2024-01-08

## 2023-03-29 RX ORDER — LIDOCAINE HYDROCHLORIDE 20 MG/ML
2 INJECTION, SOLUTION INFILTRATION; PERINEURAL
Status: COMPLETED | OUTPATIENT
Start: 2023-03-29 | End: 2023-03-29

## 2023-03-29 RX ORDER — TRIAMCINOLONE ACETONIDE 40 MG/ML
40 INJECTION, SUSPENSION INTRA-ARTICULAR; INTRAMUSCULAR
Status: COMPLETED | OUTPATIENT
Start: 2023-03-29 | End: 2023-03-29

## 2023-03-29 RX ADMIN — TRIAMCINOLONE ACETONIDE 40 MG: 40 INJECTION, SUSPENSION INTRA-ARTICULAR; INTRAMUSCULAR at 03:03

## 2023-03-29 RX ADMIN — LIDOCAINE HYDROCHLORIDE 2 ML: 20 INJECTION, SOLUTION INFILTRATION; PERINEURAL at 03:03

## 2023-03-29 NOTE — PROGRESS NOTES
Subjective:       Patient ID: Buffy Dominique is a 77 y.o. female.    Chief Complaint: rheumatoid arthritis w/positive rheumatoid factor      3/2023    3 weeks ago- left elbow  started to swell and hurt a lot  Cant lift her arm up    Otherwise humira is doing great    Right shoulder doing well    Today draining the left elbow which has olecranon bursitis           HPI     Interval Hx: 10/2022- seen by   She feels like the shoulder is doing better. She saw Ortho PA and they recommended she get evaluated by [facility] Sports [facility] for surgery. She has been doing exercises for the shoulder. She believes the Humira is helping. She did not yet get an aspirate of the joint. Other joints are doing well. She has some ankle edema but no other joint swelling. No morning stiffness.  She gets short of breath with exertion for the past few months. She complains of lower extremity edema. She's having issues getting her medication on time from [facility] because they tend to only carry the Humira 80 mg and not the 40 mg. 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  ESR,CRP nml  Vit d low-replacement sent  CMP nml  CBC nml      INITIAL HX:  Patient is a 76-year-old female with HTN, aortic stenosis, hx of thoracic aortic aneurysm s/p surgery, who presents for Rheumatology consultation for polyarthritis. Patient complains that 4 months ago, she woke up one morning with pain in the shoulders. She denies history of trauma. Now patient has pain in left ankle, left knee, left wrist, left elbow. She also has pain in both hands and both shoulders. Tylenol BID doesn't help. She can't take NSAIDs due to HTN. Ice and heat don't help. Fast Freeze ointment doesn't help. She has had 2 steroid shots in the left shoulder 3 months apart - she got relief from this but the pain came back in a couple weeks. She has never taken oral steroids.  She has morning stiffness 20 minutes, improves as the day  progresses. Pain and stiffness are worse with cold weather. Pain is worse with use.  She endorses swelling in left ankle, left knee, left hand and left wrist.    She has a history of HTN and thoracic aortic aneurysm s/p surgery 2011.  In 2015 and 2016, she had carpal tunnel surgery in both hands.    Family hx: no autoimmune disease  Social hx: smoked 1.5-2 PPD for 25 years, quit in 2009. 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    No skin rashes, malar rash, photosensitivity   No telangiectasias   No calcinosis   No psoriasis   No patchy alopecia   No oral or nasal ulcers   No dry eyes or dry mouth   No pleurisy or any cardiopulmonary complaints   +chest pain radiating to back which sometimes comes on at rest  No dysphagia, diplopia, dysphonia  No muscle weakness   No n/v/d/c   No acid reflux  No Raynaud's  No digital ulcers   No cytopenias   No renal issues   No blood clots   No fever, chills, night sweats, weight loss (5lbs in 2 months), or loss of appetite   No pregnancy losses, pre-term deliveries, or pregnancy complications   No new onset headaches   No recurrent conjunctivitis, uveitis, scleritis, or episcleritis   No chronic or bloody diarrhea. No Ulcerative Colitis or Chron's (IBD)  No vaginal or penile and urethral  discharge/STDs/ulcers   No unexplained lymphadenopathy  No parotitis   No seizures, strokes, psychosis  No sclerodactyly  No puffy hands  No perioral tightness         Review of Systems   Constitutional:  Negative for fever and unexpected weight change.   HENT:  Negative for mouth sores and trouble swallowing.    Eyes:  Negative for redness.   Respiratory:  Negative for cough and shortness of breath.    Cardiovascular:  Negative for chest pain.   Gastrointestinal:  Negative for constipation and diarrhea.   Genitourinary:  Negative for dysuria and genital sores.   Skin:  Negative for rash.   Neurological:  Negative for headaches.   Hematological:  Does  "not bruise/bleed easily.       Objective:   BP (!) 165/83   Pulse (!) 6   Ht 5' 3" (1.6 m)   Wt 54.9 kg (121 lb 0.5 oz)   LMP  (LMP Unknown)   BMI 21.44 kg/m²      Physical Exam   Constitutional: She is oriented to person, place, and time. No distress.   HENT:   Head: Normocephalic and atraumatic.   Eyes: Pupils are equal, round, and reactive to light.   Cardiovascular: Normal rate and regular rhythm.   No murmur heard.  Pulmonary/Chest: Effort normal and breath sounds normal. No respiratory distress. She has no wheezes.   Abdominal: Soft. Bowel sounds are normal. There is no abdominal tenderness.   Musculoskeletal:         General: No deformity.      Right shoulder: Normal.      Left shoulder: Normal.      Right elbow: Normal.      Left elbow: Normal.      Right wrist: Normal.      Left wrist: Normal.      Cervical back: Normal range of motion.      Right knee: Normal.      Left knee: Normal.      Comments: No joint swelling or synovitis. ROM of right shoulder is significantly improved. Some pain with internal rotation.   Neurological: She is alert and oriented to person, place, and time.   Skin: Skin is warm and dry.   Psychiatric: Affect and judgment normal.       Right Side Rheumatological Exam     Examination finds the shoulder, elbow, wrist, knee, 1st PIP, 1st MCP, 2nd PIP, 2nd MCP, 3rd PIP, 3rd MCP, 4th PIP, 4th MCP, 5th PIP and 5th MCP normal.    Muscle Strength (0-5 scale):  Neck Flexion:  5  Neck Extension: 5  Deltoid:  5  Biceps: 5/5   Triceps:  5  : 5/5   Iliopsoas: 5  Quadriceps:  5   Distal Lower Extremity: 5    Left Side Rheumatological Exam     Examination finds the shoulder, elbow, wrist, knee, 1st PIP, 1st MCP, 2nd PIP, 2nd MCP, 3rd PIP, 3rd MCP, 4th PIP, 4th MCP, 5th PIP and 5th MCP normal.    Muscle Strength (0-5 scale):  Neck Flexion:  5  Neck Extension: 5  Deltoid:  5  Biceps: 5/5   Triceps:  5  :  5/5   Iliopsoas: 5  Quadriceps:  5   Distal Lower Extremity: 5     Left elbow is " swollen    There is currently no information documented on the homunculus. Go to the Rheumatology activity and complete the homunculus joint exam.     12/2/2021 3/10/2022 7/28/2022 11/3/2022   Tender (HENDRICKS-28) 15 / 28  0 / 28 1 / 28  0 / 28    Swollen (HENDRICKS-28) 3 / 28  0 / 28  0 / 28  0 / 28    Provider Global -- 10 mm 20 mm 10 mm   Patient Global -- 30 mm 30 mm 15 mm   ESR -- -- 4 mm/hr 23 mm/hr   CRP -- -- 2.2 mg/L 4.1 mg/L   HENDRICKS-28 (ESR) -- -- 1.95 (Remission) 2.4 (Remission)   HENDRICKS-28 (CRP) -- -- 2.36 (Remission) 1.76 (Remission)   CDAI Score -- 4  6  2.5         Assessment:       1. Rheumatoid arthritis involving multiple sites with positive rheumatoid factor                Plan:       Problem List Items Addressed This Visit    None  Visit Diagnoses       Rheumatoid arthritis involving multiple sites with positive rheumatoid factor    -  Primary    Relevant Orders    WBC & Diff,Body Fluid Bursa, Fluid    Body fluid crystal Joint Fluid, Left Elbow    CULTURE, FLUID  (AEROBIC)    CBC Auto Differential (Completed)    Comprehensive Metabolic Panel (Completed)    Sedimentation rate (Completed)    C-Reactive Protein (Completed)    Body fluid crystal Joint Fluid, Left Elbow    WBC & Diff,Body Fluid Joint Fluid, Left Elbow    Culture, Body Fluid (Aerobic) w/ GS              Patient is a 76-year-old female with HTN, aortic stenosis, hx of thoracic aortic aneurysm s/p surgery, who presents for Rheumatology follow up for seropositive RA.     Presented with arthralgias and synovitis on exam.  , CCP 7.7  Medications tried: MTX (oral ulcers, had to get leucovorin). Currently on Humira (started 3/6/22).  Vaccinations up to date except for COVID which she declines.  CDAI 2.5 (remission)    KAY 1:80 low titer, homogenous.  Remainder of KAY profile is negative. Normal WBC count. No thrombocytopenia. No signs or symptoms of lupus.     Uric acid 6.5.      Standing labs look good including normal kidney function now and no  proteinuria. She has lower extremity edema. She has been getting dyspnea on exertion for the past few months. Echo 2018 shows PA pressure 40 mmHg. Significant smoking hx. Last CT this year did not show COPD.  She was sent to Pul htn clinic    DEXA shows T scores <-3 and FRAX 25% major and 11% hip so this warrants treatment with bisphosphonate  Vitamin D low at 16    She does really well on humira    Left elbow olecranon bursitis is the main complaint today- unclear if due to gout vs RA vs repetitive stress/overuse      Plan:    Aspirate and drain the left elbow and analyse fluid and inject with steroids    Left elbow olecranon bursal site marked  Verbal consent taken  Site cleaned  Local anesthetic sprayed  2 ml lidocaine injected  30 cc fluid aspirated,sent to lab  40 mg kenalog injected  She tolerated the procedure well  Will call her back with results on the fluid      Vit d,alendronate as per last visit- recs  Humira 40 mg every 2 weeks to continue    She will see  next visit    I have ordered labs for today

## 2023-03-30 LAB — PATH INTERP FLD-IMP: NORMAL

## 2023-04-03 LAB
BACTERIA FLD AEROBE CULT: NO GROWTH
GRAM STN SPEC: NORMAL
GRAM STN SPEC: NORMAL

## 2023-05-09 ENCOUNTER — PATIENT MESSAGE (OUTPATIENT)
Dept: INFECTIOUS DISEASES | Facility: CLINIC | Age: 78
End: 2023-05-09
Payer: MEDICARE

## 2023-09-06 ENCOUNTER — PATIENT MESSAGE (OUTPATIENT)
Dept: RHEUMATOLOGY | Facility: CLINIC | Age: 78
End: 2023-09-06
Payer: MEDICARE

## 2023-09-06 DIAGNOSIS — M05.79 RHEUMATOID ARTHRITIS INVOLVING MULTIPLE SITES WITH POSITIVE RHEUMATOID FACTOR: Primary | ICD-10-CM

## 2023-09-06 NOTE — TELEPHONE ENCOUNTER
----- Message from Lolly Lambert sent at 9/6/2023  2:35 PM CDT -----  Contact: Pt [phone]  Pt needs a refill on adalimumab (HUMIRA PEN) PnKt injection called into       [id number] - [place], [place] - [address]  [address] [address]  Phone: [phone] Fax: [phone]      Pt mom/dad/guardian can be reached at [phone]        Buffy states her hands hurt and are numb at times. Please call the pt back for more advice

## 2023-09-07 RX ORDER — ADALIMUMAB 40MG/0.8ML
40 KIT SUBCUTANEOUS
Qty: 6 PEN | Refills: 3 | Status: SHIPPED | OUTPATIENT
Start: 2023-09-07

## 2023-09-14 ENCOUNTER — LAB VISIT (OUTPATIENT)
Dept: LAB | Facility: HOSPITAL | Age: 78
End: 2023-09-14
Payer: MEDICARE

## 2023-09-14 ENCOUNTER — OFFICE VISIT (OUTPATIENT)
Dept: RHEUMATOLOGY | Facility: CLINIC | Age: 78
End: 2023-09-14
Payer: MEDICARE

## 2023-09-14 VITALS
SYSTOLIC BLOOD PRESSURE: 147 MMHG | BODY MASS INDEX: 19.92 KG/M2 | DIASTOLIC BLOOD PRESSURE: 67 MMHG | HEART RATE: 76 BPM | WEIGHT: 112.44 LBS | HEIGHT: 63 IN

## 2023-09-14 DIAGNOSIS — M25.611 DECREASED ROM OF RIGHT SHOULDER: ICD-10-CM

## 2023-09-14 DIAGNOSIS — G89.29 CHRONIC PAIN OF BOTH SHOULDERS: ICD-10-CM

## 2023-09-14 DIAGNOSIS — M25.612 DECREASED ROM OF LEFT SHOULDER: ICD-10-CM

## 2023-09-14 DIAGNOSIS — M81.0 OSTEOPOROSIS, UNSPECIFIED OSTEOPOROSIS TYPE, UNSPECIFIED PATHOLOGICAL FRACTURE PRESENCE: ICD-10-CM

## 2023-09-14 DIAGNOSIS — Z79.899 IMMUNOCOMPROMISED STATE DUE TO DRUG THERAPY: ICD-10-CM

## 2023-09-14 DIAGNOSIS — M25.512 CHRONIC PAIN OF BOTH SHOULDERS: ICD-10-CM

## 2023-09-14 DIAGNOSIS — D84.821 IMMUNOCOMPROMISED STATE DUE TO DRUG THERAPY: ICD-10-CM

## 2023-09-14 DIAGNOSIS — L08.9 SKIN INFECTION: ICD-10-CM

## 2023-09-14 DIAGNOSIS — R29.898 WEAKNESS OF SHOULDER: ICD-10-CM

## 2023-09-14 DIAGNOSIS — M05.79 RHEUMATOID ARTHRITIS INVOLVING MULTIPLE SITES WITH POSITIVE RHEUMATOID FACTOR: ICD-10-CM

## 2023-09-14 DIAGNOSIS — R22.32 LOCALIZED SWELLING, MASS AND LUMP, LEFT UPPER LIMB: ICD-10-CM

## 2023-09-14 DIAGNOSIS — M25.511 CHRONIC PAIN OF BOTH SHOULDERS: ICD-10-CM

## 2023-09-14 DIAGNOSIS — M05.79 RHEUMATOID ARTHRITIS INVOLVING MULTIPLE SITES WITH POSITIVE RHEUMATOID FACTOR: Primary | ICD-10-CM

## 2023-09-14 LAB
ALBUMIN SERPL BCP-MCNC: 4.1 G/DL (ref 3.5–5.2)
ALP SERPL-CCNC: 73 U/L (ref 55–135)
ALT SERPL W/O P-5'-P-CCNC: 10 U/L (ref 10–44)
ANION GAP SERPL CALC-SCNC: 12 MMOL/L (ref 8–16)
AST SERPL-CCNC: 18 U/L (ref 10–40)
BASOPHILS # BLD AUTO: 0.03 K/UL (ref 0–0.2)
BASOPHILS NFR BLD: 0.4 % (ref 0–1.9)
BILIRUB SERPL-MCNC: 0.8 MG/DL (ref 0.1–1)
BUN SERPL-MCNC: 12 MG/DL (ref 8–23)
CALCIUM SERPL-MCNC: 10 MG/DL (ref 8.7–10.5)
CHLORIDE SERPL-SCNC: 95 MMOL/L (ref 95–110)
CO2 SERPL-SCNC: 24 MMOL/L (ref 23–29)
CREAT SERPL-MCNC: 0.7 MG/DL (ref 0.5–1.4)
CRP SERPL-MCNC: 4.4 MG/L (ref 0–8.2)
DIFFERENTIAL METHOD: ABNORMAL
EOSINOPHIL # BLD AUTO: 0.4 K/UL (ref 0–0.5)
EOSINOPHIL NFR BLD: 6.1 % (ref 0–8)
ERYTHROCYTE [DISTWIDTH] IN BLOOD BY AUTOMATED COUNT: 12 % (ref 11.5–14.5)
ERYTHROCYTE [SEDIMENTATION RATE] IN BLOOD BY PHOTOMETRIC METHOD: 15 MM/HR (ref 0–36)
EST. GFR  (NO RACE VARIABLE): >60 ML/MIN/1.73 M^2
GLUCOSE SERPL-MCNC: 90 MG/DL (ref 70–110)
HCT VFR BLD AUTO: 37 % (ref 37–48.5)
HGB BLD-MCNC: 12.3 G/DL (ref 12–16)
IMM GRANULOCYTES # BLD AUTO: 0.02 K/UL (ref 0–0.04)
IMM GRANULOCYTES NFR BLD AUTO: 0.3 % (ref 0–0.5)
LYMPHOCYTES # BLD AUTO: 2.8 K/UL (ref 1–4.8)
LYMPHOCYTES NFR BLD: 38.9 % (ref 18–48)
MCH RBC QN AUTO: 31.9 PG (ref 27–31)
MCHC RBC AUTO-ENTMCNC: 33.2 G/DL (ref 32–36)
MCV RBC AUTO: 96 FL (ref 82–98)
MONOCYTES # BLD AUTO: 0.7 K/UL (ref 0.3–1)
MONOCYTES NFR BLD: 9.6 % (ref 4–15)
NEUTROPHILS # BLD AUTO: 3.2 K/UL (ref 1.8–7.7)
NEUTROPHILS NFR BLD: 44.7 % (ref 38–73)
NRBC BLD-RTO: 0 /100 WBC
PLATELET # BLD AUTO: 216 K/UL (ref 150–450)
PMV BLD AUTO: 8.6 FL (ref 9.2–12.9)
POTASSIUM SERPL-SCNC: 3.6 MMOL/L (ref 3.5–5.1)
PROT SERPL-MCNC: 7.4 G/DL (ref 6–8.4)
RBC # BLD AUTO: 3.86 M/UL (ref 4–5.4)
SODIUM SERPL-SCNC: 131 MMOL/L (ref 136–145)
WBC # BLD AUTO: 7.09 K/UL (ref 3.9–12.7)

## 2023-09-14 PROCEDURE — 36415 COLL VENOUS BLD VENIPUNCTURE: CPT | Performed by: STUDENT IN AN ORGANIZED HEALTH CARE EDUCATION/TRAINING PROGRAM

## 2023-09-14 PROCEDURE — 80053 COMPREHEN METABOLIC PANEL: CPT | Performed by: STUDENT IN AN ORGANIZED HEALTH CARE EDUCATION/TRAINING PROGRAM

## 2023-09-14 PROCEDURE — 86140 C-REACTIVE PROTEIN: CPT | Performed by: STUDENT IN AN ORGANIZED HEALTH CARE EDUCATION/TRAINING PROGRAM

## 2023-09-14 PROCEDURE — 3077F SYST BP >= 140 MM HG: CPT | Mod: CPTII,GC,S$GLB, | Performed by: STUDENT IN AN ORGANIZED HEALTH CARE EDUCATION/TRAINING PROGRAM

## 2023-09-14 PROCEDURE — 85025 COMPLETE CBC W/AUTO DIFF WBC: CPT | Performed by: STUDENT IN AN ORGANIZED HEALTH CARE EDUCATION/TRAINING PROGRAM

## 2023-09-14 PROCEDURE — 1101F PT FALLS ASSESS-DOCD LE1/YR: CPT | Mod: CPTII,GC,S$GLB, | Performed by: STUDENT IN AN ORGANIZED HEALTH CARE EDUCATION/TRAINING PROGRAM

## 2023-09-14 PROCEDURE — 3077F PR MOST RECENT SYSTOLIC BLOOD PRESSURE >= 140 MM HG: ICD-10-PCS | Mod: CPTII,GC,S$GLB, | Performed by: STUDENT IN AN ORGANIZED HEALTH CARE EDUCATION/TRAINING PROGRAM

## 2023-09-14 PROCEDURE — 3078F DIAST BP <80 MM HG: CPT | Mod: CPTII,GC,S$GLB, | Performed by: STUDENT IN AN ORGANIZED HEALTH CARE EDUCATION/TRAINING PROGRAM

## 2023-09-14 PROCEDURE — 99214 PR OFFICE/OUTPT VISIT, EST, LEVL IV, 30-39 MIN: ICD-10-PCS | Mod: GC,S$GLB,, | Performed by: STUDENT IN AN ORGANIZED HEALTH CARE EDUCATION/TRAINING PROGRAM

## 2023-09-14 PROCEDURE — 1159F PR MEDICATION LIST DOCUMENTED IN MEDICAL RECORD: ICD-10-PCS | Mod: CPTII,GC,S$GLB, | Performed by: STUDENT IN AN ORGANIZED HEALTH CARE EDUCATION/TRAINING PROGRAM

## 2023-09-14 PROCEDURE — 1160F RVW MEDS BY RX/DR IN RCRD: CPT | Mod: CPTII,GC,S$GLB, | Performed by: STUDENT IN AN ORGANIZED HEALTH CARE EDUCATION/TRAINING PROGRAM

## 2023-09-14 PROCEDURE — 1125F AMNT PAIN NOTED PAIN PRSNT: CPT | Mod: CPTII,GC,S$GLB, | Performed by: STUDENT IN AN ORGANIZED HEALTH CARE EDUCATION/TRAINING PROGRAM

## 2023-09-14 PROCEDURE — 85652 RBC SED RATE AUTOMATED: CPT | Performed by: STUDENT IN AN ORGANIZED HEALTH CARE EDUCATION/TRAINING PROGRAM

## 2023-09-14 PROCEDURE — 99214 OFFICE O/P EST MOD 30 MIN: CPT | Mod: GC,S$GLB,, | Performed by: STUDENT IN AN ORGANIZED HEALTH CARE EDUCATION/TRAINING PROGRAM

## 2023-09-14 PROCEDURE — 1125F PR PAIN SEVERITY QUANTIFIED, PAIN PRESENT: ICD-10-PCS | Mod: CPTII,GC,S$GLB, | Performed by: STUDENT IN AN ORGANIZED HEALTH CARE EDUCATION/TRAINING PROGRAM

## 2023-09-14 PROCEDURE — 3288F FALL RISK ASSESSMENT DOCD: CPT | Mod: CPTII,GC,S$GLB, | Performed by: STUDENT IN AN ORGANIZED HEALTH CARE EDUCATION/TRAINING PROGRAM

## 2023-09-14 PROCEDURE — 1160F PR REVIEW ALL MEDS BY PRESCRIBER/CLIN PHARMACIST DOCUMENTED: ICD-10-PCS | Mod: CPTII,GC,S$GLB, | Performed by: STUDENT IN AN ORGANIZED HEALTH CARE EDUCATION/TRAINING PROGRAM

## 2023-09-14 PROCEDURE — 99999 PR PBB SHADOW E&M-EST. PATIENT-LVL V: ICD-10-PCS | Mod: PBBFAC,GC,, | Performed by: STUDENT IN AN ORGANIZED HEALTH CARE EDUCATION/TRAINING PROGRAM

## 2023-09-14 PROCEDURE — 3288F PR FALLS RISK ASSESSMENT DOCUMENTED: ICD-10-PCS | Mod: CPTII,GC,S$GLB, | Performed by: STUDENT IN AN ORGANIZED HEALTH CARE EDUCATION/TRAINING PROGRAM

## 2023-09-14 PROCEDURE — 3078F PR MOST RECENT DIASTOLIC BLOOD PRESSURE < 80 MM HG: ICD-10-PCS | Mod: CPTII,GC,S$GLB, | Performed by: STUDENT IN AN ORGANIZED HEALTH CARE EDUCATION/TRAINING PROGRAM

## 2023-09-14 PROCEDURE — 99999 PR PBB SHADOW E&M-EST. PATIENT-LVL V: CPT | Mod: PBBFAC,GC,, | Performed by: STUDENT IN AN ORGANIZED HEALTH CARE EDUCATION/TRAINING PROGRAM

## 2023-09-14 PROCEDURE — 1159F MED LIST DOCD IN RCRD: CPT | Mod: CPTII,GC,S$GLB, | Performed by: STUDENT IN AN ORGANIZED HEALTH CARE EDUCATION/TRAINING PROGRAM

## 2023-09-14 PROCEDURE — 1101F PR PT FALLS ASSESS DOC 0-1 FALLS W/OUT INJ PAST YR: ICD-10-PCS | Mod: CPTII,GC,S$GLB, | Performed by: STUDENT IN AN ORGANIZED HEALTH CARE EDUCATION/TRAINING PROGRAM

## 2023-09-14 RX ORDER — ALENDRONATE SODIUM 70 MG/1
70 TABLET ORAL
Qty: 4 TABLET | Refills: 11 | Status: SHIPPED | OUTPATIENT
Start: 2023-09-14 | End: 2024-09-13

## 2023-09-14 RX ORDER — VIT C/E/ZN/COPPR/LUTEIN/ZEAXAN 250MG-90MG
1000 CAPSULE ORAL DAILY
Qty: 90 CAPSULE | Refills: 3 | Status: SHIPPED | OUTPATIENT
Start: 2023-09-14

## 2023-09-14 RX ORDER — SULFAMETHOXAZOLE AND TRIMETHOPRIM 800; 160 MG/1; MG/1
1 TABLET ORAL 2 TIMES DAILY
Qty: 10 TABLET | Refills: 0 | Status: SHIPPED | OUTPATIENT
Start: 2023-09-14 | End: 2023-09-19

## 2023-09-14 NOTE — PROGRESS NOTES
9/12/2023     7:39 PM   Rapid3 Question Responses and Scores   MDHAQ Score 0.1   Psychologic Score 0   Pain Score 6.5   When you awakened in the morning OVER THE LAST WEEK, did you feel stiff? Yes   If Yes, please indicate the number of hours until you are as limber as you will be for the day 1   Fatigue Score 1   Global Health Score 0   RAPID3 Score 2.28     Answers submitted by the patient for this visit:  Rheumatology Questionnaire (Submitted on 9/12/2023)  fever: No  eye redness: No  mouth sores: No  headaches: No  shortness of breath: No  chest pain: No  trouble swallowing: No  diarrhea: No  constipation: No  unexpected weight change: No  genital sore: No  dysuria: No  During the last 3 days, have you had a skin rash?: No  Bruises or bleeds easily: No  cough: No

## 2023-09-14 NOTE — PROGRESS NOTES
Subjective:       Patient ID: Buffy Dominique is a 77 y.o. female.    Chief Complaint: rheumatoid arthritis w/positive rheumatoid factor    9/2023    On humira 40 mg every other week  Last visit -we injected the left elbow  She is doing well    Shoulders decreased RPM  Poor strength  Ortho and sports medicine- says she has AVN  PT helped          3/2023    3 weeks ago- left elbow  started to swell and hurt a lot  Cant lift her arm up    Otherwise humira is doing great    Right shoulder doing well    Today draining the left elbow which has olecranon bursitis   Fluid non inflammatory  No crystals  No infections        HPI     Interval Hx: 10/2022- seen by   She feels like the shoulder is doing better. She saw Ortho PA and they recommended she get evaluated by Ortho Sports Med for surgery. She has been doing exercises for the shoulder. She believes the Humira is helping. She did not yet get an aspirate of the joint. Other joints are doing well. She has some ankle edema but no other joint swelling. No morning stiffness.  She gets short of breath with exertion for the past few months. She complains of lower extremity edema. She's having issues getting her medication on time from [facility] because they tend to only carry the Humira 80 mg and not the 40 mg. 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  ESR,CRP nml  Vit d low-replacement sent  CMP nml  CBC nml      INITIAL HX:  Patient is a 76-year-old female with HTN, aortic stenosis, hx of thoracic aortic aneurysm s/p surgery, who presents for Rheumatology consultation for polyarthritis. Patient complains that 4 months ago, she woke up one morning with pain in the shoulders. She denies history of trauma. Now patient has pain in left ankle, left knee, left wrist, left elbow. She also has pain in both hands and both shoulders. Tylenol BID doesn't help. She can't take NSAIDs due to HTN. Ice and heat don't help. Fast  Freeze ointment doesn't help. She has had 2 steroid shots in the left shoulder 3 months apart - she got relief from this but the pain came back in a couple weeks. She has never taken oral steroids.  She has morning stiffness 20 minutes, improves as the day progresses. Pain and stiffness are worse with cold weather. Pain is worse with use.  She endorses swelling in left ankle, left knee, left hand and left wrist.    She has a history of HTN and thoracic aortic aneurysm s/p surgery 2011.  In 2015 and 2016, she had carpal tunnel surgery in both hands.    Family hx: no autoimmune disease  Social hx: smoked 1.5-2 PPD for 25 years, quit in 2009. 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    No skin rashes, malar rash, photosensitivity   No telangiectasias   No calcinosis   No psoriasis   No patchy alopecia   No oral or nasal ulcers   No dry eyes or dry mouth   No pleurisy or any cardiopulmonary complaints   +chest pain radiating to back which sometimes comes on at rest  No dysphagia, diplopia, dysphonia  No muscle weakness   No n/v/d/c   No acid reflux  No Raynaud's  No digital ulcers   No cytopenias   No renal issues   No blood clots   No fever, chills, night sweats, weight loss (5lbs in 2 months), or loss of appetite   No pregnancy losses, pre-term deliveries, or pregnancy complications   No new onset headaches   No recurrent conjunctivitis, uveitis, scleritis, or episcleritis   No chronic or bloody diarrhea. No Ulcerative Colitis or Chron's (IBD)  No vaginal or penile and urethral  discharge/STDs/ulcers   No unexplained lymphadenopathy  No parotitis   No seizures, strokes, psychosis  No sclerodactyly  No puffy hands  No perioral tightness         Review of Systems   Constitutional:  Negative for fever and unexpected weight change.   HENT:  Negative for mouth sores and trouble swallowing.    Eyes:  Negative for redness.   Respiratory:  Negative for cough and shortness of breath.   "  Cardiovascular:  Negative for chest pain.   Gastrointestinal:  Negative for constipation and diarrhea.   Genitourinary:  Negative for dysuria and genital sores.   Skin:  Negative for rash.   Neurological:  Negative for headaches.   Hematological:  Does not bruise/bleed easily.         Objective:   BP (!) 147/67   Pulse 76   Ht 5' 3" (1.6 m)   Wt 51 kg (112 lb 7 oz)   LMP  (LMP Unknown)   BMI 19.92 kg/m²      Physical Exam   Constitutional: She is oriented to person, place, and time. No distress.   HENT:   Head: Normocephalic and atraumatic.   Eyes: Pupils are equal, round, and reactive to light.   Cardiovascular: Normal rate and regular rhythm.   No murmur heard.  Pulmonary/Chest: Effort normal and breath sounds normal. No respiratory distress. She has no wheezes.   Abdominal: Soft. Bowel sounds are normal. There is no abdominal tenderness.   Musculoskeletal:         General: No deformity.      Right shoulder: Normal.      Left shoulder: Normal.      Right elbow: Normal.      Left elbow: Normal.      Right wrist: Normal.      Left wrist: Normal.      Cervical back: Normal range of motion.      Right knee: Normal.      Left knee: Normal.      Comments: No joint swelling or synovitis. ROM of right shoulder is significantly improved. Some pain with internal rotation.   Neurological: She is alert and oriented to person, place, and time.   Skin: Skin is warm and dry.   Psychiatric: Affect and judgment normal.       Right Side Rheumatological Exam     Examination finds the shoulder, elbow, wrist, knee, 1st PIP, 1st MCP, 2nd PIP, 2nd MCP, 3rd PIP, 3rd MCP, 4th PIP, 4th MCP, 5th PIP and 5th MCP normal.    Muscle Strength (0-5 scale):  Neck Flexion:  5  Neck Extension: 5  Deltoid:  5  Biceps: 5/5   Triceps:  5  : 5/5   Iliopsoas: 5  Quadriceps:  5   Distal Lower Extremity: 5    Left Side Rheumatological Exam     Examination finds the shoulder, elbow, wrist, knee, 1st PIP, 1st MCP, 2nd PIP, 2nd MCP, 3rd PIP, 3rd " MCP, 4th PIP, 4th MCP, 5th PIP and 5th MCP normal.    Muscle Strength (0-5 scale):  Neck Flexion:  5  Neck Extension: 5  Deltoid:  5  Biceps: 5/5   Triceps:  5  :  5/5   Iliopsoas: 5  Quadriceps:  5   Distal Lower Extremity: 5       Left elbow is swollen    There is currently no information documented on the homunculus. Go to the Rheumatology activity and complete the homunculus joint exam.     12/2/2021 3/10/2022 7/28/2022 11/3/2022   Tender (HENDRICKS-28) 15 / 28  0 / 28 1 / 28  0 / 28    Swollen (HENDRICKS-28) 3 / 28  0 / 28  0 / 28  0 / 28    Provider Global -- 10 mm 20 mm 10 mm   Patient Global -- 30 mm 30 mm 15 mm   ESR -- -- 4 mm/hr 23 mm/hr   CRP -- -- 2.2 mg/L 4.1 mg/L   HENDRICKS-28 (ESR) -- -- 1.95 (Remission) 2.4 (Remission)   HENDRICKS-28 (CRP) -- -- 2.36 (Remission) 1.76 (Remission)   CDAI Score -- 4  6  2.5         Assessment:       1. Rheumatoid arthritis involving multiple sites with positive rheumatoid factor    2. Immunocompromised state due to drug therapy    3. Osteoporosis, unspecified osteoporosis type, unspecified pathological fracture presence    4. Chronic pain of both shoulders    5. Weakness of shoulder    6. Decreased ROM of left shoulder    7. Decreased ROM of right shoulder                Plan:       Problem List Items Addressed This Visit          Orthopedic    Weakness of shoulder    Decreased ROM of right shoulder     Other Visit Diagnoses       Rheumatoid arthritis involving multiple sites with positive rheumatoid factor    -  Primary    Immunocompromised state due to drug therapy        Osteoporosis, unspecified osteoporosis type, unspecified pathological fracture presence        Chronic pain of both shoulders        Decreased ROM of left shoulder                  Patient is a 76-year-old female with HTN, aortic stenosis, hx of thoracic aortic aneurysm s/p surgery, who presents for Rheumatology follow up for seropositive RA.     Presented with arthralgias and synovitis on exam.  , CCP  7.7  Medications tried: MTX (oral ulcers, had to get leucovorin). Currently on Humira (started 3/6/22).  Vaccinations up to date except for COVID which she declines.  CDAI 2.5 (remission)    KAY 1:80 low titer, homogenous.  Remainder of KAY profile is negative. Normal WBC count. No thrombocytopenia. No signs or symptoms of lupus.     Uric acid 6.5.      Standing labs look good including normal kidney function now and no proteinuria. She has lower extremity edema. She has been getting dyspnea on exertion for the past few months. Echo 2018 shows PA pressure 40 mmHg. Significant smoking hx. Last CT this year did not show COPD.  She was sent to Pul htn clinic    DEXA shows T scores <-3 and FRAX 25% major and 11% hip so this warrants treatment with bisphosphonate  Vitamin D low at 16    She does really well on humira    Left elbow olecranon bursitis is the main complaint today- unclear if due to gout vs RA vs repetitive stress/overuse     9/2023    On humira 40 mg every other week  Last visit -we injected the left elbow  She is doing well    Shoulders decreased RPM  Poor strength  Ortho and sports medicine- says she has AVN  PT helped         Plan:      Vit d,alendronate     Humira 40 mg every 2 weeks to continue

## 2023-09-14 NOTE — PROGRESS NOTES
Subjective:      Patient ID: Buffy Dominique is a 77 y.o. female.    Chief Complaint: Disease Management    Initial Presentation  Buffy Dominique is a 77 y.o. F with a past medical history below who presents today for follow-up for seropositive rheumatoid arthritis and osteoporosis.     The patient was initially diagnosed in 2021 when she presented to clinic with 4 months of L ankle, knee, wrist, hand and elbow pain and R shoulder, hand and finger pain. She also experienced swelling of the L ankle, hand, wrist and knee. KAY 1:80 low titer, homogenous. Remainder of KAY profile is negative. Elevated ESR 59, CRP 54.1. RF positive (109). CCP positive (7.7). She was started on Methotrexate but she did not tolerate because of mouth sores. She was then started on Humira in 03/2022 and her disease has been in remission.     Interval History  Today, she is doing very well from a rheumatoid arthritis standpoint. She has no pain or swelling in the joints of the hands. She has pain and weakness of the shoulders bilaterally. She was seen by Orthopedics and Sports Medicine in the past. MRI showed inflammatory arthritis and early avascular necrosis. She responded well to PT. She is not interested in surgical interventions.     She took a flu shot on Tuesday evening and now has fluid on the arm where she had the injection and it is associated with tenderness.     Rheumatology ROS  (-) fevers, chills (-) weight loss, (-) fatigue, (-) morning stiffness,  (-) arthralgias (of the shoulders bilaterally), (-) arthritis, (-) headaches, (-)  vision changes or loss of vision, (-) hx of red eyes including uveitis, iritis, scleritis or episcleritis, (-)  photophobia, (-) dry eyes, (-) dry mouth, (-) rash, (-) photosensitivity, (-) alopecia, (-) mucosal ulcers, (-) Raynaud's  phenomenon, (-) SQ nodules, (-) sense of skin tightening in hands, face or torso, (-)  hx pleurisy, (-) sharp chest pains that increase with deep breath, (-) lung fibrosis, (-)  hemoptysis, (-) hx of DVT or PE (-) chest pains, (-) shortness of breath, (-) hx pericarditis (-) abdominal pain, (-) nausea, (-) vomiting, (-) diarrhea, (-) constipation, (-) melena,  (-) bloody diarrhea, (-) UC/Crohns, (-) dysphagia, (-) GERD/Reflux, (-) hematuria, proteinuria, (-) renal failure, (-) focal weakness, (-) trouble combing hair or (-) getting out of chairs,  (-) hx of low WBC, low platelets, anemia, (-) hx of pregnancy losses/pre term deliveries/pregnancy complications, (-) genital ulcers, (+) soft tissue swelling on L upper extremity     Answers submitted by the patient for this visit:  Rheumatology Questionnaire (Submitted on 9/12/2023)  fever: No  eye redness: No  mouth sores: No  headaches: No  shortness of breath: No  chest pain: No  trouble swallowing: No  diarrhea: No  constipation: No  unexpected weight change: No  genital sore: No  dysuria: No  During the last 3 days, have you had a skin rash?: No  Bruises or bleeds easily: No  cough: No    History  Medical:  Active Problem List with Overview Notes    Diagnosis Date Noted    Shoulder pain, right 05/31/2022    Weakness of shoulder 05/31/2022    Decreased ROM of right shoulder 05/31/2022    Right shoulder pain 05/06/2022    Acute cystitis without hematuria 05/03/2022    Hyponatremia 05/03/2022    Acute kidney injury superimposed on chronic kidney disease 05/03/2022    CKD (chronic kidney disease) stage 3, GFR 30-59 ml/min     Pelvic floor dysfunction 02/24/2022    Seropositive rheumatoid arthritis 02/03/2022    Former tobacco use 02/03/2022    Multiple pulmonary nodules 02/03/2022    Rectal prolapse 02/03/2022    Polyarthritis 12/06/2021    Nonrheumatic aortic valve insufficiency 11/23/2018    HLD (hyperlipidemia) 11/09/2018    Essential hypertension 08/14/2018    AS (aortic stenosis) 08/14/2018    History of thoracic aortic aneurysm repair 01/01/2011   Surgical:  Past Surgical History:   Procedure Laterality Date    CATARACT EXTRACTION,  BILATERAL Bilateral 2014    cataract removal Right 2014    hiatial hernia  2017    HYSTERECTOMY      melanoma      on face    OOPHORECTOMY      THORACIC AORTIC ANEURYSM REPAIR     Medication:  Current Outpatient Medications   Medication Sig Dispense Refill    adalimumab (HUMIRA PEN) PnKt injection Inject 1 pen  (40 mg total) into the skin every 14 (fourteen) days. 6 pen 3    amLODIPine (NORVASC) 10 MG tablet Take 1 tablet (10 mg total) by mouth once daily. 90 tablet 3    HUMIRA 40 mg/0.8 mL SyKt injection SMARTSI Pre-Filled Pen Syringe Topical Once a Week      hydrALAZINE (APRESOLINE) 25 MG tablet TAKE 1 TABLET(25 MG) BY MOUTH EVERY 12 HOURS 60 tablet 2    olmesartan (BENICAR) 40 MG tablet Take 1 tablet (40 mg total) by mouth once daily. 90 tablet 1    alendronate (FOSAMAX) 70 MG tablet Take 1 tablet (70 mg total) by mouth every 7 days. 4 tablet 11    chlorthalidone (HYGROTEN) 25 MG Tab Take 1 tablet (25 mg total) by mouth once daily. 30 tablet 2    cholecalciferol, vitamin D3, (VITAMIN D3) 25 mcg (1,000 unit) capsule Take 1 capsule (1,000 Units total) by mouth once daily. 90 capsule 3    sulfamethoxazole-trimethoprim 800-160mg (BACTRIM DS) 800-160 mg Tab Take 1 tablet by mouth 2 (two) times daily. for 5 days 10 tablet 0     No current facility-administered medications for this visit.     Imaging/Procedures  DEXA (10/28/22):  FINDINGS:  Lumbar spine (L1-L4):              BMD is 0.815 g/cm2, T-score is -2.1, and Z-score is 0.4.     Total hip:                                BMD is 0.582 g/cm2, T-score is -3.0, and Z-score is -1.1.     Femoral neck:                          BMD is 0.506 g/cm2, T-score is -3.1, and Z-score is -0.9.     Distal 1/3 radius:                      Not applicable     FRAX:     25% risk of a major osteoporotic fracture in the next 10 years.     11% risk of hip fracture in the next 10 years.     Impression:     *Osteoporosis based on T-score below -2.5  *Fracture risk is very high due to  "FRAX >30% or 4.5%  *Compared with previous DXA, BMD at the lumbar spine and the BMD at the total hip has declined    Rheumatologic labs    Component      Latest Ref Rng 3/29/2023   Sodium      136 - 145 mmol/L 132 (L)    Potassium      3.5 - 5.1 mmol/L 4.7    Chloride      95 - 110 mmol/L 96    CO2      23 - 29 mmol/L 25    Glucose      70 - 110 mg/dL 94    BUN      8 - 23 mg/dL 14    Creatinine      0.5 - 1.4 mg/dL 0.9    Calcium      8.7 - 10.5 mg/dL 10.5    PROTEIN TOTAL      6.0 - 8.4 g/dL 8.2    Albumin      3.5 - 5.2 g/dL 4.3    BILIRUBIN TOTAL      0.1 - 1.0 mg/dL 0.7    Alkaline Phosphatase      55 - 135 U/L 102    AST      10 - 40 U/L 18    ALT      10 - 44 U/L 9 (L)    Anion Gap      8 - 16 mmol/L 11    eGFR      >60 mL/min/1.73 m^2 >60.0       Component      Latest Ref Rn 3/29/2023   WBC      3.90 - 12.70 K/uL 7.36    RBC      4.00 - 5.40 M/uL 4.14    Hemoglobin      12.0 - 16.0 g/dL 13.2    Hematocrit      37.0 - 48.5 % 39.6    MCV      82 - 98 fL 96    MCH      27.0 - 31.0 pg 31.9 (H)    MCHC      32.0 - 36.0 g/dL 33.3    RDW      11.5 - 14.5 % 12.3    Platelets      150 - 450 K/uL 263       Component      Latest Ref Rn 3/29/2023   Sed Rate      0 - 36 mm/Hr <2    CRP      0.0 - 8.2 mg/L 5.0      Rheumatologic medications history  MTX (12/2021-01/2022; discontinue due to adverse effects ie mouth sores despite increased doses of folic acid and leucovorin)  Humira 40 mg q 2 weeks (03/2022-present)    Objective:   BP (!) 147/67   Pulse 76   Ht 5' 3" (1.6 m)   Wt 51 kg (112 lb 7 oz)   LMP  (LMP Unknown)   BMI 19.92 kg/m²   Physical Exam   Constitutional: No distress.   HENT:   Mouth/Throat: Mucous membranes are moist.   Eyes: Conjunctivae are normal.   Cardiovascular: Normal rate, regular rhythm, normal heart sounds and normal pulses.   Pulmonary/Chest: Effort normal and breath sounds normal.   Abdominal: Soft. Bowel sounds are normal.   Musculoskeletal:         General: Swelling (L arm soft tissue " swelling associated with erythema and tenderness) and tenderness present. Normal range of motion.      Cervical back: Normal range of motion. No rigidity or tenderness.   Neurological: She is alert. She displays no weakness. Gait normal.   Skin: Skin is warm.      3/10/2022 7/28/2022 11/3/2022 9/14/2023   Tender (HENDRICKS-28) 0 / 28  1 / 28  0 / 28  2 / 28    Swollen (HENDRICKS-28) 0 / 28  0 / 28  0 / 28  0 / 28    Provider Global 10 mm 20 mm 10 mm 5 mm   Patient Global 30 mm 30 mm 15 mm 0 mm   ESR -- 4 mm/hr 23 mm/hr 0 mm/hr   CRP -- 2.2 mg/L 4.1 mg/L 5 mg/L   HENDRICKS-28 (ESR) -- 1.95 (Remission) 2.4 (Remission) --   HENDRICKS-28 (CRP) -- 2.36 (Remission) 1.76 (Remission) 2.4 (Remission)   CDAI Score 4  6  2.5  2.5         Assessment:     1. Rheumatoid arthritis involving multiple sites with positive rheumatoid factor    2. Immunocompromised state due to drug therapy    3. Osteoporosis, unspecified osteoporosis type, unspecified pathological fracture presence    4. Chronic pain of both shoulders    5. Weakness of shoulder    6. Decreased ROM of left shoulder    7. Decreased ROM of right shoulder    8. Skin infection    9. Localized swelling, mass and lump, left upper limb      Plan:     Problem List Items Addressed This Visit          Orthopedic    Weakness of shoulder    Relevant Orders    Ambulatory referral/consult to Physical/Occupational Therapy    Decreased ROM of right shoulder    Relevant Orders    Ambulatory referral/consult to Physical/Occupational Therapy     Other Visit Diagnoses       Rheumatoid arthritis involving multiple sites with positive rheumatoid factor    -  Primary    Relevant Orders    CBC W/ AUTO DIFFERENTIAL    COMPREHENSIVE METABOLIC PANEL    Sedimentation rate    C-REACTIVE PROTEIN    Ambulatory referral/consult to Physical/Occupational Therapy    Immunocompromised state due to drug therapy        Relevant Orders    CBC W/ AUTO DIFFERENTIAL    COMPREHENSIVE METABOLIC PANEL    Sedimentation rate    C-REACTIVE  PROTEIN    Osteoporosis, unspecified osteoporosis type, unspecified pathological fracture presence        Relevant Medications    alendronate (FOSAMAX) 70 MG tablet    cholecalciferol, vitamin D3, (VITAMIN D3) 25 mcg (1,000 unit) capsule    Chronic pain of both shoulders        Relevant Orders    Ambulatory referral/consult to Physical/Occupational Therapy    Decreased ROM of left shoulder        Relevant Orders    Ambulatory referral/consult to Physical/Occupational Therapy    Skin infection        Relevant Orders    US Soft Tissue Upper Extremity, Left    Localized swelling, mass and lump, left upper limb        Relevant Medications    sulfamethoxazole-trimethoprim 800-160mg (BACTRIM DS) 800-160 mg Tab    Other Relevant Orders    US Soft Tissue Upper Extremity, Left          Buffy Dominique is a 77 y.o. F with a past medical history of CKD stage 3, HTN, HLD, hyperparathyroidism, osteoporosis and seropositive RA who presents today for follow-up for seropositive rheumatoid arthritis.     Seropositive rheumatoid arthritis- diagnosed in 2021, failed MTX, currently on Humira; CDAI 2.5, in remission    Osteoporosis- age-related and prior steroid use; no prior fractures, no recent falls (fall last April); previously prescribed Alendronate 70 mg weekly, Vit D and Calcium; due for DEXA in October 2024    Plan:  - Continue Humira 40 mg q 2 weeks   - Monitoring labs q 3 months (CBC, CMP, ESR, CRP)  - The patient was not taking Alendronate, Vit D and Ca for osteoporosis; will prescribe Alendronate and Vit D, the patient states she gets enough calcium through her diet   - Repeat DEXA October 2024  - For soft tissue swelling follow flu shot, suspect hematoma, will US to confirm, recommended ice compression and Tylenol for pain, prescribed TMP-SMX 1 tablet BID for cellulitis coverage if no improvement   - PT referral for shoulder pain, if lizbeth persists after PT, will get another MRI of the shoulders to assess arthritis because if it  has worsened, could be a reason to switch from Humira     This patient was examined with Dr. Cui. Plan discussed with the patient. Return to clinic in 3 months.    Kennedi Aguirre MD  Rheumatology Fellow, PGY4

## 2023-10-11 ENCOUNTER — PATIENT MESSAGE (OUTPATIENT)
Dept: ADMINISTRATIVE | Facility: OTHER | Age: 78
End: 2023-10-11
Payer: MEDICARE

## 2023-10-19 ENCOUNTER — TELEPHONE (OUTPATIENT)
Dept: SURGERY | Facility: CLINIC | Age: 78
End: 2023-10-19
Payer: MEDICARE

## 2023-10-19 DIAGNOSIS — K62.3 RECTAL PROLAPSE: ICD-10-CM

## 2023-10-19 DIAGNOSIS — Z12.11 COLON CANCER SCREENING: Primary | ICD-10-CM

## 2023-10-19 NOTE — TELEPHONE ENCOUNTER
Spoke with patient and informed of phone appointment with colonoscopy schedulers. Details viewable in portal. Patient verbalizes understanding.

## 2023-10-19 NOTE — TELEPHONE ENCOUNTER
----- Message from Iain Bourne sent at 10/19/2023  8:32 AM CDT -----  Pt is requesting orders for a Colonoscopy    Pt can be reached at [phone].    Thanks

## 2023-10-20 ENCOUNTER — HOSPITAL ENCOUNTER (OUTPATIENT)
Dept: RADIOLOGY | Facility: HOSPITAL | Age: 78
Discharge: HOME | End: 2023-10-20
Attending: STUDENT IN AN ORGANIZED HEALTH CARE EDUCATION/TRAINING PROGRAM
Payer: MEDICARE

## 2023-10-20 DIAGNOSIS — L08.9 SKIN INFECTION: ICD-10-CM

## 2023-10-20 DIAGNOSIS — R22.32 LOCALIZED SWELLING, MASS AND LUMP, LEFT UPPER LIMB: ICD-10-CM

## 2023-10-23 DIAGNOSIS — I10 ESSENTIAL HYPERTENSION: ICD-10-CM

## 2023-10-23 NOTE — TELEPHONE ENCOUNTER
No care due was identified.  [facility] Embedded Care Due Messages. Reference number: [id number].   10/23/2023 1:59:54 PM CDT

## 2023-10-24 RX ORDER — AMLODIPINE BESYLATE 10 MG/1
10 TABLET ORAL
Qty: 90 TABLET | Refills: 0 | Status: SHIPPED | OUTPATIENT
Start: 2023-10-24 | End: 2024-01-22 | Stop reason: SDUPTHER

## 2023-10-24 NOTE — TELEPHONE ENCOUNTER
Refill Routing Note     Refill Routing Note   Medication(s) are not appropriate for processing by [facility] for the following reason(s):      Required vitals abnormal    [facility] action(s):  Defer Care Due:  None identified            Appointments  past 12m or future 3m with PCP    Date Provider   Last Visit   10/28/2022 Odalis Kim MD   Next Visit   1/18/2024 Odalis Kim MD   ED visits in past 90 days: 0        Note composed:9:24 AM 10/24/2023

## 2023-10-30 ENCOUNTER — TELEPHONE (OUTPATIENT)
Dept: INTERNAL MEDICINE | Facility: CLINIC | Age: 78
End: 2023-10-30
Payer: MEDICARE

## 2023-10-30 ENCOUNTER — PATIENT OUTREACH (OUTPATIENT)
Dept: ADMINISTRATIVE | Facility: HOSPITAL | Age: 78
End: 2023-10-30
Payer: MEDICARE

## 2023-10-30 VITALS — DIASTOLIC BLOOD PRESSURE: 56 MMHG | SYSTOLIC BLOOD PRESSURE: 125 MMHG

## 2023-10-30 NOTE — TELEPHONE ENCOUNTER
/56 obtained from patient's digital medicine flow sheet.    Estrellita Bishop LPN   Clinical Care Coordinator  Primary Care and Wellness

## 2023-10-30 NOTE — PROGRESS NOTES
Health Maintenance Due   Topic Date Due    COVID-19 Vaccine (1) Never done    TETANUS VACCINE  Never done    RSV Vaccine (Age 60+) (1 - 1-dose 60+ series) Never done    Mammogram  07/19/2020    Shingles Vaccine (2 of 2) 12/30/2020    LDCT Lung Screen  05/02/2023     /56 obtained from patient's digital medicine flow sheet.    Estrellita Bishop LPN   Clinical Care Coordinator  Primary Care and Wellness

## 2023-11-01 DIAGNOSIS — I10 ESSENTIAL HYPERTENSION: ICD-10-CM

## 2023-11-01 RX ORDER — CHLORTHALIDONE 25 MG/1
25 TABLET ORAL DAILY
Qty: 30 TABLET | Refills: 2 | Status: ON HOLD | OUTPATIENT
Start: 2023-11-01 | End: 2024-01-05 | Stop reason: HOSPADM

## 2023-11-01 NOTE — TELEPHONE ENCOUNTER
No care due was identified.  [facility] [place] Embedded Care Due Messages. Reference number: [id number].   11/01/2023 4:55:51 PM CDT

## 2023-11-05 ENCOUNTER — PATIENT MESSAGE (OUTPATIENT)
Dept: ADMINISTRATIVE | Facility: OTHER | Age: 78
End: 2023-11-05
Payer: MEDICARE

## 2023-11-09 ENCOUNTER — CLINICAL SUPPORT (OUTPATIENT)
Dept: ENDOSCOPY | Facility: HOSPITAL | Age: 78
End: 2023-11-09
Attending: STUDENT IN AN ORGANIZED HEALTH CARE EDUCATION/TRAINING PROGRAM
Payer: MEDICARE

## 2023-11-09 ENCOUNTER — TELEPHONE (OUTPATIENT)
Dept: ENDOSCOPY | Facility: HOSPITAL | Age: 78
End: 2023-11-09

## 2023-11-09 DIAGNOSIS — K62.3 RECTAL PROLAPSE: ICD-10-CM

## 2023-11-09 DIAGNOSIS — Z12.11 COLON CANCER SCREENING: ICD-10-CM

## 2023-11-09 NOTE — TELEPHONE ENCOUNTER
Spoke to patient to schedule procedure(s) Colonoscopy       Physician to perform procedure(s) Dr. JULI Myles  Date of Procedure (s) 2/7/24  Arrival Time 9:15 AM  Time of Procedure(s) 10:15 AM   Location of Procedure(s) [address]  Type of Rx Prep sent to patient: Miralax  Instructions provided to patient via [facility]     Patient was informed on the following information and verbalized understanding. Screening questionnaire reviewed with patient and complete. If procedure requires anesthesia, a responsible adult needs to be present to accompany the patient home, patient cannot drive after receiving anesthesia. Appointment details are tentative, especially check-in time. Patient will receive a prep-op call 4 days prior to confirm check-in time for procedure. If applicable the patient should contact their pharmacy to verify Rx for procedure prep is ready for pick-up. Patient was advised to call the scheduling department at [phone] if pharmacy states no Rx is available. Patient was advised to call the endoscopy scheduling department if any questions or concerns arise.         Endoscopy Scheduling Department                Miralax Instructions for Colonoscopy        Date of procedure: 2/7/24 Arrive at: 9:15 AM     Location of Department: [address] [place] [id number]  Take the [facility] Elevators to 4th Floor Endoscopy Lab        As soon as possible:   your over the counter prep (MIRALAX) and DULCOLAX LAXATIVE TABLETS      On the day before your procedure   What You CAN do:   You may have clear liquids ONLY-see below for list.     Liquids That Are OK to Drink:   Water  Sports drinks (Gatorade, Power-Aid)  Coffee or tea (no cream or nondairy creamer)  Clear juices without pulp (apple, white grape)  Gelatin desserts (no fruit or toppings)  Clear soda (sprite, coke, ginger ale)  Chicken broth (until 12 midnight the night before procedure)        What You CANNOT do:   Do not EAT solid  food, drink milk or anything colored red.  Do not drink alcohol.  Do not take oral medications within 1 hour of starting   each dose of MIRALAX.  No gum chewing or candy morning of procedure.     Note:   (Please disregard the MIRALAX bottle instructions).     It is not uncommon to experience some abdominal cramping, nausea and/or vomiting when taking the prep. If you have nausea and/or vomiting while taking the prep, stop drinking for 20 to 30 minutes then resume.     IMPORTANT: If you experience preparation-related symptoms (for example, nausea, bloating, or cramping), pause, or slow the rate of drinking the additional water until your symptoms decrease.     How to take prep:     MIRALAX Bowel Prep-One (1) bottle-8.3 oz. (238 grams).     You must drink water with each dose, and additional water after each dose.        DOSE 1--Day Before Procedure  2/6/24      Drink at least 6 to 8 glasses of clear liquids from time you wake up until you begin your prep and then continue until bedtime to avoid dehydration.         Step 1- 12:00 pm (NOON) - Mix the 8.3 oz. bottle of Miralax (238 grams) and 64 oz. of Gatorade/Power-Aid, place in the refrigerator (do not add ice). Take four (4) Dulcolax (Bisacodyl) tablets with at least 8 oz. or more of clear liquids.     Step 2- 6:00 pm- Drink one 8 oz. glass of the Miralax/Gatorade prep every 15 minutes until half the mixture (32 oz./quart) is gone. Set a timer as a reminder. Refrigerate the remaining half of prep for dose 2. See below when to begin this step.      Drink additional water/clear liquids     DOSE 2--Day of the Procedure  2/7/24     Step 1- 2-3 AM.-Drink the 2nd half of the prep.   Step 2- You may continue drinking water/clear liquids until   4 hours before your colonoscopy or as directed by the scheduling nurse 6:15 AM.     For more information about your procedure, please watch this informational video. It is important to watch this animated consent video prior to  your arrival.   If you haven't watched the video prior to arriving, you are required to watch it during admission which can cause delays.      Options for viewing:  Using a keyboard:  press and hold the control tab (Ctrl) and left mouse click to follow link          Colonoscopy Instructional Video                                                          OR     Type link address into your web browser's address bar:  [email or web address]     Using a mobile phone: tap on web address/link.              IMPORTANT INFORMATION TO KNOW BEFORE YOUR PROCEDURE     [facility] 4th Floor     If your procedure requires the administration of anesthesia, it is necessary for a responsible adult to drive you home. (Medical Transportation, Uber, Lyft, Taxi, etc. may ONLY be used if a responsible adult is present to accompany you home.  The responsible adult CAN'T be the  of the service).       person must be available to return to pick you up within 30 minutes of being notified of discharge.      Due to the limited socially distant seating in our waiting room, please limit your guest (1) who accompany you for this procedure. If someone accompanies you for this procedure into the facility:     Consider having them proceed to an area that is socially distant other than the lobby until a member of the medical team contacts them to provide an update after the procedure.      Also, please consider being dropped off and picked up from the facility.        Please bring a picture ID, insurance card, & copayment     Take Medications as directed below:           If you begin taking any blood thinning medications or injectable weight loss/diabetes medications (other than insulin) , please contact the endoscopy scheduling department listed below as soon as possible     If you are diabetic see the attached instruction sheet regarding your medication.      If you take HEART, BLOOD  PRESSURE, SEIZURE, PAIN, LUNG (including inhalers/nebulizers), ANTI-REJECTION (transplant patients), or PSYCHIATRIC medications, please take at your regular times with a sip of water or as directed by the scheduling nurse.      Important contact information:     Endoscopy [phone] Hours of operation Monday-Friday 8:00-4:30pm.     Questions about insurance or financial obligations call [phone] or [phone].     If you have questions regarding the prep or need to reschedule, please call [phone]. After hours questions requiring immediate assistance, contact [facility] On-Call nurse line at [phone] or [phone].   NOTE:      On occasion, unforeseen circumstances may cause a delay in your procedure start time. We respect your time and appreciate your patience during these circumstances.

## 2023-11-09 NOTE — PLAN OF CARE
Spoke to patient to schedule procedure(s) Colonoscopy       Physician to perform procedure(s) Dr. JULI Myles  Date of Procedure (s) 2/7/24  Arrival Time 9:15 AM  Time of Procedure(s) 10:15 AM   Location of Procedure(s) [facility] 4th Floor  Type of Rx Prep sent to patient: Miralax  Instructions provided to patient via [facility]    Patient was informed on the following information and verbalized understanding. Screening questionnaire reviewed with patient and complete. If procedure requires anesthesia, a responsible adult needs to be present to accompany the patient home, patient cannot drive after receiving anesthesia. Appointment details are tentative, especially check-in time. Patient will receive a prep-op call 4 days prior to confirm check-in time for procedure. If applicable the patient should contact their pharmacy to verify Rx for procedure prep is ready for pick-up. Patient was advised to call the scheduling department at [phone] if pharmacy states no Rx is available. Patient was advised to call the endoscopy scheduling department if any questions or concerns arise.      [facility] Endoscopy Scheduling Department

## 2023-12-18 ENCOUNTER — PATIENT MESSAGE (OUTPATIENT)
Dept: ADMINISTRATIVE | Facility: OTHER | Age: 78
End: 2023-12-18
Payer: MEDICARE

## 2023-12-29 ENCOUNTER — TELEPHONE (OUTPATIENT)
Dept: INTERNAL MEDICINE | Facility: CLINIC | Age: 78
End: 2023-12-29
Payer: MEDICARE

## 2023-12-29 NOTE — TELEPHONE ENCOUNTER
----- Message from Maria Dolores Estrada sent at 12/29/2023 12:14 PM CST -----  Regarding: pt call back  Name of Who is Calling:Pt         What is the request in detail: Requesting reschedule for upcoming appt please advise and contact           Can the clinic reply by [facility]: yes         What Number [facility] Back [facility]:Telephone Information:  [employer]          [phone]

## 2023-12-31 ENCOUNTER — HOSPITAL ENCOUNTER (INPATIENT)
Facility: HOSPITAL | Age: 78
LOS: 4 days | Discharge: HOME HEALTH | DRG: 643 | End: 2024-01-06
Attending: EMERGENCY MEDICINE | Admitting: HOSPITALIST
Payer: MEDICARE

## 2023-12-31 DIAGNOSIS — R09.02 HYPOXIA: ICD-10-CM

## 2023-12-31 DIAGNOSIS — R55 SYNCOPE: ICD-10-CM

## 2023-12-31 DIAGNOSIS — I95.9 HYPOTENSION, UNSPECIFIED HYPOTENSION TYPE: ICD-10-CM

## 2023-12-31 DIAGNOSIS — R07.9 CHEST PAIN: ICD-10-CM

## 2023-12-31 DIAGNOSIS — M54.50 BILATERAL LOW BACK PAIN, UNSPECIFIED CHRONICITY, UNSPECIFIED WHETHER SCIATICA PRESENT: ICD-10-CM

## 2023-12-31 DIAGNOSIS — E22.2 SIADH (SYNDROME OF INAPPROPRIATE ADH PRODUCTION): ICD-10-CM

## 2023-12-31 DIAGNOSIS — E87.1 HYPONATREMIA: Primary | ICD-10-CM

## 2023-12-31 PROBLEM — U07.1 COVID-19: Status: ACTIVE | Noted: 2023-12-31

## 2023-12-31 LAB
ALBUMIN SERPL BCP-MCNC: 4 G/DL (ref 3.5–5.2)
ALP SERPL-CCNC: 70 U/L (ref 55–135)
ALT SERPL W/O P-5'-P-CCNC: 10 U/L (ref 10–44)
ANION GAP SERPL CALC-SCNC: 6 MMOL/L (ref 8–16)
AST SERPL-CCNC: 18 U/L (ref 10–40)
BASOPHILS # BLD AUTO: 0.02 K/UL (ref 0–0.2)
BASOPHILS NFR BLD: 0.5 % (ref 0–1.9)
BILIRUB SERPL-MCNC: 0.8 MG/DL (ref 0.1–1)
BILIRUB UR QL STRIP: NEGATIVE
BUN SERPL-MCNC: 15 MG/DL (ref 8–23)
CALCIUM SERPL-MCNC: 10 MG/DL (ref 8.7–10.5)
CHLORIDE SERPL-SCNC: 97 MMOL/L (ref 95–110)
CLARITY UR REFRACT.AUTO: CLEAR
CO2 SERPL-SCNC: 24 MMOL/L (ref 23–29)
COLOR UR AUTO: YELLOW
CREAT SERPL-MCNC: 0.8 MG/DL (ref 0.5–1.4)
DIFFERENTIAL METHOD BLD: ABNORMAL
EOSINOPHIL # BLD AUTO: 0.3 K/UL (ref 0–0.5)
EOSINOPHIL NFR BLD: 7.5 % (ref 0–8)
ERYTHROCYTE [DISTWIDTH] IN BLOOD BY AUTOMATED COUNT: 12.1 % (ref 11.5–14.5)
EST. GFR  (NO RACE VARIABLE): >60 ML/MIN/1.73 M^2
GLUCOSE SERPL-MCNC: 105 MG/DL (ref 70–110)
GLUCOSE UR QL STRIP: NEGATIVE
HCT VFR BLD AUTO: 33.9 % (ref 37–48.5)
HCV AB SERPL QL IA: NORMAL
HGB BLD-MCNC: 11.7 G/DL (ref 12–16)
HGB UR QL STRIP: NEGATIVE
HIV 1+2 AB+HIV1 P24 AG SERPL QL IA: NORMAL
IMM GRANULOCYTES # BLD AUTO: 0.02 K/UL (ref 0–0.04)
IMM GRANULOCYTES NFR BLD AUTO: 0.5 % (ref 0–0.5)
INFLUENZA A, MOLECULAR: NOT DETECTED
INFLUENZA B, MOLECULAR: NOT DETECTED
KETONES UR QL STRIP: NEGATIVE
LACTATE SERPL-SCNC: 0.7 MMOL/L (ref 0.5–2.2)
LEUKOCYTE ESTERASE UR QL STRIP: ABNORMAL
LYMPHOCYTES # BLD AUTO: 0.6 K/UL (ref 1–4.8)
LYMPHOCYTES NFR BLD: 12.8 % (ref 18–48)
MCH RBC QN AUTO: 33.1 PG (ref 27–31)
MCHC RBC AUTO-ENTMCNC: 34.5 G/DL (ref 32–36)
MCV RBC AUTO: 96 FL (ref 82–98)
MICROSCOPIC COMMENT: NORMAL
MONOCYTES # BLD AUTO: 0.8 K/UL (ref 0.3–1)
MONOCYTES NFR BLD: 18.9 % (ref 4–15)
NEUTROPHILS # BLD AUTO: 2.6 K/UL (ref 1.8–7.7)
NEUTROPHILS NFR BLD: 59.8 % (ref 38–73)
NITRITE UR QL STRIP: NEGATIVE
NRBC BLD-RTO: 0 /100 WBC
OSMOLALITY UR: 525 MOSM/KG (ref 50–1200)
PH UR STRIP: 7 [PH] (ref 5–8)
PLATELET # BLD AUTO: 193 K/UL (ref 150–450)
PMV BLD AUTO: 8.6 FL (ref 9.2–12.9)
POTASSIUM SERPL-SCNC: 4.1 MMOL/L (ref 3.5–5.1)
PROT SERPL-MCNC: 7.8 G/DL (ref 6–8.4)
PROT UR QL STRIP: NEGATIVE
RBC # BLD AUTO: 3.53 M/UL (ref 4–5.4)
RBC #/AREA URNS AUTO: 1 /HPF (ref 0–4)
RSV AG BY MOLECULAR METHOD: NOT DETECTED
SARS-COV-2 RNA RESP QL NAA+PROBE: DETECTED
SODIUM SERPL-SCNC: 127 MMOL/L (ref 136–145)
SODIUM UR-SCNC: 156 MMOL/L (ref 20–250)
SP GR UR STRIP: 1.01 (ref 1–1.03)
SQUAMOUS #/AREA URNS AUTO: 3 /HPF
TSH SERPL DL<=0.005 MIU/L-ACNC: 0.69 UIU/ML (ref 0.4–4)
URN SPEC COLLECT METH UR: ABNORMAL
WBC # BLD AUTO: 4.29 K/UL (ref 3.9–12.7)
WBC #/AREA URNS AUTO: 1 /HPF (ref 0–5)

## 2023-12-31 PROCEDURE — 96372 THER/PROPH/DIAG INJ SC/IM: CPT

## 2023-12-31 PROCEDURE — 99214 OFFICE O/P EST MOD 30 MIN: CPT | Mod: ,,, | Performed by: INTERNAL MEDICINE

## 2023-12-31 PROCEDURE — 25000003 PHARM REV CODE 250: Performed by: EMERGENCY MEDICINE

## 2023-12-31 PROCEDURE — 87040 BLOOD CULTURE FOR BACTERIA: CPT

## 2023-12-31 PROCEDURE — 93005 ELECTROCARDIOGRAM TRACING: CPT

## 2023-12-31 PROCEDURE — 63600175 PHARM REV CODE 636 W HCPCS

## 2023-12-31 PROCEDURE — 25000003 PHARM REV CODE 250

## 2023-12-31 PROCEDURE — 99285 EMERGENCY DEPT VISIT HI MDM: CPT | Mod: 25

## 2023-12-31 PROCEDURE — 0241U SARS-COV2 (COVID) WITH FLU/RSV BY PCR: CPT

## 2023-12-31 PROCEDURE — 81001 URINALYSIS AUTO W/SCOPE: CPT | Performed by: EMERGENCY MEDICINE

## 2023-12-31 PROCEDURE — 87389 HIV-1 AG W/HIV-1&-2 AB AG IA: CPT | Performed by: PHYSICIAN ASSISTANT

## 2023-12-31 PROCEDURE — G0378 HOSPITAL OBSERVATION PER HR: HCPCS

## 2023-12-31 PROCEDURE — 85025 COMPLETE CBC W/AUTO DIFF WBC: CPT | Performed by: EMERGENCY MEDICINE

## 2023-12-31 PROCEDURE — 96361 HYDRATE IV INFUSION ADD-ON: CPT

## 2023-12-31 PROCEDURE — 83935 ASSAY OF URINE OSMOLALITY: CPT | Performed by: EMERGENCY MEDICINE

## 2023-12-31 PROCEDURE — 83605 ASSAY OF LACTIC ACID: CPT

## 2023-12-31 PROCEDURE — 80053 COMPREHEN METABOLIC PANEL: CPT | Performed by: EMERGENCY MEDICINE

## 2023-12-31 PROCEDURE — 93010 ELECTROCARDIOGRAM REPORT: CPT | Mod: ,,, | Performed by: INTERNAL MEDICINE

## 2023-12-31 PROCEDURE — 36415 COLL VENOUS BLD VENIPUNCTURE: CPT

## 2023-12-31 PROCEDURE — 84300 ASSAY OF URINE SODIUM: CPT | Performed by: EMERGENCY MEDICINE

## 2023-12-31 PROCEDURE — 84443 ASSAY THYROID STIM HORMONE: CPT

## 2023-12-31 PROCEDURE — 86803 HEPATITIS C AB TEST: CPT | Performed by: PHYSICIAN ASSISTANT

## 2023-12-31 PROCEDURE — 93005 ELECTROCARDIOGRAM TRACING: CPT | Performed by: INTERNAL MEDICINE

## 2023-12-31 RX ORDER — POLYETHYLENE GLYCOL 3350 17 G/17G
17 POWDER, FOR SOLUTION ORAL 2 TIMES DAILY PRN
Status: DISCONTINUED | OUTPATIENT
Start: 2023-12-31 | End: 2024-01-06 | Stop reason: HOSPADM

## 2023-12-31 RX ORDER — ACETAMINOPHEN 500 MG
1000 TABLET ORAL
Status: COMPLETED | OUTPATIENT
Start: 2023-12-31 | End: 2023-12-31

## 2023-12-31 RX ORDER — ACETAMINOPHEN 500 MG
1000 TABLET ORAL EVERY 8 HOURS PRN
Status: DISCONTINUED | OUTPATIENT
Start: 2023-12-31 | End: 2024-01-06 | Stop reason: HOSPADM

## 2023-12-31 RX ORDER — BISACODYL 10 MG/1
10 SUPPOSITORY RECTAL DAILY PRN
Status: DISCONTINUED | OUTPATIENT
Start: 2023-12-31 | End: 2024-01-06 | Stop reason: HOSPADM

## 2023-12-31 RX ORDER — SODIUM CHLORIDE 0.9 % (FLUSH) 0.9 %
5 SYRINGE (ML) INJECTION
Status: DISCONTINUED | OUTPATIENT
Start: 2023-12-31 | End: 2024-01-06 | Stop reason: HOSPADM

## 2023-12-31 RX ORDER — ACETAMINOPHEN 325 MG/1
650 TABLET ORAL EVERY 4 HOURS PRN
Status: DISCONTINUED | OUTPATIENT
Start: 2023-12-31 | End: 2024-01-06 | Stop reason: HOSPADM

## 2023-12-31 RX ORDER — CETIRIZINE HYDROCHLORIDE 10 MG/1
10 TABLET ORAL DAILY
Status: DISCONTINUED | OUTPATIENT
Start: 2023-12-31 | End: 2024-01-06 | Stop reason: HOSPADM

## 2023-12-31 RX ORDER — PROCHLORPERAZINE EDISYLATE 5 MG/ML
5 INJECTION INTRAMUSCULAR; INTRAVENOUS EVERY 6 HOURS PRN
Status: DISCONTINUED | OUTPATIENT
Start: 2023-12-31 | End: 2024-01-06 | Stop reason: HOSPADM

## 2023-12-31 RX ORDER — SIMETHICONE 80 MG
1 TABLET,CHEWABLE ORAL 4 TIMES DAILY PRN
Status: DISCONTINUED | OUTPATIENT
Start: 2023-12-31 | End: 2024-01-06 | Stop reason: HOSPADM

## 2023-12-31 RX ORDER — IPRATROPIUM BROMIDE AND ALBUTEROL SULFATE 2.5; .5 MG/3ML; MG/3ML
3 SOLUTION RESPIRATORY (INHALATION) EVERY 4 HOURS PRN
Status: DISCONTINUED | OUTPATIENT
Start: 2023-12-31 | End: 2024-01-06 | Stop reason: HOSPADM

## 2023-12-31 RX ORDER — TALC
6 POWDER (GRAM) TOPICAL NIGHTLY PRN
Status: DISCONTINUED | OUTPATIENT
Start: 2023-12-31 | End: 2024-01-06 | Stop reason: HOSPADM

## 2023-12-31 RX ORDER — CHOLECALCIFEROL (VITAMIN D3) 25 MCG
1000 TABLET ORAL DAILY
Status: DISCONTINUED | OUTPATIENT
Start: 2023-12-31 | End: 2024-01-06 | Stop reason: HOSPADM

## 2023-12-31 RX ORDER — ENOXAPARIN SODIUM 100 MG/ML
40 INJECTION SUBCUTANEOUS EVERY 24 HOURS
Status: DISCONTINUED | OUTPATIENT
Start: 2023-12-31 | End: 2024-01-03

## 2023-12-31 RX ORDER — ONDANSETRON 8 MG/1
8 TABLET, ORALLY DISINTEGRATING ORAL EVERY 8 HOURS PRN
Status: DISCONTINUED | OUTPATIENT
Start: 2023-12-31 | End: 2024-01-06 | Stop reason: HOSPADM

## 2023-12-31 RX ORDER — ALUMINUM HYDROXIDE, MAGNESIUM HYDROXIDE, AND SIMETHICONE 1200; 120; 1200 MG/30ML; MG/30ML; MG/30ML
30 SUSPENSION ORAL 4 TIMES DAILY PRN
Status: DISCONTINUED | OUTPATIENT
Start: 2023-12-31 | End: 2024-01-06 | Stop reason: HOSPADM

## 2023-12-31 RX ORDER — NALOXONE HCL 0.4 MG/ML
0.02 VIAL (ML) INJECTION
Status: DISCONTINUED | OUTPATIENT
Start: 2023-12-31 | End: 2024-01-06 | Stop reason: HOSPADM

## 2023-12-31 RX ADMIN — CETIRIZINE HYDROCHLORIDE 10 MG: 10 TABLET, FILM COATED ORAL at 01:12

## 2023-12-31 RX ADMIN — ACETAMINOPHEN 1000 MG: 500 TABLET ORAL at 05:12

## 2023-12-31 RX ADMIN — ENOXAPARIN SODIUM 40 MG: 40 INJECTION SUBCUTANEOUS at 05:12

## 2023-12-31 RX ADMIN — SODIUM CHLORIDE 500 ML: 9 INJECTION, SOLUTION INTRAVENOUS at 08:12

## 2023-12-31 RX ADMIN — CHOLECALCIFEROL TAB 25 MCG (1000 UNIT) 1000 UNITS: 25 TAB at 01:12

## 2023-12-31 RX ADMIN — SODIUM CHLORIDE 500 ML: 9 INJECTION, SOLUTION INTRAVENOUS at 07:12

## 2023-12-31 NOTE — ED NOTES
Telemetry Verification   Patient placed on Telemetry Box  Verified on ED monitor  Box [address]   Monitor Tech War room   Rate 66   Rhythm nsr

## 2023-12-31 NOTE — HPI
"Buffy Dominique  is a 77 yo F with PMHx of HTN, HLD, RA, hyperparathyroidism, chronic hyponatremia, and thoracic aortic aneurysm repair who presented to ED for bilateral low back and leg pain. Patient reports constant, aching back pain, 10/10 in severity that started 2 prior to admission and radiates down bilateral thighs. Patient reports that she experienced similar back pain 2 years ago that was associated with renal failure and that is what prompted her visit to the hospital. Pt reports she only urinates 1-2 times per day and has minimal output but that is baseline for her. She currently is on a "no salt diet and 10 oz fluid restriction" due to hypertension.     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 patient reports long standing hyponatremia, adn tells me that previously it was due to HCTZ which was then discontinued. Sodium on arrival was 127, serum osmolality was ordered. Urine osmolality measured at 525, and urine sodium elevated at 156. She is currently asymptomatic. She denies any nausea, vomiting, pain medications, but does report chronic pain. She tells me that she eats a "balanced diet" but does not eat very much.   "

## 2023-12-31 NOTE — PROVIDER PROGRESS NOTES - EMERGENCY DEPT.
Encounter Date: 12/31/2023    ED Physician Progress Notes        Physician Note:   78-year-old female signed out to my care pending IV fluids and reassessment of blood pressure.  Patient will be brought in for observation for hypoxia of unclear origin.  Hypotension and hyponatremia.  Hypotension improved with 1 L IV fluids.  Patient will be brought into hospital medicine for further evaluation and treatment.

## 2023-12-31 NOTE — H&P
[facility] Observation [address] [place] [address] Medicine  History & Physical    Patient Name: Buffy Dominique  MRN: [id number]  Patient Class: OP- Observation  Admission Date: 12/31/2023  Attending Physician: Radha Flores MD   Primary Care Provider: Odalis Kim MD         Patient information was obtained from patient and ER records.     Subjective:     Principal Problem:Hypotension    Chief Complaint:   Chief Complaint   Patient presents with    Back Pain     Lower back pain and BLE pain. Hx renal failure         HPI: Buffy Dominique  is a 79 yo F with PMHx of CKD3, HTN, HLD, RA, hyperparathyroidism, chronic hyponatremia, and thoracic aortic aneurysm repair who presented to ED for bilateral low back pain. Patient reports constant, aching back pain, 10/10 in severity that started 2 days ago and radiates down bilateral thighs. Her pain was greatly relieved by 1,000 mg of tylenol and is now 3/10 in severity. She denies aggravating factors. She was concerned her back pain may be secondary to renal failure because her last episode of pain occurred with renal failure. Pt reports she only urinates 1-2 times per day and has minimal output but that is baseline for her. Of note, she was previously hospitalized with hyponatremia determined to be related to chlorthalidone use and SIADH. She reports a former provider her told her to restrict her water intake to 10 ounces per day. She denies fever, chills, chest pain, palpitations, SOB, cough, abdominal pain, n/v/d, dysuria, headaches and LE swelling.     In ED: Afebrile. Hypotensive to 92/47>> improved to 116/58 s/p 1L NS. O2 sats dropping to low 90s when patient sleeping. No leukocytosis. Hgb 11.7. Na 127 (baseline low 130s). Cr 0.8 (at baseline). CMP otherwise unremarkable. UA noninfectious.  CXR without acute process. Given 1g tylenol. Placed in observation.     Past Medical History:   Diagnosis Date    CKD (chronic kidney disease) stage 3, GFR 30-59 ml/min     HLD (hyperlipidemia)      HTN (hypertension)     Hyperparathyroidism        Past Surgical History:   Procedure Laterality Date    CATARACT EXTRACTION, BILATERAL Bilateral 2014    cataract removal Right 2014    hiatial hernia  2017    HYSTERECTOMY      melanoma      on face    OOPHORECTOMY      THORACIC AORTIC ANEURYSM REPAIR         Review of patient's allergies indicates:   Allergen Reactions    Methotrexate analogues      Mouth Ulcers     Thiazides Other (See Comments)     hyponatremia       No current facility-administered medications on file prior to encounter.     Current Outpatient Medications on File Prior to Encounter   Medication Sig    adalimumab (HUMIRA PEN) PnKt injection Inject 1 pen  (40 mg total) into the skin every 14 (fourteen) days.    alendronate (FOSAMAX) 70 MG tablet Take 1 tablet (70 mg total) by mouth every 7 days.    amLODIPine (NORVASC) 10 MG tablet TAKE 1 TABLET(10 MG) BY MOUTH EVERY DAY    chlorthalidone (HYGROTEN) 25 MG Tab Take 1 tablet (25 mg total) by mouth once daily.    cholecalciferol, vitamin D3, (VITAMIN D3) 25 mcg (1,000 unit) capsule Take 1 capsule (1,000 Units total) by mouth once daily.    HUMIRA 40 mg/0.8 mL SyKt injection SMARTSI Pre-Filled Pen Syringe Topical Once a Week    hydrALAZINE (APRESOLINE) 25 MG tablet TAKE 1 TABLET(25 MG) BY MOUTH EVERY 12 HOURS    olmesartan (BENICAR) 40 MG tablet Take 1 tablet (40 mg total) by mouth once daily.    [DISCONTINUED] spironolactone (ALDACTONE) 25 MG tablet Take 1 tablet (25 mg total) by mouth once daily.     Family History       Problem Relation (Age of Onset)    Cancer Father    Hypertension Maternal Grandmother          Tobacco Use    Smoking status: Former     Current packs/day: 0.00     Average packs/day: 1 pack/day for 30.0 years (30.0 ttl pk-yrs)     Types: Cigarettes     Start date: 1979     Quit date: 2009     Years since quittin.9    Smokeless tobacco: Never   Substance and Sexual Activity    Alcohol use: Never     Comment:  glass of wine once or twice a year    Drug use: Never    Sexual activity: Not Currently     Partners: Male     Birth control/protection: None     Review of Systems   Constitutional:  Negative for activity change, chills and fever.   HENT:  Negative for trouble swallowing.    Eyes:  Negative for photophobia and visual disturbance.   Respiratory:  Negative for cough, chest tightness and shortness of breath.    Cardiovascular:  Negative for chest pain, palpitations and leg swelling.   Gastrointestinal:  Negative for abdominal pain, constipation, diarrhea, nausea and vomiting.   Genitourinary:  Positive for decreased urine volume and difficulty urinating. Negative for dysuria, flank pain, frequency and hematuria.   Musculoskeletal:  Positive for arthralgias and back pain. Negative for gait problem and neck pain.   Skin:  Negative for rash and wound.   Neurological:  Negative for dizziness, syncope, speech difficulty and light-headedness.   Psychiatric/Behavioral:  Negative for agitation and confusion. The patient is not nervous/anxious.      Objective:     Vital Signs (Most Recent):  Temp: 98.5 °F (36.9 °C) (12/31/23 0700)  Pulse: 62 (12/31/23 0918)  Resp: 17 (12/31/23 0918)  BP: (!) 116/58 (12/31/23 0902)  SpO2: 95 % (12/31/23 0918) Vital Signs (24h Range):  Temp:  [98.5 °F (36.9 °C)-99.4 °F (37.4 °C)] 98.5 °F (36.9 °C)  Pulse:  [62-94] 62  Resp:  [15-24] 17  SpO2:  [91 %-98 %] 95 %  BP: ()/(47-63) 116/58     Weight: 50.8 kg (112 lb)  Body mass index is 19.84 kg/m².     Physical Exam  Vitals and nursing note reviewed.   Constitutional:       General: She is not in acute distress.     Appearance: She is well-developed.   HENT:      Head: Normocephalic and atraumatic.      Mouth/Throat:      Mouth: Mucous membranes are dry.   Eyes:      Extraocular Movements: Extraocular movements intact.      Conjunctiva/sclera: Conjunctivae normal.   Cardiovascular:      Rate and Rhythm: Normal rate and regular rhythm.      Heart  sounds: Normal heart sounds.   Pulmonary:      Effort: Pulmonary effort is normal. No respiratory distress.      Breath sounds: Normal breath sounds. No wheezing.   Abdominal:      General: Bowel sounds are normal. There is no distension.      Palpations: Abdomen is soft.      Tenderness: There is no abdominal tenderness.   Musculoskeletal:         General: No tenderness. Normal range of motion.      Cervical back: Normal range of motion and neck supple.   Lymphadenopathy:      Cervical: No cervical adenopathy.   Skin:     General: Skin is warm and dry.      Capillary Refill: Capillary refill takes less than 2 seconds.      Findings: No rash.   Neurological:      Mental Status: She is alert and oriented to person, place, and time.      Cranial Nerves: No cranial nerve deficit.      Sensory: No sensory deficit.      Coordination: Coordination normal.   Psychiatric:         Behavior: Behavior normal.         Thought Content: Thought content normal.         Judgment: Judgment normal.                Significant Labs: All pertinent labs within the past 24 hours have been reviewed.  CBC:   Recent Labs   Lab 12/31/23 0427   WBC 4.29   HGB 11.7*   HCT 33.9*        CMP:   Recent Labs   Lab 12/31/23 0427   *   K 4.1   CL 97   CO2 24      BUN 15   CREATININE 0.8   CALCIUM 10.0   PROT 7.8   ALBUMIN 4.0   BILITOT 0.8   ALKPHOS 70   AST 18   ALT 10   ANIONGAP 6*     Urine Studies:   Recent Labs   Lab 12/31/23  0434   COLORU Yellow   APPEARANCEUA Clear   PHUR 7.0   SPECGRAV 1.015   PROTEINUA Negative   GLUCUA Negative   KETONESU Negative   BILIRUBINUA Negative   OCCULTUA Negative   NITRITE Negative   LEUKOCYTESUR Trace*   RBCUA 1   WBCUA 1   SQUAMEPITHEL 3       Significant Imaging: I have reviewed all pertinent imaging results/findings within the past 24 hours.  Imaging Results              X-Ray Chest 1 View (Final result)  Result time 12/31/23 07:57:27      Final result by Kurt Guillermo MD (12/31/23  "07:57:27)                   Impression:      No acute cardiopulmonary finding identified on this single view.      Electronically signed by: Kurt Guillermo MD  Date:    12/31/2023  Time:    07:57               Narrative:    EXAMINATION:  XR CHEST 1 VIEW    CLINICAL HISTORY:  Provided history is "  Hypoxemia".    TECHNIQUE:  One view of the chest.    COMPARISON:  04/24/2019.    FINDINGS:  Cardiac wires overlie the chest.  Cardiomediastinal silhouette is not enlarged.  Postoperative changes of prior median sternotomy.  No confluent area of consolidation.  No large pleural effusion.  No pneumothorax.  Scarring and chronic changes in the right lung base as seen on prior studies.                                    Assessment/Plan:     * Hyponatremia  Patient has hyponatremia which is uncontrolled. We will aim to correct the sodium by 4-6mEq in 24 hours. We will monitor sodium Daily. We will obtain the following studies: Urine sodium, urine osmolality, serum osmolality or TSH, T4. We will treat the hyponatremia with Fluid restriction of: 1 liter per day. The patient's sodium results have been reviewed and are listed below.  Recent Labs   Lab 12/31/23  0427   *   - baseline Na 130s  - given 1L NS in ED for hypotension   - hospitalized may 2022 for hyponatremia. 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 Does extreme fluid restriction of 10 ounces at home. No longer taking salt tabs  - Nephrology consulted; appreciate recs    Hypotension  - asymptomatic hypotension in ED likely 2/2 hypovolemia in setting of fluid restriction   - infectious workup negative thus far.  - Blood cx and lactic pending   - given 1L IVF in ED with improvement in BP  - hold home amlodipine, benicar, hydralazine  - monitor closely     Bilateral low back pain  - denies trauma or injury  - low concern for nephrolithiasis or pyelonephritis as UA unremarkable  - given tylenol 1g in ED  - trial robaxin & lidocaine patch    CKD (chronic " kidney disease) stage 3, GFR 30-59 ml/min  - Cr 0.8 on admit, at baseline   - renally dose meds  - nephrology consulted, appreciate recs  - avoid nephrotoxins  - monitor with daily bmp    Former tobacco use  - quit 2009    Seropositive rheumatoid arthritis  - receives humira injections every 14 days  - follows with rheumatology     HLD (hyperlipidemia)  - lipid panel in the am    History of thoracic aortic aneurysm repair  - noted    Essential hypertension  - holding all home meds for hypotension       VTE Risk Mitigation (From admission, onward)           Ordered     enoxaparin injection 40 mg  Daily         12/31/23 0957     IP VTE HIGH RISK PATIENT  Once         12/31/23 0957                         On 12/31/2023, patient should be placed in hospital observation services under my care in collaboration with Radha Flores MD.           LUPE Colmenares-C  Department of Hospital Medicine  [facility] 11H

## 2023-12-31 NOTE — ASSESSMENT & PLAN NOTE
- asymptomatic hypotension in ED likely 2/2 hypovolemia in setting of fluid restriction   - infectious workup negative thus far.  - Blood cx and lactic pending   - given 1L IVF in ED with improvement in BP  - hold home amlodipine, benicar, hydralazine  - monitor closely

## 2023-12-31 NOTE — ASSESSMENT & PLAN NOTE
Patient is identified as High risk for severe complications of COVID 19 based on COVID risk score of 4   Initiate standard COVID protocols; COVID-19 testing ,Infection Control notification  and isolation- respiratory, contact and droplet per protocol    Diagnostics: CBC, CMP, Ferritin, CRP, and Portable CXR    Management: Initiate targeted therapy with Remdesivir, 200mg IV x1, followed by 100mg IV daily x5 days total, Maintain oxygen saturations 92-96% via Nasal Cannula  LPM and monitor with continuous/intermittent pulse oximetry. , Inhaled bronchodilators as needed for shortness of breath., and Continuous cardiac monitoring.    Advance Care Planning  Current advance care plan has not been discussed with patient/family/POA and patient currently wishes Full Code.

## 2023-12-31 NOTE — SUBJECTIVE & OBJECTIVE
Past Medical History:   Diagnosis Date    CKD (chronic kidney disease) stage 3, GFR 30-59 ml/min     HLD (hyperlipidemia)     HTN (hypertension)     Hyperparathyroidism        Past Surgical History:   Procedure Laterality Date    CATARACT EXTRACTION, BILATERAL Bilateral 2014    cataract removal Right 2014    hiatial hernia  2017    HYSTERECTOMY      melanoma      on face    OOPHORECTOMY      THORACIC AORTIC ANEURYSM REPAIR         Review of patient's allergies indicates:   Allergen Reactions    Methotrexate analogues      Mouth Ulcers     Thiazides Other (See Comments)     hyponatremia       No current facility-administered medications on file prior to encounter.     Current Outpatient Medications on File Prior to Encounter   Medication Sig    adalimumab (HUMIRA PEN) PnKt injection Inject 1 pen  (40 mg total) into the skin every 14 (fourteen) days.    alendronate (FOSAMAX) 70 MG tablet Take 1 tablet (70 mg total) by mouth every 7 days.    amLODIPine (NORVASC) 10 MG tablet TAKE 1 TABLET(10 MG) BY MOUTH EVERY DAY    chlorthalidone (HYGROTEN) 25 MG Tab Take 1 tablet (25 mg total) by mouth once daily.    cholecalciferol, vitamin D3, (VITAMIN D3) 25 mcg (1,000 unit) capsule Take 1 capsule (1,000 Units total) by mouth once daily.    HUMIRA 40 mg/0.8 mL SyKt injection SMARTSI Pre-Filled Pen Syringe Topical Once a Week    hydrALAZINE (APRESOLINE) 25 MG tablet TAKE 1 TABLET(25 MG) BY MOUTH EVERY 12 HOURS    olmesartan (BENICAR) 40 MG tablet Take 1 tablet (40 mg total) by mouth once daily.    [DISCONTINUED] spironolactone (ALDACTONE) 25 MG tablet Take 1 tablet (25 mg total) by mouth once daily.     Family History       Problem Relation (Age of Onset)    Cancer Father    Hypertension Maternal Grandmother          Tobacco Use    Smoking status: Former     Current packs/day: 0.00     Average packs/day: 1 pack/day for 30.0 years (30.0 ttl pk-yrs)     Types: Cigarettes     Start date: 1979     Quit date: 2009      Years since quittin.9    Smokeless tobacco: Never   Substance and Sexual Activity    Alcohol use: Never     Comment: glass of wine once or twice a year    Drug use: Never    Sexual activity: Not Currently     Partners: Male     Birth control/protection: None     Review of Systems   Constitutional:  Negative for activity change, chills and fever.   HENT:  Negative for trouble swallowing.    Eyes:  Negative for photophobia and visual disturbance.   Respiratory:  Negative for cough, chest tightness and shortness of breath.    Cardiovascular:  Negative for chest pain, palpitations and leg swelling.   Gastrointestinal:  Negative for abdominal pain, constipation, diarrhea, nausea and vomiting.   Genitourinary:  Positive for decreased urine volume and difficulty urinating. Negative for dysuria, flank pain, frequency and hematuria.   Musculoskeletal:  Positive for arthralgias and back pain. Negative for gait problem and neck pain.   Skin:  Negative for rash and wound.   Neurological:  Negative for dizziness, syncope, speech difficulty and light-headedness.   Psychiatric/Behavioral:  Negative for agitation and confusion. The patient is not nervous/anxious.      Objective:     Vital Signs (Most Recent):  Temp: 98.5 °F (36.9 °C) (23 0700)  Pulse: 62 (23 0918)  Resp: 17 (23 0918)  BP: (!) 116/58 (23 0902)  SpO2: 95 % (23 0918) Vital Signs (24h Range):  Temp:  [98.5 °F (36.9 °C)-99.4 °F (37.4 °C)] 98.5 °F (36.9 °C)  Pulse:  [62-94] 62  Resp:  [15-24] 17  SpO2:  [91 %-98 %] 95 %  BP: ()/(47-63) 116/58     Weight: 50.8 kg (112 lb)  Body mass index is 19.84 kg/m².     Physical Exam  Vitals and nursing note reviewed.   Constitutional:       General: She is not in acute distress.     Appearance: She is well-developed.   HENT:      Head: Normocephalic and atraumatic.      Mouth/Throat:      Mouth: Mucous membranes are dry.   Eyes:      Extraocular Movements: Extraocular movements intact.       Conjunctiva/sclera: Conjunctivae normal.   Cardiovascular:      Rate and Rhythm: Normal rate and regular rhythm.      Heart sounds: Normal heart sounds.   Pulmonary:      Effort: Pulmonary effort is normal. No respiratory distress.      Breath sounds: Normal breath sounds. No wheezing.   Abdominal:      General: Bowel sounds are normal. There is no distension.      Palpations: Abdomen is soft.      Tenderness: There is no abdominal tenderness.   Musculoskeletal:         General: No tenderness. Normal range of motion.      Cervical back: Normal range of motion and neck supple.   Lymphadenopathy:      Cervical: No cervical adenopathy.   Skin:     General: Skin is warm and dry.      Capillary Refill: Capillary refill takes less than 2 seconds.      Findings: No rash.   Neurological:      Mental Status: She is alert and oriented to person, place, and time.      Cranial Nerves: No cranial nerve deficit.      Sensory: No sensory deficit.      Coordination: Coordination normal.   Psychiatric:         Behavior: Behavior normal.         Thought Content: Thought content normal.         Judgment: Judgment normal.                Significant Labs: All pertinent labs within the past 24 hours have been reviewed.  CBC:   Recent Labs   Lab 12/31/23 0427   WBC 4.29   HGB 11.7*   HCT 33.9*        CMP:   Recent Labs   Lab 12/31/23 0427   *   K 4.1   CL 97   CO2 24      BUN 15   CREATININE 0.8   CALCIUM 10.0   PROT 7.8   ALBUMIN 4.0   BILITOT 0.8   ALKPHOS 70   AST 18   ALT 10   ANIONGAP 6*     Urine Studies:   Recent Labs   Lab 12/31/23 0434   COLORU Yellow   APPEARANCEUA Clear   PHUR 7.0   SPECGRAV 1.015   PROTEINUA Negative   GLUCUA Negative   KETONESU Negative   BILIRUBINUA Negative   OCCULTUA Negative   NITRITE Negative   LEUKOCYTESUR Trace*   RBCUA 1   WBCUA 1   SQUAMEPITHEL 3       Significant Imaging: I have reviewed all pertinent imaging results/findings within the past 24 hours.  Imaging Results          "     X-Ray Chest 1 View (Final result)  Result time 12/31/23 07:57:27      Final result by Kurt Guillermo MD (12/31/23 07:57:27)                   Impression:      No acute cardiopulmonary finding identified on this single view.      Electronically signed by: Kurt Guillermo MD  Date:    12/31/2023  Time:    07:57               Narrative:    EXAMINATION:  XR CHEST 1 VIEW    CLINICAL HISTORY:  Provided history is "  Hypoxemia".    TECHNIQUE:  One view of the chest.    COMPARISON:  04/24/2019.    FINDINGS:  Cardiac wires overlie the chest.  Cardiomediastinal silhouette is not enlarged.  Postoperative changes of prior median sternotomy.  No confluent area of consolidation.  No large pleural effusion.  No pneumothorax.  Scarring and chronic changes in the right lung base as seen on prior studies.                                    "

## 2023-12-31 NOTE — ASSESSMENT & PLAN NOTE
- denies trauma or injury  - low concern for nephrolithiasis or pyelonephritis as UA unremarkable  - given tylenol 1g in ED  - trial robaxin & lidocaine patch

## 2023-12-31 NOTE — ASSESSMENT & PLAN NOTE
Patient has hyponatremia which is uncontrolled. We will aim to correct the sodium by 4-6mEq in 24 hours. We will monitor sodium Daily. We will obtain the following studies: Urine sodium, urine osmolality, serum osmolality or TSH, T4. We will treat the hyponatremia with Fluid restriction of: 1 liter per day. The patient's sodium results have been reviewed and are listed below.  Recent Labs   Lab 12/31/23  0427   *   - baseline Na 130s  - given 1L NS in ED for hypotension   - hospitalized may 2022 for hyponatremia. 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 Does extreme fluid restriction of 10 ounces at home. No longer taking salt tabs  - Nephrology consulted; appreciate recs

## 2023-12-31 NOTE — ED PROVIDER NOTES
Encounter Date: 2023       History     Chief Complaint   Patient presents with    Back Pain     Lower back pain and BLE pain. Hx renal failure      78-year-old female, with history of CKD, hyperlipidemia, hypertension, hyperparathyroidism, presents to the ED for low back pain.  Patient reports she had low back pain for the last 2 days, radiates down to her thighs.  Patient reports aching pain, has been constant, no known aggravating or alleviating factor.  Patient states that he is happened when she was diagnosed with kidney failure, she concerned that her kidney getting worse today.  Patient denies fever, chill, dysuria, weakness or numbness.        Review of patient's allergies indicates:   Allergen Reactions    Methotrexate analogues      Mouth Ulcers     Thiazides Other (See Comments)     hyponatremia     Past Medical History:   Diagnosis Date    CKD (chronic kidney disease) stage 3, GFR 30-59 ml/min     HLD (hyperlipidemia)     HTN (hypertension)     Hyperparathyroidism      Past Surgical History:   Procedure Laterality Date    CATARACT EXTRACTION, BILATERAL Bilateral 2014    cataract removal Right 2014    hiatial hernia  2017    HYSTERECTOMY      melanoma      on face    OOPHORECTOMY      THORACIC AORTIC ANEURYSM REPAIR       Family History   Problem Relation Age of Onset    Cancer Father     Hypertension Maternal Grandmother     Colon cancer Neg Hx     Inflammatory bowel disease Neg Hx      Social History     Tobacco Use    Smoking status: Former     Current packs/day: 0.00     Average packs/day: 1 pack/day for 30.0 years (30.0 ttl pk-yrs)     Types: Cigarettes     Start date: 1979     Quit date: 2009     Years since quittin.9    Smokeless tobacco: Never   Substance Use Topics    Alcohol use: Never     Comment: glass of wine once or twice a year    Drug use: Never     Review of Systems    Physical Exam     Initial Vitals [23 0403]   BP Pulse Resp Temp SpO2   138/61 94 18 99.4 °F  (37.4 °C) 98 %      MAP       --         Physical Exam    Nursing note and vitals reviewed.  Constitutional: She appears well-developed. No distress.   HENT:   Head: Normocephalic and atraumatic.   Mouth/Throat: Oropharynx is clear and moist.   Eyes: Conjunctivae and EOM are normal.   Neck: No JVD present.   Normal range of motion.  Cardiovascular:  Normal rate, regular rhythm, normal heart sounds and intact distal pulses.           Pulmonary/Chest: Breath sounds normal. No respiratory distress.   Abdominal: Abdomen is soft. She exhibits no distension. There is no abdominal tenderness.   Musculoskeletal:         General: No tenderness or edema. Normal range of motion.      Cervical back: Normal range of motion.     Neurological: She is alert and oriented to person, place, and time. She has normal strength.   Skin: Skin is warm and dry. Capillary refill takes less than 2 seconds.         ED Course   Procedures  Labs Reviewed   COMPREHENSIVE METABOLIC PANEL - Abnormal; Notable for the following components:       Result Value    Sodium 127 (*)     Anion Gap 6 (*)     All other components within normal limits    Narrative:     Release to patient->Immediate   CBC W/ AUTO DIFFERENTIAL - Abnormal; Notable for the following components:    RBC 3.53 (*)     Hemoglobin 11.7 (*)     Hematocrit 33.9 (*)     MCH 33.1 (*)     MPV 8.6 (*)     Lymph # 0.6 (*)     Lymph % 12.8 (*)     Mono % 18.9 (*)     All other components within normal limits    Narrative:     Release to patient->Immediate   URINALYSIS, REFLEX TO URINE CULTURE - Abnormal; Notable for the following components:    Leukocytes, UA Trace (*)     All other components within normal limits    Narrative:     Specimen Source->Urine   HIV 1 / 2 ANTIBODY    Narrative:     Release to patient->Immediate   HEPATITIS C ANTIBODY    Narrative:     Release to patient->Immediate   URINALYSIS MICROSCOPIC    Narrative:     Specimen Source->Urine          Imaging Results               "X-Ray Chest 1 View (Final result)  Result time 12/31/23 07:57:27      Final result by Kurt Guillermo MD (12/31/23 07:57:27)                   Impression:      No acute cardiopulmonary finding identified on this single view.      Electronically signed by: Kurt Guillermo MD  Date:    12/31/2023  Time:    07:57               Narrative:    EXAMINATION:  XR CHEST 1 VIEW    CLINICAL HISTORY:  Provided history is "  Hypoxemia".    TECHNIQUE:  One view of the chest.    COMPARISON:  04/24/2019.    FINDINGS:  Cardiac wires overlie the chest.  Cardiomediastinal silhouette is not enlarged.  Postoperative changes of prior median sternotomy.  No confluent area of consolidation.  No large pleural effusion.  No pneumothorax.  Scarring and chronic changes in the right lung base as seen on prior studies.                                    X-Rays:   Independently Interpreted Readings:   Chest X-Ray: Normal heart size.  No infiltrates.  No acute abnormalities.     Medications   sodium chloride 0.9% bolus 500 mL 500 mL (500 mLs Intravenous New Bag 12/31/23 0714)   acetaminophen tablet 1,000 mg (1,000 mg Oral Given 12/31/23 0525)     Medical Decision Making  78-year-old female, with history of CKD, hyperlipidemia, hypertension, hyperparathyroidism, presents to the ED for low back pain. Patient is well appearing, afebrile, HD stable.   Ddx: msk, radiculopathy, uti, pyelonephritis, doubt kidney stone.     Amount and/or Complexity of Data Reviewed  External Data Reviewed: notes.  Labs: ordered. Decision-making details documented in ED Course.  Radiology: ordered and independent interpretation performed. Decision-making details documented in ED Course.  ECG/medicine tests: ordered.    Risk  OTC drugs.  Decision regarding hospitalization.               ED Course as of 12/31/23 0811   Sun Dec 31, 2023   0721 CBC auto differential(!)  No leukocytosis, no acute anemia [NC]   0721 Comprehensive metabolic panel(!)  Acute on chronic " hyponatremia, otherwise asymptomatic, normal renal function. [NC]   0721 Urinalysis, Reflex to Urine Culture Urine, Clean Catch(!)  Unlike UTI [NC]   0722 Patient become hypoxic when sleeping, but no short of breath or chest pain.  Her blood pressure also trending down, will concerning for hypovolemia, will give 500 cc bolus.  We will discussed with Hospital Medicine for observation of her hyponatremia and hypotension [NC]      ED Course User Index  [NC] Joaquim Melton MD                           Clinical Impression:  Final diagnoses:  [M54.50] Bilateral low back pain, unspecified chronicity, unspecified whether sciatica present (Primary)  [R09.02] Hypoxia  [E87.1] Hyponatremia          ED Disposition Condition    Observation Stable                Joaquim Melton MD  Resident  12/31/23 0812

## 2023-12-31 NOTE — ASSESSMENT & PLAN NOTE
Patient chronically hyponatremic, urine studies this hospital admission are consistent with SIADH. Patient does report chronic back pain which may potentially explain her SIADH. She does have chlorthalidone on her home medication list, however patient denies taking this medication. Review of her medications show that a 90 day supply was filled on 11/1/23 for Chlorthalidone.     Recommendations  -Fluid restrict to 1L per day  -Agree with holding Chlorthalidone  -Daily RFP  -Goal sodium correction is up to 134 over the next 24 hours.   -No indication for salt tablets or hypertonic saline at this time since the patient is asymptomatic.   -Pain control per primary team.

## 2023-12-31 NOTE — ASSESSMENT & PLAN NOTE
- Cr 0.8 on admit, at baseline   - renally dose meds  - nephrology consulted, appreciate recs  - avoid nephrotoxins  - monitor with daily bmp

## 2023-12-31 NOTE — CONSULTS
"[facility]  Nephrology  Consult Note    Patient Name: Buffy Dominique  MRN: [id number]  Admission Date: 12/31/2023  Hospital Length of Stay: 0 days  Attending Provider: Radha Flores MD   Primary Care Physician: Odalis Kim MD  Principal Problem:Hyponatremia    Inpatient consult to Nephrology  Consult performed by: Umberto Bernstein MD  Consult ordered by: Vicky Zayas PA-C  Reason for consult: SIAHD        Subjective:     HPI: Buffy Dominique  is a 79 yo F with PMHx of HTN, HLD, RA, hyperparathyroidism, chronic hyponatremia, and thoracic aortic aneurysm repair who presented to ED for bilateral low back and leg pain. Patient reports constant, aching back pain, 10/10 in severity that started 2 prior to admission and radiates down bilateral thighs. Patient reports that she experienced similar back pain 2 years ago that was associated with renal failure and that is what prompted her visit to the hospital. Pt reports she only urinates 1-2 times per day and has minimal output but that is baseline for her. She currently is on a "no salt diet and 10 oz fluid restriction" due to hypertension.     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 patient reports long standing hyponatremia, adn tells me that previously it was due to HCTZ which was then discontinued. Sodium on arrival was 127, serum osmolality was ordered. Urine osmolality measured at 525, and urine sodium elevated at 156. She is currently asymptomatic. She denies any nausea, vomiting, pain medications, but does report chronic pain. She tells me that she eats a "balanced diet" but does not eat very much.     Past Medical History:   Diagnosis Date    CKD (chronic kidney disease) stage 3, GFR 30-59 ml/min     HLD (hyperlipidemia)     HTN (hypertension)     Hyperparathyroidism        Past Surgical History:   Procedure Laterality Date    CATARACT EXTRACTION, BILATERAL Bilateral 2014    cataract removal Right 2014    " hiatial hernia  2017    HYSTERECTOMY      melanoma      on face    OOPHORECTOMY      THORACIC AORTIC ANEURYSM REPAIR         Review of patient's allergies indicates:   Allergen Reactions    Methotrexate analogues      Mouth Ulcers     Thiazides Other (See Comments)     hyponatremia     Current Facility-Administered Medications   Medication Frequency    acetaminophen tablet 1,000 mg Q8H PRN    acetaminophen tablet 650 mg Q4H PRN    albuterol-ipratropium 2.5 mg-0.5 mg/3 mL nebulizer solution 3 mL Q4H PRN    aluminum-magnesium hydroxide-simethicone 200-200-20 mg/5 mL suspension 30 mL QID PRN    bisacodyL suppository 10 mg Daily PRN    cetirizine tablet 10 mg Daily    enoxaparin injection 40 mg Daily    melatonin tablet 6 mg Nightly PRN    naloxone 0.4 mg/mL injection 0.02 mg PRN    ondansetron disintegrating tablet 8 mg Q8H PRN    polyethylene glycol packet 17 g BID PRN    prochlorperazine injection Soln 5 mg Q6H PRN    simethicone chewable tablet 80 mg QID PRN    sodium chloride 0.9% flush 5 mL PRN    vitamin D 1000 units tablet 1,000 Units Daily     Family History       Problem Relation (Age of Onset)    Cancer Father    Hypertension Maternal Grandmother          Tobacco Use    Smoking status: Former     Current packs/day: 0.00     Average packs/day: 1 pack/day for 30.0 years (30.0 ttl pk-yrs)     Types: Cigarettes     Start date: 1979     Quit date: 2009     Years since quittin.9    Smokeless tobacco: Never   Substance and Sexual Activity    Alcohol use: Never     Comment: glass of wine once or twice a year    Drug use: Never    Sexual activity: Not Currently     Partners: Male     Birth control/protection: None     Review of Systems   Constitutional:  Negative for activity change, appetite change, chills, diaphoresis and unexpected weight change.   Respiratory:  Negative for cough, chest tightness, shortness of breath and wheezing.    Cardiovascular:  Negative for chest pain, palpitations and leg  swelling.   Gastrointestinal:  Negative for abdominal distention, abdominal pain, constipation, diarrhea, nausea and vomiting.   Endocrine: Negative for polyuria.   Genitourinary:  Positive for flank pain (bilateral). Negative for difficulty urinating, dysuria and frequency.   Musculoskeletal:  Negative for joint swelling.   Skin:  Negative for color change, pallor, rash and wound.   Neurological:  Negative for dizziness, tremors, seizures, syncope, speech difficulty, weakness, light-headedness, numbness and headaches.   Psychiatric/Behavioral:  Negative for agitation.      Objective:     Vital Signs (Most Recent):  Temp: 99.1 °F (37.3 °C) (12/31/23 1126)  Pulse: 62 (12/31/23 1126)  Resp: 17 (12/31/23 1126)  BP: 133/60 (12/31/23 1126)  SpO2: 96 % (12/31/23 1126) Vital Signs (24h Range):  Temp:  [98.5 °F (36.9 °C)-99.4 °F (37.4 °C)] 99.1 °F (37.3 °C)  Pulse:  [59-94] 62  Resp:  [15-24] 17  SpO2:  [91 %-98 %] 96 %  BP: ()/(47-63) 133/60     Weight: 50.8 kg (111 lb 15.9 oz) (12/31/23 1102)  Body mass index is 19.84 kg/m².  Body surface area is 1.5 meters squared.    No intake/output data recorded.     Physical Exam  Vitals reviewed.   Constitutional:       General: She is not in acute distress.     Appearance: Normal appearance. She is not ill-appearing or toxic-appearing.   HENT:      Head: Atraumatic.      Right Ear: External ear normal.      Left Ear: External ear normal.      Nose: Nose normal.      Mouth/Throat:      Mouth: Mucous membranes are moist.   Eyes:      Extraocular Movements: Extraocular movements intact.   Cardiovascular:      Rate and Rhythm: Normal rate and regular rhythm.      Pulses: Normal pulses.      Heart sounds: Normal heart sounds.   Pulmonary:      Effort: Pulmonary effort is normal. No respiratory distress.      Breath sounds: Normal breath sounds. No stridor.   Abdominal:      General: Abdomen is flat. There is no distension.      Palpations: Abdomen is soft.      Tenderness: There  is no abdominal tenderness. There is right CVA tenderness and left CVA tenderness. There is no guarding.   Musculoskeletal:         General: No swelling. Normal range of motion.      Cervical back: Normal range of motion. No rigidity or tenderness.      Right lower leg: No edema.      Left lower leg: No edema.   Skin:     General: Skin is warm and dry.      Capillary Refill: Capillary refill takes less than 2 seconds.   Neurological:      General: No focal deficit present.      Mental Status: She is alert and oriented to person, place, and time. Mental status is at baseline.   Psychiatric:         Mood and Affect: Mood normal.         Behavior: Behavior normal.         Thought Content: Thought content normal.         Judgment: Judgment normal.          Significant Labs:  CBC:   Recent Labs   Lab 12/31/23 0427   WBC 4.29   RBC 3.53*   HGB 11.7*   HCT 33.9*      MCV 96   MCH 33.1*   MCHC 34.5     CMP:   Recent Labs   Lab 12/31/23 0427      CALCIUM 10.0   ALBUMIN 4.0   PROT 7.8   *   K 4.1   CO2 24   CL 97   BUN 15   CREATININE 0.8   ALKPHOS 70   ALT 10   AST 18   BILITOT 0.8     All labs within the past 24 hours have been reviewed.    Significant Imaging:  Assessment/Plan:     Cardiac/Vascular  Essential hypertension  -    Renal/  * Hyponatremia  [facility]  [facility]  -Per primary team.     Endocrine  SIADH (syndrome of inappropriate ADH production)  Patient chronically hyponatremic, urine studies this hospital admission are consistent with SIADH. Patient does report chronic back pain which may potentially explain her SIADH. She does have chlorthalidone on her home medication list, however patient denies taking this medication. Review of her medications show that a 90 day supply was filled on 11/1/23 for Chlorthalidone.     Recommendations  -Fluid restrict to 1L per day  -Agree with holding Chlorthalidone  -Daily RFP  -Goal sodium correction is up to 134 over the next 24 hours.   -No  indication for salt tablets or hypertonic saline at this time since the patient is asymptomatic.   -Pain control per primary team.    Orthopedic  Bilateral low back pain  UA unremarkable, does not appear to be a UTI  -Pain control per primary team.        Thank you for your consult. I will follow-up with patient. Please contact us if you have any additional questions.    Umberto Bernstein MD  Nephrology  [facility]

## 2023-12-31 NOTE — ED TRIAGE NOTES
"Buffy Dominique, a 78 y.o. female presents to the ED w/ complaint of bilateral lower back pain. Pt reports last time she experienced pain like this she was diagnosed with kidney failure. Pt reports only urinates 1-2 times per day and not a lot comes out but it has been like that for years. Denies CP, SOB, dysuria, fever. Pt denies any fluid pills, states "All I take is blood pressure meds".     Triage note:  Chief Complaint   Patient presents with    Back Pain     Lower back pain and BLE pain. Hx renal failure      Review of patient's allergies indicates:   Allergen Reactions    Methotrexate analogues      Mouth Ulcers     Thiazides Other (See Comments)     hyponatremia     Past Medical History:   Diagnosis Date    CKD (chronic kidney disease) stage 3, GFR 30-59 ml/min     HLD (hyperlipidemia)     HTN (hypertension)     Hyperparathyroidism        "

## 2023-12-31 NOTE — SUBJECTIVE & OBJECTIVE
Past Medical History:   Diagnosis Date    CKD (chronic kidney disease) stage 3, GFR 30-59 ml/min     HLD (hyperlipidemia)     HTN (hypertension)     Hyperparathyroidism        Past Surgical History:   Procedure Laterality Date    CATARACT EXTRACTION, BILATERAL Bilateral 2014    cataract removal Right 2014    hiatial hernia  2017    HYSTERECTOMY      melanoma      on face    OOPHORECTOMY      THORACIC AORTIC ANEURYSM REPAIR         Review of patient's allergies indicates:   Allergen Reactions    Methotrexate analogues      Mouth Ulcers     Thiazides Other (See Comments)     hyponatremia     Current Facility-Administered Medications   Medication Frequency    acetaminophen tablet 1,000 mg Q8H PRN    acetaminophen tablet 650 mg Q4H PRN    albuterol-ipratropium 2.5 mg-0.5 mg/3 mL nebulizer solution 3 mL Q4H PRN    aluminum-magnesium hydroxide-simethicone 200-200-20 mg/5 mL suspension 30 mL QID PRN    bisacodyL suppository 10 mg Daily PRN    cetirizine tablet 10 mg Daily    enoxaparin injection 40 mg Daily    melatonin tablet 6 mg Nightly PRN    naloxone 0.4 mg/mL injection 0.02 mg PRN    ondansetron disintegrating tablet 8 mg Q8H PRN    polyethylene glycol packet 17 g BID PRN    prochlorperazine injection Soln 5 mg Q6H PRN    simethicone chewable tablet 80 mg QID PRN    sodium chloride 0.9% flush 5 mL PRN    vitamin D 1000 units tablet 1,000 Units Daily     Family History       Problem Relation (Age of Onset)    Cancer Father    Hypertension Maternal Grandmother          Tobacco Use    Smoking status: Former     Current packs/day: 0.00     Average packs/day: 1 pack/day for 30.0 years (30.0 ttl pk-yrs)     Types: Cigarettes     Start date: 1979     Quit date: 2009     Years since quittin.9    Smokeless tobacco: Never   Substance and Sexual Activity    Alcohol use: Never     Comment: glass of wine once or twice a year    Drug use: Never    Sexual activity: Not Currently     Partners: Male     Birth  control/protection: None     Review of Systems   Constitutional:  Negative for activity change, appetite change, chills, diaphoresis and unexpected weight change.   Respiratory:  Negative for cough, chest tightness, shortness of breath and wheezing.    Cardiovascular:  Negative for chest pain, palpitations and leg swelling.   Gastrointestinal:  Negative for abdominal distention, abdominal pain, constipation, diarrhea, nausea and vomiting.   Endocrine: Negative for polyuria.   Genitourinary:  Positive for flank pain (bilateral). Negative for difficulty urinating, dysuria and frequency.   Musculoskeletal:  Negative for joint swelling.   Skin:  Negative for color change, pallor, rash and wound.   Neurological:  Negative for dizziness, tremors, seizures, syncope, speech difficulty, weakness, light-headedness, numbness and headaches.   Psychiatric/Behavioral:  Negative for agitation.      Objective:     Vital Signs (Most Recent):  Temp: 99.1 °F (37.3 °C) (12/31/23 1126)  Pulse: 62 (12/31/23 1126)  Resp: 17 (12/31/23 1126)  BP: 133/60 (12/31/23 1126)  SpO2: 96 % (12/31/23 1126) Vital Signs (24h Range):  Temp:  [98.5 °F (36.9 °C)-99.4 °F (37.4 °C)] 99.1 °F (37.3 °C)  Pulse:  [59-94] 62  Resp:  [15-24] 17  SpO2:  [91 %-98 %] 96 %  BP: ()/(47-63) 133/60     Weight: 50.8 kg (111 lb 15.9 oz) (12/31/23 1102)  Body mass index is 19.84 kg/m².  Body surface area is 1.5 meters squared.    No intake/output data recorded.     Physical Exam  Vitals reviewed.   Constitutional:       General: She is not in acute distress.     Appearance: Normal appearance. She is not ill-appearing or toxic-appearing.   HENT:      Head: Atraumatic.      Right Ear: External ear normal.      Left Ear: External ear normal.      Nose: Nose normal.      Mouth/Throat:      Mouth: Mucous membranes are moist.   Eyes:      Extraocular Movements: Extraocular movements intact.   Cardiovascular:      Rate and Rhythm: Normal rate and regular rhythm.       Pulses: Normal pulses.      Heart sounds: Normal heart sounds.   Pulmonary:      Effort: Pulmonary effort is normal. No respiratory distress.      Breath sounds: Normal breath sounds. No stridor.   Abdominal:      General: Abdomen is flat. There is no distension.      Palpations: Abdomen is soft.      Tenderness: There is no abdominal tenderness. There is right CVA tenderness and left CVA tenderness. There is no guarding.   Musculoskeletal:         General: No swelling. Normal range of motion.      Cervical back: Normal range of motion. No rigidity or tenderness.      Right lower leg: No edema.      Left lower leg: No edema.   Skin:     General: Skin is warm and dry.      Capillary Refill: Capillary refill takes less than 2 seconds.   Neurological:      General: No focal deficit present.      Mental Status: She is alert and oriented to person, place, and time. Mental status is at baseline.   Psychiatric:         Mood and Affect: Mood normal.         Behavior: Behavior normal.         Thought Content: Thought content normal.         Judgment: Judgment normal.          Significant Labs:  CBC:   Recent Labs   Lab 12/31/23 0427   WBC 4.29   RBC 3.53*   HGB 11.7*   HCT 33.9*      MCV 96   MCH 33.1*   MCHC 34.5     CMP:   Recent Labs   Lab 12/31/23 0427      CALCIUM 10.0   ALBUMIN 4.0   PROT 7.8   *   K 4.1   CO2 24   CL 97   BUN 15   CREATININE 0.8   ALKPHOS 70   ALT 10   AST 18   BILITOT 0.8     All labs within the past 24 hours have been reviewed.    Significant Imaging:

## 2023-12-31 NOTE — HPI
Buffy Dominique  is a 79 yo F with PMHx of CKD3, HTN, HLD, RA, hyperparathyroidism, chronic hyponatremia, and thoracic aortic aneurysm repair who presented to ED for bilateral low back pain. Patient reports constant, aching back pain, 10/10 in severity that started 2 days ago and radiates down bilateral thighs. Her pain was greatly relieved by 1,000 mg of tylenol and is now 3/10 in severity. She denies aggravating factors. She was concerned her back pain may be secondary to renal failure because her last episode of pain occurred with renal failure. Pt reports she only urinates 1-2 times per day and has minimal output but that is baseline for her. Of note, she was previously hospitalized with hyponatremia determined to be related to chlorthalidone use and SIADH. She reports a former provider her told her to restrict her water intake to 10 ounces per day. She denies fever, chills, chest pain, palpitations, SOB, cough, abdominal pain, n/v/d, dysuria, headaches and LE swelling.     In ED: Afebrile. Hypotensive to 92/47>> improved to 116/58 s/p 1L NS. O2 sats dropping to low 90s when patient sleeping. No leukocytosis. Hgb 11.7. Na 127 (baseline low 130s). Cr 0.8 (at baseline). CMP otherwise unremarkable. UA noninfectious.  CXR without acute process. Given 1g tylenol. Placed in observation.

## 2024-01-01 PROBLEM — U07.1 COVID-19: Status: ACTIVE | Noted: 2024-01-01

## 2024-01-01 LAB
ANION GAP SERPL CALC-SCNC: 13 MMOL/L (ref 8–16)
BUN SERPL-MCNC: 15 MG/DL (ref 8–23)
CALCIUM SERPL-MCNC: 8.4 MG/DL (ref 8.7–10.5)
CHLORIDE SERPL-SCNC: 96 MMOL/L (ref 95–110)
CHOLEST SERPL-MCNC: 148 MG/DL (ref 120–199)
CHOLEST/HDLC SERPL: 2.6 {RATIO} (ref 2–5)
CO2 SERPL-SCNC: 22 MMOL/L (ref 23–29)
CREAT SERPL-MCNC: 0.8 MG/DL (ref 0.5–1.4)
ERYTHROCYTE [DISTWIDTH] IN BLOOD BY AUTOMATED COUNT: 12.2 % (ref 11.5–14.5)
EST. GFR  (NO RACE VARIABLE): >60 ML/MIN/1.73 M^2
GLUCOSE SERPL-MCNC: 82 MG/DL (ref 70–110)
HCT VFR BLD AUTO: 29.5 % (ref 37–48.5)
HDLC SERPL-MCNC: 56 MG/DL (ref 40–75)
HDLC SERPL: 37.8 % (ref 20–50)
HGB BLD-MCNC: 9.9 G/DL (ref 12–16)
LDLC SERPL CALC-MCNC: 84.6 MG/DL (ref 63–159)
MAGNESIUM SERPL-MCNC: 1.4 MG/DL (ref 1.6–2.6)
MCH RBC QN AUTO: 32.2 PG (ref 27–31)
MCHC RBC AUTO-ENTMCNC: 33.6 G/DL (ref 32–36)
MCV RBC AUTO: 96 FL (ref 82–98)
NONHDLC SERPL-MCNC: 92 MG/DL
PLATELET # BLD AUTO: 152 K/UL (ref 150–450)
PMV BLD AUTO: 9 FL (ref 9.2–12.9)
POTASSIUM SERPL-SCNC: 3.8 MMOL/L (ref 3.5–5.1)
RBC # BLD AUTO: 3.07 M/UL (ref 4–5.4)
SODIUM SERPL-SCNC: 131 MMOL/L (ref 136–145)
TRIGL SERPL-MCNC: 37 MG/DL (ref 30–150)
WBC # BLD AUTO: 2.7 K/UL (ref 3.9–12.7)

## 2024-01-01 PROCEDURE — 80061 LIPID PANEL: CPT

## 2024-01-01 PROCEDURE — 96372 THER/PROPH/DIAG INJ SC/IM: CPT

## 2024-01-01 PROCEDURE — G0378 HOSPITAL OBSERVATION PER HR: HCPCS

## 2024-01-01 PROCEDURE — 85027 COMPLETE CBC AUTOMATED: CPT

## 2024-01-01 PROCEDURE — 25000003 PHARM REV CODE 250

## 2024-01-01 PROCEDURE — 63600175 PHARM REV CODE 636 W HCPCS

## 2024-01-01 PROCEDURE — 80048 BASIC METABOLIC PNL TOTAL CA: CPT

## 2024-01-01 PROCEDURE — 36415 COLL VENOUS BLD VENIPUNCTURE: CPT

## 2024-01-01 PROCEDURE — 83735 ASSAY OF MAGNESIUM: CPT

## 2024-01-01 RX ORDER — MAGNESIUM SULFATE HEPTAHYDRATE 40 MG/ML
2 INJECTION, SOLUTION INTRAVENOUS ONCE
Status: COMPLETED | OUTPATIENT
Start: 2024-01-01 | End: 2024-01-01

## 2024-01-01 RX ADMIN — MAGNESIUM SULFATE HEPTAHYDRATE 2 G: 40 INJECTION, SOLUTION INTRAVENOUS at 08:01

## 2024-01-01 RX ADMIN — CHOLECALCIFEROL TAB 25 MCG (1000 UNIT) 1000 UNITS: 25 TAB at 08:01

## 2024-01-01 RX ADMIN — ACETAMINOPHEN 1000 MG: 500 TABLET ORAL at 12:01

## 2024-01-01 RX ADMIN — ENOXAPARIN SODIUM 40 MG: 40 INJECTION SUBCUTANEOUS at 04:01

## 2024-01-01 RX ADMIN — CETIRIZINE HYDROCHLORIDE 10 MG: 10 TABLET, FILM COATED ORAL at 08:01

## 2024-01-01 NOTE — PLAN OF CARE
Problem: Adult Inpatient Plan of Care  Goal: Plan of Care Review  Outcome: Ongoing, Progressing  Goal: Patient-Specific Goal (Individualized)  Outcome: Ongoing, Progressing  Goal: Absence of Hospital-Acquired Illness or Injury  Outcome: Ongoing, Progressing  Goal: Optimal Comfort and Wellbeing  Outcome: Ongoing, Progressing     Problem: Infection  Goal: Absence of Infection Signs and Symptoms  Outcome: Ongoing, Progressing     Problem: Fluid and Electrolyte Imbalance (Acute Kidney Injury/Impairment)  Goal: Fluid and Electrolyte Balance  Outcome: Ongoing, Progressing     Problem: Oral Intake Inadequate (Acute Kidney Injury/Impairment)  Goal: Optimal Nutrition Intake  Outcome: Ongoing, Progressing     Problem: Renal Function Impairment (Acute Kidney Injury/Impairment)  Goal: Effective Renal Function  Outcome: Ongoing, Progressing

## 2024-01-01 NOTE — PROGRESS NOTES
[facility] [address] [place] [address] Medicine  Progress Note    Patient Name: Buffy Dominique  MRN: [id number]  Patient Class: OP- Observation   Admission Date: 12/31/2023  Length of Stay: 1 days  Attending Physician: Radha Flores MD  Primary Care Provider: Odalis Kim MD        Subjective:     Principal Problem:SIADH (syndrome of inappropriate ADH production)        HPI:  Buffy Dominique  is a 79 yo F with PMHx of CKD3, HTN, HLD, RA, hyperparathyroidism, chronic hyponatremia, and thoracic aortic aneurysm repair who presented to ED for bilateral low back pain. Patient reports constant, aching back pain, 10/10 in severity that started 2 days ago and radiates down bilateral thighs. Her pain was greatly relieved by 1,000 mg of tylenol and is now 3/10 in severity. She denies aggravating factors. She was concerned her back pain may be secondary to renal failure because her last episode of pain occurred with renal failure. Pt reports she only urinates 1-2 times per day and has minimal output but that is baseline for her. Of note, she was previously hospitalized with hyponatremia determined to be related to chlorthalidone use and SIADH. She reports a former provider her told her to restrict her water intake to 10 ounces per day. She denies fever, chills, chest pain, palpitations, SOB, cough, abdominal pain, n/v/d, dysuria, headaches and LE swelling.     In ED: Afebrile. Hypotensive to 92/47>> improved to 116/58 s/p 1L NS. O2 sats dropping to low 90s when patient sleeping. No leukocytosis. Hgb 11.7. Na 127 (baseline low 130s). Cr 0.8 (at baseline). CMP otherwise unremarkable. UA noninfectious.  CXR without acute process. Given 1g tylenol. Placed in observation.     Overview/Hospital Course:  Ms. Dominique was placed in observation for hyponatremia and hypotension in the setting of COVID-19 and SIADH. Home antihypertensives (amlodipine, chlorthalidone, and hydralazine) were held at admission and blood pressure is improving.  Nephrology was consulted for SIADH and recommended fluid restriction of 1L and hyponatremia is now improving. The patient adamantly denied her diagnosis of COVID-19 and refused treatment despite an at-length conversation regarding risks associated with non-treatment. Plan to discharge with PCP and nephrology follow-up when medically ready.     Interval History: Pt seen and examined by me this morning. ARMANI. LAIEON. Hyponatremia improving with fluid restriction.     Review of Systems   Constitutional:  Negative for activity change, chills and fever.   HENT:  Negative for trouble swallowing.    Eyes:  Negative for photophobia and visual disturbance.   Respiratory:  Negative for cough, chest tightness and shortness of breath.    Cardiovascular:  Negative for chest pain, palpitations and leg swelling.   Gastrointestinal:  Negative for abdominal pain, constipation, diarrhea, nausea and vomiting.   Genitourinary:  Positive for decreased urine volume, difficulty urinating and flank pain. Negative for dysuria, frequency and hematuria.   Musculoskeletal:  Positive for arthralgias. Negative for gait problem and neck pain.   Skin:  Negative for rash and wound.   Neurological:  Negative for dizziness, syncope, speech difficulty and light-headedness.   Psychiatric/Behavioral:  Negative for agitation and confusion. The patient is not nervous/anxious.      Objective:     Vital Signs (Most Recent):  Temp: 98 °F (36.7 °C) (01/01/24 1148)  Pulse: 67 (01/01/24 1505)  Resp: 19 (01/01/24 1148)  BP: (!) 153/66 (01/01/24 1148)  SpO2: 96 % (01/01/24 1148) Vital Signs (24h Range):  Temp:  [98 °F (36.7 °C)-99.4 °F (37.4 °C)] 98 °F (36.7 °C)  Pulse:  [53-70] 67  Resp:  [16-19] 19  SpO2:  [91 %-96 %] 96 %  BP: (115-153)/(57-66) 153/66     Weight: 50.8 kg (111 lb 15.9 oz)  Body mass index is 19.84 kg/m².    Intake/Output Summary (Last 24 hours) at 1/1/2024 2754  Last data filed at 1/1/2024 0900  Gross per 24 hour   Intake 480 ml   Output --    Net 480 ml         Physical Exam  Vitals and nursing note reviewed.   Constitutional:       General: She is not in acute distress.     Appearance: She is well-developed.   HENT:      Head: Normocephalic and atraumatic.      Mouth/Throat:      Mouth: Mucous membranes are moist.   Eyes:      Extraocular Movements: Extraocular movements intact.      Conjunctiva/sclera: Conjunctivae normal.   Cardiovascular:      Rate and Rhythm: Normal rate and regular rhythm.      Heart sounds: Normal heart sounds.   Pulmonary:      Effort: Pulmonary effort is normal. No respiratory distress.      Breath sounds: Normal breath sounds. No wheezing.   Abdominal:      General: Bowel sounds are normal. There is no distension.      Palpations: Abdomen is soft.      Tenderness: There is no abdominal tenderness. There is right CVA tenderness and left CVA tenderness.   Musculoskeletal:         General: No tenderness. Normal range of motion.      Cervical back: Normal range of motion and neck supple.   Lymphadenopathy:      Cervical: No cervical adenopathy.   Skin:     General: Skin is warm and dry.      Capillary Refill: Capillary refill takes less than 2 seconds.      Findings: No rash.   Neurological:      Mental Status: She is alert and oriented to person, place, and time.      Cranial Nerves: No cranial nerve deficit.      Sensory: No sensory deficit.      Coordination: Coordination normal.   Psychiatric:         Behavior: Behavior normal.         Thought Content: Thought content normal.         Judgment: Judgment normal.             Significant Labs: All pertinent labs within the past 24 hours have been reviewed.    Significant Imaging: I have reviewed all pertinent imaging results/findings within the past 24 hours.    Assessment/Plan:      * Hyponatremia  SIADH  Patient has hyponatremia which is uncontrolled. We will aim to correct the sodium by 4-6mEq in 24 hours. We will monitor sodium Daily. We will obtain the following studies:  Urine sodium, urine osmolality, serum osmolality or TSH, T4. We will treat the hyponatremia with Fluid restriction of: 1 liter per day. The patient's sodium results have been reviewed and are listed below.  Recent Labs   Lab 01/01/24  0307   *     - baseline Na 130s  - given 1L NS in ED for hypotension   - hospitalized may 2022 for hyponatremia. 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 Does extreme fluid restriction of 10 ounces at home. No longer taking salt tabs  - Nephrology consulted; appreciate recs    Hypotension  - asymptomatic hypotension in ED likely 2/2 hypovolemia in setting of fluid restriction   - infectious workup negative thus far.  - Blood cx and lactic pending   - given 1L IVF in ED with improvement in BP  - hold home amlodipine, benicar, hydralazine  - monitor closely     COVID-19  Patient is identified as Severe COVID-19 based on hypoxemia with O2 saturations <94% on room air or on ambulation   Initiate standard COVID protocols; COVID-19 testing ,Infection Control notification  and isolation- respiratory, contact and droplet per protocol    Diagnostics: CBC, CMP, Ferritin, CRP, and Portable CXR    Management: Maintain oxygen saturations 92-96% via Nasal Cannula  LPM and monitor with continuous/intermittent pulse oximetry. , Inhaled bronchodilators as needed for shortness of breath., and Continuous cardiac monitoring.    Advance Care Planning Current advance care plan has not been discussed with patient/family/POA and patient currently wishes Full Code.     - Lengthy discussion regarding transmission, symptoms, results, risks and benefits of treatment, etc performed at bedside. The patient adamantly denies that she has COVID-19 and adamantly refuses treatment despite active hypoxia and symptoms. She expressed clear understanding of risks of non-treatment.     Bilateral low back pain  - denies trauma or injury  - low concern for nephrolithiasis or pyelonephritis as UA  unremarkable  - given tylenol 1g in ED  - trial robaxin & lidocaine patch    CKD (chronic kidney disease) stage 3, GFR 30-59 ml/min  - Cr 0.8 on admit, at baseline   - renally dose meds  - nephrology consulted, appreciate recs  - avoid nephrotoxins  - monitor with daily bmp    Former tobacco use  - quit 2009    Seropositive rheumatoid arthritis  - receives humira injections every 14 days  - follows with rheumatology     HLD (hyperlipidemia)  - lipid panel wnl  - statin not indicated at this time     History of thoracic aortic aneurysm repair  - noted    Essential hypertension  - holding all home meds for hypotension   - consider restarting home amlodipine if BP rises      VTE Risk Mitigation (From admission, onward)           Ordered     enoxaparin injection 40 mg  Daily         12/31/23 0957     IP VTE HIGH RISK PATIENT  Once         12/31/23 0957                    Discharge Planning   MARCELLA: 1/2/2024     Code Status: Full Code   Is the patient medically ready for discharge?: No    Reason for patient still in hospital (select all that apply): Patient trending condition, Treatment, and Consult recommendations                     BLADIMIR ColmenaresC  Department of Hospital Medicine   [facility]

## 2024-01-01 NOTE — HOSPITAL COURSE
Ms. Dominique was placed in observation for hyponatremia and hypotension in the setting of COVID-19, SIADH, and orthostatic hypotension. Home antihypertensives (amlodipine, chlorthalidone, and hydralazine) were held at admission and blood pressure is improving. Nephrology was consulted for SIADH and recommended fluid restriction of 1L and salt tabs, which was later discontinued in favor of tolvaptan. Na still low, improving - at patient's baseline. The patient adamantly denied her diagnosis of COVID-19 at admission and refused treatment despite an at-length conversation regarding risks associated with non-treatment.     On evening of 1/2/23, patient had an episode of orthostatic syncope with head trauma after standing up from having a  large bowel movement. AOX4 without any changes in neuro exam. CTH with scalp hematoma without evidence of fracture, intracranial hemorrhage, or sulcal effacement. Orthostatics improved.    Original discharge plan per nephrology was to discharge home on 25g urea and low dose [id number] for SIADH. Urea out of stock at [facility] as well as other pharmacies in the area. Also informed urea not covered by insurance even if they could get it in stock. Nephrology made aware and suggested patient can be discharged home on tolvaptan with close nephrology follow-up.  Patient medically ready for discharge. Plan to follow up with PCP, nephrology. Return precautions provided. Plan of care discussed with patient, patient agreeable with plan, and all questions answered.

## 2024-01-01 NOTE — ASSESSMENT & PLAN NOTE
SIADH  Patient has hyponatremia which is uncontrolled. We will aim to correct the sodium by 4-6mEq in 24 hours. We will monitor sodium Daily. We will obtain the following studies: Urine sodium, urine osmolality, serum osmolality or TSH, T4. We will treat the hyponatremia with Fluid restriction of: 1 liter per day. The patient's sodium results have been reviewed and are listed below.  Recent Labs   Lab 01/01/24  0307   *     - baseline Na 130s  - given 1L NS in ED for hypotension   - hospitalized may 2022 for hyponatremia. 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 Does extreme fluid restriction of 10 ounces at home. No longer taking salt tabs  - Nephrology consulted; appreciate recs

## 2024-01-01 NOTE — NURSING
Nurses Note -- 4 Eyes      12/31/2023   11:00 AM      Skin assessed during: Admit      [x] No Altered Skin Integrity Present    [x]Prevention Measures Documented      [] Yes- Altered Skin Integrity Present or Discovered   [] LDA Added if Not in Epic (Describe Wound)   [] New Altered Skin Integrity was Present on Admit and Documented in LDA   [] Wound Image Taken    Wound Care Consulted? No    Attending Nurse:  Risa Varghese RN/Staff Member:   [place]

## 2024-01-01 NOTE — SUBJECTIVE & OBJECTIVE
Interval History: Pt seen and examined by me this morning. CHAITANYA GALARZA. Hyponatremia improving with fluid restriction.     Review of Systems   Constitutional:  Negative for activity change, chills and fever.   HENT:  Negative for trouble swallowing.    Eyes:  Negative for photophobia and visual disturbance.   Respiratory:  Negative for cough, chest tightness and shortness of breath.    Cardiovascular:  Negative for chest pain, palpitations and leg swelling.   Gastrointestinal:  Negative for abdominal pain, constipation, diarrhea, nausea and vomiting.   Genitourinary:  Positive for decreased urine volume, difficulty urinating and flank pain. Negative for dysuria, frequency and hematuria.   Musculoskeletal:  Positive for arthralgias. Negative for gait problem and neck pain.   Skin:  Negative for rash and wound.   Neurological:  Negative for dizziness, syncope, speech difficulty and light-headedness.   Psychiatric/Behavioral:  Negative for agitation and confusion. The patient is not nervous/anxious.      Objective:     Vital Signs (Most Recent):  Temp: 98 °F (36.7 °C) (01/01/24 1148)  Pulse: 67 (01/01/24 1505)  Resp: 19 (01/01/24 1148)  BP: (!) 153/66 (01/01/24 1148)  SpO2: 96 % (01/01/24 1148) Vital Signs (24h Range):  Temp:  [98 °F (36.7 °C)-99.4 °F (37.4 °C)] 98 °F (36.7 °C)  Pulse:  [53-70] 67  Resp:  [16-19] 19  SpO2:  [91 %-96 %] 96 %  BP: (115-153)/(57-66) 153/66     Weight: 50.8 kg (111 lb 15.9 oz)  Body mass index is 19.84 kg/m².    Intake/Output Summary (Last 24 hours) at 1/1/2024 1559  Last data filed at 1/1/2024 0900  Gross per 24 hour   Intake 480 ml   Output --   Net 480 ml         Physical Exam  Vitals and nursing note reviewed.   Constitutional:       General: She is not in acute distress.     Appearance: She is well-developed.   HENT:      Head: Normocephalic and atraumatic.      Mouth/Throat:      Mouth: Mucous membranes are moist.   Eyes:      Extraocular Movements: Extraocular movements intact.       Conjunctiva/sclera: Conjunctivae normal.   Cardiovascular:      Rate and Rhythm: Normal rate and regular rhythm.      Heart sounds: Normal heart sounds.   Pulmonary:      Effort: Pulmonary effort is normal. No respiratory distress.      Breath sounds: Normal breath sounds. No wheezing.   Abdominal:      General: Bowel sounds are normal. There is no distension.      Palpations: Abdomen is soft.      Tenderness: There is no abdominal tenderness. There is right CVA tenderness and left CVA tenderness.   Musculoskeletal:         General: No tenderness. Normal range of motion.      Cervical back: Normal range of motion and neck supple.   Lymphadenopathy:      Cervical: No cervical adenopathy.   Skin:     General: Skin is warm and dry.      Capillary Refill: Capillary refill takes less than 2 seconds.      Findings: No rash.   Neurological:      Mental Status: She is alert and oriented to person, place, and time.      Cranial Nerves: No cranial nerve deficit.      Sensory: No sensory deficit.      Coordination: Coordination normal.   Psychiatric:         Behavior: Behavior normal.         Thought Content: Thought content normal.         Judgment: Judgment normal.             Significant Labs: All pertinent labs within the past 24 hours have been reviewed.    Significant Imaging: I have reviewed all pertinent imaging results/findings within the past 24 hours.

## 2024-01-01 NOTE — ASSESSMENT & PLAN NOTE
Patient is identified as Severe COVID-19 based on hypoxemia with O2 saturations <94% on room air or on ambulation   Initiate standard COVID protocols; COVID-19 testing ,Infection Control notification  and isolation- respiratory, contact and droplet per protocol    Diagnostics: CBC, CMP, Ferritin, CRP, and Portable CXR    Management: Maintain oxygen saturations 92-96% via Nasal Cannula  LPM and monitor with continuous/intermittent pulse oximetry. , Inhaled bronchodilators as needed for shortness of breath., and Continuous cardiac monitoring.    Advance Care Planning  Current advance care plan has not been discussed with patient/family/POA and patient currently wishes Full Code.     - Lengthy discussion regarding transmission, symptoms, results, risks and benefits of treatment, etc performed at bedside. The patient adamantly denies that she has COVID-19 and adamantly refuses treatment despite active hypoxia and symptoms. She expressed clear understanding of risks of non-treatment.

## 2024-01-02 LAB
ANION GAP SERPL CALC-SCNC: 7 MMOL/L (ref 8–16)
ANION GAP SERPL CALC-SCNC: 9 MMOL/L (ref 8–16)
BUN SERPL-MCNC: 13 MG/DL (ref 8–23)
BUN SERPL-MCNC: 14 MG/DL (ref 8–23)
CALCIUM SERPL-MCNC: 8.4 MG/DL (ref 8.7–10.5)
CALCIUM SERPL-MCNC: 8.7 MG/DL (ref 8.7–10.5)
CHLORIDE SERPL-SCNC: 94 MMOL/L (ref 95–110)
CHLORIDE SERPL-SCNC: 95 MMOL/L (ref 95–110)
CO2 SERPL-SCNC: 24 MMOL/L (ref 23–29)
CO2 SERPL-SCNC: 25 MMOL/L (ref 23–29)
CREAT SERPL-MCNC: 0.8 MG/DL (ref 0.5–1.4)
CREAT SERPL-MCNC: 0.8 MG/DL (ref 0.5–1.4)
ERYTHROCYTE [DISTWIDTH] IN BLOOD BY AUTOMATED COUNT: 11.9 % (ref 11.5–14.5)
EST. GFR  (NO RACE VARIABLE): >60 ML/MIN/1.73 M^2
EST. GFR  (NO RACE VARIABLE): >60 ML/MIN/1.73 M^2
GLUCOSE SERPL-MCNC: 78 MG/DL (ref 70–110)
GLUCOSE SERPL-MCNC: 81 MG/DL (ref 70–110)
HCT VFR BLD AUTO: 33.8 % (ref 37–48.5)
HGB BLD-MCNC: 11.6 G/DL (ref 12–16)
MAGNESIUM SERPL-MCNC: 1.6 MG/DL (ref 1.6–2.6)
MCH RBC QN AUTO: 32.5 PG (ref 27–31)
MCHC RBC AUTO-ENTMCNC: 34.3 G/DL (ref 32–36)
MCV RBC AUTO: 95 FL (ref 82–98)
PLATELET # BLD AUTO: 144 K/UL (ref 150–450)
PMV BLD AUTO: 9.2 FL (ref 9.2–12.9)
POTASSIUM SERPL-SCNC: 4.1 MMOL/L (ref 3.5–5.1)
POTASSIUM SERPL-SCNC: 4.2 MMOL/L (ref 3.5–5.1)
RBC # BLD AUTO: 3.57 M/UL (ref 4–5.4)
SODIUM SERPL-SCNC: 126 MMOL/L (ref 136–145)
SODIUM SERPL-SCNC: 128 MMOL/L (ref 136–145)
WBC # BLD AUTO: 3.07 K/UL (ref 3.9–12.7)

## 2024-01-02 PROCEDURE — 96372 THER/PROPH/DIAG INJ SC/IM: CPT

## 2024-01-02 PROCEDURE — 25000003 PHARM REV CODE 250

## 2024-01-02 PROCEDURE — 96366 THER/PROPH/DIAG IV INF ADDON: CPT

## 2024-01-02 PROCEDURE — 36415 COLL VENOUS BLD VENIPUNCTURE: CPT

## 2024-01-02 PROCEDURE — 96365 THER/PROPH/DIAG IV INF INIT: CPT

## 2024-01-02 PROCEDURE — 83735 ASSAY OF MAGNESIUM: CPT

## 2024-01-02 PROCEDURE — 63600175 PHARM REV CODE 636 W HCPCS

## 2024-01-02 PROCEDURE — G0378 HOSPITAL OBSERVATION PER HR: HCPCS

## 2024-01-02 PROCEDURE — 94761 N-INVAS EAR/PLS OXIMETRY MLT: CPT

## 2024-01-02 PROCEDURE — 36415 COLL VENOUS BLD VENIPUNCTURE: CPT | Mod: XB | Performed by: HOSPITALIST

## 2024-01-02 PROCEDURE — 80048 BASIC METABOLIC PNL TOTAL CA: CPT | Mod: 91 | Performed by: HOSPITALIST

## 2024-01-02 PROCEDURE — 80048 BASIC METABOLIC PNL TOTAL CA: CPT

## 2024-01-02 PROCEDURE — 99214 OFFICE O/P EST MOD 30 MIN: CPT | Mod: ,,, | Performed by: INTERNAL MEDICINE

## 2024-01-02 PROCEDURE — 85027 COMPLETE CBC AUTOMATED: CPT

## 2024-01-02 RX ORDER — NIFEDIPINE 30 MG/1
30 TABLET, EXTENDED RELEASE ORAL DAILY
Status: DISCONTINUED | OUTPATIENT
Start: 2024-01-03 | End: 2024-01-02

## 2024-01-02 RX ORDER — MAGNESIUM SULFATE HEPTAHYDRATE 40 MG/ML
2 INJECTION, SOLUTION INTRAVENOUS ONCE
Status: COMPLETED | OUTPATIENT
Start: 2024-01-02 | End: 2024-01-02

## 2024-01-02 RX ORDER — AMLODIPINE BESYLATE 10 MG/1
10 TABLET ORAL DAILY
Status: DISCONTINUED | OUTPATIENT
Start: 2024-01-02 | End: 2024-01-02

## 2024-01-02 RX ORDER — SODIUM CHLORIDE 1 G/1
2000 TABLET ORAL 2 TIMES DAILY
Status: DISCONTINUED | OUTPATIENT
Start: 2024-01-02 | End: 2024-01-04

## 2024-01-02 RX ORDER — NIFEDIPINE 30 MG/1
30 TABLET, EXTENDED RELEASE ORAL DAILY
Status: DISCONTINUED | OUTPATIENT
Start: 2024-01-02 | End: 2024-01-03

## 2024-01-02 RX ADMIN — MAGNESIUM SULFATE HEPTAHYDRATE 2 G: 40 INJECTION, SOLUTION INTRAVENOUS at 08:01

## 2024-01-02 RX ADMIN — ACETAMINOPHEN 650 MG: 325 TABLET ORAL at 01:01

## 2024-01-02 RX ADMIN — SODIUM CHLORIDE 2000 MG: 1 TABLET ORAL at 08:01

## 2024-01-02 RX ADMIN — Medication 6 MG: at 08:01

## 2024-01-02 RX ADMIN — ACETAMINOPHEN 650 MG: 325 TABLET ORAL at 05:01

## 2024-01-02 RX ADMIN — ENOXAPARIN SODIUM 40 MG: 40 INJECTION SUBCUTANEOUS at 05:01

## 2024-01-02 RX ADMIN — CETIRIZINE HYDROCHLORIDE 10 MG: 10 TABLET, FILM COATED ORAL at 08:01

## 2024-01-02 RX ADMIN — NIFEDIPINE 30 MG: 30 TABLET, FILM COATED, EXTENDED RELEASE ORAL at 01:01

## 2024-01-02 RX ADMIN — SODIUM CHLORIDE 2000 MG: 1 TABLET ORAL at 01:01

## 2024-01-02 RX ADMIN — CHOLECALCIFEROL TAB 25 MCG (1000 UNIT) 1000 UNITS: 25 TAB at 09:01

## 2024-01-02 RX ADMIN — AMLODIPINE BESYLATE 10 MG: 10 TABLET ORAL at 08:01

## 2024-01-02 NOTE — PROGRESS NOTES
"[facility]  Nephrology  Progress Note    Patient Name: Buffy Dominique  MRN: [id number]  Admission Date: 12/31/2023  Hospital Length of Stay: 1 days  Attending Provider: Radha Flores MD   Primary Care Physician: Odalis Kim MD  Principal Problem:Hyponatremia    Subjective:     HPI: Buffy Dominique  is a 77 yo F with PMHx of HTN, HLD, RA, hyperparathyroidism, chronic hyponatremia, and thoracic aortic aneurysm repair who presented to ED for bilateral low back and leg pain. Patient reports constant, aching back pain, 10/10 in severity that started 2 prior to admission and radiates down bilateral thighs. Patient reports that she experienced similar back pain 2 years ago that was associated with renal failure and that is what prompted her visit to the hospital. Pt reports she only urinates 1-2 times per day and has minimal output but that is baseline for her. She currently is on a "no salt diet and 10 oz fluid restriction" due to hypertension.     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 patient reports long standing hyponatremia, adn tells me that previously it was due to HCTZ which was then discontinued. Sodium on arrival was 127, serum osmolality was ordered. Urine osmolality measured at 525, and urine sodium elevated at 156. She is currently asymptomatic. She denies any nausea, vomiting, pain medications, but does report chronic pain. She tells me that she eats a "balanced diet" but does not eat very much.     Interval History: NAEO, Na down trended today    Review of patient's allergies indicates:   Allergen Reactions    Methotrexate analogues      Mouth Ulcers     Thiazides Other (See Comments)     hyponatremia     Current Facility-Administered Medications   Medication Frequency    acetaminophen tablet 1,000 mg Q8H PRN    acetaminophen tablet 650 mg Q4H PRN    albuterol-ipratropium 2.5 mg-0.5 mg/3 mL nebulizer solution 3 mL Q4H PRN    aluminum-magnesium " hydroxide-simethicone 200-200-20 mg/5 mL suspension 30 mL QID PRN    bisacodyL suppository 10 mg Daily PRN    cetirizine tablet 10 mg Daily    enoxaparin injection 40 mg Daily    melatonin tablet 6 mg Nightly PRN    naloxone 0.4 mg/mL injection 0.02 mg PRN    NIFEdipine 24 hr tablet 30 mg Daily    ondansetron disintegrating tablet 8 mg Q8H PRN    polyethylene glycol packet 17 g BID PRN    prochlorperazine injection Soln 5 mg Q6H PRN    simethicone chewable tablet 80 mg QID PRN    sodium chloride 0.9% flush 5 mL PRN    vitamin D 1000 units tablet 1,000 Units Daily       Objective:     Vital Signs (Most Recent):  Temp: 98 °F (36.7 °C) (01/02/24 1210)  Pulse: (!) 58 (01/02/24 1210)  Resp: 18 (01/02/24 1210)  BP: (!) 181/77 (01/02/24 1210)  SpO2: 96 % (01/02/24 1210) Vital Signs (24h Range):  Temp:  [98 °F (36.7 °C)-99.4 °F (37.4 °C)] 98 °F (36.7 °C)  Pulse:  [49-67] 58  Resp:  [18-20] 18  SpO2:  [94 %-96 %] 96 %  BP: (148-181)/(65-82) 181/77     Weight: 50.8 kg (111 lb 15.9 oz) (12/31/23 1102)  Body mass index is 19.84 kg/m².  Body surface area is 1.5 meters squared.    I/O last 3 completed shifts:  In: 720 [P.O.:720]  Out: -      Physical Exam  Vitals reviewed.   Constitutional:       General: She is not in acute distress.     Appearance: Normal appearance. She is not ill-appearing or toxic-appearing.   HENT:      Head: Atraumatic.      Right Ear: External ear normal.      Left Ear: External ear normal.      Nose: Nose normal.      Mouth/Throat:      Mouth: Mucous membranes are moist.   Eyes:      Extraocular Movements: Extraocular movements intact.   Cardiovascular:      Rate and Rhythm: Normal rate and regular rhythm.      Pulses: Normal pulses.      Heart sounds: Normal heart sounds.   Pulmonary:      Effort: Pulmonary effort is normal. No respiratory distress.      Breath sounds: Normal breath sounds. No stridor.   Abdominal:      General: Abdomen is flat. There is no distension.      Palpations: Abdomen is soft.       Tenderness: There is no abdominal tenderness. There is right CVA tenderness and left CVA tenderness. There is no guarding.   Musculoskeletal:         General: No swelling. Normal range of motion.      Cervical back: Normal range of motion. No rigidity or tenderness.      Right lower leg: No edema.      Left lower leg: No edema.   Skin:     General: Skin is warm and dry.      Capillary Refill: Capillary refill takes less than 2 seconds.   Neurological:      General: No focal deficit present.      Mental Status: She is alert and oriented to person, place, and time. Mental status is at baseline.   Psychiatric:         Mood and Affect: Mood normal.         Behavior: Behavior normal.         Thought Content: Thought content normal.         Judgment: Judgment normal.          Significant Labs:  All labs within the past 24 hours have been reviewed.     Significant Imaging:  reviewed  Assessment/Plan:     Cardiac/Vascular  Essential hypertension  Per primary    Renal/  * Hyponatremia  See SIDAH    -2g salt tablets BID, goal correction 4-6 mEq in 24 hrs  -renal fuction panel q12hr  -continue fluid restriction    Endocrine  SIADH (syndrome of inappropriate ADH production)  Patient chronically hyponatremic, urine studies this hospital admission are consistent with SIADH. Patient does report chronic back pain which may potentially explain her SIADH. She does have chlorthalidone on her home medication list, however patient denies taking this medication. Review of her medications show that a 90 day supply was filled on 11/1/23 for Chlorthalidone.     Recommendations  -Fluid restrict to 1L per day  -Agree with holding Chlorthalidone  -RFP Q12hr  -2g salt tablets BID, goal correction 4-6 mEq in 24 hrs  -renal fuction panel q12hr  -Pain control per primary team.    Orthopedic  Bilateral low back pain  UA unremarkable, does not appear to be a UTI  -Pain control per primary team.        Thank you for your consult. I will  follow-up with patient. Please contact us if you have any additional questions.    Andres Tolentino MD  Nephrology  [facility]

## 2024-01-02 NOTE — PLAN OF CARE
[facility]  Initial Discharge Assessment       Primary Care Provider: Odalis Kim MD    Admission Diagnosis: Hyponatremia [E87.1]  Hypoxia [R09.02]  Chest pain [R07.9]  Hypotension, unspecified hypotension type [I95.9]  Bilateral low back pain, unspecified chronicity, unspecified whether sciatica present [M54.50]    Admission Date: 12/31/2023  Expected Discharge Date: 1/3/2024         Payor: [employer] [facility] / Plan: [employer] [facility] / Product Type: Medicare Advantage /     Extended Emergency Contact Information  Primary Emergency Contact: Anatoly Rosado  Address: [address] [place] [address] [address]           [address] [facility]  Mobile Phone: [phone]  Relation: Son    Discharge Plan A: (P) Home  Discharge Plan B: (P) Home      [id number] - [place], [place] - [address] [facility]  [address] [facility] [address]  Phone: [phone] Fax: [phone]    [id number] [place], [place] - [address] [facility] [address] [facility] [address]  [address] [facility] [address]  Phone: [phone] Fax: [phone]    [facility] [address] [place], [place] - [address] [facility] [address]  [address] [facility] [address]  Phone: [phone] Fax: [phone]             SW completed Discharge Planning Assessment with patient via telephone. Discharge planning booklet given to patient/family and whiteboard updated with MARCELLA and phone #. All questions answered.    Patient may need assistance with transportation upon discharge.     Patient reported that she lives alone, and prior to hospitalization she was independent with her ADL's. Patient reported that she is not on dialysis. Patient does not go to a Coumadin clinic.      Patient lives in an apartment complex with 0 steps to enter, and an elevator to get to her floor.     Discharge Plan A and Plan B have been determined by review of patient's clinical status, future medical  and therapeutic needs, and coverage/benefits for post-acute care in coordination with multidisciplinary team members.      Lupis Hutchins LMSW  [facility]  Ext. [phone]

## 2024-01-02 NOTE — ASSESSMENT & PLAN NOTE
Patient chronically hyponatremic, urine studies this hospital admission are consistent with SIADH. Patient does report chronic back pain which may potentially explain her SIADH. She does have chlorthalidone on her home medication list, however patient denies taking this medication. Review of her medications show that a 90 day supply was filled on 11/1/23 for Chlorthalidone.     Recommendations  -Fluid restrict to 1L per day  -Agree with holding Chlorthalidone  -RFP Q12hr  -2g salt tablets BID, goal correction 4-6 mEq in 24 hrs  -renal fuction panel q12hr  -Pain control per primary team.

## 2024-01-02 NOTE — ASSESSMENT & PLAN NOTE
Resolved  - asymptomatic hypotension in ED likely 2/2 hypovolemia in setting of fluid restriction   - infectious workup negative thus far.  - Blood cx and lactic wnl  - given 1L IVF in ED with improvement in BP  - hold home amlodipine, benicar, hydralazine  - monitor closely

## 2024-01-02 NOTE — ASSESSMENT & PLAN NOTE
See SIDAH    -2g salt tablets BID, goal correction 4-6 mEq in 24 hrs  -renal fuction panel q12hr  -continue fluid restriction

## 2024-01-02 NOTE — PROGRESS NOTES
[facility] [address] [place] [address] Medicine  Progress Note    Patient Name: Buffy Dominique  MRN: [id number]  Patient Class: OP- Observation   Admission Date: 12/31/2023  Length of Stay: 1 days  Attending Physician: Radha Flores MD  Primary Care Provider: Odalis Kim MD        Subjective:     Principal Problem:Hyponatremia        HPI:  Buffy Domiinque  is a 79 yo F with PMHx of CKD3, HTN, HLD, RA, hyperparathyroidism, chronic hyponatremia, and thoracic aortic aneurysm repair who presented to ED for bilateral low back pain. Patient reports constant, aching back pain, 10/10 in severity that started 2 days ago and radiates down bilateral thighs. Her pain was greatly relieved by 1,000 mg of tylenol and is now 3/10 in severity. She denies aggravating factors. She was concerned her back pain may be secondary to renal failure because her last episode of pain occurred with renal failure. Pt reports she only urinates 1-2 times per day and has minimal output but that is baseline for her. Of note, she was previously hospitalized with hyponatremia determined to be related to chlorthalidone use and SIADH. She reports a former provider her told her to restrict her water intake to 10 ounces per day. She denies fever, chills, chest pain, palpitations, SOB, cough, abdominal pain, n/v/d, dysuria, headaches and LE swelling.     In ED: Afebrile. Hypotensive to 92/47>> improved to 116/58 s/p 1L NS. O2 sats dropping to low 90s when patient sleeping. No leukocytosis. Hgb 11.7. Na 127 (baseline low 130s). Cr 0.8 (at baseline). CMP otherwise unremarkable. UA noninfectious.  CXR without acute process. Given 1g tylenol. Placed in observation.     Overview/Hospital Course:  Ms. Dominique was placed in observation for hyponatremia and hypotension in the setting of COVID-19 and SIADH. Home antihypertensives (amlodipine, chlorthalidone, and hydralazine) were held at admission and blood pressure is improving. Nephrology was consulted for SIADH and  recommended fluid restriction of 1L and salt tabs. The patient adamantly denied her diagnosis of COVID-19 and refused treatment despite an at-length conversation regarding risks associated with non-treatment. Plan to discharge with PCP and nephrology follow-up when medically ready.     Interval History: Pt seen and examined by me this morning. CHAITANYA GALARZA. Hyponatremia down trended this morning. Nephrology recommends adding salt tabs (ordered).     Review of Systems   Constitutional:  Negative for activity change, chills and fever.   HENT:  Negative for trouble swallowing.    Eyes:  Negative for photophobia and visual disturbance.   Respiratory:  Negative for cough, chest tightness and shortness of breath.    Cardiovascular:  Negative for chest pain, palpitations and leg swelling.   Gastrointestinal:  Negative for abdominal pain, constipation, diarrhea, nausea and vomiting.   Genitourinary:  Positive for decreased urine volume, difficulty urinating and flank pain. Negative for dysuria, frequency and hematuria.   Musculoskeletal:  Positive for arthralgias. Negative for gait problem and neck pain.   Skin:  Negative for rash and wound.   Neurological:  Negative for dizziness, syncope, speech difficulty and light-headedness.   Psychiatric/Behavioral:  Negative for agitation and confusion. The patient is not nervous/anxious.      Objective:     Vital Signs (Most Recent):  Temp: 98 °F (36.7 °C) (01/02/24 1210)  Pulse: 61 (01/02/24 1520)  Resp: 18 (01/02/24 1210)  BP: (!) 153/70 (01/02/24 1520)  SpO2: 96 % (01/02/24 1210) Vital Signs (24h Range):  Temp:  [98 °F (36.7 °C)-99.4 °F (37.4 °C)] 98 °F (36.7 °C)  Pulse:  [49-61] 61  Resp:  [18-20] 18  SpO2:  [94 %-96 %] 96 %  BP: (148-181)/(65-82) 153/70     Weight: 50.8 kg (111 lb 15.9 oz)  Body mass index is 19.84 kg/m².  No intake or output data in the 24 hours ending 01/02/24 1617        Physical Exam  Vitals and nursing note reviewed.   Constitutional:       General: She is  not in acute distress.     Appearance: She is well-developed.   HENT:      Head: Normocephalic and atraumatic.      Mouth/Throat:      Mouth: Mucous membranes are moist.   Eyes:      Extraocular Movements: Extraocular movements intact.      Conjunctiva/sclera: Conjunctivae normal.   Cardiovascular:      Rate and Rhythm: Normal rate and regular rhythm.      Heart sounds: Normal heart sounds.   Pulmonary:      Effort: Pulmonary effort is normal. No respiratory distress.      Breath sounds: Normal breath sounds. No wheezing.   Abdominal:      General: Bowel sounds are normal. There is no distension.      Palpations: Abdomen is soft.      Tenderness: There is no abdominal tenderness. There is right CVA tenderness and left CVA tenderness.   Musculoskeletal:         General: No tenderness. Normal range of motion.      Cervical back: Normal range of motion and neck supple.   Lymphadenopathy:      Cervical: No cervical adenopathy.   Skin:     General: Skin is warm and dry.      Capillary Refill: Capillary refill takes less than 2 seconds.      Findings: No rash.   Neurological:      Mental Status: She is alert and oriented to person, place, and time.      Cranial Nerves: No cranial nerve deficit.      Sensory: No sensory deficit.      Coordination: Coordination normal.   Psychiatric:         Behavior: Behavior normal.         Thought Content: Thought content normal.         Judgment: Judgment normal.             Significant Labs: All pertinent labs within the past 24 hours have been reviewed.    Significant Imaging: I have reviewed all pertinent imaging results/findings within the past 24 hours.    Assessment/Plan:      * Hyponatremia  SIADH  Patient has hyponatremia which is uncontrolled. We will aim to correct the sodium by 4-6mEq in 24 hours. We will monitor sodium Daily. We will obtain the following studies: Urine sodium, urine osmolality, serum osmolality or TSH, T4. We will treat the hyponatremia with Fluid  restriction of: 1 liter per day. The patient's sodium results have been reviewed and are listed below.  Recent Labs   Lab 01/02/24  0546   *     - baseline Na 130s  - given 1L NS in ED for hypotension   - hospitalized may 2022 for hyponatremia. 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 Does extreme fluid restriction of 10 ounces at home. No longer taking salt tabs  - Nephrology consulted; appreciate recs:  -Fluid restrict to 1L per day  -Agree with holding chlorthalidone  -2g salt tablets BID, goal correction 4-6 mEq in 24 hrs  -renal fuction panel q12hr    Hypotension  Resolved  - asymptomatic hypotension in ED likely 2/2 hypovolemia in setting of fluid restriction   - infectious workup negative thus far.  - Blood cx and lactic wnl  - given 1L IVF in ED with improvement in BP  - hold home amlodipine, benicar, hydralazine  - monitor closely     COVID-19  Patient is identified as Severe COVID-19 based on hypoxemia with O2 saturations <94% on room air or on ambulation   Initiate standard COVID protocols; COVID-19 testing ,Infection Control notification  and isolation- respiratory, contact and droplet per protocol    Diagnostics: CBC, CMP, Ferritin, CRP, and Portable CXR    Management: Maintain oxygen saturations 92-96% via Nasal Cannula  LPM and monitor with continuous/intermittent pulse oximetry. , Inhaled bronchodilators as needed for shortness of breath., and Continuous cardiac monitoring.    Advance Care Planning Current advance care plan has not been discussed with patient/family/POA and patient currently wishes Full Code.     - Lengthy discussion regarding transmission, symptoms, results, risks and benefits of treatment, etc performed at bedside. The patient adamantly denies that she has COVID-19 and adamantly refuses treatment despite active hypoxia and symptoms. She expressed clear understanding of risks of non-treatment.     Bilateral low back pain  - denies trauma or injury  - low concern  for nephrolithiasis or pyelonephritis as UA unremarkable  - given tylenol 1g in ED  - trial robaxin & lidocaine patch    CKD (chronic kidney disease) stage 3, GFR 30-59 ml/min  - Cr 0.8 on admit, at baseline   - renally dose meds  - nephrology consulted, appreciate recs  - avoid nephrotoxins  - monitor with daily bmp    Former tobacco use  - quit 2009    Seropositive rheumatoid arthritis  - receives humira injections every 14 days  - follows with rheumatology     HLD (hyperlipidemia)  - lipid panel wnl  - statin not indicated at this time     History of thoracic aortic aneurysm repair  - noted    Essential hypertension  - Elevated this am  - Start nifedipine 30 mg and up-titrate as indicated      VTE Risk Mitigation (From admission, onward)           Ordered     enoxaparin injection 40 mg  Daily         12/31/23 0957     IP VTE HIGH RISK PATIENT  Once         12/31/23 0957                    Discharge Planning   MRACELLA: 1/3/2024     Code Status: Full Code   Is the patient medically ready for discharge?: No    Reason for patient still in hospital (select all that apply): Patient trending condition, Laboratory test, Treatment, and Consult recommendations  Discharge Plan A: Home                  Vicky Zayas PA-C  Department of Hospital Medicine   [facility]

## 2024-01-02 NOTE — SUBJECTIVE & OBJECTIVE
Interval History: NASHAUNORandi down trended today    Review of patient's allergies indicates:   Allergen Reactions    Methotrexate analogues      Mouth Ulcers     Thiazides Other (See Comments)     hyponatremia     Current Facility-Administered Medications   Medication Frequency    acetaminophen tablet 1,000 mg Q8H PRN    acetaminophen tablet 650 mg Q4H PRN    albuterol-ipratropium 2.5 mg-0.5 mg/3 mL nebulizer solution 3 mL Q4H PRN    aluminum-magnesium hydroxide-simethicone 200-200-20 mg/5 mL suspension 30 mL QID PRN    bisacodyL suppository 10 mg Daily PRN    cetirizine tablet 10 mg Daily    enoxaparin injection 40 mg Daily    melatonin tablet 6 mg Nightly PRN    naloxone 0.4 mg/mL injection 0.02 mg PRN    NIFEdipine 24 hr tablet 30 mg Daily    ondansetron disintegrating tablet 8 mg Q8H PRN    polyethylene glycol packet 17 g BID PRN    prochlorperazine injection Soln 5 mg Q6H PRN    simethicone chewable tablet 80 mg QID PRN    sodium chloride 0.9% flush 5 mL PRN    vitamin D 1000 units tablet 1,000 Units Daily       Objective:     Vital Signs (Most Recent):  Temp: 98 °F (36.7 °C) (01/02/24 1210)  Pulse: (!) 58 (01/02/24 1210)  Resp: 18 (01/02/24 1210)  BP: (!) 181/77 (01/02/24 1210)  SpO2: 96 % (01/02/24 1210) Vital Signs (24h Range):  Temp:  [98 °F (36.7 °C)-99.4 °F (37.4 °C)] 98 °F (36.7 °C)  Pulse:  [49-67] 58  Resp:  [18-20] 18  SpO2:  [94 %-96 %] 96 %  BP: (148-181)/(65-82) 181/77     Weight: 50.8 kg (111 lb 15.9 oz) (12/31/23 1102)  Body mass index is 19.84 kg/m².  Body surface area is 1.5 meters squared.    I/O last 3 completed shifts:  In: 720 [P.O.:720]  Out: -      Physical Exam  Vitals reviewed.   Constitutional:       General: She is not in acute distress.     Appearance: Normal appearance. She is not ill-appearing or toxic-appearing.   HENT:      Head: Atraumatic.      Right Ear: External ear normal.      Left Ear: External ear normal.      Nose: Nose normal.      Mouth/Throat:      Mouth: Mucous  membranes are moist.   Eyes:      Extraocular Movements: Extraocular movements intact.   Cardiovascular:      Rate and Rhythm: Normal rate and regular rhythm.      Pulses: Normal pulses.      Heart sounds: Normal heart sounds.   Pulmonary:      Effort: Pulmonary effort is normal. No respiratory distress.      Breath sounds: Normal breath sounds. No stridor.   Abdominal:      General: Abdomen is flat. There is no distension.      Palpations: Abdomen is soft.      Tenderness: There is no abdominal tenderness. There is right CVA tenderness and left CVA tenderness. There is no guarding.   Musculoskeletal:         General: No swelling. Normal range of motion.      Cervical back: Normal range of motion. No rigidity or tenderness.      Right lower leg: No edema.      Left lower leg: No edema.   Skin:     General: Skin is warm and dry.      Capillary Refill: Capillary refill takes less than 2 seconds.   Neurological:      General: No focal deficit present.      Mental Status: She is alert and oriented to person, place, and time. Mental status is at baseline.   Psychiatric:         Mood and Affect: Mood normal.         Behavior: Behavior normal.         Thought Content: Thought content normal.         Judgment: Judgment normal.          Significant Labs:  All labs within the past 24 hours have been reviewed.     Significant Imaging:  reviewed

## 2024-01-02 NOTE — ASSESSMENT & PLAN NOTE
SIADH  Patient has hyponatremia which is uncontrolled. We will aim to correct the sodium by 4-6mEq in 24 hours. We will monitor sodium Daily. We will obtain the following studies: Urine sodium, urine osmolality, serum osmolality or TSH, T4. We will treat the hyponatremia with Fluid restriction of: 1 liter per day. The patient's sodium results have been reviewed and are listed below.  Recent Labs   Lab 01/02/24  0546   *     - baseline Na 130s  - given 1L NS in ED for hypotension   - hospitalized may 2022 for hyponatremia. 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 Does extreme fluid restriction of 10 ounces at home. No longer taking salt tabs  - Nephrology consulted; appreciate recs:  -Fluid restrict to 1L per day  -Agree with holding chlorthalidone  -2g salt tablets BID, goal correction 4-6 mEq in 24 hrs  -renal fuction panel q12hr

## 2024-01-02 NOTE — SUBJECTIVE & OBJECTIVE
Interval History: Pt seen and examined by me this morning. CHAITANYA GALARZA. Hyponatremia down trended this morning. Nephrology recommends adding salt tabs (ordered).     Review of Systems   Constitutional:  Negative for activity change, chills and fever.   HENT:  Negative for trouble swallowing.    Eyes:  Negative for photophobia and visual disturbance.   Respiratory:  Negative for cough, chest tightness and shortness of breath.    Cardiovascular:  Negative for chest pain, palpitations and leg swelling.   Gastrointestinal:  Negative for abdominal pain, constipation, diarrhea, nausea and vomiting.   Genitourinary:  Positive for decreased urine volume, difficulty urinating and flank pain. Negative for dysuria, frequency and hematuria.   Musculoskeletal:  Positive for arthralgias. Negative for gait problem and neck pain.   Skin:  Negative for rash and wound.   Neurological:  Negative for dizziness, syncope, speech difficulty and light-headedness.   Psychiatric/Behavioral:  Negative for agitation and confusion. The patient is not nervous/anxious.      Objective:     Vital Signs (Most Recent):  Temp: 98 °F (36.7 °C) (01/02/24 1210)  Pulse: 61 (01/02/24 1520)  Resp: 18 (01/02/24 1210)  BP: (!) 153/70 (01/02/24 1520)  SpO2: 96 % (01/02/24 1210) Vital Signs (24h Range):  Temp:  [98 °F (36.7 °C)-99.4 °F (37.4 °C)] 98 °F (36.7 °C)  Pulse:  [49-61] 61  Resp:  [18-20] 18  SpO2:  [94 %-96 %] 96 %  BP: (148-181)/(65-82) 153/70     Weight: 50.8 kg (111 lb 15.9 oz)  Body mass index is 19.84 kg/m².  No intake or output data in the 24 hours ending 01/02/24 1617        Physical Exam  Vitals and nursing note reviewed.   Constitutional:       General: She is not in acute distress.     Appearance: She is well-developed.   HENT:      Head: Normocephalic and atraumatic.      Mouth/Throat:      Mouth: Mucous membranes are moist.   Eyes:      Extraocular Movements: Extraocular movements intact.      Conjunctiva/sclera: Conjunctivae normal.    Cardiovascular:      Rate and Rhythm: Normal rate and regular rhythm.      Heart sounds: Normal heart sounds.   Pulmonary:      Effort: Pulmonary effort is normal. No respiratory distress.      Breath sounds: Normal breath sounds. No wheezing.   Abdominal:      General: Bowel sounds are normal. There is no distension.      Palpations: Abdomen is soft.      Tenderness: There is no abdominal tenderness. There is right CVA tenderness and left CVA tenderness.   Musculoskeletal:         General: No tenderness. Normal range of motion.      Cervical back: Normal range of motion and neck supple.   Lymphadenopathy:      Cervical: No cervical adenopathy.   Skin:     General: Skin is warm and dry.      Capillary Refill: Capillary refill takes less than 2 seconds.      Findings: No rash.   Neurological:      Mental Status: She is alert and oriented to person, place, and time.      Cranial Nerves: No cranial nerve deficit.      Sensory: No sensory deficit.      Coordination: Coordination normal.   Psychiatric:         Behavior: Behavior normal.         Thought Content: Thought content normal.         Judgment: Judgment normal.             Significant Labs: All pertinent labs within the past 24 hours have been reviewed.    Significant Imaging: I have reviewed all pertinent imaging results/findings within the past 24 hours.

## 2024-01-02 NOTE — PLAN OF CARE
Problem: Adult Inpatient Plan of Care  Goal: Plan of Care Review  1/2/2024 0457 by Odalis Fredercik LPN  Outcome: Ongoing, Progressing  1/2/2024 0457 by Odalis Frederick LPN  Outcome: Ongoing, Progressing  Goal: Patient-Specific Goal (Individualized)  1/2/2024 0457 by Odalis Frederick LPN  Outcome: Ongoing, Progressing  1/2/2024 0457 by Odalis Frederick LPN  Outcome: Ongoing, Progressing  Goal: Absence of Hospital-Acquired Illness or Injury  1/2/2024 0457 by Odalis Frederick LPN  Outcome: Ongoing, Progressing  1/2/2024 0457 by Odalis Frederick LPN  Outcome: Ongoing, Progressing  Goal: Optimal Comfort and Wellbeing  1/2/2024 0457 by Odalis Frederick LPN  Outcome: Ongoing, Progressing  1/2/2024 0457 by Odalis Frederick LPN  Outcome: Ongoing, Progressing  Goal: Readiness for Transition of Care  1/2/2024 0457 by Odalis Frederick LPN  Outcome: Ongoing, Progressing  1/2/2024 0457 by Odalis Frederick LPN  Outcome: Ongoing, Progressing     Problem: Infection  Goal: Absence of Infection Signs and Symptoms  1/2/2024 0457 by Odalis Frederick LPN  Outcome: Ongoing, Progressing  1/2/2024 0457 by Odalis Frederick LPN  Outcome: Ongoing, Progressing     Problem: Fluid and Electrolyte Imbalance (Acute Kidney Injury/Impairment)  Goal: Fluid and Electrolyte Balance  1/2/2024 0457 by Odalis Frederick LPN  Outcome: Ongoing, Progressing  1/2/2024 0457 by Odalis Frederick LPN  Outcome: Ongoing, Progressing     Problem: Oral Intake Inadequate (Acute Kidney Injury/Impairment)  Goal: Optimal Nutrition Intake  1/2/2024 0457 by Odalis Frederick LPN  Outcome: Ongoing, Progressing  1/2/2024 0457 by Odalis Frederick LPN  Outcome: Ongoing, Progressing     Problem: Renal Function Impairment (Acute Kidney Injury/Impairment)  Goal: Effective Renal Function  1/2/2024 0457 by Odalis Frederick LPN  Outcome: Ongoing, Progressing  1/2/2024 0457 by Odalis Frederick LPN  Outcome: Ongoing, Progressing

## 2024-01-03 LAB
ANION GAP SERPL CALC-SCNC: 11 MMOL/L (ref 8–16)
ANION GAP SERPL CALC-SCNC: 9 MMOL/L (ref 8–16)
BUN SERPL-MCNC: 12 MG/DL (ref 8–23)
BUN SERPL-MCNC: 15 MG/DL (ref 8–23)
CALCIUM SERPL-MCNC: 8.4 MG/DL (ref 8.7–10.5)
CALCIUM SERPL-MCNC: 8.5 MG/DL (ref 8.7–10.5)
CHLORIDE SERPL-SCNC: 93 MMOL/L (ref 95–110)
CHLORIDE SERPL-SCNC: 94 MMOL/L (ref 95–110)
CO2 SERPL-SCNC: 21 MMOL/L (ref 23–29)
CO2 SERPL-SCNC: 22 MMOL/L (ref 23–29)
CREAT SERPL-MCNC: 0.7 MG/DL (ref 0.5–1.4)
CREAT SERPL-MCNC: 0.8 MG/DL (ref 0.5–1.4)
ERYTHROCYTE [DISTWIDTH] IN BLOOD BY AUTOMATED COUNT: 11.9 % (ref 11.5–14.5)
EST. GFR  (NO RACE VARIABLE): >60 ML/MIN/1.73 M^2
EST. GFR  (NO RACE VARIABLE): >60 ML/MIN/1.73 M^2
GLUCOSE SERPL-MCNC: 75 MG/DL (ref 70–110)
GLUCOSE SERPL-MCNC: 79 MG/DL (ref 70–110)
HCT VFR BLD AUTO: 34.2 % (ref 37–48.5)
HGB BLD-MCNC: 12 G/DL (ref 12–16)
MAGNESIUM SERPL-MCNC: 1.8 MG/DL (ref 1.6–2.6)
MCH RBC QN AUTO: 32.9 PG (ref 27–31)
MCHC RBC AUTO-ENTMCNC: 35.1 G/DL (ref 32–36)
MCV RBC AUTO: 94 FL (ref 82–98)
PLATELET # BLD AUTO: 150 K/UL (ref 150–450)
PMV BLD AUTO: 9.1 FL (ref 9.2–12.9)
POTASSIUM SERPL-SCNC: 4.2 MMOL/L (ref 3.5–5.1)
POTASSIUM SERPL-SCNC: 4.4 MMOL/L (ref 3.5–5.1)
RBC # BLD AUTO: 3.65 M/UL (ref 4–5.4)
SODIUM SERPL-SCNC: 124 MMOL/L (ref 136–145)
SODIUM SERPL-SCNC: 126 MMOL/L (ref 136–145)
WBC # BLD AUTO: 4.24 K/UL (ref 3.9–12.7)

## 2024-01-03 PROCEDURE — 99232 SBSQ HOSP IP/OBS MODERATE 35: CPT | Mod: ,,, | Performed by: INTERNAL MEDICINE

## 2024-01-03 PROCEDURE — 36415 COLL VENOUS BLD VENIPUNCTURE: CPT | Mod: XB | Performed by: HOSPITALIST

## 2024-01-03 PROCEDURE — 21400001 HC TELEMETRY ROOM

## 2024-01-03 PROCEDURE — 27000207 HC ISOLATION

## 2024-01-03 PROCEDURE — 36415 COLL VENOUS BLD VENIPUNCTURE: CPT

## 2024-01-03 PROCEDURE — 85027 COMPLETE CBC AUTOMATED: CPT

## 2024-01-03 PROCEDURE — 83735 ASSAY OF MAGNESIUM: CPT

## 2024-01-03 PROCEDURE — 80048 BASIC METABOLIC PNL TOTAL CA: CPT | Mod: 91 | Performed by: HOSPITALIST

## 2024-01-03 PROCEDURE — 25000003 PHARM REV CODE 250

## 2024-01-03 RX ADMIN — CHOLECALCIFEROL TAB 25 MCG (1000 UNIT) 1000 UNITS: 25 TAB at 09:01

## 2024-01-03 RX ADMIN — ACETAMINOPHEN 650 MG: 325 TABLET ORAL at 08:01

## 2024-01-03 RX ADMIN — ACETAMINOPHEN 650 MG: 325 TABLET ORAL at 01:01

## 2024-01-03 RX ADMIN — ACETAMINOPHEN 650 MG: 325 TABLET ORAL at 03:01

## 2024-01-03 RX ADMIN — CETIRIZINE HYDROCHLORIDE 10 MG: 10 TABLET, FILM COATED ORAL at 09:01

## 2024-01-03 RX ADMIN — NIFEDIPINE 30 MG: 30 TABLET, FILM COATED, EXTENDED RELEASE ORAL at 09:01

## 2024-01-03 RX ADMIN — Medication 6 MG: at 08:01

## 2024-01-03 NOTE — ASSESSMENT & PLAN NOTE
SIADH  Patient has hyponatremia which is uncontrolled. We will aim to correct the sodium by 4-6mEq in 24 hours. We will monitor sodium Daily. We will obtain the following studies: Urine sodium, urine osmolality, serum osmolality or TSH, T4. We will treat the hyponatremia with Fluid restriction of: 1 liter per day. The patient's sodium results have been reviewed and are listed below.  Recent Labs   Lab 01/03/24  0527   *     - baseline Na 130s  - given 1L NS in ED for hypotension   - hospitalized may 2022 for hyponatremia. 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 Does extreme fluid restriction of 10 ounces at home. No longer taking salt tabs  - Nephrology consulted; appreciate recs:  -Fluid restrict to 1L per day  -Agree with holding chlorthalidone  -2g salt tablets BID, goal correction 4-6 mEq in 24 hrs  -renal fuction panel q12hr

## 2024-01-03 NOTE — ASSESSMENT & PLAN NOTE
- Hold antihypertensives in the setting of +orthostatics   - Permissive blood pressure between 140-150s

## 2024-01-03 NOTE — SUBJECTIVE & OBJECTIVE
Interval History: Pt seen and examined by me this morning. VSSAF. Pt with an episode of syncope with head trauma while in the bathroom after a large BM. She is AAOx4 and has multiple bruises and evidence of hematoma of the scalp. She reports a mild, generalized HA without photophobia, phonophobia, N/V, or visual changes. CTH without acute intracranial process. Neuro checks q4h.    Review of Systems   Constitutional:  Negative for activity change, chills and fever.   HENT:  Negative for trouble swallowing.    Eyes:  Negative for photophobia and visual disturbance.   Respiratory:  Negative for cough, chest tightness and shortness of breath.    Cardiovascular:  Negative for chest pain, palpitations and leg swelling.   Gastrointestinal:  Negative for abdominal pain, constipation, diarrhea, nausea and vomiting.   Genitourinary:  Positive for decreased urine volume, difficulty urinating and flank pain. Negative for dysuria, frequency and hematuria.   Musculoskeletal:  Positive for arthralgias. Negative for gait problem and neck pain.   Skin:  Negative for rash and wound.   Neurological:  Negative for dizziness, syncope, speech difficulty and light-headedness.   Psychiatric/Behavioral:  Negative for agitation and confusion. The patient is not nervous/anxious.      Objective:     Vital Signs (Most Recent):  Temp: 97.9 °F (36.6 °C) (01/03/24 1121)  Pulse: 86 (01/03/24 1121)  Resp: 18 (01/03/24 1121)  BP: (!) 143/65 (01/03/24 1121)  SpO2: 96 % (01/03/24 1121) Vital Signs (24h Range):  Temp:  [96.3 °F (35.7 °C)-98 °F (36.7 °C)] 97.9 °F (36.6 °C)  Pulse:  [57-86] 86  Resp:  [18] 18  SpO2:  [91 %-96 %] 96 %  BP: (113-153)/(51-73) 143/65     Weight: 50.8 kg (111 lb 15.9 oz)  Body mass index is 19.84 kg/m².  No intake or output data in the 24 hours ending 01/03/24 1404      Physical Exam  Vitals and nursing note reviewed.   Constitutional:       General: She is not in acute distress.     Appearance: She is well-developed.   HENT:       Head: Normocephalic.        Mouth/Throat:      Mouth: Mucous membranes are dry.   Eyes:      Extraocular Movements: Extraocular movements intact.      Conjunctiva/sclera: Conjunctivae normal.      Pupils: Pupils are equal, round, and reactive to light.   Cardiovascular:      Rate and Rhythm: Normal rate and regular rhythm.      Heart sounds: Normal heart sounds.   Pulmonary:      Effort: Pulmonary effort is normal. No respiratory distress.      Breath sounds: Normal breath sounds. No wheezing.   Abdominal:      General: Bowel sounds are normal. There is no distension.      Palpations: Abdomen is soft.      Tenderness: There is no abdominal tenderness. There is right CVA tenderness and left CVA tenderness.   Musculoskeletal:         General: No tenderness. Normal range of motion.      Cervical back: Normal range of motion and neck supple.   Lymphadenopathy:      Cervical: No cervical adenopathy.   Skin:     General: Skin is warm and dry.      Capillary Refill: Capillary refill takes less than 2 seconds.      Findings: Bruising present. No rash.   Neurological:      Mental Status: She is alert and oriented to person, place, and time.      Cranial Nerves: No cranial nerve deficit.      Sensory: No sensory deficit.      Coordination: Coordination normal.   Psychiatric:         Behavior: Behavior normal.         Thought Content: Thought content normal.         Judgment: Judgment normal.             Significant Labs: All pertinent labs within the past 24 hours have been reviewed.    Significant Imaging: I have reviewed all pertinent imaging results/findings within the past 24 hours.

## 2024-01-03 NOTE — SUBJECTIVE & OBJECTIVE
Interval History: Patient fell with head trauma over night. CTH pending, no focal neurologicla deficits noted on examination. Patient has no new complaints. Reports low urine output. No urge.    Review of patient's allergies indicates:   Allergen Reactions    Methotrexate analogues      Mouth Ulcers     Thiazides Other (See Comments)     hyponatremia     Current Facility-Administered Medications   Medication Frequency    acetaminophen tablet 1,000 mg Q8H PRN    acetaminophen tablet 650 mg Q4H PRN    albuterol-ipratropium 2.5 mg-0.5 mg/3 mL nebulizer solution 3 mL Q4H PRN    aluminum-magnesium hydroxide-simethicone 200-200-20 mg/5 mL suspension 30 mL QID PRN    bisacodyL suppository 10 mg Daily PRN    cetirizine tablet 10 mg Daily    enoxaparin injection 40 mg Daily    melatonin tablet 6 mg Nightly PRN    naloxone 0.4 mg/mL injection 0.02 mg PRN    NIFEdipine 24 hr tablet 30 mg Daily    ondansetron disintegrating tablet 8 mg Q8H PRN    polyethylene glycol packet 17 g BID PRN    prochlorperazine injection Soln 5 mg Q6H PRN    simethicone chewable tablet 80 mg QID PRN    sodium chloride 0.9% flush 5 mL PRN    sodium chloride oral tablet 2,000 mg BID    vitamin D 1000 units tablet 1,000 Units Daily       Objective:     Vital Signs (Most Recent):  Temp: 97.9 °F (36.6 °C) (01/03/24 0756)  Pulse: 82 (01/03/24 0758)  Resp: 18 (01/03/24 0756)  BP: (!) 113/51 (01/03/24 0758)  SpO2: 96 % (01/03/24 0756) Vital Signs (24h Range):  Temp:  [96.3 °F (35.7 °C)-98.2 °F (36.8 °C)] 97.9 °F (36.6 °C)  Pulse:  [57-83] 82  Resp:  [18] 18  SpO2:  [91 %-96 %] 96 %  BP: (113-181)/(51-77) 113/51     Weight: 50.8 kg (111 lb 15.9 oz) (12/31/23 1102)  Body mass index is 19.84 kg/m².  Body surface area is 1.5 meters squared.    No intake/output data recorded.     Physical Exam  Vitals reviewed.   Constitutional:       General: She is not in acute distress.     Appearance: Normal appearance. She is not ill-appearing or toxic-appearing.   HENT:       Head: Atraumatic.      Right Ear: External ear normal.      Left Ear: External ear normal.      Nose: Nose normal.      Mouth/Throat:      Mouth: Mucous membranes are moist.   Eyes:      Extraocular Movements: Extraocular movements intact.   Cardiovascular:      Rate and Rhythm: Normal rate and regular rhythm.      Pulses: Normal pulses.      Heart sounds: Normal heart sounds.   Pulmonary:      Effort: Pulmonary effort is normal. No respiratory distress.      Breath sounds: Normal breath sounds. No stridor.   Abdominal:      General: Abdomen is flat. There is no distension.      Palpations: Abdomen is soft.      Tenderness: There is no abdominal tenderness. There is right CVA tenderness and left CVA tenderness. There is no guarding.   Musculoskeletal:         General: No swelling. Normal range of motion.      Cervical back: Normal range of motion. No rigidity or tenderness.      Right lower leg: No edema.      Left lower leg: No edema.   Skin:     General: Skin is warm and dry.      Capillary Refill: Capillary refill takes less than 2 seconds.   Neurological:      General: No focal deficit present.      Mental Status: She is alert and oriented to person, place, and time. Mental status is at baseline.   Psychiatric:         Mood and Affect: Mood normal.         Behavior: Behavior normal.         Thought Content: Thought content normal.         Judgment: Judgment normal.          Significant Labs:  All labs within the past 24 hours have been reviewed.     Significant Imaging:  Reviewed

## 2024-01-03 NOTE — PLAN OF CARE
Problem: Adult Inpatient Plan of Care  Goal: Plan of Care Review  Outcome: Ongoing, Progressing  Goal: Patient-Specific Goal (Individualized)  Outcome: Ongoing, Progressing  Goal: Absence of Hospital-Acquired Illness or Injury  Outcome: Ongoing, Progressing  Goal: Optimal Comfort and Wellbeing  Outcome: Ongoing, Progressing  Goal: Readiness for Transition of Care  Outcome: Ongoing, Progressing     Problem: Infection  Goal: Absence of Infection Signs and Symptoms  Outcome: Ongoing, Progressing     Problem: Fluid and Electrolyte Imbalance (Acute Kidney Injury/Impairment)  Goal: Fluid and Electrolyte Balance  Outcome: Ongoing, Progressing     Problem: Oral Intake Inadequate (Acute Kidney Injury/Impairment)  Goal: Optimal Nutrition Intake  Outcome: Ongoing, Progressing     Problem: Renal Function Impairment (Acute Kidney Injury/Impairment)  Goal: Effective Renal Function  Outcome: Ongoing, Progressing     Problem: Impaired Wound Healing  Goal: Optimal Wound Healing  Outcome: Ongoing, Progressing

## 2024-01-03 NOTE — SIGNIFICANT EVENT
Notified by nurse that patient had an unwitnessed syncopal episode with fall while in the bathroom tonight. Patient reportedly passed out when rising from commode and fall hitting head on commode shortly after having a bowel movement. Patient notified nursing via emergency bathroom call light. Noted to have 1/2 dollar sized swelling to left scalp. AOX4 without any changes in neuro exam reported. She has some scalp swelling with mild tenderness to touch but otherwise denies any neck pain or dizziness. Stat CTH ordered. Neuro checks q4h. Will check orthostatics as well and give IVFs if indicated.        Chasity Pinon PA-C  [facility]

## 2024-01-03 NOTE — PROGRESS NOTES
[facility] [address] [place] [address] Medicine  Progress Note    Patient Name: Buffy Dominique  MRN: [id number]  Patient Class: IP- Inpatient   Admission Date: 12/31/2023  Length of Stay: 1 days  Attending Physician: Radha Flores MD  Primary Care Provider: Odalis Kim MD        Subjective:     Principal Problem:Hyponatremia        HPI:  Buffy Dominique  is a 77 yo F with PMHx of CKD3, HTN, HLD, RA, hyperparathyroidism, chronic hyponatremia, and thoracic aortic aneurysm repair who presented to ED for bilateral low back pain. Patient reports constant, aching back pain, 10/10 in severity that started 2 days ago and radiates down bilateral thighs. Her pain was greatly relieved by 1,000 mg of tylenol and is now 3/10 in severity. She denies aggravating factors. She was concerned her back pain may be secondary to renal failure because her last episode of pain occurred with renal failure. Pt reports she only urinates 1-2 times per day and has minimal output but that is baseline for her. Of note, she was previously hospitalized with hyponatremia determined to be related to chlorthalidone use and SIADH. She reports a former provider her told her to restrict her water intake to 10 ounces per day. She denies fever, chills, chest pain, palpitations, SOB, cough, abdominal pain, n/v/d, dysuria, headaches and LE swelling.     In ED: Afebrile. Hypotensive to 92/47>> improved to 116/58 s/p 1L NS. O2 sats dropping to low 90s when patient sleeping. No leukocytosis. Hgb 11.7. Na 127 (baseline low 130s). Cr 0.8 (at baseline). CMP otherwise unremarkable. UA noninfectious.  CXR without acute process. Given 1g tylenol. Placed in observation.     Overview/Hospital Course:  Ms. Dominique was placed in observation for hyponatremia and hypotension in the setting of COVID-19 and SIADH. Home antihypertensives (amlodipine, chlorthalidone, and hydralazine) were held at admission and blood pressure is improving. Nephrology was consulted for SIADH and  recommended fluid restriction of 1L and salt tabs. The patient adamantly denied her diagnosis of COVID-19 and refused treatment despite an at-length conversation regarding risks associated with non-treatment.     On evening of 1/2/23, patient had an episode of syncope with head trauma after a large bowel movement. AOX4 without any changes in neuro exam. CTH with scalp hematoma without evidence of fracture, intracranial hemorrhage, or sulcal effacement. Orthostatics positive.     Plan to discharge with PCP and nephrology follow-up when medically ready.     Interval History: Pt seen and examined by me this morning. VSSAF. Pt with an episode of syncope with head trauma while in the bathroom after a large BM. She is AAOx4 and has multiple bruises and evidence of hematoma of the scalp. She reports a mild, generalized HA without photophobia, phonophobia, N/V, or visual changes. CTH without acute intracranial process. Neuro checks q4h.    Review of Systems   Constitutional:  Negative for activity change, chills and fever.   HENT:  Negative for trouble swallowing.    Eyes:  Negative for photophobia and visual disturbance.   Respiratory:  Negative for cough, chest tightness and shortness of breath.    Cardiovascular:  Negative for chest pain, palpitations and leg swelling.   Gastrointestinal:  Negative for abdominal pain, constipation, diarrhea, nausea and vomiting.   Genitourinary:  Positive for decreased urine volume, difficulty urinating and flank pain. Negative for dysuria, frequency and hematuria.   Musculoskeletal:  Positive for arthralgias. Negative for gait problem and neck pain.   Skin:  Negative for rash and wound.   Neurological:  Negative for dizziness, syncope, speech difficulty and light-headedness.   Psychiatric/Behavioral:  Negative for agitation and confusion. The patient is not nervous/anxious.      Objective:     Vital Signs (Most Recent):  Temp: 97.9 °F (36.6 °C) (01/03/24 1121)  Pulse: 86 (01/03/24  1121)  Resp: 18 (01/03/24 1121)  BP: (!) 143/65 (01/03/24 1121)  SpO2: 96 % (01/03/24 1121) Vital Signs (24h Range):  Temp:  [96.3 °F (35.7 °C)-98 °F (36.7 °C)] 97.9 °F (36.6 °C)  Pulse:  [57-86] 86  Resp:  [18] 18  SpO2:  [91 %-96 %] 96 %  BP: (113-153)/(51-73) 143/65     Weight: 50.8 kg (111 lb 15.9 oz)  Body mass index is 19.84 kg/m².  No intake or output data in the 24 hours ending 01/03/24 1404      Physical Exam  Vitals and nursing note reviewed.   Constitutional:       General: She is not in acute distress.     Appearance: She is well-developed.   HENT:      Head: Normocephalic.        Mouth/Throat:      Mouth: Mucous membranes are dry.   Eyes:      Extraocular Movements: Extraocular movements intact.      Conjunctiva/sclera: Conjunctivae normal.      Pupils: Pupils are equal, round, and reactive to light.   Cardiovascular:      Rate and Rhythm: Normal rate and regular rhythm.      Heart sounds: Normal heart sounds.   Pulmonary:      Effort: Pulmonary effort is normal. No respiratory distress.      Breath sounds: Normal breath sounds. No wheezing.   Abdominal:      General: Bowel sounds are normal. There is no distension.      Palpations: Abdomen is soft.      Tenderness: There is no abdominal tenderness. There is right CVA tenderness and left CVA tenderness.   Musculoskeletal:         General: No tenderness. Normal range of motion.      Cervical back: Normal range of motion and neck supple.   Lymphadenopathy:      Cervical: No cervical adenopathy.   Skin:     General: Skin is warm and dry.      Capillary Refill: Capillary refill takes less than 2 seconds.      Findings: Bruising present. No rash.   Neurological:      Mental Status: She is alert and oriented to person, place, and time.      Cranial Nerves: No cranial nerve deficit.      Sensory: No sensory deficit.      Coordination: Coordination normal.   Psychiatric:         Behavior: Behavior normal.         Thought Content: Thought content normal.          Judgment: Judgment normal.             Significant Labs: All pertinent labs within the past 24 hours have been reviewed.    Significant Imaging: I have reviewed all pertinent imaging results/findings within the past 24 hours.    Assessment/Plan:      * Hyponatremia  SIADH  Patient has hyponatremia which is uncontrolled. We will aim to correct the sodium by 4-6mEq in 24 hours. We will monitor sodium Daily. We will obtain the following studies: Urine sodium, urine osmolality, serum osmolality or TSH, T4. We will treat the hyponatremia with Fluid restriction of: 1 liter per day. The patient's sodium results have been reviewed and are listed below.  Recent Labs   Lab 01/03/24  0527   *     - baseline Na 130s  - given 1L NS in ED for hypotension   - hospitalized may 2022 for hyponatremia. 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 Does extreme fluid restriction of 10 ounces at home. No longer taking salt tabs  - Nephrology consulted; appreciate recs:  -Fluid restrict to 1L per day  -Agree with holding chlorthalidone  -2g salt tablets BID, goal correction 4-6 mEq in 24 hrs  -renal fuction panel q12hr    Hypotension  Orthostatic hypotension with syncope  - likely 2/2 hypovolemia in setting of fluid restriction   - infectious workup negative thus far.  - Blood cx and lactic wnl  - given 1L IVF in ED with improvement in BP  - hold home amlodipine, benicar, hydralazine  - echo pending  - monitor closely   - use gentle IVFs PRN    COVID-19  Patient is identified as Severe COVID-19 based on hypoxemia with O2 saturations <94% on room air or on ambulation   Initiate standard COVID protocols; COVID-19 testing ,Infection Control notification  and isolation- respiratory, contact and droplet per protocol    Diagnostics: CBC, CMP, Ferritin, CRP, and Portable CXR    Management: Maintain oxygen saturations 92-96% via Nasal Cannula  LPM and monitor with continuous/intermittent pulse oximetry. , Inhaled bronchodilators as  needed for shortness of breath., and Continuous cardiac monitoring.    Advance Care Planning Current advance care plan has not been discussed with patient/family/POA and patient currently wishes Full Code.     - Lengthy discussion regarding transmission, symptoms, results, risks and benefits of treatment, etc performed at bedside. The patient adamantly denies that she has COVID-19 and adamantly refuses treatment despite active hypoxia and symptoms. She expressed clear understanding of risks of non-treatment.     Bilateral low back pain  - denies trauma or injury  - low concern for nephrolithiasis or pyelonephritis as UA unremarkable  - given tylenol 1g in ED  - trial robaxin & lidocaine patch    CKD (chronic kidney disease) stage 3, GFR 30-59 ml/min  - Cr 0.8 on admit, at baseline   - renally dose meds  - nephrology consulted, appreciate recs  - avoid nephrotoxins  - monitor with daily bmp    Former tobacco use  - quit 2009    Seropositive rheumatoid arthritis  - receives humira injections every 14 days  - follows with rheumatology     HLD (hyperlipidemia)  - lipid panel wnl  - statin not indicated at this time     History of thoracic aortic aneurysm repair  - noted    Essential hypertension  - Hold antihypertensives in the setting of +orthostatics   - Permissive blood pressure between 140-150s      VTE Risk Mitigation (From admission, onward)           Ordered     IP VTE HIGH RISK PATIENT  Once         12/31/23 0957                    Discharge Planning   MARCELLA: 1/4/2024     Code Status: Full Code   Is the patient medically ready for discharge?: No    Reason for patient still in hospital (select all that apply): Patient new problem, Patient trending condition, Treatment, and Consult recommendations  Discharge Plan A: Home                  Vicky Zayas PA-C  Department of Hospital Medicine   [facility]

## 2024-01-03 NOTE — ASSESSMENT & PLAN NOTE
Patient chronically hyponatremic, urine studies this hospital admission are consistent with SIADH. Patient does report chronic back pain which may potentially explain her SIADH. She does have chlorthalidone on her home medication list, however patient denies taking this medication. Review of her medications show that a 90 day supply was filled on 11/1/23 for Chlorthalidone.     Final recommendations pending staff attestation    -Fluid restrict to 1L per day  -Agree with holding Chlorthalidone  -RFP Q12hr  -2g salt tablets BID, goal correction 4-6 mEq in 24 hrs  -renal fuction panel q12hr  -Pain control per primary team.

## 2024-01-03 NOTE — PROGRESS NOTES
"[facility]  Nephrology  Progress Note    Patient Name: Buffy Dominique  MRN: [id number]  Admission Date: 12/31/2023  Hospital Length of Stay: 1 days  Attending Provider: Radha Flores MD   Primary Care Physician: Odalis Kim MD  Principal Problem:Hyponatremia    Subjective:     HPI: Buffy Dominique  is a 79 yo F with PMHx of HTN, HLD, RA, hyperparathyroidism, chronic hyponatremia, and thoracic aortic aneurysm repair who presented to ED for bilateral low back and leg pain. Patient reports constant, aching back pain, 10/10 in severity that started 2 prior to admission and radiates down bilateral thighs. Patient reports that she experienced similar back pain 2 years ago that was associated with renal failure and that is what prompted her visit to the hospital. Pt reports she only urinates 1-2 times per day and has minimal output but that is baseline for her. She currently is on a "no salt diet and 10 oz fluid restriction" due to hypertension.     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 patient reports long standing hyponatremia, adn tells me that previously it was due to HCTZ which was then discontinued. Sodium on arrival was 127, serum osmolality was ordered. Urine osmolality measured at 525, and urine sodium elevated at 156. She is currently asymptomatic. She denies any nausea, vomiting, pain medications, but does report chronic pain. She tells me that she eats a "balanced diet" but does not eat very much.     Interval History: Patient fell with head trauma over night. CTH pending, no focal neurologicla deficits noted on examination. Patient has no new complaints. Reports low urine output. No urge.    Review of patient's allergies indicates:   Allergen Reactions    Methotrexate analogues      Mouth Ulcers     Thiazides Other (See Comments)     hyponatremia     Current Facility-Administered Medications   Medication Frequency    acetaminophen tablet 1,000 mg Q8H PRN    " acetaminophen tablet 650 mg Q4H PRN    albuterol-ipratropium 2.5 mg-0.5 mg/3 mL nebulizer solution 3 mL Q4H PRN    aluminum-magnesium hydroxide-simethicone 200-200-20 mg/5 mL suspension 30 mL QID PRN    bisacodyL suppository 10 mg Daily PRN    cetirizine tablet 10 mg Daily    enoxaparin injection 40 mg Daily    melatonin tablet 6 mg Nightly PRN    naloxone 0.4 mg/mL injection 0.02 mg PRN    NIFEdipine 24 hr tablet 30 mg Daily    ondansetron disintegrating tablet 8 mg Q8H PRN    polyethylene glycol packet 17 g BID PRN    prochlorperazine injection Soln 5 mg Q6H PRN    simethicone chewable tablet 80 mg QID PRN    sodium chloride 0.9% flush 5 mL PRN    sodium chloride oral tablet 2,000 mg BID    vitamin D 1000 units tablet 1,000 Units Daily       Objective:     Vital Signs (Most Recent):  Temp: 97.9 °F (36.6 °C) (01/03/24 0756)  Pulse: 82 (01/03/24 0758)  Resp: 18 (01/03/24 0756)  BP: (!) 113/51 (01/03/24 0758)  SpO2: 96 % (01/03/24 0756) Vital Signs (24h Range):  Temp:  [96.3 °F (35.7 °C)-98.2 °F (36.8 °C)] 97.9 °F (36.6 °C)  Pulse:  [57-83] 82  Resp:  [18] 18  SpO2:  [91 %-96 %] 96 %  BP: (113-181)/(51-77) 113/51     Weight: 50.8 kg (111 lb 15.9 oz) (12/31/23 1102)  Body mass index is 19.84 kg/m².  Body surface area is 1.5 meters squared.    No intake/output data recorded.     Physical Exam  Vitals reviewed.   Constitutional:       General: She is not in acute distress.     Appearance: Normal appearance. She is not ill-appearing or toxic-appearing.   HENT:      Head: Atraumatic.      Right Ear: External ear normal.      Left Ear: External ear normal.      Nose: Nose normal.      Mouth/Throat:      Mouth: Mucous membranes are moist.   Eyes:      Extraocular Movements: Extraocular movements intact.   Cardiovascular:      Rate and Rhythm: Normal rate and regular rhythm.      Pulses: Normal pulses.      Heart sounds: Normal heart sounds.   Pulmonary:      Effort: Pulmonary effort is normal. No respiratory distress.       Breath sounds: Normal breath sounds. No stridor.   Abdominal:      General: Abdomen is flat. There is no distension.      Palpations: Abdomen is soft.      Tenderness: There is no abdominal tenderness. There is right CVA tenderness and left CVA tenderness. There is no guarding.   Musculoskeletal:         General: No swelling. Normal range of motion.      Cervical back: Normal range of motion. No rigidity or tenderness.      Right lower leg: No edema.      Left lower leg: No edema.   Skin:     General: Skin is warm and dry.      Capillary Refill: Capillary refill takes less than 2 seconds.   Neurological:      General: No focal deficit present.      Mental Status: She is alert and oriented to person, place, and time. Mental status is at baseline.   Psychiatric:         Mood and Affect: Mood normal.         Behavior: Behavior normal.         Thought Content: Thought content normal.         Judgment: Judgment normal.          Significant Labs:  All labs within the past 24 hours have been reviewed.     Significant Imaging:  Reviewed  Assessment/Plan:     Cardiac/Vascular  Essential hypertension  Per primary    Renal/  * Hyponatremia  See SIDAH    -2g salt tablets BID, goal correction 4-6 mEq in 24 hrs  -renal fuction panel q12hr  -continue fluid restriction    Endocrine  SIADH (syndrome of inappropriate ADH production)  Patient chronically hyponatremic, urine studies this hospital admission are consistent with SIADH. Patient does report chronic back pain which may potentially explain her SIADH. She does have chlorthalidone on her home medication list, however patient denies taking this medication. Review of her medications show that a 90 day supply was filled on 11/1/23 for Chlorthalidone.     Final recommendations pending staff attestation    -Fluid restrict to 1L per day  -Agree with holding Chlorthalidone  -RFP Q12hr  -2g salt tablets BID, goal correction 4-6 mEq in 24 hrs  -renal fuction panel q12hr  -Pain control  per primary team.    Orthopedic  Bilateral low back pain  UA unremarkable, does not appear to be a UTI  -Pain control per primary team.        Thank you for your consult. I will follow-up with patient. Please contact us if you have any additional questions.    Andres Tolentino MD  Nephrology  [facility]

## 2024-01-03 NOTE — ASSESSMENT & PLAN NOTE
Orthostatic hypotension with syncope  - likely 2/2 hypovolemia in setting of fluid restriction   - infectious workup negative thus far.  - Blood cx and lactic wnl  - given 1L IVF in ED with improvement in BP  - hold home amlodipine, benicar, hydralazine  - echo pending  - monitor closely   - use gentle IVFs PRN

## 2024-01-04 LAB
ANION GAP SERPL CALC-SCNC: 11 MMOL/L (ref 8–16)
ANION GAP SERPL CALC-SCNC: 13 MMOL/L (ref 8–16)
ANION GAP SERPL CALC-SCNC: 15 MMOL/L (ref 8–16)
ANION GAP SERPL CALC-SCNC: 8 MMOL/L (ref 8–16)
ANION GAP SERPL CALC-SCNC: 9 MMOL/L (ref 8–16)
ASCENDING AORTA: 3.06 CM
AV INDEX (PROSTH): 0.77
AV MEAN GRADIENT: 5 MMHG
AV PEAK GRADIENT: 13 MMHG
AV VALVE AREA BY VELOCITY RATIO: 2.77 CM²
AV VALVE AREA: 2.91 CM²
AV VELOCITY RATIO: 0.73
BASOPHILS # BLD AUTO: 0.01 K/UL (ref 0–0.2)
BASOPHILS NFR BLD: 0.3 % (ref 0–1.9)
BSA FOR ECHO PROCEDURE: 1.5 M2
BUN SERPL-MCNC: 11 MG/DL (ref 8–23)
BUN SERPL-MCNC: 12 MG/DL (ref 8–23)
BUN SERPL-MCNC: 13 MG/DL (ref 8–23)
BUN SERPL-MCNC: 13 MG/DL (ref 8–23)
BUN SERPL-MCNC: 14 MG/DL (ref 8–23)
CALCIUM SERPL-MCNC: 8.5 MG/DL (ref 8.7–10.5)
CALCIUM SERPL-MCNC: 8.8 MG/DL (ref 8.7–10.5)
CALCIUM SERPL-MCNC: 8.8 MG/DL (ref 8.7–10.5)
CALCIUM SERPL-MCNC: 9 MG/DL (ref 8.7–10.5)
CALCIUM SERPL-MCNC: 9 MG/DL (ref 8.7–10.5)
CHLORIDE SERPL-SCNC: 89 MMOL/L (ref 95–110)
CHLORIDE SERPL-SCNC: 90 MMOL/L (ref 95–110)
CHLORIDE SERPL-SCNC: 91 MMOL/L (ref 95–110)
CHLORIDE SERPL-SCNC: 91 MMOL/L (ref 95–110)
CHLORIDE SERPL-SCNC: 92 MMOL/L (ref 95–110)
CO2 SERPL-SCNC: 23 MMOL/L (ref 23–29)
CO2 SERPL-SCNC: 24 MMOL/L (ref 23–29)
CO2 SERPL-SCNC: 25 MMOL/L (ref 23–29)
CO2 SERPL-SCNC: 26 MMOL/L (ref 23–29)
CO2 SERPL-SCNC: 27 MMOL/L (ref 23–29)
CORTIS SERPL-MCNC: 17.3 UG/DL (ref 4.3–22.4)
CREAT SERPL-MCNC: 0.7 MG/DL (ref 0.5–1.4)
CREAT SERPL-MCNC: 0.8 MG/DL (ref 0.5–1.4)
CV ECHO LV RWT: 0.43 CM
DIFFERENTIAL METHOD BLD: ABNORMAL
DOP CALC AO PEAK VEL: 1.77 M/S
DOP CALC AO VTI: 37.34 CM
DOP CALC LVOT AREA: 3.8 CM2
DOP CALC LVOT DIAMETER: 2.19 CM
DOP CALC LVOT PEAK VEL: 1.3 M/S
DOP CALC LVOT STROKE VOLUME: 108.84 CM3
DOP CALCLVOT PEAK VEL VTI: 28.91 CM
E WAVE DECELERATION TIME: 209.8 MSEC
E/A RATIO: 0.98
E/E' RATIO: 12.29 M/S
ECHO LV POSTERIOR WALL: 0.94 CM (ref 0.6–1.1)
EOSINOPHIL # BLD AUTO: 0.1 K/UL (ref 0–0.5)
EOSINOPHIL NFR BLD: 1.5 % (ref 0–8)
ERYTHROCYTE [DISTWIDTH] IN BLOOD BY AUTOMATED COUNT: 11.8 % (ref 11.5–14.5)
EST. GFR  (NO RACE VARIABLE): >60 ML/MIN/1.73 M^2
FRACTIONAL SHORTENING: 42 % (ref 28–44)
GLUCOSE SERPL-MCNC: 75 MG/DL (ref 70–110)
GLUCOSE SERPL-MCNC: 79 MG/DL (ref 70–110)
GLUCOSE SERPL-MCNC: 79 MG/DL (ref 70–110)
GLUCOSE SERPL-MCNC: 81 MG/DL (ref 70–110)
GLUCOSE SERPL-MCNC: 83 MG/DL (ref 70–110)
HCT VFR BLD AUTO: 35.1 % (ref 37–48.5)
HGB BLD-MCNC: 12.4 G/DL (ref 12–16)
IMM GRANULOCYTES # BLD AUTO: 0.01 K/UL (ref 0–0.04)
IMM GRANULOCYTES NFR BLD AUTO: 0.3 % (ref 0–0.5)
INTERVENTRICULAR SEPTUM: 0.95 CM (ref 0.6–1.1)
LA MAJOR: 4.84 CM
LA MINOR: 4.94 CM
LA WIDTH: 3.64 CM
LEFT ATRIUM SIZE: 4 CM
LEFT ATRIUM VOLUME INDEX MOD: 31.5 ML/M2
LEFT ATRIUM VOLUME INDEX: 40.1 ML/M2
LEFT ATRIUM VOLUME MOD: 47.49 CM3
LEFT ATRIUM VOLUME: 60.51 CM3
LEFT INTERNAL DIMENSION IN SYSTOLE: 2.56 CM (ref 2.1–4)
LEFT VENTRICLE DIASTOLIC VOLUME INDEX: 58.11 ML/M2
LEFT VENTRICLE DIASTOLIC VOLUME: 87.74 ML
LEFT VENTRICLE MASS INDEX: 91 G/M2
LEFT VENTRICLE SYSTOLIC VOLUME INDEX: 15.6 ML/M2
LEFT VENTRICLE SYSTOLIC VOLUME: 23.62 ML
LEFT VENTRICULAR INTERNAL DIMENSION IN DIASTOLE: 4.4 CM (ref 3.5–6)
LEFT VENTRICULAR MASS: 136.78 G
LV LATERAL E/E' RATIO: 12.29 M/S
LV SEPTAL E/E' RATIO: 12.29 M/S
LYMPHOCYTES # BLD AUTO: 1.4 K/UL (ref 1–4.8)
LYMPHOCYTES NFR BLD: 34.7 % (ref 18–48)
MAGNESIUM SERPL-MCNC: 1.6 MG/DL (ref 1.6–2.6)
MCH RBC QN AUTO: 32.6 PG (ref 27–31)
MCHC RBC AUTO-ENTMCNC: 35.3 G/DL (ref 32–36)
MCV RBC AUTO: 92 FL (ref 82–98)
MONOCYTES # BLD AUTO: 0.5 K/UL (ref 0.3–1)
MONOCYTES NFR BLD: 11.6 % (ref 4–15)
MV A" WAVE DURATION": 10.28 MSEC
MV PEAK A VEL: 0.88 M/S
MV PEAK E VEL: 0.86 M/S
NEUTROPHILS # BLD AUTO: 2 K/UL (ref 1.8–7.7)
NEUTROPHILS NFR BLD: 51.6 % (ref 38–73)
NRBC BLD-RTO: 0 /100 WBC
PHOSPHATE SERPL-MCNC: 3.1 MG/DL (ref 2.7–4.5)
PISA TR MAX VEL: 2.43 M/S
PLATELET # BLD AUTO: 179 K/UL (ref 150–450)
PMV BLD AUTO: 9.2 FL (ref 9.2–12.9)
POTASSIUM SERPL-SCNC: 4.2 MMOL/L (ref 3.5–5.1)
POTASSIUM SERPL-SCNC: 4.2 MMOL/L (ref 3.5–5.1)
POTASSIUM SERPL-SCNC: 4.3 MMOL/L (ref 3.5–5.1)
POTASSIUM SERPL-SCNC: 5 MMOL/L (ref 3.5–5.1)
POTASSIUM SERPL-SCNC: 5.1 MMOL/L (ref 3.5–5.1)
PULM VEIN S/D RATIO: 1.14
PV PEAK D VEL: 0.51 M/S
PV PEAK S VEL: 0.58 M/S
RA MAJOR: 3.9 CM
RA PRESSURE ESTIMATED: 3 MMHG
RA WIDTH: 2.72 CM
RBC # BLD AUTO: 3.8 M/UL (ref 4–5.4)
RIGHT VENTRICULAR END-DIASTOLIC DIMENSION: 2.95 CM
RV TB RVSP: 5 MMHG
SINUS: 3.44 CM
SODIUM SERPL-SCNC: 125 MMOL/L (ref 136–145)
SODIUM SERPL-SCNC: 125 MMOL/L (ref 136–145)
SODIUM SERPL-SCNC: 126 MMOL/L (ref 136–145)
SODIUM SERPL-SCNC: 127 MMOL/L (ref 136–145)
SODIUM SERPL-SCNC: 128 MMOL/L (ref 136–145)
SODIUM SERPL-SCNC: 130 MMOL/L (ref 136–145)
STJ: 2.7 CM
TDI LATERAL: 0.07 M/S
TDI SEPTAL: 0.07 M/S
TDI: 0.07 M/S
TR MAX PG: 24 MMHG
TRICUSPID ANNULAR PLANE SYSTOLIC EXCURSION: 1.69 CM
TV REST PULMONARY ARTERY PRESSURE: 27 MMHG
WBC # BLD AUTO: 3.89 K/UL (ref 3.9–12.7)
Z-SCORE OF LEFT VENTRICULAR DIMENSION IN END DIASTOLE: -0.09
Z-SCORE OF LEFT VENTRICULAR DIMENSION IN END SYSTOLE: -0.59

## 2024-01-04 PROCEDURE — 25000003 PHARM REV CODE 250

## 2024-01-04 PROCEDURE — 80048 BASIC METABOLIC PNL TOTAL CA: CPT

## 2024-01-04 PROCEDURE — 21400001 HC TELEMETRY ROOM

## 2024-01-04 PROCEDURE — 36415 COLL VENOUS BLD VENIPUNCTURE: CPT | Mod: XB | Performed by: HOSPITALIST

## 2024-01-04 PROCEDURE — 63600175 PHARM REV CODE 636 W HCPCS

## 2024-01-04 PROCEDURE — 99232 SBSQ HOSP IP/OBS MODERATE 35: CPT | Mod: GC,,, | Performed by: INTERNAL MEDICINE

## 2024-01-04 PROCEDURE — 85025 COMPLETE CBC W/AUTO DIFF WBC: CPT

## 2024-01-04 PROCEDURE — 82533 TOTAL CORTISOL: CPT | Performed by: HOSPITALIST

## 2024-01-04 PROCEDURE — 36415 COLL VENOUS BLD VENIPUNCTURE: CPT | Mod: XB

## 2024-01-04 PROCEDURE — 27000207 HC ISOLATION

## 2024-01-04 PROCEDURE — 84295 ASSAY OF SERUM SODIUM: CPT

## 2024-01-04 PROCEDURE — 83735 ASSAY OF MAGNESIUM: CPT

## 2024-01-04 PROCEDURE — 84100 ASSAY OF PHOSPHORUS: CPT

## 2024-01-04 RX ORDER — MAGNESIUM SULFATE HEPTAHYDRATE 40 MG/ML
2 INJECTION, SOLUTION INTRAVENOUS ONCE
Status: COMPLETED | OUTPATIENT
Start: 2024-01-04 | End: 2024-01-04

## 2024-01-04 RX ORDER — TOLVAPTAN 15 MG/1
15 TABLET ORAL DAILY
Status: COMPLETED | OUTPATIENT
Start: 2024-01-04 | End: 2024-01-06

## 2024-01-04 RX ADMIN — ACETAMINOPHEN 650 MG: 325 TABLET ORAL at 12:01

## 2024-01-04 RX ADMIN — CHOLECALCIFEROL TAB 25 MCG (1000 UNIT) 1000 UNITS: 25 TAB at 08:01

## 2024-01-04 RX ADMIN — ACETAMINOPHEN 650 MG: 325 TABLET ORAL at 09:01

## 2024-01-04 RX ADMIN — SODIUM CHLORIDE 2000 MG: 1 TABLET ORAL at 08:01

## 2024-01-04 RX ADMIN — TOLVAPTAN 15 MG: 15 TABLET ORAL at 11:01

## 2024-01-04 RX ADMIN — CETIRIZINE HYDROCHLORIDE 10 MG: 10 TABLET, FILM COATED ORAL at 08:01

## 2024-01-04 RX ADMIN — MAGNESIUM SULFATE HEPTAHYDRATE 2 G: 40 INJECTION, SOLUTION INTRAVENOUS at 10:01

## 2024-01-04 NOTE — ASSESSMENT & PLAN NOTE
SIADH  Patient has hyponatremia which is uncontrolled. We will aim to correct the sodium by 4-6mEq in 24 hours. We will monitor sodium Daily. We will obtain the following studies: Urine sodium, urine osmolality, serum osmolality or TSH, T4. We will treat the hyponatremia with Fluid restriction of: 1 liter per day. The patient's sodium results have been reviewed and are listed below.  Recent Labs   Lab 01/04/24  1144   *     - baseline Na 130s  - given 1L NS in ED for hypotension   - CT chest pending to r/o SCC  - Nephrology consulted; appreciate recs:  -Agree with holding Chlorthalidone  -start Tolvaptan 15 mg goal correction 4-6 mEq in 24 hrs  -Discontinue salt tablets  -renal function panel q 4 hr  -drink to thirst

## 2024-01-04 NOTE — ASSESSMENT & PLAN NOTE
See FRANCIS    -start Tolvaptan 15 mg goal correction 4-6 mEq in 24 hrs  Discontinue salt tablets  -renal fuction panel q 4 hr  -drink to thirst

## 2024-01-04 NOTE — SUBJECTIVE & OBJECTIVE
Interval History: Pt seen and examined by me this morning. Hypertensive with significant orthostatic hypotension  -> 109 from sitting to standing otherwise VSSAF. NAEON. Pt reports persistent dizziness and lightheadedness upon standing, as well as dry mouth and dry eyes. Discussed with nephrology & salt tabs were discontinued in favor of tolvaptan. Pt notified that she is permitted to drink to thirst. No new complaints at this time.     Review of Systems   Constitutional:  Negative for activity change, chills and fever.   HENT:  Negative for trouble swallowing.    Eyes:  Negative for photophobia and visual disturbance.   Respiratory:  Negative for cough, chest tightness and shortness of breath.    Cardiovascular:  Negative for chest pain, palpitations and leg swelling.   Gastrointestinal:  Negative for abdominal pain, constipation, diarrhea, nausea and vomiting.   Genitourinary:  Positive for decreased urine volume, difficulty urinating and flank pain. Negative for dysuria, frequency and hematuria.   Musculoskeletal:  Positive for arthralgias. Negative for gait problem and neck pain.   Skin:  Negative for rash and wound.   Neurological:  Positive for dizziness and light-headedness. Negative for syncope and speech difficulty.   Psychiatric/Behavioral:  Negative for agitation and confusion. The patient is not nervous/anxious.      Objective:     Vital Signs (Most Recent):  Temp: 97.7 °F (36.5 °C) (01/04/24 0809)  Pulse: 73 (01/04/24 1108)  Resp: 18 (01/04/24 0809)  BP: (!) 109/58 (01/04/24 0812)  SpO2: (!) 94 % (01/04/24 0812) Vital Signs (24h Range):  Temp:  [97.6 °F (36.4 °C)-98 °F (36.7 °C)] 97.7 °F (36.5 °C)  Pulse:  [46-96] 73  Resp:  [16-20] 18  SpO2:  [94 %-95 %] 94 %  BP: (109-165)/(58-70) 109/58     Weight: 50.8 kg (111 lb 15.9 oz)  Body mass index is 19.84 kg/m².  No intake or output data in the 24 hours ending 01/04/24 1209      Physical Exam  Vitals and nursing note reviewed.   Constitutional:        General: She is not in acute distress.     Appearance: She is well-developed. She is ill-appearing.   HENT:      Head: Normocephalic.        Mouth/Throat:      Mouth: Mucous membranes are dry.   Eyes:      Extraocular Movements: Extraocular movements intact.      Conjunctiva/sclera: Conjunctivae normal.   Cardiovascular:      Rate and Rhythm: Normal rate and regular rhythm.      Heart sounds: Normal heart sounds.   Pulmonary:      Effort: Pulmonary effort is normal. No respiratory distress.      Breath sounds: Normal breath sounds. No wheezing.   Abdominal:      General: Bowel sounds are normal. There is no distension.      Palpations: Abdomen is soft.      Tenderness: There is no abdominal tenderness. There is right CVA tenderness and left CVA tenderness.   Musculoskeletal:         General: No tenderness. Normal range of motion.      Cervical back: Normal range of motion and neck supple.   Lymphadenopathy:      Cervical: No cervical adenopathy.   Skin:     General: Skin is warm and dry.      Capillary Refill: Capillary refill takes less than 2 seconds.      Findings: Bruising present. No rash.   Neurological:      Mental Status: She is alert and oriented to person, place, and time.      Cranial Nerves: No cranial nerve deficit.      Sensory: No sensory deficit.      Coordination: Coordination normal.   Psychiatric:         Behavior: Behavior normal.         Thought Content: Thought content normal.         Judgment: Judgment normal.             Significant Labs: All pertinent labs within the past 24 hours have been reviewed.  BMP:   Recent Labs   Lab 01/04/24  0738 01/04/24  1014 01/04/24  1144   GLU 83  --  79   *   < > 126*   K 4.2  --  4.3   CL 91*  --  91*   CO2 23  --  26   BUN 14  --  13   CREATININE 0.8  --  0.7   CALCIUM 8.8  --  9.0   MG 1.6  --   --     < > = values in this interval not displayed.       Significant Imaging: I have reviewed all pertinent imaging results/findings within the past 24  hours.

## 2024-01-04 NOTE — ASSESSMENT & PLAN NOTE
- Hold antihypertensives in the setting of +orthostatics   - Permissive blood pressure between 140-160s

## 2024-01-04 NOTE — PROGRESS NOTES
[facility] [address] [place] [address] Medicine  Progress Note    Patient Name: Buffy Dominique  MRN: [id number]  Patient Class: IP- Inpatient   Admission Date: 12/31/2023  Length of Stay: 2 days  Attending Physician: Keren Clancy MD  Primary Care Provider: Odalis Kim MD        Subjective:     Principal Problem:Hyponatremia        HPI:  Buffy Dominique  is a 77 yo F with PMHx of CKD3, HTN, HLD, RA, hyperparathyroidism, chronic hyponatremia, and thoracic aortic aneurysm repair who presented to ED for bilateral low back pain. Patient reports constant, aching back pain, 10/10 in severity that started 2 days ago and radiates down bilateral thighs. Her pain was greatly relieved by 1,000 mg of tylenol and is now 3/10 in severity. She denies aggravating factors. She was concerned her back pain may be secondary to renal failure because her last episode of pain occurred with renal failure. Pt reports she only urinates 1-2 times per day and has minimal output but that is baseline for her. Of note, she was previously hospitalized with hyponatremia determined to be related to chlorthalidone use and SIADH. She reports a former provider her told her to restrict her water intake to 10 ounces per day. She denies fever, chills, chest pain, palpitations, SOB, cough, abdominal pain, n/v/d, dysuria, headaches and LE swelling.     In ED: Afebrile. Hypotensive to 92/47>> improved to 116/58 s/p 1L NS. O2 sats dropping to low 90s when patient sleeping. No leukocytosis. Hgb 11.7. Na 127 (baseline low 130s). Cr 0.8 (at baseline). CMP otherwise unremarkable. UA noninfectious.  CXR without acute process. Given 1g tylenol. Placed in observation.     Overview/Hospital Course:  Ms. Dominique was placed in observation for hyponatremia and hypotension in the setting of COVID-19, SIADH, and orthostatic hypotension. Home antihypertensives (amlodipine, chlorthalidone, and hydralazine) were held at admission and blood pressure is improving. Nephrology was  consulted for SIADH and recommended fluid restriction of 1L and salt tabs, which was later discontinued in favor of tolvaptan.The patient adamantly denied her diagnosis of COVID-19 at admission and refused treatment despite an at-length conversation regarding risks associated with non-treatment.     On evening of 1/2/23, patient had an episode of orthostatic syncope with head trauma after standing up from having a  large bowel movement. AOX4 without any changes in neuro exam. CTH with scalp hematoma without evidence of fracture, intracranial hemorrhage, or sulcal effacement. Orthostatics persistently positive.     Plan to discharge with PCP and nephrology follow-up when medically ready.     Interval History: Pt seen and examined by me this morning. Hypertensive with significant orthostatic hypotension  -> 109 from sitting to standing otherwise VSSAF. NAEON. Pt reports persistent dizziness and lightheadedness upon standing, as well as dry mouth and dry eyes. Discussed with nephrology & salt tabs were discontinued in favor of tolvaptan. Pt notified that she is permitted to drink to thirst. No new complaints at this time.     Review of Systems   Constitutional:  Negative for activity change, chills and fever.   HENT:  Negative for trouble swallowing.    Eyes:  Negative for photophobia and visual disturbance.   Respiratory:  Negative for cough, chest tightness and shortness of breath.    Cardiovascular:  Negative for chest pain, palpitations and leg swelling.   Gastrointestinal:  Negative for abdominal pain, constipation, diarrhea, nausea and vomiting.   Genitourinary:  Positive for decreased urine volume, difficulty urinating and flank pain. Negative for dysuria, frequency and hematuria.   Musculoskeletal:  Positive for arthralgias. Negative for gait problem and neck pain.   Skin:  Negative for rash and wound.   Neurological:  Positive for dizziness and light-headedness. Negative for syncope and speech  difficulty.   Psychiatric/Behavioral:  Negative for agitation and confusion. The patient is not nervous/anxious.      Objective:     Vital Signs (Most Recent):  Temp: 97.7 °F (36.5 °C) (01/04/24 0809)  Pulse: 73 (01/04/24 1108)  Resp: 18 (01/04/24 0809)  BP: (!) 109/58 (01/04/24 0812)  SpO2: (!) 94 % (01/04/24 0812) Vital Signs (24h Range):  Temp:  [97.6 °F (36.4 °C)-98 °F (36.7 °C)] 97.7 °F (36.5 °C)  Pulse:  [46-96] 73  Resp:  [16-20] 18  SpO2:  [94 %-95 %] 94 %  BP: (109-165)/(58-70) 109/58     Weight: 50.8 kg (111 lb 15.9 oz)  Body mass index is 19.84 kg/m².  No intake or output data in the 24 hours ending 01/04/24 1209      Physical Exam  Vitals and nursing note reviewed.   Constitutional:       General: She is not in acute distress.     Appearance: She is well-developed. She is ill-appearing.   HENT:      Head: Normocephalic.        Mouth/Throat:      Mouth: Mucous membranes are dry.   Eyes:      Extraocular Movements: Extraocular movements intact.      Conjunctiva/sclera: Conjunctivae normal.   Cardiovascular:      Rate and Rhythm: Normal rate and regular rhythm.      Heart sounds: Normal heart sounds.   Pulmonary:      Effort: Pulmonary effort is normal. No respiratory distress.      Breath sounds: Normal breath sounds. No wheezing.   Abdominal:      General: Bowel sounds are normal. There is no distension.      Palpations: Abdomen is soft.      Tenderness: There is no abdominal tenderness. There is right CVA tenderness and left CVA tenderness.   Musculoskeletal:         General: No tenderness. Normal range of motion.      Cervical back: Normal range of motion and neck supple.   Lymphadenopathy:      Cervical: No cervical adenopathy.   Skin:     General: Skin is warm and dry.      Capillary Refill: Capillary refill takes less than 2 seconds.      Findings: Bruising present. No rash.   Neurological:      Mental Status: She is alert and oriented to person, place, and time.      Cranial Nerves: No cranial nerve  deficit.      Sensory: No sensory deficit.      Coordination: Coordination normal.   Psychiatric:         Behavior: Behavior normal.         Thought Content: Thought content normal.         Judgment: Judgment normal.             Significant Labs: All pertinent labs within the past 24 hours have been reviewed.  BMP:   Recent Labs   Lab 01/04/24  0738 01/04/24  1014 01/04/24  1144   GLU 83  --  79   *   < > 126*   K 4.2  --  4.3   CL 91*  --  91*   CO2 23  --  26   BUN 14  --  13   CREATININE 0.8  --  0.7   CALCIUM 8.8  --  9.0   MG 1.6  --   --     < > = values in this interval not displayed.       Significant Imaging: I have reviewed all pertinent imaging results/findings within the past 24 hours.    Assessment/Plan:      * Hyponatremia  SIADH  Patient has hyponatremia which is uncontrolled. We will aim to correct the sodium by 4-6mEq in 24 hours. We will monitor sodium Daily. We will obtain the following studies: Urine sodium, urine osmolality, serum osmolality or TSH, T4. We will treat the hyponatremia with Fluid restriction of: 1 liter per day. The patient's sodium results have been reviewed and are listed below.  Recent Labs   Lab 01/04/24  1144   *     - baseline Na 130s  - given 1L NS in ED for hypotension   - CT chest pending to r/o SCC  - Nephrology consulted; appreciate recs:  -Agree with holding Chlorthalidone  -start Tolvaptan 15 mg goal correction 4-6 mEq in 24 hrs  -Discontinue salt tablets  -renal function panel q 4 hr  -drink to thirst    Hypotension  Orthostatic hypotension with syncope  - likely 2/2 hypovolemia in setting of fluid restriction   - infectious workup negative thus far.  - Blood cx and lactic wnl  - given 1L IVF in ED with improvement in BP  - hold home amlodipine, benicar, hydralazine  - echo pending  - monitor closely   - use gentle IVFs PRN    COVID-19  Patient is identified as Severe COVID-19 based on hypoxemia with O2 saturations <94% on room air or on ambulation    Initiate standard COVID protocols; COVID-19 testing ,Infection Control notification  and isolation- respiratory, contact and droplet per protocol    Diagnostics: CBC, CMP, Ferritin, CRP, and Portable CXR    Management: Maintain oxygen saturations 92-96% via Nasal Cannula  LPM and monitor with continuous/intermittent pulse oximetry. , Inhaled bronchodilators as needed for shortness of breath., and Continuous cardiac monitoring.    Advance Care Planning Current advance care plan has not been discussed with patient/family/POA and patient currently wishes Full Code.     - Lengthy discussion regarding transmission, symptoms, results, risks and benefits of treatment, etc performed at bedside. The patient adamantly denies that she has COVID-19 and adamantly refuses treatment despite active hypoxia and symptoms. She expressed clear understanding of risks of non-treatment.     Bilateral low back pain  Improving  - denies trauma or injury  - low concern for nephrolithiasis or pyelonephritis as UA unremarkable  - given tylenol 1g in ED  - trial robaxin & lidocaine patch    CKD (chronic kidney disease) stage 3, GFR 30-59 ml/min  - Cr 0.8 on admit, at baseline   - renally dose meds  - nephrology consulted, appreciate recs  - avoid nephrotoxins  - monitor with daily bmp    Former tobacco use  - quit 2009    Seropositive rheumatoid arthritis  - receives humira injections every 14 days  - follows with rheumatology     HLD (hyperlipidemia)  - lipid panel wnl  - statin not indicated at this time     History of thoracic aortic aneurysm repair  - noted    Essential hypertension  - Hold antihypertensives in the setting of +orthostatics   - Permissive blood pressure between 140-160s      VTE Risk Mitigation (From admission, onward)           Ordered     IP VTE HIGH RISK PATIENT  Once         12/31/23 0957                    Discharge Planning   MARCELLA: 1/6/2024     Code Status: Full Code   Is the patient medically ready for discharge?:  No    Reason for patient still in hospital (select all that apply): Patient trending condition, Laboratory test, Treatment, Imaging, and Consult recommendations  Discharge Plan A: Home                  Vicky Zayas PA-C  Department of Hospital Medicine   [facility]

## 2024-01-04 NOTE — ASSESSMENT & PLAN NOTE
Patient chronically hyponatremic, urine studies this hospital admission are consistent with SIADH. Patient does report chronic back pain which may potentially explain her SIADH. She does have chlorthalidone on her home medication list, however patient denies taking this medication. Review of her medications show that a 90 day supply was filled on 11/1/23 for Chlorthalidone.     Final recommendations pending staff attestation    -Agree with holding Chlorthalidone  -start Tolvaptan 15 mg goal correction 4-6 mEq in 24 hrs  -Discontinue salt tablets  -renal function panel q 4 hr  -drink to thirst  -Pain control per primary team.

## 2024-01-04 NOTE — SUBJECTIVE & OBJECTIVE
Interval History:   Serum sodium 125 this morning  Review of patient's allergies indicates:   Allergen Reactions    Methotrexate analogues      Mouth Ulcers     Thiazides Other (See Comments)     hyponatremia     Current Facility-Administered Medications   Medication Frequency    acetaminophen tablet 1,000 mg Q8H PRN    acetaminophen tablet 650 mg Q4H PRN    albuterol-ipratropium 2.5 mg-0.5 mg/3 mL nebulizer solution 3 mL Q4H PRN    aluminum-magnesium hydroxide-simethicone 200-200-20 mg/5 mL suspension 30 mL QID PRN    bisacodyL suppository 10 mg Daily PRN    cetirizine tablet 10 mg Daily    melatonin tablet 6 mg Nightly PRN    naloxone 0.4 mg/mL injection 0.02 mg PRN    ondansetron disintegrating tablet 8 mg Q8H PRN    polyethylene glycol packet 17 g BID PRN    prochlorperazine injection Soln 5 mg Q6H PRN    simethicone chewable tablet 80 mg QID PRN    sodium chloride 0.9% flush 5 mL PRN    sodium chloride oral tablet 2,000 mg BID    vitamin D 1000 units tablet 1,000 Units Daily       Objective:     Vital Signs (Most Recent):  Temp: 97.7 °F (36.5 °C) (01/04/24 0809)  Pulse: 96 (01/04/24 0812)  Resp: 18 (01/04/24 0809)  BP: (!) 109/58 (01/04/24 0812)  SpO2: (!) 94 % (01/04/24 0812) Vital Signs (24h Range):  Temp:  [97.6 °F (36.4 °C)-98 °F (36.7 °C)] 97.7 °F (36.5 °C)  Pulse:  [46-96] 96  Resp:  [16-20] 18  SpO2:  [94 %-96 %] 94 %  BP: (109-165)/(58-70) 109/58     Weight: 50.8 kg (111 lb 15.9 oz) (12/31/23 1102)  Body mass index is 19.84 kg/m².  Body surface area is 1.5 meters squared.    No intake/output data recorded.     Physical Exam  Vitals reviewed.   Constitutional:       General: She is not in acute distress.     Appearance: Normal appearance. She is not ill-appearing or toxic-appearing.   HENT:      Head: Atraumatic.      Right Ear: External ear normal.      Left Ear: External ear normal.      Nose: Nose normal.      Mouth/Throat:      Mouth: Mucous membranes are moist.   Eyes:      Extraocular Movements:  Extraocular movements intact.   Cardiovascular:      Rate and Rhythm: Normal rate and regular rhythm.      Pulses: Normal pulses.      Heart sounds: Normal heart sounds.   Pulmonary:      Effort: Pulmonary effort is normal. No respiratory distress.      Breath sounds: Normal breath sounds. No stridor.   Abdominal:      General: Abdomen is flat. There is no distension.      Palpations: Abdomen is soft.      Tenderness: There is no abdominal tenderness. There is right CVA tenderness and left CVA tenderness. There is no guarding.   Musculoskeletal:         General: No swelling. Normal range of motion.      Cervical back: Normal range of motion. No rigidity or tenderness.      Right lower leg: No edema.      Left lower leg: No edema.   Skin:     General: Skin is warm and dry.      Capillary Refill: Capillary refill takes less than 2 seconds.   Neurological:      General: No focal deficit present.      Mental Status: She is alert and oriented to person, place, and time. Mental status is at baseline.   Psychiatric:         Mood and Affect: Mood normal.         Behavior: Behavior normal.         Thought Content: Thought content normal.         Judgment: Judgment normal.          Significant Labs:  All labs within the past 24 hours have been reviewed.     Significant Imaging:  Reviewed

## 2024-01-04 NOTE — ASSESSMENT & PLAN NOTE
Orthostatic hypotension with syncope  - likely 2/2 hypovolemia in setting of fluid restriction   - infectious workup negative thus far.  - Blood cx and lactic wnl  - given 1L IVF in ED with improvement in BP  - hold home amlodipine, benicar, hydralazine  - echo pending  - monitor closely   - use gentle IVFs PRN   [Potential consequences of obesity discussed] : Potential consequences of obesity discussed [Benefits of weight loss discussed] : Benefits of weight loss discussed [Structured Weight Management Program suggested:] : Structured weight management program suggested [Encouraged to maintain food diary] : Encouraged to maintain food diary [Encouraged to increase physical activity] : Encouraged to increase physical activity [Encouraged to use exercise tracking device] : Encouraged to use exercise tracking device [Weigh Self Weekly] : weigh self weekly [____ min/wk Activity] : [unfilled] min/wk activity [Keep Food Diary] : keep food diary [None] : None [Good understanding] : Patient has a good understanding of lifestyle changes and steps needed to achieve self management goal

## 2024-01-04 NOTE — PROGRESS NOTES
"[facility]  Nephrology  Progress Note    Patient Name: Buffy Dominique  MRN: [id number]  Admission Date: 12/31/2023  Hospital Length of Stay: 2 days  Attending Provider: Keren Clancy MD   Primary Care Physician: Odalis Kim MD  Principal Problem:Hyponatremia    Subjective:     HPI: Buffy Dominique  is a 77 yo F with PMHx of HTN, HLD, RA, hyperparathyroidism, chronic hyponatremia, and thoracic aortic aneurysm repair who presented to ED for bilateral low back and leg pain. Patient reports constant, aching back pain, 10/10 in severity that started 2 prior to admission and radiates down bilateral thighs. Patient reports that she experienced similar back pain 2 years ago that was associated with renal failure and that is what prompted her visit to the hospital. Pt reports she only urinates 1-2 times per day and has minimal output but that is baseline for her. She currently is on a "no salt diet and 10 oz fluid restriction" due to hypertension.     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 patient reports long standing hyponatremia, adn tells me that previously it was due to HCTZ which was then discontinued. Sodium on arrival was 127, serum osmolality was ordered. Urine osmolality measured at 525, and urine sodium elevated at 156. She is currently asymptomatic. She denies any nausea, vomiting, pain medications, but does report chronic pain. She tells me that she eats a "balanced diet" but does not eat very much.     Interval History:   Serum sodium 125 this morning  Review of patient's allergies indicates:   Allergen Reactions    Methotrexate analogues      Mouth Ulcers     Thiazides Other (See Comments)     hyponatremia     Current Facility-Administered Medications   Medication Frequency    acetaminophen tablet 1,000 mg Q8H PRN    acetaminophen tablet 650 mg Q4H PRN    albuterol-ipratropium 2.5 mg-0.5 mg/3 mL nebulizer solution 3 mL Q4H PRN    aluminum-magnesium " hydroxide-simethicone 200-200-20 mg/5 mL suspension 30 mL QID PRN    bisacodyL suppository 10 mg Daily PRN    cetirizine tablet 10 mg Daily    melatonin tablet 6 mg Nightly PRN    naloxone 0.4 mg/mL injection 0.02 mg PRN    ondansetron disintegrating tablet 8 mg Q8H PRN    polyethylene glycol packet 17 g BID PRN    prochlorperazine injection Soln 5 mg Q6H PRN    simethicone chewable tablet 80 mg QID PRN    sodium chloride 0.9% flush 5 mL PRN    sodium chloride oral tablet 2,000 mg BID    vitamin D 1000 units tablet 1,000 Units Daily       Objective:     Vital Signs (Most Recent):  Temp: 97.7 °F (36.5 °C) (01/04/24 0809)  Pulse: 96 (01/04/24 0812)  Resp: 18 (01/04/24 0809)  BP: (!) 109/58 (01/04/24 0812)  SpO2: (!) 94 % (01/04/24 0812) Vital Signs (24h Range):  Temp:  [97.6 °F (36.4 °C)-98 °F (36.7 °C)] 97.7 °F (36.5 °C)  Pulse:  [46-96] 96  Resp:  [16-20] 18  SpO2:  [94 %-96 %] 94 %  BP: (109-165)/(58-70) 109/58     Weight: 50.8 kg (111 lb 15.9 oz) (12/31/23 1102)  Body mass index is 19.84 kg/m².  Body surface area is 1.5 meters squared.    No intake/output data recorded.    Physical Exam  Vitals reviewed.   Constitutional:       General: She is not in acute distress.     Appearance: Normal appearance. She is not ill-appearing or toxic-appearing.   HENT:      Head: Atraumatic.      Right Ear: External ear normal.      Left Ear: External ear normal.      Nose: Nose normal.      Mouth/Throat:      Mouth: Mucous membranes are moist.   Eyes:      Extraocular Movements: Extraocular movements intact.   Cardiovascular:      Rate and Rhythm: Normal rate and regular rhythm.      Pulses: Normal pulses.      Heart sounds: Normal heart sounds.   Pulmonary:      Effort: Pulmonary effort is normal. No respiratory distress.      Breath sounds: Normal breath sounds. No stridor.   Abdominal:      General: Abdomen is flat. There is no distension.      Palpations: Abdomen is soft.      Tenderness: There is no abdominal tenderness.  There is right CVA tenderness and left CVA tenderness. There is no guarding.   Musculoskeletal:         General: No swelling. Normal range of motion.      Cervical back: Normal range of motion. No rigidity or tenderness.      Right lower leg: No edema.      Left lower leg: No edema.   Skin:     General: Skin is warm and dry.      Capillary Refill: Capillary refill takes less than 2 seconds.   Neurological:      General: No focal deficit present.      Mental Status: She is alert and oriented to person, place, and time. Mental status is at baseline.   Psychiatric:         Mood and Affect: Mood normal.         Behavior: Behavior normal.         Thought Content: Thought content normal.         Judgment: Judgment normal.          Significant Labs:  All labs within the past 24 hours have been reviewed.     Significant Imaging:  Reviewed  Assessment/Plan:     Renal/  * Hyponatremia  See SIDAH    -start Tolvaptan 15 mg goal correction 4-6 mEq in 24 hrs  Discontinue salt tablets  -renal fuction panel q 4 hr  -drink to thirst    Endocrine  SIADH (syndrome of inappropriate ADH production)  Patient chronically hyponatremic, urine studies this hospital admission are consistent with SIADH. Patient does report chronic back pain which may potentially explain her SIADH. She does have chlorthalidone on her home medication list, however patient denies taking this medication. Review of her medications show that a 90 day supply was filled on 11/1/23 for Chlorthalidone.     Final recommendations pending staff attestation    -Agree with holding Chlorthalidone  -start Tolvaptan 15 mg goal correction 4-6 mEq in 24 hrs  -Discontinue salt tablets  -renal function panel q 4 hr  -drink to thirst  -Pain control per primary team.        Thank you for your consult. I will follow-up with patient. Please contact us if you have any additional questions.    Prema Rodriguez MD  Nephrology  [facility]

## 2024-01-04 NOTE — ASSESSMENT & PLAN NOTE
Improving  - denies trauma or injury  - low concern for nephrolithiasis or pyelonephritis as UA unremarkable  - given tylenol 1g in ED  - trial robaxin & lidocaine patch

## 2024-01-05 LAB
ANION GAP SERPL CALC-SCNC: 10 MMOL/L (ref 8–16)
ANION GAP SERPL CALC-SCNC: 15 MMOL/L (ref 8–16)
ANION GAP SERPL CALC-SCNC: 9 MMOL/L (ref 8–16)
BACTERIA BLD CULT: NORMAL
BACTERIA BLD CULT: NORMAL
BASOPHILS # BLD AUTO: 0.01 K/UL (ref 0–0.2)
BASOPHILS NFR BLD: 0.2 % (ref 0–1.9)
BUN SERPL-MCNC: 10 MG/DL (ref 8–23)
BUN SERPL-MCNC: 10 MG/DL (ref 8–23)
BUN SERPL-MCNC: 11 MG/DL (ref 8–23)
CALCIUM SERPL-MCNC: 9.1 MG/DL (ref 8.7–10.5)
CALCIUM SERPL-MCNC: 9.2 MG/DL (ref 8.7–10.5)
CALCIUM SERPL-MCNC: 9.4 MG/DL (ref 8.7–10.5)
CHLORIDE SERPL-SCNC: 91 MMOL/L (ref 95–110)
CHLORIDE SERPL-SCNC: 92 MMOL/L (ref 95–110)
CHLORIDE SERPL-SCNC: 92 MMOL/L (ref 95–110)
CO2 SERPL-SCNC: 24 MMOL/L (ref 23–29)
CO2 SERPL-SCNC: 26 MMOL/L (ref 23–29)
CO2 SERPL-SCNC: 26 MMOL/L (ref 23–29)
CREAT SERPL-MCNC: 0.7 MG/DL (ref 0.5–1.4)
CREAT SERPL-MCNC: 0.7 MG/DL (ref 0.5–1.4)
CREAT SERPL-MCNC: 0.8 MG/DL (ref 0.5–1.4)
DIFFERENTIAL METHOD BLD: ABNORMAL
EOSINOPHIL # BLD AUTO: 0.1 K/UL (ref 0–0.5)
EOSINOPHIL NFR BLD: 2.6 % (ref 0–8)
ERYTHROCYTE [DISTWIDTH] IN BLOOD BY AUTOMATED COUNT: 11.9 % (ref 11.5–14.5)
EST. GFR  (NO RACE VARIABLE): >60 ML/MIN/1.73 M^2
GLUCOSE SERPL-MCNC: 71 MG/DL (ref 70–110)
GLUCOSE SERPL-MCNC: 72 MG/DL (ref 70–110)
GLUCOSE SERPL-MCNC: 91 MG/DL (ref 70–110)
HCT VFR BLD AUTO: 32.9 % (ref 37–48.5)
HGB BLD-MCNC: 11.8 G/DL (ref 12–16)
IMM GRANULOCYTES # BLD AUTO: 0.01 K/UL (ref 0–0.04)
IMM GRANULOCYTES NFR BLD AUTO: 0.2 % (ref 0–0.5)
LYMPHOCYTES # BLD AUTO: 1.9 K/UL (ref 1–4.8)
LYMPHOCYTES NFR BLD: 44.7 % (ref 18–48)
MAGNESIUM SERPL-MCNC: 2 MG/DL (ref 1.6–2.6)
MCH RBC QN AUTO: 32.7 PG (ref 27–31)
MCHC RBC AUTO-ENTMCNC: 35.9 G/DL (ref 32–36)
MCV RBC AUTO: 91 FL (ref 82–98)
MONOCYTES # BLD AUTO: 0.5 K/UL (ref 0.3–1)
MONOCYTES NFR BLD: 12.6 % (ref 4–15)
NEUTROPHILS # BLD AUTO: 1.7 K/UL (ref 1.8–7.7)
NEUTROPHILS NFR BLD: 39.7 % (ref 38–73)
NRBC BLD-RTO: 0 /100 WBC
PHOSPHATE SERPL-MCNC: 3.2 MG/DL (ref 2.7–4.5)
PLATELET # BLD AUTO: 149 K/UL (ref 150–450)
PMV BLD AUTO: 9.2 FL (ref 9.2–12.9)
POTASSIUM SERPL-SCNC: 4.5 MMOL/L (ref 3.5–5.1)
POTASSIUM SERPL-SCNC: 4.5 MMOL/L (ref 3.5–5.1)
POTASSIUM SERPL-SCNC: 4.7 MMOL/L (ref 3.5–5.1)
RBC # BLD AUTO: 3.61 M/UL (ref 4–5.4)
SODIUM SERPL-SCNC: 127 MMOL/L (ref 136–145)
SODIUM SERPL-SCNC: 128 MMOL/L (ref 136–145)
SODIUM SERPL-SCNC: 128 MMOL/L (ref 136–145)
SODIUM SERPL-SCNC: 129 MMOL/L (ref 136–145)
SODIUM SERPL-SCNC: 130 MMOL/L (ref 136–145)
WBC # BLD AUTO: 4.21 K/UL (ref 3.9–12.7)

## 2024-01-05 PROCEDURE — 84100 ASSAY OF PHOSPHORUS: CPT

## 2024-01-05 PROCEDURE — 99233 SBSQ HOSP IP/OBS HIGH 50: CPT | Mod: GC,,, | Performed by: INTERNAL MEDICINE

## 2024-01-05 PROCEDURE — 27000207 HC ISOLATION

## 2024-01-05 PROCEDURE — 85025 COMPLETE CBC W/AUTO DIFF WBC: CPT

## 2024-01-05 PROCEDURE — 83735 ASSAY OF MAGNESIUM: CPT

## 2024-01-05 PROCEDURE — 25000003 PHARM REV CODE 250

## 2024-01-05 PROCEDURE — 36415 COLL VENOUS BLD VENIPUNCTURE: CPT | Mod: XB

## 2024-01-05 PROCEDURE — 21400001 HC TELEMETRY ROOM

## 2024-01-05 PROCEDURE — 80048 BASIC METABOLIC PNL TOTAL CA: CPT | Mod: 91

## 2024-01-05 PROCEDURE — 84295 ASSAY OF SERUM SODIUM: CPT

## 2024-01-05 RX ORDER — TOLVAPTAN 15 MG/1
15 TABLET ORAL DAILY
Qty: 30 TABLET | Refills: 6 | Status: SHIPPED | OUTPATIENT
Start: 2024-01-06 | End: 2024-02-02 | Stop reason: ALTCHOICE

## 2024-01-05 RX ORDER — TOLVAPTAN 15 MG/1
15 TABLET ORAL DAILY
Qty: 1 TABLET | Refills: 0 | Status: ACTIVE | OUTPATIENT
Start: 2024-01-06 | End: 2024-01-05

## 2024-01-05 RX ORDER — HYDRALAZINE HYDROCHLORIDE 25 MG/1
25 TABLET, FILM COATED ORAL EVERY 12 HOURS
Status: DISCONTINUED | OUTPATIENT
Start: 2024-01-05 | End: 2024-01-06 | Stop reason: HOSPADM

## 2024-01-05 RX ORDER — AMLODIPINE BESYLATE 10 MG/1
10 TABLET ORAL DAILY
Status: DISCONTINUED | OUTPATIENT
Start: 2024-01-05 | End: 2024-01-06 | Stop reason: HOSPADM

## 2024-01-05 RX ADMIN — AMLODIPINE BESYLATE 10 MG: 10 TABLET ORAL at 08:01

## 2024-01-05 RX ADMIN — TOLVAPTAN 15 MG: 15 TABLET ORAL at 08:01

## 2024-01-05 RX ADMIN — ACETAMINOPHEN 1000 MG: 500 TABLET ORAL at 09:01

## 2024-01-05 RX ADMIN — HYDRALAZINE HYDROCHLORIDE 25 MG: 25 TABLET, FILM COATED ORAL at 01:01

## 2024-01-05 RX ADMIN — CETIRIZINE HYDROCHLORIDE 10 MG: 10 TABLET, FILM COATED ORAL at 08:01

## 2024-01-05 RX ADMIN — HYDRALAZINE HYDROCHLORIDE 25 MG: 25 TABLET, FILM COATED ORAL at 09:01

## 2024-01-05 RX ADMIN — CHOLECALCIFEROL TAB 25 MCG (1000 UNIT) 1000 UNITS: 25 TAB at 08:01

## 2024-01-05 NOTE — PLAN OF CARE
Problem: Adult Inpatient Plan of Care  Goal: Plan of Care Review  Outcome: Ongoing, Progressing  Goal: Patient-Specific Goal (Individualized)  Outcome: Ongoing, Progressing  Goal: Absence of Hospital-Acquired Illness or Injury  Outcome: Ongoing, Progressing  Goal: Optimal Comfort and Wellbeing  Outcome: Ongoing, Progressing  Goal: Readiness for Transition of Care  Outcome: Ongoing, Progressing     Problem: Infection  Goal: Absence of Infection Signs and Symptoms  Outcome: Ongoing, Progressing     Problem: Fluid and Electrolyte Imbalance (Acute Kidney Injury/Impairment)  Goal: Fluid and Electrolyte Balance  Outcome: Ongoing, Progressing     Problem: Oral Intake Inadequate (Acute Kidney Injury/Impairment)  Goal: Optimal Nutrition Intake  Outcome: Ongoing, Progressing     Problem: Renal Function Impairment (Acute Kidney Injury/Impairment)  Goal: Effective Renal Function  Outcome: Ongoing, Progressing     Problem: Impaired Wound Healing  Goal: Optimal Wound Healing  Outcome: Ongoing, Progressing      none

## 2024-01-05 NOTE — PROGRESS NOTES
"[facility]  Nephrology  Progress Note    Patient Name: Buffy Dominique  MRN: [id number]  Admission Date: 12/31/2023  Hospital Length of Stay: 3 days  Attending Provider: Keren Clancy MD   Primary Care Physician: Odalis Kim MD  Principal Problem:Hyponatremia    Subjective:     HPI: Buffy Dominique  is a 77 yo F with PMHx of HTN, HLD, RA, hyperparathyroidism, chronic hyponatremia, and thoracic aortic aneurysm repair who presented to ED for bilateral low back and leg pain. Patient reports constant, aching back pain, 10/10 in severity that started 2 prior to admission and radiates down bilateral thighs. Patient reports that she experienced similar back pain 2 years ago that was associated with renal failure and that is what prompted her visit to the hospital. Pt reports she only urinates 1-2 times per day and has minimal output but that is baseline for her. She currently is on a "no salt diet and 10 oz fluid restriction" due to hypertension.     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 patient reports long standing hyponatremia, adn tells me that previously it was due to HCTZ which was then discontinued. Sodium on arrival was 127, serum osmolality was ordered. Urine osmolality measured at 525, and urine sodium elevated at 156. She is currently asymptomatic. She denies any nausea, vomiting, pain medications, but does report chronic pain. She tells me that she eats a "balanced diet" but does not eat very much.     Interval History: NAEO, Hypertensive this morning, improving following regular BP meds. Patient feels well and is asking about discharge    Review of patient's allergies indicates:   Allergen Reactions    Methotrexate analogues      Mouth Ulcers     Thiazides Other (See Comments)     hyponatremia     Current Facility-Administered Medications   Medication Frequency    acetaminophen tablet 1,000 mg Q8H PRN    acetaminophen tablet 650 mg Q4H PRN    " albuterol-ipratropium 2.5 mg-0.5 mg/3 mL nebulizer solution 3 mL Q4H PRN    aluminum-magnesium hydroxide-simethicone 200-200-20 mg/5 mL suspension 30 mL QID PRN    amLODIPine tablet 10 mg Daily    artificial tears 0.5 % ophthalmic solution 1 drop TID PRN    bisacodyL suppository 10 mg Daily PRN    cetirizine tablet 10 mg Daily    melatonin tablet 6 mg Nightly PRN    naloxone 0.4 mg/mL injection 0.02 mg PRN    ondansetron disintegrating tablet 8 mg Q8H PRN    polyethylene glycol packet 17 g BID PRN    prochlorperazine injection Soln 5 mg Q6H PRN    simethicone chewable tablet 80 mg QID PRN    sodium chloride 0.9% flush 5 mL PRN    tolvaptan tablet 15 mg Daily    vitamin D 1000 units tablet 1,000 Units Daily       Objective:     Vital Signs (Most Recent):  Temp: 97.5 °F (36.4 °C) (01/05/24 0658)  Pulse: 97 (01/05/24 0701)  Resp: 18 (01/05/24 0658)  BP: (!) 162/71 (01/05/24 0701)  SpO2: 95 % (01/05/24 0701) Vital Signs (24h Range):  Temp:  [97.5 °F (36.4 °C)-98.1 °F (36.7 °C)] 97.5 °F (36.4 °C)  Pulse:  [56-97] 97  Resp:  [17-18] 18  SpO2:  [94 %-97 %] 95 %  BP: (109-195)/(58-78) 162/71     Weight: 50.3 kg (111 lb) (01/04/24 1108)  Body mass index is 19.66 kg/m².  Body surface area is 1.5 meters squared.    No intake/output data recorded.     Physical Exam  Vitals reviewed.   Constitutional:       General: She is not in acute distress.     Appearance: Normal appearance. She is not ill-appearing or toxic-appearing.   HENT:      Head: Atraumatic.      Right Ear: External ear normal.      Left Ear: External ear normal.      Nose: Nose normal.      Mouth/Throat:      Mouth: Mucous membranes are moist.   Eyes:      Extraocular Movements: Extraocular movements intact.   Cardiovascular:      Rate and Rhythm: Normal rate and regular rhythm.      Pulses: Normal pulses.      Heart sounds: Normal heart sounds.   Pulmonary:      Effort: Pulmonary effort is normal. No respiratory distress.      Breath sounds: Normal breath sounds.  No stridor.   Abdominal:      General: Abdomen is flat. There is no distension.      Palpations: Abdomen is soft.      Tenderness: There is no abdominal tenderness. There is right CVA tenderness and left CVA tenderness. There is no guarding.   Musculoskeletal:         General: No swelling. Normal range of motion.      Cervical back: Normal range of motion. No rigidity or tenderness.      Right lower leg: No edema.      Left lower leg: No edema.   Skin:     General: Skin is warm and dry.      Capillary Refill: Capillary refill takes less than 2 seconds.   Neurological:      General: No focal deficit present.      Mental Status: She is alert and oriented to person, place, and time. Mental status is at baseline.   Psychiatric:         Mood and Affect: Mood normal.         Behavior: Behavior normal.         Thought Content: Thought content normal.         Judgment: Judgment normal.          Significant Labs:  All labs within the past 24 hours have been reviewed.     Significant Imaging:  reviewed  Assessment/Plan:     Cardiac/Vascular  Essential hypertension  Per primary    Renal/  * Hyponatremia  See [facility]    *Final recommendations pending staff attestation*    -continue Tolvaptan 15 mg goal correction 4-6 mEq in 24 hrs  -renal fuction panel q 4 hr  -drink to thirst    Endocrine  SIADH (syndrome of inappropriate ADH production)  Patient chronically hyponatremic, urine studies this hospital admission are consistent with SIADH. Patient does report chronic back pain which may potentially explain her SIADH. She does have chlorthalidone on her home medication list, however patient denies taking this medication. Review of her medications show that a 90 day supply was filled on 11/1/23 for Chlorthalidone.     *Final recommendations pending staff attestation*    -Agree with holding Chlorthalidone  -continue Tolvaptan 15 mg goal correction 4-6 mEq in 24 hrs  -renal function panel q 4 hr  -drink to thirst  -Pain control per  primary team.    Orthopedic  Bilateral low back pain  UA unremarkable, does not appear to be a UTI  -Pain control per primary team.        Thank you for your consult. I will follow-up with patient. Please contact us if you have any additional questions.    Andres Tolentino MD  Nephrology  [facility]

## 2024-01-05 NOTE — PLAN OF CARE
[facility]      HOME HEALTH ORDERS  FACE TO FACE ENCOUNTER    Patient Name: Buffy Dominique  YOB: 1945    PCP: Odalis Kim MD   PCP Address: [address] / [place] [address]  PCP Phone Number: [phone]  PCP Fax: [phone]    Encounter Date: 12/31/23    Admit to Home Health    Diagnoses:  Active Hospital Problems    Diagnosis  POA    *Hyponatremia [E87.1]  Yes    COVID-19 [U07.1]  Yes    Hypotension [I95.9]  Yes    Bilateral low back pain [M54.50]  Yes    SIADH (syndrome of inappropriate ADH production) [E22.2]  Yes    CKD (chronic kidney disease) stage 3, GFR 30-59 ml/min [N18.30]  Yes    Seropositive rheumatoid arthritis [M05.9]  Yes    Former tobacco use [Z87.891]  Not Applicable    HLD (hyperlipidemia) [E78.5]  Yes    Essential hypertension [I10]  Yes    History of thoracic aortic aneurysm repair [Z98.890, Z86.79]  Not Applicable      Resolved Hospital Problems   No resolved problems to display.       Follow Up Appointments:  Future Appointments   Date Time Provider Department Center   1/8/2024  2:30 PM Horacio Cui MD [facility] RHEUM [facility]   1/22/2024  2:00 PM Odalis Kim MD [facility] IM [facility]   2/12/2024  9:00 AM Gina Yañez MD [facility] DERM [facility]       Allergies:  Review of patient's allergies indicates:   Allergen Reactions    Methotrexate analogues      Mouth Ulcers     Thiazides Other (See Comments)     hyponatremia       Medications: Review discharge medications with patient and family and provide education.    Current Facility-Administered Medications   Medication Dose Route Frequency Provider Last Rate Last Admin    acetaminophen tablet 1,000 mg  1,000 mg Oral Q8H PRN Lisa Forbes PA-C   1,000 mg at 01/01/24 0042    acetaminophen tablet 650 mg  650 mg Oral Q4H PRN Lisa Forbes PA-C   650 mg at 01/04/24 2158    albuterol-ipratropium 2.5 mg-0.5 mg/3 mL nebulizer solution 3 mL  3 mL Nebulization Q4H PRN Leidy  RAJENDRA Gonzalez        aluminum-magnesium hydroxide-simethicone 200-200-20 mg/5 mL suspension 30 mL  30 mL Oral QID PRN Lisa Forbes PA-C        amLODIPine tablet 10 mg  10 mg Oral Daily Diann Rodriguez PA-C   10 mg at 01/05/24 0830    artificial tears 0.5 % ophthalmic solution 1 drop  1 drop Both Eyes TID PRN Diana Kinney PA-C        bisacodyL suppository 10 mg  10 mg Rectal Daily PRN Lisa Forbes PA-C        cetirizine tablet 10 mg  10 mg Oral Daily Vicky Zayas PA-C   10 mg at 01/05/24 0830    melatonin tablet 6 mg  6 mg Oral Nightly PRN Lisa Forbes PA-C   6 mg at 01/03/24 2042    naloxone 0.4 mg/mL injection 0.02 mg  0.02 mg Intravenous PRN Lisa Forbes PA-C        ondansetron disintegrating tablet 8 mg  8 mg Oral Q8H PRN Lisa Forbes PA-C        polyethylene glycol packet 17 g  17 g Oral BID PRN Lisa Forbes PA-C        prochlorperazine injection Soln 5 mg  5 mg Intravenous Q6H PRN Lisa Forbes PA-C        simethicone chewable tablet 80 mg  1 tablet Oral QID PRN Lisa Forbes PA-C        sodium chloride 0.9% flush 5 mL  5 mL Intravenous PRN Lisa Forbes PA-C        tolvaptan tablet 15 mg  15 mg Oral Daily Vicky Zyaas PA-C   15 mg at 01/05/24 0830    vitamin D 1000 units tablet 1,000 Units  1,000 Units Oral Daily Vicky Zayas PA-C   1,000 Units at 01/05/24 0830     Current Discharge Medication List        START taking these medications    Details   artificial tears (ISOPTO TEARS) 0.5 % ophthalmic solution Place 1 drop into both eyes as needed (for dry eyes).  Qty: 15 mL, Refills: 5      pulse oximeter (PULSE OXIMETER) device by Apply Externally route 2 (two) times a day. Use twice daily at 8 AM and 3 PM and [facility].  Qty: 1 each, Refills: 0      tolvaptan (SAMSCA) 15 mg Tab Take 1 tablet (15 mg total) by mouth once daily.  Qty: 30 tablet, Refills: 6    Associated Diagnoses: SIADH (syndrome of inappropriate ADH  production)           CONTINUE these medications which have NOT CHANGED    Details   adalimumab (HUMIRA PEN) PnKt injection Inject 1 pen  (40 mg total) into the skin every 14 (fourteen) days.  Qty: 6 pen , Refills: 3    Associated Diagnoses: Rheumatoid arthritis involving multiple sites with positive rheumatoid factor      alendronate (FOSAMAX) 70 MG tablet Take 1 tablet (70 mg total) by mouth every 7 days.  Qty: 4 tablet, Refills: 11    Associated Diagnoses: Osteoporosis, unspecified osteoporosis type, unspecified pathological fracture presence      amLODIPine (NORVASC) 10 MG tablet TAKE 1 TABLET(10 MG) BY MOUTH EVERY DAY  Qty: 90 tablet, Refills: 0    Comments: .  Associated Diagnoses: Essential hypertension      cholecalciferol, vitamin D3, (VITAMIN D3) 25 mcg (1,000 unit) capsule Take 1 capsule (1,000 Units total) by mouth once daily.  Qty: 90 capsule, Refills: 3    Associated Diagnoses: Osteoporosis, unspecified osteoporosis type, unspecified pathological fracture presence      HUMIRA 40 mg/0.8 mL SyKt injection SMARTSI Pre-Filled Pen Syringe Topical Once a Week      hydrALAZINE (APRESOLINE) 25 MG tablet TAKE 1 TABLET(25 MG) BY MOUTH EVERY 12 HOURS  Qty: 60 tablet, Refills: 2    Associated Diagnoses: Essential hypertension           STOP taking these medications       chlorthalidone (HYGROTEN) 25 MG Tab Comments:   Reason for Stopping:         olmesartan (BENICAR) 40 MG tablet Comments:   Reason for Stopping:                 I have seen and examined this patient within the last 30 days. My clinical findings that support the need for the home health skilled services and home bound status are the following:no   Weakness/numbness causing balance and gait disturbance due to Weakness/Debility making it taxing to leave home.     Diet:   renal diet    Labs:  SN to perform labs:  CMP: Weekly; 3 week(s)    Referrals/ Consults  Physical Therapy to evaluate and treat. Evaluate for home safety and equipment needs;  Establish/upgrade home exercise program. Perform / instruct on therapeutic exercises, gait training, transfer training, and Range of Motion.  Occupational Therapy to evaluate and treat. Evaluate home environment for safety and equipment needs. Perform/Instruct on transfers, ADL training, ROM, and therapeutic exercises.   to evaluate for community resources/long-range planning.  Aide to provide assistance with personal care, ADLs, and vital signs.    Activities:   activity as tolerated    Nursing:   Agency to admit patient within 24 hours of hospital discharge unless specified on physician order or at patient request    SN to complete comprehensive assessment including routine vital signs. Instruct on disease process and s/s of complications to report to MD. Review/verify medication list sent home with the patient at time of discharge  and instruct patient/caregiver as needed. Frequency may be adjusted depending on start of care date.     Skilled nurse to perform up to 3 visits PRN for symptoms related to diagnosis    Notify MD if SBP > 160 or < 90; DBP > 90 or < 50; HR > 120 or < 50; Temp > 101; O2 < 88%    Ok to schedule additional visits based on staff availability and patient request on consecutive days within the home health episode.    When multiple disciplines ordered:    Start of Care occurs on Sunday - Wednesday schedule remaining discipline evaluations as ordered on separate consecutive days following the start of care.    Thursday SOC -schedule subsequent evaluations Friday and Monday the following week.     Friday - Saturday SOC - schedule subsequent discipline evaluations on consecutive days starting Monday of the following week.    For all post-discharge communication and subsequent orders please contact patient's primary care physician. If unable to reach primary care physician or do not receive response within 30 minutes, please contact [facility] for clinical staff order  clarification    Miscellaneous   Diabetic Care:   SN to perform and educate Diabetic management with blood glucose monitoring:, Fingerstick blood sugar AC and HS, and Report CBG < 60 or > 350 to physician.    Home Health Aide:  Nursing Twice weekly, Physical Therapy Three times weekly, Occupational Therapy Three times weekly, and Home Health Aide Twice weekly    Wound Care Orders  no    I certify that this patient is confined to her home and needs intermittent skilled nursing care, physical therapy, and occupational therapy.      Diann Rodriguez PA-C  Department of Hospital Medicine   [facility] [address]

## 2024-01-05 NOTE — ASSESSMENT & PLAN NOTE
Patient chronically hyponatremic, urine studies this hospital admission are consistent with SIADH. Patient does report chronic back pain which may potentially explain her SIADH. She does have chlorthalidone on her home medication list, however patient denies taking this medication. Review of her medications show that a 90 day supply was filled on 11/1/23 for Chlorthalidone.     *Final recommendations pending staff attestation*    -Agree with holding Chlorthalidone  -continue Tolvaptan 15 mg goal correction 4-6 mEq in 24 hrs  -renal function panel q 4 hr  -drink to thirst  -Pain control per primary team.

## 2024-01-05 NOTE — ASSESSMENT & PLAN NOTE
- Hold antihypertensives in the setting of +orthostatics   - Permissive blood pressure between 140-160s  - restarting home meds

## 2024-01-05 NOTE — PROGRESS NOTES
[facility] [address] [place] [address] Medicine  Progress Note    Patient Name: Buffy Dominique  MRN: [id number]  Patient Class: IP- Inpatient   Admission Date: 12/31/2023  Length of Stay: 3 days  Attending Physician: Keren Clancy MD  Primary Care Provider: Odalis Kim MD        Subjective:     Principal Problem:Hyponatremia        HPI:  Buffy Dominique  is a 79 yo F with PMHx of CKD3, HTN, HLD, RA, hyperparathyroidism, chronic hyponatremia, and thoracic aortic aneurysm repair who presented to ED for bilateral low back pain. Patient reports constant, aching back pain, 10/10 in severity that started 2 days ago and radiates down bilateral thighs. Her pain was greatly relieved by 1,000 mg of tylenol and is now 3/10 in severity. She denies aggravating factors. She was concerned her back pain may be secondary to renal failure because her last episode of pain occurred with renal failure. Pt reports she only urinates 1-2 times per day and has minimal output but that is baseline for her. Of note, she was previously hospitalized with hyponatremia determined to be related to chlorthalidone use and SIADH. She reports a former provider her told her to restrict her water intake to 10 ounces per day. She denies fever, chills, chest pain, palpitations, SOB, cough, abdominal pain, n/v/d, dysuria, headaches and LE swelling.     In ED: Afebrile. Hypotensive to 92/47>> improved to 116/58 s/p 1L NS. O2 sats dropping to low 90s when patient sleeping. No leukocytosis. Hgb 11.7. Na 127 (baseline low 130s). Cr 0.8 (at baseline). CMP otherwise unremarkable. UA noninfectious.  CXR without acute process. Given 1g tylenol. Placed in observation.     Overview/Hospital Course:  Ms. Dominique was placed in observation for hyponatremia and hypotension in the setting of COVID-19, SIADH, and orthostatic hypotension. Home antihypertensives (amlodipine, chlorthalidone, and hydralazine) were held at admission and blood pressure is improving. Nephrology was  consulted for SIADH and recommended fluid restriction of 1L and salt tabs, which was later discontinued in favor of tolvaptan.The patient adamantly denied her diagnosis of COVID-19 at admission and refused treatment despite an at-length conversation regarding risks associated with non-treatment.     On evening of 1/2/23, patient had an episode of orthostatic syncope with head trauma after standing up from having a  large bowel movement. AOX4 without any changes in neuro exam. CTH with scalp hematoma without evidence of fracture, intracranial hemorrhage, or sulcal effacement. Orthostatics improved.    Plan to discharge with PCP and nephrology follow-up when medically ready. Plan to discharge on tolvaptan.     Interval History: Patient seen and assessed at bedside. Afebrile, hypertensive, otherwise VSS. Restarted BP medications. Orthostatics improved. Planned for discharge today with urea + sglt2i, but ran into problems with getting urea and insurance coverage. Will stay to receive prescription of tolvaptan from special pharmacy. Patient medically ready for discharge.     Review of Systems   Constitutional:  Negative for activity change, chills and fever.   HENT:  Negative for trouble swallowing.    Eyes:  Negative for photophobia and visual disturbance.   Respiratory:  Negative for cough, chest tightness and shortness of breath.    Cardiovascular:  Negative for chest pain, palpitations and leg swelling.   Gastrointestinal:  Negative for abdominal pain, constipation, diarrhea, nausea and vomiting.   Genitourinary:  Positive for decreased urine volume and difficulty urinating. Negative for dysuria, frequency and hematuria.   Musculoskeletal:  Positive for arthralgias. Negative for gait problem and neck pain.   Skin:  Negative for rash and wound.   Neurological:  Negative for syncope and speech difficulty.   Psychiatric/Behavioral:  Negative for agitation and confusion. The patient is not nervous/anxious.       Objective:     Vital Signs (Most Recent):  Temp: 98.1 °F (36.7 °C) (01/05/24 1118)  Pulse: 82 (01/05/24 1300)  Resp: 17 (01/05/24 1118)  BP: (!) 177/74 (01/05/24 1300)  SpO2: 95 % (01/05/24 1300) Vital Signs (24h Range):  Temp:  [97.5 °F (36.4 °C)-98.1 °F (36.7 °C)] 98.1 °F (36.7 °C)  Pulse:  [56-97] 82  Resp:  [17-18] 17  SpO2:  [94 %-97 %] 95 %  BP: (162-195)/(71-79) 177/74     Weight: 50.3 kg (111 lb)  Body mass index is 19.66 kg/m².  No intake or output data in the 24 hours ending 01/05/24 1644      Physical Exam  Vitals and nursing note reviewed.   Constitutional:       General: She is not in acute distress.     Appearance: She is well-developed.   HENT:      Head: Normocephalic.        Mouth/Throat:      Mouth: Mucous membranes are dry.   Eyes:      Extraocular Movements: Extraocular movements intact.      Conjunctiva/sclera: Conjunctivae normal.   Cardiovascular:      Rate and Rhythm: Normal rate and regular rhythm.      Heart sounds: Normal heart sounds.   Pulmonary:      Effort: Pulmonary effort is normal. No respiratory distress.      Breath sounds: Normal breath sounds. No wheezing.   Abdominal:      General: Bowel sounds are normal. There is no distension.      Palpations: Abdomen is soft.      Tenderness: There is no abdominal tenderness.   Musculoskeletal:         General: No tenderness. Normal range of motion.      Cervical back: Normal range of motion and neck supple.   Lymphadenopathy:      Cervical: No cervical adenopathy.   Skin:     General: Skin is warm and dry.      Capillary Refill: Capillary refill takes less than 2 seconds.      Findings: Bruising present. No rash.   Neurological:      Mental Status: She is alert and oriented to person, place, and time.      Cranial Nerves: No cranial nerve deficit.      Sensory: No sensory deficit.      Coordination: Coordination normal.   Psychiatric:         Behavior: Behavior normal.         Thought Content: Thought content normal.         Judgment:  Judgment normal.             Significant Labs: All pertinent labs within the past 24 hours have been reviewed.    Significant Imaging: I have reviewed all pertinent imaging results/findings within the past 24 hours.    Assessment/Plan:      * Hyponatremia  SIADH  Patient has hyponatremia which is uncontrolled. We will aim to correct the sodium by 4-6mEq in 24 hours. We will monitor sodium Daily. We will obtain the following studies: Urine sodium, urine osmolality, serum osmolality or TSH, T4. We will treat the hyponatremia with Fluid restriction of: 1 liter per day. The patient's sodium results have been reviewed and are listed below.  Recent Labs   Lab 01/05/24  1110   *  129*     - baseline Na 130s  - given 1L NS in ED for hypotension   - CT chest pending to r/o SCC  - Nephrology consulted; appreciate recs:  -Agree with holding Chlorthalidone  -start Tolvaptan 15 mg goal correction 4-6 mEq in 24 hrs  -Discontinue salt tablets  -renal function panel q 4 hr  -drink to thirst  - patient medically ready for discharge    -plan to continue tolvaptan due to issues with urea + sglt2i   -patient staying to receive prescription for tolvaptan    COVID-19  Patient is identified as Severe COVID-19 based on hypoxemia with O2 saturations <94% on room air or on ambulation   Initiate standard COVID protocols; COVID-19 testing ,Infection Control notification  and isolation- respiratory, contact and droplet per protocol    Diagnostics: CBC, CMP, Ferritin, CRP, and Portable CXR    Management: Maintain oxygen saturations 92-96% via Nasal Cannula  LPM and monitor with continuous/intermittent pulse oximetry. , Inhaled bronchodilators as needed for shortness of breath., and Continuous cardiac monitoring.    Advance Care Planning Current advance care plan has not been discussed with patient/family/POA and patient currently wishes Full Code.     - Lengthy discussion regarding transmission, symptoms, results, risks and benefits of  treatment, etc performed at bedside. The patient adamantly denies that she has COVID-19 and adamantly refuses treatment despite active hypoxia and symptoms. She expressed clear understanding of risks of non-treatment.     Bilateral low back pain  Improving  - denies trauma or injury  - low concern for nephrolithiasis or pyelonephritis as UA unremarkable  - given tylenol 1g in ED  - trial robaxin & lidocaine patch    Hypotension  Orthostatic hypotension with syncope  - likely 2/2 hypovolemia in setting of fluid restriction   - infectious workup negative thus far.  - Blood cx and lactic wnl  - given 1L IVF in ED with improvement in BP  - hold home amlodipine, benicar, hydralazine   -resumed amlodipine and hydralazine  - echo below  - monitor closely   - use gentle IVFs PRN    Results for orders placed during the hospital encounter of 12/31/23    Echo    Interpretation Summary    Left Ventricle: The left ventricle is normal in size. Normal wall thickness. There is concentric remodeling. Normal wall motion. There is normal systolic function with a visually estimated ejection fraction of 60 - 65%. There is normal diastolic function.    Right Ventricle: Normal right ventricular cavity size. Wall thickness is normal. Right ventricle wall motion  is normal. Systolic function is normal.    Left Atrium: Left atrium is mildly dilated.    Aortic Valve: The aortic valve is a trileaflet valve. There is mild aortic regurgitation.    Mitral Valve: There is mild posterior mitral annular calcification present. There is mild regurgitation.    Aorta: Atherosclerosis of the mildly ectatic abdominal aorta is noted.    Pulmonary Artery: The estimated pulmonary artery systolic pressure is 27 mmHg.    IVC/SVC: Normal venous pressure at 3 mmHg.      CKD (chronic kidney disease) stage 3, GFR 30-59 ml/min  - Cr 0.8 on admit, at baseline   - renally dose meds  - nephrology consulted, appreciate recs  - avoid nephrotoxins  - monitor with daily  bmp    Former tobacco use  - quit 2009    Seropositive rheumatoid arthritis  - receives humira injections every 14 days  - follows with rheumatology     HLD (hyperlipidemia)  - lipid panel wnl  - statin not indicated at this time     History of thoracic aortic aneurysm repair  - noted    Essential hypertension  - Hold antihypertensives in the setting of +orthostatics   - Permissive blood pressure between 140-160s  - restarting home meds      VTE Risk Mitigation (From admission, onward)           Ordered     IP VTE HIGH RISK PATIENT  Once         12/31/23 0957                    Discharge Planning   MARCELLA: 1/5/2024     Code Status: Full Code   Is the patient medically ready for discharge?: Yes    Reason for patient still in hospital (select all that apply): Laboratory test and Pending disposition  Discharge Plan A: Home                  Diann Rodriguez PA-C  Department of Hospital Medicine   [facility] 11[facility]

## 2024-01-05 NOTE — ASSESSMENT & PLAN NOTE
Orthostatic hypotension with syncope  - likely 2/2 hypovolemia in setting of fluid restriction   - infectious workup negative thus far.  - Blood cx and lactic wnl  - given 1L IVF in ED with improvement in BP  - hold home amlodipine, benicar, hydralazine   -resumed amlodipine and hydralazine  - echo below  - monitor closely   - use gentle IVFs PRN    Results for orders placed during the hospital encounter of 12/31/23    Echo    Interpretation Summary    Left Ventricle: The left ventricle is normal in size. Normal wall thickness. There is concentric remodeling. Normal wall motion. There is normal systolic function with a visually estimated ejection fraction of 60 - 65%. There is normal diastolic function.    Right Ventricle: Normal right ventricular cavity size. Wall thickness is normal. Right ventricle wall motion  is normal. Systolic function is normal.    Left Atrium: Left atrium is mildly dilated.    Aortic Valve: The aortic valve is a trileaflet valve. There is mild aortic regurgitation.    Mitral Valve: There is mild posterior mitral annular calcification present. There is mild regurgitation.    Aorta: Atherosclerosis of the mildly ectatic abdominal aorta is noted.    Pulmonary Artery: The estimated pulmonary artery systolic pressure is 27 mmHg.    IVC/SVC: Normal venous pressure at 3 mmHg.

## 2024-01-05 NOTE — SUBJECTIVE & OBJECTIVE
Interval History: Patient seen and assessed at bedside. Afebrile, hypertensive, otherwise VSS. Restarted BP medications. Orthostatics improved. Planned for discharge today with urea + sglt2i, but ran into problems with getting urea and insurance coverage. Will stay to receive prescription of tolvaptan from special pharmacy. Patient medically ready for discharge.     Review of Systems   Constitutional:  Negative for activity change, chills and fever.   HENT:  Negative for trouble swallowing.    Eyes:  Negative for photophobia and visual disturbance.   Respiratory:  Negative for cough, chest tightness and shortness of breath.    Cardiovascular:  Negative for chest pain, palpitations and leg swelling.   Gastrointestinal:  Negative for abdominal pain, constipation, diarrhea, nausea and vomiting.   Genitourinary:  Positive for decreased urine volume and difficulty urinating. Negative for dysuria, frequency and hematuria.   Musculoskeletal:  Positive for arthralgias. Negative for gait problem and neck pain.   Skin:  Negative for rash and wound.   Neurological:  Negative for syncope and speech difficulty.   Psychiatric/Behavioral:  Negative for agitation and confusion. The patient is not nervous/anxious.      Objective:     Vital Signs (Most Recent):  Temp: 98.1 °F (36.7 °C) (01/05/24 1118)  Pulse: 82 (01/05/24 1300)  Resp: 17 (01/05/24 1118)  BP: (!) 177/74 (01/05/24 1300)  SpO2: 95 % (01/05/24 1300) Vital Signs (24h Range):  Temp:  [97.5 °F (36.4 °C)-98.1 °F (36.7 °C)] 98.1 °F (36.7 °C)  Pulse:  [56-97] 82  Resp:  [17-18] 17  SpO2:  [94 %-97 %] 95 %  BP: (162-195)/(71-79) 177/74     Weight: 50.3 kg (111 lb)  Body mass index is 19.66 kg/m².  No intake or output data in the 24 hours ending 01/05/24 1644      Physical Exam  Vitals and nursing note reviewed.   Constitutional:       General: She is not in acute distress.     Appearance: She is well-developed.   HENT:      Head: Normocephalic.        Mouth/Throat:      Mouth:  Mucous membranes are dry.   Eyes:      Extraocular Movements: Extraocular movements intact.      Conjunctiva/sclera: Conjunctivae normal.   Cardiovascular:      Rate and Rhythm: Normal rate and regular rhythm.      Heart sounds: Normal heart sounds.   Pulmonary:      Effort: Pulmonary effort is normal. No respiratory distress.      Breath sounds: Normal breath sounds. No wheezing.   Abdominal:      General: Bowel sounds are normal. There is no distension.      Palpations: Abdomen is soft.      Tenderness: There is no abdominal tenderness.   Musculoskeletal:         General: No tenderness. Normal range of motion.      Cervical back: Normal range of motion and neck supple.   Lymphadenopathy:      Cervical: No cervical adenopathy.   Skin:     General: Skin is warm and dry.      Capillary Refill: Capillary refill takes less than 2 seconds.      Findings: Bruising present. No rash.   Neurological:      Mental Status: She is alert and oriented to person, place, and time.      Cranial Nerves: No cranial nerve deficit.      Sensory: No sensory deficit.      Coordination: Coordination normal.   Psychiatric:         Behavior: Behavior normal.         Thought Content: Thought content normal.         Judgment: Judgment normal.             Significant Labs: All pertinent labs within the past 24 hours have been reviewed.    Significant Imaging: I have reviewed all pertinent imaging results/findings within the past 24 hours.

## 2024-01-05 NOTE — SUBJECTIVE & OBJECTIVE
Interval History: NAEO, Hypertensive this morning, improving following regular BP meds. Patient feels well and is asking about discharge    Review of patient's allergies indicates:   Allergen Reactions    Methotrexate analogues      Mouth Ulcers     Thiazides Other (See Comments)     hyponatremia     Current Facility-Administered Medications   Medication Frequency    acetaminophen tablet 1,000 mg Q8H PRN    acetaminophen tablet 650 mg Q4H PRN    albuterol-ipratropium 2.5 mg-0.5 mg/3 mL nebulizer solution 3 mL Q4H PRN    aluminum-magnesium hydroxide-simethicone 200-200-20 mg/5 mL suspension 30 mL QID PRN    amLODIPine tablet 10 mg Daily    artificial tears 0.5 % ophthalmic solution 1 drop TID PRN    bisacodyL suppository 10 mg Daily PRN    cetirizine tablet 10 mg Daily    melatonin tablet 6 mg Nightly PRN    naloxone 0.4 mg/mL injection 0.02 mg PRN    ondansetron disintegrating tablet 8 mg Q8H PRN    polyethylene glycol packet 17 g BID PRN    prochlorperazine injection Soln 5 mg Q6H PRN    simethicone chewable tablet 80 mg QID PRN    sodium chloride 0.9% flush 5 mL PRN    tolvaptan tablet 15 mg Daily    vitamin D 1000 units tablet 1,000 Units Daily       Objective:     Vital Signs (Most Recent):  Temp: 97.5 °F (36.4 °C) (01/05/24 0658)  Pulse: 97 (01/05/24 0701)  Resp: 18 (01/05/24 0658)  BP: (!) 162/71 (01/05/24 0701)  SpO2: 95 % (01/05/24 0701) Vital Signs (24h Range):  Temp:  [97.5 °F (36.4 °C)-98.1 °F (36.7 °C)] 97.5 °F (36.4 °C)  Pulse:  [56-97] 97  Resp:  [17-18] 18  SpO2:  [94 %-97 %] 95 %  BP: (109-195)/(58-78) 162/71     Weight: 50.3 kg (111 lb) (01/04/24 1108)  Body mass index is 19.66 kg/m².  Body surface area is 1.5 meters squared.    No intake/output data recorded.     Physical Exam  Vitals reviewed.   Constitutional:       General: She is not in acute distress.     Appearance: Normal appearance. She is not ill-appearing or toxic-appearing.   HENT:      Head: Atraumatic.      Right Ear: External ear  normal.      Left Ear: External ear normal.      Nose: Nose normal.      Mouth/Throat:      Mouth: Mucous membranes are moist.   Eyes:      Extraocular Movements: Extraocular movements intact.   Cardiovascular:      Rate and Rhythm: Normal rate and regular rhythm.      Pulses: Normal pulses.      Heart sounds: Normal heart sounds.   Pulmonary:      Effort: Pulmonary effort is normal. No respiratory distress.      Breath sounds: Normal breath sounds. No stridor.   Abdominal:      General: Abdomen is flat. There is no distension.      Palpations: Abdomen is soft.      Tenderness: There is no abdominal tenderness. There is right CVA tenderness and left CVA tenderness. There is no guarding.   Musculoskeletal:         General: No swelling. Normal range of motion.      Cervical back: Normal range of motion. No rigidity or tenderness.      Right lower leg: No edema.      Left lower leg: No edema.   Skin:     General: Skin is warm and dry.      Capillary Refill: Capillary refill takes less than 2 seconds.   Neurological:      General: No focal deficit present.      Mental Status: She is alert and oriented to person, place, and time. Mental status is at baseline.   Psychiatric:         Mood and Affect: Mood normal.         Behavior: Behavior normal.         Thought Content: Thought content normal.         Judgment: Judgment normal.          Significant Labs:  All labs within the past 24 hours have been reviewed.     Significant Imaging:  reviewed

## 2024-01-05 NOTE — PLAN OF CARE
01/05/24 1246   Post-Acute Status   Post-Acute Authorization Home Health   Home Health Status Referrals Sent     Pt is expected to discharge home with home health. SW sent referrals via [facility] and is waiting for an accepting agency.    SHAHRAM will continue to follow up.      Lupis Hutchins LMSW  [facility]  Ext. [phone]

## 2024-01-05 NOTE — ASSESSMENT & PLAN NOTE
SIADH  Patient has hyponatremia which is uncontrolled. We will aim to correct the sodium by 4-6mEq in 24 hours. We will monitor sodium Daily. We will obtain the following studies: Urine sodium, urine osmolality, serum osmolality or TSH, T4. We will treat the hyponatremia with Fluid restriction of: 1 liter per day. The patient's sodium results have been reviewed and are listed below.  Recent Labs   Lab 01/05/24  1110   *  129*     - baseline Na 130s  - given 1L NS in ED for hypotension   - CT chest pending to r/o SCC  - Nephrology consulted; appreciate recs:  -Agree with holding Chlorthalidone  -start Tolvaptan 15 mg goal correction 4-6 mEq in 24 hrs  -Discontinue salt tablets  -renal function panel q 4 hr  -drink to thirst  - patient medically ready for discharge    -plan to continue tolvaptan due to issues with urea + sglt2i   -patient staying to receive prescription for tolvaptan

## 2024-01-05 NOTE — PLAN OF CARE
01/05/24 1332   Post-Acute Status   Post-Acute Authorization Home Health   Home Health Status Set-up Complete/Auth obtained     SW received notification that pt has been accepted with [facility] .    SHAHRAM faxed  authorization request to N for review and approval.      Lupis Hutchins LMSW  [facility]  Ext. [phone]

## 2024-01-06 VITALS
SYSTOLIC BLOOD PRESSURE: 158 MMHG | RESPIRATION RATE: 16 BRPM | HEART RATE: 80 BPM | OXYGEN SATURATION: 96 % | HEIGHT: 63 IN | DIASTOLIC BLOOD PRESSURE: 73 MMHG | WEIGHT: 111 LBS | TEMPERATURE: 98 F | BODY MASS INDEX: 19.67 KG/M2

## 2024-01-06 LAB
ANION GAP SERPL CALC-SCNC: 15 MMOL/L (ref 8–16)
BUN SERPL-MCNC: 11 MG/DL (ref 8–23)
CALCIUM SERPL-MCNC: 9.3 MG/DL (ref 8.7–10.5)
CHLORIDE SERPL-SCNC: 91 MMOL/L (ref 95–110)
CO2 SERPL-SCNC: 22 MMOL/L (ref 23–29)
CREAT SERPL-MCNC: 0.7 MG/DL (ref 0.5–1.4)
EST. GFR  (NO RACE VARIABLE): >60 ML/MIN/1.73 M^2
GLUCOSE SERPL-MCNC: 72 MG/DL (ref 70–110)
POTASSIUM SERPL-SCNC: 4.1 MMOL/L (ref 3.5–5.1)
SODIUM SERPL-SCNC: 128 MMOL/L (ref 136–145)

## 2024-01-06 PROCEDURE — 25000003 PHARM REV CODE 250

## 2024-01-06 PROCEDURE — 80048 BASIC METABOLIC PNL TOTAL CA: CPT

## 2024-01-06 PROCEDURE — 36415 COLL VENOUS BLD VENIPUNCTURE: CPT

## 2024-01-06 RX ADMIN — HYDRALAZINE HYDROCHLORIDE 25 MG: 25 TABLET, FILM COATED ORAL at 06:01

## 2024-01-06 RX ADMIN — TOLVAPTAN 15 MG: 15 TABLET ORAL at 08:01

## 2024-01-06 RX ADMIN — CETIRIZINE HYDROCHLORIDE 10 MG: 10 TABLET, FILM COATED ORAL at 08:01

## 2024-01-06 NOTE — PLAN OF CARE
Patient appointments added to AVS. SW faxed HH auth to [facility]. Patient will transport home via [facility]. All CM needs have been met.    01/06/24 0842   Final Note   Assessment Type Final Discharge Note   Anticipated Discharge Disposition Home   Hospital Resources/Appts/Education Provided Provided patient/caregiver with written discharge plan information;Appointments scheduled and added to AVS   Post-Acute Status   Post-Acute Authorization Home Health   Home Health Status Set-up Complete/Auth obtained   Discharge Delays None known at this time     [facility]  Discharge Final Note    Primary Care Provider: Odalis Kim MD    Expected Discharge Date: 1/6/2024    Final Discharge Note (most recent)       Final Note - 01/06/24 0842          Final Note    Assessment Type Final Discharge Note     Anticipated Discharge Disposition Home or Self Care     Hospital Resources/Appts/Education Provided Provided patient/caregiver with written discharge plan information;Appointments scheduled and added to AVS        Post-Acute Status    Post-Acute Authorization Home Health (P)      Home Health Status Set-up Complete/Auth obtained (P)      Discharge Delays None known at this time (P)                      Important Message from Medicare  Important Message from Medicare regarding Discharge Appeal Rights: Explained to patient/caregiver, Signed/date by patient/caregiver, Other (comments) (phone/verbal      witness Estrellita DIOP)     Date IMM was signed: 01/05/24  Time IMM was signed: 1130    Contact Info       Odalis Kim MD   Specialty: Internal Medicine   Relationship: PCP - General  Hypertension Digital Medicine Responsible Provider  Hyperlipidemia Digital Medicine Responsible Provider    [address] [facility] [address]   Phone: [phone]       Next Steps: Follow up in 1 week(s)    [facility]   Specialty: Emergency Medicine    [address] [facility] [address]   Phone:  [phone]       Next Steps: Follow up    Instructions: As needed          Rachel Rai LMSW

## 2024-01-06 NOTE — PLAN OF CARE
01/06/24 0820   Post-Acute Status   Post-Acute Authorization Home Health   Home Health Status Set-up Complete/Auth obtained   Hospital Resources/Appts/Education Provided Appointment suggestion unavailable   Discharge Delays None known at this time   Discharge Plan   Discharge Plan A Home Health  ([facility] Phone: [phone])   Discharge Plan B Home      Patients plan is to dc home with home health  No changes in DC plans. MARCELLA: 01/06/24    HH orders  uploaded in Care Port per assigned CM    CM arranged for patient to dc home with Is[facility] Trevino is arriving   [phone]  [facility]  [id number]  [employer]  [id number]   Total: $ 22.00            Drea Lester RN  Case Management  [facility]  [phone]

## 2024-01-06 NOTE — PLAN OF CARE
Problem: Fall Injury Risk  Goal: Absence of Fall and Fall-Related Injury  Outcome: Met     Problem: Pain Acute  Goal: Acceptable Pain Control and Functional Ability  Outcome: Met     Problem: Hypertension Acute  Goal: Blood Pressure Within Desired Range  Outcome: Met     Problem: Skin Injury Risk Increased  Goal: Skin Health and Integrity  Outcome: Met

## 2024-01-06 NOTE — PLAN OF CARE
Problem: Fall Injury Risk  Goal: Absence of Fall and Fall-Related Injury  Outcome: Ongoing, Progressing     Problem: Pain Acute  Goal: Acceptable Pain Control and Functional Ability  Outcome: Ongoing, Progressing     Problem: Hypertension Acute  Goal: Blood Pressure Within Desired Range  Outcome: Ongoing, Progressing     Problem: Skin Injury Risk Increased  Goal: Skin Health and Integrity  Outcome: Ongoing, Progressing

## 2024-01-06 NOTE — DISCHARGE SUMMARY
[facility] - Observation [address] [place] [address] Medicine  Discharge Summary      Patient Name: Buffy Dominique  MRN: [id number]  JUAN: [id number]  Patient Class: IP- Inpatient  Admission Date: 12/31/2023  Hospital Length of Stay: 4 days  Discharge Date and Time: 1/6/2024 10:05 AM  Attending Physician: Myranda att. providers found   Discharging Provider: Diann Rodriguez PA-C  Primary Care Provider: Odalis Kim MD  [facility] Medicine Team: [facility] [place] [facility] HOSP MED Y Diann Rodriguez PA-C  Primary Care Team: [facility] MED Y    HPI:   Buffy Dominique  is a 79 yo F with PMHx of CKD3, HTN, HLD, RA, hyperparathyroidism, chronic hyponatremia, and thoracic aortic aneurysm repair who presented to ED for bilateral low back pain. Patient reports constant, aching back pain, 10/10 in severity that started 2 days ago and radiates down bilateral thighs. Her pain was greatly relieved by 1,000 mg of tylenol and is now 3/10 in severity. She denies aggravating factors. She was concerned her back pain may be secondary to renal failure because her last episode of pain occurred with renal failure. Pt reports she only urinates 1-2 times per day and has minimal output but that is baseline for her. Of note, she was previously hospitalized with hyponatremia determined to be related to chlorthalidone use and SIADH. She reports a former provider her told her to restrict her water intake to 10 ounces per day. She denies fever, chills, chest pain, palpitations, SOB, cough, abdominal pain, n/v/d, dysuria, headaches and LE swelling.     In ED: Afebrile. Hypotensive to 92/47>> improved to 116/58 s/p 1L NS. O2 sats dropping to low 90s when patient sleeping. No leukocytosis. Hgb 11.7. Na 127 (baseline low 130s). Cr 0.8 (at baseline). CMP otherwise unremarkable. UA noninfectious.  CXR without acute process. Given 1g tylenol. Placed in observation.     * No surgery found *      Hospital Course:   Ms. Dominique was placed in observation for hyponatremia and hypotension in the setting of  COVID-19, SIADH, and orthostatic hypotension. Home antihypertensives (amlodipine, chlorthalidone, and hydralazine) were held at admission and blood pressure is improving. Nephrology was consulted for SIADH and recommended fluid restriction of 1L and salt tabs, which was later discontinued in favor of tolvaptan. Na still low, improving - at patient's baseline. The patient adamantly denied her diagnosis of COVID-19 at admission and refused treatment despite an at-length conversation regarding risks associated with non-treatment.     On evening of 1/2/23, patient had an episode of orthostatic syncope with head trauma after standing up from having a  large bowel movement. AOX4 without any changes in neuro exam. CTH with scalp hematoma without evidence of fracture, intracranial hemorrhage, or sulcal effacement. Orthostatics improved.    Original discharge plan per nephrology was to discharge home on 25g urea and low dose sglt2i for SIADH. Urea out of stock at [facility] as well as other pharmacies in the area. Also informed urea not covered by insurance even if they could get it in stock. Nephrology made aware and suggested patient can be discharged home on tolvaptan with close nephrology follow-up.  Patient medically ready for discharge. Plan to follow up with PCP, nephrology. Return precautions provided. Plan of care discussed with patient, patient agreeable with plan, and all questions answered.         Goals of Care Treatment Preferences:  Code Status: Full Code      Consults:   Consults (From admission, onward)          Status Ordering Provider     Inpatient consult to Nephrology  Once        Provider:  (Not yet assigned)    Completed JONNIE WAITE            No new Assessment & Plan notes have been filed under this hospital service since the last note was generated.  Service: Hospital Medicine    Final Active Diagnoses:    Diagnosis Date Noted POA    PRINCIPAL PROBLEM:  Hyponatremia [E87.1] 05/03/2022 Yes     COVID-19 [U07.1] 01/01/2024 Yes    Hypotension [I95.9] 12/31/2023 Yes    Bilateral low back pain [M54.50] 12/31/2023 Yes    SIADH (syndrome of inappropriate ADH production) [E22.2] 12/31/2023 Yes    CKD (chronic kidney disease) stage 3, GFR 30-59 ml/min [N18.30]  Yes    Seropositive rheumatoid arthritis [M05.9] 02/03/2022 Yes    Former tobacco use [Z87.891] 02/03/2022 Not Applicable    HLD (hyperlipidemia) [E78.5] 11/09/2018 Yes    Essential hypertension [I10] 08/14/2018 Yes    History of thoracic aortic aneurysm repair [Z98.890, Z86.79] 01/01/2011 Not Applicable      Problems Resolved During this Admission:       Discharged Condition: good    Disposition: Home or Self Care    Follow Up:   Follow-up Information       Odalis Kim MD In 1 week.    Specialty: Internal Medicine  Contact information:  [address]  [place] [address]  [phone]               [facility] - Emergency Dept .    Specialty: Emergency Medicine  Why: As needed  Contact information:  [address] [facility] [address] [phone]                         Patient Instructions:      COMPREHENSIVE METABOLIC PANEL   Standing Status: Future Standing Exp. Date: 03/05/25     Ambulatory referral/consult to Nephrology   Standing Status: Future   Referral Priority: Urgent Referral Type: Consultation   Referral Reason: Specialty Services Required   Requested Specialty: Nephrology   Number of Visits Requested: 1     Notify your health care provider if you experience any of the following:  temperature >100.4     Notify your health care provider if you experience any of the following:  difficulty breathing or increased cough     Notify your health care provider if you experience any of the following:  persistent dizziness, light-headedness, or visual disturbances     Notify your health care provider if you experience any of the following:  increased confusion or weakness     Activity as tolerated       Significant  Diagnostic Studies: N/A    Pending Diagnostic Studies:       Procedure Component Value Units Date/Time    Basic Metabolic Panel [[id number]]     Order Status: Sent Lab Status: No result     Specimen: Blood     Basic Metabolic Panel [[id number]]     Order Status: Sent Lab Status: No result     Specimen: Blood     Basic Metabolic Panel [[id number]]     Order Status: Sent Lab Status: No result     Specimen: Blood     Basic Metabolic Panel [[id number]]     Order Status: Sent Lab Status: No result     Specimen: Blood     CBC Auto Differential [[id number]]     Order Status: Sent Lab Status: No result     Specimen: Blood     Magnesium [[id number]]     Order Status: Sent Lab Status: No result     Specimen: Blood     Phosphorus [[id number]]     Order Status: Sent Lab Status: No result     Specimen: Blood            Medications:  Reconciled Home Medications:      Medication List        START taking these medications      artificial tears 0.5 % ophthalmic solution  Commonly known as: ISOPTO TEARS  Place 1 drop into both eyes as needed (for dry eyes).     pulse oximeter device  Commonly known as: pulse oximeter  by Apply Externally route 2 (two) times a day. Use twice daily at 8 AM and 3 PM and record the value in [facility] as directed.     tolvaptan 15 mg Tab  Commonly known as: SAMSCA  Take 1 tablet (15 mg total) by mouth once daily.            CONTINUE taking these medications      alendronate 70 MG tablet  Commonly known as: FOSAMAX  Take 1 tablet (70 mg total) by mouth every 7 days.     amLODIPine 10 MG tablet  Commonly known as: NORVASC  TAKE 1 TABLET(10 MG) BY MOUTH EVERY DAY     cholecalciferol (vitamin D3) 25 mcg (1,000 unit) capsule  Commonly known as: VITAMIN D3  Take 1 capsule (1,000 Units total) by mouth once daily.     * HUMIRA 40 mg/0.8 mL Sykt injection  Generic drug: adalimumab  SMARTSI Pre-Filled Pen Syringe Topical Once a Week     * HUMIRA PEN Pnkt injection  Generic drug: adalimumab  Inject 1 pen  (40 mg  total) into the skin every 14 (fourteen) days.     hydrALAZINE 25 MG tablet  Commonly known as: APRESOLINE  TAKE 1 TABLET(25 MG) BY MOUTH EVERY 12 HOURS           * This list has 2 medication(s) that are the same as other medications prescribed for you. Read the directions carefully, and ask your doctor or other care provider to review them with you.                STOP taking these medications      chlorthalidone 25 MG Tab  Commonly known as: HYGROTEN     olmesartan 40 MG tablet  Commonly known as: BENICAR              Indwelling Lines/Drains at time of discharge:   Lines/Drains/Airways       None                   Time spent on the discharge of patient: 36 minutes         Diann Rodriguez PA-C  Department of Hospital Medicine  [facility] - Observation 11H

## 2024-01-08 ENCOUNTER — OFFICE VISIT (OUTPATIENT)
Dept: RHEUMATOLOGY | Facility: CLINIC | Age: 79
End: 2024-01-08
Payer: MEDICARE

## 2024-01-08 DIAGNOSIS — N18.32 STAGE 3B CHRONIC KIDNEY DISEASE: ICD-10-CM

## 2024-01-08 DIAGNOSIS — M05.79 RHEUMATOID ARTHRITIS INVOLVING MULTIPLE SITES WITH POSITIVE RHEUMATOID FACTOR: ICD-10-CM

## 2024-01-08 DIAGNOSIS — D84.821 IMMUNOCOMPROMISED STATE DUE TO DRUG THERAPY: Primary | ICD-10-CM

## 2024-01-08 DIAGNOSIS — Z79.899 IMMUNOCOMPROMISED STATE DUE TO DRUG THERAPY: Primary | ICD-10-CM

## 2024-01-08 PROCEDURE — 99214 OFFICE O/P EST MOD 30 MIN: CPT | Mod: 95,,, | Performed by: INTERNAL MEDICINE

## 2024-01-08 RX ORDER — ADALIMUMAB 40MG/0.8ML
40 KIT SUBCUTANEOUS
Qty: 2 PEN | Refills: 11 | Status: ACTIVE | OUTPATIENT
Start: 2024-01-08 | End: 2024-12-09

## 2024-01-08 NOTE — PROGRESS NOTES
Subjective:       Patient ID: Buffy Dominique is a 78 y.o. female.    Chief Complaint: rheumatoid arthritis w/positive rheumatoid factor    The patient location is: home  The chief complaint leading to consultation is: RA    Visit type: audiovisual    Face to Face time with patient: 20   minutes of total time spent on the encounter, which includes face to face time and non-face to face time preparing to see the patient (eg, review of tests), Obtaining and/or reviewing separately obtained history, Documenting clinical information in the electronic or other health record, Independently interpreting results (not separately reported) and communicating results to the patient/family/caregiver, or Care coordination (not separately reported).         Each patient to whom he or she provides medical services by telemedicine is:  (1) informed of the relationship between the physician and patient and the respective role of any other health care provider with respect to management of the patient; and (2) notified that he or she may decline to receive medical services by telemedicine and may withdraw from such care at any time.    Notes:       1/2024    Recent hospitalisation for hyponatremia  Went in with back pain  Blood pressure meds she was on was causing the low sodium  She is really weak because of this    She is taking humira regularly  Joint pains -she is doing well  Shoulders doing better      9/2023    On humira 40 mg every other week  Last visit -we injected the left elbow  She is doing well    Shoulders decreased RPM  Poor strength  Ortho and sports medicine- says she has AVN  PT helped          3/2023    3 weeks ago- left elbow  started to swell and hurt a lot  Cant lift her arm up    Otherwise humira is doing great    Right shoulder doing well    Today draining the left elbow which has olecranon bursitis   Fluid non inflammatory  No crystals  No infections        HPI     Interval Hx: 10/2022- seen by   She feels  like the shoulder is doing better. She saw Ortho PA and they recommended she get evaluated by Ortho Sports Med for surgery. She has been doing exercises for the shoulder. She believes the Humira is helping. She did not yet get an aspirate of the joint. Other joints are doing well. She has some ankle edema but no other joint swelling. No morning stiffness.  She gets short of breath with exertion for the past few months. She complains of lower extremity edema. She's having issues getting her medication on time from [facility] because they tend to only carry the Humira 80 mg and not the 40 mg. 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  ESR,CRP nml  Vit d low-replacement sent  CMP nml  CBC nml      INITIAL HX:  Patient is a 76-year-old female with HTN, aortic stenosis, hx of thoracic aortic aneurysm s/p surgery, who presents for Rheumatology consultation for polyarthritis. Patient complains that 4 months ago, she woke up one morning with pain in the shoulders. She denies history of trauma. Now patient has pain in left ankle, left knee, left wrist, left elbow. She also has pain in both hands and both shoulders. Tylenol BID doesn't help. She can't take NSAIDs due to HTN. Ice and heat don't help. Fast Freeze ointment doesn't help. She has had 2 steroid shots in the left shoulder 3 months apart - she got relief from this but the pain came back in a couple weeks. She has never taken oral steroids.  She has morning stiffness 20 minutes, improves as the day progresses. Pain and stiffness are worse with cold weather. Pain is worse with use.  She endorses swelling in left ankle, left knee, left hand and left wrist.    She has a history of HTN and thoracic aortic aneurysm s/p surgery 2011.  In 2015 and 2016, she had carpal tunnel surgery in both hands.    Family hx: no autoimmune disease  Social hx: smoked 1.5-2 PPD for 25 years, quit in 2009. 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    No skin rashes, malar rash, photosensitivity   No telangiectasias   No calcinosis   No psoriasis   No patchy alopecia   No oral or nasal ulcers   No dry eyes or dry mouth   No pleurisy or any cardiopulmonary complaints   +chest pain radiating to back which sometimes comes on at rest  No dysphagia, diplopia, dysphonia  No muscle weakness   No n/v/d/c   No acid reflux  No Raynaud's  No digital ulcers   No cytopenias   No renal issues   No blood clots   No fever, chills, night sweats, weight loss (5lbs in 2 months), or loss of appetite   No pregnancy losses, pre-term deliveries, or pregnancy complications   No new onset headaches   No recurrent conjunctivitis, uveitis, scleritis, or episcleritis   No chronic or bloody diarrhea. No Ulcerative Colitis or Chron's (IBD)  No vaginal or penile and urethral  discharge/STDs/ulcers   No unexplained lymphadenopathy  No parotitis   No seizures, strokes, psychosis  No sclerodactyly  No puffy hands  No perioral tightness         Review of Systems   Constitutional:  Negative for fever and unexpected weight change.   HENT:  Negative for mouth sores and trouble swallowing.    Eyes:  Negative for redness.   Respiratory:  Negative for cough and shortness of breath.    Cardiovascular:  Negative for chest pain.   Gastrointestinal:  Negative for constipation and diarrhea.   Genitourinary:  Negative for dysuria and genital sores.   Skin:  Negative for rash.   Neurological:  Negative for headaches.   Hematological:  Does not bruise/bleed easily.         Objective:   LMP  (LMP Unknown)      Physical Exam   Constitutional: She is oriented to person, place, and time. No distress.   HENT:   Head: Normocephalic and atraumatic.   Eyes: Pupils are equal, round, and reactive to light.   Cardiovascular: Normal rate and regular rhythm.   No murmur heard.  Pulmonary/Chest: Effort normal and breath sounds normal. No respiratory distress.  She has no wheezes.   Abdominal: Soft. Bowel sounds are normal. There is no abdominal tenderness.   Musculoskeletal:         General: No deformity.      Right shoulder: Normal.      Left shoulder: Normal.      Right elbow: Normal.      Left elbow: Normal.      Right wrist: Normal.      Left wrist: Normal.      Cervical back: Normal range of motion.      Right knee: Normal.      Left knee: Normal.      Comments: No joint swelling or synovitis. ROM of right shoulder is significantly improved. Some pain with internal rotation.   Neurological: She is alert and oriented to person, place, and time.   Skin: Skin is warm and dry.   Psychiatric: Affect and judgment normal.       Right Side Rheumatological Exam     Examination finds the shoulder, elbow, wrist, knee, 1st PIP, 1st MCP, 2nd PIP, 2nd MCP, 3rd PIP, 3rd MCP, 4th PIP, 4th MCP, 5th PIP and 5th MCP normal.    Muscle Strength (0-5 scale):  Neck Flexion:  5  Neck Extension: 5  Deltoid:  5  Biceps: 5/5   Triceps:  5  : 5/5   Iliopsoas: 5  Quadriceps:  5   Distal Lower Extremity: 5    Left Side Rheumatological Exam     Examination finds the shoulder, elbow, wrist, knee, 1st PIP, 1st MCP, 2nd PIP, 2nd MCP, 3rd PIP, 3rd MCP, 4th PIP, 4th MCP, 5th PIP and 5th MCP normal.    Muscle Strength (0-5 scale):  Neck Flexion:  5  Neck Extension: 5  Deltoid:  5  Biceps: 5/5   Triceps:  5  :  5/5   Iliopsoas: 5  Quadriceps:  5   Distal Lower Extremity: 5       Left elbow is swollen    There is currently no information documented on the homunculus. Go to the Rheumat[facility] activity [facility].     3/10/2022 7/28/2022 11/3/2022 9/14/2023   Tender (HENDRICKS-28) 0 / 28 1 / 28  0 / 28  2 / 28    Swollen (HENDRICKS-28) 0 / 28  0 / 28  0 / 28  0 / 28    Provider Global 10 mm 20 mm 10 mm 5 mm   Patient Global 30 mm 30 mm 15 mm 0 mm   ESR -- 4 mm/hr 23 mm/hr 0 mm/hr   CRP -- 2.2 mg/L 4.1 mg/L 5 mg/L   HENDRICKS-28 (ESR) -- 1.95 (Remission) 2.4 (Remission) --   HENDRICKS-28 (CRP) --  2.36 (Remission) 1.76 (Remission) 2.4 (Remission)   CDAI Score 4  6  2.5  2.5         Assessment:       1. Immunocompromised state due to drug therapy    2. Stage 3b chronic kidney disease    3. Rheumatoid arthritis involving multiple sites with positive rheumatoid factor                  Plan:       Problem List Items Addressed This Visit          Renal/    Stage 3b chronic kidney disease       Immunology/Multi System    Immunocompromised state due to drug therapy - Primary     Other Visit Diagnoses       Rheumatoid arthritis involving multiple sites with positive rheumatoid factor        Relevant Medications    adalimumab (HUMIRA PEN) PnKt injection    Other Relevant Orders    CBC Auto Differential    Comprehensive Metabolic Panel    Sedimentation rate    C-Reactive Protein    Quantiferon Gold TB    Hepatitis B Surface Antigen    Hepatitis B Surface Ab, Qualitative    Hepatitis B Core Antibody, Total                Patient is a 76-year-old female with HTN, aortic stenosis, hx of thoracic aortic aneurysm s/p surgery, who presents for Rheumatology follow up for seropositive RA.     Presented with arthralgias and synovitis on exam.  , CCP 7.7  Medications tried: MTX (oral ulcers, had to get leucovorin). Currently on Humira (started 3/6/22).  Vaccinations up to date except for COVID which she declines.  CDAI 2.5 (remission)    KAY 1:80 low titer, homogenous.  Remainder of KAY profile is negative. Normal WBC count. No thrombocytopenia. No signs or symptoms of lupus.     Uric acid 6.5.      Standing labs look good including normal kidney function now and no proteinuria. She has lower extremity edema. She has been getting dyspnea on exertion for the past few months. Echo 2018 shows PA pressure 40 mmHg. Significant smoking hx. Last CT this year did not show COPD.  She was sent to Pul htn clinic    DEXA shows T scores <-3 and FRAX 25% major and 11% hip so this warrants treatment with bisphosphonate  Vitamin D low  at 16    She does really well on humira    Left elbow olecranon bursitis is the main complaint today- unclear if due to gout vs RA vs repetitive stress/overuse     9/2023    On humira 40 mg every other week  Last visit -we injected the left elbow  She is doing well    Shoulders decreased RPM  Poor strength  Ortho and sports medicine- says she has AVN  PT helped    1/2024    Recent hospitalisation for hyponatremia  Went in with back pain  Blood pressure meds she was on was causing the low sodium  She is really weak because of this    She is taking humira regularly  Joint pains -she is doing well  Shoulders doing better       Plan:      Vit d,alendronate     Humira 40 mg every 2 weeks to continue    Sodium issues managed by PCP    CBC,CMP,ESR,CRP,TB,hepatitis in 3 months

## 2024-01-09 ENCOUNTER — PATIENT OUTREACH (OUTPATIENT)
Dept: ADMINISTRATIVE | Facility: CLINIC | Age: 79
End: 2024-01-09
Payer: MEDICARE

## 2024-01-09 NOTE — PROGRESS NOTES
C3 nurse spoke with Buffy Dominique for a TCC post hospital discharge follow up call. The pt denied any new symptoms or complaints at this time.     The patient has a scheduled Annual appointment with Odalis Kim MD on 1/22/24 at 1400. C3 nurse was unable to schedule a closer appointment and the pt denied the need for a NPHV. Message routed to Odalis Kim MD requesting a closer appointment within 5-7 days of discharge date of 1/6/24.

## 2024-01-22 ENCOUNTER — OFFICE VISIT (OUTPATIENT)
Dept: INTERNAL MEDICINE | Facility: CLINIC | Age: 79
End: 2024-01-22
Payer: MEDICARE

## 2024-01-22 ENCOUNTER — LAB VISIT (OUTPATIENT)
Dept: LAB | Facility: HOSPITAL | Age: 79
End: 2024-01-22
Attending: INTERNAL MEDICINE
Payer: MEDICARE

## 2024-01-22 VITALS
SYSTOLIC BLOOD PRESSURE: 135 MMHG | DIASTOLIC BLOOD PRESSURE: 74 MMHG | BODY MASS INDEX: 20.25 KG/M2 | HEART RATE: 86 BPM | WEIGHT: 114.31 LBS | HEIGHT: 63 IN | OXYGEN SATURATION: 95 %

## 2024-01-22 DIAGNOSIS — E87.1 HYPONATREMIA: ICD-10-CM

## 2024-01-22 DIAGNOSIS — E78.2 MIXED HYPERLIPIDEMIA: ICD-10-CM

## 2024-01-22 DIAGNOSIS — I70.0 AORTIC ATHEROSCLEROSIS: ICD-10-CM

## 2024-01-22 DIAGNOSIS — I10 ESSENTIAL HYPERTENSION: Primary | ICD-10-CM

## 2024-01-22 DIAGNOSIS — I10 ESSENTIAL HYPERTENSION: ICD-10-CM

## 2024-01-22 DIAGNOSIS — R53.81 PHYSICAL DECONDITIONING: ICD-10-CM

## 2024-01-22 DIAGNOSIS — Z12.31 ENCOUNTER FOR SCREENING MAMMOGRAM FOR BREAST CANCER: ICD-10-CM

## 2024-01-22 LAB
ANION GAP SERPL CALC-SCNC: 7 MMOL/L (ref 8–16)
BUN SERPL-MCNC: 12 MG/DL (ref 8–23)
CALCIUM SERPL-MCNC: 10.2 MG/DL (ref 8.7–10.5)
CHLORIDE SERPL-SCNC: 102 MMOL/L (ref 95–110)
CO2 SERPL-SCNC: 27 MMOL/L (ref 23–29)
CREAT SERPL-MCNC: 0.9 MG/DL (ref 0.5–1.4)
EST. GFR  (NO RACE VARIABLE): >60 ML/MIN/1.73 M^2
GLUCOSE SERPL-MCNC: 82 MG/DL (ref 70–110)
POTASSIUM SERPL-SCNC: 4.2 MMOL/L (ref 3.5–5.1)
SODIUM SERPL-SCNC: 136 MMOL/L (ref 136–145)

## 2024-01-22 PROCEDURE — 99999 PR PBB SHADOW E&M-EST. PATIENT-LVL III: CPT | Mod: PBBFAC,,, | Performed by: INTERNAL MEDICINE

## 2024-01-22 PROCEDURE — 36415 COLL VENOUS BLD VENIPUNCTURE: CPT | Performed by: INTERNAL MEDICINE

## 2024-01-22 PROCEDURE — 99214 OFFICE O/P EST MOD 30 MIN: CPT | Mod: S$GLB,,, | Performed by: INTERNAL MEDICINE

## 2024-01-22 PROCEDURE — 80048 BASIC METABOLIC PNL TOTAL CA: CPT | Performed by: INTERNAL MEDICINE

## 2024-01-22 RX ORDER — HYDRALAZINE HYDROCHLORIDE 25 MG/1
25 TABLET, FILM COATED ORAL EVERY 12 HOURS
Qty: 180 TABLET | Refills: 3 | Status: SHIPPED | OUTPATIENT
Start: 2024-01-22 | End: 2024-05-02

## 2024-01-22 RX ORDER — AMLODIPINE BESYLATE 10 MG/1
10 TABLET ORAL DAILY
Qty: 90 TABLET | Refills: 3 | Status: SHIPPED | OUTPATIENT
Start: 2024-01-22 | End: 2024-06-04 | Stop reason: SDUPTHER

## 2024-01-22 NOTE — PROGRESS NOTES
"Subjective:       Patient ID: Buffy Dominique is a 78 y.o. female.    Chief Complaint: Annual Exam    HPI  Buffy Dominique is a 78 y.o. female here for routine follow up of the following chronic issues:    HTN  Amlodipine 10mg  Hydralazine 25mg q12h  Chlorthalidone and olmesartan stopped on 1/6/24.     Recent hospitalization 1/6/24 for orthostatic hypotension, COVID 19 and her benicar was held after this.     Rheumatoid arthritis  Avi LEONE    SIAANTONINO - Hyponatremia  Tolvaptan 15mg started on 1/6/24.  CT chest 1/4 stable, no new lesions.    Mild aortic and mitral regurg. No stenosis noted.     She has quit working.       Review of Systems   Constitutional:  Negative for fever.   Respiratory:  Negative for shortness of breath.    Cardiovascular:  Negative for chest pain.   Musculoskeletal: Negative.    Skin: Negative.        Objective:   /74 (BP Location: Left arm, Patient Position: Sitting, BP Method: Medium (Manual))   Pulse 86   Ht 5' 3" (1.6 m)   Wt 51.9 kg (114 lb 4.9 oz)   LMP  (LMP Unknown)   SpO2 95%   BMI 20.25 kg/m²      Physical Exam  Constitutional:       General: She is not in acute distress.     Appearance: She is well-developed. She is not diaphoretic.   HENT:      Head: Normocephalic and atraumatic.   Cardiovascular:      Rate and Rhythm: Normal rate and regular rhythm.   Pulmonary:      Effort: Pulmonary effort is normal. No respiratory distress.      Breath sounds: No wheezing or rales.   Skin:     General: Skin is warm and dry.   Neurological:      Mental Status: She is alert and oriented to person, place, and time.   Psychiatric:         Behavior: Behavior normal.         Assessment:       1. Essential hypertension    2. Aortic atherosclerosis    3. Mixed hyperlipidemia    4. Hyponatremia    5. Encounter for screening mammogram for breast cancer    6. Physical deconditioning        Plan:       1. Essential hypertension  -     Basic Metabolic Panel; Future; Expected date: 01/22/2024  -     " hydrALAZINE (APRESOLINE) 25 MG tablet; Take 1 tablet (25 mg total) by mouth every 12 (twelve) hours.  Dispense: 180 tablet; Refill: 3  -     amLODIPine (NORVASC) 10 MG tablet; Take 1 tablet (10 mg total) by mouth once daily.  Dispense: 90 tablet; Refill: 3    2. Aortic atherosclerosis  3. Mixed hyperlipidemia  Has been unable to tolerate statin in past. Will  consider re-trial      4. Hyponatremia  -     Basic Metabolic Panel; Future; Expected date: 01/22/2024    5. Encounter for screening mammogram for breast cancer  -     Mammo Digital Screening Bilat w/ [place]; Future; Expected date: 01/22/2024    6. Physical deconditioning  -     WALKER FOR HOME USE           Health Maintenance Due   Topic    Mammogram       Labs  Mmg  rsv - declined  Declined covid vaccines

## 2024-01-29 ENCOUNTER — HOSPITAL ENCOUNTER (OUTPATIENT)
Dept: RADIOLOGY | Facility: HOSPITAL | Age: 79
Discharge: HOME | End: 2024-01-29
Attending: INTERNAL MEDICINE
Payer: MEDICARE

## 2024-01-29 DIAGNOSIS — Z12.31 ENCOUNTER FOR SCREENING MAMMOGRAM FOR BREAST CANCER: ICD-10-CM

## 2024-01-29 PROCEDURE — 77067 SCR MAMMO BI INCL CAD: CPT | Mod: 26,,, | Performed by: RADIOLOGY

## 2024-01-29 PROCEDURE — 77067 SCR MAMMO BI INCL CAD: CPT | Mod: TC

## 2024-01-29 PROCEDURE — 77063 BREAST TOMOSYNTHESIS BI: CPT | Mod: 26,,, | Performed by: RADIOLOGY

## 2024-01-31 ENCOUNTER — TELEPHONE (OUTPATIENT)
Dept: ENDOSCOPY | Facility: HOSPITAL | Age: 79
End: 2024-01-31
Payer: MEDICARE

## 2024-01-31 VITALS — BODY MASS INDEX: 20.2 KG/M2 | HEIGHT: 63 IN | WEIGHT: 114 LBS

## 2024-01-31 NOTE — TELEPHONE ENCOUNTER
Pt was contacted for pre-call for colonoscopy with DR. Myles on 2/7/24. Pt requested to reschedule for a later date due to no transportation.     Spoke to Pt to reschedule procedure(s) Colonoscopy       Physician to perform procedure(s) Dr. JULI Myles  Date of Procedure (s) 4/30/24  Arrival Time 9:00 AM  Time of Procedure(s) 10:00 AM   Location of Procedure(s) [address]  Type of Rx Prep sent to patient: Miralax  Instructions provided to patient via [facility]    Patient was informed on the following information and verbalized understanding. Screening questionnaire reviewed with patient and complete. If procedure requires anesthesia, a responsible adult needs to be present to accompany the patient home, patient cannot drive after receiving anesthesia. Appointment details are tentative, especially check-in time. Patient will receive a prep-op call 7 days prior to confirm check-in time for procedure. If applicable the patient should contact their pharmacy to verify Rx for procedure prep is ready for pick-up. Patient was advised to call the scheduling department at [phone] if pharmacy states no Rx is available. Patient was advised to call the endoscopy scheduling department if any questions or concerns arise.       Endoscopy Scheduling Department

## 2024-02-09 ENCOUNTER — TELEPHONE (OUTPATIENT)
Dept: INTERNAL MEDICINE | Facility: CLINIC | Age: 79
End: 2024-02-09
Payer: MEDICARE

## 2024-02-09 NOTE — TELEPHONE ENCOUNTER
----- Message from Isra Akers sent at 2/9/2024  8:01 AM CST -----  Type:  Patient Returning Call    Who Called:pt  Who Left Message for Patient:  Does the patient know what this is regarding?:walker update  Would the patient rather a call back or a response via [facility]? Call  Best Call Back Number:[phone]  Additional Information: pt states she would like to see if there is an update on her walker. Pt states she is only asking due to a fall she had yesterday. Pt states she would like a call. Thank you

## 2024-02-12 ENCOUNTER — OFFICE VISIT (OUTPATIENT)
Dept: DERMATOLOGY | Facility: CLINIC | Age: 79
End: 2024-02-12
Payer: MEDICARE

## 2024-02-12 DIAGNOSIS — D48.5 NEOPLASM OF UNCERTAIN BEHAVIOR OF SKIN: Primary | ICD-10-CM

## 2024-02-12 PROCEDURE — 88342 IMHCHEM/IMCYTCHM 1ST ANTB: CPT | Mod: 26,,, | Performed by: PATHOLOGY

## 2024-02-12 PROCEDURE — 99999 PR PBB SHADOW E&M-EST. PATIENT-LVL III: CPT | Mod: PBBFAC,,, | Performed by: STUDENT IN AN ORGANIZED HEALTH CARE EDUCATION/TRAINING PROGRAM

## 2024-02-12 PROCEDURE — 88305 TISSUE EXAM BY PATHOLOGIST: CPT | Performed by: PATHOLOGY

## 2024-02-12 PROCEDURE — 99499 UNLISTED E&M SERVICE: CPT | Mod: S$GLB,,, | Performed by: STUDENT IN AN ORGANIZED HEALTH CARE EDUCATION/TRAINING PROGRAM

## 2024-02-12 PROCEDURE — 88342 IMHCHEM/IMCYTCHM 1ST ANTB: CPT | Performed by: PATHOLOGY

## 2024-02-12 PROCEDURE — 88341 IMHCHEM/IMCYTCHM EA ADD ANTB: CPT | Mod: 26,,, | Performed by: PATHOLOGY

## 2024-02-12 PROCEDURE — 88341 IMHCHEM/IMCYTCHM EA ADD ANTB: CPT | Mod: 59 | Performed by: PATHOLOGY

## 2024-02-12 PROCEDURE — 88305 TISSUE EXAM BY PATHOLOGIST: CPT | Mod: 26,,, | Performed by: PATHOLOGY

## 2024-02-12 PROCEDURE — 11102 TANGNTL BX SKIN SINGLE LES: CPT | Mod: S$GLB,,, | Performed by: STUDENT IN AN ORGANIZED HEALTH CARE EDUCATION/TRAINING PROGRAM

## 2024-02-12 NOTE — PROGRESS NOTES
Subjective:      Patient ID:  Buffy Dominique is a 78 y.o. female who presents for   Chief Complaint   Patient presents with    Lesion     R side of face- initial     Buffy Dominique is a 78 y.o. female who presents for: evaluation of skin lesions.    New patient- transferring care from [facility]- has not seen derm in many years    The patient has the following lesions of concern:  Location: R side of face  Duration: 7 months  Symptoms: itching around spot, tenderness, dry raised spot in appearance (scab in appearance)  Relieving factors/Previous treatments: none    Pertinent history:  History of blistering sunburns: Yes- childhood  History of tanning bed use: No  Family history of melanoma: No  Personal history of mole removal: No  Personal history of skin cancer: Yes- pt has Mohs for she says melanoma on face 2009/2010 and another skin cancer removed on nose      Review of Systems   Skin:  Positive for itching and dry skin. Negative for daily sunscreen use, activity-related sunscreen use, tendency to form keloidal scars, recent sunburn and wears hat.   Hematologic/Lymphatic: Bruises/bleeds easily.       Objective:   Physical Exam   Skin:   Areas Examined (abnormalities noted in diagram):   Head / Face Inspection Performed            Diagram Legend     Erythematous scaling macule/papule c/w actinic keratosis       Vascular papule c/w angioma      Pigmented verrucoid papule/plaque c/w seborrheic keratosis      Yellow umbilicated papule c/w sebaceous hyperplasia      Irregularly shaped tan macule c/w lentigo     1-2 mm smooth white papules consistent with Milia      Movable subcutaneous cyst with punctum c/w epidermal inclusion cyst      Subcutaneous movable cyst c/w pilar cyst      Firm pink to brown papule c/w dermatofibroma      Pedunculated fleshy papule(s) c/w skin tag(s)      Evenly pigmented macule c/w junctional nevus     Mildly variegated pigmented, slightly irregular-bordered macule c/w mildly atypical nevus      Flesh  colored to evenly pigmented papule c/w intradermal nevus       Pink pearly papule/plaque c/w basal cell carcinoma      Erythematous hyperkeratotic cursted plaque c/w SCC      Surgical scar with no sign of skin cancer recurrence      Open and closed comedones      Inflammatory papules and pustules      Verrucoid papule consistent consistent with wart     Erythematous eczematous patches and plaques     Dystrophic onycholytic nail with subungual debris c/w onychomycosis     Umbilicated papule    Erythematous-base heme-crusted tan verrucoid plaque consistent with inflamed seborrheic keratosis     Erythematous Silvery Scaling Plaque c/w Psoriasis     See annotation        Assessment / Plan:      Pathology Orders:       Normal Orders This Visit    Specimen to Pathology, Dermatology     Questions:    Procedure Type: Dermatology and skin neoplasms    Number of Specimens: 1    ------------------------: -------------------------    Spec 1 Procedure: Biopsy    Spec 1 Clinical Impression: tender hyperkeratotic papule    Spec 1 Source: right cheek    Release to patient: Immediate          Neoplasm of uncertain behavior of skin  -     Specimen to Pathology, Dermatology  Shave biopsy procedure note:    Shave biopsy performed after verbal consent including risk of infection, scar, recurrence, need for additional treatment of site. Area prepped with alcohol, anesthetized with approximately 1.0cc of 1% lidocaine with epinephrine. Lesional tissue shaved with razor blade. Hemostasis achieved with application of aluminum chloride followed by hyfrecation. No complications. Dressing applied. Wound care explained.    RTC 6 months FBSE, sooner pending bx results

## 2024-02-19 LAB
FINAL PATHOLOGIC DIAGNOSIS: NORMAL
GROSS: NORMAL
Lab: NORMAL
MICROSCOPIC EXAM: NORMAL

## 2024-02-19 NOTE — PROGRESS NOTES
Recommended patient to Dr. Meeks for Mohs surgery consultation. Picture in chart.  Nodular + infiltrative BCC on right cheek     General Derm team: Sent patient for Mohs surgery. F/u with me in 6 months.

## 2024-02-23 ENCOUNTER — PATIENT MESSAGE (OUTPATIENT)
Dept: ADMINISTRATIVE | Facility: OTHER | Age: 79
End: 2024-02-23
Payer: MEDICARE

## 2024-02-25 ENCOUNTER — PATIENT MESSAGE (OUTPATIENT)
Dept: ADMINISTRATIVE | Facility: OTHER | Age: 79
End: 2024-02-25
Payer: MEDICARE

## 2024-02-29 ENCOUNTER — TELEPHONE (OUTPATIENT)
Dept: DERMATOLOGY | Facility: CLINIC | Age: 79
End: 2024-02-29
Payer: MEDICARE

## 2024-02-29 NOTE — TELEPHONE ENCOUNTER
Pt accidentally cancelled appt. Let pt know I kept her appt for 4/3 at 11:30.         ----- Message from Beth Bolden sent at 2/29/2024  9:11 AM CST -----  Regarding: Reschedule Appt  Contact: Patient  Patient is requesting to reschedule appt that was on 04/03/2024   Patient stated she was a notification in regards to an appt and did not want an earlier appt so she typed in cancel but it cancelled appt   Patient is attempting to keep appt that was scheduled for 04/03/2024 at 11:30 am   Please Assist     Patient can be reached at [phone]

## 2024-03-25 ENCOUNTER — OFFICE VISIT (OUTPATIENT)
Dept: NEPHROLOGY | Facility: CLINIC | Age: 79
End: 2024-03-25
Payer: MEDICARE

## 2024-03-25 ENCOUNTER — LAB VISIT (OUTPATIENT)
Dept: LAB | Facility: HOSPITAL | Age: 79
End: 2024-03-25
Attending: INTERNAL MEDICINE
Payer: MEDICARE

## 2024-03-25 ENCOUNTER — PATIENT MESSAGE (OUTPATIENT)
Dept: NEPHROLOGY | Facility: CLINIC | Age: 79
End: 2024-03-25

## 2024-03-25 VITALS
BODY MASS INDEX: 20.85 KG/M2 | OXYGEN SATURATION: 99 % | DIASTOLIC BLOOD PRESSURE: 76 MMHG | SYSTOLIC BLOOD PRESSURE: 140 MMHG | WEIGHT: 117.75 LBS | HEART RATE: 76 BPM

## 2024-03-25 DIAGNOSIS — I10 HYPERTENSION, UNSPECIFIED TYPE: ICD-10-CM

## 2024-03-25 DIAGNOSIS — E22.2 SIADH (SYNDROME OF INAPPROPRIATE ADH PRODUCTION): ICD-10-CM

## 2024-03-25 DIAGNOSIS — E87.1 HYPONATREMIA: Primary | ICD-10-CM

## 2024-03-25 DIAGNOSIS — E87.1 HYPONATREMIA: ICD-10-CM

## 2024-03-25 LAB
ANION GAP SERPL CALC-SCNC: 8 MMOL/L (ref 8–16)
BUN SERPL-MCNC: 18 MG/DL (ref 8–23)
CALCIUM SERPL-MCNC: 10.3 MG/DL (ref 8.7–10.5)
CHLORIDE SERPL-SCNC: 97 MMOL/L (ref 95–110)
CO2 SERPL-SCNC: 26 MMOL/L (ref 23–29)
CREAT SERPL-MCNC: 0.7 MG/DL (ref 0.5–1.4)
EST. GFR  (NO RACE VARIABLE): >60 ML/MIN/1.73 M^2
GLUCOSE SERPL-MCNC: 89 MG/DL (ref 70–110)
POTASSIUM SERPL-SCNC: 4.3 MMOL/L (ref 3.5–5.1)
SODIUM SERPL-SCNC: 131 MMOL/L (ref 136–145)
URATE SERPL-MCNC: 5 MG/DL (ref 2.4–5.7)

## 2024-03-25 PROCEDURE — 80048 BASIC METABOLIC PNL TOTAL CA: CPT | Performed by: INTERNAL MEDICINE

## 2024-03-25 PROCEDURE — 36415 COLL VENOUS BLD VENIPUNCTURE: CPT | Performed by: INTERNAL MEDICINE

## 2024-03-25 PROCEDURE — 84550 ASSAY OF BLOOD/URIC ACID: CPT | Performed by: INTERNAL MEDICINE

## 2024-03-25 PROCEDURE — 99999 PR PBB SHADOW E&M-EST. PATIENT-LVL IV: CPT | Mod: PBBFAC,,, | Performed by: INTERNAL MEDICINE

## 2024-03-25 PROCEDURE — 99214 OFFICE O/P EST MOD 30 MIN: CPT | Mod: S$GLB,,, | Performed by: INTERNAL MEDICINE

## 2024-03-25 NOTE — PROGRESS NOTES
HPI  This 78 y.o. y/o female with PMH of HTN, HLD, RA, hyperparathyroidism, chronic hyponatremia, and thoracic aortic aneurysm repair came in for hyponatremia.     Admitted Dec 2023 due to bilateral leg/back pain, now subsided    HypoNa  Lab Results   Component Value Date     01/22/2024     (L) 01/06/2024     (L) 01/05/2024     (L) 01/05/2024     (L) 01/05/2024     (L) 01/05/2024     (L) 01/05/2024 12/31/2023 Urine sodium 156, Urine osmolality 525; TSH, cortisol WNL  Likely pain induced excessive ADH secretion, chlorthalidone use  S/p holding chlorthalidone, got a course of tolvaptan  The patient doesn't add salt to the diet, the independent living facility prepared the lunch    HTN  BP this visit 140/76 mmHg  BP at home 130s-150s/60s  Current medication: amlodipine 10 mg daily, hydralazine 25 mg BID       Past Medical History:   Diagnosis Date    CKD (chronic kidney disease) stage 3, GFR 30-59 ml/min     HLD (hyperlipidemia)     HTN (hypertension)     Hyperparathyroidism     Melanoma        Past Surgical History:   Procedure Laterality Date    CATARACT EXTRACTION, BILATERAL Bilateral 2014    cataract removal Right 2014    hiatial hernia  2017    HYSTERECTOMY      melanoma      on face    OOPHORECTOMY      THORACIC AORTIC ANEURYSM REPAIR  2011       Review of patient's allergies indicates:   Allergen Reactions    Methotrexate analogues      Mouth Ulcers     Thiazides Other (See Comments)     hyponatremia         Social History     Socioeconomic History    Marital status:    Tobacco Use    Smoking status: Former     Current packs/day: 0.00     Average packs/day: 1 pack/day for 30.0 years (30.0 ttl pk-yrs)     Types: Cigarettes     Start date: 1/26/1979     Quit date: 1/26/2009     Years since quitting: 15.1    Smokeless tobacco: Never   Substance and Sexual Activity    Alcohol use: Never     Comment: glass of wine once or twice a year    Drug use: Never    Sexual  activity: Not Currently     Partners: Male     Birth control/protection: None     Social Determinants of Health     Financial Resource Strain: Low Risk  (12/29/2023)    Overall Financial Resource Strain (CARDIA)     Difficulty of Paying Living Expenses: Not very hard   Food Insecurity: No Food Insecurity (12/29/2023)    Hunger Vital Sign     Worried About Running Out of Food in the Last Year: Never true     Ran Out of Food in the Last Year: Never true   Transportation Needs: No Transportation Needs (12/29/2023)    PRAPARE - Transportation     Lack of Transportation (Medical): No     Lack of Transportation (Non-Medical): No   Physical Activity: Unknown (12/29/2023)    Exercise Vital Sign     Days of Exercise per Week: 5 days   Stress: No Stress Concern Present (12/29/2023)    [employer] - Occupational Stress Questionnaire     Feeling of Stress : Not at all   Social Connections: Unknown (12/29/2023)    Social Connection and Isolation Panel [NHANES]     Frequency of Communication with Friends and Family: More than three times a week     Frequency of Social Gatherings with Friends and Family: Twice a week     Active Member of Clubs or Organizations: Yes     Attends Club or Organization Meetings: More than 4 times per year     Marital Status:    Housing Stability: Unknown (12/29/2023)    Housing Stability Vital Sign     Unstable Housing in the Last Year: No       Family History   Problem Relation Age of Onset    Cancer Father     Hypertension Maternal Grandmother     Colon cancer Neg Hx     Inflammatory bowel disease Neg Hx        ROS negative except listed above    PHYSICAL EXAMINATION:  Blood pressure (!) 140/76, pulse 76, weight 53.4 kg (117 lb 11.6 oz), SpO2 99 %.  Constitutional - No acute distress  HEENT - Grossly normal  Neck - supple.   Cardiovascular - JVP normal  Respiratory - Clear  Musculoskeletal - Peripheral edema no  Dermatologic/Skin - Skin warm and dry.  No rashes.     Neurologic - No acute neurological deficit  Psychiatric - AAOx3    Assessment and Plan  1. 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 HTN: BP goal 130/80 mmHg  -Continue amlodipine 10 mg daily, hydralazine 25 mg BID    Follow up in 1 months

## 2024-03-26 ENCOUNTER — OFFICE VISIT (OUTPATIENT)
Dept: ORTHOPEDICS | Facility: CLINIC | Age: 79
End: 2024-03-26
Payer: MEDICARE

## 2024-03-26 ENCOUNTER — HOSPITAL ENCOUNTER (OUTPATIENT)
Dept: RADIOLOGY | Facility: HOSPITAL | Age: 79
Discharge: HOME | End: 2024-03-26
Attending: PHYSICIAN ASSISTANT
Payer: MEDICARE

## 2024-03-26 VITALS
BODY MASS INDEX: 21.15 KG/M2 | HEART RATE: 66 BPM | SYSTOLIC BLOOD PRESSURE: 143 MMHG | WEIGHT: 119.38 LBS | HEIGHT: 63 IN | DIASTOLIC BLOOD PRESSURE: 68 MMHG

## 2024-03-26 DIAGNOSIS — M25.512 BILATERAL SHOULDER PAIN, UNSPECIFIED CHRONICITY: ICD-10-CM

## 2024-03-26 DIAGNOSIS — M25.511 BILATERAL SHOULDER PAIN, UNSPECIFIED CHRONICITY: ICD-10-CM

## 2024-03-26 DIAGNOSIS — M19.011 PRIMARY OSTEOARTHRITIS OF BOTH SHOULDERS: ICD-10-CM

## 2024-03-26 DIAGNOSIS — M19.012 PRIMARY OSTEOARTHRITIS OF BOTH SHOULDERS: ICD-10-CM

## 2024-03-26 DIAGNOSIS — M47.12 OSTEOARTHRITIS OF CERVICAL SPINE WITH MYELOPATHY: ICD-10-CM

## 2024-03-26 DIAGNOSIS — M54.2 CERVICAL SPINE PAIN: Primary | ICD-10-CM

## 2024-03-26 DIAGNOSIS — M54.2 CERVICAL SPINE PAIN: ICD-10-CM

## 2024-03-26 PROCEDURE — 99999 PR PBB SHADOW E&M-EST. PATIENT-LVL IV: CPT | Mod: PBBFAC,,, | Performed by: PHYSICIAN ASSISTANT

## 2024-03-26 PROCEDURE — 73030 X-RAY EXAM OF SHOULDER: CPT | Mod: 26,50,, | Performed by: STUDENT IN AN ORGANIZED HEALTH CARE EDUCATION/TRAINING PROGRAM

## 2024-03-26 PROCEDURE — 72050 X-RAY EXAM NECK SPINE 4/5VWS: CPT | Mod: TC

## 2024-03-26 PROCEDURE — 73030 X-RAY EXAM OF SHOULDER: CPT | Mod: TC,50

## 2024-03-26 PROCEDURE — 20610 DRAIN/INJ JOINT/BURSA W/O US: CPT | Mod: RT,S$GLB,, | Performed by: PHYSICIAN ASSISTANT

## 2024-03-26 PROCEDURE — 72050 X-RAY EXAM NECK SPINE 4/5VWS: CPT | Mod: 26,,, | Performed by: STUDENT IN AN ORGANIZED HEALTH CARE EDUCATION/TRAINING PROGRAM

## 2024-03-26 PROCEDURE — 99214 OFFICE O/P EST MOD 30 MIN: CPT | Mod: 25,S$GLB,, | Performed by: PHYSICIAN ASSISTANT

## 2024-03-26 RX ORDER — BETAMETHASONE SODIUM PHOSPHATE AND BETAMETHASONE ACETATE 3; 3 MG/ML; MG/ML
6 INJECTION, SUSPENSION INTRA-ARTICULAR; INTRALESIONAL; INTRAMUSCULAR; SOFT TISSUE
Status: COMPLETED | OUTPATIENT
Start: 2024-03-26 | End: 2024-03-26

## 2024-03-26 RX ADMIN — BETAMETHASONE SODIUM PHOSPHATE AND BETAMETHASONE ACETATE 6 MG: 3; 3 INJECTION, SUSPENSION INTRA-ARTICULAR; INTRALESIONAL; INTRAMUSCULAR; SOFT TISSUE at 01:03

## 2024-03-26 NOTE — PROGRESS NOTES
SUBJECTIVE:     Chief Complaint & History of Present Illness:  Buffy Dominique is a  Established  patient 78 y.o. female who is seen here today with a complaint of    Chief Complaint   Patient presents with    Left Shoulder - Pain    Right Shoulder - Pain    Neck - Pain    .  She is patient well-known to me was last seen treated the clinic for this condition 10/28/2022 for her right shoulder.  She has since been seen by Sports and underwent subsequent physical therapy with only short-term relief.  Returns today with continued pain of the right shoulder and to a lesser degree the left.  She also has pain in her neck with occasional radiation down the arms to and past the elbows.  On a scale of 1-10, with 10 being worst pain imaginable, he rates this pain as 3 on good days and 7 on bad days.  she describes the pain as sore and achy.    Review of patient's allergies indicates:   Allergen Reactions    Methotrexate analogues      Mouth Ulcers     Thiazides Other (See Comments)     hyponatremia         Current Outpatient Medications   Medication Sig Dispense Refill    adalimumab (HUMIRA PEN) PnKt injection Inject 1 pen  (40 mg total) into the skin every 14 (fourteen) days. 6 pen 3    adalimumab (HUMIRA PEN) PnKt injection Inject 1 pen  (40 mg total) into the skin every 14 (fourteen) days. 2 Pen 11    alendronate (FOSAMAX) 70 MG tablet Take 1 tablet (70 mg total) by mouth every 7 days. 4 tablet 11    amLODIPine (NORVASC) 10 MG tablet Take 1 tablet (10 mg total) by mouth once daily. 90 tablet 3    artificial tears (ISOPTO TEARS) 0.5 % ophthalmic solution Place 1 drop into both eyes as needed (for dry eyes). 15 mL 5    cholecalciferol, vitamin D3, (VITAMIN D3) 25 mcg (1,000 unit) capsule Take 1 capsule (1,000 Units total) by mouth once daily. 90 capsule 3    hydrALAZINE (APRESOLINE) 25 MG tablet Take 1 tablet (25 mg total) by mouth every 12 (twelve) hours. 180 tablet 3    olmesartan (BENICAR) 40 MG tablet Take 1 tablet (40 mg  "total) by mouth once daily. 30 tablet 2    pulse oximeter (PULSE OXIMETER) device by Apply Externally route 2 (two) times a day. Use twice daily at 8 AM and 3 PM and record the value in MyChart as directed. 1 each 0     No current facility-administered medications for this visit.       Past Medical History:   Diagnosis Date    CKD (chronic kidney disease) stage 3, GFR 30-59 ml/min     HLD (hyperlipidemia)     HTN (hypertension)     Hyperparathyroidism     Melanoma        Past Surgical History:   Procedure Laterality Date    CATARACT EXTRACTION, BILATERAL Bilateral 2014    cataract removal Right 2014    hiatial hernia  2017    HYSTERECTOMY      melanoma      on face    OOPHORECTOMY      THORACIC AORTIC ANEURYSM REPAIR  2011       Vital Signs (Most Recent)  Vitals:    03/26/24 1059   BP: (!) 143/68   Pulse: 66       Review of Systems:  ROS:  Constitutional: no fever or chills  Eyes: no visual changes  ENT: no nasal congestion or sore throat  Respiratory: no cough or shortness of breath, Positive multiple pulmonary nodules  Cardiovascular: no chest pain or palpitations, Positive aortic stenosis, nonrheumatic aortic valve insufficiency history of thoracic aortic aneurysm with repair  Gastrointestinal: no nausea or vomiting, tolerating diet  Genitourinary: no hematuria or dysuria, Positive pelvic floor dysfunction, CKD stage 3 hyponatremia  Integument/Breast: no rash or pruritis  Hematologic/Lymphatic: no easy bruising or lymphadenopathy  Musculoskeletal: no arthralgias or myalgias  Neurological: no seizures or tremors  Behavioral/Psych: no auditory or visual hallucinations  Endocrine: no heat or cold intolerance, Positive hyperparathyroidism      OBJECTIVE:     PHYSICAL EXAM:  Height: 5' 3" (160 cm) Weight: 54.2 kg (119 lb 6.1 oz), General Appearance: Well nourished, well developed, in no acute distress.  Neurological: Mood & affect are normal.  Shoulder exam:  right  Tenderness: globally  ROM: forward flexion 100/100, " extension 40/40, full abduction 100/100, abduction-glenohumeral 30/30, external rotation 20/20  Shoulder Strength: biceps 5/5, triceps 5/5, abduction 5/5, adduction 5/5, external rotation 5/5 with shoulder at side, flexion 5/5, and extension 5/5  positive for tenderness about the glenohumeral joint, positive for tenderness over the acromioclavicular joint, and negative for impingement sign  Stability tests: anterior apprehension test positive for pain only and posterior apprehension test positive for pain only       Shoulder exam:  left  Tenderness: globally  ROM: forward flexion 140/140, extension 40/40, full abduction 140/140, abduction-glenohumeral 80/840, external rotation 50/50  Shoulder Strength: biceps 5/5, triceps 5/5, abduction 5/5, adduction 5/5, external rotation 5/5 with shoulder at side, flexion 5/5, and extension 5/5  positive for tenderness about the glenohumeral joint, positive for tenderness over the acromioclavicular joint, and negative for impingement sign  Stability tests: anterior apprehension test positive for pain only and posterior apprehension test positive for pain only        Back Exam:  bilateral tenderness: mild, moderate, right greater than left paraspinous spasm, limitation of flexion: mild, moderate, limitation of rotation to the right: moderate, limitation of rotation to the left: mild, limitation of extension: moderate    Tenderness: Cervical   Swelling:  None       Range of Motion:      Flexion: 90     Extension: 40     Lateral Bend:   Right 40                              Left 40     Rotation:          Right 70                              Left 50                   RADIOGRAPHS:  X-rays of the shoulders taken today films reviewed by me demonstrate moderate severe arthritic changes throughout both shoulders with complete loss of glenohumeral joint space flattening of the humeral head and marked sclerotic changes osteophytic spurring noted at the AC joint as well as on or about the  humeral head bilaterally.      X-rays of the cervical spine taken today films reviewed by me demonstrate significant disc space narrowing at C4-C5 C5-C6 with associated osteophytic spurring and sclerotic changes no evidence of fracture dislocation    ASSESSMENT/PLAN:       ICD-10-CM ICD-9-CM   1. Cervical spine pain  M54.2 723.1   2. Bilateral shoulder pain, unspecified chronicity  M25.511 719.41    M25.512    3. Primary osteoarthritis of both shoulders  M19.011 715.11    M19.012    4. Osteoarthritis of cervical spine with myelopathy  M47.12 721.1       Plan: We discussed with the patient at length all the different treatment options available for her bilateral shoulder  and cervical spine including anti-inflammatories, acetaminophen, rest, ice, Physical therapy to include strengthening exercise, occasional cortisone injections for temporary relief, arthroscopic surgical repair, and finally shoulder arthroplasty.   Therapeutic diagnostic cortisone injection of the right shoulder.  Robaxin 500 mg t.i.d. for muscle spasm pain  Physical therapy was recommended the patient will take this under consideration contact the clinic if she wishes to pursue    The risks, benefits, pros, cons, and potential side effects of the procedure were discussed with the patient in detail all questions were answered.  The patient is comfortable and willing to proceed with the procedure. Verbal consent was obtained and the proper joint was identified by the patient and provider      The injection site was identified and the skin was prepared with an ETOH solution. The    right  shoulder was injected with 1 ml of Celestone and 5 ml Lidocaine under sterile technique. Buffy Dominique tolerated the procedure well, she was advised to rest the  shoulder  today, ice and support. she did receive immediate relief of the pain in and about her  shoulder  she was told this would be short lived and is secondary to the lidocaine. she may have an increase in  her discomfort tonight followed by steady improvement over the next several days. It may take 1-3 weeks following the injection to get the full benefit of the medication.  I will see her back in 3-6 months. Sooner if she has any problems or concerns.

## 2024-04-03 ENCOUNTER — PROCEDURE VISIT (OUTPATIENT)
Dept: DERMATOLOGY | Facility: CLINIC | Age: 79
End: 2024-04-03
Payer: MEDICARE

## 2024-04-03 VITALS
BODY MASS INDEX: 21.17 KG/M2 | WEIGHT: 119.5 LBS | DIASTOLIC BLOOD PRESSURE: 73 MMHG | SYSTOLIC BLOOD PRESSURE: 164 MMHG | HEIGHT: 63 IN | HEART RATE: 67 BPM

## 2024-04-03 DIAGNOSIS — C44.319 BASAL CELL CARCINOMA OF RIGHT CHEEK: Primary | ICD-10-CM

## 2024-04-03 PROCEDURE — 99499 UNLISTED E&M SERVICE: CPT | Mod: S$GLB,,, | Performed by: DERMATOLOGY

## 2024-04-03 PROCEDURE — 17312 MOHS ADDL STAGE: CPT | Mod: S$GLB,,, | Performed by: DERMATOLOGY

## 2024-04-03 PROCEDURE — 13132 CMPLX RPR F/C/C/M/N/AX/G/H/F: CPT | Mod: S$GLB,,, | Performed by: DERMATOLOGY

## 2024-04-03 PROCEDURE — 17311 MOHS 1 STAGE H/N/HF/G: CPT | Mod: 51,S$GLB,, | Performed by: DERMATOLOGY

## 2024-04-03 NOTE — PROGRESS NOTES
PROCEDURE: Mohs' Micrographic Surgery    INDICATION: Location in non-mask areas of face where maximum conservation of tumor-free tissue is needed. Biopsy-proven skin cancer of cosmetically and functionally important areas, including head, neck, genital, hand, foot, or areas known for having difficulty in healing, such as the lower anterior legs. Tumor with ill-defined borders. Tumor with aggressive histopathology. Aggressive histopathology including sclerosing, morpheaform/infiltrating, micronodular, superficial multicentric, poorly differentiated, basosquamous, or perineural invasion.    REFERRING PROVIDER:  Gina Yañez MD    CASE NUMBER:     ANESTHETIC: 3 cc 0.5% Lidocaine with Epi 1:200,000 mixed 1:1 with 0.5% Bupivacaine    SURGICAL PREP: Hibiclens    SURGEON: Velvet Meeks MD    ASSISTANTS: Kath Roberts PA-C and Monica Balderrama MA    PREOPERATIVE DIAGNOSIS: basal cell carcinoma- nodular, infiltrating    POSTOPERATIVE DIAGNOSIS: basal cell carcinoma- nodular, infiltrating    PATHOLOGIC DIAGNOSIS: basal cell carcinoma- nodular, infiltrating    HISTOLOGY OF SPECIMENS IN FIRST STAGE:   Tumor Type: Tumor seen. Nodular basal cell carcinoma: Nodular tumor in dermis composed of basaloid cells exhibiting peripheral palisading and retraction artifact.  Infiltrative basal cell carcinoma: Basaloid tumor in dermis characterized by thin elongated strands of basaloid cells infiltrating between dermal collagen bundles.   Depth of Invasion: epidermis and dermis  Perineural Invasion: No    HISTOLOGY OF SPECIMENS IN SUBSEQUENT STAGES:  Tumor Type: No tumor seen. Inflammation.    STAGES OF MOHS' SURGERY PERFORMED: 2    TUMOR-FREE PLANE ACHIEVED: Yes    HEMOSTASIS: electrocoagulation     SPECIMENS: 4 (3 in stage A and 1 in stage B)    LOCATION: right cheek (inferolateral). Location verified with Dr. Yañez's clinical photograph. Patient also verified location with hand held mirror.    INITIAL LESION SIZE: 0.7 x 1.0  cm    FINAL DEFECT SIZE: 1.2 x 2.0 cm    WOUND REPAIR/DISPOSITION: The patient tolerated Mohs' Micrographic Surgery for a basal cell carcinoma very well. When the tumor was completely removed, a repair of the surgical defect was undertaken.    PROCEDURE: Complex Linear Repair    INDICATION: Status post Mohs' Micrographic Surgery for basal cell carcinoma.    CASE NUMBER:     SURGEON: Velvet Meeks MD    ASSISTANTS: Kath Roberts PA-C, Michelle Mccarthy Surg Tech, and Ruby Persaud MD    ANESTHETIC: 2 cc 0.5% Lidocaine with Epi 1:200,000 mixed 1:1 with 0.5% Bupivacaine and 0.5 cc 1% Lidocaine with Epinephrine 1:100,000    SURGICAL PREP: Hibiclens, prepped by Michelle Mccarthy Surg Carter    LOCATION: right cheek (inferolateral)    DEFECT SIZE: 1.2 x 2.0 cm    WOUND REPAIR/DISPOSITION:  After the patient's carcinoma had been completely removed with Mohs' Micrographic Surgery, a repair of the surgical defect was undertaken. The patient was returned to the operating suite where the area of right inferolateral cheek was prepped, draped, and anesthetized in the usual sterile fashion. The wound was widely undermined in all directions. The wound was undermined to a distance at least the maximum width of the defect as measured perpendicular to the closure line along at least one entire edge of the defect, in this case 2 cm. Then, electrocoagulation was used to obtain meticulous hemostasis. 5-0 Vicryl buried vertical mattress sutures were placed into the subcutaneous and dermal plane to close the wound and corey the cutaneous wound edge. Bilateral dog ears were identified and were removed by a standard Burow's triangle technique. The cutaneous wound edges were closed using running 5-0 fast absorbing gut suture.    The patient tolerated the procedure well.    The area was cleaned and dressed appropriately and the patient was given wound care instructions. Advised patient to contact our office with any questions or concerns  "regarding this area. Patient verbally stated understanding.    LENGTH OF REPAIR: 4 cm    Vitals:    04/03/24 1051 04/03/24 1313   BP: (!) 171/66 (!) 164/73   BP Location: Left arm Right arm   Patient Position: Sitting Lying   BP Method: Medium (Automatic) Medium (Automatic)   Pulse: 70 67   Weight: 54.2 kg (119 lb 7.8 oz)    Height: 5' 3" (1.6 m)          "

## 2024-04-09 ENCOUNTER — PATIENT OUTREACH (OUTPATIENT)
Dept: ADMINISTRATIVE | Facility: HOSPITAL | Age: 79
End: 2024-04-09
Payer: MEDICARE

## 2024-04-09 VITALS — DIASTOLIC BLOOD PRESSURE: 61 MMHG | SYSTOLIC BLOOD PRESSURE: 107 MMHG

## 2024-04-09 NOTE — PROGRESS NOTES
Health Maintenance Due   Topic Date Due    COVID-19 Vaccine (1) Never done    TETANUS VACCINE  Never done    RSV Vaccine (Age 60+ and Pregnant patients) (1 - 1-dose 60+ series) Never done    Annual UACr  08/21/2019    Shingles Vaccine (2 of 2) 12/30/2020     Triggered LINKS and reconciled immunizations. Updated Care Everywhere. Checked pt's Hypertension Digital Medicine flow sheet for an updated BP reading. BP reading of 107/61 was taken from pt's Hypertension Digital Medicine flow sheet on 4/8/2024. Chart review completed.

## 2024-04-11 ENCOUNTER — PATIENT MESSAGE (OUTPATIENT)
Dept: DERMATOLOGY | Facility: CLINIC | Age: 79
End: 2024-04-11
Payer: MEDICARE

## 2024-04-11 ENCOUNTER — TELEPHONE (OUTPATIENT)
Dept: DERMATOLOGY | Facility: CLINIC | Age: 79
End: 2024-04-11
Payer: MEDICARE

## 2024-04-17 DIAGNOSIS — M05.79 RHEUMATOID ARTHRITIS INVOLVING MULTIPLE SITES WITH POSITIVE RHEUMATOID FACTOR: ICD-10-CM

## 2024-04-17 RX ORDER — ADALIMUMAB 40MG/0.8ML
40 KIT SUBCUTANEOUS
Qty: 2 PEN | Refills: 11 | Status: ACTIVE | OUTPATIENT
Start: 2024-04-17 | End: 2024-05-03 | Stop reason: SDUPTHER

## 2024-04-25 ENCOUNTER — TELEPHONE (OUTPATIENT)
Dept: ENDOSCOPY | Facility: HOSPITAL | Age: 79
End: 2024-04-25
Payer: MEDICARE

## 2024-04-25 ENCOUNTER — OFFICE VISIT (OUTPATIENT)
Dept: NEPHROLOGY | Facility: CLINIC | Age: 79
End: 2024-04-25
Payer: MEDICARE

## 2024-04-25 VITALS
BODY MASS INDEX: 21.44 KG/M2 | HEART RATE: 68 BPM | OXYGEN SATURATION: 98 % | WEIGHT: 121.06 LBS | DIASTOLIC BLOOD PRESSURE: 68 MMHG | SYSTOLIC BLOOD PRESSURE: 197 MMHG

## 2024-04-25 DIAGNOSIS — E87.1 HYPONATREMIA: Primary | ICD-10-CM

## 2024-04-25 DIAGNOSIS — E22.2 SIADH (SYNDROME OF INAPPROPRIATE ADH PRODUCTION): ICD-10-CM

## 2024-04-25 DIAGNOSIS — I10 HYPERTENSION, UNSPECIFIED TYPE: ICD-10-CM

## 2024-04-25 PROCEDURE — 1159F MED LIST DOCD IN RCRD: CPT | Mod: CPTII,S$GLB,, | Performed by: INTERNAL MEDICINE

## 2024-04-25 PROCEDURE — 99999 PR PBB SHADOW E&M-EST. PATIENT-LVL III: CPT | Mod: PBBFAC,,, | Performed by: INTERNAL MEDICINE

## 2024-04-25 PROCEDURE — 1101F PT FALLS ASSESS-DOCD LE1/YR: CPT | Mod: CPTII,S$GLB,, | Performed by: INTERNAL MEDICINE

## 2024-04-25 PROCEDURE — 1126F AMNT PAIN NOTED NONE PRSNT: CPT | Mod: CPTII,S$GLB,, | Performed by: INTERNAL MEDICINE

## 2024-04-25 PROCEDURE — 3077F SYST BP >= 140 MM HG: CPT | Mod: CPTII,S$GLB,, | Performed by: INTERNAL MEDICINE

## 2024-04-25 PROCEDURE — 3288F FALL RISK ASSESSMENT DOCD: CPT | Mod: CPTII,S$GLB,, | Performed by: INTERNAL MEDICINE

## 2024-04-25 PROCEDURE — 3078F DIAST BP <80 MM HG: CPT | Mod: CPTII,S$GLB,, | Performed by: INTERNAL MEDICINE

## 2024-04-25 PROCEDURE — 99213 OFFICE O/P EST LOW 20 MIN: CPT | Mod: S$GLB,,, | Performed by: INTERNAL MEDICINE

## 2024-04-25 PROCEDURE — 1160F RVW MEDS BY RX/DR IN RCRD: CPT | Mod: CPTII,S$GLB,, | Performed by: INTERNAL MEDICINE

## 2024-04-25 NOTE — PROGRESS NOTES
HPI  This 78 y.o. y/o female with PMH of HTN, HLD, RA, hyperparathyroidism, chronic hyponatremia, and thoracic aortic aneurysm repair came in for hyponatremia.     Admitted Dec 2023 due to bilateral leg/back pain, now subsided    HypoNa  Lab Results   Component Value Date     (L) 03/25/2024     01/22/2024     (L) 01/06/2024     (L) 01/05/2024     (L) 01/05/2024     (L) 01/05/2024     (L) 01/05/2024     (L) 01/05/2024 12/31/2023 Urine sodium 156, Urine osmolality 525; TSH, cortisol WNL  Likely pain induced excessive ADH secretion, chlorthalidone use  S/p holding chlorthalidone, got a course of tolvaptan  CT no new nodules  The patient doesn't add salt to the diet, the independent living facility prepared the lunch    HTN  BP this visit 197/68 mmHg, recheck 181/75 (states she just drank a cup of coffee and has been feeling anxious about her sodium level)  BP at home 120s-160s/60s  Current medication: amlodipine 10 mg daily, hydralazine 25 mg BID       Past Medical History:   Diagnosis Date    Basal cell carcinoma (BCC) of right cheek 04/03/2024    R cheek    CKD (chronic kidney disease) stage 3, GFR 30-59 ml/min     HLD (hyperlipidemia)     HTN (hypertension)     Hyperparathyroidism     Melanoma        Past Surgical History:   Procedure Laterality Date    CATARACT EXTRACTION, BILATERAL Bilateral 2014    cataract removal Right 2014    hiatial hernia  2017    HYSTERECTOMY      melanoma      on face    OOPHORECTOMY      THORACIC AORTIC ANEURYSM REPAIR  2011       Review of patient's allergies indicates:   Allergen Reactions    Methotrexate analogues      Mouth Ulcers     Thiazides Other (See Comments)     hyponatremia         Social History     Socioeconomic History    Marital status:    Tobacco Use    Smoking status: Former     Current packs/day: 0.00     Average packs/day: 1 pack/day for 30.0 years (30.0 ttl pk-yrs)     Types: Cigarettes     Start date: 1/26/1979      Quit date: 1/26/2009     Years since quitting: 15.2    Smokeless tobacco: Never   Substance and Sexual Activity    Alcohol use: Never     Comment: glass of wine once or twice a year    Drug use: Never    Sexual activity: Not Currently     Partners: Male     Birth control/protection: None     Social Determinants of Health     Financial Resource Strain: Low Risk  (12/29/2023)    Overall Financial Resource Strain (CARDIA)     Difficulty of Paying Living Expenses: Not very hard   Food Insecurity: No Food Insecurity (12/29/2023)    Hunger Vital Sign     Worried About Running Out of Food in the Last Year: Never true     Ran Out of Food in the Last Year: Never true   Transportation Needs: No Transportation Needs (12/29/2023)    PRAPARE - Transportation     Lack of Transportation (Medical): No     Lack of Transportation (Non-Medical): No   Physical Activity: Unknown (12/29/2023)    Exercise Vital Sign     Days of Exercise per Week: 5 days   Stress: No Stress Concern Present (12/29/2023)    [employer] - Occupational Stress Questionnaire     Feeling of Stress : Not at all   Social Connections: Unknown (12/29/2023)    Social Connection and Isolation Panel [NHANES]     Frequency of Communication with Friends and Family: More than three times a week     Frequency of Social Gatherings with Friends and Family: Twice a week     Active Member of Clubs or Organizations: Yes     Attends Club or Organization Meetings: More than 4 times per year     Marital Status:    Housing Stability: Unknown (12/29/2023)    Housing Stability Vital Sign     Unstable Housing in the Last Year: No       Family History   Problem Relation Name Age of Onset    Cancer Father Junior     Hypertension Maternal Grandmother Jazzy     Colon cancer Neg Hx      Inflammatory bowel disease Neg Hx         ROS negative except listed above    PHYSICAL EXAMINATION:  Blood pressure (!) 197/68, pulse 68, weight 54.9 kg (121 lb 0.5  oz), SpO2 98%.  Constitutional - No acute distress  HEENT - Grossly normal  Neck - supple.   Cardiovascular - JVP normal  Respiratory - Clear  Musculoskeletal - Peripheral edema no  Dermatologic/Skin - Skin warm and dry.  No rashes.    Neurologic - No acute neurological deficit  Psychiatric - AAOx3    Assessment and Plan  1. 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 HTN: BP goal 130/80 mmHg  -Continue amlodipine 10 mg daily, hydralazine 25 mg BID  -Counseled the patient to monitor it at home when she is calm. Also, bring the machine to PCP's or our office to make sure the readings are accurate    Follow up in 2 months

## 2024-04-26 ENCOUNTER — PATIENT OUTREACH (OUTPATIENT)
Dept: ADMINISTRATIVE | Facility: HOSPITAL | Age: 79
End: 2024-04-26
Payer: MEDICARE

## 2024-04-26 VITALS — DIASTOLIC BLOOD PRESSURE: 67 MMHG | SYSTOLIC BLOOD PRESSURE: 137 MMHG

## 2024-04-26 NOTE — PROGRESS NOTES
Health Maintenance Due   Topic Date Due    COVID-19 Vaccine (1) Never done    TETANUS VACCINE  Never done    RSV Vaccine (Age 60+ and Pregnant patients) (1 - 1-dose 60+ series) Never done    Shingles Vaccine (2 of 2) 12/30/2020     Triggered LINKS and reconciled immunizations. Updated Care Everywhere. Checked pt's Hypertension Digital Medicine flow sheet for an updated BP reading. BP reading of 137/67 was taken from pt's Hypertension Digital Medicine flow sheet on 4/25/2024. Chart review completed.

## 2024-04-30 ENCOUNTER — HOSPITAL ENCOUNTER (OUTPATIENT)
Facility: HOSPITAL | Age: 79
Discharge: HOME | End: 2024-04-30
Attending: STUDENT IN AN ORGANIZED HEALTH CARE EDUCATION/TRAINING PROGRAM | Admitting: STUDENT IN AN ORGANIZED HEALTH CARE EDUCATION/TRAINING PROGRAM
Payer: MEDICARE

## 2024-04-30 ENCOUNTER — ANESTHESIA EVENT (OUTPATIENT)
Dept: ENDOSCOPY | Facility: HOSPITAL | Age: 79
End: 2024-04-30
Payer: MEDICARE

## 2024-04-30 ENCOUNTER — ANESTHESIA (OUTPATIENT)
Dept: ENDOSCOPY | Facility: HOSPITAL | Age: 79
End: 2024-04-30
Payer: MEDICARE

## 2024-04-30 VITALS
OXYGEN SATURATION: 95 % | TEMPERATURE: 98 F | BODY MASS INDEX: 21.79 KG/M2 | WEIGHT: 123 LBS | HEIGHT: 63 IN | SYSTOLIC BLOOD PRESSURE: 129 MMHG | HEART RATE: 60 BPM | RESPIRATION RATE: 17 BRPM | DIASTOLIC BLOOD PRESSURE: 80 MMHG

## 2024-04-30 DIAGNOSIS — Z12.11 ENCOUNTER FOR SCREENING COLONOSCOPY: ICD-10-CM

## 2024-04-30 DIAGNOSIS — Z12.11 SCREENING FOR MALIGNANT NEOPLASM OF COLON: Primary | ICD-10-CM

## 2024-04-30 PROCEDURE — 37000008 HC ANESTHESIA 1ST 15 MINUTES: Performed by: STUDENT IN AN ORGANIZED HEALTH CARE EDUCATION/TRAINING PROGRAM

## 2024-04-30 PROCEDURE — E9220 PRA ENDO ANESTHESIA: HCPCS | Mod: ,,, | Performed by: STUDENT IN AN ORGANIZED HEALTH CARE EDUCATION/TRAINING PROGRAM

## 2024-04-30 PROCEDURE — G0121 COLON CA SCRN NOT HI RSK IND: HCPCS | Mod: 74 | Performed by: STUDENT IN AN ORGANIZED HEALTH CARE EDUCATION/TRAINING PROGRAM

## 2024-04-30 PROCEDURE — 63600175 PHARM REV CODE 636 W HCPCS: Performed by: STUDENT IN AN ORGANIZED HEALTH CARE EDUCATION/TRAINING PROGRAM

## 2024-04-30 PROCEDURE — 25000003 PHARM REV CODE 250: Performed by: STUDENT IN AN ORGANIZED HEALTH CARE EDUCATION/TRAINING PROGRAM

## 2024-04-30 PROCEDURE — G0121 COLON CA SCRN NOT HI RSK IND: HCPCS | Mod: 52,,, | Performed by: STUDENT IN AN ORGANIZED HEALTH CARE EDUCATION/TRAINING PROGRAM

## 2024-04-30 PROCEDURE — 37000009 HC ANESTHESIA EA ADD 15 MINS: Performed by: STUDENT IN AN ORGANIZED HEALTH CARE EDUCATION/TRAINING PROGRAM

## 2024-04-30 RX ORDER — PROPOFOL 10 MG/ML
VIAL (ML) INTRAVENOUS
Status: DISCONTINUED | OUTPATIENT
Start: 2024-04-30 | End: 2024-04-30

## 2024-04-30 RX ORDER — LIDOCAINE HYDROCHLORIDE 20 MG/ML
INJECTION INTRAVENOUS
Status: DISCONTINUED | OUTPATIENT
Start: 2024-04-30 | End: 2024-04-30

## 2024-04-30 RX ORDER — SODIUM CHLORIDE 9 MG/ML
INJECTION, SOLUTION INTRAVENOUS CONTINUOUS
Status: DISCONTINUED | OUTPATIENT
Start: 2024-04-30 | End: 2024-04-30 | Stop reason: HOSPADM

## 2024-04-30 RX ADMIN — PROPOFOL 20 MG: 10 INJECTION, EMULSION INTRAVENOUS at 10:04

## 2024-04-30 RX ADMIN — SODIUM CHLORIDE: 0.9 INJECTION, SOLUTION INTRAVENOUS at 10:04

## 2024-04-30 RX ADMIN — PROPOFOL 60 MG: 10 INJECTION, EMULSION INTRAVENOUS at 10:04

## 2024-04-30 RX ADMIN — LIDOCAINE HYDROCHLORIDE 40 MG: 20 INJECTION INTRAVENOUS at 10:04

## 2024-04-30 NOTE — ANESTHESIA PREPROCEDURE EVALUATION
04/30/2024  Buffy Dominique is a 78 y.o., female.  Past Medical History:   Diagnosis Date    Basal cell carcinoma (BCC) of right cheek 04/03/2024    R cheek    CKD (chronic kidney disease) stage 3, GFR 30-59 ml/min     HLD (hyperlipidemia)     HTN (hypertension)     Hyperparathyroidism     Melanoma      Past Surgical History:   Procedure Laterality Date    CATARACT EXTRACTION, BILATERAL Bilateral 2014    cataract removal Right 2014    hiatial hernia  2017    HYSTERECTOMY      melanoma      on face    OOPHORECTOMY      THORACIC AORTIC ANEURYSM REPAIR  2011           Pre-op Assessment    I have reviewed the Patient Summary Reports.    I have reviewed the NPO Status.   I have reviewed the Medications.     Review of Systems  Anesthesia Hx:  No problems with previous Anesthesia   Neg history of prior surgery.          Denies Family Hx of Anesthesia complications.    Denies Personal Hx of Anesthesia complications.                    Social:  Former Smoker, No Alcohol Use       Hematology/Oncology:  Hematology Normal   Oncology Normal                                   EENT/Dental:  EENT/Dental Normal           Cardiovascular:  Exercise tolerance: good   Hypertension Valvular problems/Murmurs, AS      Denies Angina.     hyperlipidemia  Denies LEMONS.  ECG has been reviewed.                          Pulmonary:  Pulmonary Normal                       Renal/:  Chronic Renal Disease, CKD                Hepatic/GI:  Bowel Prep.                Musculoskeletal:  Musculoskeletal Normal                Neurological:  Neurology Normal                                      Endocrine:  Endocrine Normal            Dermatological:  Skin Normal    Psych:  Psychiatric Normal                    Physical Exam  General: Well nourished, Cooperative, Alert and Oriented    Airway:  Mallampati: III / II  Mouth Opening: Normal  TM Distance:  Normal  Tongue: Normal  Neck ROM: Normal ROM    Dental:  Dentures        Anesthesia Plan  Type of Anesthesia, risks & benefits discussed:    Anesthesia Type: Gen Natural Airway  Intra-op Monitoring Plan: Standard ASA Monitors  Post Op Pain Control Plan: multimodal analgesia  Induction:  IV  Informed Consent: Informed consent signed with the Patient and all parties understand the risks and agree with anesthesia plan.  All questions answered. Patient consented to blood products? No  ASA Score: 3  Day of Surgery Review of History & Physical: H&P Update referred to the surgeon/provider.    Ready For Surgery From Anesthesia Perspective.     .

## 2024-04-30 NOTE — ANESTHESIA POSTPROCEDURE EVALUATION
Anesthesia Post Evaluation    Patient: Buffy Dominique    Procedure(s) Performed: Procedure(s) (LRB):  COLONOSCOPY (N/A)    Final Anesthesia Type: general      Patient location during evaluation: GI PACU  Patient participation: Yes- Able to Participate  Level of consciousness: awake and alert and oriented  Post-procedure vital signs: reviewed and stable  Pain management: adequate  Airway patency: patent    PONV status at discharge: No PONV  Anesthetic complications: no      Cardiovascular status: blood pressure returned to baseline and hemodynamically stable  Respiratory status: unassisted, spontaneous ventilation and room air  Hydration status: euvolemic  Follow-up not needed.              Vitals Value Taken Time   /80 04/30/24 1110   Temp 36.4 °C (97.5 °F) 04/30/24 1037   Pulse 60 04/30/24 1110   Resp 17 04/30/24 1110   SpO2 95 % 04/30/24 1110         Event Time   Out of Recovery 11:20:39         Pain/Shay Score: Shay Score: 9 (4/30/2024 10:52 AM)

## 2024-04-30 NOTE — PROVATION PATIENT INSTRUCTIONS
Discharge Summary/Instructions after an Endoscopic Procedure  Patient Name: Buffy Dominique  Patient MRN: [id number]  Patient YOB: 1945 Tuesday, April 30, 2024  Sharath Myles MD  Dear patient,  As a result of recent federal legislation (The Federal Cures Act), you may   receive lab or pathology results from your procedure in your [facility]   account before your physician is able to contact you. Your physician or   their representative will relay the results to you with their   recommendations at their soonest availability.  Thank you,  RESTRICTIONS:  During your procedure today, you received medications for sedation.  These   medications may affect your judgment, balance and coordination.  Therefore,   for 24 hours, you have the following restrictions:   - DO NOT drive a car, operate machinery, make legal/financial decisions,   sign important papers or drink alcohol.    ACTIVITY:  Today: no heavy lifting, straining or running due to procedural   sedation/anesthesia.  The following day: return to full activity including work.  DIET:  Eat and drink normally unless instructed otherwise.     TREATMENT FOR COMMON SIDE EFFECTS:  - Mild abdominal pain, nausea, belching, bloating or excessive gas:  rest,   eat lightly and use a heating pad.  - Sore Throat: treat with throat lozenges and/or gargle with warm salt   water.  - Because air was used during the procedure, expelling large amounts of air   from your rectum or belching is normal.  - If a bowel prep was taken, you may not have a bowel movement for 1-3 days.    This is normal.  SYMPTOMS TO WATCH FOR AND REPORT TO YOUR PHYSICIAN:  1. Abdominal pain or bloating, other than gas cramps.  2. Chest pain.  3. Back pain.  4. Signs of infection such as: chills or fever occurring within 24 hours   after the procedure.  5. Rectal bleeding, which would show as bright red, maroon, or black stools.   (A tablespoon of blood from the rectum is not serious, especially if    hemorrhoids are present.)  6. Vomiting.  7. Weakness or dizziness.  GO DIRECTLY TO THE NEAREST EMERGENCY ROOM IF YOU HAVE ANY OF THE FOLLOWING:      Difficulty breathing              Chills and/or fever over 101 F   Persistent vomiting and/or vomiting blood   Severe abdominal pain   Severe chest pain   Black, tarry stools   Bleeding- more than one tablespoon   Any other symptom or condition that you feel may need urgent attention  Your doctor recommends these additional instructions:  If any biopsies were taken, your doctors clinic will contact you in 1 to 2   weeks with any results.  - Discharge patient to home.   - Resume previous diet.   - Continue present medications.   - Repeat colonoscopy at the next available appointment because the bowel   preparation was suboptimal. Will discuss utility of proceeding with repair   given difficulty with preparation related to prolapse.  - Return to my office in 1 month.  For questions, problems or results please call your physician - Sharath Myles MD at Work:  [phone].  [facility], EMERGENCY ROOM PHONE NUMBER: [phone]  IF A COMPLICATION OR EMERGENCY SITUATION ARISES AND YOU ARE UNABLE TO REACH   YOUR PHYSICIAN - GO DIRECTLY TO THE EMERGENCY ROOM.  MD Sharath Conner MD  4/30/2024 10:34:42 AM  This report has been verified and signed electronically.  Dear patient,  As a result of recent federal legislation (The Federal Cures Act), you may   receive lab or pathology results from your procedure in your [facility]   account before your physician is able to contact you. Your physician or   their representative will relay the results to you with their   recommendations at their soonest availability.  Thank you,  PROVATION

## 2024-04-30 NOTE — H&P
[facility]  Colon and Rectal Surgery    [address]  [place], [place]  Tel: [phone]  Fax: [phone]  [email or web address]   MD Saturnino Urena MD Brian Kann, MD W. Forrest Johnston, MD Matthew Giglia, MD Jennifer Paruch, MD William Kethman, MD Danielle Kay, MD     Patient name: Buffy Dominique   YOB: 1945   MRN: [id number]  Date of procedure: 04/30/2024    Procedure: Colonoscopy  Indications: Rectal prolapse, seen in 1/2022, recommended colonoscopy at that time - she is now retired (was a ), her prolapse is still reducible but is happening more frequent than previously    Last colonoscopy: 5-7 years ago maybe - doesn't recall exactly when but it was reportedly normal     The patient was informed of the availability of a certified  without charge. A certified  was not necessary for this visit.    Sedation plan: MAC  ASA: 3    Review of Systems  See above    Past Medical History:   Diagnosis Date    Basal cell carcinoma (BCC) of right cheek 04/03/2024    R cheek    CKD (chronic kidney disease) stage 3, GFR 30-59 ml/min     HLD (hyperlipidemia)     HTN (hypertension)     Hyperparathyroidism     Melanoma      Past Surgical History:   Procedure Laterality Date    CATARACT EXTRACTION, BILATERAL Bilateral 2014    cataract removal Right 2014    hiatial hernia  2017    HYSTERECTOMY      melanoma      on face    OOPHORECTOMY      THORACIC AORTIC ANEURYSM REPAIR  2011     Family History   Problem Relation Name Age of Onset    Cancer Father Junior     Hypertension Maternal Grandmother Jazzy     Colon cancer Neg Hx      Inflammatory bowel disease Neg Hx       Social History     Tobacco Use    Smoking status: Former     Current packs/day: 0.00     Average packs/day: 1 pack/day for 30.0 years (30.0 ttl pk-yrs)     Types: Cigarettes     Start date: 1/26/1979     Quit date: 1/26/2009     Years since quitting: 15.2     Smokeless tobacco: Never   Substance Use Topics    Alcohol use: Never     Comment: glass of wine once or twice a year    Drug use: Never     Review of patient's allergies indicates:   Allergen Reactions    Methotrexate analogues      Mouth Ulcers     Thiazides Other (See Comments)     hyponatremia       Prior to Admission medications    Medication Sig Start Date End Date Taking? Authorizing Provider   adalimumab (HUMIRA PEN) PnKt injection Inject 1 pen  (40 mg total) into the skin every 14 (fourteen) days. 9/7/23   Horacio Cui MD   adalimumab (HUMIRA PEN) PnKt injection Inject 1 pen  (40 mg total) into the skin every 14 (fourteen) days. 4/17/24 3/19/25  Horacio Cui MD   alendronate (FOSAMAX) 70 MG tablet Take 1 tablet (70 mg total) by mouth every 7 days. 9/14/23 9/13/24  Kennedi Aguirre MD   amLODIPine (NORVASC) 10 MG tablet Take 1 tablet (10 mg total) by mouth once daily. 1/22/24   Odalis Kim MD   artificial tears (ISOPTO TEARS) 0.5 % ophthalmic solution Place 1 drop into both eyes as needed (for dry eyes). 1/5/24   Diann Rodriguez PA-C   cholecalciferol, vitamin D3, (VITAMIN D3) 25 mcg (1,000 unit) capsule Take 1 capsule (1,000 Units total) by mouth once daily. 9/14/23   Kennedi Aguirre MD   hydrALAZINE (APRESOLINE) 25 MG tablet Take 1 tablet (25 mg total) by mouth every 12 (twelve) hours. 1/22/24   Odalis Kim MD   olmesartan (BENICAR) 40 MG tablet Take 1 tablet (40 mg total) by mouth once daily. 2/19/24 5/19/24  Odalis Kim MD   pulse oximeter (PULSE OXIMETER) device by Apply Externally route 2 (two) times a day. Use twice daily at 8 AM and 3 PM and record the value in [facility] as directed. 1/5/24   Diann Rodriguez PA-C   spironolactone (ALDACTONE) 25 MG tablet Take 1 tablet (25 mg total) by mouth once daily. 11/23/21 5/8/22  Odalis Kim MD       Physical Examination  LMP  (LMP Unknown)      Constitutional: well developed, no cough, no  dyspnea, alert, and no acute distress    Head: Normocephalic, no lesions, without obvious abnormality  Eye: Normal external eye, conjunctiva, and lids, PERRL  Cardiovascular: regular rate and regular rhythm  Respiratory: normal air entry  Gastrointestinal: soft, non-tender, without masses or organomegaly  Neurologic: alert, oriented, normal speech, no focal findings or movement disorder noted  Psychiatric: appropriate, normal mood    Plan of Care    It was a pleasure meeting Ms. Dominique today - we will plan to perform a colonoscopy with monitored anesthesia care. The details of the procedure, the possible need for biopsy or polypectomy and the potential risks including bleeding, perforation, missed polyps were discussed in detail and they consented to undergo the procedure.      Sharath Myles MD, FACS, FASCRS  Department of Colon & Rectal Surgery  [facility]

## 2024-05-03 DIAGNOSIS — M05.79 RHEUMATOID ARTHRITIS INVOLVING MULTIPLE SITES WITH POSITIVE RHEUMATOID FACTOR: ICD-10-CM

## 2024-05-03 RX ORDER — ADALIMUMAB 40MG/0.8ML
40 KIT SUBCUTANEOUS
Qty: 2 PEN | Refills: 11 | Status: ACTIVE | OUTPATIENT
Start: 2024-05-03 | End: 2024-05-30

## 2024-05-17 ENCOUNTER — TELEPHONE (OUTPATIENT)
Dept: SURGERY | Facility: CLINIC | Age: 79
End: 2024-05-17
Payer: MEDICARE

## 2024-05-17 NOTE — TELEPHONE ENCOUNTER
Called pt and touched base with her to choose a sx date.  Pt stated that June 7 will be good for her to have her sx.  Tentative arrival time of 0900/0930 discussed.  Pt is currently at a bday party, so RN let pt know that she will email her the sx instructions she needs to review and we will touch base again on 5/21.  Pt verbalized understanding to all.

## 2024-05-22 ENCOUNTER — TELEPHONE (OUTPATIENT)
Dept: DERMATOLOGY | Facility: CLINIC | Age: 79
End: 2024-05-22
Payer: MEDICARE

## 2024-05-22 NOTE — TELEPHONE ENCOUNTER
Called and spoke to pt. Pt stated that she was unable to make it to her last appt bc [facility] handles her transportation and they have not been picking her up in time. Scheduled pt appt in July on same day with her PCP appt. Pt confirmed and thanked me for helping her out.     ----- Message from Freida Cross MA sent at 5/20/2024  5:11 PM CDT -----  Regarding: FW: Reschedule appt  Contact: Pt    ----- Message -----  From: Sheyla Rico  Sent: 5/20/2024   9:57 AM CDT  To: [facility] Staff  Subject: Reschedule appt                                  Pt Would Like To Reschedule Appt    Please advise    Phone [phone]    Thank You

## 2024-05-23 DIAGNOSIS — K62.3 RECTAL PROLAPSE: Primary | ICD-10-CM

## 2024-05-24 ENCOUNTER — TELEPHONE (OUTPATIENT)
Dept: SURGERY | Facility: CLINIC | Age: 79
End: 2024-05-24
Payer: MEDICARE

## 2024-05-25 NOTE — TELEPHONE ENCOUNTER
Called pt to let her know that I need her to check her email and review her instructions and we will review them after she has had a chance to go over everything.  RN also let pt know that Dr. Myles would like for her to have an anesthesia eval prior to sx and someone will call and get her scheduled soon.  Pt verbalized understanding to all.

## 2024-05-27 ENCOUNTER — TELEPHONE (OUTPATIENT)
Dept: PREADMISSION TESTING | Facility: HOSPITAL | Age: 79
End: 2024-05-27
Payer: MEDICARE

## 2024-05-27 NOTE — TELEPHONE ENCOUNTER
----- Message from Kristie Trejo RN sent at 5/24/2024  9:04 AM CDT -----  Regarding: Needs assistance with scheduling  Good morning,    This pt is having sx with Dr. Myles on June 7.  Please schedule her for an anesthesia evaluation.    Thank you!    Kristie

## 2024-05-30 DIAGNOSIS — M05.79 RHEUMATOID ARTHRITIS INVOLVING MULTIPLE SITES WITH POSITIVE RHEUMATOID FACTOR: ICD-10-CM

## 2024-05-30 RX ORDER — CIPROFLOXACIN 500 MG/1
500 TABLET ORAL SEE ADMIN INSTRUCTIONS
Qty: 2 TABLET | Refills: 0 | Status: ON HOLD | OUTPATIENT
Start: 2024-05-30 | End: 2024-06-10 | Stop reason: HOSPADM

## 2024-05-30 RX ORDER — ADALIMUMAB 40MG/0.8ML
40 KIT SUBCUTANEOUS
Qty: 2 PEN | Refills: 11 | Status: ACTIVE | OUTPATIENT
Start: 2024-05-30 | End: 2025-05-01

## 2024-05-30 RX ORDER — METRONIDAZOLE 500 MG/1
500 TABLET ORAL SEE ADMIN INSTRUCTIONS
Qty: 2 TABLET | Refills: 0 | Status: ON HOLD | OUTPATIENT
Start: 2024-05-30 | End: 2024-06-10 | Stop reason: HOSPADM

## 2024-05-31 ENCOUNTER — HOSPITAL ENCOUNTER (OUTPATIENT)
Dept: PREADMISSION TESTING | Facility: HOSPITAL | Age: 79
Discharge: HOME | End: 2024-05-31
Attending: STUDENT IN AN ORGANIZED HEALTH CARE EDUCATION/TRAINING PROGRAM
Payer: MEDICARE

## 2024-05-31 ENCOUNTER — ANESTHESIA EVENT (OUTPATIENT)
Dept: SURGERY | Facility: HOSPITAL | Age: 79
DRG: 330 | End: 2024-05-31
Payer: MEDICARE

## 2024-05-31 VITALS
OXYGEN SATURATION: 98 % | BODY MASS INDEX: 21.35 KG/M2 | WEIGHT: 120.5 LBS | SYSTOLIC BLOOD PRESSURE: 138 MMHG | TEMPERATURE: 98 F | RESPIRATION RATE: 16 BRPM | HEART RATE: 73 BPM | DIASTOLIC BLOOD PRESSURE: 55 MMHG | HEIGHT: 63 IN

## 2024-05-31 NOTE — ANESTHESIA PREPROCEDURE EVALUATION
05/31/2024  Buffy Dominique is a 78 y.o., female.    Pre-operative evaluation for Procedure(s) (LRB):  DV5 ROBOTIC RECTOPEXY (eras low, lith) (N/A)    Buffy Dominique is a 78 y.o. female     Patient Active Problem List   Diagnosis    Essential hypertension    AS (aortic stenosis)    History of thoracic aortic aneurysm repair    HLD (hyperlipidemia)    Nonrheumatic aortic valve insufficiency    Polyarthritis    Seropositive rheumatoid arthritis    Former tobacco use    Multiple pulmonary nodules    Rectal prolapse    Pelvic floor dysfunction    Acute cystitis without hematuria    CKD (chronic kidney disease) stage 3, GFR 30-59 ml/min    Hyponatremia    Acute kidney injury superimposed on chronic kidney disease    Right shoulder pain    Shoulder pain, right    Weakness of shoulder    Decreased ROM of right shoulder    Hypotension    Bilateral low back pain    SIADH (syndrome of inappropriate ADH production)    COVID-19    Immunocompromised state due to drug therapy    Stage 3b chronic kidney disease    Aortic atherosclerosis       Review of patient's allergies indicates:   Allergen Reactions    Methotrexate analogues      Mouth Ulcers     Thiazides Other (See Comments)     hyponatremia       Current Outpatient Medications on File Prior to Encounter   Medication Sig Dispense Refill    adalimumab (HUMIRA PEN) PnKt injection Inject 1 pen  (40 mg total) into the skin every 14 (fourteen) days. 6 pen 3    adalimumab (HUMIRA PEN) PnKt injection Inject 1 pen  (40 mg total) into the skin every 14 (fourteen) days. 2 pen 11    alendronate (FOSAMAX) 70 MG tablet Take 1 tablet (70 mg total) by mouth every 7 days. 4 tablet 11    amLODIPine (NORVASC) 10 MG tablet Take 1 tablet (10 mg total) by mouth once daily. 90 tablet 3    artificial tears (ISOPTO TEARS) 0.5 % ophthalmic solution Place 1 drop into both eyes as needed (for dry eyes).  15 mL 5    cholecalciferol, vitamin D3, (VITAMIN D3) 25 mcg (1,000 unit) capsule Take 1 capsule (1,000 Units total) by mouth once daily. 90 capsule 3    ciprofloxacin HCl (CIPRO) 500 MG tablet Take 1 tablet (500 mg total) by mouth As instructed (preop). One tab at 9p and another at 11p night before surgery 2 tablet 0    hydrALAZINE (APRESOLINE) 50 MG tablet Take 1 tablet (50 mg total) by mouth every 12 (twelve) hours. 60 tablet 2    metroNIDAZOLE (FLAGYL) 500 MG tablet Take 1 tablet (500 mg total) by mouth As instructed (preop). Take one pill at 9pm, and other pill at 11p night before surgery 2 tablet 0    olmesartan (BENICAR) 40 MG tablet Take 1 tablet (40 mg total) by mouth once daily. 30 tablet 2    pulse oximeter (PULSE OXIMETER) device by Apply Externally route 2 (two) times a day. Use twice daily at 8 AM and 3 PM and record the value in "[employer]t as directed. 1 each 0    [DISCONTINUED] spironolactone (ALDACTONE) 25 MG tablet Take 1 tablet (25 mg total) by mouth once daily. 90 tablet 0     No current facility-administered medications on file prior to encounter.       Past Surgical History:   Procedure Laterality Date    CATARACT EXTRACTION, BILATERAL Bilateral 2014    cataract removal Right 2014    COLONOSCOPY N/A 4/30/2024    Procedure: COLONOSCOPY;  Surgeon: Sharath Myles MD;  Location: [address]);  Service: Endoscopy;  Laterality: N/A;  ref by / Miralax inst portal-RB  1/31/24- Pt r/s/ prep ins on portal - Miralax prep - ERW  4/25/24- precall complete - ERW  4/29-pt cannot come in earlier due to ride-KPvt    hiatial hernia  2017    HYSTERECTOMY      melanoma      on face    OOPHORECTOMY      THORACIC AORTIC ANEURYSM REPAIR  2011       Social History     Socioeconomic History    Marital status:    Tobacco Use    Smoking status: Former     Current packs/day: 0.00     Average packs/day: 1 pack/day for 30.0 years (30.0 ttl pk-yrs)     Types: Cigarettes     Start date: 1/26/1979      "Quit date: 2009     Years since quitting: 15.3    Smokeless tobacco: Never   Substance and Sexual Activity    Alcohol use: Never     Comment: glass of wine once or twice a year    Drug use: Never    Sexual activity: Not Currently     Partners: Male     Birth control/protection: None     Social Determinants of Health     Financial Resource Strain: Low Risk  (2023)    Overall Financial Resource Strain (CARDIA)     Difficulty of Paying Living Expenses: Not very hard   Food Insecurity: No Food Insecurity (2023)    Hunger Vital Sign     Worried About Running Out of Food in the Last Year: Never true     Ran Out of Food in the Last Year: Never true   Transportation Needs: No Transportation Needs (2023)    PRAPARE - Transportation     Lack of Transportation (Medical): No     Lack of Transportation (Non-Medical): No   Physical Activity: Unknown (2023)    Exercise Vital Sign     Days of Exercise per Week: 5 days   Stress: No Stress Concern Present (2023)    [place] [employer] - Occupational Stress Questionnaire     Feeling of Stress : Not at all   Housing Stability: Unknown (2023)    Housing Stability Vital Sign     Unstable Housing in the Last Year: No         CBC: No results for input(s): "WBC", "RBC", "HGB", "HCT", "PLT", "MCV", "MCH", "MCHC" in the last 72 hours.    CMP: No results for input(s): "NA", "K", "CL", "CO2", "BUN", "CREATININE", "GLU", "MG", "PHOS", "CALCIUM", "ALBUMIN", "PROT", "ALKPHOS", "ALT", "AST", "BILITOT" in the last 72 hours.    INR  No results for input(s): "PT", "INR", "PROTIME", "APTT" in the last 72 hours.        Diagnostic Studies:      EKD Echo:  No results found for this or any previous visit.        Pre-op Assessment    I have reviewed the Patient Summary Reports.    I have reviewed the NPO Status.   I have reviewed the Medications.     Review of Systems  Anesthesia Hx:  No problems with previous Anesthesia   Neg history of " prior surgery.          Denies Family Hx of Anesthesia complications.    Denies Personal Hx of Anesthesia complications.                    Social:  Former Smoker, No Alcohol Use       Hematology/Oncology:  Hematology Normal   Oncology Normal                                   EENT/Dental:  EENT/Dental Normal           Cardiovascular:  Exercise tolerance: good   Hypertension Valvular problems/Murmurs, AS      Denies Angina.     hyperlipidemia  Denies LEMONS.  ECG has been reviewed. Jan 2024 TTE reviewed. No significant valvular pathology. Normal biventricular function.                   Hypertension         Pulmonary:  Pulmonary Normal   Denies COPD.                     Renal/:  Chronic Renal Disease, CKD   Chronic hyponatremia, last sodium on 3/25 was 131     Kidney Function/Disease             Musculoskeletal:  Arthritis               Neurological:  Neurology Normal                                    Neuromuscular Disease   Endocrine:  Endocrine Normal            Dermatological:  Skin Normal    Psych:  Psychiatric Normal                    Physical Exam  General: Well nourished, Cooperative and Alert    Airway:  Mallampati: I   Mouth Opening: Normal  TM Distance: Normal  Tongue: Normal  Neck ROM: Normal ROM    Dental:  Dentures    Chest/Lungs:  Normal Respiratory Rate    Heart:  Rate: Normal  Rhythm: Regular Rhythm  Murmur: Systolic;        Anesthesia Plan  Type of Anesthesia, risks & benefits discussed:    Anesthesia Type: Gen ETT  Intra-op Monitoring Plan: Standard ASA Monitors  Post Op Pain Control Plan: multimodal analgesia and IV/PO Opioids PRN  Induction:  IV  Airway Plan: Direct, Post-Induction  Informed Consent: Informed consent signed with the Patient and all parties understand the risks and agree with anesthesia plan.  All questions answered.   ASA Score: 3  Day of Surgery Review of History & Physical: H&P Update referred to the surgeon/provider.  Anesthesia Plan Notes: GA, ETT R/B/A explained. All  questions answered. E-consent obtained in preop clinic.    Ready For Surgery From Anesthesia Perspective.     .

## 2024-06-03 ENCOUNTER — TELEPHONE (OUTPATIENT)
Dept: SURGERY | Facility: CLINIC | Age: 79
End: 2024-06-03
Payer: MEDICARE

## 2024-06-03 RX ORDER — CIPROFLOXACIN 500 MG/1
500 TABLET ORAL 2 TIMES DAILY
Qty: 2 TABLET | Refills: 0 | Status: SHIPPED | OUTPATIENT
Start: 2024-06-03 | End: 2024-06-04

## 2024-06-03 RX ORDER — METRONIDAZOLE 500 MG/1
500 TABLET ORAL 2 TIMES DAILY
Qty: 2 TABLET | Refills: 0 | Status: SHIPPED | OUTPATIENT
Start: 2024-06-03 | End: 2024-06-04

## 2024-06-03 NOTE — TELEPHONE ENCOUNTER
Confirmed 0900am arrival time with pt and reviewed sx instructions with her.  Pt had her appt with pre-op and is all set.  All questions and concerns addressed.  Pt verbalized understanding to all.

## 2024-06-07 ENCOUNTER — ANESTHESIA (OUTPATIENT)
Dept: SURGERY | Facility: HOSPITAL | Age: 79
DRG: 330 | End: 2024-06-07
Payer: MEDICARE

## 2024-06-07 ENCOUNTER — HOSPITAL ENCOUNTER (INPATIENT)
Facility: HOSPITAL | Age: 79
LOS: 3 days | Discharge: HOME | DRG: 330 | End: 2024-06-10
Attending: STUDENT IN AN ORGANIZED HEALTH CARE EDUCATION/TRAINING PROGRAM | Admitting: STUDENT IN AN ORGANIZED HEALTH CARE EDUCATION/TRAINING PROGRAM
Payer: MEDICARE

## 2024-06-07 DIAGNOSIS — K62.3 RECTAL PROLAPSE: ICD-10-CM

## 2024-06-07 LAB
ABO + RH BLD: NORMAL
ALBUMIN SERPL BCP-MCNC: 3.3 G/DL (ref 3.5–5.2)
ALP SERPL-CCNC: 60 U/L (ref 55–135)
ALT SERPL W/O P-5'-P-CCNC: 10 U/L (ref 10–44)
ANION GAP SERPL CALC-SCNC: 9 MMOL/L (ref 8–16)
AST SERPL-CCNC: 19 U/L (ref 10–40)
BASOPHILS # BLD AUTO: 0.01 K/UL (ref 0–0.2)
BASOPHILS NFR BLD: 0.1 % (ref 0–1.9)
BILIRUB SERPL-MCNC: 0.9 MG/DL (ref 0.1–1)
BLD GP AB SCN CELLS X3 SERPL QL: NORMAL
BUN SERPL-MCNC: 13 MG/DL (ref 8–23)
CALCIUM SERPL-MCNC: 8.7 MG/DL (ref 8.7–10.5)
CHLORIDE SERPL-SCNC: 99 MMOL/L (ref 95–110)
CO2 SERPL-SCNC: 18 MMOL/L (ref 23–29)
CREAT SERPL-MCNC: 0.9 MG/DL (ref 0.5–1.4)
DIFFERENTIAL METHOD BLD: ABNORMAL
EOSINOPHIL # BLD AUTO: 0 K/UL (ref 0–0.5)
EOSINOPHIL NFR BLD: 0.1 % (ref 0–8)
ERYTHROCYTE [DISTWIDTH] IN BLOOD BY AUTOMATED COUNT: 11.7 % (ref 11.5–14.5)
EST. GFR  (NO RACE VARIABLE): >60 ML/MIN/1.73 M^2
GLUCOSE SERPL-MCNC: 162 MG/DL (ref 70–110)
HCT VFR BLD AUTO: 33.3 % (ref 37–48.5)
HGB BLD-MCNC: 11.5 G/DL (ref 12–16)
IMM GRANULOCYTES # BLD AUTO: 0.03 K/UL (ref 0–0.04)
IMM GRANULOCYTES NFR BLD AUTO: 0.4 % (ref 0–0.5)
LYMPHOCYTES # BLD AUTO: 0.6 K/UL (ref 1–4.8)
LYMPHOCYTES NFR BLD: 7.7 % (ref 18–48)
MAGNESIUM SERPL-MCNC: 1.5 MG/DL (ref 1.6–2.6)
MCH RBC QN AUTO: 32.4 PG (ref 27–31)
MCHC RBC AUTO-ENTMCNC: 34.5 G/DL (ref 32–36)
MCV RBC AUTO: 94 FL (ref 82–98)
MONOCYTES # BLD AUTO: 0.1 K/UL (ref 0.3–1)
MONOCYTES NFR BLD: 1.9 % (ref 4–15)
NEUTROPHILS # BLD AUTO: 6.6 K/UL (ref 1.8–7.7)
NEUTROPHILS NFR BLD: 89.8 % (ref 38–73)
NRBC BLD-RTO: 0 /100 WBC
PHOSPHATE SERPL-MCNC: 3.7 MG/DL (ref 2.7–4.5)
PLATELET # BLD AUTO: 186 K/UL (ref 150–450)
PMV BLD AUTO: 8.6 FL (ref 9.2–12.9)
POTASSIUM SERPL-SCNC: 3.6 MMOL/L (ref 3.5–5.1)
PROT SERPL-MCNC: 6.3 G/DL (ref 6–8.4)
RBC # BLD AUTO: 3.55 M/UL (ref 4–5.4)
SODIUM SERPL-SCNC: 126 MMOL/L (ref 136–145)
SPECIMEN OUTDATE: NORMAL
WBC # BLD AUTO: 7.36 K/UL (ref 3.9–12.7)

## 2024-06-07 PROCEDURE — 0DJD8ZZ INSPECTION OF LOWER INTESTINAL TRACT, VIA NATURAL OR ARTIFICIAL OPENING ENDOSCOPIC: ICD-10-PCS | Performed by: STUDENT IN AN ORGANIZED HEALTH CARE EDUCATION/TRAINING PROGRAM

## 2024-06-07 PROCEDURE — 25000003 PHARM REV CODE 250: Performed by: ANESTHESIOLOGY

## 2024-06-07 PROCEDURE — 63600175 PHARM REV CODE 636 W HCPCS: Performed by: NURSE PRACTITIONER

## 2024-06-07 PROCEDURE — 63600175 PHARM REV CODE 636 W HCPCS: Mod: JZ,JG | Performed by: STUDENT IN AN ORGANIZED HEALTH CARE EDUCATION/TRAINING PROGRAM

## 2024-06-07 PROCEDURE — 36415 COLL VENOUS BLD VENIPUNCTURE: CPT | Performed by: NURSE PRACTITIONER

## 2024-06-07 PROCEDURE — 63600175 PHARM REV CODE 636 W HCPCS: Performed by: STUDENT IN AN ORGANIZED HEALTH CARE EDUCATION/TRAINING PROGRAM

## 2024-06-07 PROCEDURE — 71000015 HC POSTOP RECOV 1ST HR: Performed by: STUDENT IN AN ORGANIZED HEALTH CARE EDUCATION/TRAINING PROGRAM

## 2024-06-07 PROCEDURE — 36000713 HC OR TIME LEV V EA ADD 15 MIN: Performed by: STUDENT IN AN ORGANIZED HEALTH CARE EDUCATION/TRAINING PROGRAM

## 2024-06-07 PROCEDURE — 36000712 HC OR TIME LEV V 1ST 15 MIN: Performed by: STUDENT IN AN ORGANIZED HEALTH CARE EDUCATION/TRAINING PROGRAM

## 2024-06-07 PROCEDURE — 83735 ASSAY OF MAGNESIUM: CPT | Performed by: STUDENT IN AN ORGANIZED HEALTH CARE EDUCATION/TRAINING PROGRAM

## 2024-06-07 PROCEDURE — 99900035 HC TECH TIME PER 15 MIN (STAT)

## 2024-06-07 PROCEDURE — 63600175 PHARM REV CODE 636 W HCPCS: Performed by: ANESTHESIOLOGY

## 2024-06-07 PROCEDURE — 71000016 HC POSTOP RECOV ADDL HR: Performed by: STUDENT IN AN ORGANIZED HEALTH CARE EDUCATION/TRAINING PROGRAM

## 2024-06-07 PROCEDURE — 25000003 PHARM REV CODE 250: Performed by: STUDENT IN AN ORGANIZED HEALTH CARE EDUCATION/TRAINING PROGRAM

## 2024-06-07 PROCEDURE — 80053 COMPREHEN METABOLIC PANEL: CPT | Performed by: STUDENT IN AN ORGANIZED HEALTH CARE EDUCATION/TRAINING PROGRAM

## 2024-06-07 PROCEDURE — 63600175 PHARM REV CODE 636 W HCPCS: Performed by: NURSE ANESTHETIST, CERTIFIED REGISTERED

## 2024-06-07 PROCEDURE — 37000008 HC ANESTHESIA 1ST 15 MINUTES: Performed by: STUDENT IN AN ORGANIZED HEALTH CARE EDUCATION/TRAINING PROGRAM

## 2024-06-07 PROCEDURE — 0DNU4ZZ RELEASE OMENTUM, PERCUTANEOUS ENDOSCOPIC APPROACH: ICD-10-PCS | Performed by: STUDENT IN AN ORGANIZED HEALTH CARE EDUCATION/TRAINING PROGRAM

## 2024-06-07 PROCEDURE — 27000221 HC OXYGEN, UP TO 24 HOURS

## 2024-06-07 PROCEDURE — 25000003 PHARM REV CODE 250: Performed by: NURSE PRACTITIONER

## 2024-06-07 PROCEDURE — 8E0W4CZ ROBOTIC ASSISTED PROCEDURE OF TRUNK REGION, PERCUTANEOUS ENDOSCOPIC APPROACH: ICD-10-PCS | Performed by: STUDENT IN AN ORGANIZED HEALTH CARE EDUCATION/TRAINING PROGRAM

## 2024-06-07 PROCEDURE — 25000003 PHARM REV CODE 250: Performed by: NURSE ANESTHETIST, CERTIFIED REGISTERED

## 2024-06-07 PROCEDURE — 37000009 HC ANESTHESIA EA ADD 15 MINS: Performed by: STUDENT IN AN ORGANIZED HEALTH CARE EDUCATION/TRAINING PROGRAM

## 2024-06-07 PROCEDURE — 84100 ASSAY OF PHOSPHORUS: CPT | Performed by: STUDENT IN AN ORGANIZED HEALTH CARE EDUCATION/TRAINING PROGRAM

## 2024-06-07 PROCEDURE — 0DN84ZZ RELEASE SMALL INTESTINE, PERCUTANEOUS ENDOSCOPIC APPROACH: ICD-10-PCS | Performed by: STUDENT IN AN ORGANIZED HEALTH CARE EDUCATION/TRAINING PROGRAM

## 2024-06-07 PROCEDURE — 86901 BLOOD TYPING SEROLOGIC RH(D): CPT | Performed by: NURSE PRACTITIONER

## 2024-06-07 PROCEDURE — 85025 COMPLETE CBC W/AUTO DIFF WBC: CPT | Performed by: STUDENT IN AN ORGANIZED HEALTH CARE EDUCATION/TRAINING PROGRAM

## 2024-06-07 PROCEDURE — 71000039 HC RECOVERY, EACH ADD'L HOUR: Performed by: STUDENT IN AN ORGANIZED HEALTH CARE EDUCATION/TRAINING PROGRAM

## 2024-06-07 PROCEDURE — 27201423 OPTIME MED/SURG SUP & DEVICES STERILE SUPPLY: Performed by: STUDENT IN AN ORGANIZED HEALTH CARE EDUCATION/TRAINING PROGRAM

## 2024-06-07 PROCEDURE — 0DSP4ZZ REPOSITION RECTUM, PERCUTANEOUS ENDOSCOPIC APPROACH: ICD-10-PCS | Performed by: STUDENT IN AN ORGANIZED HEALTH CARE EDUCATION/TRAINING PROGRAM

## 2024-06-07 PROCEDURE — 71000033 HC RECOVERY, INTIAL HOUR: Performed by: STUDENT IN AN ORGANIZED HEALTH CARE EDUCATION/TRAINING PROGRAM

## 2024-06-07 PROCEDURE — 94761 N-INVAS EAR/PLS OXIMETRY MLT: CPT

## 2024-06-07 PROCEDURE — 20600001 HC STEP DOWN PRIVATE ROOM

## 2024-06-07 PROCEDURE — 0DNN4ZZ RELEASE SIGMOID COLON, PERCUTANEOUS ENDOSCOPIC APPROACH: ICD-10-PCS | Performed by: STUDENT IN AN ORGANIZED HEALTH CARE EDUCATION/TRAINING PROGRAM

## 2024-06-07 RX ORDER — ACETAMINOPHEN 10 MG/ML
1000 INJECTION, SOLUTION INTRAVENOUS EVERY 8 HOURS
Status: DISPENSED | OUTPATIENT
Start: 2024-06-07 | End: 2024-06-08

## 2024-06-07 RX ORDER — EPHEDRINE SULFATE 50 MG/ML
INJECTION, SOLUTION INTRAVENOUS
Status: DISCONTINUED | OUTPATIENT
Start: 2024-06-07 | End: 2024-06-07

## 2024-06-07 RX ORDER — ONDANSETRON 2 MG/ML
INJECTION INTRAMUSCULAR; INTRAVENOUS
Status: DISCONTINUED | OUTPATIENT
Start: 2024-06-07 | End: 2024-06-07

## 2024-06-07 RX ORDER — SODIUM CHLORIDE 9 MG/ML
INJECTION, SOLUTION INTRAVENOUS CONTINUOUS
Status: DISCONTINUED | OUTPATIENT
Start: 2024-06-07 | End: 2024-06-08

## 2024-06-07 RX ORDER — LIDOCAINE HYDROCHLORIDE ANHYDROUS AND DEXTROSE MONOHYDRATE .8; 5 G/100ML; G/100ML
INJECTION, SOLUTION INTRAVENOUS CONTINUOUS PRN
Status: DISCONTINUED | OUTPATIENT
Start: 2024-06-07 | End: 2024-06-07

## 2024-06-07 RX ORDER — SCOLOPAMINE TRANSDERMAL SYSTEM 1 MG/1
PATCH, EXTENDED RELEASE TRANSDERMAL
Status: COMPLETED
Start: 2024-06-07 | End: 2024-06-10

## 2024-06-07 RX ORDER — POLYETHYLENE GLYCOL 3350 17 G/17G
17 POWDER, FOR SOLUTION ORAL 2 TIMES DAILY
Status: DISCONTINUED | OUTPATIENT
Start: 2024-06-07 | End: 2024-06-10 | Stop reason: HOSPADM

## 2024-06-07 RX ORDER — DEXMEDETOMIDINE HYDROCHLORIDE 100 UG/ML
INJECTION, SOLUTION INTRAVENOUS
Status: DISCONTINUED | OUTPATIENT
Start: 2024-06-07 | End: 2024-06-07

## 2024-06-07 RX ORDER — TRAMADOL HYDROCHLORIDE 50 MG/1
50 TABLET ORAL EVERY 6 HOURS PRN
Status: DISCONTINUED | OUTPATIENT
Start: 2024-06-07 | End: 2024-06-10 | Stop reason: HOSPADM

## 2024-06-07 RX ORDER — PHENYLEPHRINE HYDROCHLORIDE 10 MG/ML
INJECTION INTRAVENOUS
Status: DISCONTINUED | OUTPATIENT
Start: 2024-06-07 | End: 2024-06-07

## 2024-06-07 RX ORDER — ONDANSETRON HYDROCHLORIDE 2 MG/ML
INJECTION, SOLUTION INTRAVENOUS
Status: COMPLETED
Start: 2024-06-07 | End: 2024-06-07

## 2024-06-07 RX ORDER — ROCURONIUM BROMIDE 10 MG/ML
INJECTION, SOLUTION INTRAVENOUS
Status: DISCONTINUED | OUTPATIENT
Start: 2024-06-07 | End: 2024-06-07

## 2024-06-07 RX ORDER — SODIUM CHLORIDE 0.9 % (FLUSH) 0.9 %
10 SYRINGE (ML) INJECTION
Status: DISCONTINUED | OUTPATIENT
Start: 2024-06-07 | End: 2024-06-10 | Stop reason: HOSPADM

## 2024-06-07 RX ORDER — MUPIROCIN 20 MG/G
1 OINTMENT TOPICAL
Status: COMPLETED | OUTPATIENT
Start: 2024-06-07 | End: 2024-06-07

## 2024-06-07 RX ORDER — FENTANYL CITRATE 50 UG/ML
INJECTION, SOLUTION INTRAMUSCULAR; INTRAVENOUS
Status: DISCONTINUED | OUTPATIENT
Start: 2024-06-07 | End: 2024-06-07

## 2024-06-07 RX ORDER — DEXAMETHASONE SODIUM PHOSPHATE 4 MG/ML
INJECTION, SOLUTION INTRA-ARTICULAR; INTRALESIONAL; INTRAMUSCULAR; INTRAVENOUS; SOFT TISSUE
Status: DISCONTINUED | OUTPATIENT
Start: 2024-06-07 | End: 2024-06-07

## 2024-06-07 RX ORDER — SODIUM CHLORIDE 9 MG/ML
INJECTION, SOLUTION INTRAVENOUS
Status: COMPLETED | OUTPATIENT
Start: 2024-06-07 | End: 2024-06-07

## 2024-06-07 RX ORDER — HEPARIN SODIUM 5000 [USP'U]/ML
5000 INJECTION, SOLUTION INTRAVENOUS; SUBCUTANEOUS EVERY 8 HOURS
Status: COMPLETED | OUTPATIENT
Start: 2024-06-07 | End: 2024-06-07

## 2024-06-07 RX ORDER — GABAPENTIN 300 MG/1
300 CAPSULE ORAL 3 TIMES DAILY
Status: DISCONTINUED | OUTPATIENT
Start: 2024-06-07 | End: 2024-06-10 | Stop reason: HOSPADM

## 2024-06-07 RX ORDER — IBUPROFEN 400 MG/1
800 TABLET ORAL EVERY 8 HOURS
Status: DISCONTINUED | OUTPATIENT
Start: 2024-06-08 | End: 2024-06-08

## 2024-06-07 RX ORDER — OXYCODONE HYDROCHLORIDE 5 MG/1
5 TABLET ORAL EVERY 6 HOURS PRN
Status: DISCONTINUED | OUTPATIENT
Start: 2024-06-07 | End: 2024-06-10 | Stop reason: HOSPADM

## 2024-06-07 RX ORDER — TRIPROLIDINE/PSEUDOEPHEDRINE 2.5MG-60MG
600 TABLET ORAL
Status: COMPLETED | OUTPATIENT
Start: 2024-06-07 | End: 2024-06-07

## 2024-06-07 RX ORDER — LIDOCAINE HYDROCHLORIDE 10 MG/ML
1 INJECTION, SOLUTION EPIDURAL; INFILTRATION; INTRACAUDAL; PERINEURAL
Status: COMPLETED | OUTPATIENT
Start: 2024-06-07 | End: 2024-06-07

## 2024-06-07 RX ORDER — LIDOCAINE HYDROCHLORIDE 20 MG/ML
INJECTION INTRAVENOUS
Status: DISCONTINUED | OUTPATIENT
Start: 2024-06-07 | End: 2024-06-07

## 2024-06-07 RX ORDER — GABAPENTIN 300 MG/1
300 CAPSULE ORAL
Status: COMPLETED | OUTPATIENT
Start: 2024-06-07 | End: 2024-06-07

## 2024-06-07 RX ORDER — SODIUM CHLORIDE 9 MG/ML
INJECTION, SOLUTION INTRAVENOUS CONTINUOUS PRN
Status: DISCONTINUED | OUTPATIENT
Start: 2024-06-07 | End: 2024-06-07

## 2024-06-07 RX ORDER — SCOLOPAMINE TRANSDERMAL SYSTEM 1 MG/1
1 PATCH, EXTENDED RELEASE TRANSDERMAL
Status: COMPLETED | OUTPATIENT
Start: 2024-06-07 | End: 2024-06-10

## 2024-06-07 RX ORDER — METRONIDAZOLE 500 MG/100ML
500 INJECTION, SOLUTION INTRAVENOUS
Status: COMPLETED | OUTPATIENT
Start: 2024-06-07 | End: 2024-06-07

## 2024-06-07 RX ORDER — PROPOFOL 10 MG/ML
VIAL (ML) INTRAVENOUS
Status: DISCONTINUED | OUTPATIENT
Start: 2024-06-07 | End: 2024-06-07

## 2024-06-07 RX ORDER — ONDANSETRON HYDROCHLORIDE 2 MG/ML
INJECTION, SOLUTION INTRAVENOUS
Status: DISCONTINUED | OUTPATIENT
Start: 2024-06-07 | End: 2024-06-07

## 2024-06-07 RX ORDER — ACETAMINOPHEN 500 MG
1000 TABLET ORAL EVERY 8 HOURS
Status: DISCONTINUED | OUTPATIENT
Start: 2024-06-08 | End: 2024-06-10 | Stop reason: HOSPADM

## 2024-06-07 RX ORDER — ENOXAPARIN SODIUM 100 MG/ML
40 INJECTION SUBCUTANEOUS EVERY 24 HOURS
Status: DISCONTINUED | OUTPATIENT
Start: 2024-06-08 | End: 2024-06-08 | Stop reason: SINTOL

## 2024-06-07 RX ORDER — METRONIDAZOLE 500 MG/100ML
INJECTION, SOLUTION INTRAVENOUS
Status: DISCONTINUED | OUTPATIENT
Start: 2024-06-07 | End: 2024-06-07

## 2024-06-07 RX ORDER — ACETAMINOPHEN 650 MG/20.3ML
975 LIQUID ORAL
Status: COMPLETED | OUTPATIENT
Start: 2024-06-07 | End: 2024-06-07

## 2024-06-07 RX ORDER — LIDOCAINE HYDROCHLORIDE 10 MG/ML
INJECTION INFILTRATION; PERINEURAL
Status: DISCONTINUED | OUTPATIENT
Start: 2024-06-07 | End: 2024-06-07 | Stop reason: HOSPADM

## 2024-06-07 RX ORDER — ONDANSETRON 8 MG/1
8 TABLET, ORALLY DISINTEGRATING ORAL EVERY 8 HOURS PRN
Status: DISCONTINUED | OUTPATIENT
Start: 2024-06-07 | End: 2024-06-10 | Stop reason: HOSPADM

## 2024-06-07 RX ORDER — BUPIVACAINE HYDROCHLORIDE 2.5 MG/ML
INJECTION, SOLUTION EPIDURAL; INFILTRATION; INTRACAUDAL
Status: DISCONTINUED | OUTPATIENT
Start: 2024-06-07 | End: 2024-06-07 | Stop reason: HOSPADM

## 2024-06-07 RX ORDER — SODIUM CHLORIDE 9 MG/ML
INJECTION, SOLUTION INTRAVENOUS CONTINUOUS
Status: DISCONTINUED | OUTPATIENT
Start: 2024-06-07 | End: 2024-06-07

## 2024-06-07 RX ORDER — GABAPENTIN 300 MG/1
300 CAPSULE ORAL 3 TIMES DAILY
Status: DISCONTINUED | OUTPATIENT
Start: 2024-06-07 | End: 2024-06-07

## 2024-06-07 RX ORDER — MUPIROCIN 20 MG/G
OINTMENT TOPICAL 2 TIMES DAILY
Status: COMPLETED | OUTPATIENT
Start: 2024-06-07 | End: 2024-06-09

## 2024-06-07 RX ORDER — ONDANSETRON HYDROCHLORIDE 2 MG/ML
4 INJECTION, SOLUTION INTRAVENOUS
Status: COMPLETED | OUTPATIENT
Start: 2024-06-07 | End: 2024-06-07

## 2024-06-07 RX ADMIN — SODIUM CHLORIDE: 9 INJECTION, SOLUTION INTRAVENOUS at 09:06

## 2024-06-07 RX ADMIN — EPHEDRINE SULFATE 5 MG: 50 INJECTION INTRAVENOUS at 11:06

## 2024-06-07 RX ADMIN — METRONIDAZOLE 500 MG: 500 INJECTION, SOLUTION INTRAVENOUS at 11:06

## 2024-06-07 RX ADMIN — DEXMEDETOMIDINE 4 MCG: 100 INJECTION, SOLUTION, CONCENTRATE INTRAVENOUS at 01:06

## 2024-06-07 RX ADMIN — EPHEDRINE SULFATE 5 MG: 50 INJECTION INTRAVENOUS at 02:06

## 2024-06-07 RX ADMIN — PHENYLEPHRINE HYDROCHLORIDE 50 MCG: 10 INJECTION INTRAVENOUS at 12:06

## 2024-06-07 RX ADMIN — ONDANSETRON 4 MG: 2 INJECTION INTRAMUSCULAR; INTRAVENOUS at 03:06

## 2024-06-07 RX ADMIN — DEXAMETHASONE SODIUM PHOSPHATE 4 MG: 4 INJECTION, SOLUTION INTRAMUSCULAR; INTRAVENOUS at 11:06

## 2024-06-07 RX ADMIN — LIDOCAINE HYDROCHLORIDE 60 MG: 20 INJECTION INTRAVENOUS at 11:06

## 2024-06-07 RX ADMIN — MUPIROCIN: 20 OINTMENT TOPICAL at 10:06

## 2024-06-07 RX ADMIN — LIDOCAINE HYDROCHLORIDE ANHYDROUS AND DEXTROSE MONOHYDRATE 0.02 MG/KG/MIN: .8; 5 INJECTION, SOLUTION INTRAVENOUS at 11:06

## 2024-06-07 RX ADMIN — PROPOFOL 50 MG: 10 INJECTION, EMULSION INTRAVENOUS at 03:06

## 2024-06-07 RX ADMIN — IBUPROFEN 800 MG: 800 INJECTION INTRAVENOUS at 10:06

## 2024-06-07 RX ADMIN — TRAMADOL HYDROCHLORIDE 50 MG: 50 TABLET, COATED ORAL at 04:06

## 2024-06-07 RX ADMIN — ROCURONIUM BROMIDE 50 MG: 10 INJECTION, SOLUTION INTRAVENOUS at 11:06

## 2024-06-07 RX ADMIN — ROCURONIUM BROMIDE 10 MG: 10 INJECTION, SOLUTION INTRAVENOUS at 12:06

## 2024-06-07 RX ADMIN — SODIUM CHLORIDE: 9 INJECTION, SOLUTION INTRAVENOUS at 04:06

## 2024-06-07 RX ADMIN — PHENYLEPHRINE HYDROCHLORIDE 0.2 MCG/KG/MIN: 10 INJECTION INTRAVENOUS at 12:06

## 2024-06-07 RX ADMIN — PROPOFOL 100 MG: 10 INJECTION, EMULSION INTRAVENOUS at 11:06

## 2024-06-07 RX ADMIN — PHENYLEPHRINE HYDROCHLORIDE 100 MCG: 10 INJECTION INTRAVENOUS at 11:06

## 2024-06-07 RX ADMIN — FENTANYL CITRATE 75 MCG: 50 INJECTION, SOLUTION INTRAMUSCULAR; INTRAVENOUS at 11:06

## 2024-06-07 RX ADMIN — SODIUM CHLORIDE, SODIUM GLUCONATE, SODIUM ACETATE, POTASSIUM CHLORIDE, MAGNESIUM CHLORIDE, SODIUM PHOSPHATE, DIBASIC, AND POTASSIUM PHOSPHATE: .53; .5; .37; .037; .03; .012; .00082 INJECTION, SOLUTION INTRAVENOUS at 01:06

## 2024-06-07 RX ADMIN — SODIUM CHLORIDE, SODIUM GLUCONATE, SODIUM ACETATE, POTASSIUM CHLORIDE, MAGNESIUM CHLORIDE, SODIUM PHOSPHATE, DIBASIC, AND POTASSIUM PHOSPHATE: .53; .5; .37; .037; .03; .012; .00082 INJECTION, SOLUTION INTRAVENOUS at 11:06

## 2024-06-07 RX ADMIN — CEFTRIAXONE 2 G: 1 INJECTION, POWDER, FOR SOLUTION INTRAMUSCULAR; INTRAVENOUS at 11:06

## 2024-06-07 RX ADMIN — DEXMEDETOMIDINE 4 MCG: 100 INJECTION, SOLUTION, CONCENTRATE INTRAVENOUS at 12:06

## 2024-06-07 RX ADMIN — SCOPALAMINE 1 PATCH: 1 PATCH, EXTENDED RELEASE TRANSDERMAL at 10:06

## 2024-06-07 RX ADMIN — IBUPROFEN 600 MG: 100 SUSPENSION ORAL at 09:06

## 2024-06-07 RX ADMIN — ACETAMINOPHEN 1000 MG: 10 INJECTION, SOLUTION INTRAVENOUS at 10:06

## 2024-06-07 RX ADMIN — ONDANSETRON 100 MG: 2 INJECTION INTRAMUSCULAR; INTRAVENOUS at 02:06

## 2024-06-07 RX ADMIN — DEXMEDETOMIDINE 8 MCG: 100 INJECTION, SOLUTION, CONCENTRATE INTRAVENOUS at 11:06

## 2024-06-07 RX ADMIN — HEPARIN SODIUM 5000 UNITS: 5000 INJECTION INTRAVENOUS; SUBCUTANEOUS at 09:06

## 2024-06-07 RX ADMIN — DEXMEDETOMIDINE 4 MCG: 100 INJECTION, SOLUTION, CONCENTRATE INTRAVENOUS at 11:06

## 2024-06-07 RX ADMIN — ONDANSETRON 4 MG: 2 INJECTION INTRAMUSCULAR; INTRAVENOUS at 10:06

## 2024-06-07 RX ADMIN — PHENYLEPHRINE HYDROCHLORIDE 50 MCG: 10 INJECTION INTRAVENOUS at 11:06

## 2024-06-07 RX ADMIN — ROCURONIUM BROMIDE 10 MG: 10 INJECTION, SOLUTION INTRAVENOUS at 01:06

## 2024-06-07 RX ADMIN — FENTANYL CITRATE 25 MCG: 50 INJECTION, SOLUTION INTRAMUSCULAR; INTRAVENOUS at 10:06

## 2024-06-07 RX ADMIN — GABAPENTIN 300 MG: 300 CAPSULE ORAL at 10:06

## 2024-06-07 RX ADMIN — MUPIROCIN 1 G: 20 OINTMENT TOPICAL at 10:06

## 2024-06-07 RX ADMIN — SODIUM CHLORIDE: 9 INJECTION, SOLUTION INTRAVENOUS at 10:06

## 2024-06-07 RX ADMIN — SUGAMMADEX 200 MG: 100 INJECTION, SOLUTION INTRAVENOUS at 03:06

## 2024-06-07 RX ADMIN — LIDOCAINE HYDROCHLORIDE 10 MG: 10 INJECTION, SOLUTION EPIDURAL; INFILTRATION; INTRACAUDAL; PERINEURAL at 09:06

## 2024-06-07 RX ADMIN — ACETAMINOPHEN 976.6 MG: 650 SOLUTION ORAL at 09:06

## 2024-06-07 RX ADMIN — GABAPENTIN 300 MG: 300 CAPSULE ORAL at 09:06

## 2024-06-07 RX ADMIN — SODIUM CHLORIDE: 0.9 INJECTION, SOLUTION INTRAVENOUS at 11:06

## 2024-06-07 RX ADMIN — METRONIDAZOLE 500 MG: 500 INJECTION, SOLUTION INTRAVENOUS at 10:06

## 2024-06-07 RX ADMIN — GABAPENTIN 300 MG: 300 CAPSULE ORAL at 04:06

## 2024-06-07 NOTE — H&P
Colon and Rectal Surgery History and Physical    Patient name: Buffy Dominique   YOB: 1945   MRN: [id number]    Subjective:       Patient ID: Buffy Dominique is a 78 y.o. female.    Chief Complaint: Rectal prolapse  HPI  77yo female presents with rectal prolapse. Prolapse has been present for several years however it is becoming more frequent. She is able to reduce prolapse. She had a hysterectomy in the early 1990s and before a week ago has never had this issue. She had 3 vaginal deliveries, she denies any prolapse of tissue from her vagina. She has had issues with fecal incontinence - less control. She does have some urinary leakage but not a significant amount. She has had issues with hemorrhoids in the past. She denies a family history of IBD or CRC. She used to smoke tobacco but quit in 2009. She is on humira for arthritis. Last dose 6/1.     Last colonoscopy - 4/30/24  Impression:            - Preparation of the colon was inadequate.                          - Full thickness rectal prolapse, easily                          reducible. found on perianal exam.                          - Stool in the ascending colon and in the cecum.                          - Diverticulosis in the sigmoid colon.         Review of patient's allergies indicates:   Allergen Reactions    Methotrexate analogues      Mouth Ulcers     Thiazides Other (See Comments)     hyponatremia       Past Medical History:   Diagnosis Date    Basal cell carcinoma (BCC) of right cheek 04/03/2024    R cheek    CKD (chronic kidney disease) stage 3, GFR 30-59 ml/min     HLD (hyperlipidemia)     HTN (hypertension)     Hyperparathyroidism     Melanoma        Past Surgical History:   Procedure Laterality Date    CATARACT EXTRACTION, BILATERAL Bilateral 2014    cataract removal Right 2014    COLONOSCOPY N/A 4/30/2024    Procedure: COLONOSCOPY;  Surgeon: Sharath Myles MD;  Location: [address]);  Service: Endoscopy;  Laterality: N/A;  ref by  / Miralax inst portal-RB  1/31/24- Pt r/s/ prep ins on portal - Miralax prep - ERW  4/25/24- precall complete - ERW  4/29-pt cannot come in earlier due to ride-KPvt    hiatial hernia  2017    HYSTERECTOMY      melanoma      on face    OOPHORECTOMY      THORACIC AORTIC ANEURYSM REPAIR  2011       Current Facility-Administered Medications   Medication Dose Route Frequency Provider Last Rate Last Admin    0.9%  NaCl infusion   Intravenous On Call Procedure Eden Martin NP        acetaminophen oral solution 976.601 mg  976.601 mg Oral On Call Procedure Eden Martin NP        cefTRIAXone (ROCEPHIN) 2 g in dextrose 5 % in water (D5W) 100 mL IVPB (MB+)  2 g Intravenous On Call Procedure Eden Martin NP        And    metronidazole IVPB 500 mg  500 mg Intravenous On Call Procedure Eedn Martin NP        gabapentin capsule 300 mg  300 mg Oral On Call Procedure Eden Martin NP        heparin (porcine) injection 5,000 Units  5,000 Units Subcutaneous Q8H Eden Martin NP        ibuprofen 20 mg/mL oral liquid 600 mg  600 mg Oral On Call Procedure Eden Martin NP        LIDOcaine (PF) 10 mg/ml (1%) injection 10 mg  1 mL Intradermal On Call Procedure Eden Martin NP        mupirocin 2 % ointment 1 g  1 g Nasal On Call Procedure Eden Martin NP           Family History   Problem Relation Name Age of Onset    Cancer Father Junior     Hypertension Maternal Grandmother Jazzy     Colon cancer Neg Hx      Inflammatory bowel disease Neg Hx         Social History     Socioeconomic History    Marital status:    Tobacco Use    Smoking status: Former     Current packs/day: 0.00     Average packs/day: 1 pack/day for 30.0 years (30.0 ttl pk-yrs)     Types: Cigarettes     Start date: 1/26/1979     Quit date: 1/26/2009     Years since quitting: 15.3    Smokeless tobacco: Never   Substance and Sexual Activity    Alcohol use: Never     Comment: glass of wine  "once or twice a year    Drug use: Never    Sexual activity: Not Currently     Partners: Male     Birth control/protection: None     Social Determinants of Health     Financial Resource Strain: Low Risk  (12/29/2023)    Overall Financial Resource Strain (CARDIA)     Difficulty of Paying Living Expenses: Not very hard   Food Insecurity: No Food Insecurity (12/29/2023)    Hunger Vital Sign     Worried About Running Out of Food in the Last Year: Never true     Ran Out of Food in the Last Year: Never true   Transportation Needs: No Transportation Needs (12/29/2023)    PRAPARE - Transportation     Lack of Transportation (Medical): No     Lack of Transportation (Non-Medical): No   Physical Activity: Unknown (12/29/2023)    Exercise Vital Sign     Days of Exercise per Week: 5 days   Stress: No Stress Concern Present (12/29/2023)    [place] [employer] - Occupational Stress Questionnaire     Feeling of Stress : Not at all   Housing Stability: Unknown (12/29/2023)    Housing Stability Vital Sign     Unstable Housing in the Last Year: No       Review of Systems   Constitutional:  Negative for activity change, chills, fatigue and fever.   HENT:  Negative for congestion and sinus pain.    Respiratory:  Negative for apnea and chest tightness.    Cardiovascular:  Negative for chest pain.   Gastrointestinal:  Negative for abdominal distention, abdominal pain, diarrhea and nausea.        Rectal prolapse, fecal incontinence   Genitourinary:  Negative for dysuria and hematuria.   Neurological:  Negative for light-headedness.       Objective:      Physical Exam      Physical Exam:  /62 (BP Location: Left arm, Patient Position: Lying)   Pulse 68   Temp 97.3 °F (36.3 °C) (Skin)   Resp 20   Ht 5' 3" (1.6 m)   Wt 54.9 kg (121 lb 0.5 oz)   LMP  (LMP Unknown)   SpO2 96%   Breastfeeding No   BMI 21.44 kg/m²   General: appears older than stated age, no distress  Head: normocephalic  Neck: supple, " symmetrical, trachea midline  Lungs:  normal respiratory effort  Heart: regular rate and rhythm  Abdomen: soft, nontender, nondistended  Extremities: no cyanosis or edema, or clubbing    Laboratory Studies:  Complete Blood Count:  Lab Results   Component Value Date/Time    WBC 4.21 01/05/2024 03:47 AM    HGB 11.8 (L) 01/05/2024 03:47 AM    HCT 32.9 (L) 01/05/2024 03:47 AM    HCT 39 07/28/2022 02:27 PM    RBC 3.61 (L) 01/05/2024 03:47 AM     (L) 01/05/2024 03:47 AM       Basic Chemistry Panel:  Lab Results   Component Value Date/Time     (L) 03/25/2024 09:18 AM    K 4.3 03/25/2024 09:18 AM    CL 97 03/25/2024 09:18 AM    CO2 26 03/25/2024 09:18 AM    BUN 18 03/25/2024 09:18 AM    CREATININE 0.7 03/25/2024 09:18 AM    CALCIUM 10.3 03/25/2024 09:18 AM       Lab Results   Component Value Date/Time    CRP 4.4 09/14/2023 12:32 PM           Assessment:       1. Rectal prolapse      79yo female presents with rectal prolapse  Plan:       To OR for robotic rectopexy  Discussed risks, benefits and alternatives to surgery. Agreeable with going forward with the procedure.   Consents signed at bedside.     Babatunde Rao MD  Colon & Rectal Fellow

## 2024-06-07 NOTE — TRANSFER OF CARE
"Anesthesia Transfer of Care Note    Patient: Buffy Dominique    Procedure(s) Performed: Procedure(s) (LRB):  DV5 ROBOTIC RECTOPEXY (eras low, lith); lysis of adhesions (N/A)  SIGMOIDOSCOPY, FLEXIBLE    Patient location: PACU    Anesthesia Type: general    Transport from OR: Transported from OR on 6-10 L/min O2 by face mask with adequate spontaneous ventilation    Post pain: adequate analgesia    Post assessment: no apparent anesthetic complications    Post vital signs: stable    Level of consciousness: sedated    Nausea/Vomiting: no nausea/vomiting    Complications: none    Transfer of care protocol was followed      Last vitals: Visit Vitals  /62 (BP Location: Left arm, Patient Position: Lying)   Pulse 68   Temp 36.3 °C (97.3 °F) (Skin)   Resp 20   Ht 5' 3" (1.6 m)   Wt 54.9 kg (121 lb 0.5 oz)   LMP  (LMP Unknown)   SpO2 96%   Breastfeeding No   BMI 21.44 kg/m²     "

## 2024-06-07 NOTE — PROGRESS NOTES
06/07/24 1733   Vital Signs   Pulse (!) 58   Resp 11   SpO2 97 %   Device (Oxygen Therapy) room air   BP (!) 94/48  (Dr. Myles at bedside and ok with patient's SBP and states ok for patient to go to floor. patient asymptomatic.)   MAP (mmHg) 69

## 2024-06-07 NOTE — ANESTHESIA POSTPROCEDURE EVALUATION
Anesthesia Post Evaluation    Patient: Buffy Dominique    Procedure(s) Performed: Procedure(s) (LRB):  DV5 ROBOTIC RECTOPEXY (eras low, lith); lysis of adhesions (N/A)  SIGMOIDOSCOPY, FLEXIBLE    Final Anesthesia Type: general      Patient location during evaluation: PACU  Patient participation: Yes- Able to Participate  Level of consciousness: awake and alert  Post-procedure vital signs: reviewed and stable  Pain management: adequate  Airway patency: patent    PONV status at discharge: No PONV  Anesthetic complications: no      Cardiovascular status: blood pressure returned to baseline  Respiratory status: unassisted  Hydration status: euvolemic  Follow-up not needed.              Vitals Value Taken Time   /52 06/07/24 1557   Temp 36.4 °C (97.5 °F) 06/07/24 1543   Pulse 62 06/07/24 1600   Resp 16 06/07/24 1600   SpO2 99 % 06/07/24 1600   Vitals shown include unfiled device data.      No case tracking events are documented in the log.      Pain/Shay Score: Pain Rating Prior to Med Admin: 0 (pre op prep) (6/7/2024  9:52 AM)

## 2024-06-07 NOTE — OP NOTE
[facility]  Colon and Rectal Surgery    [address]  [place], [place]  Tel: [phone]  Fax: [phone]  [email or web address]   MD Saturnino Urena MD Brian Kann, MD W. Forrest Johnston, MD Matthew Giglia, MD Jennifer Paruch, MD William Kethman, MD Danielle Kay, MD     Patient name: Buffy Dominique   YOB: 1945   MRN: [id number]  Date of surgery: 06/07/2024    Operative Report    Pre-operative diagnosis: Rectal prolapse  Post-operative diagnosis: Same as above    Procedure:  Robotic-assisted rectopexy  Extensive lysis of adhesions for 120 minutes  Flexible sigmoidoscopy    Findings:  Extensive adhesions of sigmoid colon to small bowel, bladder, anterior abdominal wall, and right pelvic sidewall - extensive laparoscopic lysis performed prior to docking and then additional robotic-assisted lysis performed  Suture rectopexy performed after posterior and right anterolateral rectal mobilization to levator ani, three 0-Ethibond sutures to sacral promontory, peritoneum closed with 3-0 V-loc absorbable suture  Flexible sigmoidoscopy performed - reassuring  Bladder instilled with 150 mL of methylene blue dilute saline - no bladder injury identified, bilateral ureters identified and protected during our dissection  Facial edema and mild shoulder crepitus appreciated post-operatively - improved on second 2 hour post-operative PACU evaluation    Surgeon: Sharath Myles MD  Assistant: Babatunde Rao MD    Indication: Ms. Dominique is a 78 year old woman with a history of HTN, HLD, RA, and CKD who presented with longstanding rectal prolapse  who is scheduled to undergo a robotic-assisted rectopexy. The benefits, risks, and alternatives were discussed with the patient, they were given the opportunity to ask questions and they elected to proceed with operative intervention after signing written consent.    Procedure:  After pre-operative assessment and review of  informed consent, the patient was taken to the operating room and received general anesthesia. A crystal catheter was then placed under strict sterile precautions. Pre-operative antibiotics were administered if indicated and the patient was placed in lithotomy position. The abdomen was prepped and draped in the usual sterile fashion and a timeout was performed according to [facility] Quality and Safety guidelines.      A 10 mm supra-umbilical incision was made and fascia incised sharply - a 12 mm balloon port was placed under direct visualization. The abdomen was then insufflated to 15 mmHg. The abdomen was explored and the underlying bowel and vasculature were inspected and found to be free from any iatrogenic injury. The abdomen was explored and the findings are discussed above. We were able to safely place a right upper quadrant, and one of our left lateral 8 mm robotic ports under direct visualization. This facilitated a laparoscopic lysis of adhesions - this was mostly small bowel and omentum adherent to the anterior midline incision. Once performed we were able to place the the remaining 8 mm robotic ports and AirSeal port. These were placed under direct visualization. The patient was placed in standard position and robot docked.    Once docked, we continue our lysis of adhesions, the remainder were mainly pelvic in nature to sigmoid colon, small bowel, bladder, and deep pelvis - this proceeded slowly but without issue. The sigmoid colon and upper rectum were redundant which made identifying her super hemorrhoidal artery difficult. Once identified, the superior hemorrhoidal artery pedicle was elevated and right lateral peritoneum incised to enter the posterior mesorectal plane. This was taken down posterior to the levator ani, identified by musculature and on perineal exam. The right lateral stalk was divided and rectovaginal septum was entered without injury to vagina. Bilateral ureters were identified and  protected during our dissection. The rectum was grasped and tension applied to ensure our planned rectopexy site was adequate for reduction and fixation. I was unable to prolapse her rectum after retraction. A clearing at the sacral promontory was performed and three 0-Ethibond sutures were placed to fixate the right lateral stalk to the sacrum at our previously planned site. The peritoneum was approximated cephalad to our fixation with a 2-0 Vloc suture. A flexible sigmoidoscopy was performed to ensure no injury to the rectum occurred and that our fixation was not narrowing the lumen, no sutures were visualized and the mucosa was healthy appearing. We then instilled 150 mL of dilute methylene blue into the crystal catheter to ensure no inadvertent injury to the bladder had occurred. A vaginal exam was also     We then undocked the robotic platform and performed a laparoscopic exploration. The bowel was ran from ligament of Treitz to ileocecal valve - no serosal injuries or enterotomies were identified. Hemostasis was verified. The AirSeal port site had enlarged due to retraction during the case and so we elected to perform a laparoscopic figure-of-eight 0-Vicryl fascial closure. The remaining ports were removed under direct visualization. The supraumbilical fascia was approximated with a running 0- Vicryl suture.     The port sites were approximated with 4-0 Monocryl. Dry dressings were applied.    Instrument, needle, and sponge counts were correct prior to fascial closure.    The procedure was completed without complication and was well-tolerated. The patient was then brought to the post-anesthesia care unit in stable condition. I was present for the entire operation and discussed the surgery with the patients son by phone at the end of the case.    Complications: None  Estimated blood loss: 50 mL  Disposition: PACU      Sharath Myles MD, FACS, FASCRS  Department of Colon & Rectal Surgery  [facility]

## 2024-06-07 NOTE — ANESTHESIA PROCEDURE NOTES
Intubation    Date/Time: 6/7/2024 11:12 AM    Performed by: Gerri Gipson MD  Authorized by: Gerri Gipson MD    Intubation:     Induction:  Intravenous    Intubated:  Postinduction    Mask Ventilation:  Easy with oral airway    Attempts:  1    Attempted By:  Staff anesthesiologist    Method of Intubation:  Video laryngoscopy    Blade:  Li 3    Laryngeal View Grade: Grade I - full view of cords      Difficult Airway Encountered?: No      Complications:  None    Airway Device:  Oral endotracheal tube    Airway Device Size:  7.0    Style/Cuff Inflation:  Cuffed (inflated to minimal occlusive pressure)    Inflation Amount (mL):  5    Placement Verified By:  Capnometry    Complicating Factors:  None    Findings Post-Intubation:  BS equal bilateral and atraumatic/condition of teeth unchanged

## 2024-06-07 NOTE — NURSING TRANSFER
..Nursing Transfer Note      Reason patient is being transferred: post procedure    Transfer To: 1002    Transfer via stretcher    Transfer with belonging, 2 bags, small crossover purse and phone    Transported by nurse and pct    Medicines sent: none    Any special needs or follow-up needed: routine    Chart send with patient: Yes    Notified: son    Patient reassessed at: 6/7/2024 7432

## 2024-06-07 NOTE — BRIEF OP NOTE
Brett Mcgill - Surgery ([facility])  Brief Operative Note    SUMMARY     Surgery Date: 6/7/2024     Surgeons and Role:     * Sharath Myles MD - Primary     * Babatunde Rao MD - Resident - Assisting        Pre-op Diagnosis:  Rectal prolapse [K62.3]    Post-op Diagnosis:  Post-Op Diagnosis Codes:     * Rectal prolapse [K62.3]    Procedure(s) (LRB):  DV5 ROBOTIC RECTOPEXY (eras low, lith); lysis of adhesions (N/A)  SIGMOIDOSCOPY, FLEXIBLE    Anesthesia: General/MAC    Implants:  * No implants in log *    Operative Findings: Extensive adhesions throughout the lower abdomen requiring lysis. Redundant sigmoid colon bunched in pelvis. Rectum mobilized to the pelvic floor. Suture rectopexy performed to sacral promontory. Rectum straight without twisting at end of case confirmed with flex sig.    Estimated Blood Loss: 50 mL    Estimated Blood Loss has been documented.         Specimens:   Specimen (24h ago, onward)      None            [id number]

## 2024-06-08 LAB
ANION GAP SERPL CALC-SCNC: 4 MMOL/L (ref 8–16)
BASOPHILS # BLD AUTO: 0.01 K/UL (ref 0–0.2)
BASOPHILS # BLD AUTO: 0.01 K/UL (ref 0–0.2)
BASOPHILS NFR BLD: 0.1 % (ref 0–1.9)
BASOPHILS NFR BLD: 0.1 % (ref 0–1.9)
BLD PROD TYP BPU: NORMAL
BLOOD UNIT EXPIRATION DATE: NORMAL
BLOOD UNIT TYPE CODE: 5100
BLOOD UNIT TYPE: NORMAL
BUN SERPL-MCNC: 15 MG/DL (ref 8–23)
CALCIUM SERPL-MCNC: 8.1 MG/DL (ref 8.7–10.5)
CHLORIDE SERPL-SCNC: 99 MMOL/L (ref 95–110)
CO2 SERPL-SCNC: 21 MMOL/L (ref 23–29)
CODING SYSTEM: NORMAL
CREAT SERPL-MCNC: 0.9 MG/DL (ref 0.5–1.4)
CROSSMATCH INTERPRETATION: NORMAL
DIFFERENTIAL METHOD BLD: ABNORMAL
DIFFERENTIAL METHOD BLD: ABNORMAL
DISPENSE STATUS: NORMAL
EOSINOPHIL # BLD AUTO: 0 K/UL (ref 0–0.5)
EOSINOPHIL # BLD AUTO: 0 K/UL (ref 0–0.5)
EOSINOPHIL NFR BLD: 0 % (ref 0–8)
EOSINOPHIL NFR BLD: 0 % (ref 0–8)
ERYTHROCYTE [DISTWIDTH] IN BLOOD BY AUTOMATED COUNT: 12 % (ref 11.5–14.5)
ERYTHROCYTE [DISTWIDTH] IN BLOOD BY AUTOMATED COUNT: 12.3 % (ref 11.5–14.5)
ERYTHROCYTE [DISTWIDTH] IN BLOOD BY AUTOMATED COUNT: 12.3 % (ref 11.5–14.5)
EST. GFR  (NO RACE VARIABLE): >60 ML/MIN/1.73 M^2
GLUCOSE SERPL-MCNC: 133 MG/DL (ref 70–110)
HCT VFR BLD AUTO: 21.4 % (ref 37–48.5)
HCT VFR BLD AUTO: 23.8 % (ref 37–48.5)
HCT VFR BLD AUTO: 24.4 % (ref 37–48.5)
HGB BLD-MCNC: 7.2 G/DL (ref 12–16)
HGB BLD-MCNC: 8.1 G/DL (ref 12–16)
HGB BLD-MCNC: 8.5 G/DL (ref 12–16)
IMM GRANULOCYTES # BLD AUTO: 0.03 K/UL (ref 0–0.04)
IMM GRANULOCYTES # BLD AUTO: 0.04 K/UL (ref 0–0.04)
IMM GRANULOCYTES NFR BLD AUTO: 0.3 % (ref 0–0.5)
IMM GRANULOCYTES NFR BLD AUTO: 0.4 % (ref 0–0.5)
LYMPHOCYTES # BLD AUTO: 1.1 K/UL (ref 1–4.8)
LYMPHOCYTES # BLD AUTO: 1.7 K/UL (ref 1–4.8)
LYMPHOCYTES NFR BLD: 11.6 % (ref 18–48)
LYMPHOCYTES NFR BLD: 18.5 % (ref 18–48)
MAGNESIUM SERPL-MCNC: 1.5 MG/DL (ref 1.6–2.6)
MCH RBC QN AUTO: 32.5 PG (ref 27–31)
MCH RBC QN AUTO: 32.6 PG (ref 27–31)
MCH RBC QN AUTO: 32.7 PG (ref 27–31)
MCHC RBC AUTO-ENTMCNC: 33.6 G/DL (ref 32–36)
MCHC RBC AUTO-ENTMCNC: 34 G/DL (ref 32–36)
MCHC RBC AUTO-ENTMCNC: 34.8 G/DL (ref 32–36)
MCV RBC AUTO: 94 FL (ref 82–98)
MCV RBC AUTO: 96 FL (ref 82–98)
MCV RBC AUTO: 97 FL (ref 82–98)
MONOCYTES # BLD AUTO: 1.3 K/UL (ref 0.3–1)
MONOCYTES # BLD AUTO: 1.4 K/UL (ref 0.3–1)
MONOCYTES NFR BLD: 13.5 % (ref 4–15)
MONOCYTES NFR BLD: 14.2 % (ref 4–15)
NEUTROPHILS # BLD AUTO: 6.3 K/UL (ref 1.8–7.7)
NEUTROPHILS # BLD AUTO: 7 K/UL (ref 1.8–7.7)
NEUTROPHILS NFR BLD: 67.6 % (ref 38–73)
NEUTROPHILS NFR BLD: 73.7 % (ref 38–73)
NRBC BLD-RTO: 0 /100 WBC
NRBC BLD-RTO: 0 /100 WBC
PHOSPHATE SERPL-MCNC: 3.7 MG/DL (ref 2.7–4.5)
PLATELET # BLD AUTO: 198 K/UL (ref 150–450)
PLATELET # BLD AUTO: 210 K/UL (ref 150–450)
PLATELET # BLD AUTO: 227 K/UL (ref 150–450)
PMV BLD AUTO: 9 FL (ref 9.2–12.9)
PMV BLD AUTO: 9.1 FL (ref 9.2–12.9)
PMV BLD AUTO: 9.1 FL (ref 9.2–12.9)
POCT GLUCOSE: 117 MG/DL (ref 70–110)
POTASSIUM SERPL-SCNC: 3.9 MMOL/L (ref 3.5–5.1)
RBC # BLD AUTO: 2.21 M/UL (ref 4–5.4)
RBC # BLD AUTO: 2.49 M/UL (ref 4–5.4)
RBC # BLD AUTO: 2.6 M/UL (ref 4–5.4)
SODIUM SERPL-SCNC: 124 MMOL/L (ref 136–145)
TRANS ERYTHROCYTES VOL PATIENT: NORMAL ML
WBC # BLD AUTO: 17.68 K/UL (ref 3.9–12.7)
WBC # BLD AUTO: 9.26 K/UL (ref 3.9–12.7)
WBC # BLD AUTO: 9.48 K/UL (ref 3.9–12.7)

## 2024-06-08 PROCEDURE — 25500020 PHARM REV CODE 255: Performed by: STUDENT IN AN ORGANIZED HEALTH CARE EDUCATION/TRAINING PROGRAM

## 2024-06-08 PROCEDURE — 36415 COLL VENOUS BLD VENIPUNCTURE: CPT | Mod: XB | Performed by: STUDENT IN AN ORGANIZED HEALTH CARE EDUCATION/TRAINING PROGRAM

## 2024-06-08 PROCEDURE — 85027 COMPLETE CBC AUTOMATED: CPT | Performed by: STUDENT IN AN ORGANIZED HEALTH CARE EDUCATION/TRAINING PROGRAM

## 2024-06-08 PROCEDURE — P9021 RED BLOOD CELLS UNIT: HCPCS | Performed by: STUDENT IN AN ORGANIZED HEALTH CARE EDUCATION/TRAINING PROGRAM

## 2024-06-08 PROCEDURE — 94799 UNLISTED PULMONARY SVC/PX: CPT

## 2024-06-08 PROCEDURE — 25000003 PHARM REV CODE 250: Performed by: STUDENT IN AN ORGANIZED HEALTH CARE EDUCATION/TRAINING PROGRAM

## 2024-06-08 PROCEDURE — 94761 N-INVAS EAR/PLS OXIMETRY MLT: CPT

## 2024-06-08 PROCEDURE — 84100 ASSAY OF PHOSPHORUS: CPT | Performed by: STUDENT IN AN ORGANIZED HEALTH CARE EDUCATION/TRAINING PROGRAM

## 2024-06-08 PROCEDURE — 80048 BASIC METABOLIC PNL TOTAL CA: CPT | Performed by: STUDENT IN AN ORGANIZED HEALTH CARE EDUCATION/TRAINING PROGRAM

## 2024-06-08 PROCEDURE — 36415 COLL VENOUS BLD VENIPUNCTURE: CPT

## 2024-06-08 PROCEDURE — 25000003 PHARM REV CODE 250

## 2024-06-08 PROCEDURE — 27000221 HC OXYGEN, UP TO 24 HOURS

## 2024-06-08 PROCEDURE — 85027 COMPLETE CBC AUTOMATED: CPT | Mod: 91 | Performed by: STUDENT IN AN ORGANIZED HEALTH CARE EDUCATION/TRAINING PROGRAM

## 2024-06-08 PROCEDURE — 63600175 PHARM REV CODE 636 W HCPCS: Performed by: STUDENT IN AN ORGANIZED HEALTH CARE EDUCATION/TRAINING PROGRAM

## 2024-06-08 PROCEDURE — 30233N1 TRANSFUSION OF NONAUTOLOGOUS RED BLOOD CELLS INTO PERIPHERAL VEIN, PERCUTANEOUS APPROACH: ICD-10-PCS | Performed by: STUDENT IN AN ORGANIZED HEALTH CARE EDUCATION/TRAINING PROGRAM

## 2024-06-08 PROCEDURE — 20600001 HC STEP DOWN PRIVATE ROOM

## 2024-06-08 PROCEDURE — 86920 COMPATIBILITY TEST SPIN: CPT | Performed by: STUDENT IN AN ORGANIZED HEALTH CARE EDUCATION/TRAINING PROGRAM

## 2024-06-08 PROCEDURE — 63600175 PHARM REV CODE 636 W HCPCS

## 2024-06-08 PROCEDURE — 83735 ASSAY OF MAGNESIUM: CPT | Performed by: STUDENT IN AN ORGANIZED HEALTH CARE EDUCATION/TRAINING PROGRAM

## 2024-06-08 PROCEDURE — 85025 COMPLETE CBC W/AUTO DIFF WBC: CPT

## 2024-06-08 PROCEDURE — 85025 COMPLETE CBC W/AUTO DIFF WBC: CPT | Mod: 91 | Performed by: STUDENT IN AN ORGANIZED HEALTH CARE EDUCATION/TRAINING PROGRAM

## 2024-06-08 PROCEDURE — 36415 COLL VENOUS BLD VENIPUNCTURE: CPT | Performed by: STUDENT IN AN ORGANIZED HEALTH CARE EDUCATION/TRAINING PROGRAM

## 2024-06-08 PROCEDURE — 36430 TRANSFUSION BLD/BLD COMPNT: CPT

## 2024-06-08 RX ORDER — HYDROCODONE BITARTRATE AND ACETAMINOPHEN 500; 5 MG/1; MG/1
TABLET ORAL
Status: DISCONTINUED | OUTPATIENT
Start: 2024-06-08 | End: 2024-06-10 | Stop reason: HOSPADM

## 2024-06-08 RX ORDER — LANOLIN ALCOHOL/MO/W.PET/CERES
400 CREAM (GRAM) TOPICAL ONCE
Status: COMPLETED | OUTPATIENT
Start: 2024-06-08 | End: 2024-06-08

## 2024-06-08 RX ORDER — SODIUM CHLORIDE, SODIUM LACTATE, POTASSIUM CHLORIDE, CALCIUM CHLORIDE 600; 310; 30; 20 MG/100ML; MG/100ML; MG/100ML; MG/100ML
INJECTION, SOLUTION INTRAVENOUS CONTINUOUS
Status: DISCONTINUED | OUTPATIENT
Start: 2024-06-08 | End: 2024-06-10 | Stop reason: HOSPADM

## 2024-06-08 RX ADMIN — POLYETHYLENE GLYCOL 3350 17 G: 17 POWDER, FOR SOLUTION ORAL at 08:06

## 2024-06-08 RX ADMIN — MUPIROCIN: 20 OINTMENT TOPICAL at 10:06

## 2024-06-08 RX ADMIN — ONDANSETRON 8 MG: 8 TABLET, ORALLY DISINTEGRATING ORAL at 12:06

## 2024-06-08 RX ADMIN — Medication 400 MG: at 08:06

## 2024-06-08 RX ADMIN — OXYCODONE 5 MG: 5 TABLET ORAL at 10:06

## 2024-06-08 RX ADMIN — HYPROMELLOSE 2910 1 DROP: 5 SOLUTION/ DROPS OPHTHALMIC at 02:06

## 2024-06-08 RX ADMIN — GABAPENTIN 300 MG: 300 CAPSULE ORAL at 08:06

## 2024-06-08 RX ADMIN — ACETAMINOPHEN 1000 MG: 10 INJECTION, SOLUTION INTRAVENOUS at 06:06

## 2024-06-08 RX ADMIN — SODIUM CHLORIDE, POTASSIUM CHLORIDE, SODIUM LACTATE AND CALCIUM CHLORIDE: 600; 310; 30; 20 INJECTION, SOLUTION INTRAVENOUS at 06:06

## 2024-06-08 RX ADMIN — MUPIROCIN: 20 OINTMENT TOPICAL at 08:06

## 2024-06-08 RX ADMIN — IOHEXOL 75 ML: 350 INJECTION, SOLUTION INTRAVENOUS at 11:06

## 2024-06-08 RX ADMIN — IBUPROFEN 800 MG: 800 INJECTION INTRAVENOUS at 06:06

## 2024-06-08 RX ADMIN — ACETAMINOPHEN 1000 MG: 500 TABLET ORAL at 10:06

## 2024-06-08 NOTE — PLAN OF CARE
Problem: Adult Inpatient Plan of Care  Goal: Plan of Care Review  Outcome: Progressing  Goal: Absence of Hospital-Acquired Illness or Injury  Outcome: Progressing  Goal: Optimal Comfort and Wellbeing  Outcome: Progressing     Problem: Wound  Goal: Improved Oral Intake  Outcome: Not Progressing     Problem: Infection  Goal: Absence of Infection Signs and Symptoms  Outcome: Not Progressing

## 2024-06-08 NOTE — ASSESSMENT & PLAN NOTE
77 yo F with rectal prolapse s/p rectopexy 6/7/24.     - Low residue diet  - trend CBC q6h   - MMPC   - daily labs   - d/gianluca lovenox in setting of drifting hgb

## 2024-06-08 NOTE — PROGRESS NOTES
[facility]  Colorectal Surgery  Progress Note    Patient Name: Buffy Dominique  MRN: [id number]  Admission Date: 6/7/2024  Hospital Length of Stay: 1 days  Attending Physician: Sharath Myles MD    Subjective:     Interval History: No acute events overnight. Am Hgb downtrending from 11.5 to 8.1. Repeat Hgb 7.2. Receiving 1u prbcs. Incisions CDI, abd soft     Post-Op Info:  Procedure(s) (LRB):  DV5 ROBOTIC RECTOPEXY (eras low, lith); lysis of adhesions (N/A)  SIGMOIDOSCOPY, FLEXIBLE   1 Day Post-Op      Medications:  Continuous Infusions:  Scheduled Meds:   acetaminophen  1,000 mg Intravenous Q8H    Followed by    acetaminophen  1,000 mg Oral Q8H    gabapentin  300 mg Oral TID    mupirocin   Nasal BID    polyethylene glycol  17 g Oral BID    [COMPLETED] scopolamine  1 patch Transdermal Once Pre-Op     PRN Meds:   acetaminophen 1,000 mg/100 mL (10 mg/mL) injection 1,000 mg    Followed by    acetaminophen tablet 1,000 mg    gabapentin capsule 300 mg    mupirocin 2 % ointment    polyethylene glycol packet 17 g    [COMPLETED] scopolamine 1.3-1.5 mg (1 mg over 3 days) 1 patch        Objective:     Vital Signs (Most Recent):  Temp: 97.4 °F (36.3 °C) (06/08/24 0821)  Pulse: 63 (06/08/24 0821)  Resp: 16 (06/08/24 0821)  BP: (!) 103/51 (06/08/24 0821)  SpO2: (!) 93 % (06/08/24 1007) Vital Signs (24h Range):  Temp:  [97 °F (36.1 °C)-98.1 °F (36.7 °C)] 97.4 °F (36.3 °C)  Pulse:  [55-71] 63  Resp:  [10-20] 16  SpO2:  [93 %-99 %] 93 %  BP: ()/(45-58) 103/51     Intake/Output - Last 3 Shifts         06/06 0700  06/07 0659 06/07 0700  06/08 0659 06/08 0700  06/09 0659    I.V. (mL/kg)  2282.9 (41.6)     Total Intake(mL/kg)  2282.9 (41.6)     Urine (mL/kg/hr)  525     Blood  50     Total Output  575     Net  +1707.9                     Physical Exam  Constitutional:       Appearance: Normal appearance.   HENT:      Head: Normocephalic and atraumatic.      Mouth/Throat:      Mouth: Mucous membranes are moist.    Cardiovascular:      Rate and Rhythm: Regular rhythm.   Pulmonary:      Effort: Pulmonary effort is normal. No respiratory distress.   Abdominal:      Palpations: Abdomen is soft.      Comments: Incisions CDI   Skin:     General: Skin is warm.   Neurological:      General: No focal deficit present.      Mental Status: She is alert and oriented to person, place, and time. Mental status is at baseline.   Psychiatric:         Mood and Affect: Mood normal.         Behavior: Behavior normal.         Thought Content: Thought content normal.            Significant Labs:  All pertinent lab results within the last 24 hours have been reviewed.     Significant Diagnostics:  I have reviewed all pertinent imaging results/findings within the past 24 hours.  Assessment/Plan:     * Rectal prolapse  77 yo F with rectal prolapse s/p rectopexy 6/7/24.     - NPO  - 1u prbcs for down trending hgb   - f/u CTA scan   - NPO   - MMPC   - daily labs   - d/gianluca lovenox in setting of drifting hgb             Aly Hoff MD  Colorectal Surgery  [facility]    CTA showed no active bleed.  Large retroperitoneal hematoma.  Serial h/h after 1 unit transfusion    Brandt Garcia

## 2024-06-08 NOTE — NURSING
Patient remains due to void. Bladder scan completed; 111 mL of urine detected. Patient currently NPO with no IV fluids ordered. Will reassess. On-call resident paged.

## 2024-06-08 NOTE — PLAN OF CARE
"[facility] Plan of Care Note    Dx:   Rectal prolapse [K62.3]    Shift Events: Pt admitted to floor at shift change. Crepitus noted all over body. MD notified.     Goals of Care: Goals of care ongoing and progressing.     Neuro: A/Ox 4    Vital Signs: BP (!) 105/51 (BP Location: Left arm, Patient Position: Lying)   Pulse 69   Temp 97.5 °F (36.4 °C) (Oral)   Resp 18   Ht 5' 3" (1.6 m)   Wt 54.9 kg (121 lb 0.5 oz)   LMP  (LMP Unknown)   SpO2 99%   Breastfeeding No   BMI 21.44 kg/m²     Respiratory: RA    Diet: Diet Clear Liquid      Is patient tolerating current diet? Yes    GTTS: NACL 60ml/hr    Urine Output/Bowel Movement:   Sanders cath removed, due to void 12pm      Drains/Tubes/Tube Feeds (include total output/shift):   Indwelling cath       Lines: 20g R hand      Accuchecks:NA    Skin: 4laps sites    Fall Risk Score: Moderate    Activity level? Rolling, not up at this time.     Any scheduled procedures? NA    Any safety concerns? Facial crepitus    .  Problem: Adult Inpatient Plan of Care  Goal: Plan of Care Review  Outcome: Progressing  Goal: Patient-Specific Goal (Individualized)  Outcome: Progressing  Goal: Absence of Hospital-Acquired Illness or Injury  Outcome: Progressing  Goal: Optimal Comfort and Wellbeing  Outcome: Progressing  Goal: Readiness for Transition of Care  Outcome: Progressing     Problem: Acute Kidney Injury/Impairment  Goal: Fluid and Electrolyte Balance  Outcome: Progressing  Goal: Improved Oral Intake  Outcome: Progressing  Goal: Effective Renal Function  Outcome: Progressing     Problem: Wound  Goal: Optimal Coping  Outcome: Progressing  Goal: Optimal Functional Ability  Outcome: Progressing  Goal: Absence of Infection Signs and Symptoms  Outcome: Progressing  Goal: Improved Oral Intake  Outcome: Progressing  Goal: Optimal Pain Control and Function  Outcome: Progressing  Goal: Skin Health and Integrity  Outcome: Progressing  Goal: Optimal Wound Healing  Outcome: Progressing   "   Problem: Infection  Goal: Absence of Infection Signs and Symptoms  Outcome: Progressing     Problem: Fall Injury Risk  Goal: Absence of Fall and Fall-Related Injury  Outcome: Progressing     Problem: Skin Injury Risk Increased  Goal: Skin Health and Integrity  Outcome: Progressing

## 2024-06-08 NOTE — NURSING
While attempting to sit patient up to transfer to the stretcher (to go for an abdominal CT), patient became very weak. She began to stair off and did not respond to voice for about 1 minute. Once patient was positioned back in bed, she returned to her baseline presentation. Vital signs and blood glucose were assessed, and all were stable.

## 2024-06-08 NOTE — SUBJECTIVE & OBJECTIVE
Subjective:     Interval History: No acute events overnight. Pain controlled, Hgb stable this am 7.9 (7.9). Incisions CDI, not passing gas/BM.     Post-Op Info:  Procedure(s) (LRB):  DV5 ROBOTIC RECTOPEXY (eras low, lith); lysis of adhesions (N/A)  SIGMOIDOSCOPY, FLEXIBLE   1 Day Post-Op      Medications:  Continuous Infusions:  Scheduled Meds:   acetaminophen  1,000 mg Intravenous Q8H    Followed by    acetaminophen  1,000 mg Oral Q8H    gabapentin  300 mg Oral TID    mupirocin   Nasal BID    polyethylene glycol  17 g Oral BID    [COMPLETED] scopolamine  1 patch Transdermal Once Pre-Op     PRN Meds:   acetaminophen 1,000 mg/100 mL (10 mg/mL) injection 1,000 mg    Followed by    acetaminophen tablet 1,000 mg    gabapentin capsule 300 mg    mupirocin 2 % ointment    polyethylene glycol packet 17 g    [COMPLETED] scopolamine 1.3-1.5 mg (1 mg over 3 days) 1 patch        Objective:     Vital Signs (Most Recent):  Temp: 97.4 °F (36.3 °C) (06/08/24 0821)  Pulse: 63 (06/08/24 0821)  Resp: 16 (06/08/24 0821)  BP: (!) 103/51 (06/08/24 0821)  SpO2: (!) 93 % (06/08/24 1007) Vital Signs (24h Range):  Temp:  [97 °F (36.1 °C)-98.1 °F (36.7 °C)] 97.4 °F (36.3 °C)  Pulse:  [55-71] 63  Resp:  [10-20] 16  SpO2:  [93 %-99 %] 93 %  BP: ()/(45-58) 103/51     Intake/Output - Last 3 Shifts         06/06 0700  06/07 0659 06/07 0700  06/08 0659 06/08 0700  06/09 0659    I.V. (mL/kg)  2282.9 (41.6)     Total Intake(mL/kg)  2282.9 (41.6)     Urine (mL/kg/hr)  525     Blood  50     Total Output  575     Net  +1707.9                     Physical Exam  Constitutional:       Appearance: Normal appearance.   HENT:      Head: Normocephalic and atraumatic.      Mouth/Throat:      Mouth: Mucous membranes are moist.   Cardiovascular:      Rate and Rhythm: Regular rhythm.   Pulmonary:      Effort: Pulmonary effort is normal. No respiratory distress.   Abdominal:      Palpations: Abdomen is soft.      Comments: Incisions CDI   Skin:     General:  Skin is warm.   Neurological:      General: No focal deficit present.      Mental Status: She is alert and oriented to person, place, and time. Mental status is at baseline.   Psychiatric:         Mood and Affect: Mood normal.         Behavior: Behavior normal.         Thought Content: Thought content normal.            Significant Labs:  All pertinent lab results within the last 24 hours have been reviewed.     Significant Diagnostics:  I have reviewed all pertinent imaging results/findings within the past 24 hours.

## 2024-06-08 NOTE — ASSESSMENT & PLAN NOTE
77 yo F with rectal prolapse s/p rectopexy 6/7/24.     - Advance to low residue diet   - MMPC   - daily labs   - DVT ppx

## 2024-06-08 NOTE — SUBJECTIVE & OBJECTIVE
Subjective:     Interval History: No acute events overnight, pain controlled, incisions CDI, no N/V.     Post-Op Info:  Procedure(s) (LRB):  DV5 ROBOTIC RECTOPEXY (eras low, lith); lysis of adhesions (N/A)  SIGMOIDOSCOPY, FLEXIBLE   1 Day Post-Op      Medications:  Continuous Infusions:   sodium chloride 0.9%   Intravenous Continuous 60 mL/hr at 06/07/24 2119 Rate Change at 06/07/24 2119     Scheduled Meds:   acetaminophen  1,000 mg Intravenous Q8H    Followed by    acetaminophen  1,000 mg Oral Q8H    enoxparin  40 mg Subcutaneous Daily    gabapentin  300 mg Oral TID    ibuprofen  800 mg Intravenous Q8H    Followed by    ibuprofen  800 mg Oral Q8H    mupirocin   Nasal BID    polyethylene glycol  17 g Oral BID    [COMPLETED] scopolamine  1 patch Transdermal Once Pre-Op     PRN Meds:   acetaminophen 1,000 mg/100 mL (10 mg/mL) injection 1,000 mg    Followed by    acetaminophen tablet 1,000 mg    enoxaparin injection 40 mg    gabapentin capsule 300 mg    ibuprofen 800 mg in dextrose 5 % 250 mL (Vial-Mate)    Followed by    ibuprofen tablet 800 mg    mupirocin 2 % ointment    polyethylene glycol packet 17 g    [COMPLETED] scopolamine 1.3-1.5 mg (1 mg over 3 days) 1 patch        Objective:     Vital Signs (Most Recent):  Temp: 97.4 °F (36.3 °C) (06/08/24 0821)  Pulse: 63 (06/08/24 0821)  Resp: 16 (06/08/24 0821)  BP: (!) 103/51 (06/08/24 0821)  SpO2: 98 % (06/08/24 0821) Vital Signs (24h Range):  Temp:  [97 °F (36.1 °C)-98.1 °F (36.7 °C)] 97.4 °F (36.3 °C)  Pulse:  [55-71] 63  Resp:  [10-20] 16  SpO2:  [94 %-99 %] 98 %  BP: ()/(45-62) 103/51     Intake/Output - Last 3 Shifts         06/06 0700  06/07 0659 06/07 0700  06/08 0659 06/08 0700 06/09 0659    I.V. (mL/kg)  2282.9 (41.6)     Total Intake(mL/kg)  2282.9 (41.6)     Urine (mL/kg/hr)  525     Blood  50     Total Output  575     Net  +1707.9                     Physical Exam  Constitutional:       Appearance: Normal appearance.   HENT:      Head:  Normocephalic and atraumatic.      Mouth/Throat:      Mouth: Mucous membranes are moist.   Cardiovascular:      Rate and Rhythm: Regular rhythm.   Pulmonary:      Effort: Pulmonary effort is normal. No respiratory distress.   Abdominal:      Palpations: Abdomen is soft.      Comments: Incisions CDI   Skin:     General: Skin is warm.   Neurological:      General: No focal deficit present.      Mental Status: She is alert and oriented to person, place, and time. Mental status is at baseline.   Psychiatric:         Mood and Affect: Mood normal.         Behavior: Behavior normal.         Thought Content: Thought content normal.            Significant Labs:  All pertinent lab results within the last 24 hours have been reviewed.     Significant Diagnostics:  I have reviewed all pertinent imaging results/findings within the past 24 hours.

## 2024-06-09 LAB
ERYTHROCYTE [DISTWIDTH] IN BLOOD BY AUTOMATED COUNT: 12.8 % (ref 11.5–14.5)
ERYTHROCYTE [DISTWIDTH] IN BLOOD BY AUTOMATED COUNT: 13.2 % (ref 11.5–14.5)
ERYTHROCYTE [DISTWIDTH] IN BLOOD BY AUTOMATED COUNT: 13.5 % (ref 11.5–14.5)
ERYTHROCYTE [DISTWIDTH] IN BLOOD BY AUTOMATED COUNT: 13.6 % (ref 11.5–14.5)
ERYTHROCYTE [DISTWIDTH] IN BLOOD BY AUTOMATED COUNT: 13.6 % (ref 11.5–14.5)
HCT VFR BLD AUTO: 22.7 % (ref 37–48.5)
HCT VFR BLD AUTO: 22.8 % (ref 37–48.5)
HCT VFR BLD AUTO: 22.8 % (ref 37–48.5)
HCT VFR BLD AUTO: 25.9 % (ref 37–48.5)
HCT VFR BLD AUTO: 26.3 % (ref 37–48.5)
HGB BLD-MCNC: 7.7 G/DL (ref 12–16)
HGB BLD-MCNC: 7.9 G/DL (ref 12–16)
HGB BLD-MCNC: 7.9 G/DL (ref 12–16)
HGB BLD-MCNC: 8.7 G/DL (ref 12–16)
HGB BLD-MCNC: 8.7 G/DL (ref 12–16)
MCH RBC QN AUTO: 31.2 PG (ref 27–31)
MCH RBC QN AUTO: 31.8 PG (ref 27–31)
MCH RBC QN AUTO: 32 PG (ref 27–31)
MCH RBC QN AUTO: 32.1 PG (ref 27–31)
MCH RBC QN AUTO: 32.5 PG (ref 27–31)
MCHC RBC AUTO-ENTMCNC: 33.1 G/DL (ref 32–36)
MCHC RBC AUTO-ENTMCNC: 33.6 G/DL (ref 32–36)
MCHC RBC AUTO-ENTMCNC: 33.8 G/DL (ref 32–36)
MCHC RBC AUTO-ENTMCNC: 34.6 G/DL (ref 32–36)
MCHC RBC AUTO-ENTMCNC: 34.8 G/DL (ref 32–36)
MCV RBC AUTO: 92 FL (ref 82–98)
MCV RBC AUTO: 93 FL (ref 82–98)
MCV RBC AUTO: 94 FL (ref 82–98)
MCV RBC AUTO: 95 FL (ref 82–98)
MCV RBC AUTO: 95 FL (ref 82–98)
PLATELET # BLD AUTO: 150 K/UL (ref 150–450)
PLATELET # BLD AUTO: 176 K/UL (ref 150–450)
PLATELET # BLD AUTO: 178 K/UL (ref 150–450)
PLATELET # BLD AUTO: 185 K/UL (ref 150–450)
PLATELET # BLD AUTO: 203 K/UL (ref 150–450)
PMV BLD AUTO: 8.7 FL (ref 9.2–12.9)
PMV BLD AUTO: 8.9 FL (ref 9.2–12.9)
PMV BLD AUTO: 9 FL (ref 9.2–12.9)
PMV BLD AUTO: 9 FL (ref 9.2–12.9)
PMV BLD AUTO: 9.3 FL (ref 9.2–12.9)
RBC # BLD AUTO: 2.4 M/UL (ref 4–5.4)
RBC # BLD AUTO: 2.43 M/UL (ref 4–5.4)
RBC # BLD AUTO: 2.47 M/UL (ref 4–5.4)
RBC # BLD AUTO: 2.74 M/UL (ref 4–5.4)
RBC # BLD AUTO: 2.79 M/UL (ref 4–5.4)
WBC # BLD AUTO: 7.79 K/UL (ref 3.9–12.7)
WBC # BLD AUTO: 8.74 K/UL (ref 3.9–12.7)
WBC # BLD AUTO: 9.02 K/UL (ref 3.9–12.7)
WBC # BLD AUTO: 9.03 K/UL (ref 3.9–12.7)
WBC # BLD AUTO: 9.99 K/UL (ref 3.9–12.7)

## 2024-06-09 PROCEDURE — 25000003 PHARM REV CODE 250: Performed by: STUDENT IN AN ORGANIZED HEALTH CARE EDUCATION/TRAINING PROGRAM

## 2024-06-09 PROCEDURE — 36415 COLL VENOUS BLD VENIPUNCTURE: CPT | Performed by: STUDENT IN AN ORGANIZED HEALTH CARE EDUCATION/TRAINING PROGRAM

## 2024-06-09 PROCEDURE — 20600001 HC STEP DOWN PRIVATE ROOM

## 2024-06-09 PROCEDURE — 85027 COMPLETE CBC AUTOMATED: CPT | Mod: 91 | Performed by: STUDENT IN AN ORGANIZED HEALTH CARE EDUCATION/TRAINING PROGRAM

## 2024-06-09 PROCEDURE — 63600175 PHARM REV CODE 636 W HCPCS

## 2024-06-09 RX ORDER — AMOXICILLIN AND CLAVULANATE POTASSIUM 875; 125 MG/1; MG/1
1 TABLET, FILM COATED ORAL EVERY 12 HOURS
Status: DISCONTINUED | OUTPATIENT
Start: 2024-06-09 | End: 2024-06-10 | Stop reason: HOSPADM

## 2024-06-09 RX ADMIN — POLYETHYLENE GLYCOL 3350 17 G: 17 POWDER, FOR SOLUTION ORAL at 09:06

## 2024-06-09 RX ADMIN — ACETAMINOPHEN 1000 MG: 500 TABLET ORAL at 05:06

## 2024-06-09 RX ADMIN — MUPIROCIN: 20 OINTMENT TOPICAL at 09:06

## 2024-06-09 RX ADMIN — AMOXICILLIN AND CLAVULANATE POTASSIUM 1 TABLET: 875; 125 TABLET, FILM COATED ORAL at 09:06

## 2024-06-09 RX ADMIN — TRAMADOL HYDROCHLORIDE 50 MG: 50 TABLET, COATED ORAL at 09:06

## 2024-06-09 RX ADMIN — SODIUM CHLORIDE, POTASSIUM CHLORIDE, SODIUM LACTATE AND CALCIUM CHLORIDE: 600; 310; 30; 20 INJECTION, SOLUTION INTRAVENOUS at 03:06

## 2024-06-09 RX ADMIN — OXYCODONE 5 MG: 5 TABLET ORAL at 09:06

## 2024-06-09 RX ADMIN — AMOXICILLIN AND CLAVULANATE POTASSIUM 1 TABLET: 875; 125 TABLET, FILM COATED ORAL at 11:06

## 2024-06-09 RX ADMIN — ACETAMINOPHEN 1000 MG: 500 TABLET ORAL at 09:06

## 2024-06-09 RX ADMIN — ACETAMINOPHEN 1000 MG: 500 TABLET ORAL at 01:06

## 2024-06-09 RX ADMIN — ONDANSETRON 8 MG: 8 TABLET, ORALLY DISINTEGRATING ORAL at 01:06

## 2024-06-09 NOTE — PLAN OF CARE
Problem: Adult Inpatient Plan of Care  Goal: Absence of Hospital-Acquired Illness or Injury  Outcome: Progressing  Goal: Optimal Comfort and Wellbeing  Outcome: Progressing     Problem: Infection  Goal: Absence of Infection Signs and Symptoms  Outcome: Progressing     Problem: Fall Injury Risk  Goal: Absence of Fall and Fall-Related Injury  Outcome: Progressing     Problem: Skin Injury Risk Increased  Goal: Skin Health and Integrity  Outcome: Progressing

## 2024-06-09 NOTE — PLAN OF CARE
[facility] Plan of Care Note     Dx:   Rectal prolapse [K62.3]     Shift Events: Pt had uneventful shift. No distress noted. Ambulated to bathroom x2 with stand-by assist. Did well.      Goals of Care: Goals of care ongoing and progressing.      Neuro: A/Ox 4     Vital Signs: VSS     Respiratory: 2L NC     Diet: NPO        Is patient tolerating current diet? NA     GTTS: LR 100ml/hr      Urine Output/Bowel Movement:   Urine output adequate.         Drains/Tubes/Tube Feeds (include total output/shift):   NA        Lines: 20g L AC        Accuchecks:NA     Skin: 4laps sites     Fall Risk Score: Low     Activity level? Stand-by assist     Any scheduled procedures? NA     Any safety concerns? Facial crepitus     Problem: Adult Inpatient Plan of Care  Goal: Plan of Care Review  Outcome: Progressing  Goal: Patient-Specific Goal (Individualized)  Outcome: Progressing  Goal: Absence of Hospital-Acquired Illness or Injury  Outcome: Progressing  Goal: Optimal Comfort and Wellbeing  Outcome: Progressing  Goal: Readiness for Transition of Care  Outcome: Progressing     Problem: Acute Kidney Injury/Impairment  Goal: Fluid and Electrolyte Balance  Outcome: Progressing  Goal: Improved Oral Intake  Outcome: Progressing  Goal: Effective Renal Function  Outcome: Progressing     Problem: Wound  Goal: Optimal Coping  Outcome: Progressing  Goal: Optimal Functional Ability  Outcome: Progressing  Goal: Absence of Infection Signs and Symptoms  Outcome: Progressing  Goal: Improved Oral Intake  Outcome: Progressing  Goal: Optimal Pain Control and Function  Outcome: Progressing  Goal: Skin Health and Integrity  Outcome: Progressing  Goal: Optimal Wound Healing  Outcome: Progressing     Problem: Infection  Goal: Absence of Infection Signs and Symptoms  Outcome: Progressing     Problem: Fall Injury Risk  Goal: Absence of Fall and Fall-Related Injury  Outcome: Progressing     Problem: Skin Injury Risk Increased  Goal: Skin Health and  Integrity  Outcome: Progressing

## 2024-06-09 NOTE — PLAN OF CARE
[facility]rett [facility] - [facility]  Initial Discharge Assessment       Primary Care Provider: Odalis Kim MD    Admission Diagnosis: Rectal prolapse [K62.3]    Admission Date: 6/7/2024  Expected Discharge Date:     Transition of Care Barriers: None    Payor: [employer] MGD [facility]ARE [facility] / Plan: [employer] SECURE SNP / Product Type: Medicare Advantage /     Extended Emergency Contact Information  Primary Emergency Contact: Anatoly Rosado  Address: [address] [place] [address] [address]           [place] [address] [place]  Mobile Phone: [phone]  Relation: Son    Discharge Plan A: Home  Discharge Plan B: Home      [id number] - [place], [place] - [address]  [address] [facility] [address]  Phone: [phone] Fax: [phone]    [facility]  [address]  [facility] [place] [address]  Phone: [phone] Fax: [phone]    [id number] - [place] - [address]  [address] [place] [address]  Phone: [phone] Fax: [phone]      Initial Assessment (most recent)       Adult Discharge Assessment - 06/09/24 0941          Discharge Assessment    Assessment Type Discharge Planning Assessment     Confirmed/corrected address, phone number and insurance Yes     Confirmed Demographics Correct on Facesheet     Source of Information patient     If unable to respond/provide information was family/caregiver contacted? Yes   Alma Khan [phone]    Contact Name/Number Alma Khan [phone]     Communicated MARCELLA with patient/caregiver Date not available/Unable to determine     Reason For Admission Rectal prolapse     People in Home alone     Facility Arrived From: home     Do you expect to return to your current living situation? Yes     Do you have help at home or someone to help you manage your care at home? Yes     Who are your caregiver(s) and their phone number(s)? Alma Khan [phone]     Prior to  hospitilization cognitive status: Alert/Oriented     Current cognitive status: Alert/Oriented     Walking or Climbing Stairs Difficulty yes     Walking or Climbing Stairs ambulation difficulty, requires equipment     Mobility Management rollator     Dressing/Bathing Difficulty no     Do you have any problems with: --   pt denies    Home Accessibility other (see comments)   elevator    Home Layout Able to live on 1st floor     Equipment Currently Used at Home rollator     Readmission within 30 days? No     Patient currently being followed by outpatient case management? No     Do you currently have service(s) that help you manage your care at home? No     Do you take prescription medications? Yes     Do you have prescription coverage? Yes     Coverage : [employer] MGD MCARE [facility] SECURE SNP -     Do you have any problems affording any of your prescribed medications? No     Is the patient taking medications as prescribed? yes     Who is going to help you get home at discharge? Alma Rosado- [phone]     How do you get to doctors appointments? health plan transportation     Are you on dialysis? No     Do you take coumadin? No     Discharge Plan A Home     Discharge Plan B Home     DME Needed Upon Discharge  none     Discharge Plan discussed with: Patient     Transition of Care Barriers None        Transportation Needs    Has the lack of transportation kept you from medical appointments, meetings, work or from getting things needed for daily living? No        Social Isolation    How often do you feel lonely or isolated from those around you?  Rarely        Utilities    In the past 12 months has the electric, gas, oil, or water company threatened to shut off services in your home? No        Health Literacy    How often do you need to have someone help you when you read instructions, pamphlets, or other written material from your doctor or pharmacy? Never                     SW spoke with  patient in 1002 for Discharge Planning Assessment. Per patient, Pt lives alone in an apartment  son a slab foundation with zero steps to porch and point of entry.  Patient was independent with ADLS and DID use DME or in-home assistive equipment. Pt is not on dialysis  or coumadin,  takes medications as prescribed / keeps refilled / has resources for all daily and prescriptive needs. Preferred pharmacy is [facility] - Agreeable to bedside delivery / Wants any necessary medication sent to preferred pharmacy.   Will have help from  SonBrad Rosado- [phone]  and other immediate family upon discharge - Pt will require transportation home.  All questions addressed. Unit and CM direct numbers provided. Will continue to follow for course of hospitalization    6/7/2024  8:35 AM  Rectal prolapse [K62.3]    PCP: Odalis Kim MD    PHARMACY:   [id number] - [facility] [place] - [address]  [address] [facility] [address] [facility] [address]  Phone: [phone] Fax: [phone]    [facility]  [address]  [place] [address]  Phone: [phone] Fax: [phone]    [id number] - [place] - [address]  [address] [place] [address]  Phone: [phone] Fax: [phone]      [facility] / [facility] / Product Type: Medicare Advantage /       Usha Platt LMSW  - [facility]

## 2024-06-09 NOTE — PROGRESS NOTES
[facility]  Colorectal Surgery  Progress Note    Patient Name: Buffy Dominique  MRN: [id number]  Admission Date: 6/7/2024  Hospital Length of Stay: 2 days  Attending Physician: Sharath Myles MD    Subjective:     Interval History: No acute events overnight. Pain controlled, Hgb stable this am 7.9 (7.9). Incisions CDI, not passing gas/BM.     Post-Op Info:  Procedure(s) (LRB):  DV5 ROBOTIC RECTOPEXY (eras low, lith); lysis of adhesions (N/A)  SIGMOIDOSCOPY, FLEXIBLE   1 Day Post-Op      Medications:  Continuous Infusions:  Scheduled Meds:   acetaminophen  1,000 mg Intravenous Q8H    Followed by    acetaminophen  1,000 mg Oral Q8H    gabapentin  300 mg Oral TID    mupirocin   Nasal BID    polyethylene glycol  17 g Oral BID    [COMPLETED] scopolamine  1 patch Transdermal Once Pre-Op     PRN Meds:   acetaminophen 1,000 mg/100 mL (10 mg/mL) injection 1,000 mg    Followed by    acetaminophen tablet 1,000 mg    gabapentin capsule 300 mg    mupirocin 2 % ointment    polyethylene glycol packet 17 g    [COMPLETED] scopolamine 1.3-1.5 mg (1 mg over 3 days) 1 patch        Objective:     Vital Signs (Most Recent):  Temp: 97.4 °F (36.3 °C) (06/08/24 0821)  Pulse: 63 (06/08/24 0821)  Resp: 16 (06/08/24 0821)  BP: (!) 103/51 (06/08/24 0821)  SpO2: (!) 93 % (06/08/24 1007) Vital Signs (24h Range):  Temp:  [97 °F (36.1 °C)-98.1 °F (36.7 °C)] 97.4 °F (36.3 °C)  Pulse:  [55-71] 63  Resp:  [10-20] 16  SpO2:  [93 %-99 %] 93 %  BP: ()/(45-58) 103/51     Intake/Output - Last 3 Shifts         06/06 0700  06/07 0659 06/07 0700  06/08 0659 06/08 0700  06/09 0659    I.V. (mL/kg)  2282.9 (41.6)     Total Intake(mL/kg)  2282.9 (41.6)     Urine (mL/kg/hr)  525     Blood  50     Total Output  575     Net  +1707.9                     Physical Exam  Constitutional:       Appearance: Normal appearance.   HENT:      Head: Normocephalic and atraumatic.      Mouth/Throat:      Mouth: Mucous membranes are moist.   Cardiovascular:      Rate and  Rhythm: Regular rhythm.   Pulmonary:      Effort: Pulmonary effort is normal. No respiratory distress.   Abdominal:      Palpations: Abdomen is soft.      Comments: Incisions CDI   Skin:     General: Skin is warm.   Neurological:      General: No focal deficit present.      Mental Status: She is alert and oriented to person, place, and time. Mental status is at baseline.   Psychiatric:         Mood and Affect: Mood normal.         Behavior: Behavior normal.         Thought Content: Thought content normal.            Significant Labs:  All pertinent lab results within the last 24 hours have been reviewed.     Significant Diagnostics:  I have reviewed all pertinent imaging results/findings within the past 24 hours.  Assessment/Plan:     * Rectal prolapse  79 yo F with rectal prolapse s/p rectopexy 6/7/24.     - Low residue diet  - trend CBC q6h   - MMPC   - daily labs   - d/gianluca lovenox in setting of drifting hgb             Aly Hoff MD  Colorectal Surgery  [facility]

## 2024-06-10 ENCOUNTER — PATIENT MESSAGE (OUTPATIENT)
Dept: INTERNAL MEDICINE | Facility: CLINIC | Age: 79
End: 2024-06-10
Payer: MEDICARE

## 2024-06-10 VITALS
BODY MASS INDEX: 21.45 KG/M2 | RESPIRATION RATE: 20 BRPM | DIASTOLIC BLOOD PRESSURE: 74 MMHG | TEMPERATURE: 99 F | HEIGHT: 63 IN | WEIGHT: 121.06 LBS | OXYGEN SATURATION: 96 % | HEART RATE: 75 BPM | SYSTOLIC BLOOD PRESSURE: 173 MMHG

## 2024-06-10 LAB
BASOPHILS # BLD AUTO: 0.03 K/UL (ref 0–0.2)
BASOPHILS NFR BLD: 0.4 % (ref 0–1.9)
CRP SERPL-MCNC: 75.6 MG/L (ref 0–8.2)
DIFFERENTIAL METHOD BLD: ABNORMAL
EOSINOPHIL # BLD AUTO: 0.3 K/UL (ref 0–0.5)
EOSINOPHIL NFR BLD: 4 % (ref 0–8)
ERYTHROCYTE [DISTWIDTH] IN BLOOD BY AUTOMATED COUNT: 13.6 % (ref 11.5–14.5)
HCT VFR BLD AUTO: 23.7 % (ref 37–48.5)
HGB BLD-MCNC: 7.9 G/DL (ref 12–16)
IMM GRANULOCYTES # BLD AUTO: 0.03 K/UL (ref 0–0.04)
IMM GRANULOCYTES NFR BLD AUTO: 0.4 % (ref 0–0.5)
LYMPHOCYTES # BLD AUTO: 2 K/UL (ref 1–4.8)
LYMPHOCYTES NFR BLD: 25.2 % (ref 18–48)
MCH RBC QN AUTO: 31.6 PG (ref 27–31)
MCHC RBC AUTO-ENTMCNC: 33.3 G/DL (ref 32–36)
MCV RBC AUTO: 95 FL (ref 82–98)
MONOCYTES # BLD AUTO: 1 K/UL (ref 0.3–1)
MONOCYTES NFR BLD: 12.3 % (ref 4–15)
NEUTROPHILS # BLD AUTO: 4.5 K/UL (ref 1.8–7.7)
NEUTROPHILS NFR BLD: 57.7 % (ref 38–73)
NRBC BLD-RTO: 0 /100 WBC
PLATELET # BLD AUTO: 198 K/UL (ref 150–450)
PMV BLD AUTO: 9 FL (ref 9.2–12.9)
RBC # BLD AUTO: 2.5 M/UL (ref 4–5.4)
WBC # BLD AUTO: 7.83 K/UL (ref 3.9–12.7)

## 2024-06-10 PROCEDURE — 36415 COLL VENOUS BLD VENIPUNCTURE: CPT | Performed by: STUDENT IN AN ORGANIZED HEALTH CARE EDUCATION/TRAINING PROGRAM

## 2024-06-10 PROCEDURE — 25000003 PHARM REV CODE 250: Performed by: STUDENT IN AN ORGANIZED HEALTH CARE EDUCATION/TRAINING PROGRAM

## 2024-06-10 PROCEDURE — 85025 COMPLETE CBC W/AUTO DIFF WBC: CPT | Performed by: STUDENT IN AN ORGANIZED HEALTH CARE EDUCATION/TRAINING PROGRAM

## 2024-06-10 PROCEDURE — 86140 C-REACTIVE PROTEIN: CPT | Performed by: STUDENT IN AN ORGANIZED HEALTH CARE EDUCATION/TRAINING PROGRAM

## 2024-06-10 RX ORDER — POLYETHYLENE GLYCOL 3350 17 G/17G
17 POWDER, FOR SOLUTION ORAL 2 TIMES DAILY
Qty: 1020 G | Refills: 6 | Status: SHIPPED | OUTPATIENT
Start: 2024-06-10

## 2024-06-10 RX ORDER — OXYCODONE HYDROCHLORIDE 5 MG/1
5 TABLET ORAL EVERY 6 HOURS PRN
Qty: 20 TABLET | Refills: 0 | Status: SHIPPED | OUTPATIENT
Start: 2024-06-10

## 2024-06-10 RX ORDER — AMOXICILLIN AND CLAVULANATE POTASSIUM 875; 125 MG/1; MG/1
1 TABLET, FILM COATED ORAL EVERY 12 HOURS
Qty: 6 TABLET | Refills: 0 | Status: SHIPPED | OUTPATIENT
Start: 2024-06-10 | End: 2024-06-13

## 2024-06-10 RX ADMIN — AMOXICILLIN AND CLAVULANATE POTASSIUM 1 TABLET: 875; 125 TABLET, FILM COATED ORAL at 10:06

## 2024-06-10 RX ADMIN — ACETAMINOPHEN 1000 MG: 500 TABLET ORAL at 05:06

## 2024-06-10 NOTE — PLAN OF CARE
CHW met with patient/family at bedside. Patient experience rounding completed and reviewed the following.     Do you know your discharge plan? Yes or No,    If yes, what is the plan? (Home, Home Health, Rehab, SNF, LTAC, or Other)    Home    Have you discussed your needs and preferences with your SW/CM? Yes or No      yes    If you are discharging home, do you have help at home? Yes or No   yes    Do you think you will need help additional at home at discharge? Yes or No    No    Do you currently have difficulty keeping up with bills, affording medicine or buying food? Yes or No     No    Assigned SW/CM notified of any patient/family needs or concerns. Appropriate resources provided to address patient's needs.       Freida Sultana CHW\  Case Management  [phone]

## 2024-06-10 NOTE — CARE UPDATE
I have reviewed the chart of Buffy Dominique who is hospitalized for the following:    Active Hospital Problems    Diagnosis    *Rectal prolapse    Hyponatremia     POA Na 126  - trend          Lindsey Baron PA-C  Unit Based [facility]

## 2024-06-10 NOTE — PLAN OF CARE
[facility]  Discharge Final Note    Observed d/c orders placed for this patient. No post-acute needs noted. Patient d/c to home w/family via private vehicle.    Primary Care Provider: Odalis Kim MD    Expected Discharge Date: 6/10/2024    Final Discharge Note (most recent)       Final Note - 06/10/24 1442          Final Note    Assessment Type Final Discharge Note (P)      What phone number can be called within the next 1-3 days to see how you are doing after discharge? [phone] (P)         Post-Acute Status    Post-Acute Authorization Other (P)      Coverage [facility] MGD [facility] SECURE SNP - (P)      Other Status No Post-Acute Service Needs (P)      Discharge Delays None known at this time (P)                      Important Message from Medicare  Important Message from Medicare regarding Discharge Appeal Rights: Given to patient/caregiver, Explained to patient/caregiver, Signed/date by patient/caregiver     Date IMM was signed: 06/10/24  Time IMM was signed: 0956    Contact Info       Sharath Myles MD   Specialty: Colon and Rectal Surgery    [address] [place] [address]   Phone: [phone]       Next Steps: Follow up in 2 week(s)          LUIGI Toledo, LMSW    Case Management Department  [facility]

## 2024-06-10 NOTE — NURSING
Patient discharged home. Discharge paperwork reviewed with patient who verbally verified understanding. Left AC PIV removed; patient tolerated without complication. Telemetry box removed and returned to nursing station; monitor technician notified prior to removal. Patient currently awaiting patient transport.

## 2024-06-10 NOTE — HOSPITAL COURSE
Ms. Dominique is a 78 year old woman with a history of HTN, HLD, RA, and CKD who presented with longstanding rectal prolapse  who is scheduled to undergo a robotic-assisted rectopexy. The benefits, risks, and alternatives were discussed with the patient, they were given the opportunity to ask questions and they elected to proceed with operative intervention after signing written consent. PO 1 H&h drifted down due to acute blood loss anemia related to surgery, one unit given, diet was advanced.  On day of discharge pt is loulou regular diet, inc line healing well, positive for  flatus, H&h stable ambulating in nguyen without difficulty, adequate pain control with oral medication, VS stable and afebrile.      FU 2 weeks Dr. Myles

## 2024-06-10 NOTE — PLAN OF CARE
[facility] Plan of Care Note     Dx:   Rectal prolapse [K62.3]     Shift Events: Pt had uneventful shift. No distress noted.      Goals of Care: Goals of care ongoing and progressing.      Neuro: A/Ox 4     Vital Signs: VSS     Respiratory: RA     Diet: Low residue/Fat     Is patient tolerating current diet? NA     GTTS: LR 10ml/hr      Urine Output/Bowel Movement:   Urine output adequate.         Drains/Tubes/Tube Feeds (include total output/shift):   NA        Lines: 20g L AC        Accuchecks:NA     Skin: 4laps sites     Fall Risk Score: Low     Activity level? Stand-by assist     Any scheduled procedures? NA     Any safety concerns? Facial crepitus    Problem: Adult Inpatient Plan of Care  Goal: Plan of Care Review  Outcome: Progressing  Goal: Patient-Specific Goal (Individualized)  Outcome: Progressing  Goal: Absence of Hospital-Acquired Illness or Injury  Outcome: Progressing  Goal: Optimal Comfort and Wellbeing  Outcome: Progressing  Goal: Readiness for Transition of Care  Outcome: Progressing     Problem: Acute Kidney Injury/Impairment  Goal: Fluid and Electrolyte Balance  Outcome: Progressing  Goal: Improved Oral Intake  Outcome: Progressing  Goal: Effective Renal Function  Outcome: Progressing     Problem: Wound  Goal: Optimal Coping  Outcome: Progressing  Goal: Optimal Functional Ability  Outcome: Progressing  Goal: Absence of Infection Signs and Symptoms  Outcome: Progressing  Goal: Improved Oral Intake  Outcome: Progressing  Goal: Optimal Pain Control and Function  Outcome: Progressing  Goal: Skin Health and Integrity  Outcome: Progressing  Goal: Optimal Wound Healing  Outcome: Progressing     Problem: Infection  Goal: Absence of Infection Signs and Symptoms  Outcome: Progressing     Problem: Fall Injury Risk  Goal: Absence of Fall and Fall-Related Injury  Outcome: Progressing     Problem: Skin Injury Risk Increased  Goal: Skin Health and Integrity  Outcome: Progressing

## 2024-06-10 NOTE — DISCHARGE SUMMARY
"[facility]  Colorectal Surgery  Discharge Summary      Patient Name: Buffy Dominique  MRN: [id number]  Admission Date: 6/7/2024  Hospital Length of Stay: 3 days  Discharge Date and Time: No discharge date for patient encounter.  Attending Physician: Sharath Myles MD   Discharging Provider: Eden Martin NP  Primary Care Provider: Odalis Kim MD     HPI:  No notes on file    Procedure(s) (LRB):  DV5 ROBOTIC RECTOPEXY (eras low, lith); lysis of adhesions (N/A)  SIGMOIDOSCOPY, FLEXIBLE     Hospital Course:   Ms. Dominique is a 78 year old woman with a history of HTN, HLD, RA, and CKD who presented with longstanding rectal prolapse  who is scheduled to undergo a robotic-assisted rectopexy. The benefits, risks, and alternatives were discussed with the patient, they were given the opportunity to ask questions and they elected to proceed with operative intervention after signing written consent. PO 1 H&h drifted down due to acute blood loss anemia related to surgery, one unit given, diet was advanced.  On day of discharge pt is loulou regular diet, inc line healing well, positive for  flatus, H&h stable ambulating in nguyen without difficulty, adequate pain control with oral medication, VS stable and afebrile.      FU 2 weeks Dr. Myles    Goals of Care Treatment Preferences:  Code Status: Full Code          Significant Diagnostic Studies: Labs: BMP: No results for input(s): "GLU", "NA", "K", "CL", "CO2", "BUN", "CREATININE", "CALCIUM", "MG" in the last 48 hours. and CBC   Recent Labs   Lab 06/09/24  1841 06/09/24  2305 06/10/24  0728   WBC 9.03 7.79 7.83   HGB 8.7* 7.7* 7.9*   HCT 25.9* 22.8* 23.7*    176 198       Pending Diagnostic Studies:       Procedure Component Value Units Date/Time    CBC Without Differential [[id number]] Collected: 06/08/24 2237    Order Status: Sent Lab Status: No result     Specimen: Blood           Final Active Diagnoses:    Diagnosis Date Noted POA    PRINCIPAL PROBLEM:  " Rectal prolapse [K62.3] 02/03/2022 Yes    Hyponatremia [E87.1] 05/03/2022 Yes      Problems Resolved During this Admission:      Discharged Condition: good    Disposition: Home or Self Care    Follow Up:   Follow-up Information       Sharath Myles MD Follow up in 2 week(s).    Specialty: Colon and Rectal Surgery  Contact information:  [address]  [facility] [address]  [phone]                           Patient Instructions:      Diet Adult Regular   Order Comments: Low fiber, no fresh fruit or fresh vegetables, no nuts, grapes, popcorn or raisins     Lifting restrictions   Order Comments: No lifting anything greater than 5 pounds     No dressing needed   Order Comments: May shower, no tub bath     Notify your health care provider if you experience any of the following:  temperature >100.4     Notify your health care provider if you experience any of the following:  persistent nausea and vomiting or diarrhea     Notify your health care provider if you experience any of the following:  severe uncontrolled pain     Notify your health care provider if you experience any of the following:  redness, tenderness, or signs of infection (pain, swelling, redness, odor or green/yellow discharge around incision site)     Notify your health care provider if you experience any of the following:  difficulty breathing or increased cough     Notify your health care provider if you experience any of the following:  severe persistent headache     Notify your health care provider if you experience any of the following:  worsening rash     Notify your health care provider if you experience any of the following:  persistent dizziness, light-headedness, or visual disturbances     Notify your health care provider if you experience any of the following:  increased confusion or weakness     Medications:  Reconciled Home Medications:      Medication List        START taking these medications      amoxicillin-clavulanate  875-125mg 875-125 mg per tablet  Commonly known as: AUGMENTIN  Take 1 tablet by mouth every 12 (twelve) hours. for 3 days     oxyCODONE 5 MG immediate release tablet  Commonly known as: ROXICODONE  Take 1 tablet (5 mg total) by mouth every 6 (six) hours as needed for Pain.     polyethylene glycol 17 gram/dose powder  Commonly known as: GLYCOLAX  Use to cap to measure 17g, mix with liquid, and take by mouth 2 (two) times daily.            CONTINUE taking these medications      alendronate 70 MG tablet  Commonly known as: FOSAMAX  Take 1 tablet (70 mg total) by mouth every 7 days.     amLODIPine 10 MG tablet  Commonly known as: NORVASC  Take 1 tablet (10 mg total) by mouth once daily.     artificial tears 0.5 % ophthalmic solution  Commonly known as: ISOPTO TEARS  Place 1 drop into both eyes as needed (for dry eyes).     cholecalciferol (vitamin D3) 25 mcg (1,000 unit) capsule  Commonly known as: VITAMIN D3  Take 1 capsule (1,000 Units total) by mouth once daily.     * HUMIRA PEN Pnkt injection  Generic drug: adalimumab  Inject 1 pen  (40 mg total) into the skin every 14 (fourteen) days.     * HUMIRA PEN Pnkt injection  Generic drug: adalimumab  Inject 1 pen  (40 mg total) into the skin every 14 (fourteen) days.     hydrALAZINE 50 MG tablet  Commonly known as: APRESOLINE  Take 1 tablet (50 mg total) by mouth every 12 (twelve) hours.     olmesartan 40 MG tablet  Commonly known as: BENICAR  Take 1 tablet (40 mg total) by mouth once daily.           * This list has 2 medication(s) that are the same as other medications prescribed for you. Read the directions carefully, and ask your doctor or other care provider to review them with you.                STOP taking these medications      ciprofloxacin HCl 500 MG tablet  Commonly known as: CIPRO     metroNIDAZOLE 500 MG tablet  Commonly known as: FLAGYL            ASK your doctor about these medications      pulse oximeter device  Commonly known as: pulse oximeter  by Apply  Externally route 2 (two) times a day. Use twice daily at 8 AM and 3 PM and record the value in MyChart as directed.              Eden Martin NP  Colorectal Surgery  [facility]

## 2024-06-11 ENCOUNTER — PATIENT OUTREACH (OUTPATIENT)
Dept: ADMINISTRATIVE | Facility: CLINIC | Age: 79
End: 2024-06-11
Payer: MEDICARE

## 2024-06-11 NOTE — PROGRESS NOTES
C3 nurse spoke with Buffy Dominique  for a TCC post hospital discharge follow up call. The patient has a scheduled [facility] appointment with Odalis Kim MD  on 6/21/24 @ 1030.

## 2024-06-13 NOTE — ADDENDUM NOTE
Addendum  created 06/13/24 1322 by Demetria Khan, BEN    Intraprocedure Staff edited      
no weight-bearing restrictions

## 2024-06-24 RX ORDER — OXYCODONE HYDROCHLORIDE 5 MG/1
5 TABLET ORAL EVERY 6 HOURS PRN
Qty: 20 TABLET | Refills: 0 | Status: SHIPPED | OUTPATIENT
Start: 2024-06-24

## 2024-07-08 NOTE — PROGRESS NOTES
[facility]  Colon and Rectal Surgery    [address]  [place], [place]  Tel: [phone]  Fax: [phone]  [email or web address]   MD Saturnino Urena MD Brian Kann, MD W. Forrest Johnston, MD Matthew Giglia, MD Jennifer Paruch, MD William Kethman, MD Danielle Kay, MD     Patient name: Buffy Dominique   YOB: 1945   MRN: [id number]    It was a pleasure seeing Ms. Dominique in the Colon and Rectal Surgery clinic here at [facility].     As you know, Ms. Dominique is a 78 year old woman with a history of HTN, HLD, CKD, AS, former tobacco use but quit many years ago  who presents for evaluation of rectal prolapse . She underwent an extensive lysis of adhesions and robotic-assisted rectopexy on 6/7/2024 - this was complicated by pelvic hematoma requiring transfusion and subcutaneous emphysema. She is doing well overall, some mild firmness in her right lower quadrant but this is improving. She is having bowel function without prolapse recurrence - daily, not straining. She does have mild incontinence but this is also improving.    The patient was informed of the availability of a certified  without charge. A certified  was not necessary for this visit.    Review of Systems  See pertinent review of systems above    Past Medical History:   Diagnosis Date    Basal cell carcinoma (BCC) of right cheek 04/03/2024    R cheek    CKD (chronic kidney disease) stage 3, GFR 30-59 ml/min     HLD (hyperlipidemia)     HTN (hypertension)     Hyperparathyroidism     Melanoma      Past Surgical History:   Procedure Laterality Date    CATARACT EXTRACTION, BILATERAL Bilateral 2014    cataract removal Right 2014    COLONOSCOPY N/A 4/30/2024    Procedure: COLONOSCOPY;  Surgeon: Sharath Myles MD;  Location: [facility] ([address] [place] [address]);  Service: Endoscopy;  Laterality: N/A;  ref by / Miralax inst portal-RB  1/31/24- Pt r/s/ prep ins on portal -  Miralax prep - ERW  4/25/24- precall complete - ERW  4/29-pt cannot come in earlier due to ride-KPvt    DV5 ROBOTIC RECTOPEXY N/A 6/7/2024    Procedure: DV5 ROBOTIC RECTOPEXY (eras low, lith); lysis of adhesions;  Surgeon: Sharath Myles MD;  Location: [facility] OR 2ND FLR;  Service: Colon and Rectal;  Laterality: N/A;    FLEXIBLE SIGMOIDOSCOPY  6/7/2024    Procedure: SIGMOIDOSCOPY, FLEXIBLE;  Surgeon: Sharath Myles MD;  Location: [facility] OR 2ND FLR;  Service: Colon and Rectal;;    hiatial hernia  2017    HYSTERECTOMY      melanoma      on face    OOPHORECTOMY      THORACIC AORTIC ANEURYSM REPAIR  2011     Family History   Problem Relation Name Age of Onset    Cancer Father Junior     Hypertension Maternal Grandmother Jazzy     Colon cancer Neg Hx      Inflammatory bowel disease Neg Hx       Social History     Tobacco Use    Smoking status: Former     Current packs/day: 0.00     Average packs/day: 1 pack/day for 30.0 years (30.0 ttl pk-yrs)     Types: Cigarettes     Start date: 1/26/1979     Quit date: 1/26/2009     Years since quitting: 15.4    Smokeless tobacco: Never   Substance Use Topics    Alcohol use: Never     Comment: glass of wine once or twice a year    Drug use: Never     Review of patient's allergies indicates:   Allergen Reactions    Gabapentin Hallucinations    Methotrexate analogues      Mouth Ulcers     Thiazides Other (See Comments)     hyponatremia       Current Outpatient Medications on File Prior to Visit   Medication Sig Dispense Refill    adalimumab (HUMIRA PEN) PnKt injection Inject 1 pen  (40 mg total) into the skin every 14 (fourteen) days. 6 pen 3    adalimumab (HUMIRA PEN) PnKt injection Inject 1 pen  (40 mg total) into the skin every 14 (fourteen) days. 2 pen 11    amLODIPine (NORVASC) 10 MG tablet Take 1 tablet (10 mg total) by mouth once daily. 90 tablet 1    artificial tears (ISOPTO TEARS) 0.5 % ophthalmic solution Place 1 drop into both eyes as needed (for dry eyes). 15 mL 5     "hydrALAZINE (APRESOLINE) 50 MG tablet Take 1 tablet (50 mg total) by mouth every 12 (twelve) hours. 180 tablet 1    olmesartan (BENICAR) 40 MG tablet Take 1 tablet (40 mg total) by mouth once daily. 90 tablet 1    pulse oximeter (PULSE OXIMETER) device by Apply Externally route 2 (two) times a day. Use twice daily at 8 AM and 3 PM and [facility] the [facility] as directed. 1 each 0    alendronate (FOSAMAX) 70 MG tablet Take 1 tablet (70 mg total) by mouth every 7 days. (Patient not taking: Reported on 6/11/2024) 4 tablet 11    cholecalciferol, vitamin D3, (VITAMIN D3) 25 mcg (1,000 unit) capsule Take 1 capsule (1,000 Units total) by mouth once daily. (Patient not taking: Reported on 6/11/2024) 90 capsule 3    oxyCODONE (ROXICODONE) 5 MG immediate release tablet Take 1 tablet (5 mg total) by mouth every 6 (six) hours as needed for Pain. (Patient not taking: Reported on 7/9/2024) 20 tablet 0    polyethylene glycol (GLYCOLAX) 17 gram/dose powder Use to cap to measure 17g, mix with liquid, and take by mouth 2 (two) times daily. (Patient not taking: Reported on 6/11/2024) 1020 g 6    [DISCONTINUED] spironolactone (ALDACTONE) 25 MG tablet Take 1 tablet (25 mg total) by mouth once daily. 90 tablet 0     No current facility-administered medications on file prior to visit.     Physical Examination  BP (!) 149/65 (BP Location: Left arm, Patient Position: Sitting, BP Method: Large (Automatic))   Pulse 99   Resp 18   Ht 5' 3" (1.6 m)   Wt 51.6 kg (113 lb 12.1 oz)   LMP  (LMP Unknown)   BMI 20.15 kg/m²      Constitutional: well developed, no cough, no dyspnea, alert, and no acute distress    Head: Normocephalic, no lesions, without obvious abnormality  Eye: Normal external eye, conjunctiva, and lids  Cardiovascular: regular rate and regular rhythm  Respiratory: normal air entry  Gastrointestinal: soft, non-tender, incisions are well-healed, some right-sided fullness but soft, no erythema  Musculoskeletal: full range of " motion without pain  Neurologic: alert, oriented, normal speech, no focal findings or movement disorder noted  Psychiatric: appropriate, normal mood    Assessment and Plan of Care    Thank you again for referring Ms. Dominique to my care. In summary, Ms. Dominique is a 78 year old woman presenting with rectal prolapse - underwent robotic-assisted rectopexy on 6/7/2024. We discussed treatment options and have provided the following recommendations:    Discussed bowel management - she is currently not needing Miralax or Metamucil but recommend this if needed  No straining  No smoking - she quit in 2009 - continued to encourage this  Suspect fullness in her right lower quadrant will continue to improve, if not will plan for CT  Follow-up in 2 months to reassess     Please do not hesitate to contact me if you have any questions.      Sharath Myles MD, FACS, FASCRS  Department of Colon & Rectal Surgery  [facility]

## 2024-07-09 ENCOUNTER — OFFICE VISIT (OUTPATIENT)
Dept: SURGERY | Facility: CLINIC | Age: 79
End: 2024-07-09
Payer: MEDICARE

## 2024-07-09 VITALS
BODY MASS INDEX: 20.16 KG/M2 | RESPIRATION RATE: 18 BRPM | WEIGHT: 113.75 LBS | HEIGHT: 63 IN | HEART RATE: 99 BPM | SYSTOLIC BLOOD PRESSURE: 149 MMHG | DIASTOLIC BLOOD PRESSURE: 65 MMHG

## 2024-07-09 DIAGNOSIS — K62.3 RECTAL PROLAPSE: Primary | ICD-10-CM

## 2024-07-09 PROCEDURE — 1101F PT FALLS ASSESS-DOCD LE1/YR: CPT | Mod: CPTII,S$GLB,, | Performed by: STUDENT IN AN ORGANIZED HEALTH CARE EDUCATION/TRAINING PROGRAM

## 2024-07-09 PROCEDURE — 1125F AMNT PAIN NOTED PAIN PRSNT: CPT | Mod: CPTII,S$GLB,, | Performed by: STUDENT IN AN ORGANIZED HEALTH CARE EDUCATION/TRAINING PROGRAM

## 2024-07-09 PROCEDURE — 3288F FALL RISK ASSESSMENT DOCD: CPT | Mod: CPTII,S$GLB,, | Performed by: STUDENT IN AN ORGANIZED HEALTH CARE EDUCATION/TRAINING PROGRAM

## 2024-07-09 PROCEDURE — 3077F SYST BP >= 140 MM HG: CPT | Mod: CPTII,S$GLB,, | Performed by: STUDENT IN AN ORGANIZED HEALTH CARE EDUCATION/TRAINING PROGRAM

## 2024-07-09 PROCEDURE — 1159F MED LIST DOCD IN RCRD: CPT | Mod: CPTII,S$GLB,, | Performed by: STUDENT IN AN ORGANIZED HEALTH CARE EDUCATION/TRAINING PROGRAM

## 2024-07-09 PROCEDURE — 99024 POSTOP FOLLOW-UP VISIT: CPT | Mod: S$GLB,,, | Performed by: STUDENT IN AN ORGANIZED HEALTH CARE EDUCATION/TRAINING PROGRAM

## 2024-07-09 PROCEDURE — 99999 PR PBB SHADOW E&M-EST. PATIENT-LVL III: CPT | Mod: PBBFAC,,, | Performed by: STUDENT IN AN ORGANIZED HEALTH CARE EDUCATION/TRAINING PROGRAM

## 2024-07-09 PROCEDURE — 3078F DIAST BP <80 MM HG: CPT | Mod: CPTII,S$GLB,, | Performed by: STUDENT IN AN ORGANIZED HEALTH CARE EDUCATION/TRAINING PROGRAM

## 2024-07-11 ENCOUNTER — TELEPHONE (OUTPATIENT)
Dept: SURGERY | Facility: CLINIC | Age: 79
End: 2024-07-11
Payer: MEDICARE

## 2024-07-11 RX ORDER — PANTOPRAZOLE SODIUM 40 MG/1
40 TABLET, DELAYED RELEASE ORAL DAILY
Qty: 30 TABLET | Refills: 0 | Status: SHIPPED | OUTPATIENT
Start: 2024-07-11 | End: 2024-07-12

## 2024-07-12 ENCOUNTER — TELEPHONE (OUTPATIENT)
Dept: SURGERY | Facility: HOSPITAL | Age: 79
End: 2024-07-12
Payer: MEDICARE

## 2024-07-12 DIAGNOSIS — K62.3 RECTAL PROLAPSE: Primary | ICD-10-CM

## 2024-07-12 RX ORDER — PANTOPRAZOLE SODIUM 40 MG/1
40 TABLET, DELAYED RELEASE ORAL DAILY
Qty: 90 TABLET | Refills: 0 | Status: SHIPPED | OUTPATIENT
Start: 2024-07-12 | End: 2024-10-10

## 2024-07-12 NOTE — TELEPHONE ENCOUNTER
RN returned pt's call.  Pt stated that she has been retching and having issues with heartburn and acid reflux.  RN suggested pt try Famotidine 20mg OTC and other aids from the drug store.  Pt asked for a prescription.  She reported that she had a discussion with Dr. Myles about her reflux during their last visit.  RN let pt know that she would ask our NP team to send in a medication for her to her pharmacy. Pt verbalized understanding to all and stated that she was doing well otherwise.

## 2024-07-12 NOTE — TELEPHONE ENCOUNTER
----- Message from Kristie Trejo RN sent at 7/12/2024 11:31 AM CDT -----  Regarding: Protonix  Yogesh Larkin,    This is the pt that needs Protonix sent to [facility] in her chart.    Thank you!    Ronal

## 2024-07-23 ENCOUNTER — TELEPHONE (OUTPATIENT)
Dept: DERMATOLOGY | Facility: CLINIC | Age: 79
End: 2024-07-23
Payer: MEDICARE

## 2024-07-23 NOTE — TELEPHONE ENCOUNTER
Called and re scheduled patient in August. Pt recently had surgery and is not strong enough to come into her currently scheduled [facility] and needed to cancel / re schedule. Now scheduled in August. Confirmed date and time with patient. Verbalized understanding.     ----- Message from Chel Brooks sent at 7/23/2024  9:06 AM CDT -----  Regarding: Reschedule appointment  Contact: [phone]  Calling in regards to rescheduling appointment as soon as possible. Please call and schedule, patient will review portal for appointment scheduled.      [phone]

## 2024-07-24 ENCOUNTER — LAB VISIT (OUTPATIENT)
Dept: LAB | Facility: HOSPITAL | Age: 79
End: 2024-07-24
Attending: INTERNAL MEDICINE
Payer: MEDICARE

## 2024-07-24 ENCOUNTER — OFFICE VISIT (OUTPATIENT)
Dept: INTERNAL MEDICINE | Facility: CLINIC | Age: 79
End: 2024-07-24
Payer: MEDICARE

## 2024-07-24 VITALS
WEIGHT: 108.56 LBS | HEIGHT: 63 IN | DIASTOLIC BLOOD PRESSURE: 70 MMHG | BODY MASS INDEX: 19.23 KG/M2 | HEART RATE: 86 BPM | SYSTOLIC BLOOD PRESSURE: 138 MMHG | OXYGEN SATURATION: 99 %

## 2024-07-24 DIAGNOSIS — R10.31 CHRONIC RLQ PAIN: Primary | ICD-10-CM

## 2024-07-24 DIAGNOSIS — G89.29 CHRONIC RLQ PAIN: Primary | ICD-10-CM

## 2024-07-24 DIAGNOSIS — R19.03 RLQ ABDOMINAL MASS: ICD-10-CM

## 2024-07-24 DIAGNOSIS — D64.9 ANEMIA, UNSPECIFIED TYPE: ICD-10-CM

## 2024-07-24 LAB
ANION GAP SERPL CALC-SCNC: 8 MMOL/L (ref 8–16)
BASOPHILS # BLD AUTO: 0.05 K/UL (ref 0–0.2)
BASOPHILS NFR BLD: 0.8 % (ref 0–1.9)
BUN SERPL-MCNC: 14 MG/DL (ref 8–23)
CALCIUM SERPL-MCNC: 10.1 MG/DL (ref 8.7–10.5)
CHLORIDE SERPL-SCNC: 100 MMOL/L (ref 95–110)
CO2 SERPL-SCNC: 27 MMOL/L (ref 23–29)
CREAT SERPL-MCNC: 0.7 MG/DL (ref 0.5–1.4)
DIFFERENTIAL METHOD BLD: ABNORMAL
EOSINOPHIL # BLD AUTO: 0.3 K/UL (ref 0–0.5)
EOSINOPHIL NFR BLD: 4 % (ref 0–8)
ERYTHROCYTE [DISTWIDTH] IN BLOOD BY AUTOMATED COUNT: 14.1 % (ref 11.5–14.5)
EST. GFR  (NO RACE VARIABLE): >60 ML/MIN/1.73 M^2
FERRITIN SERPL-MCNC: 298 NG/ML (ref 20–300)
GLUCOSE SERPL-MCNC: 91 MG/DL (ref 70–110)
HCT VFR BLD AUTO: 37.8 % (ref 37–48.5)
HGB BLD-MCNC: 12.5 G/DL (ref 12–16)
IMM GRANULOCYTES # BLD AUTO: 0.02 K/UL (ref 0–0.04)
IMM GRANULOCYTES NFR BLD AUTO: 0.3 % (ref 0–0.5)
IRON SERPL-MCNC: 85 UG/DL (ref 30–160)
LYMPHOCYTES # BLD AUTO: 3.1 K/UL (ref 1–4.8)
LYMPHOCYTES NFR BLD: 49.4 % (ref 18–48)
MCH RBC QN AUTO: 32.8 PG (ref 27–31)
MCHC RBC AUTO-ENTMCNC: 33.1 G/DL (ref 32–36)
MCV RBC AUTO: 99 FL (ref 82–98)
MONOCYTES # BLD AUTO: 0.6 K/UL (ref 0.3–1)
MONOCYTES NFR BLD: 9 % (ref 4–15)
NEUTROPHILS # BLD AUTO: 2.3 K/UL (ref 1.8–7.7)
NEUTROPHILS NFR BLD: 36.5 % (ref 38–73)
NRBC BLD-RTO: 0 /100 WBC
PLATELET # BLD AUTO: 295 K/UL (ref 150–450)
PMV BLD AUTO: 8.8 FL (ref 9.2–12.9)
POTASSIUM SERPL-SCNC: 4.2 MMOL/L (ref 3.5–5.1)
RBC # BLD AUTO: 3.81 M/UL (ref 4–5.4)
SATURATED IRON: 29 % (ref 20–50)
SODIUM SERPL-SCNC: 135 MMOL/L (ref 136–145)
TOTAL IRON BINDING CAPACITY: 289 UG/DL (ref 250–450)
TRANSFERRIN SERPL-MCNC: 195 MG/DL (ref 200–375)
WBC # BLD AUTO: 6.32 K/UL (ref 3.9–12.7)

## 2024-07-24 PROCEDURE — 3288F FALL RISK ASSESSMENT DOCD: CPT | Mod: CPTII,S$GLB,, | Performed by: INTERNAL MEDICINE

## 2024-07-24 PROCEDURE — 82728 ASSAY OF FERRITIN: CPT | Performed by: INTERNAL MEDICINE

## 2024-07-24 PROCEDURE — 1159F MED LIST DOCD IN RCRD: CPT | Mod: CPTII,S$GLB,, | Performed by: INTERNAL MEDICINE

## 2024-07-24 PROCEDURE — 85025 COMPLETE CBC W/AUTO DIFF WBC: CPT | Performed by: INTERNAL MEDICINE

## 2024-07-24 PROCEDURE — 1101F PT FALLS ASSESS-DOCD LE1/YR: CPT | Mod: CPTII,S$GLB,, | Performed by: INTERNAL MEDICINE

## 2024-07-24 PROCEDURE — 84466 ASSAY OF TRANSFERRIN: CPT | Performed by: INTERNAL MEDICINE

## 2024-07-24 PROCEDURE — 3075F SYST BP GE 130 - 139MM HG: CPT | Mod: CPTII,S$GLB,, | Performed by: INTERNAL MEDICINE

## 2024-07-24 PROCEDURE — 3078F DIAST BP <80 MM HG: CPT | Mod: CPTII,S$GLB,, | Performed by: INTERNAL MEDICINE

## 2024-07-24 PROCEDURE — 1160F RVW MEDS BY RX/DR IN RCRD: CPT | Mod: CPTII,S$GLB,, | Performed by: INTERNAL MEDICINE

## 2024-07-24 PROCEDURE — 80048 BASIC METABOLIC PNL TOTAL CA: CPT | Performed by: INTERNAL MEDICINE

## 2024-07-24 PROCEDURE — 83540 ASSAY OF IRON: CPT | Performed by: INTERNAL MEDICINE

## 2024-07-24 PROCEDURE — 99214 OFFICE O/P EST MOD 30 MIN: CPT | Mod: S$GLB,,, | Performed by: INTERNAL MEDICINE

## 2024-07-24 PROCEDURE — 1125F AMNT PAIN NOTED PAIN PRSNT: CPT | Mod: CPTII,S$GLB,, | Performed by: INTERNAL MEDICINE

## 2024-07-24 PROCEDURE — 99999 PR PBB SHADOW E&M-EST. PATIENT-LVL IV: CPT | Mod: PBBFAC,,, | Performed by: INTERNAL MEDICINE

## 2024-07-24 PROCEDURE — 36415 COLL VENOUS BLD VENIPUNCTURE: CPT | Performed by: INTERNAL MEDICINE

## 2024-07-24 NOTE — PROGRESS NOTES
"Subjective:       Patient ID: Buffy Dominique is a 78 y.o. female.    Chief Complaint: Annual Exam (Lump in belly causing pain)    HPI  78 y.o. female here for follow up of    She underwent rectal prolpase repair on 6/7/24.     6/8/24:  CTA: Large hyperdense collection within the presacral area extending superiorly along the retroperitoneum into right para-aortic region, worrisome for hematoma noting recent drop in hemoglobin.   Baseline Hgb 11-12; down to 7.9 post op.     She has felt extremely tired since surgery. Has bad abdominal pain that comes and goes. Cramping squeezing pain that comes and goes "like labor pains."    BM normal and no vomiting. No fevers.    HTN  Amlodipine 10mg  Hydralazine 25mg q12h  Chlorthalidone and olmesartan stopped on 1/6/24.      Rheumatoid arthritis  Avi LEONE     Hyponatremia due to SIADH and chlorthalidone  Now s/p tolvaptan and off chlorthalidone  CT chest 1/4 stable, no new lesions.  Repeat in June was worsened:  Sodium 124 down from 131  Scheduled to see Dr. Gallegos in August.     Mild aortic and mitral regurg. No stenosis noted.   Review of Systems   Constitutional:  Positive for fatigue. Negative for fever.   Respiratory:  Positive for shortness of breath.    Cardiovascular:  Negative for chest pain.   Gastrointestinal:  Positive for abdominal pain.   Musculoskeletal: Negative.    Skin: Negative.        Objective:   /70 (BP Location: Right arm, Patient Position: Sitting, BP Method: Medium (Manual))   Pulse 86   Ht 5' 3" (1.6 m)   Wt 49.2 kg (108 lb 9.2 oz)   LMP  (LMP Unknown)   SpO2 99%   BMI 19.23 kg/m²      Physical Exam  Constitutional:       General: She is not in acute distress.     Appearance: She is well-developed. She is not diaphoretic.   HENT:      Head: Normocephalic and atraumatic.   Cardiovascular:      Rate and Rhythm: Normal rate and regular rhythm.   Pulmonary:      Effort: Pulmonary effort is normal. No respiratory distress.      Breath sounds: No " wheezing or rales.   Abdominal:      Palpations: There is mass.   Skin:     General: Skin is warm and dry.   Neurological:      Mental Status: She is alert and oriented to person, place, and time.   Psychiatric:         Behavior: Behavior normal.           Hard oval mass in RLQ approx 6 cm x 3cm  Assessment:       1. Chronic RLQ pain    2. RLQ abdominal mass    3. Anemia, unspecified type        Plan:       Chronic RLQ pain  RLQ abdominal mass  -     CT Abdomen Pelvis W Wo Contrast; Future; Expected date: 07/24/2024    Anemia, unspecified type  -     CBC Auto Differential; Future; Expected date: 07/24/2024  -     Basic Metabolic Panel; Future; Expected date: 07/24/2024  -     Ferritin; Future; Expected date: 07/24/2024  -     Iron and TIBC; Future; Expected date: 07/24/2024          You are up to date for your primary preventive health care, and there are no reminders at this time.

## 2024-07-26 ENCOUNTER — HOSPITAL ENCOUNTER (EMERGENCY)
Facility: HOSPITAL | Age: 79
Discharge: HOME | End: 2024-07-26
Attending: EMERGENCY MEDICINE
Payer: MEDICARE

## 2024-07-26 ENCOUNTER — HOSPITAL ENCOUNTER (OUTPATIENT)
Dept: RADIOLOGY | Facility: HOSPITAL | Age: 79
Discharge: HOME | End: 2024-07-26
Attending: INTERNAL MEDICINE
Payer: MEDICARE

## 2024-07-26 ENCOUNTER — TELEPHONE (OUTPATIENT)
Dept: INTERNAL MEDICINE | Facility: CLINIC | Age: 79
End: 2024-07-26
Payer: MEDICARE

## 2024-07-26 VITALS
HEIGHT: 63 IN | WEIGHT: 108 LBS | DIASTOLIC BLOOD PRESSURE: 77 MMHG | RESPIRATION RATE: 18 BRPM | BODY MASS INDEX: 19.14 KG/M2 | HEART RATE: 74 BPM | TEMPERATURE: 98 F | OXYGEN SATURATION: 96 % | SYSTOLIC BLOOD PRESSURE: 178 MMHG

## 2024-07-26 DIAGNOSIS — R10.31 CHRONIC RLQ PAIN: ICD-10-CM

## 2024-07-26 DIAGNOSIS — G89.29 CHRONIC RLQ PAIN: ICD-10-CM

## 2024-07-26 DIAGNOSIS — K43.9 HERNIA OF ABDOMINAL WALL: Primary | ICD-10-CM

## 2024-07-26 DIAGNOSIS — R19.03 RLQ ABDOMINAL MASS: ICD-10-CM

## 2024-07-26 PROBLEM — K43.2 INCISIONAL HERNIA: Status: ACTIVE | Noted: 2024-07-26

## 2024-07-26 LAB
ALBUMIN SERPL BCP-MCNC: 3.9 G/DL (ref 3.5–5.2)
ALP SERPL-CCNC: 80 U/L (ref 55–135)
ALT SERPL W/O P-5'-P-CCNC: 8 U/L (ref 10–44)
ANION GAP SERPL CALC-SCNC: 7 MMOL/L (ref 8–16)
AST SERPL-CCNC: 18 U/L (ref 10–40)
BASOPHILS # BLD AUTO: 0.03 K/UL (ref 0–0.2)
BASOPHILS NFR BLD: 0.5 % (ref 0–1.9)
BILIRUB SERPL-MCNC: 0.9 MG/DL (ref 0.1–1)
BILIRUB UR QL STRIP: NEGATIVE
BUN SERPL-MCNC: 12 MG/DL (ref 8–23)
BUN SERPL-MCNC: 13 MG/DL (ref 6–30)
CALCIUM SERPL-MCNC: 9.4 MG/DL (ref 8.7–10.5)
CHLORIDE SERPL-SCNC: 96 MMOL/L (ref 95–110)
CHLORIDE SERPL-SCNC: 99 MMOL/L (ref 95–110)
CLARITY UR REFRACT.AUTO: CLEAR
CO2 SERPL-SCNC: 26 MMOL/L (ref 23–29)
COLOR UR AUTO: YELLOW
CREAT SERPL-MCNC: 0.7 MG/DL (ref 0.5–1.4)
CREAT SERPL-MCNC: 0.7 MG/DL (ref 0.5–1.4)
DIFFERENTIAL METHOD BLD: ABNORMAL
EOSINOPHIL # BLD AUTO: 0.2 K/UL (ref 0–0.5)
EOSINOPHIL NFR BLD: 3.7 % (ref 0–8)
ERYTHROCYTE [DISTWIDTH] IN BLOOD BY AUTOMATED COUNT: 13.5 % (ref 11.5–14.5)
EST. GFR  (NO RACE VARIABLE): >60 ML/MIN/1.73 M^2
GLUCOSE SERPL-MCNC: 101 MG/DL (ref 70–110)
GLUCOSE SERPL-MCNC: 96 MG/DL (ref 70–110)
GLUCOSE UR QL STRIP: NEGATIVE
HCT VFR BLD AUTO: 37.3 % (ref 37–48.5)
HCT VFR BLD CALC: 39 %PCV (ref 36–54)
HGB BLD-MCNC: 12.7 G/DL (ref 12–16)
HGB UR QL STRIP: NEGATIVE
IMM GRANULOCYTES # BLD AUTO: 0.02 K/UL (ref 0–0.04)
IMM GRANULOCYTES NFR BLD AUTO: 0.3 % (ref 0–0.5)
KETONES UR QL STRIP: NEGATIVE
LEUKOCYTE ESTERASE UR QL STRIP: ABNORMAL
LYMPHOCYTES # BLD AUTO: 2.2 K/UL (ref 1–4.8)
LYMPHOCYTES NFR BLD: 37.8 % (ref 18–48)
MCH RBC QN AUTO: 33.1 PG (ref 27–31)
MCHC RBC AUTO-ENTMCNC: 34 G/DL (ref 32–36)
MCV RBC AUTO: 97 FL (ref 82–98)
MICROSCOPIC COMMENT: NORMAL
MONOCYTES # BLD AUTO: 0.6 K/UL (ref 0.3–1)
MONOCYTES NFR BLD: 10.8 % (ref 4–15)
NEUTROPHILS # BLD AUTO: 2.7 K/UL (ref 1.8–7.7)
NEUTROPHILS NFR BLD: 46.9 % (ref 38–73)
NITRITE UR QL STRIP: NEGATIVE
NRBC BLD-RTO: 0 /100 WBC
PH UR STRIP: 6 [PH] (ref 5–8)
PLATELET # BLD AUTO: 264 K/UL (ref 150–450)
PMV BLD AUTO: 8.6 FL (ref 9.2–12.9)
POC IONIZED CALCIUM: 1.19 MMOL/L (ref 1.06–1.42)
POC TCO2 (MEASURED): 24 MMOL/L (ref 23–29)
POTASSIUM BLD-SCNC: 4 MMOL/L (ref 3.5–5.1)
POTASSIUM SERPL-SCNC: 4 MMOL/L (ref 3.5–5.1)
PROT SERPL-MCNC: 7.8 G/DL (ref 6–8.4)
PROT UR QL STRIP: NEGATIVE
RBC # BLD AUTO: 3.84 M/UL (ref 4–5.4)
RBC #/AREA URNS AUTO: 1 /HPF (ref 0–4)
SAMPLE: ABNORMAL
SODIUM BLD-SCNC: 131 MMOL/L (ref 136–145)
SODIUM SERPL-SCNC: 132 MMOL/L (ref 136–145)
SP GR UR STRIP: >=1.03 (ref 1–1.03)
SQUAMOUS #/AREA URNS AUTO: 2 /HPF
URN SPEC COLLECT METH UR: ABNORMAL
WBC # BLD AUTO: 5.72 K/UL (ref 3.9–12.7)
WBC #/AREA URNS AUTO: 2 /HPF (ref 0–5)

## 2024-07-26 PROCEDURE — 80053 COMPREHEN METABOLIC PANEL: CPT | Performed by: PHYSICIAN ASSISTANT

## 2024-07-26 PROCEDURE — 99900035 HC TECH TIME PER 15 MIN (STAT)

## 2024-07-26 PROCEDURE — 74178 CT ABD&PLV WO CNTR FLWD CNTR: CPT | Mod: 26,,, | Performed by: RADIOLOGY

## 2024-07-26 PROCEDURE — 74178 CT ABD&PLV WO CNTR FLWD CNTR: CPT | Mod: TC

## 2024-07-26 PROCEDURE — 83605 ASSAY OF LACTIC ACID: CPT

## 2024-07-26 PROCEDURE — 82803 BLOOD GASES ANY COMBINATION: CPT

## 2024-07-26 PROCEDURE — 85025 COMPLETE CBC W/AUTO DIFF WBC: CPT | Performed by: PHYSICIAN ASSISTANT

## 2024-07-26 PROCEDURE — 99283 EMERGENCY DEPT VISIT LOW MDM: CPT | Mod: 25

## 2024-07-26 PROCEDURE — 99284 EMERGENCY DEPT VISIT MOD MDM: CPT | Mod: 25

## 2024-07-26 PROCEDURE — 81001 URINALYSIS AUTO W/SCOPE: CPT | Performed by: PHYSICIAN ASSISTANT

## 2024-07-26 PROCEDURE — 25500020 PHARM REV CODE 255: Performed by: INTERNAL MEDICINE

## 2024-07-26 PROCEDURE — A9698 NON-RAD CONTRAST MATERIALNOC: HCPCS | Performed by: INTERNAL MEDICINE

## 2024-07-26 RX ADMIN — IOHEXOL 75 ML: 350 INJECTION, SOLUTION INTRAVENOUS at 12:07

## 2024-07-26 RX ADMIN — IOHEXOL 1000 ML: 9 SOLUTION ORAL at 11:07

## 2024-07-29 ENCOUNTER — TELEPHONE (OUTPATIENT)
Dept: SURGERY | Facility: CLINIC | Age: 79
End: 2024-07-29
Payer: MEDICARE

## 2024-07-29 ENCOUNTER — PATIENT OUTREACH (OUTPATIENT)
Dept: EMERGENCY MEDICINE | Facility: HOSPITAL | Age: 79
End: 2024-07-29
Payer: MEDICARE

## 2024-07-29 NOTE — TELEPHONE ENCOUNTER
----- Message from Sharath Myles MD sent at 7/29/2024 10:25 AM CDT -----  I have a case at noon, sometime between 8-12  ----- Message -----  From: Naomie Singh RN  Sent: 7/29/2024  10:06 AM CDT  To: Kristie Trejo RN; Sharath Myles MD    What time on Wednesday? I need to put her on your schedule.  ----- Message -----  From: Sharath Myles MD  Sent: 7/29/2024   9:36 AM CDT  To: Kristie Trejo RN; #    Can't see her this week unfortunately - can we see her on Wednesday 8/7?    WK  ----- Message -----  From: Naomie Singh RN  Sent: 7/29/2024   9:31 AM CDT  To: Kristie Trejo RN; Sharath Myles MD    Please advise.  ----- Message -----  From: Yosef Quiros MD  Sent: 7/26/2024   8:47 PM CDT  To: Shameka Bolivar    This patient was seen in the ED after her PCP got an outpatient CT scan. Findings consistent with a reducible, bowel containing hernia. Please discuss this patient with Dr. Myles to see if he will want to see her in clinic this week to set up hernia repair for her. Thank you.       Yosef Quiros MD   General Surgery, PGY5  [phone]

## 2024-07-29 NOTE — TELEPHONE ENCOUNTER
Spoke with patient regarding appointment for 8/7. Patient states that she would like to keep appointment in September (previously scheduled).

## 2024-08-07 ENCOUNTER — ANESTHESIA EVENT (OUTPATIENT)
Dept: SURGERY | Facility: HOSPITAL | Age: 79
End: 2024-08-07
Payer: MEDICARE

## 2024-08-07 ENCOUNTER — ANESTHESIA (OUTPATIENT)
Dept: SURGERY | Facility: HOSPITAL | Age: 79
End: 2024-08-07
Payer: MEDICARE

## 2024-08-07 ENCOUNTER — HOSPITAL ENCOUNTER (INPATIENT)
Facility: HOSPITAL | Age: 79
LOS: 5 days | Discharge: HOME | DRG: 331 | End: 2024-08-12
Attending: EMERGENCY MEDICINE | Admitting: STUDENT IN AN ORGANIZED HEALTH CARE EDUCATION/TRAINING PROGRAM
Payer: MEDICARE

## 2024-08-07 DIAGNOSIS — R10.9 ABDOMINAL PAIN: ICD-10-CM

## 2024-08-07 DIAGNOSIS — K43.0 INCISIONAL HERNIA WITH OBSTRUCTION BUT NO GANGRENE: ICD-10-CM

## 2024-08-07 DIAGNOSIS — K56.609 SBO (SMALL BOWEL OBSTRUCTION): Primary | ICD-10-CM

## 2024-08-07 DIAGNOSIS — K46.0 INCARCERATED HERNIA: ICD-10-CM

## 2024-08-07 LAB
ALBUMIN SERPL BCP-MCNC: 3.7 G/DL (ref 3.5–5.2)
ALLENS TEST: NORMAL
ALP SERPL-CCNC: 76 U/L (ref 55–135)
ALT SERPL W/O P-5'-P-CCNC: 6 U/L (ref 10–44)
ANION GAP SERPL CALC-SCNC: 11 MMOL/L (ref 8–16)
AST SERPL-CCNC: 18 U/L (ref 10–40)
BACTERIA #/AREA URNS AUTO: ABNORMAL /HPF
BASOPHILS # BLD AUTO: 0.01 K/UL (ref 0–0.2)
BASOPHILS NFR BLD: 0.2 % (ref 0–1.9)
BILIRUB SERPL-MCNC: 0.5 MG/DL (ref 0.1–1)
BILIRUB UR QL STRIP: NEGATIVE
BUN SERPL-MCNC: 18 MG/DL (ref 6–30)
BUN SERPL-MCNC: 18 MG/DL (ref 8–23)
CALCIUM SERPL-MCNC: 9.6 MG/DL (ref 8.7–10.5)
CHLORIDE SERPL-SCNC: 95 MMOL/L (ref 95–110)
CHLORIDE SERPL-SCNC: 97 MMOL/L (ref 95–110)
CLARITY UR REFRACT.AUTO: CLEAR
CO2 SERPL-SCNC: 22 MMOL/L (ref 23–29)
COLOR UR AUTO: YELLOW
CREAT SERPL-MCNC: 0.8 MG/DL (ref 0.5–1.4)
CREAT SERPL-MCNC: 0.8 MG/DL (ref 0.5–1.4)
DIFFERENTIAL METHOD BLD: ABNORMAL
EOSINOPHIL # BLD AUTO: 0.1 K/UL (ref 0–0.5)
EOSINOPHIL NFR BLD: 1 % (ref 0–8)
ERYTHROCYTE [DISTWIDTH] IN BLOOD BY AUTOMATED COUNT: 13.4 % (ref 11.5–14.5)
EST. GFR  (NO RACE VARIABLE): >60 ML/MIN/1.73 M^2
GLUCOSE SERPL-MCNC: 107 MG/DL (ref 70–110)
GLUCOSE SERPL-MCNC: 111 MG/DL (ref 70–110)
GLUCOSE UR QL STRIP: NEGATIVE
HCT VFR BLD AUTO: 39.3 % (ref 37–48.5)
HCT VFR BLD CALC: 40 %PCV (ref 36–54)
HGB BLD-MCNC: 12.9 G/DL (ref 12–16)
HGB UR QL STRIP: NEGATIVE
IMM GRANULOCYTES # BLD AUTO: 0.02 K/UL (ref 0–0.04)
IMM GRANULOCYTES NFR BLD AUTO: 0.4 % (ref 0–0.5)
KETONES UR QL STRIP: ABNORMAL
LDH SERPL L TO P-CCNC: 0.58 MMOL/L (ref 0.5–2.2)
LEUKOCYTE ESTERASE UR QL STRIP: ABNORMAL
LIPASE SERPL-CCNC: 13 U/L (ref 4–60)
LYMPHOCYTES # BLD AUTO: 1.7 K/UL (ref 1–4.8)
LYMPHOCYTES NFR BLD: 32.8 % (ref 18–48)
MCH RBC QN AUTO: 32.8 PG (ref 27–31)
MCHC RBC AUTO-ENTMCNC: 32.8 G/DL (ref 32–36)
MCV RBC AUTO: 100 FL (ref 82–98)
MICROSCOPIC COMMENT: ABNORMAL
MONOCYTES # BLD AUTO: 0.4 K/UL (ref 0.3–1)
MONOCYTES NFR BLD: 7.9 % (ref 4–15)
NEUTROPHILS # BLD AUTO: 3 K/UL (ref 1.8–7.7)
NEUTROPHILS NFR BLD: 57.7 % (ref 38–73)
NITRITE UR QL STRIP: POSITIVE
NRBC BLD-RTO: 0 /100 WBC
OHS QRS DURATION: 90 MS
OHS QTC CALCULATION: 446 MS
PH UR STRIP: 5 [PH] (ref 5–8)
PLATELET # BLD AUTO: 275 K/UL (ref 150–450)
PMV BLD AUTO: 8.6 FL (ref 9.2–12.9)
POC IONIZED CALCIUM: 1.22 MMOL/L (ref 1.06–1.42)
POC TCO2 (MEASURED): 22 MMOL/L (ref 23–29)
POTASSIUM BLD-SCNC: 4.3 MMOL/L (ref 3.5–5.1)
POTASSIUM SERPL-SCNC: 4.4 MMOL/L (ref 3.5–5.1)
PROT SERPL-MCNC: 7.8 G/DL (ref 6–8.4)
PROT UR QL STRIP: NEGATIVE
RBC # BLD AUTO: 3.93 M/UL (ref 4–5.4)
RBC #/AREA URNS AUTO: 2 /HPF (ref 0–4)
SAMPLE: ABNORMAL
SAMPLE: NORMAL
SITE: NORMAL
SODIUM BLD-SCNC: 131 MMOL/L (ref 136–145)
SODIUM SERPL-SCNC: 130 MMOL/L (ref 136–145)
SP GR UR STRIP: 1.02 (ref 1–1.03)
SQUAMOUS #/AREA URNS AUTO: 2 /HPF
URN SPEC COLLECT METH UR: ABNORMAL
WBC # BLD AUTO: 5.19 K/UL (ref 3.9–12.7)
WBC #/AREA URNS AUTO: 2 /HPF (ref 0–5)

## 2024-08-07 PROCEDURE — 25000003 PHARM REV CODE 250

## 2024-08-07 PROCEDURE — 85025 COMPLETE CBC W/AUTO DIFF WBC: CPT | Performed by: EMERGENCY MEDICINE

## 2024-08-07 PROCEDURE — 88307 TISSUE EXAM BY PATHOLOGIST: CPT | Performed by: PATHOLOGY

## 2024-08-07 PROCEDURE — 0DB80ZZ EXCISION OF SMALL INTESTINE, OPEN APPROACH: ICD-10-PCS | Performed by: STUDENT IN AN ORGANIZED HEALTH CARE EDUCATION/TRAINING PROGRAM

## 2024-08-07 PROCEDURE — 25500020 PHARM REV CODE 255: Performed by: EMERGENCY MEDICINE

## 2024-08-07 PROCEDURE — 80047 BASIC METABLC PNL IONIZED CA: CPT

## 2024-08-07 PROCEDURE — 37000009 HC ANESTHESIA EA ADD 15 MINS: Performed by: STUDENT IN AN ORGANIZED HEALTH CARE EDUCATION/TRAINING PROGRAM

## 2024-08-07 PROCEDURE — 8E0W0CZ ROBOTIC ASSISTED PROCEDURE OF TRUNK REGION, OPEN APPROACH: ICD-10-PCS | Performed by: STUDENT IN AN ORGANIZED HEALTH CARE EDUCATION/TRAINING PROGRAM

## 2024-08-07 PROCEDURE — 99900035 HC TECH TIME PER 15 MIN (STAT)

## 2024-08-07 PROCEDURE — 37000008 HC ANESTHESIA 1ST 15 MINUTES: Performed by: STUDENT IN AN ORGANIZED HEALTH CARE EDUCATION/TRAINING PROGRAM

## 2024-08-07 PROCEDURE — 96361 HYDRATE IV INFUSION ADD-ON: CPT

## 2024-08-07 PROCEDURE — 63600175 PHARM REV CODE 636 W HCPCS

## 2024-08-07 PROCEDURE — 93010 ELECTROCARDIOGRAM REPORT: CPT | Mod: ,,, | Performed by: INTERNAL MEDICINE

## 2024-08-07 PROCEDURE — 83690 ASSAY OF LIPASE: CPT | Performed by: EMERGENCY MEDICINE

## 2024-08-07 PROCEDURE — 36000708 HC OR TIME LEV III 1ST 15 MIN: Performed by: STUDENT IN AN ORGANIZED HEALTH CARE EDUCATION/TRAINING PROGRAM

## 2024-08-07 PROCEDURE — 36000709 HC OR TIME LEV III EA ADD 15 MIN: Performed by: STUDENT IN AN ORGANIZED HEALTH CARE EDUCATION/TRAINING PROGRAM

## 2024-08-07 PROCEDURE — 20600001 HC STEP DOWN PRIVATE ROOM

## 2024-08-07 PROCEDURE — 80053 COMPREHEN METABOLIC PANEL: CPT | Performed by: EMERGENCY MEDICINE

## 2024-08-07 PROCEDURE — 71000016 HC POSTOP RECOV ADDL HR: Performed by: STUDENT IN AN ORGANIZED HEALTH CARE EDUCATION/TRAINING PROGRAM

## 2024-08-07 PROCEDURE — 63600175 PHARM REV CODE 636 W HCPCS: Mod: JZ,JG | Performed by: STUDENT IN AN ORGANIZED HEALTH CARE EDUCATION/TRAINING PROGRAM

## 2024-08-07 PROCEDURE — 27201423 OPTIME MED/SURG SUP & DEVICES STERILE SUPPLY: Performed by: STUDENT IN AN ORGANIZED HEALTH CARE EDUCATION/TRAINING PROGRAM

## 2024-08-07 PROCEDURE — 81001 URINALYSIS AUTO W/SCOPE: CPT | Performed by: EMERGENCY MEDICINE

## 2024-08-07 PROCEDURE — 96374 THER/PROPH/DIAG INJ IV PUSH: CPT

## 2024-08-07 PROCEDURE — 83605 ASSAY OF LACTIC ACID: CPT

## 2024-08-07 PROCEDURE — 63600175 PHARM REV CODE 636 W HCPCS: Performed by: NURSE ANESTHETIST, CERTIFIED REGISTERED

## 2024-08-07 PROCEDURE — 96375 TX/PRO/DX INJ NEW DRUG ADDON: CPT

## 2024-08-07 PROCEDURE — 71000033 HC RECOVERY, INTIAL HOUR: Performed by: STUDENT IN AN ORGANIZED HEALTH CARE EDUCATION/TRAINING PROGRAM

## 2024-08-07 PROCEDURE — 99285 EMERGENCY DEPT VISIT HI MDM: CPT | Mod: 25

## 2024-08-07 PROCEDURE — 71000015 HC POSTOP RECOV 1ST HR: Performed by: STUDENT IN AN ORGANIZED HEALTH CARE EDUCATION/TRAINING PROGRAM

## 2024-08-07 PROCEDURE — 0WQF0ZZ REPAIR ABDOMINAL WALL, OPEN APPROACH: ICD-10-PCS | Performed by: STUDENT IN AN ORGANIZED HEALTH CARE EDUCATION/TRAINING PROGRAM

## 2024-08-07 PROCEDURE — 93005 ELECTROCARDIOGRAM TRACING: CPT

## 2024-08-07 PROCEDURE — 99223 1ST HOSP IP/OBS HIGH 75: CPT | Mod: 57,24,, | Performed by: STUDENT IN AN ORGANIZED HEALTH CARE EDUCATION/TRAINING PROGRAM

## 2024-08-07 PROCEDURE — 0WJF4ZZ INSPECTION OF ABDOMINAL WALL, PERCUTANEOUS ENDOSCOPIC APPROACH: ICD-10-PCS | Performed by: STUDENT IN AN ORGANIZED HEALTH CARE EDUCATION/TRAINING PROGRAM

## 2024-08-07 PROCEDURE — 25000003 PHARM REV CODE 250: Performed by: NURSE ANESTHETIST, CERTIFIED REGISTERED

## 2024-08-07 RX ORDER — MUPIROCIN 20 MG/G
OINTMENT TOPICAL 2 TIMES DAILY
Status: DISPENSED | OUTPATIENT
Start: 2024-08-08 | End: 2024-08-10

## 2024-08-07 RX ORDER — ACETAMINOPHEN 10 MG/ML
INJECTION, SOLUTION INTRAVENOUS
Status: DISCONTINUED | OUTPATIENT
Start: 2024-08-07 | End: 2024-08-08

## 2024-08-07 RX ORDER — AMLODIPINE BESYLATE 10 MG/1
10 TABLET ORAL DAILY
Status: DISCONTINUED | OUTPATIENT
Start: 2024-08-08 | End: 2024-08-12 | Stop reason: HOSPADM

## 2024-08-07 RX ORDER — SODIUM CHLORIDE 0.9 % (FLUSH) 0.9 %
10 SYRINGE (ML) INJECTION
Status: DISCONTINUED | OUTPATIENT
Start: 2024-08-08 | End: 2024-08-08 | Stop reason: HOSPADM

## 2024-08-07 RX ORDER — ENOXAPARIN SODIUM 100 MG/ML
40 INJECTION SUBCUTANEOUS EVERY 24 HOURS
Status: DISCONTINUED | OUTPATIENT
Start: 2024-08-07 | End: 2024-08-07

## 2024-08-07 RX ORDER — METRONIDAZOLE 500 MG/100ML
INJECTION, SOLUTION INTRAVENOUS
Status: DISCONTINUED | OUTPATIENT
Start: 2024-08-07 | End: 2024-08-08

## 2024-08-07 RX ORDER — ONDANSETRON HYDROCHLORIDE 2 MG/ML
4 INJECTION, SOLUTION INTRAVENOUS
Status: COMPLETED | OUTPATIENT
Start: 2024-08-07 | End: 2024-08-07

## 2024-08-07 RX ORDER — NALOXONE HCL 0.4 MG/ML
0.02 VIAL (ML) INJECTION
Status: DISCONTINUED | OUTPATIENT
Start: 2024-08-07 | End: 2024-08-12 | Stop reason: HOSPADM

## 2024-08-07 RX ORDER — LIDOCAINE HYDROCHLORIDE 20 MG/ML
INJECTION, SOLUTION EPIDURAL; INFILTRATION; INTRACAUDAL; PERINEURAL
Status: DISCONTINUED | OUTPATIENT
Start: 2024-08-07 | End: 2024-08-08

## 2024-08-07 RX ORDER — HALOPERIDOL 5 MG/ML
0.5 INJECTION INTRAMUSCULAR EVERY 10 MIN PRN
Status: DISCONTINUED | OUTPATIENT
Start: 2024-08-07 | End: 2024-08-08 | Stop reason: HOSPADM

## 2024-08-07 RX ORDER — PROPOFOL 10 MG/ML
VIAL (ML) INTRAVENOUS
Status: DISCONTINUED | OUTPATIENT
Start: 2024-08-07 | End: 2024-08-08

## 2024-08-07 RX ORDER — PROCHLORPERAZINE EDISYLATE 5 MG/ML
5 INJECTION INTRAMUSCULAR; INTRAVENOUS EVERY 30 MIN PRN
Status: DISCONTINUED | OUTPATIENT
Start: 2024-08-07 | End: 2024-08-08 | Stop reason: HOSPADM

## 2024-08-07 RX ORDER — PHENYLEPHRINE HCL IN 0.9% NACL 1 MG/10 ML
SYRINGE (ML) INTRAVENOUS
Status: DISCONTINUED | OUTPATIENT
Start: 2024-08-07 | End: 2024-08-08

## 2024-08-07 RX ORDER — PROCHLORPERAZINE EDISYLATE 5 MG/ML
5 INJECTION INTRAMUSCULAR; INTRAVENOUS EVERY 6 HOURS PRN
Status: DISCONTINUED | OUTPATIENT
Start: 2024-08-08 | End: 2024-08-12 | Stop reason: HOSPADM

## 2024-08-07 RX ORDER — HYDRALAZINE HYDROCHLORIDE 25 MG/1
50 TABLET, FILM COATED ORAL
Status: COMPLETED | OUTPATIENT
Start: 2024-08-07 | End: 2024-08-07

## 2024-08-07 RX ORDER — LIDOCAINE 50 MG/G
1 PATCH TOPICAL
Status: COMPLETED | OUTPATIENT
Start: 2024-08-07 | End: 2024-08-08

## 2024-08-07 RX ORDER — ACETAMINOPHEN 500 MG
1000 TABLET ORAL EVERY 8 HOURS
Status: DISCONTINUED | OUTPATIENT
Start: 2024-08-09 | End: 2024-08-12 | Stop reason: HOSPADM

## 2024-08-07 RX ORDER — CEFTRIAXONE 1 G/1
INJECTION, POWDER, FOR SOLUTION INTRAMUSCULAR; INTRAVENOUS
Status: DISCONTINUED | OUTPATIENT
Start: 2024-08-07 | End: 2024-08-08

## 2024-08-07 RX ORDER — FENTANYL CITRATE 50 UG/ML
INJECTION, SOLUTION INTRAMUSCULAR; INTRAVENOUS
Status: DISCONTINUED | OUTPATIENT
Start: 2024-08-07 | End: 2024-08-08

## 2024-08-07 RX ORDER — OXYCODONE HYDROCHLORIDE 5 MG/1
5 TABLET ORAL
Status: DISCONTINUED | OUTPATIENT
Start: 2024-08-07 | End: 2024-08-08 | Stop reason: HOSPADM

## 2024-08-07 RX ORDER — ACETAMINOPHEN 10 MG/ML
1000 INJECTION, SOLUTION INTRAVENOUS EVERY 8 HOURS
Status: COMPLETED | OUTPATIENT
Start: 2024-08-08 | End: 2024-08-08

## 2024-08-07 RX ORDER — HYDROMORPHONE HYDROCHLORIDE 1 MG/ML
0.2 INJECTION, SOLUTION INTRAMUSCULAR; INTRAVENOUS; SUBCUTANEOUS EVERY 5 MIN PRN
Status: DISCONTINUED | OUTPATIENT
Start: 2024-08-07 | End: 2024-08-08 | Stop reason: HOSPADM

## 2024-08-07 RX ORDER — AMLODIPINE BESYLATE 10 MG/1
10 TABLET ORAL
Status: COMPLETED | OUTPATIENT
Start: 2024-08-07 | End: 2024-08-07

## 2024-08-07 RX ORDER — BUPIVACAINE HYDROCHLORIDE 2.5 MG/ML
INJECTION, SOLUTION EPIDURAL; INFILTRATION; INTRACAUDAL
Status: DISCONTINUED | OUTPATIENT
Start: 2024-08-07 | End: 2024-08-07

## 2024-08-07 RX ORDER — IBUPROFEN 400 MG/1
800 TABLET ORAL EVERY 8 HOURS
Status: DISCONTINUED | OUTPATIENT
Start: 2024-08-09 | End: 2024-08-12 | Stop reason: HOSPADM

## 2024-08-07 RX ORDER — SODIUM CHLORIDE 9 MG/ML
INJECTION, SOLUTION INTRAVENOUS CONTINUOUS
Status: DISCONTINUED | OUTPATIENT
Start: 2024-08-07 | End: 2024-08-09

## 2024-08-07 RX ORDER — SODIUM CHLORIDE 0.9 % (FLUSH) 0.9 %
10 SYRINGE (ML) INJECTION
Status: DISCONTINUED | OUTPATIENT
Start: 2024-08-07 | End: 2024-08-12 | Stop reason: HOSPADM

## 2024-08-07 RX ORDER — GLUCAGON 1 MG
1 KIT INJECTION
Status: DISCONTINUED | OUTPATIENT
Start: 2024-08-08 | End: 2024-08-08 | Stop reason: HOSPADM

## 2024-08-07 RX ORDER — ONDANSETRON HYDROCHLORIDE 2 MG/ML
INJECTION, SOLUTION INTRAVENOUS
Status: DISCONTINUED | OUTPATIENT
Start: 2024-08-07 | End: 2024-08-07

## 2024-08-07 RX ORDER — PANTOPRAZOLE SODIUM 40 MG/1
40 TABLET, DELAYED RELEASE ORAL DAILY
Status: DISCONTINUED | OUTPATIENT
Start: 2024-08-08 | End: 2024-08-08

## 2024-08-07 RX ORDER — ROCURONIUM BROMIDE 10 MG/ML
INJECTION, SOLUTION INTRAVENOUS
Status: DISCONTINUED | OUTPATIENT
Start: 2024-08-07 | End: 2024-08-08

## 2024-08-07 RX ORDER — ONDANSETRON HYDROCHLORIDE 2 MG/ML
4 INJECTION, SOLUTION INTRAVENOUS EVERY 12 HOURS PRN
Status: DISCONTINUED | OUTPATIENT
Start: 2024-08-08 | End: 2024-08-09

## 2024-08-07 RX ORDER — HYDROMORPHONE HYDROCHLORIDE 1 MG/ML
0.5 INJECTION, SOLUTION INTRAMUSCULAR; INTRAVENOUS; SUBCUTANEOUS EVERY 4 HOURS PRN
Status: DISCONTINUED | OUTPATIENT
Start: 2024-08-08 | End: 2024-08-10

## 2024-08-07 RX ORDER — MORPHINE SULFATE 4 MG/ML
4 INJECTION, SOLUTION INTRAMUSCULAR; INTRAVENOUS
Status: COMPLETED | OUTPATIENT
Start: 2024-08-07 | End: 2024-08-07

## 2024-08-07 RX ORDER — HYDROMORPHONE HYDROCHLORIDE 1 MG/ML
1 INJECTION, SOLUTION INTRAMUSCULAR; INTRAVENOUS; SUBCUTANEOUS EVERY 6 HOURS PRN
Status: DISCONTINUED | OUTPATIENT
Start: 2024-08-08 | End: 2024-08-09

## 2024-08-07 RX ORDER — DEXAMETHASONE SODIUM PHOSPHATE 4 MG/ML
INJECTION, SOLUTION INTRA-ARTICULAR; INTRALESIONAL; INTRAMUSCULAR; INTRAVENOUS; SOFT TISSUE
Status: DISCONTINUED | OUTPATIENT
Start: 2024-08-07 | End: 2024-08-08

## 2024-08-07 RX ORDER — ONDANSETRON HYDROCHLORIDE 2 MG/ML
INJECTION, SOLUTION INTRAVENOUS
Status: DISCONTINUED | OUTPATIENT
Start: 2024-08-07 | End: 2024-08-08

## 2024-08-07 RX ADMIN — LIDOCAINE HYDROCHLORIDE 100 MG: 20 INJECTION, SOLUTION EPIDURAL; INFILTRATION; INTRACAUDAL at 09:08

## 2024-08-07 RX ADMIN — SODIUM CHLORIDE, SODIUM GLUCONATE, SODIUM ACETATE, POTASSIUM CHLORIDE, MAGNESIUM CHLORIDE, SODIUM PHOSPHATE, DIBASIC, AND POTASSIUM PHOSPHATE: .53; .5; .37; .037; .03; .012; .00082 INJECTION, SOLUTION INTRAVENOUS at 09:08

## 2024-08-07 RX ADMIN — Medication 200 MCG: at 09:08

## 2024-08-07 RX ADMIN — IOHEXOL 75 ML: 350 INJECTION, SOLUTION INTRAVENOUS at 06:08

## 2024-08-07 RX ADMIN — ONDANSETRON 4 MG: 2 INJECTION INTRAMUSCULAR; INTRAVENOUS at 11:08

## 2024-08-07 RX ADMIN — MORPHINE SULFATE 4 MG: 4 INJECTION INTRAVENOUS at 03:08

## 2024-08-07 RX ADMIN — CEFTRIAXONE 1 G: 1 INJECTION, POWDER, FOR SOLUTION INTRAMUSCULAR; INTRAVENOUS at 09:08

## 2024-08-07 RX ADMIN — ACETAMINOPHEN 1000 MG: 10 INJECTION, SOLUTION INTRAVENOUS at 10:08

## 2024-08-07 RX ADMIN — Medication 200 MCG: at 10:08

## 2024-08-07 RX ADMIN — FENTANYL CITRATE 50 MCG: 50 INJECTION INTRAMUSCULAR; INTRAVENOUS at 09:08

## 2024-08-07 RX ADMIN — SUGAMMADEX 200 MG: 100 INJECTION, SOLUTION INTRAVENOUS at 11:08

## 2024-08-07 RX ADMIN — ROCURONIUM BROMIDE 30 MG: 10 INJECTION, SOLUTION INTRAVENOUS at 09:08

## 2024-08-07 RX ADMIN — METRONIDAZOLE 500 MG: 5 INJECTION, SOLUTION INTRAVENOUS at 09:08

## 2024-08-07 RX ADMIN — PROPOFOL 100 MG: 10 INJECTION, EMULSION INTRAVENOUS at 09:08

## 2024-08-07 RX ADMIN — ROCURONIUM BROMIDE 50 MG: 10 INJECTION, SOLUTION INTRAVENOUS at 09:08

## 2024-08-07 RX ADMIN — HYDRALAZINE HYDROCHLORIDE 50 MG: 25 TABLET ORAL at 05:08

## 2024-08-07 RX ADMIN — SODIUM CHLORIDE 500 ML: 9 INJECTION, SOLUTION INTRAVENOUS at 05:08

## 2024-08-07 RX ADMIN — ONDANSETRON 4 MG: 2 INJECTION INTRAMUSCULAR; INTRAVENOUS at 03:08

## 2024-08-07 RX ADMIN — AMLODIPINE BESYLATE 10 MG: 10 TABLET ORAL at 05:08

## 2024-08-07 RX ADMIN — DEXAMETHASONE SODIUM PHOSPHATE 8 MG: 4 INJECTION, SOLUTION INTRAMUSCULAR; INTRAVENOUS at 11:08

## 2024-08-07 RX ADMIN — LIDOCAINE 1 PATCH: 50 PATCH CUTANEOUS at 05:08

## 2024-08-07 NOTE — ED NOTES
I-STAT Chem-8+ Results:    Value Reference Range   Sodium 131 136-145 mmol/L   Potassium  4.3 3.5-5.1 mmol/L   Chloride 95  mmol/L   Ionized Calcium 1.22 1.06-1.42 mmol/L   CO2 (measured) 22 23-29 mmol/L   Glucose 111  mg/dL   BUN 18 6-30 mg/dL   Creatinine 0.8 0.5-1.4 mg/dL   Hematocrit 40 36-54%

## 2024-08-07 NOTE — ED TRIAGE NOTES
Pt. Is a 78 yr old  female presenting to the ED with pain in the LRQ post rectum resection in June.

## 2024-08-07 NOTE — FIRST PROVIDER EVALUATION
Medical screening examination initiated.  I have conducted a focused provider triage encounter, findings are as follows:    Brief history of present illness:  78 F here abdominal pain with cramping since yesterday.  + nausea - vomiting, - fever    There were no vitals filed for this visit.    Pertinent physical exam:  well appearing, no distress    Brief workup plan:  labs    Preliminary workup initiated; this workup will be continued and followed by the physician or advanced practice provider that is assigned to the patient when roomed.

## 2024-08-07 NOTE — ED PROVIDER NOTES
Encounter Date: 8/7/2024       History     Chief Complaint   Patient presents with    Abdominal Pain     Had a procedure in June to repair her prolapsed rectum and is having pain currently, with nausea     78-year-old female with history of BCC, CKD, HTN, HLD, hyperparathyroidism, melanoma, and history as stated below who presents to the ED for chief complaint of worsening abdominal pain of 2 days.  Patient states that she prolapsed rectal surgery repair in June and has been having intermittent abdominal pain ever since.  She states over the last couple of days that her pain has been severe.  Has been trying to take Tylenol at home, but it has been providing little relief.  She states that she does not have any stronger pain medication at home.  Currently rates her pain and states that it feels like labor pains.  She also endorses that she has pain that radiates to her back.  Denies any extremity weakness, extremity numbness or tingling, urinary incontinence, fecal incontinence, and recent falls or injuries.  She endorses that she has had mild SOB ever since her surgery.  Patient denies any fevers, chest pain, vomiting, or changes in urination.  Patient has been tolerating eating and drinking at home.  Of note, patient has a colorectal surgery clinic appointment in September.  Patient went to the ED on 07/27/2024 for similar complaint and had a CT scan that showed suspected early SBO with transition point secondary to herniated loop of small bowel in the right lower quadrant ventral hernia.  She was informed that no surgery was needed at that time.    The history is provided by the patient. No  was used.     Review of patient's allergies indicates:   Allergen Reactions    Gabapentin Hallucinations    Methotrexate analogues      Mouth Ulcers     Thiazides Other (See Comments)     hyponatremia     Past Medical History:   Diagnosis Date    Basal cell carcinoma (BCC) of right cheek 04/03/2024     R cheek    CKD (chronic kidney disease) stage 3, GFR 30-59 ml/min     HLD (hyperlipidemia)     HTN (hypertension)     Hyperparathyroidism     Melanoma      Past Surgical History:   Procedure Laterality Date    CATARACT EXTRACTION, BILATERAL Bilateral 2014    cataract removal Right 2014    COLONOSCOPY N/A 4/30/2024    Procedure: COLONOSCOPY;  Surgeon: Sharath Myles MD;  Location: [facility] ENDO (4TH FLR);  Service: Endoscopy;  Laterality: N/A;  ref by / Miralax inst portal-RB  1/31/24- Pt r/s/ prep ins on portal - Miralax prep - ERW  4/25/24- precall complete - ERW  4/29-pt cannot come in earlier due to ride-KPvt    DV5 ROBOTIC RECTOPEXY N/A 6/7/2024    Procedure: DV5 ROBOTIC RECTOPEXY (eras low, lith); lysis of adhesions;  Surgeon: Sharath Myles MD;  Location: [facility] OR 2ND FLR;  Service: Colon and Rectal;  Laterality: N/A;    FLEXIBLE SIGMOIDOSCOPY  6/7/2024    Procedure: SIGMOIDOSCOPY, FLEXIBLE;  Surgeon: Sharath Myles MD;  Location: [facility] OR 2ND FLR;  Service: Colon and Rectal;;    hiatial hernia  2017    HYSTERECTOMY      melanoma      on face    OOPHORECTOMY      THORACIC AORTIC ANEURYSM REPAIR  2011     Family History   Problem Relation Name Age of Onset    Cancer Father Junior     Hypertension Maternal Grandmother Jazzy     Colon cancer Neg Hx      Inflammatory bowel disease Neg Hx       Social History     Tobacco Use    Smoking status: Former     Current packs/day: 0.00     Average packs/day: 1 pack/day for 30.0 years (30.0 ttl pk-yrs)     Types: Cigarettes     Start date: 1/26/1979     Quit date: 1/26/2009     Years since quitting: 15.5    Smokeless tobacco: Never   Substance Use Topics    Alcohol use: Never     Comment: glass of wine once or twice a year    Drug use: Never     Review of Systems    Physical Exam     Initial Vitals [08/07/24 1354]   BP Pulse Resp Temp SpO2   (!) 143/73 83 16 97.5 °F (36.4 °C) 96 %      MAP       --         Physical  Exam    Nursing note and vitals reviewed.  Constitutional: No distress.   Elderly woman laying in bed in no apparent distress.   HENT:   Head: Normocephalic.   Oropharynx is clear, but dry.   Eyes: Conjunctivae and EOM are normal. No scleral icterus.   Neck: Neck supple.   Normal range of motion.  Cardiovascular:  Normal rate, regular rhythm, normal heart sounds and intact distal pulses.           Pulmonary/Chest: Breath sounds normal. No respiratory distress. She has no wheezes. She has no rhonchi. She has no rales. She exhibits no tenderness.   Abdominal: Abdomen is soft. She exhibits no distension and no mass. There is abdominal tenderness (Generalized abdominal tenderness). There is no rebound and no guarding.   Musculoskeletal:         General: No tenderness. Normal range of motion.      Cervical back: Normal range of motion and neck supple.      Comments: Back pain is not reproducible to palpation     Neurological: She is alert. She has normal strength. No sensory deficit.   5/5 motor strength and light touch sensation in all extremities.  No focal neurological deficits.   Skin: Skin is warm. Capillary refill takes less than 2 seconds.   Psychiatric: She has a normal mood and affect.         ED Course   Procedures  Labs Reviewed   CBC W/ AUTO DIFFERENTIAL - Abnormal       Result Value    WBC 5.19      RBC 3.93 (*)     Hemoglobin 12.9      Hematocrit 39.3       (*)     MCH 32.8 (*)     MCHC 32.8      RDW 13.4      Platelets 275      MPV 8.6 (*)     Immature Granulocytes 0.4      Gran # (ANC) 3.0      Immature Grans (Abs) 0.02      Lymph # 1.7      Mono # 0.4      Eos # 0.1      Baso # 0.01      nRBC 0      Gran % 57.7      Lymph % 32.8      Mono % 7.9      Eosinophil % 1.0      Basophil % 0.2      Differential Method Automated     COMPREHENSIVE METABOLIC PANEL - Abnormal    Sodium 130 (*)     Potassium 4.4      Chloride 97      CO2 22 (*)     Glucose 107      BUN 18      Creatinine 0.8      Calcium 9.6       Total Protein 7.8      Albumin 3.7      Total Bilirubin 0.5      Alkaline Phosphatase 76      AST 18      ALT 6 (*)     eGFR >60.0      Anion Gap 11     ISTAT PROCEDURE - Abnormal    POC Glucose 111 (*)     POC BUN 18      POC Creatinine 0.8      POC Sodium 131 (*)     POC Potassium 4.3      POC Chloride 95      POC TCO2 (MEASURED) 22 (*)     POC Ionized Calcium 1.22      POC Hematocrit 40      Sample CASIMIRO     LIPASE    Lipase 13     URINALYSIS, REFLEX TO URINE CULTURE   ISTAT CHEM8          Imaging Results    None          Medications - No data to display  Medical Decision Making  78-year-old female presents with abdominal pain.  She is hypertensive, other vitals WNL.  On exam, she has generalized abdominal tenderness.  No focal neurological deficits.  Please see physical exam findings above for additional details.  Differential diagnoses include but are not limited to postop pain, pancreatitis, electrolyte abnormality, SBO.  Moderate suspicion of postop pain in the setting of the patient's prolapse rectal surgery repair.  Lower suspicion of a complete SBO since patient has been able to tolerate p.o..  Will evaluate for aortic dissection with CTA in the setting of the patient's abdominal pain which radiates to her back.  However, patient has symmetrical bilateral radial and DP pulses and no extremity deficits.  Obtained labs, CXR, and CTA abdomen pelvis.  Ordered morphine for pain and Zofran for nausea.  Please see ED course for additional details.    Patient will be admitted to the hospital by CRS in the setting of her abdominal pain, SBO, and incarcerated hernia.    Amount and/or Complexity of Data Reviewed  Radiology: ordered. Decision-making details documented in ED Course.  ECG/medicine tests: ordered and independent interpretation performed. Decision-making details documented in ED Course.    Risk  Prescription drug management.  Decision regarding hospitalization.               ED Course as of 08/08/24 7655    Wed Aug 07, 2024   1702 EKG 12-lead  EKG shows sinus rhythm with PVCs and artifact, rate of 86 beats per minute, and no STEMI.  Will obtain repeat EKG due to artifact. [MD]   1719 Patient reassessed and she states that her abdominal pain and nausea have improved.  She still endorses a dull achy back pain, but has not received her lidocaine patch yet. [MD]   1720 Ordered a 500 mL bolus of normal saline since patient appears mildly dry. [MD]   1740 Repeat EKG shows normal sinus rhythm, rate of 82 beats per minute, normal MT and QT intervals, and no STEMI. [MD]   1743 Patient did not take her home antihypertensive today.  Ordered her home dose of hydralazine and Norvasc. [MD]   1747 X-Ray Chest AP Portable  CXR shows no acute cardiopulmonary abnormalities. [MD]   1845 Patient reassessed, her BP has improved and her back pain has resolved with lidocaine patch. [MD]   1900 CTA Chest Abdomen Pelvis  CTA shows no aortic dissection.  Worsening SBO with suspected transition point within a right lower abdominal wall hernia.  Several stable fluid collections within pelvis and in right lower abdominal wall.  Stable mild aneurysm of aortic arch. [MD]   1930 Consulted and discussed patient with CRS.  CRS team will come to evaluate patient at bedside. [MD]      ED Course User Index  [MD] Chapo Engle MD                             Clinical Impression:  Final diagnoses:  [R10.9] Abdominal pain                Chapo Engle MD  Resident  08/08/24 0783

## 2024-08-08 LAB
ANION GAP SERPL CALC-SCNC: 5 MMOL/L (ref 8–16)
BUN SERPL-MCNC: 12 MG/DL (ref 8–23)
CALCIUM SERPL-MCNC: 7.9 MG/DL (ref 8.7–10.5)
CHLORIDE SERPL-SCNC: 102 MMOL/L (ref 95–110)
CO2 SERPL-SCNC: 21 MMOL/L (ref 23–29)
CREAT SERPL-MCNC: 0.6 MG/DL (ref 0.5–1.4)
ERYTHROCYTE [DISTWIDTH] IN BLOOD BY AUTOMATED COUNT: 13.3 % (ref 11.5–14.5)
ERYTHROCYTE [DISTWIDTH] IN BLOOD BY AUTOMATED COUNT: 13.3 % (ref 11.5–14.5)
EST. GFR  (NO RACE VARIABLE): >60 ML/MIN/1.73 M^2
GLUCOSE SERPL-MCNC: 103 MG/DL (ref 70–110)
HCT VFR BLD AUTO: 27.8 % (ref 37–48.5)
HCT VFR BLD AUTO: 30 % (ref 37–48.5)
HGB BLD-MCNC: 9.2 G/DL (ref 12–16)
HGB BLD-MCNC: 9.9 G/DL (ref 12–16)
MAGNESIUM SERPL-MCNC: 1.4 MG/DL (ref 1.6–2.6)
MCH RBC QN AUTO: 31.8 PG (ref 27–31)
MCH RBC QN AUTO: 32.3 PG (ref 27–31)
MCHC RBC AUTO-ENTMCNC: 33 G/DL (ref 32–36)
MCHC RBC AUTO-ENTMCNC: 33.1 G/DL (ref 32–36)
MCV RBC AUTO: 97 FL (ref 82–98)
MCV RBC AUTO: 98 FL (ref 82–98)
OHS QRS DURATION: 88 MS
OHS QTC CALCULATION: 425 MS
PHOSPHATE SERPL-MCNC: 2.9 MG/DL (ref 2.7–4.5)
PLATELET # BLD AUTO: 181 K/UL (ref 150–450)
PLATELET # BLD AUTO: 194 K/UL (ref 150–450)
PMV BLD AUTO: 8.6 FL (ref 9.2–12.9)
PMV BLD AUTO: 8.9 FL (ref 9.2–12.9)
POTASSIUM SERPL-SCNC: 3.9 MMOL/L (ref 3.5–5.1)
RBC # BLD AUTO: 2.85 M/UL (ref 4–5.4)
RBC # BLD AUTO: 3.11 M/UL (ref 4–5.4)
SODIUM SERPL-SCNC: 128 MMOL/L (ref 136–145)
WBC # BLD AUTO: 6.17 K/UL (ref 3.9–12.7)
WBC # BLD AUTO: 6.87 K/UL (ref 3.9–12.7)

## 2024-08-08 PROCEDURE — 63600175 PHARM REV CODE 636 W HCPCS

## 2024-08-08 PROCEDURE — 20600001 HC STEP DOWN PRIVATE ROOM

## 2024-08-08 PROCEDURE — 63600175 PHARM REV CODE 636 W HCPCS: Performed by: STUDENT IN AN ORGANIZED HEALTH CARE EDUCATION/TRAINING PROGRAM

## 2024-08-08 PROCEDURE — 99900035 HC TECH TIME PER 15 MIN (STAT)

## 2024-08-08 PROCEDURE — 84100 ASSAY OF PHOSPHORUS: CPT

## 2024-08-08 PROCEDURE — 80048 BASIC METABOLIC PNL TOTAL CA: CPT

## 2024-08-08 PROCEDURE — 97530 THERAPEUTIC ACTIVITIES: CPT

## 2024-08-08 PROCEDURE — 97165 OT EVAL LOW COMPLEX 30 MIN: CPT

## 2024-08-08 PROCEDURE — 94761 N-INVAS EAR/PLS OXIMETRY MLT: CPT

## 2024-08-08 PROCEDURE — 97116 GAIT TRAINING THERAPY: CPT

## 2024-08-08 PROCEDURE — 97161 PT EVAL LOW COMPLEX 20 MIN: CPT

## 2024-08-08 PROCEDURE — 83735 ASSAY OF MAGNESIUM: CPT

## 2024-08-08 PROCEDURE — 36415 COLL VENOUS BLD VENIPUNCTURE: CPT

## 2024-08-08 PROCEDURE — 85027 COMPLETE CBC AUTOMATED: CPT

## 2024-08-08 PROCEDURE — 25000003 PHARM REV CODE 250: Performed by: STUDENT IN AN ORGANIZED HEALTH CARE EDUCATION/TRAINING PROGRAM

## 2024-08-08 RX ORDER — HEPARIN SODIUM 5000 [USP'U]/ML
5000 INJECTION, SOLUTION INTRAVENOUS; SUBCUTANEOUS EVERY 12 HOURS
Status: DISCONTINUED | OUTPATIENT
Start: 2024-08-08 | End: 2024-08-12 | Stop reason: HOSPADM

## 2024-08-08 RX ORDER — MAGNESIUM SULFATE HEPTAHYDRATE 40 MG/ML
2 INJECTION, SOLUTION INTRAVENOUS ONCE
Status: COMPLETED | OUTPATIENT
Start: 2024-08-08 | End: 2024-08-08

## 2024-08-08 RX ORDER — HEPARIN SODIUM 5000 [USP'U]/ML
5000 INJECTION, SOLUTION INTRAVENOUS; SUBCUTANEOUS EVERY 12 HOURS
Status: DISCONTINUED | OUTPATIENT
Start: 2024-08-08 | End: 2024-08-08

## 2024-08-08 RX ORDER — MAGNESIUM SULFATE HEPTAHYDRATE 40 MG/ML
2 INJECTION, SOLUTION INTRAVENOUS ONCE
Status: DISCONTINUED | OUTPATIENT
Start: 2024-08-08 | End: 2024-08-08

## 2024-08-08 RX ORDER — PANTOPRAZOLE SODIUM 40 MG/10ML
40 INJECTION, POWDER, LYOPHILIZED, FOR SOLUTION INTRAVENOUS DAILY
Status: DISCONTINUED | OUTPATIENT
Start: 2024-08-08 | End: 2024-08-12 | Stop reason: HOSPADM

## 2024-08-08 RX ADMIN — PROCHLORPERAZINE EDISYLATE 5 MG: 5 INJECTION INTRAMUSCULAR; INTRAVENOUS at 12:08

## 2024-08-08 RX ADMIN — PANTOPRAZOLE SODIUM 40 MG: 40 INJECTION, POWDER, FOR SOLUTION INTRAVENOUS at 09:08

## 2024-08-08 RX ADMIN — IBUPROFEN 800 MG: 800 INJECTION INTRAVENOUS at 02:08

## 2024-08-08 RX ADMIN — HYDROMORPHONE HYDROCHLORIDE 0.2 MG: 1 INJECTION, SOLUTION INTRAMUSCULAR; INTRAVENOUS; SUBCUTANEOUS at 12:08

## 2024-08-08 RX ADMIN — MUPIROCIN: 20 OINTMENT TOPICAL at 08:08

## 2024-08-08 RX ADMIN — HEPARIN SODIUM 5000 UNITS: 5000 INJECTION INTRAVENOUS; SUBCUTANEOUS at 08:08

## 2024-08-08 RX ADMIN — PIPERACILLIN SODIUM AND TAZOBACTAM SODIUM 4.5 G: 4; .5 INJECTION, POWDER, FOR SOLUTION INTRAVENOUS at 03:08

## 2024-08-08 RX ADMIN — ACETAMINOPHEN 1000 MG: 10 INJECTION, SOLUTION INTRAVENOUS at 02:08

## 2024-08-08 RX ADMIN — MAGNESIUM SULFATE HEPTAHYDRATE 2 G: 40 INJECTION, SOLUTION INTRAVENOUS at 12:08

## 2024-08-08 RX ADMIN — PIPERACILLIN SODIUM AND TAZOBACTAM SODIUM 4.5 G: 4; .5 INJECTION, POWDER, FOR SOLUTION INTRAVENOUS at 09:08

## 2024-08-08 RX ADMIN — MUPIROCIN: 20 OINTMENT TOPICAL at 09:08

## 2024-08-08 RX ADMIN — SODIUM CHLORIDE: 9 INJECTION, SOLUTION INTRAVENOUS at 02:08

## 2024-08-08 RX ADMIN — IBUPROFEN 800 MG: 800 INJECTION INTRAVENOUS at 08:08

## 2024-08-08 RX ADMIN — ACETAMINOPHEN 1000 MG: 10 INJECTION, SOLUTION INTRAVENOUS at 08:08

## 2024-08-08 RX ADMIN — SODIUM CHLORIDE: 9 INJECTION, SOLUTION INTRAVENOUS at 12:08

## 2024-08-08 RX ADMIN — ACETAMINOPHEN 1000 MG: 10 INJECTION, SOLUTION INTRAVENOUS at 05:08

## 2024-08-08 RX ADMIN — HYDROMORPHONE HYDROCHLORIDE 0.5 MG: 1 INJECTION, SOLUTION INTRAMUSCULAR; INTRAVENOUS; SUBCUTANEOUS at 05:08

## 2024-08-08 RX ADMIN — IBUPROFEN 800 MG: 800 INJECTION INTRAVENOUS at 05:08

## 2024-08-08 RX ADMIN — PIPERACILLIN SODIUM AND TAZOBACTAM SODIUM 4.5 G: 4; .5 INJECTION, POWDER, FOR SOLUTION INTRAVENOUS at 10:08

## 2024-08-08 NOTE — NURSING
[place] Plan of Care Note    Dx: incisional hernia    Shift Events: Laparoscopic converted to open incisional hernia repair with small bowel resection    Goals of Care: Pain control. Wound care. Drain management.    Neuro: WNL    Vital Signs: WNL    Respiratory: Room Air    Diet: NPO    Is patient tolerating current diet? Yes    GTTS: NS @ 125    Urine Output/Bowel Movement: See flowsheet    Drains/Tubes/Tube Feeds (include total output/shift): Sanders catheter. NG tube to the left nare    Lines: PIV x 1      Accuchecks: none    Skin: Lap sites and one Horizontal incision to right quad. All dermabond    Fall Risk Score: 1    Activity level? One assist.    Any scheduled procedures? none    Any safety concerns? Fall risk    Other: none

## 2024-08-08 NOTE — PLAN OF CARE
Problem: Adult Inpatient Plan of Care  Goal: Plan of Care Review  8/8/2024 0247 by Charity Jacobs RN  Outcome: Progressing  8/8/2024 0222 by Charity Jacobs RN  Outcome: Progressing  Goal: Patient-Specific Goal (Individualized)  Outcome: Progressing  Goal: Absence of Hospital-Acquired Illness or Injury  Outcome: Progressing  Goal: Optimal Comfort and Wellbeing  Outcome: Progressing  Goal: Readiness for Transition of Care  Outcome: Progressing

## 2024-08-08 NOTE — SUBJECTIVE & OBJECTIVE
Subjective:     Interval History: No acute over night events. Pain under control. NG tube in place with minimal output. No nausea or vomiting. No flatus or BM. Patient hemodynamically stable.    Post-Op Info:  Procedure(s) (LRB):  REPAIR, HERNIA, INCISIONAL, LAPAROSCOPIC, SMALL BOWEL RESECTION (N/A)   1 Day Post-Op      Medications:  Continuous Infusions:   0.9% NaCl   Intravenous Continuous 125 mL/hr at 08/08/24 0019 New Bag at 08/08/24 0019     Scheduled Meds:   acetaminophen  1,000 mg Intravenous Q8H    Followed by    [START ON 8/9/2024] acetaminophen  1,000 mg Oral Q8H    amLODIPine  10 mg Oral Daily    ibuprofen  800 mg Intravenous Q8H    Followed by    [START ON 8/9/2024] ibuprofen  800 mg Oral Q8H    mupirocin   Nasal BID    pantoprazole  40 mg Oral Daily     PRN Meds:   acetaminophen 1,000 mg/100 mL (10 mg/mL) injection 1,000 mg    Followed by    [START ON 8/9/2024] acetaminophen tablet 1,000 mg    amLODIPine tablet 10 mg    ibuprofen 800 mg in dextrose 5 % 250 mL (Vial-Mate)    Followed by    [START ON 8/9/2024] ibuprofen tablet 800 mg    mupirocin 2 % ointment    pantoprazole EC tablet 40 mg        Objective:     Vital Signs (Most Recent):  Temp: 96.9 °F (36.1 °C) (08/08/24 0510)  Pulse: 89 (08/08/24 0510)  Resp: 16 (08/08/24 0511)  BP: (!) 171/77 (08/08/24 0510)  SpO2: (!) 94 % (08/08/24 0510) Vital Signs (24h Range):  Temp:  [96.9 °F (36.1 °C)-98.4 °F (36.9 °C)] 96.9 °F (36.1 °C)  Pulse:  [70-98] 89  Resp:  [11-20] 16  SpO2:  [94 %-100 %] 94 %  BP: (115-205)/() 171/77     Intake/Output - Last 3 Shifts         08/06 0700 08/07 0659 08/07 0700 08/08 0659    I.V. (mL/kg)  584.4 (11.5)    IV Piggyback  1462.6    Total Intake(mL/kg)  2046.9 (40.4)    Urine (mL/kg/hr)  600    Drains  20    Total Output  620    Net  +1426.9                   Physical Exam  Constitutional:       General: She is not in acute distress.     Appearance: Normal appearance.   HENT:      Head: Normocephalic and atraumatic.     "  Mouth/Throat:      Mouth: Mucous membranes are moist.   Eyes:      Extraocular Movements: Extraocular movements intact.      Conjunctiva/sclera: Conjunctivae normal.   Cardiovascular:      Rate and Rhythm: Normal rate and regular rhythm.   Pulmonary:      Effort: Pulmonary effort is normal. No respiratory distress.   Abdominal:      Comments: Abdomen soft, non-distended, tender to palpation appropriate for post-operative course. Multiple abdominal incisions healing appropriately. All incisions clean, dry, intact.   Skin:     General: Skin is warm and dry.   Neurological:      General: No focal deficit present.      Mental Status: She is alert and oriented to person, place, and time. Mental status is at baseline.            Significant Labs:  BMP (Last 3 Results):   Recent Labs   Lab 08/07/24  1403      *   K 4.4   CL 97   CO2 22*   BUN 18   CREATININE 0.8   CALCIUM 9.6     CBC (Last 3 Results):   Recent Labs   Lab 08/07/24  1403 08/07/24  1411 08/08/24  0611   WBC 5.19  --  6.17   RBC 3.93*  --  2.85*   HGB 12.9  --  9.2*   HCT 39.3 40 27.8*     --  181   *  --  98   MCH 32.8*  --  32.3*   MCHC 32.8  --  33.1     CRP (Last 3 Results): No results for input(s): "CRP" in the last 168 hours.    Significant Diagnostics:  CT: I have reviewed all pertinent results/findings within the past 24 hours:     "

## 2024-08-08 NOTE — NURSING
Pt transported to [facility] via stretcher. NG tube hooked to LIWS. Sanders is intact. IVF infusing per MAR. Pt reports mild pain to right abdomen.  Lap sites and horizontal incision are intact / dermabond  Lidoderm patch to right back.   SCD's connected.   Pt remains NPO  4 eyes completed.

## 2024-08-08 NOTE — ED NOTES
Received report from WOLF Leiva. Assumed care of pt.    The patient is resting quietly in ED stretcher, and is AAOx4 at this time. Respirations are even and unlabored, with no distress noted. The patient remains on continuous cardiac monitor, automated BP cuff cycling Q15 minutes, and continuous pulse oximeter. The patient is aware of POC and all questions and concerns addressed at this time. The patient was offered restroom assistance and denies need to void. The patient denies further needs and has no complaints at this time. SR raised x2, bed locked and in low position with brake engaged. Call bell within reach and the patient verbalized she would call for assistance if needed. Personal belongings are at bedside within reach. Patient has a visitor at bedside.

## 2024-08-08 NOTE — CONSULTS
[facility] Emergency Dept  Colorectal Surgery  Consult Note    Patient Name: Buffy Dominique  MRN: [id number]  Admission Date: 8/7/2024  Hospital Length of Stay: 0 days  Attending Physician: Odin Bolanos MD  Primary Care Provider: Odalis Kim MD    Inpatient consult to Colorectal Surgery  Consult performed by: Yosef Quiros MD  Consult ordered by: Chapo Engle MD        Subjective:     Chief Complaint/Reason for Admission: incisional hernia    History of Present Illness:  Buffy Dominique is a 78yoF with a history of hypertension, hyperlipidemia, CKD who underwent robotic rectopexy in June for long-standing rectal prolapse. Her surgery was complicated by subcutaneous emphysema and a pelvic hematoma that required blood transfusions. She presented to the emergency room nearly two weeks ago with complaints of right lower abdominal bulge and intermittent pelvic cramps. A CT at that time demonstrated an incisional hernia, which was soft, reducible at that time. She was discharged with plans for short interval follow up. The patient returns today with recurrent pelvic cramps and lower abdominal pain. A CT scan was obtained which demonstrates worsening bowel dilation with concern for a small bowel obstruction. She endorses some nausea, continued bowel function, and denies any vomiting or PO intolerance. Her labs are largely within normal limits.     At the time of my exam, the patient is hemodynamically stable. Her abdomen is soft. There is a reducible component to this hernia, but it is not fully reducible. I discussed the plans to take her for a repair in the operating room.     (Not in a hospital admission)      Review of patient's allergies indicates:   Allergen Reactions    Gabapentin Hallucinations    Methotrexate analogues      Mouth Ulcers     Thiazides Other (See Comments)     hyponatremia       Past Medical History:   Diagnosis Date    Basal cell carcinoma (BCC) of right cheek 04/03/2024    R cheek     CKD (chronic kidney disease) stage 3, GFR 30-59 ml/min     HLD (hyperlipidemia)     HTN (hypertension)     Hyperparathyroidism     Melanoma      Past Surgical History:   Procedure Laterality Date    CATARACT EXTRACTION, BILATERAL Bilateral 2014    cataract removal Right 2014    COLONOSCOPY N/A 4/30/2024    Procedure: COLONOSCOPY;  Surgeon: Sharath Myles MD;  Location: [facility] ENDO (4TH FLR);  Service: Endoscopy;  Laterality: N/A;  ref by / Miralax inst portal-RB  1/31/24- Pt r/s/ prep ins on portal - Miralax prep - ERW  4/25/24- precall complete - ERW  4/29-pt cannot come in earlier due to ride-KPvt    DV5 ROBOTIC RECTOPEXY N/A 6/7/2024    Procedure: DV5 ROBOTIC RECTOPEXY (eras low, lith); lysis of adhesions;  Surgeon: Sharath Myles MD;  Location: [facility] OR 2ND FLR;  Service: Colon and Rectal;  Laterality: N/A;    FLEXIBLE SIGMOIDOSCOPY  6/7/2024    Procedure: SIGMOIDOSCOPY, FLEXIBLE;  Surgeon: Sharath Myles MD;  Location: [facility] OR 2ND FLR;  Service: Colon and Rectal;;    hiatial hernia  2017    HYSTERECTOMY      melanoma      on face    OOPHORECTOMY      THORACIC AORTIC ANEURYSM REPAIR  2011     Family History       Problem Relation (Age of Onset)    Cancer Father    Hypertension Maternal Grandmother          Tobacco Use    Smoking status: Former     Current packs/day: 0.00     Average packs/day: 1 pack/day for 30.0 years (30.0 ttl pk-yrs)     Types: Cigarettes     Start date: 1/26/1979     Quit date: 1/26/2009     Years since quitting: 15.5    Smokeless tobacco: Never   Substance and Sexual Activity    Alcohol use: Never     Comment: glass of wine once or twice a year    Drug use: Never    Sexual activity: Not Currently     Partners: Male     Birth control/protection: None     Review of Systems   Constitutional:  Negative for activity change, appetite change, fatigue and fever.   HENT: Negative.     Respiratory:  Negative for cough, chest tightness and shortness of breath.     Cardiovascular:  Negative for chest pain.   Gastrointestinal:  Positive for abdominal distention, abdominal pain and nausea. Negative for constipation, diarrhea and vomiting.   Musculoskeletal: Negative.    Skin: Negative.      Objective:     Vital Signs (Most Recent):  Temp: 98.4 °F (36.9 °C) (08/07/24 1900)  Pulse: 82 (08/07/24 1900)  Resp: 20 (08/07/24 1900)  BP: (!) 165/74 (08/07/24 1834)  SpO2: 95 % (08/07/24 1900) Vital Signs (24h Range):  Temp:  [97.5 °F (36.4 °C)-98.4 °F (36.9 °C)] 98.4 °F (36.9 °C)  Pulse:  [70-84] 82  Resp:  [16-20] 20  SpO2:  [94 %-96 %] 95 %  BP: (143-205)/() 165/74     Weight: 49.4 kg (109 lb)  Body mass index is 19.31 kg/m².     Physical Exam  Vitals and nursing note reviewed.   Constitutional:       General: She is not in acute distress.     Appearance: Normal appearance.   HENT:      Nose: Nose normal.      Mouth/Throat:      Mouth: Mucous membranes are moist.   Cardiovascular:      Rate and Rhythm: Normal rate and regular rhythm.   Pulmonary:      Effort: Pulmonary effort is normal. No respiratory distress.   Abdominal:      Comments: Abdomen soft, mildly distended  Bowel-containing hernia to the right of the umbilicus; there is a reducible component to this hernia, but it is not fully reducible  This is non-tender; there is no firmness to the hernia contents; there are no overlying skin changes   Musculoskeletal:         General: Normal range of motion.   Skin:     General: Skin is warm and dry.   Neurological:      Mental Status: She is alert.            Significant Labs:  CBC:   Recent Labs   Lab 08/07/24  1403 08/07/24  1411   WBC 5.19  --    RBC 3.93*  --    HGB 12.9  --    HCT 39.3 40     --    *  --    MCH 32.8*  --    MCHC 32.8  --      CMP:   Recent Labs   Lab 08/07/24  1403      CALCIUM 9.6   ALBUMIN 3.7   PROT 7.8   *   K 4.4   CO2 22*   CL 97   BUN 18   CREATININE 0.8   ALKPHOS 76   ALT 6*   AST 18   BILITOT 0.5       Significant  Diagnostics:  I have reviewed all pertinent imaging results/findings within the past 24 hours.  Assessment/Plan:     GI  Incisional hernia  Buffy Dominique is a 78yoF who underwent rectopexy in 5/2024 who represents to the emergency room with an incarcerated incisional hernia causing a small bowel obstruction.     - admit to colorectal surgery  - to OR for hernia repair   - surgical and blood consent obtained   - discussed with anesthesia and OR staff  - NPO  - please call with questions        Thank you for your consult. I will follow-up with patient. Please contact us if you have any additional questions.    Yosef Quiros MD  Colorectal Surgery  [facility] Dept

## 2024-08-08 NOTE — SUBJECTIVE & OBJECTIVE
(Not in a hospital admission)      Review of patient's allergies indicates:   Allergen Reactions    Gabapentin Hallucinations    Methotrexate analogues      Mouth Ulcers     Thiazides Other (See Comments)     hyponatremia       Past Medical History:   Diagnosis Date    Basal cell carcinoma (BCC) of right cheek 04/03/2024    R cheek    CKD (chronic kidney disease) stage 3, GFR 30-59 ml/min     HLD (hyperlipidemia)     HTN (hypertension)     Hyperparathyroidism     Melanoma      Past Surgical History:   Procedure Laterality Date    CATARACT EXTRACTION, BILATERAL Bilateral 2014    cataract removal Right 2014    COLONOSCOPY N/A 4/30/2024    Procedure: COLONOSCOPY;  Surgeon: Sharath Myles MD;  Location: [facility] (4TH FLR);  Service: Endoscopy;  Laterality: N/A;  ref by / Miralax inst portal-RB  1/31/24- Pt r/s/ prep ins on portal - Miralax prep - ERW  4/25/24- precall complete - ERW  4/29-pt cannot come in earlier due to ride-KPvt    DV5 ROBOTIC RECTOPEXY N/A 6/7/2024    Procedure: DV5 ROBOTIC RECTOPEXY (eras low, lith); lysis of adhesions;  Surgeon: Sharath Myles MD;  Location: [facility] OR 2ND FLR;  Service: Colon and Rectal;  Laterality: N/A;    FLEXIBLE SIGMOIDOSCOPY  6/7/2024    Procedure: SIGMOIDOSCOPY, FLEXIBLE;  Surgeon: Sharath Myles MD;  Location: [facility] OR 2ND FLR;  Service: Colon and Rectal;;    hiatial hernia  2017    HYSTERECTOMY      melanoma      on face    OOPHORECTOMY      THORACIC AORTIC ANEURYSM REPAIR  2011     Family History       Problem Relation (Age of Onset)    Cancer Father    Hypertension Maternal Grandmother          Tobacco Use    Smoking status: Former     Current packs/day: 0.00     Average packs/day: 1 pack/day for 30.0 years (30.0 ttl pk-yrs)     Types: Cigarettes     Start date: 1/26/1979     Quit date: 1/26/2009     Years since quitting: 15.5    Smokeless tobacco: Never   Substance and Sexual Activity    Alcohol use: Never     Comment: glass of wine once or  twice a year    Drug use: Never    Sexual activity: Not Currently     Partners: Male     Birth control/protection: None     Review of Systems   Constitutional:  Negative for activity change, appetite change, fatigue and fever.   HENT: Negative.     Respiratory:  Negative for cough, chest tightness and shortness of breath.    Cardiovascular:  Negative for chest pain.   Gastrointestinal:  Positive for abdominal distention, abdominal pain and nausea. Negative for constipation, diarrhea and vomiting.   Musculoskeletal: Negative.    Skin: Negative.      Objective:     Vital Signs (Most Recent):  Temp: 98.4 °F (36.9 °C) (08/07/24 1900)  Pulse: 82 (08/07/24 1900)  Resp: 20 (08/07/24 1900)  BP: (!) 165/74 (08/07/24 1834)  SpO2: 95 % (08/07/24 1900) Vital Signs (24h Range):  Temp:  [97.5 °F (36.4 °C)-98.4 °F (36.9 °C)] 98.4 °F (36.9 °C)  Pulse:  [70-84] 82  Resp:  [16-20] 20  SpO2:  [94 %-96 %] 95 %  BP: (143-205)/() 165/74     Weight: 49.4 kg (109 lb)  Body mass index is 19.31 kg/m².     Physical Exam  Vitals and nursing note reviewed.   Constitutional:       General: She is not in acute distress.     Appearance: Normal appearance.   HENT:      Nose: Nose normal.      Mouth/Throat:      Mouth: Mucous membranes are moist.   Cardiovascular:      Rate and Rhythm: Normal rate and regular rhythm.   Pulmonary:      Effort: Pulmonary effort is normal. No respiratory distress.   Abdominal:      Comments: Abdomen soft, mildly distended  Bowel-containing hernia to the right of the umbilicus; there is a reducible component to this hernia, but it is not fully reducible  This is non-tender; there is no firmness to the hernia contents; there are no overlying skin changes   Musculoskeletal:         General: Normal range of motion.   Skin:     General: Skin is warm and dry.   Neurological:      Mental Status: She is alert.            Significant Labs:  CBC:   Recent Labs   Lab 08/07/24  1403 08/07/24  1411   WBC 5.19  --    RBC 3.93*   --    HGB 12.9  --    HCT 39.3 40     --    *  --    MCH 32.8*  --    MCHC 32.8  --      CMP:   Recent Labs   Lab 08/07/24  1403      CALCIUM 9.6   ALBUMIN 3.7   PROT 7.8   *   K 4.4   CO2 22*   CL 97   BUN 18   CREATININE 0.8   ALKPHOS 76   ALT 6*   AST 18   BILITOT 0.5       Significant Diagnostics:  I have reviewed all pertinent imaging results/findings within the past 24 hours.

## 2024-08-08 NOTE — PLAN OF CARE
PT Evaluation completed and PT POC established.    Problem: Physical Therapy  Goal: Physical Therapy Goal  Description: Goals to be met by: 2024     Patient will increase functional independence with mobility by performin. Supine to sit with Modified [place]  2. Sit to supine with Modified [place]  3. Sit to stand transfer with Supervision  4. Bed to chair transfer with Supervision using LRAD  5. Gait  x 300 feet with Supervision using LRAD.     Outcome: Progressing

## 2024-08-08 NOTE — PLAN OF CARE
Problem: Occupational Therapy  Goal: Occupational Therapy Goal  Description: Goals to be met by: 9/7/24     Patient will increase functional independence with ADLs by performing:    LE Dressing with Lane.  Grooming while standing at sink with Lane.  Toileting from toilet with Lane for hygiene and clothing management.   Supine to sit with Lane.  Toilet transfer to toilet with Lane.    Outcome: Progressing

## 2024-08-08 NOTE — ASSESSMENT & PLAN NOTE
Buffy Dominique is a 78yoF who underwent rectopexy in 5/2024 who presented to the emergency room with an incarcerated incisional hernia causing a small bowel obstruction on 8/7. S/p laparoscopic-converted open incisional hernia repair with small bowel resection with Dr Myles on 8/7.     - admit to colorectal surgery  - NPO  - Continue NG tube  - mIVF  - Replenish electrolytes PRN  - Continue antibiotics  - please call with questions

## 2024-08-08 NOTE — NURSING TRANSFER
Nursing Transfer Note      8/8/2024   01:50 AM    Nurse giving handoff:WOLF Briceño  Nurse receiving handoff:WOLF Nash    Reason patient is being transferred: s/p hernia repair    Transfer To: 1012    Transfer via bed    Transported by tramsport    Transfer Vital Signs:SEE FLOWSHEET  Order for Tele Monitor? No    Additional Lines: Sanders Catheter    Medicines sent: infusing     Any special needs or follow-up needed: routine    Patient belongings transferred with patient: Yes, belonging bag x1  and personal purple backpack    Chart send with patient: Yes    Notified: son    Patient reassessed at: 8/8/24 @0100

## 2024-08-08 NOTE — NURSING
[facility] Plan of Care Note  Dx Incisional hernia    Shift Events none    Goals of Care: tolerate ice chips and then increase diet    Neuro: AAOx4    Vital Signs: see flowsheet    Respiratory: see flowsheet    Diet: NPO except ice chips    Is patient tolerating current diet? N/A    GTTS: NS @ 125 ml/hr    Urine Output/Bowel Movement: see flowsheet    Drains/Tubes/Tube Feeds (include total output/shift): no    Lines: SL      Accuchecks:no    Skin: see flowsheet    Fall Risk Score: see flowsheet    Activity level? Up with assist    Any scheduled procedures? no    Any safety concerns? no    Other: no

## 2024-08-08 NOTE — PT/OT/SLP EVAL
"Physical Therapy Co-Evaluation    Patient Name:  Buffy Dominique   MRN:  [id number]    Recommendations:     Discharge Recommendations: Low Intensity Therapy   Discharge Equipment Recommendations: none   Barriers to discharge: None    Assessment:     Buffy Dominique is a 78 y.o. female admitted with a medical diagnosis of Incisional hernia.  She presents with the following impairments/functional limitations: impaired endurance, weakness, impaired balance, gait instability.    Pleasant and in good spirits, motivated. Pt amb 50' no AD with 1 person for safety. Pt reported 1 fall in January in ED; she stated she blacked out during event which the medical team attributed it to low sodium and BP. Pt will benefit from skilled PT services while in house in order to address the aforementioned deficits.      Rehab Prognosis: Good; patient would benefit from acute skilled PT services to address these deficits and reach maximum level of function.    Recent Surgery: Procedure(s) (LRB):  REPAIR, HERNIA, INCISIONAL, LAPAROSCOPIC, SMALL BOWEL RESECTION (N/A)  REPAIR, HERNIA, INCISIONAL  EXCISION, SMALL INTESTINE 1 Day Post-Op    Plan:     During this hospitalization, patient to be seen 3 x/week to address the identified rehab impairments via gait training, therapeutic activities, therapeutic exercises, neuromuscular re-education and progress toward the following goals:    Plan of Care Expires:  09/08/24    Subjective     "I have a 48 year old cockatoo"    Pain/Comfort:  Pain Rating 1: 0/10    Patients cultural, spiritual, Worship conflicts given the current situation: no    Living Environment:  Per pt, pt resides in independent living senior facility. Pt has tub/shower combo.  Prior to admission, patients level of function was I.  Equipment used at home: rollator.  DME owned (not currently used):  rollator .  Upon discharge, patient will have assistance from adult children and neighbors.    Objective:     Communicated with RN prior to " session.  Patient found HOB elevated with crystal catheter, peripheral IV, NG tube  upon PT entry to room.    General Precautions: Standard, fall  Orthopedic Precautions:N/A   Braces: N/A  Respiratory Status: Room air    Exams:  RLE ROM: WFL  RLE Strength: WFL  LLE ROM: WFL  LLE Strength: WFL    Functional Mobility:  Bed Mobility:     Scooting: supervision  Supine to Sit: supervision; pt performed R hip flexion in supine and grabbed posterior thigh to use momentum to get to long sitting.   Transfers:     Sit to Stand:  stand by assistance with no AD  Bed to Chair: stand by assistance with  no AD  using  Step Transfer  Gait: pt amb 50' no AD SBA. Pt presented with decreased lui, mild unsteadiness, good upright posture  Balance:   Good sitting balance  Fair-Good standing balance      AM-PAC 6 CLICK MOBILITY  Total Score:19       Treatment & Education:  Discussed sitting upright in chair >1hr, pt agreeable  Discussed sitting up EOB and/or UIC with RN/staff outside of therapy services    Educated pt on PT role/POC  Educated pt on importance of OOB activity and daily ambulation   Pt educated on proper body mechanics, safety techniques, and energy conservation with PT facilitation and cueing throughout session   Pt verbalized understanding      Patient left up in chair with all lines intact, call button in reach, RN notified, and OT and son present.    GOALS:   Multidisciplinary Problems       Physical Therapy Goals          Problem: Physical Therapy    Goal Priority Disciplines Outcome Goal Variances Interventions   Physical Therapy Goal     PT, PT/OT Progressing     Description: Goals to be met by: 2024     Patient will increase functional independence with mobility by performin. Supine to sit with Modified [place]  2. Sit to supine with Modified [place]  3. Sit to stand transfer with Supervision  4. Bed to chair transfer with Supervision using LRAD  5. Gait  x 300 feet with Supervision using  LRAD.                          History:     Past Medical History:   Diagnosis Date    Basal cell carcinoma (BCC) of right cheek 04/03/2024    R cheek    CKD (chronic kidney disease) stage 3, GFR 30-59 ml/min     HLD (hyperlipidemia)     HTN (hypertension)     Hyperparathyroidism     Melanoma        Past Surgical History:   Procedure Laterality Date    CATARACT EXTRACTION, BILATERAL Bilateral 2014    cataract removal Right 2014    COLONOSCOPY N/A 4/30/2024    Procedure: COLONOSCOPY;  Surgeon: Sharath Myles MD;  Location: [facility] (4TH FLR);  Service: Endoscopy;  Laterality: N/A;  ref by / Miralax inst portal-RB  1/31/24- Pt r/s/ prep ins on portal - Miralax prep - ERW  4/25/24- precall complete - ERW  4/29-pt cannot come in earlier due to ride-KPvt    DV5 ROBOTIC RECTOPEXY N/A 6/7/2024    Procedure: DV5 ROBOTIC RECTOPEXY (eras low, lith); lysis of adhesions;  Surgeon: Sharath Myles MD;  Location: [facility] OR 2ND FLR;  Service: Colon and Rectal;  Laterality: N/A;    EXCISION, SMALL INTESTINE  8/7/2024    Procedure: EXCISION, SMALL INTESTINE;  Surgeon: Sharath Myles MD;  Location: [facility] OR 2ND FLR;  Service: Colon and Rectal;;    FLEXIBLE SIGMOIDOSCOPY  6/7/2024    Procedure: SIGMOIDOSCOPY, FLEXIBLE;  Surgeon: Sharath Myles MD;  [facility];  Service: Colon and Rectal;;    hiatial hernia  2017    HYSTERECTOMY      LAPAROSCOPIC REPAIR OF INCISIONAL HERNIA N/A 8/7/2024    Procedure: REPAIR, HERNIA, INCISIONAL, LAPAROSCOPIC, SMALL BOWEL RESECTION;  Surgeon: Sharath Myles MD;  Location: [facility] OR 2ND FLR;  Service: Colon and Rectal;  Laterality: N/A;    melanoma      on face    OOPHORECTOMY      REPAIR, HERNIA, INCISIONAL  8/7/2024    Procedure: REPAIR, HERNIA, INCISIONAL;  Surgeon: Sharath Myles MD;  [facility];  Service: Colon and Rectal;;    THORACIC AORTIC ANEURYSM REPAIR  2011       Time Tracking:     PT Received On: 08/08/24  PT Start Time:  1111     PT Stop Time: 1135  PT Total Time (min): 24 min     Billable Minutes: Evaluation 10 and Gait Training 14    Co-treatment performed due to patient's multiple deficits requiring two skilled therapists to appropriately and safely assess patient's strength and endurance while facilitating functional tasks in addition to accommodating for patient's activity tolerance.       08/08/2024

## 2024-08-08 NOTE — PT/OT/SLP EVAL
Occupational Therapy   Co-Evaluation    Name: Buffy Dominique  MRN: [id number]  Admitting Diagnosis: Incisional hernia  Recent Surgery: Procedure(s) (LRB):  REPAIR, HERNIA, INCISIONAL, LAPAROSCOPIC, SMALL BOWEL RESECTION (N/A)  REPAIR, HERNIA, INCISIONAL  EXCISION, SMALL INTESTINE 1 Day Post-Op    Recommendations:     Discharge Recommendations: Low Intensity Therapy  Discharge Equipment Recommendations:  none  Barriers to discharge:  None    Assessment:     Buffy Dominique is a 78 y.o. female with a medical diagnosis of Incisional hernia.  She presents with no pain however tolerated room level fxl mob    Performance deficits affecting function: impaired endurance, weakness, impaired self care skills, gait instability.      Rehab Prognosis: Good; patient would benefit from acute skilled OT services to address these deficits and reach maximum level of function.       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     Pain/Comfort:  Pain Rating 1: 0/10    Patients cultural, spiritual, Mandaen conflicts given the current situation:      Objective:     Communicated with: Nurse prior to session.  Patient found up in chair with NG tube, crystal catheter, peripheral IV upon OT entry to room.    General Precautions: Standard, fall  Orthopedic Precautions:N/A   Braces: N/A  Respiratory Status: Room air    Occupational  Performance:    Bed Mobility:    Patient completed Scooting/Bridging with supervision   Patient completed Supine to Sit with supervision   no traditional method,R hip flexion in supine and grabbed posterior thigh to use momentum to get to long sitting.     Functional Mobility/Transfers:  Patient completed Sit <> Stand Transfer with stand by assistance with no AD   Functional Mobility: Pt demonstrated functional mobility training to simulate household to/ from doorway of bedroom with no AD with SBA and no LOB and good visual search and navigation strategies.     Activities of Daily Living:  Grooming: supervision oral care with swabs mouthwash    Cognitive/Visual Perceptual:  Cognitive/Psychosocial Skills:     -       Oriented to: Person, Place, Time, and Situation   -       Follows Commands/attention:Follows multistep  commands  -       Communication: clear/fluent  -       Memory: No Deficits noted  -       Safety awareness/insight to disability: impaired   -       Mood/Affect/Coping skills/emotional control: Appropriate to situation    Physical Exam:  Balance:   Static Sitting:  Sup  Dynamic Sitting: sup  Static Standing: SBA  Dynamic Standing: SBA  Upper Extremity Range of Motion:     -       Right Upper Extremity: WFL  -       Left Upper Extremity: WFL  Upper Extremity Strength:    -       Right Upper Extremity: WFL  -       Left Upper Extremity: WFL   Strength:    -       Right Upper Extremity: WFL  -       Left Upper Extremity: WFL  Fine Motor Coordination:    -       Intact    AMPAC 6 Click ADL:  AMPAC Total Score: 21    Treatment & Education:  Pt educated on role of occupational therapy, POC, and safety during ADLs and functional mobility. Pt and OT discussed importance of safe, continued mobility to optimize daily living skills. Pt verbalized understanding. Pt given instruction to call for medical staff/nurse for assistance.   PT present for coeval due to pt's multiple medical comorbidities and  functional/cognition deficits requiring two skilled therapists to appropriately progress pt's musculoskeletal strength, neuromuscular control, and endurance while taking into consideration medical acuity and pt safety.      Patient left up in chair with all lines intact, call button in reach, and son presentand nurse notified    GOALS:   Multidisciplinary Problems       Occupational Therapy Goals          Problem: Occupational Therapy    Goal Priority Disciplines Outcome Interventions   Occupational Therapy Goal     OT, PT/OT Progressing    Description: Goals to be met by: 9/7/24     Patient will increase functional independence with ADLs by performing:    LE Dressing with Ransom.  Grooming while standing at sink with Ransom.  Toileting from toilet with Ransom for hygiene and clothing management.   Supine to sit with Ransom.  Toilet transfer to toilet with Ransom.                         History:     Past Medical History:   Diagnosis Date    Basal cell carcinoma (BCC) of right cheek 04/03/2024    R cheek    CKD (chronic kidney disease) stage 3, GFR 30-59 ml/min     HLD (hyperlipidemia)     HTN (hypertension)     Hyperparathyroidism     Melanoma          Past Surgical History:   Procedure Laterality Date    CATARACT EXTRACTION, BILATERAL Bilateral 2014    cataract removal Right 2014    COLONOSCOPY N/A 4/30/2024    Procedure: COLONOSCOPY;  Surgeon: Sharath Mylse MD;  Location: [facility] (4TH FLR);  Service: Endoscopy;  Laterality: N/A;  ref by / Miralax inst portal-RB  1/31/24- Pt r/s/ prep ins on portal - Miralax prep - ERW  4/25/24- precall complete - ERW  4/29-pt cannot come in earlier due to ride-KPvt    DV5 ROBOTIC RECTOPEXY N/A 6/7/2024    Procedure: DV5 ROBOTIC RECTOPEXY (eras low, lith); lysis of adhesions;  Surgeon: Sharath Myles MD;  Location: [address];  Service: Colon and Rectal;  Laterality: N/A;    EXCISION, SMALL INTESTINE  8/7/2024     Procedure: EXCISION, SMALL INTESTINE;  Surgeon: Sharath Myles MD;  Location: [facility] OR [facility] FLR;  Service: Colon and Rectal;;    FLEXIBLE SIGMOIDOSCOPY  6/7/2024    Procedure: SIGMOIDOSCOPY, FLEXIBLE;  Surgeon: Sharath Myles MD;  [facility];  Service: Colon and Rectal;;    hiatial hernia  2017    HYSTERECTOMY      LAPAROSCOPIC REPAIR OF INCISIONAL HERNIA N/A 8/7/2024    Procedure: REPAIR, HERNIA, INCISIONAL, LAPAROSCOPIC, SMALL BOWEL RESECTION;  Surgeon: Sharath Myles MD;  [facility];  Service: Colon and Rectal;  Laterality: N/A;    melanoma      on face    OOPHORECTOMY      REPAIR, HERNIA, INCISIONAL  8/7/2024    Procedure: REPAIR, HERNIA, INCISIONAL;  Surgeon: Sharath Myles MD;  [facility];  Service: Colon and Rectal;;    THORACIC AORTIC ANEURYSM REPAIR  2011       Time Tracking:     OT Date of Treatment: 08/08/24  OT Start Time: 1111  OT Stop Time: 1135  OT Total Time (min): 24 min    Billable Minutes:Evaluation 12  Therapeutic Activity 12    8/8/2024

## 2024-08-08 NOTE — PLAN OF CARE
Problem: Adult Inpatient Plan of Care  Goal: Plan of Care Review  Outcome: Progressing  Goal: Patient-Specific Goal (Individualized)  Outcome: Progressing  Goal: Absence of Hospital-Acquired Illness or Injury  Outcome: Progressing  Goal: Optimal Comfort and Wellbeing  Outcome: Progressing  Goal: Readiness for Transition of Care  Outcome: Progressing     Problem: Infection  Goal: Absence of Infection Signs and Symptoms  Outcome: Progressing  Intervention: Prevent or Manage Infection  Flowsheets (Taken 8/8/2024 0222)  Fever Reduction/Comfort Measures:   lightweight bedding   lightweight clothing  Infection Management: aseptic technique maintained  Isolation Precautions: precautions maintained

## 2024-08-08 NOTE — OP NOTE
[facility]  Colon and Rectal Surgery    [address]  [place], [place]  Tel: [phone]  Fax: [phone]  [email or web address]   MD Saturnino Urena MD Brian Kann, MD W. Forrest Johnston, MD Matthew Giglia, MD Jennifer Paruch, MD William Kethman, MD Danielle Kay, MD     Patient name: Buffy Dominique   YOB: 1945   MRN: [id number]  Date of surgery: 08/08/2024    Operative Report    Pre-operative diagnosis: Incarcerated incisional hernia  Post-operative diagnosis: Same as above    Procedure:  Laparoscopic-assisted incisional hernia repair  with small bowel resection    Findings:  Incarcerated small bowel at an 8 mm port site (this was just below the previously approximated Airseal port that was closed primarily at the time of the procedure due to stretching), appeared to be distal ileum with clear transition point, laparoscopic exploration limited somewhat by bowel distension and I was able to be fairly certain based on pre-operative imaging review and my exploration that this was the only site of obstruction - although it was the same loop there were two components, one intramuscular and one subcutaneous - the intramuscular was likely the culprit and contained non-viable bowel requiring resection, proximal bowel was dilated by not significant edematous and viable, primary side-to-side anastomosis performed    Surgeon: Sharath Myles MD  Assistant: Yosef Quiros MD    Indication: Ms. Dominique is a 78 year old woman with a history of rectal prolapse  who underwent a robotic-assisted rectopexy on 6/7/2024 complicated by pelvic hematoma requiring transfusion, and subcutaneous emphysema, she was seen in follow-up subsequently and had been doing well. She was subsequently seen on 7/26/2024 by Ivory Quiros and Arianne for abdominal pain/cramping with imaging findings concerning for partial small bowel obstruction in the right abdominal port site  although it was felt to be reducible. She was discharged from the ED at that time with plan for outpatient follow-up - she says that she has continued to experience intermittent but more progressive abdominal cramping since that time and now presents with worsening evidence of incarceration of this port site hernia. In close review it appears that there may be two separate involved loops, one more medially that feels at least partially reducible and a more lateral loop that appear within the layers of the abdominal wall that on exam I am not convinced is reducible. It certainly immediately recurs. Given symptomatic imaging evidence of ongoing progression I have recommended urgent exploratory laparoscopy, possible laparotomy. The benefits, risks, and alternatives were discussed with the patient, they were given the opportunity to ask questions and they elected to proceed with operative intervention after signing written consent.    Procedure:  After pre-operative assessment and review of informed consent, the patient was taken to the operating room and received general anesthesia. A crystal catheter was then placed under strict sterile precautions. Pre-operative antibiotics were administered and the patient was placed in lithotomy position. The abdomen was prepped and draped in the usual sterile fashion and a timeout was performed according to [facility] Safety guidelines.      We began with a Veress needle in the left upper quadrant away from our prior port sites - saline drop test was not reassuring after first pass and so an insufflating 5 mm trocar was utilized. This was successful on first attempt. An exploratory laparoscopic was performed, there were no adhesions to the anterior abdominal wall. We identified a loop of small bowel that was incarcerated in the right abdominal port site, this could not be reduced despite paralytic and direct visualization. An exploration was performed and findings discussed  above.     Two additional 5 mm left lateral abdominal incisions were made and 5 mm ports placed. We then performed a careful lysis of adhesions - this was unsuccessful despite enlargement of the fascial defect to aid in reduction of the hernia and so we performed a hybrid approach. A 4 cm incision was made overlying the port site and hernia. This was carried down into the subcutaneous tissue and hernia sac to evaluate the more medial subcutaneous small bowel. This did appear viable, however, revealed the intramuscular incarceration - this bowel had to be carefully  but due to small size of fascial defect and larger lateral intramuscular hernia site this was difficult. We were able to separate them allowing this portion of small bowel to be freed - it did not appear viable and so given health of proximal and distal small bowel we elected to perform a resection of this segment and primary anastomosis. We enlarged the hernia defect to accommodate our anastomosis and placed a wound protector.     We performed a careful lysis of a second loop of small bowel to free this up from the mesentery and carefully isolated a proximal and distal segment of small bowel. These were divided with a DALLIN 75 mm blue load stapler. The intervening small bowel mesentery was divided carefully with Ligasure and the specimen was sent for review. It was at this time we appreciated a small hematoma within the distal decompressed small bowel wall that I felt should not be included in our anastomosis. A second DALLIN 75 mm blue load was utilized and this additional segment sent with the primary specimen after division of the mesentery with Ligasure. The mesenteric orientation of these limbs were confirmed in open and utilizing laparoscopy. A side-to-side functional end-to-end anastomosis was made with a single fire of a 75 mm DALLIN blue load. The common channel staple line was observed and hemostasis was confirmed. The common channel was  approximated with a 60 mm TA green load stapler. The common channel staple line was oversewn with 3-0 Vicryl and a single interrupted stitch was placed at the base of the anastomosis. This was then gently pushed back into the abdomen without difficulty.    We then performed a second exploratory laparoscopic - serous fluid cleared from the abdomen with suction and integrity of our anastomosis was ensured. Hemostasis was verified and our ports were removed with direct visualization.    Attention was then turned to the hernia site - our wound protector was removed and a multi-layer primary closure was performed with 0-PDS. The posterior sheath and peritoneum was approximated and the intramuscular dead space was collapsed when approximating the anterior sheath. We then irrigated the subcutaneous tissue with saline and again multilayer closure performed with 3-0 Vicryl including interrupted 3-0 Vicryl dermal approximation. The port sites were approximated with 4-0 Monocryl and Dermabond was applied.    Prior to closure and after final closure instrument, needle, and sponge counts were correct.    The procedure was completed without complication and was well-tolerated. The patient was then brought to the post-anesthesia care unit in stable condition. I was present for the entire operation and called her son at its conclusion - this was around midnight.    Complications: None  Estimated blood loss: 100 mL  Disposition: PACU      Sharath Myles MD, FACS, FASCRS  Department of Colon & Rectal Surgery  [facility]

## 2024-08-08 NOTE — ED NOTES
Class A surgery orders in place for pt. Charge nurse notified. Surgery checklist being completed at this time by this RN.

## 2024-08-08 NOTE — BRIEF OP NOTE
[facility] - Surgery ([facility])  Brief Operative Note    SUMMARY     Surgery Date: 8/7/2024     Surgeons and Role:     * Sharath Myles MD - Primary     * Yosef Quiros MD - Resident - Assisting        Pre-op Diagnosis:  Abdominal pain [R10.9]    Post-op Diagnosis:  Post-Op Diagnosis Codes:     * Abdominal pain [R10.9]    Procedure(s) (LRB):  REPAIR, HERNIA, INCISIONAL, LAPAROSCOPIC, SMALL BOWEL RESECTION (N/A)    Anesthesia: General    Implants:  * No implants in log *    Operative Findings: laparoscopic-converted open incisional hernia repair with small bowel resection.     Estimated Blood Loss: 50mL    Estimated Blood Loss has been documented.         Specimens:   Specimen (24h ago, onward)       Start     Ordered    08/07/24 8296  Specimen to Pathology, Surgery Other (CRS)  Once        Comments: Pre-op Diagnosis: Abdominal pain [R10.9]Procedure(s):REPAIR, HERNIA, INCISIONAL, LAPAROSCOPIC Number of specimens: 1Name of specimens: 1. Incisional Hernia and small Bowel Resection- permanent     References:    Click here for ordering Quick Tip   Question Answer Comment   Procedure Type: Other CRS   Release to patient Immediate        08/07/24 9822                    [id number]

## 2024-08-08 NOTE — HPI
Buffy Dominique is a 78yoF with a history of hypertension, hyperlipidemia, CKD who underwent robotic rectopexy in June for long-standing rectal prolapse. Her surgery was complicated by subcutaneous emphysema and a pelvic hematoma that required blood transfusions. Two ER visits with complaints of right lower abdominal bulge and intermittent pelvic cramps. Presented 8/7 with recurrent pelvic cramps and lower abdominal pain. A CT scan was obtained which demonstrated worsening bowel dilation with concern for a small bowel obstruction. S/p laparoscopic-converted open incisional hernia repair with small bowel resection with Dr Myles on 8/7.

## 2024-08-08 NOTE — ASSESSMENT & PLAN NOTE
Buffy Dominique is a 78yoF who underwent rectopexy in 5/2024 who represents to the emergency room with an incarcerated incisional hernia causing a small bowel obstruction.     - admit to colorectal surgery  - to OR for hernia repair   - surgical and blood consent obtained   - discussed with anesthesia and OR staff  - NPO  - please call with questions

## 2024-08-08 NOTE — NURSING
Nurses Note -- 4 Eyes      8/8/2024   2:14 AM      Skin assessed during: Admit      [] No Altered Skin Integrity Present    []Prevention Measures Documented      [x] Yes- Altered Skin Integrity Present or Discovered   [x] LDA Added if Not in Epic (Describe Wound)   [] New Altered Skin Integrity was Present on Admit and Documented in LDA   [] Wound Image Taken    Wound Care Consulted? No    Attending Nurse:  Charity Varghese RN/Staff Member:   BARRERA

## 2024-08-08 NOTE — ED NOTES
Surgery and blood consents with surgery resident.  Pt is ready for surgery. Awaiting further orders at this time.  Pt is disrobed, in hospital gown, and personal belongings are labeled and at bedside with pt.

## 2024-08-09 LAB
ANION GAP SERPL CALC-SCNC: 8 MMOL/L (ref 8–16)
BASOPHILS # BLD AUTO: 0.01 K/UL (ref 0–0.2)
BASOPHILS NFR BLD: 0.2 % (ref 0–1.9)
BUN SERPL-MCNC: 12 MG/DL (ref 8–23)
CALCIUM SERPL-MCNC: 8 MG/DL (ref 8.7–10.5)
CHLORIDE SERPL-SCNC: 99 MMOL/L (ref 95–110)
CO2 SERPL-SCNC: 22 MMOL/L (ref 23–29)
CREAT SERPL-MCNC: 0.7 MG/DL (ref 0.5–1.4)
CRP SERPL-MCNC: 164.2 MG/L (ref 0–8.2)
DIFFERENTIAL METHOD BLD: ABNORMAL
EOSINOPHIL # BLD AUTO: 0 K/UL (ref 0–0.5)
EOSINOPHIL NFR BLD: 0.6 % (ref 0–8)
ERYTHROCYTE [DISTWIDTH] IN BLOOD BY AUTOMATED COUNT: 13.2 % (ref 11.5–14.5)
EST. GFR  (NO RACE VARIABLE): >60 ML/MIN/1.73 M^2
GLUCOSE SERPL-MCNC: 83 MG/DL (ref 70–110)
HCT VFR BLD AUTO: 28.8 % (ref 37–48.5)
HGB BLD-MCNC: 9.6 G/DL (ref 12–16)
IMM GRANULOCYTES # BLD AUTO: 0.01 K/UL (ref 0–0.04)
IMM GRANULOCYTES NFR BLD AUTO: 0.2 % (ref 0–0.5)
LYMPHOCYTES # BLD AUTO: 2 K/UL (ref 1–4.8)
LYMPHOCYTES NFR BLD: 32.4 % (ref 18–48)
MAGNESIUM SERPL-MCNC: 1.8 MG/DL (ref 1.6–2.6)
MCH RBC QN AUTO: 32 PG (ref 27–31)
MCHC RBC AUTO-ENTMCNC: 33.3 G/DL (ref 32–36)
MCV RBC AUTO: 96 FL (ref 82–98)
MONOCYTES # BLD AUTO: 0.6 K/UL (ref 0.3–1)
MONOCYTES NFR BLD: 9 % (ref 4–15)
NEUTROPHILS # BLD AUTO: 3.6 K/UL (ref 1.8–7.7)
NEUTROPHILS NFR BLD: 57.6 % (ref 38–73)
NRBC BLD-RTO: 0 /100 WBC
PHOSPHATE SERPL-MCNC: 3 MG/DL (ref 2.7–4.5)
PLATELET # BLD AUTO: 188 K/UL (ref 150–450)
PMV BLD AUTO: 8.8 FL (ref 9.2–12.9)
POTASSIUM SERPL-SCNC: 4 MMOL/L (ref 3.5–5.1)
RBC # BLD AUTO: 3 M/UL (ref 4–5.4)
SODIUM SERPL-SCNC: 129 MMOL/L (ref 136–145)
WBC # BLD AUTO: 6.23 K/UL (ref 3.9–12.7)

## 2024-08-09 PROCEDURE — 63600175 PHARM REV CODE 636 W HCPCS: Performed by: STUDENT IN AN ORGANIZED HEALTH CARE EDUCATION/TRAINING PROGRAM

## 2024-08-09 PROCEDURE — 25000003 PHARM REV CODE 250: Performed by: STUDENT IN AN ORGANIZED HEALTH CARE EDUCATION/TRAINING PROGRAM

## 2024-08-09 PROCEDURE — 97110 THERAPEUTIC EXERCISES: CPT

## 2024-08-09 PROCEDURE — 25000003 PHARM REV CODE 250

## 2024-08-09 PROCEDURE — 63600175 PHARM REV CODE 636 W HCPCS

## 2024-08-09 PROCEDURE — 84100 ASSAY OF PHOSPHORUS: CPT

## 2024-08-09 PROCEDURE — 86140 C-REACTIVE PROTEIN: CPT

## 2024-08-09 PROCEDURE — 85025 COMPLETE CBC W/AUTO DIFF WBC: CPT

## 2024-08-09 PROCEDURE — 36415 COLL VENOUS BLD VENIPUNCTURE: CPT

## 2024-08-09 PROCEDURE — 83735 ASSAY OF MAGNESIUM: CPT

## 2024-08-09 PROCEDURE — 20600001 HC STEP DOWN PRIVATE ROOM

## 2024-08-09 PROCEDURE — 80048 BASIC METABOLIC PNL TOTAL CA: CPT

## 2024-08-09 RX ORDER — DEXTROSE MONOHYDRATE AND SODIUM CHLORIDE 5; .9 G/100ML; G/100ML
INJECTION, SOLUTION INTRAVENOUS CONTINUOUS
Status: DISCONTINUED | OUTPATIENT
Start: 2024-08-09 | End: 2024-08-10

## 2024-08-09 RX ORDER — LANOLIN ALCOHOL/MO/W.PET/CERES
400 CREAM (GRAM) TOPICAL ONCE
Status: COMPLETED | OUTPATIENT
Start: 2024-08-09 | End: 2024-08-09

## 2024-08-09 RX ORDER — ONDANSETRON HYDROCHLORIDE 2 MG/ML
8 INJECTION, SOLUTION INTRAVENOUS EVERY 12 HOURS PRN
Status: DISCONTINUED | OUTPATIENT
Start: 2024-08-09 | End: 2024-08-12 | Stop reason: HOSPADM

## 2024-08-09 RX ADMIN — ONDANSETRON 4 MG: 2 INJECTION INTRAMUSCULAR; INTRAVENOUS at 11:08

## 2024-08-09 RX ADMIN — IBUPROFEN 800 MG: 400 TABLET ORAL at 05:08

## 2024-08-09 RX ADMIN — HEPARIN SODIUM 5000 UNITS: 5000 INJECTION INTRAVENOUS; SUBCUTANEOUS at 08:08

## 2024-08-09 RX ADMIN — MUPIROCIN: 20 OINTMENT TOPICAL at 08:08

## 2024-08-09 RX ADMIN — ACETAMINOPHEN 1000 MG: 500 TABLET ORAL at 05:08

## 2024-08-09 RX ADMIN — HEPARIN SODIUM 5000 UNITS: 5000 INJECTION INTRAVENOUS; SUBCUTANEOUS at 09:08

## 2024-08-09 RX ADMIN — ACETAMINOPHEN 1000 MG: 500 TABLET ORAL at 02:08

## 2024-08-09 RX ADMIN — DEXTROSE AND SODIUM CHLORIDE: 5; 900 INJECTION, SOLUTION INTRAVENOUS at 05:08

## 2024-08-09 RX ADMIN — ACETAMINOPHEN 1000 MG: 500 TABLET ORAL at 09:08

## 2024-08-09 RX ADMIN — Medication 400 MG: at 08:08

## 2024-08-09 RX ADMIN — PIPERACILLIN SODIUM AND TAZOBACTAM SODIUM 4.5 G: 4; .5 INJECTION, POWDER, FOR SOLUTION INTRAVENOUS at 02:08

## 2024-08-09 RX ADMIN — PIPERACILLIN SODIUM AND TAZOBACTAM SODIUM 4.5 G: 4; .5 INJECTION, POWDER, FOR SOLUTION INTRAVENOUS at 05:08

## 2024-08-09 RX ADMIN — IBUPROFEN 800 MG: 400 TABLET ORAL at 02:08

## 2024-08-09 RX ADMIN — MUPIROCIN: 20 OINTMENT TOPICAL at 09:08

## 2024-08-09 RX ADMIN — PIPERACILLIN SODIUM AND TAZOBACTAM SODIUM 4.5 G: 4; .5 INJECTION, POWDER, FOR SOLUTION INTRAVENOUS at 10:08

## 2024-08-09 RX ADMIN — PANTOPRAZOLE SODIUM 40 MG: 40 INJECTION, POWDER, FOR SOLUTION INTRAVENOUS at 08:08

## 2024-08-09 RX ADMIN — IBUPROFEN 800 MG: 400 TABLET ORAL at 09:08

## 2024-08-09 RX ADMIN — AMLODIPINE BESYLATE 10 MG: 10 TABLET ORAL at 08:08

## 2024-08-09 NOTE — ASSESSMENT & PLAN NOTE
Buffy Dominique is a 78yoF who underwent rectopexy in 5/2024 who presented to the emergency room with an incarcerated incisional hernia causing a small bowel obstruction on 8/7. S/p laparoscopic-converted open incisional hernia repair with small bowel resection with Dr Myles on 8/7.     - Clear liquid diet, as tolerated  - Trend CRP  - Encourage OOB and IS  - Replenish electrolytes PRN  - Continue antibiotics  - please call with questions

## 2024-08-09 NOTE — NURSING
[facility] Plan of Care Note     Dx: incisional hernia      Shift Events: no significant events      Goals of Care: Pain control. Wound care.      Neuro: WNL     Vital Signs: WNL     Respiratory: Room Air     Diet: NPO with ice chips     Is patient tolerating current diet? Yes     GTTS: NS @ 125     Urine Output/Bowel Movement: See flowsheet     Drains/Tubes/Tube Feeds (include total output/shift): Sanders catheter.      Lines: PIV x 1        Accuchecks: none     Skin: Lap sites and one Horizontal incision to right quad. All dermabond     Fall Risk Score: 1     Activity level? One assist.     Any scheduled procedures? none     Any safety concerns? Fall risk     Other: none

## 2024-08-09 NOTE — SUBJECTIVE & OBJECTIVE
"  Subjective:     Interval History: No acute over night events. Pain under control, but reports feeling "very tender" around incision sites. NG tube removed yesterday. No nausea or vomiting. No flatus or BM. Patient hemodynamically stable. CRP elevated to 164.2. CBC WNL.    Post-Op Info:  Procedure(s) (LRB):  REPAIR, HERNIA, INCISIONAL, LAPAROSCOPIC, SMALL BOWEL RESECTION (N/A)  REPAIR, HERNIA, INCISIONAL  EXCISION, SMALL INTESTINE   2 Days Post-Op      Medications:  Continuous Infusions:      Scheduled Meds:   acetaminophen  1,000 mg Oral Q8H    amLODIPine  10 mg Oral Daily    heparin (porcine)  5,000 Units Subcutaneous Q12H    ibuprofen  800 mg Oral Q8H    mupirocin   Nasal BID    pantoprazole  40 mg Intravenous Daily    piperacillin-tazobactam (Zosyn) IV (PEDS and ADULTS) (extended infusion is not appropriate)  4.5 g Intravenous Q8H     PRN Meds:   acetaminophen tablet 1,000 mg    amLODIPine tablet 10 mg    heparin (porcine) injection 5,000 Units    ibuprofen tablet 800 mg    mupirocin 2 % ointment    pantoprazole injection 40 mg    piperacillin-tazobactam (ZOSYN) 4.5 g in D5W 100 mL IVPB (MB+)        Objective:     Vital Signs (Most Recent):  Temp: 98 °F (36.7 °C) (08/09/24 0722)  Pulse: 67 (08/09/24 0722)  Resp: 19 (08/09/24 0722)  BP: (!) 142/64 (08/09/24 0722)  SpO2: (!) 92 % (08/09/24 0722) Vital Signs (24h Range):  Temp:  [96.8 °F (36 °C)-98.2 °F (36.8 °C)] 98 °F (36.7 °C)  Pulse:  [63-78] 67  Resp:  [16-19] 19  SpO2:  [92 %-98 %] 92 %  BP: (121-167)/(60-72) 142/64     Intake/Output - Last 3 Shifts         08/07 0700  08/08 0659 08/08 0700 08/09 0659 08/09 0700  08/10 0659    P.O.  100     I.V. (mL/kg) 584.4 (11.5) 1462.7 (28.8)     IV Piggyback 1462.6 1261     Total Intake(mL/kg) 2046.9 (40.4) 2823.6 (55.7)     Urine (mL/kg/hr) 600 1350 (1.1)     Drains 20      Total Output 620 1350     Net +1426.9 +1473.6                     Physical Exam  Constitutional:       General: She is not in acute distress.     " Appearance: Normal appearance.   HENT:      Head: Normocephalic and atraumatic.      Mouth/Throat:      Mouth: Mucous membranes are moist.   Eyes:      Extraocular Movements: Extraocular movements intact.      Conjunctiva/sclera: Conjunctivae normal.   Cardiovascular:      Rate and Rhythm: Normal rate and regular rhythm.   Pulmonary:      Effort: Pulmonary effort is normal. No respiratory distress.   Abdominal:      Comments: Abdomen soft, non-distended, tender to palpation around sites of incisions appropriate for post-operative course. Multiple abdominal incisions healing appropriately. All incisions clean, dry, intact.   Skin:     General: Skin is warm and dry.   Neurological:      General: No focal deficit present.      Mental Status: She is alert and oriented to person, place, and time. Mental status is at baseline.        Significant Labs:  BMP (Last 3 Results):   Recent Labs   Lab 08/07/24  1403 08/08/24  0926 08/09/24 0217    103 83   * 128* 129*   K 4.4 3.9 4.0   CL 97 102 99   CO2 22* 21* 22*   BUN 18 12 12   CREATININE 0.8 0.6 0.7   CALCIUM 9.6 7.9* 8.0*   MG  --  1.4* 1.8     CBC (Last 3 Results):   Recent Labs   Lab 08/08/24  0611 08/08/24  0926 08/09/24 0217   WBC 6.17 6.87 6.23   RBC 2.85* 3.11* 3.00*   HGB 9.2* 9.9* 9.6*   HCT 27.8* 30.0* 28.8*    194 188   MCV 98 97 96   MCH 32.3* 31.8* 32.0*   MCHC 33.1 33.0 33.3     CRP (Last 3 Results):   Recent Labs   Lab 08/09/24 0217   .2*       Significant Diagnostics:  None

## 2024-08-09 NOTE — PROGRESS NOTES
"[facility]  Colorectal Surgery  Progress Note    Patient Name: Buffy Dominique  MRN: [id number]  Admission Date: 8/7/2024  Hospital Length of Stay: 2 days  Attending Physician: Sharath Myles MD    Subjective:     Interval History: No acute over night events. Pain under control, but reports feeling "very tender" around incision sites. NG tube removed yesterday. No nausea or vomiting. No flatus or BM. Patient hemodynamically stable. CRP elevated to 164.2. CBC WNL.    Post-Op Info:  Procedure(s) (LRB):  REPAIR, HERNIA, INCISIONAL, LAPAROSCOPIC, SMALL BOWEL RESECTION (N/A)  REPAIR, HERNIA, INCISIONAL  EXCISION, SMALL INTESTINE   2 Days Post-Op      Medications:  Continuous Infusions:      Scheduled Meds:   acetaminophen  1,000 mg Oral Q8H    amLODIPine  10 mg Oral Daily    heparin (porcine)  5,000 Units Subcutaneous Q12H    ibuprofen  800 mg Oral Q8H    mupirocin   Nasal BID    pantoprazole  40 mg Intravenous Daily    piperacillin-tazobactam (Zosyn) IV (PEDS and ADULTS) (extended infusion is not appropriate)  4.5 g Intravenous Q8H     PRN Meds:   acetaminophen tablet 1,000 mg    amLODIPine tablet 10 mg    heparin (porcine) injection 5,000 Units    ibuprofen tablet 800 mg    mupirocin 2 % ointment    pantoprazole injection 40 mg    piperacillin-tazobactam (ZOSYN) 4.5 g in D5W 100 mL IVPB (MB+)        Objective:     Vital Signs (Most Recent):  Temp: 98 °F (36.7 °C) (08/09/24 0722)  Pulse: 67 (08/09/24 0722)  Resp: 19 (08/09/24 0722)  BP: (!) 142/64 (08/09/24 0722)  SpO2: (!) 92 % (08/09/24 0722) Vital Signs (24h Range):  Temp:  [96.8 °F (36 °C)-98.2 °F (36.8 °C)] 98 °F (36.7 °C)  Pulse:  [63-78] 67  Resp:  [16-19] 19  SpO2:  [92 %-98 %] 92 %  BP: (121-167)/(60-72) 142/64     Intake/Output - Last 3 Shifts         08/07 0700  08/08 0659 08/08 0700 08/09 0659 08/09 0700  08/10 0659    P.O.  100     I.V. (mL/kg) 584.4 (11.5) 1462.7 (28.8)     IV Piggyback 1462.6 1261     Total Intake(mL/kg) 2046.9 (40.4) 2823.6 (55.7)  "    Urine (mL/kg/hr) 600 1350 (1.1)     Drains 20      Total Output 620 1350     Net +1426.9 +1473.6                     Physical Exam  Constitutional:       General: She is not in acute distress.     Appearance: Normal appearance.   HENT:      Head: Normocephalic and atraumatic.      Mouth/Throat:      Mouth: Mucous membranes are moist.   Eyes:      Extraocular Movements: Extraocular movements intact.      Conjunctiva/sclera: Conjunctivae normal.   Cardiovascular:      Rate and Rhythm: Normal rate and regular rhythm.   Pulmonary:      Effort: Pulmonary effort is normal. No respiratory distress.   Abdominal:      Comments: Abdomen soft, non-distended, tender to palpation around sites of incisions appropriate for post-operative course. Multiple abdominal incisions healing appropriately. All incisions clean, dry, intact.   Skin:     General: Skin is warm and dry.   Neurological:      General: No focal deficit present.      Mental Status: She is alert and oriented to person, place, and time. Mental status is at baseline.        Significant Labs:  BMP (Last 3 Results):   Recent Labs   Lab 08/07/24  1403 08/08/24  0926 08/09/24  0217    103 83   * 128* 129*   K 4.4 3.9 4.0   CL 97 102 99   CO2 22* 21* 22*   BUN 18 12 12   CREATININE 0.8 0.6 0.7   CALCIUM 9.6 7.9* 8.0*   MG  --  1.4* 1.8     CBC (Last 3 Results):   Recent Labs   Lab 08/08/24  0611 08/08/24  0926 08/09/24 0217   WBC 6.17 6.87 6.23   RBC 2.85* 3.11* 3.00*   HGB 9.2* 9.9* 9.6*   HCT 27.8* 30.0* 28.8*    194 188   MCV 98 97 96   MCH 32.3* 31.8* 32.0*   MCHC 33.1 33.0 33.3     CRP (Last 3 Results):   Recent Labs   Lab 08/09/24 0217   .2*       Significant Diagnostics:  None  Assessment/Plan:     * Incisional hernia  Buffy Dominique is a 78yoF who underwent rectopexy in 5/2024 who presented to the emergency room with an incarcerated incisional hernia causing a small bowel obstruction on 8/7. S/p laparoscopic-converted open incisional  hernia repair with small bowel resection with Dr Myles on 8/7.     - Clear liquid diet, as tolerated  - Trend CRP  - Encourage OOB and IS  - Replenish electrolytes PRN  - Continue antibiotics  - please call with questions      Krystal Beltre, DO  Colorectal Surgery  [facility]

## 2024-08-09 NOTE — PT/OT/SLP PROGRESS
"Occupational Therapy   Treatment    Name: Buffy Dominique  MRN: [id number]  Admitting Diagnosis:  Incisional hernia  2 Days Post-Op    Recommendations:     Discharge Recommendations: Low Intensity Therapy  Discharge Equipment Recommendations:  none  Barriers to discharge:  None    Assessment:     Buffy Dominique is a 78 y.o. female with a medical diagnosis of Incisional hernia.  She presents with the following performance deficits affecting function are impaired endurance, weakness, impaired self care skills, pain.     Pt agreeable to therapy and tolerated well. Pt would continue to benefit from skilled OT services to maximize functional independence with ADLs and functional mobility, reduce caregiver burden, and facilitate safe discharge in the least restrictive environment.      Rehab Prognosis:  Good; patient would benefit from acute skilled OT services to address these deficits and reach maximum level of function.       Plan:     Patient to be seen 3 x/week to address the above listed problems via self-care/home management, therapeutic activities, therapeutic exercises  Plan of Care Expires: 09/07/24  Plan of Care Reviewed with: patient    Subjective     Chief Complaint: Nausea  Patient/Family Comments/goals: pt agreeable to OT session  Pain/Comfort:  Pain Rating 1:  ("soreness")  Location 1: abdomen    Objective:     Communicated with: RN prior to session.  Patient found HOB elevated with peripheral IV upon OT entry to room.    General Precautions: Standard, fall    Orthopedic Precautions:N/A  Braces: N/A  Respiratory Status: Room air     Occupational Performance:     Bed Mobility:    Patient completed Scooting to EOB with independence  Patient completed Supine to Sit with independence  Patient completed Sit to Supine with independence     Functional Mobility/Transfers:  Patient completed Sit <> Stand Transfer with supervision  with  no assistive device       [facility] 6 Click ADL: 21    Treatment & Education:  Therapeutic " exercises: pt completed the following UE exercises to maintain/improve UE strength and overall activity tolerance required for participation/independence with ADLs, IADLs & functional mobility/transfers. Pt completed the following exercises utilizing a red theraband   - Seated rows x10   - Elbow flexion 2x10 (each UE)   - Elbow extension 3x10 (each UE)  -Education on task modification to maximize safety and (I) during ADLs and mobility  -Education on importance of OOB/UE activity to improve overall activity tolerance and promote recovery   Pt had no further questions & when asked whether there were any concerns pt reported none.     Patient left HOB elevated with all lines intact and call button in reach    GOALS:   Multidisciplinary Problems       Occupational Therapy Goals          Problem: Occupational Therapy    Goal Priority Disciplines Outcome Interventions   Occupational Therapy Goal     OT, PT/OT Progressing    Description: Goals to be met by: 9/7/24     Patient will increase functional independence with ADLs by performing:    LE Dressing with [place].  Grooming while standing at sink with [place].  Toileting from toilet with [place] for hygiene and clothing management.   Supine to sit with [place].  Toilet transfer to toilet with [place].                         Time Tracking:     OT Date of Treatment: 08/09/24  OT Start Time: 1307  OT Stop Time: 1331  OT Total Time (min): 24 min    Billable Minutes:Therapeutic Exercise 24    OT/SHAYNA: OT          8/9/2024

## 2024-08-09 NOTE — PLAN OF CARE
Problem: Acute Kidney Injury/Impairment  Goal: Fluid and Electrolyte Balance  Outcome: Met  Goal: Improved Oral Intake  Outcome: Met  Goal: Effective Renal Function  Outcome: Met     Problem: Wound  Goal: Optimal Coping  Outcome: Progressing  Intervention: Support Patient and Family Response  Flowsheets (Taken 8/9/2024 7045)  Supportive Measures: goal-setting facilitated  Family/Support System Care: self-care encouraged

## 2024-08-09 NOTE — PLAN OF CARE
[facility]  Initial Discharge Assessment       Primary Care Provider: Odalis Kim MD    Admission Diagnosis: Abdominal pain [R10.9]    Admission Date: 8/7/2024  Expected Discharge Date: 8/11/2024    Transition of Care Barriers: None    [facility] / Plan: [employer] / Product Type: Medicare Advantage /     Extended Emergency Contact Information  Primary Emergency Contact: Anatoly Rosado  Address: [address] [place] [address] [address]           [address] [place]  Mobile Phone: [phone]  Relation: Son    Discharge Plan A: Home  Discharge Plan B: Home Health      [facility] - [place], [place] - [address]  [address]  [address]  [facility] [address]  Phone: [phone] Fax: [phone]      Initial Assessment (most recent)       Adult Discharge Assessment - 08/09/24 1830          Discharge Assessment    Assessment Type Discharge Planning Assessment     Confirmed/corrected address, phone number and insurance Yes     Confirmed Demographics Correct on Facesheet     Source of Information patient     Communicated MARCELLA with patient/caregiver Yes     People in Home alone     Do you expect to return to your current living situation? Yes     Do you have help at home or someone to help you manage your care at home? Yes     Who are your caregiver(s) and their phone number(s)? son     Prior to hospitilization cognitive status: Alert/Oriented     Current cognitive status: Alert/Oriented     Home Layout Able to live on 1st floor     Do you take prescription medications? Yes     Do you have prescription coverage? Yes     Do you have any problems affording any of your prescribed medications? No     Is the patient taking medications as prescribed? yes     Who is going to help you get home at discharge? son     How do you get to doctors appointments? family or friend will provide     Are you on dialysis? No     Do you take coumadin? No      Discharge Plan A Home     Discharge Plan B Home Health     Discharge Plan discussed with: Patient     Transition of Care Barriers None     SDOH --   no       Physical Activity    On average, how many days per week do you engage in moderate to strenuous exercise (like a brisk walk)? 0 days     On average, how many minutes do you engage in exercise at this level? 0 min        Financial Resource Strain    How hard is it for you to pay for the very basics like food, housing, medical care, and heating? Not hard at all        Housing Stability    In the last 12 months, was there a time when you were not able to pay the mortgage or rent on time? No     At any time in the past 12 months, were you homeless or living in a shelter (including now)? No        Transportation Needs    Has the lack of transportation kept you from medical appointments, meetings, work or from getting things needed for daily living? No        Food Insecurity    Within the past 12 months, you worried that your food would run out before you got the money to buy more. Never true     Within the past 12 months, the food you bought just didn't last and you didn't have money to get more. Never true        Stress    Do you feel stress - tense, restless, nervous, or anxious, or unable to sleep at night because your mind is troubled all the time - these days? Not at all        Social Isolation    How often do you feel lonely or isolated from those around you?  Never        Alcohol Use    Q1: How often do you have a drink containing alcohol? Never     Q2: How many drinks containing alcohol do you have on a typical day when you are drinking? Patient does not drink     Q3: How often do you have six or more drinks on one occasion? Never        Utilities    In the past 12 months has the electric, gas, oil, or water company threatened to shut off services in your home? No        Health Literacy    How often do you need to have someone help you when you read  instructions, pamphlets, or other written material from your doctor or pharmacy? Never                   The CM met with the patient at bedside to complete the DPA. The CM placed name and contact information on the blackboard in the patient's room.  Use preferred pharmacy / bedside delivery for any necessary  medications at the time of discharge.The patient is independent with all ADLs.  Patient uses a Rollator Walker. The patient is not on Dialysis or Coumadin. The patient's son will provide assistance to the patient upon discharge. The patient's son will provide transportation upon discharge. The CM will continue to follow for course of hospitalization.

## 2024-08-09 NOTE — PLAN OF CARE
Problem: Adult Inpatient Plan of Care  Goal: Plan of Care Review  Outcome: Progressing  Goal: Patient-Specific Goal (Individualized)  Outcome: Progressing  Goal: Absence of Hospital-Acquired Illness or Injury  Outcome: Progressing  Goal: Optimal Comfort and Wellbeing  Outcome: Progressing  Goal: Readiness for Transition of Care  Outcome: Progressing     Problem: Wound  Goal: Optimal Coping  Outcome: Progressing  Goal: Optimal Functional Ability  Outcome: Progressing  Goal: Absence of Infection Signs and Symptoms  Outcome: Progressing  Goal: Improved Oral Intake  Outcome: Progressing  Goal: Optimal Pain Control and Function  Outcome: Progressing  Goal: Skin Health and Integrity  Outcome: Progressing  Goal: Optimal Wound Healing  Outcome: Progressing     Problem: Infection  Goal: Absence of Infection Signs and Symptoms  Outcome: Progressing     Problem: Pain Acute  Goal: Optimal Pain Control and Function  Outcome: Progressing     Problem: Fall Injury Risk  Goal: Absence of Fall and Fall-Related Injury  Outcome: Progressing

## 2024-08-10 LAB
ANION GAP SERPL CALC-SCNC: 10 MMOL/L (ref 8–16)
BASOPHILS # BLD AUTO: 0.01 K/UL (ref 0–0.2)
BASOPHILS NFR BLD: 0.1 % (ref 0–1.9)
BUN SERPL-MCNC: 7 MG/DL (ref 8–23)
CALCIUM SERPL-MCNC: 7.9 MG/DL (ref 8.7–10.5)
CHLORIDE SERPL-SCNC: 100 MMOL/L (ref 95–110)
CO2 SERPL-SCNC: 21 MMOL/L (ref 23–29)
CREAT SERPL-MCNC: 0.6 MG/DL (ref 0.5–1.4)
CRP SERPL-MCNC: 287.3 MG/L (ref 0–8.2)
DIFFERENTIAL METHOD BLD: ABNORMAL
EOSINOPHIL # BLD AUTO: 0.1 K/UL (ref 0–0.5)
EOSINOPHIL NFR BLD: 0.7 % (ref 0–8)
ERYTHROCYTE [DISTWIDTH] IN BLOOD BY AUTOMATED COUNT: 13.2 % (ref 11.5–14.5)
EST. GFR  (NO RACE VARIABLE): >60 ML/MIN/1.73 M^2
GLUCOSE SERPL-MCNC: 75 MG/DL (ref 70–110)
HCT VFR BLD AUTO: 30.7 % (ref 37–48.5)
HGB BLD-MCNC: 10 G/DL (ref 12–16)
IMM GRANULOCYTES # BLD AUTO: 0.02 K/UL (ref 0–0.04)
IMM GRANULOCYTES NFR BLD AUTO: 0.3 % (ref 0–0.5)
LYMPHOCYTES # BLD AUTO: 1.5 K/UL (ref 1–4.8)
LYMPHOCYTES NFR BLD: 21.2 % (ref 18–48)
MAGNESIUM SERPL-MCNC: 1.4 MG/DL (ref 1.6–2.6)
MCH RBC QN AUTO: 31.9 PG (ref 27–31)
MCHC RBC AUTO-ENTMCNC: 32.6 G/DL (ref 32–36)
MCV RBC AUTO: 98 FL (ref 82–98)
MONOCYTES # BLD AUTO: 0.6 K/UL (ref 0.3–1)
MONOCYTES NFR BLD: 8 % (ref 4–15)
NEUTROPHILS # BLD AUTO: 5 K/UL (ref 1.8–7.7)
NEUTROPHILS NFR BLD: 69.7 % (ref 38–73)
NRBC BLD-RTO: 0 /100 WBC
PHOSPHATE SERPL-MCNC: 2.3 MG/DL (ref 2.7–4.5)
PLATELET # BLD AUTO: 209 K/UL (ref 150–450)
PMV BLD AUTO: 9 FL (ref 9.2–12.9)
POTASSIUM SERPL-SCNC: 3.3 MMOL/L (ref 3.5–5.1)
RBC # BLD AUTO: 3.13 M/UL (ref 4–5.4)
SODIUM SERPL-SCNC: 131 MMOL/L (ref 136–145)
WBC # BLD AUTO: 7.23 K/UL (ref 3.9–12.7)

## 2024-08-10 PROCEDURE — 63600175 PHARM REV CODE 636 W HCPCS

## 2024-08-10 PROCEDURE — 83735 ASSAY OF MAGNESIUM: CPT

## 2024-08-10 PROCEDURE — 36415 COLL VENOUS BLD VENIPUNCTURE: CPT

## 2024-08-10 PROCEDURE — 25000003 PHARM REV CODE 250

## 2024-08-10 PROCEDURE — 20600001 HC STEP DOWN PRIVATE ROOM

## 2024-08-10 PROCEDURE — 85025 COMPLETE CBC W/AUTO DIFF WBC: CPT

## 2024-08-10 PROCEDURE — 63600175 PHARM REV CODE 636 W HCPCS: Performed by: STUDENT IN AN ORGANIZED HEALTH CARE EDUCATION/TRAINING PROGRAM

## 2024-08-10 PROCEDURE — 25000003 PHARM REV CODE 250: Performed by: STUDENT IN AN ORGANIZED HEALTH CARE EDUCATION/TRAINING PROGRAM

## 2024-08-10 PROCEDURE — 80048 BASIC METABOLIC PNL TOTAL CA: CPT

## 2024-08-10 PROCEDURE — 86140 C-REACTIVE PROTEIN: CPT

## 2024-08-10 PROCEDURE — 84100 ASSAY OF PHOSPHORUS: CPT

## 2024-08-10 RX ORDER — MAGNESIUM SULFATE HEPTAHYDRATE 40 MG/ML
2 INJECTION, SOLUTION INTRAVENOUS ONCE
Status: COMPLETED | OUTPATIENT
Start: 2024-08-10 | End: 2024-08-10

## 2024-08-10 RX ORDER — HYDRALAZINE HYDROCHLORIDE 50 MG/1
50 TABLET, FILM COATED ORAL EVERY 12 HOURS
Status: DISCONTINUED | OUTPATIENT
Start: 2024-08-10 | End: 2024-08-12 | Stop reason: HOSPADM

## 2024-08-10 RX ADMIN — PIPERACILLIN SODIUM AND TAZOBACTAM SODIUM 4.5 G: 4; .5 INJECTION, POWDER, FOR SOLUTION INTRAVENOUS at 10:08

## 2024-08-10 RX ADMIN — PIPERACILLIN SODIUM AND TAZOBACTAM SODIUM 4.5 G: 4; .5 INJECTION, POWDER, FOR SOLUTION INTRAVENOUS at 06:08

## 2024-08-10 RX ADMIN — IBUPROFEN 800 MG: 400 TABLET ORAL at 06:08

## 2024-08-10 RX ADMIN — HYDRALAZINE HYDROCHLORIDE 50 MG: 50 TABLET ORAL at 09:08

## 2024-08-10 RX ADMIN — IBUPROFEN 800 MG: 400 TABLET ORAL at 09:08

## 2024-08-10 RX ADMIN — HEPARIN SODIUM 5000 UNITS: 5000 INJECTION INTRAVENOUS; SUBCUTANEOUS at 09:08

## 2024-08-10 RX ADMIN — POTASSIUM PHOSPHATE, MONOBASIC AND POTASSIUM PHOSPHATE, DIBASIC 15 MMOL: 224; 236 INJECTION, SOLUTION, CONCENTRATE INTRAVENOUS at 10:08

## 2024-08-10 RX ADMIN — PIPERACILLIN SODIUM AND TAZOBACTAM SODIUM 4.5 G: 4; .5 INJECTION, POWDER, FOR SOLUTION INTRAVENOUS at 02:08

## 2024-08-10 RX ADMIN — PANTOPRAZOLE SODIUM 40 MG: 40 INJECTION, POWDER, FOR SOLUTION INTRAVENOUS at 09:08

## 2024-08-10 RX ADMIN — AMLODIPINE BESYLATE 10 MG: 10 TABLET ORAL at 09:08

## 2024-08-10 RX ADMIN — ACETAMINOPHEN 1000 MG: 500 TABLET ORAL at 06:08

## 2024-08-10 RX ADMIN — ONDANSETRON 8 MG: 2 INJECTION INTRAMUSCULAR; INTRAVENOUS at 10:08

## 2024-08-10 RX ADMIN — MAGNESIUM SULFATE HEPTAHYDRATE 2 G: 40 INJECTION, SOLUTION INTRAVENOUS at 10:08

## 2024-08-10 RX ADMIN — ACETAMINOPHEN 1000 MG: 500 TABLET ORAL at 09:08

## 2024-08-10 NOTE — PLAN OF CARE
"  Problem: Adult Inpatient Plan of Care  Goal: Plan of Care Review  Outcome: Progressing  Goal: Patient-Specific Goal (Individualized)  Outcome: Progressing  Goal: Absence of Hospital-Acquired Illness or Injury  Outcome: Progressing  Goal: Optimal Comfort and Wellbeing  Outcome: Progressing  Goal: Readiness for Transition of Care  Outcome: Progressing     Problem: Wound  Goal: Optimal Coping  Outcome: Progressing  Goal: Optimal Functional Ability  Outcome: Progressing  Goal: Absence of Infection Signs and Symptoms  Outcome: Progressing  Goal: Improved Oral Intake  Outcome: Progressing  Goal: Optimal Pain Control and Function  Outcome: Progressing  Goal: Skin Health and Integrity  Outcome: Progressing  Goal: Optimal Wound Healing  Outcome: Progressing     Problem: Infection  Goal: Absence of Infection Signs and Symptoms  Outcome: Progressing     Problem: Pain Acute  Goal: Optimal Pain Control and Function  Outcome: Progressing     Problem: Fall Injury Risk  Goal: Absence of Fall and Fall-Related Injury  Outcome: Progressing         [facility] Plan of Care Note    Dx:   Abdominal pain [R10.9]    Shift Events: NA    Goals of Care: IV ABX, pain control    Neuro: WDL    Vital Signs: BP (!) 184/72   Pulse 68   Temp 98.1 °F (36.7 °C)   Resp 18   Ht 5' 3" (1.6 m)   Wt 50.7 kg (111 lb 12.4 oz)   LMP  (LMP Unknown)   SpO2 (!) 93%   Breastfeeding No   BMI 19.80 kg/m²     Respiratory: WDL    Diet: Diet NPO Except for: Ice Chips      Is patient tolerating current diet? NPO    GTTS: IVF    Urine Output/Bowel Movement:   No intake/output data recorded.  Last Bowel Movement: 08/10/24      Drains/Tubes/Tube Feeds (include total output/shift):   No intake/output data recorded.      Lines: PIV x 1      Accuchecks: NA    Skin: WDL ex incisions    Fall Risk Score: see flow sheets    Activity level? Standing with assistance    Any scheduled procedures? NA    Any safety concerns? None    Other: NA   "

## 2024-08-10 NOTE — PROGRESS NOTES
[facility]  Colorectal Surgery  Progress Note    Patient Name: Buffy Dominique  MRN: [id number]  Admission Date: 8/7/2024  Hospital Length of Stay: 3 days  Attending Physician: Sharath Myles MD    Subjective:     Interval History: No acute over night events. Pain under improved control. Some nausea yesterday so changed back to NPO. No nausea today. Had multiple BM overnight and passing flatus. Patient ambulating daily. Patient hemodynamically stable. CRP elevated to 287 from 164.2 yesterday. Patient reports symptomatic improvement, with no narcotic pain medication use. Will continue monitoring. CBC WNL.    Post-Op Info:  Procedure(s) (LRB):  REPAIR, HERNIA, INCISIONAL, LAPAROSCOPIC, SMALL BOWEL RESECTION (N/A)  REPAIR, HERNIA, INCISIONAL  EXCISION, SMALL INTESTINE   3 Days Post-Op      Medications:  Continuous Infusions:      Scheduled Meds:   acetaminophen  1,000 mg Oral Q8H    amLODIPine  10 mg Oral Daily    heparin (porcine)  5,000 Units Subcutaneous Q12H    hydrALAZINE  50 mg Oral Q12H    ibuprofen  800 mg Oral Q8H    pantoprazole  40 mg Intravenous Daily    piperacillin-tazobactam (Zosyn) IV (PEDS and ADULTS) (extended infusion is not appropriate)  4.5 g Intravenous Q8H    spironolactone  25 mg Oral Daily     PRN Meds:   acetaminophen tablet 1,000 mg    amLODIPine tablet 10 mg    heparin (porcine) injection 5,000 Units    hydrALAZINE tablet 50 mg    ibuprofen tablet 800 mg    pantoprazole injection 40 mg    piperacillin-tazobactam (ZOSYN) 4.5 g in D5W 100 mL IVPB (MB+)    spironolactone split tablet 25 mg        Objective:     Vital Signs (Most Recent):  Temp: 98.2 °F (36.8 °C) (08/10/24 0718)  Pulse: 78 (08/10/24 0756)  Resp: 20 (08/10/24 0718)  BP: (!) 186/74 (08/10/24 0756)  SpO2: 97 % (08/10/24 0718) Vital Signs (24h Range):  Temp:  [98 °F (36.7 °C)-98.2 °F (36.8 °C)] 98.2 °F (36.8 °C)  Pulse:  [68-78] 78  Resp:  [16-20] 20  SpO2:  [93 %-98 %] 97 %  BP: (139-189)/(63-78) 186/74     Intake/Output - Last  3 Shifts         08/08 0700 08/09 0659 08/09 0700  08/10 0659 08/10 0700 08/11 0659    P.O. 100      I.V. (mL/kg) 1462.7 (28.8)      IV Piggyback 1261      Total Intake(mL/kg) 2823.6 (55.7)      Urine (mL/kg/hr) 1350 (1.1) 100 (0.1)     Drains       Stool  0     Total Output 1350 100     Net +1473.6 -100            Urine Occurrence  1 x     Stool Occurrence  1 x              Physical Exam  Constitutional:       General: She is not in acute distress.     Appearance: Normal appearance.   HENT:      Head: Normocephalic and atraumatic.      Mouth/Throat:      Mouth: Mucous membranes are moist.   Eyes:      Extraocular Movements: Extraocular movements intact.      Conjunctiva/sclera: Conjunctivae normal.   Cardiovascular:      Rate and Rhythm: Normal rate and regular rhythm.   Pulmonary:      Effort: Pulmonary effort is normal. No respiratory distress.   Abdominal:      Comments: Abdomen soft, non-distended, less tender to palpation around sites of incisions than yesterday, appropriate for post-operative course. Multiple abdominal incisions healing appropriately. All incisions clean, dry, intact.   Skin:     General: Skin is warm and dry.   Neurological:      General: No focal deficit present.      Mental Status: She is alert and oriented to person, place, and time. Mental status is at baseline.        Significant Labs:  BMP (Last 3 Results):   Recent Labs   Lab 08/08/24  0926 08/09/24  0217 08/10/24  0524    83 75   * 129* 131*   K 3.9 4.0 3.3*    99 100   CO2 21* 22* 21*   BUN 12 12 7*   CREATININE 0.6 0.7 0.6   CALCIUM 7.9* 8.0* 7.9*   MG 1.4* 1.8 1.4*     CBC (Last 3 Results):   Recent Labs   Lab 08/08/24  0926 08/09/24  0217 08/10/24  0524   WBC 6.87 6.23 7.23   RBC 3.11* 3.00* 3.13*   HGB 9.9* 9.6* 10.0*   HCT 30.0* 28.8* 30.7*    188 209   MCV 97 96 98   MCH 31.8* 32.0* 31.9*   MCHC 33.0 33.3 32.6     CRP (Last 3 Results):   Recent Labs   Lab 08/09/24  0217 08/10/24  0524   .2*  287.3*       Significant Diagnostics:  None  Assessment/Plan:     * Incisional hernia  Buffy Dominique is a 78yoF who underwent rectopexy in 5/2024 who presented to the emergency room with an incarcerated incisional hernia causing a small bowel obstruction on 8/7. S/p laparoscopic-converted open incisional hernia repair with small bowel resection with Dr Myles on 8/7.     - NPO last night; advance to clear liquid diet, as tolerated  - Trend CRP  - Removed IV pain medication; ibuprofen and tylenol on board. If require IV pain med, please notify CRS team  - Encourage OOB and IS  - Replenish electrolytes PRN  - Continue antibiotics  - please call with questions      Krystal Beltre, DO  Colorectal Surgery  [facility]

## 2024-08-10 NOTE — PROGRESS NOTES
08/10/24 0756   Vital Signs   Pulse 78   BP (!) 186/74   MAP (mmHg) 107     Resident on call notified via telephone call. Informed him that patient's home antihypertensives we not restarted. States he will review chart.

## 2024-08-10 NOTE — ASSESSMENT & PLAN NOTE
Buffy Dominique is a 78yoF who underwent rectopexy in 5/2024 who presented to the emergency room with an incarcerated incisional hernia causing a small bowel obstruction on 8/7. S/p laparoscopic-converted open incisional hernia repair with small bowel resection with Dr Myles on 8/7.     - NPO last night; advance to clear liquid diet, as tolerated  - Trend CRP  - Removed IV pain medication; ibuprofen and tylenol on board. If require IV pain med, please notify CRS team  - Encourage OOB and IS  - Replenish electrolytes PRN  - Continue antibiotics  - please call with questions

## 2024-08-10 NOTE — SUBJECTIVE & OBJECTIVE
Subjective:     Interval History: No acute over night events. Pain under improved control. Some nausea yesterday so changed back to NPO. No nausea today. Had multiple BM overnight and passing flatus. Patient ambulating daily. Patient hemodynamically stable. CRP elevated to 287 from 164.2 yesterday. Patient reports symptomatic improvement, with no narcotic pain medication use. Will continue monitoring. CBC WNL.    Post-Op Info:  Procedure(s) (LRB):  REPAIR, HERNIA, INCISIONAL, LAPAROSCOPIC, SMALL BOWEL RESECTION (N/A)  REPAIR, HERNIA, INCISIONAL  EXCISION, SMALL INTESTINE   3 Days Post-Op      Medications:  Continuous Infusions:      Scheduled Meds:   acetaminophen  1,000 mg Oral Q8H    amLODIPine  10 mg Oral Daily    heparin (porcine)  5,000 Units Subcutaneous Q12H    hydrALAZINE  50 mg Oral Q12H    ibuprofen  800 mg Oral Q8H    pantoprazole  40 mg Intravenous Daily    piperacillin-tazobactam (Zosyn) IV (PEDS and ADULTS) (extended infusion is not appropriate)  4.5 g Intravenous Q8H    spironolactone  25 mg Oral Daily     PRN Meds:   acetaminophen tablet 1,000 mg    amLODIPine tablet 10 mg    heparin (porcine) injection 5,000 Units    hydrALAZINE tablet 50 mg    ibuprofen tablet 800 mg    pantoprazole injection 40 mg    piperacillin-tazobactam (ZOSYN) 4.5 g in D5W 100 mL IVPB (MB+)    spironolactone split tablet 25 mg        Objective:     Vital Signs (Most Recent):  Temp: 98.2 °F (36.8 °C) (08/10/24 0718)  Pulse: 78 (08/10/24 0756)  Resp: 20 (08/10/24 0718)  BP: (!) 186/74 (08/10/24 0756)  SpO2: 97 % (08/10/24 0718) Vital Signs (24h Range):  Temp:  [98 °F (36.7 °C)-98.2 °F (36.8 °C)] 98.2 °F (36.8 °C)  Pulse:  [68-78] 78  Resp:  [16-20] 20  SpO2:  [93 %-98 %] 97 %  BP: (139-189)/(63-78) 186/74     Intake/Output - Last 3 Shifts         08/08 0700 08/09 0659 08/09 0700  08/10 0659 08/10 0700  08/11 0659    P.O. 100      I.V. (mL/kg) 1462.7 (28.8)      IV Piggyback 1261      Total Intake(mL/kg) 2823.6 (55.7)       Urine (mL/kg/hr) 1350 (1.1) 100 (0.1)     Drains       Stool  0     Total Output 1350 100     Net +1473.6 -100            Urine Occurrence  1 x     Stool Occurrence  1 x              Physical Exam  Constitutional:       General: She is not in acute distress.     Appearance: Normal appearance.   HENT:      Head: Normocephalic and atraumatic.      Mouth/Throat:      Mouth: Mucous membranes are moist.   Eyes:      Extraocular Movements: Extraocular movements intact.      Conjunctiva/sclera: Conjunctivae normal.   Cardiovascular:      Rate and Rhythm: Normal rate and regular rhythm.   Pulmonary:      Effort: Pulmonary effort is normal. No respiratory distress.   Abdominal:      Comments: Abdomen soft, non-distended, less tender to palpation around sites of incisions than yesterday, appropriate for post-operative course. Multiple abdominal incisions healing appropriately. All incisions clean, dry, intact.   Skin:     General: Skin is warm and dry.   Neurological:      General: No focal deficit present.      Mental Status: She is alert and oriented to person, place, and time. Mental status is at baseline.        Significant Labs:  BMP (Last 3 Results):   Recent Labs   Lab 08/08/24  0926 08/09/24  0217 08/10/24  0524    83 75   * 129* 131*   K 3.9 4.0 3.3*    99 100   CO2 21* 22* 21*   BUN 12 12 7*   CREATININE 0.6 0.7 0.6   CALCIUM 7.9* 8.0* 7.9*   MG 1.4* 1.8 1.4*     CBC (Last 3 Results):   Recent Labs   Lab 08/08/24  0926 08/09/24  0217 08/10/24  0524   WBC 6.87 6.23 7.23   RBC 3.11* 3.00* 3.13*   HGB 9.9* 9.6* 10.0*   HCT 30.0* 28.8* 30.7*    188 209   MCV 97 96 98   MCH 31.8* 32.0* 31.9*   MCHC 33.0 33.3 32.6     CRP (Last 3 Results):   Recent Labs   Lab 08/09/24  0217 08/10/24  0524   .2* 287.3*       Significant Diagnostics:  None

## 2024-08-11 LAB
ANION GAP SERPL CALC-SCNC: 12 MMOL/L (ref 8–16)
BASOPHILS # BLD AUTO: 0.01 K/UL (ref 0–0.2)
BASOPHILS NFR BLD: 0.2 % (ref 0–1.9)
BUN SERPL-MCNC: 5 MG/DL (ref 8–23)
CALCIUM SERPL-MCNC: 8.3 MG/DL (ref 8.7–10.5)
CHLORIDE SERPL-SCNC: 97 MMOL/L (ref 95–110)
CO2 SERPL-SCNC: 20 MMOL/L (ref 23–29)
CREAT SERPL-MCNC: 0.7 MG/DL (ref 0.5–1.4)
CRP SERPL-MCNC: 204.9 MG/L (ref 0–8.2)
DIFFERENTIAL METHOD BLD: ABNORMAL
EOSINOPHIL # BLD AUTO: 0.1 K/UL (ref 0–0.5)
EOSINOPHIL NFR BLD: 1.1 % (ref 0–8)
ERYTHROCYTE [DISTWIDTH] IN BLOOD BY AUTOMATED COUNT: 13 % (ref 11.5–14.5)
EST. GFR  (NO RACE VARIABLE): >60 ML/MIN/1.73 M^2
GLUCOSE SERPL-MCNC: 61 MG/DL (ref 70–110)
HCT VFR BLD AUTO: 30.9 % (ref 37–48.5)
HGB BLD-MCNC: 10.7 G/DL (ref 12–16)
IMM GRANULOCYTES # BLD AUTO: 0.02 K/UL (ref 0–0.04)
IMM GRANULOCYTES NFR BLD AUTO: 0.3 % (ref 0–0.5)
LYMPHOCYTES # BLD AUTO: 1.6 K/UL (ref 1–4.8)
LYMPHOCYTES NFR BLD: 24.5 % (ref 18–48)
MAGNESIUM SERPL-MCNC: 1.7 MG/DL (ref 1.6–2.6)
MCH RBC QN AUTO: 32.9 PG (ref 27–31)
MCHC RBC AUTO-ENTMCNC: 34.6 G/DL (ref 32–36)
MCV RBC AUTO: 95 FL (ref 82–98)
MONOCYTES # BLD AUTO: 0.4 K/UL (ref 0.3–1)
MONOCYTES NFR BLD: 6.2 % (ref 4–15)
NEUTROPHILS # BLD AUTO: 4.5 K/UL (ref 1.8–7.7)
NEUTROPHILS NFR BLD: 67.7 % (ref 38–73)
NRBC BLD-RTO: 0 /100 WBC
PHOSPHATE SERPL-MCNC: 2.4 MG/DL (ref 2.7–4.5)
PLATELET # BLD AUTO: 255 K/UL (ref 150–450)
PMV BLD AUTO: 9 FL (ref 9.2–12.9)
POTASSIUM SERPL-SCNC: 3.3 MMOL/L (ref 3.5–5.1)
RBC # BLD AUTO: 3.25 M/UL (ref 4–5.4)
SODIUM SERPL-SCNC: 129 MMOL/L (ref 136–145)
WBC # BLD AUTO: 6.65 K/UL (ref 3.9–12.7)

## 2024-08-11 PROCEDURE — 36415 COLL VENOUS BLD VENIPUNCTURE: CPT

## 2024-08-11 PROCEDURE — 85025 COMPLETE CBC W/AUTO DIFF WBC: CPT

## 2024-08-11 PROCEDURE — 25000003 PHARM REV CODE 250

## 2024-08-11 PROCEDURE — 25000003 PHARM REV CODE 250: Performed by: STUDENT IN AN ORGANIZED HEALTH CARE EDUCATION/TRAINING PROGRAM

## 2024-08-11 PROCEDURE — 84100 ASSAY OF PHOSPHORUS: CPT

## 2024-08-11 PROCEDURE — 80048 BASIC METABOLIC PNL TOTAL CA: CPT

## 2024-08-11 PROCEDURE — 63600175 PHARM REV CODE 636 W HCPCS

## 2024-08-11 PROCEDURE — 20600001 HC STEP DOWN PRIVATE ROOM

## 2024-08-11 PROCEDURE — 83735 ASSAY OF MAGNESIUM: CPT

## 2024-08-11 PROCEDURE — 63600175 PHARM REV CODE 636 W HCPCS: Performed by: STUDENT IN AN ORGANIZED HEALTH CARE EDUCATION/TRAINING PROGRAM

## 2024-08-11 PROCEDURE — 86140 C-REACTIVE PROTEIN: CPT

## 2024-08-11 RX ORDER — MAGNESIUM SULFATE HEPTAHYDRATE 40 MG/ML
2 INJECTION, SOLUTION INTRAVENOUS ONCE
Status: COMPLETED | OUTPATIENT
Start: 2024-08-11 | End: 2024-08-11

## 2024-08-11 RX ADMIN — HYDRALAZINE HYDROCHLORIDE 50 MG: 50 TABLET ORAL at 09:08

## 2024-08-11 RX ADMIN — PIPERACILLIN SODIUM AND TAZOBACTAM SODIUM 4.5 G: 4; .5 INJECTION, POWDER, FOR SOLUTION INTRAVENOUS at 06:08

## 2024-08-11 RX ADMIN — HEPARIN SODIUM 5000 UNITS: 5000 INJECTION INTRAVENOUS; SUBCUTANEOUS at 09:08

## 2024-08-11 RX ADMIN — SODIUM PHOSPHATE, MONOBASIC, MONOHYDRATE AND SODIUM PHOSPHATE, DIBASIC, ANHYDROUS 15 MMOL: 142; 276 INJECTION, SOLUTION INTRAVENOUS at 10:08

## 2024-08-11 RX ADMIN — PIPERACILLIN SODIUM AND TAZOBACTAM SODIUM 4.5 G: 4; .5 INJECTION, POWDER, FOR SOLUTION INTRAVENOUS at 05:08

## 2024-08-11 RX ADMIN — AMLODIPINE BESYLATE 10 MG: 10 TABLET ORAL at 09:08

## 2024-08-11 RX ADMIN — PANTOPRAZOLE SODIUM 40 MG: 40 INJECTION, POWDER, FOR SOLUTION INTRAVENOUS at 10:08

## 2024-08-11 RX ADMIN — MAGNESIUM SULFATE HEPTAHYDRATE 2 G: 40 INJECTION, SOLUTION INTRAVENOUS at 09:08

## 2024-08-11 NOTE — SUBJECTIVE & OBJECTIVE
Subjective:     Interval History: No acute over night events. Pain under improved control. Some nausea yesterday. No nausea today. Had multiple loose BM overnight and passing flatus. Patient not ambulating due to need to stool when standing. Patient hemodynamically stable. CRP down trending to 205 from 287 yesterday. Patient reports symptomatic improvement, with no narcotic pain medication use. Will continue monitoring. CBC WNL.    Post-Op Info:  Procedure(s) (LRB):  REPAIR, HERNIA, INCISIONAL, LAPAROSCOPIC, SMALL BOWEL RESECTION (N/A)  REPAIR, HERNIA, INCISIONAL  EXCISION, SMALL INTESTINE   4 Days Post-Op      Medications:  Continuous Infusions:      Scheduled Meds:   acetaminophen  1,000 mg Oral Q8H    amLODIPine  10 mg Oral Daily    heparin (porcine)  5,000 Units Subcutaneous Q12H    hydrALAZINE  50 mg Oral Q12H    ibuprofen  800 mg Oral Q8H    pantoprazole  40 mg Intravenous Daily    piperacillin-tazobactam (Zosyn) IV (PEDS and ADULTS) (extended infusion is not appropriate)  4.5 g Intravenous Q8H    spironolactone  25 mg Oral Daily     PRN Meds:   acetaminophen tablet 1,000 mg    amLODIPine tablet 10 mg    heparin (porcine) injection 5,000 Units    hydrALAZINE tablet 50 mg    ibuprofen tablet 800 mg    pantoprazole injection 40 mg    piperacillin-tazobactam (ZOSYN) 4.5 g in D5W 100 mL IVPB (MB+)    spironolactone split tablet 25 mg        Objective:     Vital Signs (Most Recent):  Temp: 98.1 °F (36.7 °C) (08/11/24 0734)  Pulse: 81 (08/11/24 0734)  Resp: 18 (08/11/24 0734)  BP: (!) 162/72 (08/11/24 0734)  SpO2: 95 % (08/11/24 0734) Vital Signs (24h Range):  Temp:  [97.9 °F (36.6 °C)-98.2 °F (36.8 °C)] 98.1 °F (36.7 °C)  Pulse:  [71-81] 81  Resp:  [16-20] 18  SpO2:  [93 %-95 %] 95 %  BP: (122-162)/(58-72) 162/72     Intake/Output - Last 3 Shifts         08/09 0700  08/10 0659 08/10 0700  08/11 0659 08/11 0700  08/12 0659    P.O.       I.V. (mL/kg)       IV Piggyback       Total Intake(mL/kg)       Urine  (mL/kg/hr) 100 (0.1) 0 (0)     Stool 0 2     Total Output 100 2     Net -100 -2            Urine Occurrence 1 x 6 x     Stool Occurrence 1 x 4 x              Physical Exam  Constitutional:       General: She is not in acute distress.     Appearance: Normal appearance.   HENT:      Head: Normocephalic and atraumatic.      Mouth/Throat:      Mouth: Mucous membranes are moist.   Eyes:      Extraocular Movements: Extraocular movements intact.      Conjunctiva/sclera: Conjunctivae normal.   Cardiovascular:      Rate and Rhythm: Normal rate and regular rhythm.   Pulmonary:      Effort: Pulmonary effort is normal. No respiratory distress.   Abdominal:      Comments: Abdomen soft, non-distended, less tender to palpation around sites of incisions than yesterday, appropriate for post-operative course. Multiple abdominal incisions healing appropriately. All incisions clean, dry, intact.   Skin:     General: Skin is warm and dry.   Neurological:      General: No focal deficit present.      Mental Status: She is alert and oriented to person, place, and time. Mental status is at baseline.        Significant Labs:  BMP (Last 3 Results):   Recent Labs   Lab 08/09/24  0217 08/10/24  0524 08/11/24  0606   GLU 83 75 61*   * 131* 129*   K 4.0 3.3* 3.3*   CL 99 100 97   CO2 22* 21* 20*   BUN 12 7* 5*   CREATININE 0.7 0.6 0.7   CALCIUM 8.0* 7.9* 8.3*   MG 1.8 1.4* 1.7     CBC (Last 3 Results):   Recent Labs   Lab 08/09/24  0217 08/10/24  0524 08/11/24  0606   WBC 6.23 7.23 6.65   RBC 3.00* 3.13* 3.25*   HGB 9.6* 10.0* 10.7*   HCT 28.8* 30.7* 30.9*    209 255   MCV 96 98 95   MCH 32.0* 31.9* 32.9*   MCHC 33.3 32.6 34.6     CRP (Last 3 Results):   Recent Labs   Lab 08/09/24  0217 08/10/24  0524 08/11/24  0606   .2* 287.3* 204.9*       Significant Diagnostics:  None

## 2024-08-11 NOTE — ASSESSMENT & PLAN NOTE
Buffy Dominique is a 78yoF who underwent rectopexy in 5/2024 who presented to the emergency room with an incarcerated incisional hernia causing a small bowel obstruction on 8/7. S/p laparoscopic-converted open incisional hernia repair with small bowel resection with Dr Myles on 8/7.     - CLD last night; advance to low res diet, as tolerated  - Trend CRP  - Removed IV pain medication; ibuprofen and tylenol on board. If require IV pain med, please notify CRS team  - Encourage OOB and IS  - Replenish electrolytes PRN  - Continue antibiotics  - please call with questions

## 2024-08-11 NOTE — PROGRESS NOTES
[facility]  Colorectal Surgery  Progress Note    Patient Name: Buffy Dominique  MRN: [id number]  Admission Date: 8/7/2024  Hospital Length of Stay: 4 days  Attending Physician: Sharath Myles MD    Subjective:     Interval History: No acute over night events. Pain under improved control. Some nausea yesterday. No nausea today. Had multiple loose BM overnight and passing flatus. Patient not ambulating due to need to stool when standing. Patient hemodynamically stable. CRP down trending to 205 from 287 yesterday. Patient reports symptomatic improvement, with no narcotic pain medication use. Will continue monitoring. CBC WNL.    Post-Op Info:  Procedure(s) (LRB):  REPAIR, HERNIA, INCISIONAL, LAPAROSCOPIC, SMALL BOWEL RESECTION (N/A)  REPAIR, HERNIA, INCISIONAL  EXCISION, SMALL INTESTINE   4 Days Post-Op      Medications:  Continuous Infusions:      Scheduled Meds:   acetaminophen  1,000 mg Oral Q8H    amLODIPine  10 mg Oral Daily    heparin (porcine)  5,000 Units Subcutaneous Q12H    hydrALAZINE  50 mg Oral Q12H    ibuprofen  800 mg Oral Q8H    pantoprazole  40 mg Intravenous Daily    piperacillin-tazobactam (Zosyn) IV (PEDS and ADULTS) (extended infusion is not appropriate)  4.5 g Intravenous Q8H    spironolactone  25 mg Oral Daily     PRN Meds:   acetaminophen tablet 1,000 mg    amLODIPine tablet 10 mg    heparin (porcine) injection 5,000 Units    hydrALAZINE tablet 50 mg    ibuprofen tablet 800 mg    pantoprazole injection 40 mg    piperacillin-tazobactam (ZOSYN) 4.5 g in D5W 100 mL IVPB (MB+)    spironolactone split tablet 25 mg        Objective:     Vital Signs (Most Recent):  Temp: 98.1 °F (36.7 °C) (08/11/24 0734)  Pulse: 81 (08/11/24 0734)  Resp: 18 (08/11/24 0734)  BP: (!) 162/72 (08/11/24 0734)  SpO2: 95 % (08/11/24 0734) Vital Signs (24h Range):  Temp:  [97.9 °F (36.6 °C)-98.2 °F (36.8 °C)] 98.1 °F (36.7 °C)  Pulse:  [71-81] 81  Resp:  [16-20] 18  SpO2:  [93 %-95 %] 95 %  BP: (122-162)/(58-72) 162/72      Intake/Output - Last 3 Shifts         08/09 0700  08/10 0659 08/10 0700 08/11 0659 08/11 0700 08/12 0659    P.O.       I.V. (mL/kg)       IV Piggyback       Total Intake(mL/kg)       Urine (mL/kg/hr) 100 (0.1) 0 (0)     Stool 0 2     Total Output 100 2     Net -100 -2            Urine Occurrence 1 x 6 x     Stool Occurrence 1 x 4 x              Physical Exam  Constitutional:       General: She is not in acute distress.     Appearance: Normal appearance.   HENT:      Head: Normocephalic and atraumatic.      Mouth/Throat:      Mouth: Mucous membranes are moist.   Eyes:      Extraocular Movements: Extraocular movements intact.      Conjunctiva/sclera: Conjunctivae normal.   Cardiovascular:      Rate and Rhythm: Normal rate and regular rhythm.   Pulmonary:      Effort: Pulmonary effort is normal. No respiratory distress.   Abdominal:      Comments: Abdomen soft, non-distended, less tender to palpation around sites of incisions than yesterday, appropriate for post-operative course. Multiple abdominal incisions healing appropriately. All incisions clean, dry, intact.   Skin:     General: Skin is warm and dry.   Neurological:      General: No focal deficit present.      Mental Status: She is alert and oriented to person, place, and time. Mental status is at baseline.        Significant Labs:  BMP (Last 3 Results):   Recent Labs   Lab 08/09/24  0217 08/10/24  0524 08/11/24  0606   GLU 83 75 61*   * 131* 129*   K 4.0 3.3* 3.3*   CL 99 100 97   CO2 22* 21* 20*   BUN 12 7* 5*   CREATININE 0.7 0.6 0.7   CALCIUM 8.0* 7.9* 8.3*   MG 1.8 1.4* 1.7     CBC (Last 3 Results):   Recent Labs   Lab 08/09/24  0217 08/10/24  0524 08/11/24  0606   WBC 6.23 7.23 6.65   RBC 3.00* 3.13* 3.25*   HGB 9.6* 10.0* 10.7*   HCT 28.8* 30.7* 30.9*    209 255   MCV 96 98 95   MCH 32.0* 31.9* 32.9*   MCHC 33.3 32.6 34.6     CRP (Last 3 Results):   Recent Labs   Lab 08/09/24  0217 08/10/24  0524 08/11/24  0606   .2* 287.3* 204.9*        Significant Diagnostics:  None  Assessment/Plan:     * Incisional hernia  Buffy Dominique is a 78yoF who underwent rectopexy in 5/2024 who presented to the emergency room with an incarcerated incisional hernia causing a small bowel obstruction on 8/7. S/p laparoscopic-converted open incisional hernia repair with small bowel resection with Dr Myles on 8/7.     - CLD last night; advance to low res diet, as tolerated  - Trend CRP  - Removed IV pain medication; ibuprofen and tylenol on board. If require IV pain med, please notify CRS team  - Encourage OOB and IS  - Replenish electrolytes PRN  - Continue antibiotics  - please call with questions      Krystal Beltre, DO  Colorectal Surgery  [facility]

## 2024-08-11 NOTE — PLAN OF CARE
"  Problem: Adult Inpatient Plan of Care  Goal: Plan of Care Review  Outcome: Progressing  Goal: Patient-Specific Goal (Individualized)  Outcome: Progressing  Goal: Absence of Hospital-Acquired Illness or Injury  Outcome: Progressing  Goal: Optimal Comfort and Wellbeing  Outcome: Progressing  Goal: Readiness for Transition of Care  Outcome: Progressing     Problem: Wound  Goal: Optimal Coping  Outcome: Progressing  Goal: Optimal Functional Ability  Outcome: Progressing  Goal: Absence of Infection Signs and Symptoms  Outcome: Progressing  Goal: Improved Oral Intake  Outcome: Progressing  Goal: Optimal Pain Control and Function  Outcome: Progressing  Goal: Skin Health and Integrity  Outcome: Progressing  Goal: Optimal Wound Healing  Outcome: Progressing     Problem: Infection  Goal: Absence of Infection Signs and Symptoms  Outcome: Progressing     Problem: Pain Acute  Goal: Optimal Pain Control and Function  Outcome: Progressing     Problem: Fall Injury Risk  Goal: Absence of Fall and Fall-Related Injury  Outcome: Progressing         GIS Plan of Care Note    Dx:   Abdominal pain [R10.9]    Shift Events: NA    Goals of Care: IV ABX, pain control    Neuro: WDL    Vital Signs: BP (!) 162/72   Pulse 81   Temp 98.1 °F (36.7 °C) (Oral)   Resp 18   Ht 5' 3" (1.6 m)   Wt 50.7 kg (111 lb 12.4 oz)   LMP  (LMP Unknown)   SpO2 95%   Breastfeeding No   BMI 19.80 kg/m²     Respiratory: WDL    Diet: Diet Clear Liquid Standard Tray      Is patient tolerating current diet? NPO    GTTS: IVF    Urine Output/Bowel Movement:   No intake/output data recorded.  Last Bowel Movement: 08/10/24      Drains/Tubes/Tube Feeds (include total output/shift):   No intake/output data recorded.      Lines: PIV x 1      Accuchecks: NA    Skin: WDL ex incisions    Fall Risk Score: see flow sheets    Activity level? Standing with assistance    Any scheduled procedures? NA    Any safety concerns? None    Other: NA   "

## 2024-08-12 VITALS
OXYGEN SATURATION: 93 % | RESPIRATION RATE: 18 BRPM | HEART RATE: 104 BPM | WEIGHT: 111.75 LBS | SYSTOLIC BLOOD PRESSURE: 129 MMHG | DIASTOLIC BLOOD PRESSURE: 60 MMHG | BODY MASS INDEX: 19.8 KG/M2 | TEMPERATURE: 98 F | HEIGHT: 63 IN

## 2024-08-12 LAB
ANION GAP SERPL CALC-SCNC: 9 MMOL/L (ref 8–16)
BUN SERPL-MCNC: 5 MG/DL (ref 8–23)
CALCIUM SERPL-MCNC: 8.1 MG/DL (ref 8.7–10.5)
CHLORIDE SERPL-SCNC: 95 MMOL/L (ref 95–110)
CO2 SERPL-SCNC: 25 MMOL/L (ref 23–29)
CREAT SERPL-MCNC: 0.7 MG/DL (ref 0.5–1.4)
CRP SERPL-MCNC: 116.7 MG/L (ref 0–8.2)
EST. GFR  (NO RACE VARIABLE): >60 ML/MIN/1.73 M^2
GLUCOSE SERPL-MCNC: 82 MG/DL (ref 70–110)
MAGNESIUM SERPL-MCNC: 1.8 MG/DL (ref 1.6–2.6)
PHOSPHATE SERPL-MCNC: 2.4 MG/DL (ref 2.7–4.5)
POTASSIUM SERPL-SCNC: 3.1 MMOL/L (ref 3.5–5.1)
SODIUM SERPL-SCNC: 129 MMOL/L (ref 136–145)

## 2024-08-12 PROCEDURE — 97116 GAIT TRAINING THERAPY: CPT

## 2024-08-12 PROCEDURE — 83735 ASSAY OF MAGNESIUM: CPT

## 2024-08-12 PROCEDURE — 97530 THERAPEUTIC ACTIVITIES: CPT

## 2024-08-12 PROCEDURE — 63600175 PHARM REV CODE 636 W HCPCS

## 2024-08-12 PROCEDURE — 25000003 PHARM REV CODE 250: Performed by: STUDENT IN AN ORGANIZED HEALTH CARE EDUCATION/TRAINING PROGRAM

## 2024-08-12 PROCEDURE — 86140 C-REACTIVE PROTEIN: CPT

## 2024-08-12 PROCEDURE — 97535 SELF CARE MNGMENT TRAINING: CPT

## 2024-08-12 PROCEDURE — 84100 ASSAY OF PHOSPHORUS: CPT

## 2024-08-12 PROCEDURE — 80048 BASIC METABOLIC PNL TOTAL CA: CPT

## 2024-08-12 PROCEDURE — 36415 COLL VENOUS BLD VENIPUNCTURE: CPT

## 2024-08-12 PROCEDURE — 63600175 PHARM REV CODE 636 W HCPCS: Performed by: STUDENT IN AN ORGANIZED HEALTH CARE EDUCATION/TRAINING PROGRAM

## 2024-08-12 PROCEDURE — 25000003 PHARM REV CODE 250

## 2024-08-12 RX ORDER — ACETAMINOPHEN 500 MG
1000 TABLET ORAL EVERY 8 HOURS
COMMUNITY
Start: 2024-08-12

## 2024-08-12 RX ORDER — LANOLIN ALCOHOL/MO/W.PET/CERES
400 CREAM (GRAM) TOPICAL ONCE
Status: COMPLETED | OUTPATIENT
Start: 2024-08-12 | End: 2024-08-12

## 2024-08-12 RX ORDER — POTASSIUM CHLORIDE 20 MEQ/1
40 TABLET, EXTENDED RELEASE ORAL ONCE
Status: COMPLETED | OUTPATIENT
Start: 2024-08-12 | End: 2024-08-12

## 2024-08-12 RX ORDER — IBUPROFEN 800 MG/1
800 TABLET ORAL EVERY 8 HOURS
COMMUNITY
Start: 2024-08-12

## 2024-08-12 RX ORDER — MAGNESIUM SULFATE HEPTAHYDRATE 40 MG/ML
2 INJECTION, SOLUTION INTRAVENOUS ONCE
Status: COMPLETED | OUTPATIENT
Start: 2024-08-12 | End: 2024-08-12

## 2024-08-12 RX ORDER — POTASSIUM CHLORIDE 7.45 MG/ML
10 INJECTION INTRAVENOUS
Status: DISCONTINUED | OUTPATIENT
Start: 2024-08-12 | End: 2024-08-12

## 2024-08-12 RX ADMIN — POTASSIUM CHLORIDE 40 MEQ: 1500 TABLET, EXTENDED RELEASE ORAL at 01:08

## 2024-08-12 RX ADMIN — Medication 400 MG: at 01:08

## 2024-08-12 RX ADMIN — HYDRALAZINE HYDROCHLORIDE 50 MG: 50 TABLET ORAL at 10:08

## 2024-08-12 RX ADMIN — PANTOPRAZOLE SODIUM 40 MG: 40 INJECTION, POWDER, FOR SOLUTION INTRAVENOUS at 10:08

## 2024-08-12 RX ADMIN — PIPERACILLIN SODIUM AND TAZOBACTAM SODIUM 4.5 G: 4; .5 INJECTION, POWDER, FOR SOLUTION INTRAVENOUS at 12:08

## 2024-08-12 RX ADMIN — MAGNESIUM SULFATE HEPTAHYDRATE 2 G: 40 INJECTION, SOLUTION INTRAVENOUS at 10:08

## 2024-08-12 RX ADMIN — AMLODIPINE BESYLATE 10 MG: 10 TABLET ORAL at 10:08

## 2024-08-12 NOTE — PT/OT/SLP PROGRESS
"Physical Therapy Treatment and Discharge    Patient Name:  Buffy Dominique   MRN:  [id number]    Recommendations:     Discharge Recommendations: Low Intensity Therapy  Discharge Equipment Recommendations: bath bench  Barriers to discharge: None    Assessment:     Buffy Dominique is a 78 y.o. female admitted with a medical diagnosis of Incisional hernia.  She presents with the following impairments/functional limitations: impaired endurance, weakness, impaired balance, gait instability.    Pt very pleasant and in good spirits. Pt demonstrated improved activity tolerance and amb distance of 220' + 220' no AD with 1 person for safety and seated break in between bouts. Pt has met all goals, participated well and making excellent stride. Pt is at functional baseline with no acute needs. PT to sign off. Please reconsult if status changes.      Rehab Prognosis: Good; patient would benefit from acute skilled PT services to address these deficits and reach maximum level of function.    Recent Surgery: Procedure(s) (LRB):  REPAIR, HERNIA, INCISIONAL, LAPAROSCOPIC, SMALL BOWEL RESECTION (N/A)  REPAIR, HERNIA, INCISIONAL  EXCISION, SMALL INTESTINE 5 Days Post-Op    Plan:     During this hospitalization, patient to be seen 3 x/week to address the identified rehab impairments via gait training, therapeutic activities, therapeutic exercises, neuromuscular re-education and progress toward the following goals:    Plan of Care Expires:  08/12/24    Subjective     "I can't wait to see Jonatan (49 y/o cockatoo)"    Pain/Comfort:  Pain Rating 1: 0/10      Objective:     Communicated with RN prior to session.  Patient found HOB elevated with PIV upon PT entry to room.     General Precautions: Standard, fall  Orthopedic Precautions: N/A  Braces: N/A  Respiratory Status: Room air     Functional Mobility:  Bed Mobility:     Supine to Sit: modified independence  Transfers:     Sit to Stand:  supervision with no AD  Bed to Chair: supervision with  no AD  " using  Step Transfer  Gait: pt amb 220' + 220' no AD supvn with sitting in between bouts to enjoy view. Pt presented with reciprocal gait pattern, arm swing present, good lui.   Balance:   Good sitting balance  Good standing balance      AM-PAC 6 CLICK MOBILITY  Turning over in bed (including adjusting bedclothes, sheets and blankets)?: 4  Sitting down on and standing up from a chair with arms (e.g., wheelchair, bedside commode, etc.): 4  Moving from lying on back to sitting on the side of the bed?: 4  Moving to and from a bed to a chair (including a wheelchair)?: 4  Need to walk in hospital room?: 4  Climbing 3-5 steps with a railing?: 3  Basic Mobility Total Score: 23       Treatment & Education:  Pt is at functional baseline with no acute needs. PT to sign off. Please reconsult if status changes.  Discussed bath bench for her tub/shower combo     Educated pt on PT role/POC  Educated pt on importance of OOB activity and daily ambulation   Pt educated on proper body mechanics, safety techniques, and energy conservation with PT facilitation and cueing throughout session   Pt verbalized understanding      Patient left up in chair with all lines intact, call button in reach, RN notified.    GOALS:   Multidisciplinary Problems       Physical Therapy Goals       Not on file              Multidisciplinary Problems (Resolved)          Problem: Physical Therapy    Goal Priority Disciplines Outcome Goal Variances Interventions   Physical Therapy Goal   (Resolved)     PT, PT/OT Met     Description: Goals to be met by: 2024     Patient will increase functional independence with mobility by performin. Supine to sit with Modified [place] MET  2. Sit to supine with Modified [place] MET  3. Sit to stand transfer with Supervision MET  4. Bed to chair transfer with Supervision using LRAD MET  5. Gait  x 300 feet with Supervision using LRAD. MET                         Time Tracking:     PT Received On:  08/12/24  PT Start Time: 1132     PT Stop Time: 1200  PT Total Time (min): 28 min     Billable Minutes: Gait Training 25      Treatment Type: Treatment  PT/PTA: PT           08/12/2024

## 2024-08-12 NOTE — SUBJECTIVE & OBJECTIVE
Subjective:     Interval History: No acute over night events. Pain under control. No nausea or vomiting. Diarrhea improved and passing flatus appropriately. Patient hemodynamically stable. CRP down trending to 117 from 205 yesterday. Patient reports symptomatic improvement, with no narcotic pain medication use.    Post-Op Info:  Procedure(s) (LRB):  REPAIR, HERNIA, INCISIONAL, LAPAROSCOPIC, SMALL BOWEL RESECTION (N/A)  REPAIR, HERNIA, INCISIONAL  EXCISION, SMALL INTESTINE   5 Days Post-Op      Medications:  Continuous Infusions:      Scheduled Meds:   acetaminophen  1,000 mg Oral Q8H    amLODIPine  10 mg Oral Daily    heparin (porcine)  5,000 Units Subcutaneous Q12H    hydrALAZINE  50 mg Oral Q12H    ibuprofen  800 mg Oral Q8H    pantoprazole  40 mg Intravenous Daily    spironolactone  25 mg Oral Daily     PRN Meds:   acetaminophen tablet 1,000 mg    amLODIPine tablet 10 mg    heparin (porcine) injection 5,000 Units    hydrALAZINE tablet 50 mg    ibuprofen tablet 800 mg    pantoprazole injection 40 mg    spironolactone split tablet 25 mg        Objective:     Vital Signs (Most Recent):  Temp: 97.6 °F (36.4 °C) (08/12/24 0449)  Pulse: 76 (08/12/24 0449)  Resp: 18 (08/12/24 0449)  BP: (!) 154/66 (08/12/24 0449)  SpO2: (!) 93 % (08/12/24 0449) Vital Signs (24h Range):  Temp:  [97.6 °F (36.4 °C)-98.1 °F (36.7 °C)] 97.6 °F (36.4 °C)  Pulse:  [74-83] 76  Resp:  [14-18] 18  SpO2:  [93 %-95 %] 93 %  BP: (116-154)/(57-66) 154/66     Intake/Output - Last 3 Shifts         08/10 0700  08/11 0659 08/11 0700  08/12 0659 08/12 0700 08/13 0659    Urine (mL/kg/hr) 0 (0)      Stool 2      Total Output 2      Net -2             Urine Occurrence 6 x      Stool Occurrence 4 x               Physical Exam  Constitutional:       General: She is not in acute distress.     Appearance: Normal appearance.   HENT:      Head: Normocephalic and atraumatic.      Mouth/Throat:      Mouth: Mucous membranes are moist.   Eyes:      Extraocular  Movements: Extraocular movements intact.      Conjunctiva/sclera: Conjunctivae normal.   Cardiovascular:      Rate and Rhythm: Normal rate and regular rhythm.   Pulmonary:      Effort: Pulmonary effort is normal. No respiratory distress.   Abdominal:      Comments: Abdomen soft, non-distended, less tender to palpation around sites of incisions than yesterday, appropriate for post-operative course. Multiple abdominal incisions healing appropriately. All incisions clean, dry, intact.   Skin:     General: Skin is warm and dry.   Neurological:      General: No focal deficit present.      Mental Status: She is alert and oriented to person, place, and time. Mental status is at baseline.        Significant Labs:  BMP (Last 3 Results):   Recent Labs   Lab 08/10/24  0524 08/11/24  0606 08/12/24  0444   GLU 75 61* 82   * 129* 129*   K 3.3* 3.3* 3.1*    97 95   CO2 21* 20* 25   BUN 7* 5* 5*   CREATININE 0.6 0.7 0.7   CALCIUM 7.9* 8.3* 8.1*   MG 1.4* 1.7 1.8     CBC (Last 3 Results):   Recent Labs   Lab 08/09/24  0217 08/10/24  0524 08/11/24  0606   WBC 6.23 7.23 6.65   RBC 3.00* 3.13* 3.25*   HGB 9.6* 10.0* 10.7*   HCT 28.8* 30.7* 30.9*    209 255   MCV 96 98 95   MCH 32.0* 31.9* 32.9*   MCHC 33.3 32.6 34.6     CRP (Last 3 Results):   Recent Labs   Lab 08/10/24  0524 08/11/24  0606 08/12/24  0444   .3* 204.9* 116.7*       Significant Diagnostics:  None

## 2024-08-12 NOTE — ASSESSMENT & PLAN NOTE
Buffy Dominique is a 78yoF who underwent rectopexy in 5/2024 who presented to the emergency room with an incarcerated incisional hernia causing a small bowel obstruction on 8/7. S/p laparoscopic-converted open incisional hernia repair with small bowel resection with Dr Myles on 8/7.     - Tolerating low res diet  - CRP down trending  - Removed IV pain medication; ibuprofen and tylenol on board. If require IV pain med, please notify CRS team   - Patient pain under control for last 2 days without need for narcotic medications  - Encourage OOB and IS  - Replenish electrolytes PRN  - Antibiotics completed yesterday  - please call with questions    Dispo: discharge later today

## 2024-08-12 NOTE — TREATMENT PLAN
Colorectal Surgery Update:     Afternoon rounds performed. Mrs. Dominique is discharged and waiting on her ride. We noted some increased tachycardia during the shift today which corresponded to her walking the hallways. Repeat vitals were taken on rounds. Her blood pressure was 122/58 and HR was 90bpm. Her abdomen remains soft, non-distended, non-tender. She remains appropriate for discharge.       Yosef Quiros MD   General Surgery, PGY5  [phone]

## 2024-08-12 NOTE — DISCHARGE INSTRUCTIONS
Postoperative General Instructions    What to Expect:  It is normal to experience pain and swelling at the surgical site.  The pain usually decreases significantly after the first week but may last for many weeks.  Each day, the pain should be similar or better to the previous day. If it worsens, call the doctor's office.     Activity:  You should walk beginning on the day of surgery and increase activity slowly over the next two to four weeks.  Do not drive while taking prescription pain medication.  You may return to work when you feel ready.  Do not lift anything heavier than 10 lbs for 4 weeks.     Wound Care:  You may shower. Let soap and water run over the incisions and pat dry. Do not submerge in a bathtub or pool.  If you have an open wound, you should change the dressing twice daily with moist gauze.     Diet:  You may resume your normal diet postoperatively.  Take a stool softener or laxative to avoid constipation.     Medication:  Pain Control  Take acetaminophen (Tylenol) 650 mg 4 times daily as needed for pain.  Take ibuprofen 600 mg 3 times daily as needed for pain. Stop taking this medication if it causes an upset stomach.  Bowel Regularity  Take an over-the-counter stool softener/laxative (Colace, Miralax, or Milk of Magnesia) to avoid constipation.  Previous Home Medication  Restart your previous home medication unless otherwise instructed.    Call Your Doctor's Office If You Experience:  Fevers greater than 101.3°F.  Vomiting.  Spreading redness or drainage from the incisions.  Opening of the incisions.  Worsening pain not controlled by medication.  Chest pain or shortness of breath.    Follow-Up:  Follow-up with your surgeon as scheduled in 1 week.  If no follow-up appointment has been scheduled, call to schedule an appointment within 1-2 weeks of discharge.     Contact Information:  During normal business hours call the clinic with any questions or concerns. On weekends and evenings call [phone] to have the operators page the surgery resident on call after hours with questions or concerns.

## 2024-08-12 NOTE — DISCHARGE SUMMARY
[facility]  Discharge Note  Short Stay    Procedure(s) (LRB):  REPAIR, HERNIA, INCISIONAL, LAPAROSCOPIC, SMALL BOWEL RESECTION (N/A)  REPAIR, HERNIA, INCISIONAL  EXCISION, SMALL INTESTINE      OUTCOME: Patient tolerated treatment/procedure well without complication and is now ready for discharge.  Buffy Dominique is a 78yoF who underwent rectopexy in 5/2024 who presented to the emergency room with an incarcerated incisional hernia causing a small bowel obstruction on 8/7. S/p laparoscopic-converted open incisional hernia repair with small bowel resection with Dr Myles on 8/7. Post-operative course was as follows:  - 8/8: pain well-controlled, KUB reviewed which revealed NGT coiled - attempted to pull back but required removal. Patient's pain under control with use of IV tylenol, IV ibuprofen, and dilaudid pushes  - 8/9: tolerating sips of liquids with medications, pain improving with IV tylenol and acetaminophen on board, some nausea so dialed back to NPO  - 8/10: return of bowel function, CRP elevated so kept for monitoring, some nausea. Advanced to CLD, tolerated  - 8/11: some nausea with clears but no emesis, bowel function w/ loose motions, CRP downtrending  - 8/12: tolerated low residue diet, bowel function w/ firmer motions, CRP downtrending, pain controlled    DISPOSITION: Home or Self Care    FINAL DIAGNOSIS:  Incisional hernia    FOLLOWUP: In clinic    DISCHARGE INSTRUCTIONS:  No discharge procedures on file.      Clinical Reference Documents Added to Patient Instructions         Document    ABDOMINAL HERNIA REPAIR DISCHARGE INSTRUCTIONS (ENGLISH)    SMALL BOWEL OBSTRUCTION DISCHARGE INSTRUCTIONS (ENGLISH)            TIME SPENT ON DISCHARGE: 20 minutes

## 2024-08-12 NOTE — PLAN OF CARE
Pt is at functional baseline with no acute needs. PT to sign off. Please reconsult if status changes.    Problem: Physical Therapy  Goal: Physical Therapy Goal  Description: Goals to be met by: 2024     Patient will increase functional independence with mobility by performin. Supine to sit with Modified [place] MET  2. Sit to supine with Modified [place] MET  3. Sit to stand transfer with Supervision MET  4. Bed to chair transfer with Supervision using LRAD MET  5. Gait  x 300 feet with Supervision using LRAD. MET    Outcome: Met

## 2024-08-12 NOTE — PLAN OF CARE
CHW met with patient/family at bedside. Patient experience rounding completed and reviewed the following.     Do you know your discharge plan? Yes or No,    If yes, what is the plan? (Home, Home Health, Rehab, SNF, LTAC, or Other)   Home    Have you discussed your needs and preferences with your SW/CM? Yes or No    Yes    If you are discharging home, do you have help at home? Yes or No    Yes      Do you think you will need help additional at home at discharge? Yes or No   No    Do you currently have difficulty keeping up with bills, affording medicine or buying food? Yes or No    No    Assigned SW/CM notified of any patient/family needs or concerns. Appropriate resources provided to address patient's needs.   Jaime Sultana CHW  Case Management   [phone]

## 2024-08-12 NOTE — PT/OT/SLP PROGRESS
Occupational Therapy   Treatment/Discharge    Name: Buffy Dominique  MRN: [id number]  Admitting Diagnosis:  Incisional hernia  5 Days Post-Op    Recommendations:     Discharge Recommendations: Low Intensity Therapy  Discharge Equipment Recommendations:  none  Barriers to discharge:  None    Assessment:     Buffy Dominique is a 78 y.o. female with a medical diagnosis of Incisional hernia.  She presents performing ADL safely and without A. Performance deficits affecting function are impaired endurance, weakness.     Rehab Prognosis:  Good; patient is no long in need of skilled OT services to address these deficits and reach maximum level of function.       Plan:     Patient to be d/c'd from acute OT services  Plan of Care Expires: 09/07/24  Plan of Care Reviewed with: patient    Subjective     Chief Complaint: denies  Patient/Family Comments/goals: to go home  Pain/Comfort:  Pain Rating 1: 0/10  Pain Rating Post-Intervention 1: 0/10    Objective:     Communicated with: RN prior to session.  Patient found supine with peripheral IV upon OT entry to room.    General Precautions: Standard, fall    Orthopedic Precautions:N/A  Braces: N/A  Respiratory Status: Room air     Occupational Performance:     Bed Mobility:    Patient completed Scooting/Bridging with modified independence  Patient completed Supine to Sit with modified independence     Functional Mobility/Transfers:  Patient completed Sit <> Stand Transfer with supervision  with  no assistive device   Functional Mobility: Supervision to/from bathroom    Activities of Daily Living:  Grooming: independence    Upper Body Dressing: independence    Lower Body Dressing: independence    Toileting: independence        [facility] 6 Click ADL: 24    Treatment & Education:  Pt ed on OT POC  Pt ed on ROM ex's daily for increased overall strength and endurance to reduce risk of hospital acquired weakness    Patient left  seated on couch  with all lines intact, call button in reach, and RN  notified    GOALS:   Multidisciplinary Problems       Occupational Therapy Goals          Problem: Occupational Therapy    Goal Priority Disciplines Outcome Interventions   Occupational Therapy Goal     OT, PT/OT Progressing    Description: Goals to be met by: 9/7/24     Patient will increase functional independence with ADLs by performing:    LE Dressing with [place].  Grooming while standing at sink with [place].  Toileting from toilet with [place] for hygiene and clothing management.   Supine to sit with [place].  Toilet transfer to toilet with [place].                         Time Tracking:     OT Date of Treatment: 08/12/24  OT Start Time: 0837  OT Stop Time: 0900  OT Total Time (min): 23 min    Billable Minutes:Self Care/Home Management 23 8/12/2024

## 2024-08-12 NOTE — PLAN OF CARE
Problem: Occupational Therapy  Goal: Occupational Therapy Goal  Description: Goals to be met by: 9/7/24     Patient will increase functional independence with ADLs by performing:    LE Dressing with [place].  Grooming while standing at sink with [place].  Toileting from toilet with [place] for hygiene and clothing management.   Supine to sit with [place].  Toilet transfer to toilet with [place].    Outcome: Met

## 2024-08-12 NOTE — PLAN OF CARE
[facility] - [facility]  Discharge Final Note    Primary Care Provider: Odalis Kim MD  Expected Discharge Date: 8/12/2024    Patient medically ready for discharge to home.     Transportation by son.     Is family/patient aware of discharge: yes     Hospital follow up scheduled: yes       Final Discharge Note (most recent)       Final Note - 08/12/24 1437          Final Note    Assessment Type Final Discharge Note     Anticipated Discharge Disposition Home or Self Care     Hospital Resources/Appts/Education Provided Provided patient/caregiver with written discharge plan information                     Important Message from Medicare  Important Message from Medicare regarding Discharge Appeal Rights: Given to patient/caregiver, Explained to patient/caregiver, Signed/date by patient/caregiver   Date IMM was signed: 08/12/24  Time IMM was signed: 0900  Referral Info (most recent)       Referral Info    No documentation.                   Future Appointments   Date Time Provider Department Center   8/14/2024  9:00 AM Adolfo Gallegos MD [facility] NEPHRO [facility]   8/19/2024 10:00 AM Gina Yañez MD [facility] DERM [facility]   8/20/2024  3:20 PM Sharath Myles MD [facility] COLSHAWANDA Kelly   9/17/2024  1:40 PM Sharath Myles MD [facility] SABA White RN  Case Management  Ext: [phone]  08/12/2024

## 2024-08-12 NOTE — HOSPITAL COURSE
Buffy Dominique is a 78yoF who underwent rectopexy in 5/2024 who presented to the emergency room with an incarcerated incisional hernia causing a small bowel obstruction on 8/7. S/p laparoscopic-converted open incisional hernia repair with small bowel resection with Dr Myles on 8/7. Post-operative course was as follows:  - 8/8: pain well-controlled, KUB reviewed which revealed NGT coiled - attempted to pull back but required removal. Patient's pain under control with use of IV tylenol, IV ibuprofen, and dilaudid pushes  - 8/9: tolerating sips of liquids with medications, pain improving with IV tylenol and acetaminophen on board, some nausea so dialed back to NPO  - 8/10: return of bowel function, CRP elevated so kept for monitoring, some nausea. Advanced to CLD, tolerated  - 8/11: some nausea with clears but no emesis, bowel function w/ loose motions, CRP downtrending  - 8/12: tolerated low residue diet, bowel function w/ firmer motions, CRP downtrending, pain controlled

## 2024-08-12 NOTE — PROGRESS NOTES
[facility]  Colorectal Surgery  Progress Note    Patient Name: Buffy Dominique  MRN: [id number]  Admission Date: 8/7/2024  Hospital Length of Stay: 5 days  Attending Physician: Sharath Myles MD    Subjective:     Interval History: No acute over night events. Pain under control. No nausea or vomiting. Diarrhea improved and passing flatus appropriately. Patient hemodynamically stable. CRP down trending to 117 from 205 yesterday. Patient reports symptomatic improvement, with no narcotic pain medication use.    Post-Op Info:  Procedure(s) (LRB):  REPAIR, HERNIA, INCISIONAL, LAPAROSCOPIC, SMALL BOWEL RESECTION (N/A)  REPAIR, HERNIA, INCISIONAL  EXCISION, SMALL INTESTINE   5 Days Post-Op      Medications:  Continuous Infusions:      Scheduled Meds:   acetaminophen  1,000 mg Oral Q8H    amLODIPine  10 mg Oral Daily    heparin (porcine)  5,000 Units Subcutaneous Q12H    hydrALAZINE  50 mg Oral Q12H    ibuprofen  800 mg Oral Q8H    pantoprazole  40 mg Intravenous Daily    spironolactone  25 mg Oral Daily     PRN Meds:   acetaminophen tablet 1,000 mg    amLODIPine tablet 10 mg    heparin (porcine) injection 5,000 Units    hydrALAZINE tablet 50 mg    ibuprofen tablet 800 mg    pantoprazole injection 40 mg    spironolactone split tablet 25 mg        Objective:     Vital Signs (Most Recent):  Temp: 97.6 °F (36.4 °C) (08/12/24 0449)  Pulse: 76 (08/12/24 0449)  Resp: 18 (08/12/24 0449)  BP: (!) 154/66 (08/12/24 0449)  SpO2: (!) 93 % (08/12/24 0449) Vital Signs (24h Range):  Temp:  [97.6 °F (36.4 °C)-98.1 °F (36.7 °C)] 97.6 °F (36.4 °C)  Pulse:  [74-83] 76  Resp:  [14-18] 18  SpO2:  [93 %-95 %] 93 %  BP: (116-154)/(57-66) 154/66     Intake/Output - Last 3 Shifts         08/10 0700  08/11 0659 08/11 0700  08/12 0659 08/12 0700 08/13 0659    Urine (mL/kg/hr) 0 (0)      Stool 2      Total Output 2      Net -2             Urine Occurrence 6 x      Stool Occurrence 4 x               Physical Exam  Constitutional:       General:  She is not in acute distress.     Appearance: Normal appearance.   HENT:      Head: Normocephalic and atraumatic.      Mouth/Throat:      Mouth: Mucous membranes are moist.   Eyes:      Extraocular Movements: Extraocular movements intact.      Conjunctiva/sclera: Conjunctivae normal.   Cardiovascular:      Rate and Rhythm: Normal rate and regular rhythm.   Pulmonary:      Effort: Pulmonary effort is normal. No respiratory distress.   Abdominal:      Comments: Abdomen soft, non-distended, less tender to palpation around sites of incisions than yesterday, appropriate for post-operative course. Multiple abdominal incisions healing appropriately. All incisions clean, dry, intact.   Skin:     General: Skin is warm and dry.   Neurological:      General: No focal deficit present.      Mental Status: She is alert and oriented to person, place, and time. Mental status is at baseline.        Significant Labs:  BMP (Last 3 Results):   Recent Labs   Lab 08/10/24  0524 08/11/24  0606 08/12/24  0444   GLU 75 61* 82   * 129* 129*   K 3.3* 3.3* 3.1*    97 95   CO2 21* 20* 25   BUN 7* 5* 5*   CREATININE 0.6 0.7 0.7   CALCIUM 7.9* 8.3* 8.1*   MG 1.4* 1.7 1.8     CBC (Last 3 Results):   Recent Labs   Lab 08/09/24  0217 08/10/24  0524 08/11/24  0606   WBC 6.23 7.23 6.65   RBC 3.00* 3.13* 3.25*   HGB 9.6* 10.0* 10.7*   HCT 28.8* 30.7* 30.9*    209 255   MCV 96 98 95   MCH 32.0* 31.9* 32.9*   MCHC 33.3 32.6 34.6     CRP (Last 3 Results):   Recent Labs   Lab 08/10/24  0524 08/11/24  0606 08/12/24  0444   .3* 204.9* 116.7*       Significant Diagnostics:  None  Assessment/Plan:     * Incisional hernia  Buffy Dominique is a 78yoF who underwent rectopexy in 5/2024 who presented to the emergency room with an incarcerated incisional hernia causing a small bowel obstruction on 8/7. S/p laparoscopic-converted open incisional hernia repair with small bowel resection with Dr Myles on 8/7.     - Tolerating low res diet  - CRP  down trending  - Removed IV pain medication; ibuprofen and tylenol on board. If require IV pain med, please notify CRS team   - Patient pain under control for last 2 days without need for narcotic medications  - Encourage OOB and IS  - Replenish electrolytes PRN  - Antibiotics completed yesterday  - please call with questions    Dispo: discharge later today          Krystal Beltre, DO  Colorectal Surgery  [facility]

## 2024-08-13 NOTE — PLAN OF CARE
[facility] - [facility]  Discharge Final Note    Primary Care Provider: Odalis Kim MD  Expected Discharge Date: 8/12/2024    Patient medically ready for discharge to home.     Transportation by son.     Is family/patient aware of discharge: yes     Hospital follow up scheduled: yes       Final Discharge Note (most recent)       Final Note - 08/13/24 1456          Final Note    Assessment Type Final Discharge Note     Anticipated Discharge Disposition Home or Self Care     Hospital Resources/Appts/Education Provided Provided patient/caregiver with written discharge plan information                     Important Message from Medicare  Important Message from Medicare regarding Discharge Appeal Rights: Given to patient/caregiver, Explained to patient/caregiver, Signed/date by patient/caregiver   Date IMM was signed: 08/12/24  Time IMM was signed: 0900  Referral Info (most recent)       Referral Info    No documentation.                   Future Appointments   Date Time Provider Department Center   8/14/2024  9:00 AM Adolfo Gallegos MD [facility] NEPHRO [facility]   8/19/2024 10:00 AM Gina Yañez MD [facility]   8/30/2024  8:20 AM Sharath Myles MD [facility] COLON [facility]   9/17/2024  1:40 PM Sharath Myles MD [facility] COLSUKYM Braydon White RN  Case Management  Ext: [phone]  08/13/2024

## 2024-08-14 ENCOUNTER — TELEPHONE (OUTPATIENT)
Dept: SURGERY | Facility: CLINIC | Age: 79
End: 2024-08-14
Payer: MEDICARE

## 2024-08-14 ENCOUNTER — PATIENT OUTREACH (OUTPATIENT)
Dept: ADMINISTRATIVE | Facility: CLINIC | Age: 79
End: 2024-08-14
Payer: MEDICARE

## 2024-08-14 LAB
FINAL PATHOLOGIC DIAGNOSIS: NORMAL
GROSS: NORMAL
Lab: NORMAL

## 2024-08-14 NOTE — PROGRESS NOTES
C3 nurse attempted to contact Buffy Trip for a TCC post hospital discharge follow up call. No answer. No voicemail available.The patient does not have a scheduled HOSFU appointment. Message sent to PCP staff for assistance with scheduling visit with patient.

## 2024-08-14 NOTE — TELEPHONE ENCOUNTER
She is doing good - just checked in with her      Sharath Myles MD, FACS, FASCRS  Department of Colon & Rectal Surgery  [facility]

## 2024-08-15 NOTE — PROGRESS NOTES
C3 nurse spoke with Buffy Dominique for a TCC post hospital discharge follow up call. The patient has a scheduled HOSFU appointment per virtual visit with Odalis Kim On 08/22/24 @ 1300.

## 2024-08-19 ENCOUNTER — OFFICE VISIT (OUTPATIENT)
Dept: DERMATOLOGY | Facility: CLINIC | Age: 79
End: 2024-08-19
Payer: MEDICARE

## 2024-08-19 DIAGNOSIS — D48.5 NEOPLASM OF UNCERTAIN BEHAVIOR OF SKIN: Primary | ICD-10-CM

## 2024-08-19 DIAGNOSIS — Z85.828 HISTORY OF NONMELANOMA SKIN CANCER: ICD-10-CM

## 2024-08-19 DIAGNOSIS — L57.0 AK (ACTINIC KERATOSIS): ICD-10-CM

## 2024-08-19 PROCEDURE — 1160F RVW MEDS BY RX/DR IN RCRD: CPT | Mod: CPTII,S$GLB,, | Performed by: STUDENT IN AN ORGANIZED HEALTH CARE EDUCATION/TRAINING PROGRAM

## 2024-08-19 PROCEDURE — 99213 OFFICE O/P EST LOW 20 MIN: CPT | Mod: 25,S$GLB,, | Performed by: STUDENT IN AN ORGANIZED HEALTH CARE EDUCATION/TRAINING PROGRAM

## 2024-08-19 PROCEDURE — 1125F AMNT PAIN NOTED PAIN PRSNT: CPT | Mod: CPTII,S$GLB,, | Performed by: STUDENT IN AN ORGANIZED HEALTH CARE EDUCATION/TRAINING PROGRAM

## 2024-08-19 PROCEDURE — 69100 BIOPSY OF EXTERNAL EAR: CPT | Mod: S$GLB,,, | Performed by: STUDENT IN AN ORGANIZED HEALTH CARE EDUCATION/TRAINING PROGRAM

## 2024-08-19 PROCEDURE — 17000 DESTRUCT PREMALG LESION: CPT | Mod: XS,S$GLB,, | Performed by: STUDENT IN AN ORGANIZED HEALTH CARE EDUCATION/TRAINING PROGRAM

## 2024-08-19 PROCEDURE — 3288F FALL RISK ASSESSMENT DOCD: CPT | Mod: CPTII,S$GLB,, | Performed by: STUDENT IN AN ORGANIZED HEALTH CARE EDUCATION/TRAINING PROGRAM

## 2024-08-19 PROCEDURE — 88305 TISSUE EXAM BY PATHOLOGIST: CPT | Performed by: PATHOLOGY

## 2024-08-19 PROCEDURE — 1111F DSCHRG MED/CURRENT MED MERGE: CPT | Mod: CPTII,S$GLB,, | Performed by: STUDENT IN AN ORGANIZED HEALTH CARE EDUCATION/TRAINING PROGRAM

## 2024-08-19 PROCEDURE — 17003 DESTRUCT PREMALG LES 2-14: CPT | Mod: S$GLB,,, | Performed by: STUDENT IN AN ORGANIZED HEALTH CARE EDUCATION/TRAINING PROGRAM

## 2024-08-19 PROCEDURE — 11102 TANGNTL BX SKIN SINGLE LES: CPT | Mod: XS,S$GLB,, | Performed by: STUDENT IN AN ORGANIZED HEALTH CARE EDUCATION/TRAINING PROGRAM

## 2024-08-19 PROCEDURE — 1101F PT FALLS ASSESS-DOCD LE1/YR: CPT | Mod: CPTII,S$GLB,, | Performed by: STUDENT IN AN ORGANIZED HEALTH CARE EDUCATION/TRAINING PROGRAM

## 2024-08-19 PROCEDURE — 99999 PR PBB SHADOW E&M-EST. PATIENT-LVL III: CPT | Mod: PBBFAC,,, | Performed by: STUDENT IN AN ORGANIZED HEALTH CARE EDUCATION/TRAINING PROGRAM

## 2024-08-19 PROCEDURE — 1159F MED LIST DOCD IN RCRD: CPT | Mod: CPTII,S$GLB,, | Performed by: STUDENT IN AN ORGANIZED HEALTH CARE EDUCATION/TRAINING PROGRAM

## 2024-08-19 NOTE — PROGRESS NOTES
Subjective:      Patient ID:  Buffy Dominique is a 78 y.o. female who presents for   Chief Complaint   Patient presents with    Skin Check     Buffy Dominique is a 78 y.o. female who presents for: FBSE screening exam for skin cancer.    Last office visit 2/12/2024    The patient has the following lesions of concern:  Location: left ear   Duration: 2 mo   Symptoms: itches sometimes   Relieving factors/Previous treatments: none     Pertinent history:  History of blistering sunburns: Yes- childhood  History of tanning bed use: No  Family history of melanoma: Yes- father- unsure of what kind   Personal history of mole removal: Yes   Personal history of skin cancer: Yes- pt has Mohs for she says melanoma on face 2009/2010 and another skin cancer removed on nose; BCC right cheek- Mohs 4/3/2024         Review of Systems   Skin:  Positive for activity-related sunscreen use. Negative for daily sunscreen use and recent sunburn.   Hematologic/Lymphatic: Does not bruise/bleed easily.       Objective:   Physical Exam   Skin:   Areas Examined (abnormalities noted in diagram):   Scalp / Hair Palpated and Inspected  Head / Face Inspection Performed  Neck Inspection Performed  Chest / Axilla Inspection Performed  Abdomen Inspection Performed  Back Inspection Performed  RUE Inspected  LUE Inspection Performed  RLE Inspected  LLE Inspection Performed  Nails and Digits Inspection Performed                Diagram Legend     Erythematous scaling macule/papule c/w actinic keratosis       Vascular papule c/w angioma      Pigmented verrucoid papule/plaque c/w seborrheic keratosis      Yellow umbilicated papule c/w sebaceous hyperplasia      Irregularly shaped tan macule c/w lentigo     1-2 mm smooth white papules consistent with Milia      Movable subcutaneous cyst with punctum c/w epidermal inclusion cyst      Subcutaneous movable cyst c/w pilar cyst      Firm pink to brown papule c/w dermatofibroma      Pedunculated fleshy papule(s) c/w skin  tag(s)      Evenly pigmented macule c/w junctional nevus     Mildly variegated pigmented, slightly irregular-bordered macule c/w mildly atypical nevus      Flesh colored to evenly pigmented papule c/w intradermal nevus       Pink pearly papule/plaque c/w basal cell carcinoma      Erythematous hyperkeratotic cursted plaque c/w SCC      Surgical scar with no sign of skin cancer recurrence      Open and closed comedones      Inflammatory papules and pustules      Verrucoid papule consistent consistent with wart     Erythematous eczematous patches and plaques     Dystrophic onycholytic nail with subungual debris c/w onychomycosis     Umbilicated papule    Erythematous-base heme-crusted tan verrucoid plaque consistent with inflamed seborrheic keratosis     Erythematous Silvery Scaling Plaque c/w Psoriasis     See annotation                    Assessment / Plan:      Pathology Orders:       Normal Orders This Visit    Specimen to Pathology, Dermatology     Questions:    Procedure Type: Dermatology and skin neoplasms    Number of Specimens: 2    ------------------------: -------------------------    Spec 1 Procedure: Biopsy    Spec 1 Clinical Impression: pearly pink crusted papule arborizing telangiectasias- r/o bcc    Spec 1 Source: Right frontal scalp    ------------------------: -------------------------    Spec 2 Procedure: Biopsy    Spec 2 Clinical Impression: erythematous papule hyperkeratotic core- ddx AK v SCC v retention hyperkeratosis    Spec 2 Source: Left antihelix    Release to patient: Immediate    Release to patient:           Neoplasm of uncertain behavior of skin  -     Specimen to Pathology, Dermatology  Shave biopsy procedure note:    Shave biopsy performed after verbal consent including risk of infection, scar, recurrence, need for additional treatment of site. Area prepped with alcohol, anesthetized with approximately 1.0cc of 1% lidocaine with epinephrine. Lesional tissue shaved with razor blade.  Hemostasis achieved with application of aluminum chloride followed by hyfrecation. No complications. Dressing applied. Wound care explained.    If + NMSC will send to Mohs    AK (actinic keratosis)  Cryosurgery Procedure Note    Verbal consent from the patient is obtained including, but not limited to, risk of hypopigmentation/hyperpigmentation, scar, recurrence of lesion. The patient is aware of the precancerous quality and need for treatment of these lesions. Liquid nitrogen cryosurgery is applied to the 2 actinic keratoses, as detailed in the physical exam, to produce a freeze injury. The patient is aware that blisters may form and is instructed on wound care with gentle cleansing and use of vaseline ointment to keep moist until healed. The patient is supplied a handout on cryosurgery and is instructed to call if lesions do not completely resolve.    History of NMSC  Examined areas of prior scars, no evidence of recurrence  - Continue skin exams at least annually, increased risk of additional skin cancers developing in the future  - I discussed the importance of sun protection with the patient. Recommend daily use of SPF 30+ physical or mineral sunscreen, apply 15 minutes before going outdoors and reapply every 2 hours. Discussed wide-brimmed hats and UPF 50+ clothing.    RTC 6 months, sooner pending bx results

## 2024-08-22 ENCOUNTER — PATIENT MESSAGE (OUTPATIENT)
Dept: DERMATOLOGY | Facility: CLINIC | Age: 79
End: 2024-08-22
Payer: MEDICARE

## 2024-08-22 ENCOUNTER — TELEPHONE (OUTPATIENT)
Dept: DERMATOLOGY | Facility: CLINIC | Age: 79
End: 2024-08-22
Payer: MEDICARE

## 2024-08-22 LAB
FINAL PATHOLOGIC DIAGNOSIS: NORMAL
GROSS: NORMAL
Lab: NORMAL
MICROSCOPIC EXAM: NORMAL

## 2024-08-22 NOTE — TELEPHONE ENCOUNTER
6 month recall placed.     ----- Message from Gina Yañez MD sent at 8/22/2024 10:26 AM CDT -----  Recommended patient to Dr. Meeks for Mohs surgery consultation. Picture in chart.  Mohs consultation for n+I BCC of R frontal scalp and at least SCCis of L antihelix (extending to deep biopsy margin)    General Derm team: Sent patient for Mohs surgery. F/u with me in 6 months.

## 2024-08-22 NOTE — PROGRESS NOTES
Recommended patient to Dr. Meeks for Mohs surgery consultation. Picture in chart.  Mohs consultation for n+I BCC of R frontal scalp and at least SCCis of L antihelix (extending to deep biopsy margin)    General Derm team: Sent patient for Mohs surgery. F/u with me in 6 months.

## 2024-08-27 ENCOUNTER — PATIENT MESSAGE (OUTPATIENT)
Dept: OTHER | Facility: OTHER | Age: 79
End: 2024-08-27
Payer: MEDICARE

## 2024-08-28 NOTE — PROGRESS NOTES
[facility]  Colon and Rectal Surgery    [address]  [place], [place]  Tel: [phone]  Fax: [phone]  [email or web address]   MD Saturnino Urena MD Brian Kann, MD W. Forrest Johnston, MD Matthew Giglia, MD Jennifer Paruch, MD William Kethman, MD Danielle Kay, MD     Patient name: Buffy Dominique   YOB: 1945   MRN: [id number]    It was a pleasure seeing Ms. Dominique in the Colon and Rectal Surgery clinic here at [facility].     As you know, Ms. Dominique is a 78 year old woman with a history of HTN, HLD, CKD, AS, former tobacco use but quit many years ago  who presents for evaluation of rectal prolapse . She underwent an extensive lysis of adhesions and robotic-assisted rectopexy on 6/7/2024 - this was complicated by pelvic hematoma requiring transfusion and subcutaneous emphysema. She is doing well overall, some mild firmness in her right lower quadrant but this is improving. She is having bowel function without prolapse recurrence - daily, not straining. She does have mild incontinence but this is also improving.    8/30/2024  Here for post-operative follow-up after undergoing urgent port-site hernia repair and small bowel resection on 8/7/2024. She is doing well - no major complaints, diarrhea has stopped.    Pathology  Incisional hernia small bowel (resection):  Negative for tumor  Small intestine mucosa with reactive changes, nonspecific.  Serosal adhesions.    The patient was informed of the availability of a certified  without charge. A certified  was not necessary for this visit.    Review of Systems  See pertinent review of systems above    Past Medical History:   Diagnosis Date    Basal cell carcinoma (BCC) of right cheek 04/03/2024    R cheek    CKD (chronic kidney disease) stage 3, GFR 30-59 ml/min     HLD (hyperlipidemia)     HTN (hypertension)     Hyperparathyroidism     Melanoma      Past Surgical  History:   Procedure Laterality Date    CATARACT EXTRACTION, BILATERAL Bilateral 2014    cataract removal Right 2014    COLONOSCOPY N/A 4/30/2024    Procedure: COLONOSCOPY;  Surgeon: Sharath Myles MD;  Location: [facility] (4TH FLR);  Service: Endoscopy;  Laterality: N/A;  ref by / Miralax inst portal-RB  1/31/24- Pt r/s/ prep ins on portal - Miralax prep - ERW  4/25/24- precall complete - ERW  4/29-pt cannot come in earlier due to ride-KPvt    DV5 ROBOTIC RECTOPEXY N/A 6/7/2024    Procedure: DV5 ROBOTIC RECTOPEXY (eras low, lith); lysis of adhesions;  Surgeon: Sharath Myles MD;  Location: [facility] OR 2ND FLR;  Service: Colon and Rectal;  Laterality: N/A;    EXCISION, SMALL INTESTINE  8/7/2024    Procedure: EXCISION, SMALL INTESTINE;  Surgeon: Sharath Myles MD;  Location: [facility] OR 2ND FLR;  Service: Colon and Rectal;;    FLEXIBLE SIGMOIDOSCOPY  6/7/2024    Procedure: SIGMOIDOSCOPY, FLEXIBLE;  Surgeon: Sharath Myles MD;  Location: [facility] OR 2ND FLR;  Service: Colon and Rectal;;    hiatial hernia  2017    HYSTERECTOMY      LAPAROSCOPIC REPAIR OF INCISIONAL HERNIA N/A 8/7/2024    Procedure: REPAIR, HERNIA, INCISIONAL, LAPAROSCOPIC, SMALL BOWEL RESECTION;  Surgeon: Sharath Myles MD;  Location: [facility] OR 2ND FLR;  Service: Colon and Rectal;  Laterality: N/A;    melanoma      on face    OOPHORECTOMY      REPAIR, HERNIA, INCISIONAL  8/7/2024    Procedure: REPAIR, HERNIA, INCISIONAL;  Surgeon: Sharath Myles MD;  Location: [facility] OR 2ND FLR;  Service: Colon and Rectal;;    THORACIC AORTIC ANEURYSM REPAIR  2011     Family History   Problem Relation Name Age of Onset    Cancer Father Junior     Hypertension Maternal Grandmother Jazzy     Colon cancer Neg Hx      Inflammatory bowel disease Neg Hx       Social History     Tobacco Use    Smoking status: Former     Current packs/day: 0.00     Average packs/day: 1 pack/day for 30.0 years (30.0 ttl pk-yrs)     Types: Cigarettes     Start  date: 1/26/1979     Quit date: 1/26/2009     Years since quitting: 15.5    Smokeless tobacco: Never   Substance Use Topics    Alcohol use: Never     Comment: glass of wine once or twice a year    Drug use: Never     Review of patient's allergies indicates:   Allergen Reactions    Gabapentin Hallucinations    Methotrexate analogues      Mouth Ulcers     Thiazides Other (See Comments)     hyponatremia       Current Outpatient Medications on File Prior to Visit   Medication Sig Dispense Refill    acetaminophen (TYLENOL) 500 MG tablet Take 2 tablets (1,000 mg total) by mouth every 8 (eight) hours.      adalimumab (HUMIRA PEN) PnKt injection Inject 1 pen  (40 mg total) into the skin every 14 (fourteen) days. 6 pen 3    adalimumab (HUMIRA PEN) PnKt injection Inject 1 pen  (40 mg total) into the skin every 14 (fourteen) days. 2 pen 11    alendronate (FOSAMAX) 70 MG tablet Take 1 tablet (70 mg total) by mouth every 7 days. 4 tablet 11    amLODIPine (NORVASC) 10 MG tablet Take 1 tablet (10 mg total) by mouth once daily. 90 tablet 1    artificial tears (ISOPTO TEARS) 0.5 % ophthalmic solution Place 1 drop into both eyes as needed (for dry eyes). 15 mL 5    cholecalciferol, vitamin D3, (VITAMIN D3) 25 mcg (1,000 unit) capsule Take 1 capsule (1,000 Units total) by mouth once daily. 90 capsule 3    hydrALAZINE (APRESOLINE) 50 MG tablet Take 1 tablet (50 mg total) by mouth every 12 (twelve) hours. 180 tablet 1    ibuprofen (ADVIL,MOTRIN) 800 MG tablet Take 1 tablet (800 mg total) by mouth every 8 (eight) hours. (Patient not taking: Reported on 8/15/2024)      olmesartan (BENICAR) 40 MG tablet Take 1 tablet (40 mg total) by mouth once daily. 90 tablet 1    oxyCODONE (ROXICODONE) 5 MG immediate release tablet Take 1 tablet (5 mg total) by mouth every 6 (six) hours as needed for Pain. (Patient not taking: Reported on 8/15/2024) 20 tablet 0    pantoprazole (PROTONIX) 40 MG tablet Take 1 tablet (40 mg total) by mouth once daily.  (Patient not taking: Reported on 8/15/2024) 90 tablet 0    pulse oximeter (PULSE OXIMETER) device by Apply Externally route 2 (two) times a day. Use twice daily at 8 AM and 3 PM and record the value in MyChart as directed. (Patient not taking: Reported on 8/15/2024) 1 each 0    [DISCONTINUED] spironolactone (ALDACTONE) 25 MG tablet Take 1 tablet (25 mg total) by mouth once daily. 90 tablet 0     No current facility-administered medications on file prior to visit.     Physical Examination  LMP  (LMP Unknown)      Constitutional: well developed, no cough, no dyspnea, alert, and no acute distress    Head: Normocephalic, no lesions, without obvious abnormality  Eye: Normal external eye, conjunctiva, and lids  Cardiovascular: regular rate and regular rhythm  Respiratory: normal air entry  Gastrointestinal: soft, non-tender, incisions are healed        Musculoskeletal: full range of motion without pain  Neurologic: alert, oriented, normal speech, no focal findings or movement disorder noted  Psychiatric: appropriate, normal mood    Assessment and Plan of Care    Thank you again for referring Ms. Dominique to my care. In summary, Ms. Dominique is a 78 year old woman presenting with rectal prolapse - underwent robotic-assisted rectopexy on 6/7/2024, required urgent port-site hernia repair with small bowel resection. We discussed treatment options and have provided the following recommendations:    Discussed bowel management - she is currently not needing Miralax or Metamucil but recommend this if needed  Follow-up with me as needed    Please do not hesitate to contact me if you have any questions.      Sharath Myles MD, FACS, FASCRS  Department of Colon & Rectal Surgery  [facility]

## 2024-08-30 ENCOUNTER — OFFICE VISIT (OUTPATIENT)
Dept: SURGERY | Facility: CLINIC | Age: 79
End: 2024-08-30
Payer: MEDICARE

## 2024-08-30 VITALS
HEART RATE: 75 BPM | WEIGHT: 108.69 LBS | DIASTOLIC BLOOD PRESSURE: 82 MMHG | RESPIRATION RATE: 18 BRPM | HEIGHT: 63 IN | BODY MASS INDEX: 19.26 KG/M2 | SYSTOLIC BLOOD PRESSURE: 154 MMHG

## 2024-08-30 DIAGNOSIS — K43.2 PORT-SITE HERNIA: Primary | ICD-10-CM

## 2024-08-30 DIAGNOSIS — K91.89 PORT-SITE HERNIA: Primary | ICD-10-CM

## 2024-08-30 PROCEDURE — 99999 PR PBB SHADOW E&M-EST. PATIENT-LVL III: CPT | Mod: PBBFAC,,, | Performed by: STUDENT IN AN ORGANIZED HEALTH CARE EDUCATION/TRAINING PROGRAM

## 2024-10-09 ENCOUNTER — LAB VISIT (OUTPATIENT)
Dept: LAB | Facility: HOSPITAL | Age: 79
End: 2024-10-09
Attending: INTERNAL MEDICINE
Payer: MEDICARE

## 2024-10-09 DIAGNOSIS — E87.1 HYPONATREMIA: ICD-10-CM

## 2024-10-09 LAB
ANION GAP SERPL CALC-SCNC: 7 MMOL/L (ref 8–16)
BASOPHILS # BLD AUTO: 0.05 K/UL (ref 0–0.2)
BASOPHILS NFR BLD: 0.9 % (ref 0–1.9)
BUN SERPL-MCNC: 14 MG/DL (ref 8–23)
CALCIUM SERPL-MCNC: 9.7 MG/DL (ref 8.7–10.5)
CHLORIDE SERPL-SCNC: 101 MMOL/L (ref 95–110)
CO2 SERPL-SCNC: 26 MMOL/L (ref 23–29)
CREAT SERPL-MCNC: 0.8 MG/DL (ref 0.5–1.4)
DIFFERENTIAL METHOD BLD: ABNORMAL
EOSINOPHIL # BLD AUTO: 0.5 K/UL (ref 0–0.5)
EOSINOPHIL NFR BLD: 8.9 % (ref 0–8)
ERYTHROCYTE [DISTWIDTH] IN BLOOD BY AUTOMATED COUNT: 13 % (ref 11.5–14.5)
EST. GFR  (NO RACE VARIABLE): >60 ML/MIN/1.73 M^2
GLUCOSE SERPL-MCNC: 72 MG/DL (ref 70–110)
HCT VFR BLD AUTO: 36.6 % (ref 37–48.5)
HGB BLD-MCNC: 12.2 G/DL (ref 12–16)
IMM GRANULOCYTES # BLD AUTO: 0.01 K/UL (ref 0–0.04)
IMM GRANULOCYTES NFR BLD AUTO: 0.2 % (ref 0–0.5)
LYMPHOCYTES # BLD AUTO: 2.5 K/UL (ref 1–4.8)
LYMPHOCYTES NFR BLD: 45.5 % (ref 18–48)
MCH RBC QN AUTO: 32.7 PG (ref 27–31)
MCHC RBC AUTO-ENTMCNC: 33.3 G/DL (ref 32–36)
MCV RBC AUTO: 98 FL (ref 82–98)
MONOCYTES # BLD AUTO: 0.6 K/UL (ref 0.3–1)
MONOCYTES NFR BLD: 10.6 % (ref 4–15)
NEUTROPHILS # BLD AUTO: 1.8 K/UL (ref 1.8–7.7)
NEUTROPHILS NFR BLD: 33.9 % (ref 38–73)
NRBC BLD-RTO: 0 /100 WBC
PLATELET # BLD AUTO: 267 K/UL (ref 150–450)
PMV BLD AUTO: 8.9 FL (ref 9.2–12.9)
POTASSIUM SERPL-SCNC: 4.6 MMOL/L (ref 3.5–5.1)
RBC # BLD AUTO: 3.73 M/UL (ref 4–5.4)
SODIUM SERPL-SCNC: 134 MMOL/L (ref 136–145)
WBC # BLD AUTO: 5.38 K/UL (ref 3.9–12.7)

## 2024-10-09 PROCEDURE — 36415 COLL VENOUS BLD VENIPUNCTURE: CPT | Mod: PO | Performed by: INTERNAL MEDICINE

## 2024-10-09 PROCEDURE — 85025 COMPLETE CBC W/AUTO DIFF WBC: CPT | Performed by: INTERNAL MEDICINE

## 2024-10-09 PROCEDURE — 80048 BASIC METABOLIC PNL TOTAL CA: CPT | Performed by: INTERNAL MEDICINE

## 2024-10-15 DIAGNOSIS — D04.22 SQUAMOUS CELL CARCINOMA IN SITU OF SKIN OF EAR, LEFT: Primary | ICD-10-CM

## 2024-10-15 NOTE — PROGRESS NOTES
HPI  This 79 y.o. y/o female with PMH of HTN, HLD, RA, hyperparathyroidism, chronic hyponatremia, and thoracic aortic aneurysm repair came in for hyponatremia.    HypoNa  Lab Results   Component Value Date     (L) 10/09/2024     (L) 08/12/2024     (L) 08/11/2024     (L) 08/10/2024     (L) 08/09/2024     (L) 08/08/2024     (L) 08/07/2024 12/31/2023 Urine sodium 156, Urine osmolality 525; TSH, cortisol WNL; low sodium likely 2/2 pain induced high ADH level  Likely pain induced excessive ADH secretion, chlorthalidone use  S/p holding chlorthalidone, got a course of tolvaptan  CT no new nodules  The patient doesn't add salt to the diet, the independent living facility prepared the lunch    HTN  BP this visit 161/77 mmHg (did not take any anti-HTN meds)  BP at home 120s-160s/60s (noticed a lot of fluctuation)  Current medication: amlodipine 10 mg daily, olmesartan 40 mg was added in June, hydralazine 25 mg BID       Past Medical History:   Diagnosis Date    Basal cell carcinoma (BCC) of right cheek 04/03/2024    R cheek    CKD (chronic kidney disease) stage 3, GFR 30-59 ml/min     HLD (hyperlipidemia)     HTN (hypertension)     Hyperparathyroidism     Melanoma        Past Surgical History:   Procedure Laterality Date    CATARACT EXTRACTION, BILATERAL Bilateral 2014    cataract removal Right 2014    COLONOSCOPY N/A 4/30/2024    Procedure: COLONOSCOPY;  Surgeon: Sharath Myles MD;  Location: [facility] ([address] [place] [address]);  Service: Endoscopy;  Laterality: N/A;  ref by / Miralax inst portal-RB  1/31/24- Pt r/s/ prep ins on portal - Miralax prep - ERW  4/25/24- precall complete - ERW  4/29-pt cannot come in earlier due to ride-KPvt    DV5 ROBOTIC RECTOPEXY N/A 6/7/2024    Procedure: DV5 ROBOTIC RECTOPEXY (eras low, lith); lysis of adhesions;  Surgeon: Sharath Myles MD;  Location: [facility] OR 2ND FLR;  Service: Colon and Rectal;  Laterality: N/A;    EXCISION, SMALL  INTESTINE  8/7/2024    Procedure: EXCISION, SMALL INTESTINE;  Surgeon: Sharath Myles MD;  Location: [facility] OR 2ND FLR;  Service: Colon and Rectal;;    FLEXIBLE SIGMOIDOSCOPY  6/7/2024    Procedure: SIGMOIDOSCOPY, FLEXIBLE;  Surgeon: Sharath Myles MD;  Location: [facility] OR 2ND FLR;  Service: Colon and Rectal;;    hiatial hernia  2017    HYSTERECTOMY      LAPAROSCOPIC REPAIR OF INCISIONAL HERNIA N/A 8/7/2024    Procedure: REPAIR, HERNIA, INCISIONAL, LAPAROSCOPIC, SMALL BOWEL RESECTION;  Surgeon: Sharath Myles MD;  Location: [facility] OR 2ND FLR;  Service: Colon and Rectal;  Laterality: N/A;    melanoma      on face    OOPHORECTOMY      REPAIR, HERNIA, INCISIONAL  8/7/2024    Procedure: REPAIR, HERNIA, INCISIONAL;  Surgeon: Sharath Myles MD;  Location: [facility] OR 2ND FLR;  Service: Colon and Rectal;;    THORACIC AORTIC ANEURYSM REPAIR  2011       Review of patient's allergies indicates:   Allergen Reactions    Gabapentin Hallucinations    Methotrexate analogues      Mouth Ulcers     Thiazides Other (See Comments)     hyponatremia         Social History     Socioeconomic History    Marital status:    Tobacco Use    Smoking status: Former     Current packs/day: 0.00     Average packs/day: 1 pack/day for 30.0 years (30.0 ttl pk-yrs)     Types: Cigarettes     Start date: 1/26/1979     Quit date: 1/26/2009     Years since quitting: 15.7    Smokeless tobacco: Never   Substance and Sexual Activity    Alcohol use: Never     Comment: glass of wine once or twice a year    Drug use: Never    Sexual activity: Not Currently     Partners: Male     Birth control/protection: None     Social Drivers of Health     Financial Resource Strain: Low Risk  (8/9/2024)    Overall Financial Resource Strain (CARDIA)     Difficulty of Paying Living Expenses: Not hard at all   Food Insecurity: No Food Insecurity (8/9/2024)    Hunger Vital Sign     Worried About Running Out of Food in the Last Year: Never true     Ran Out of  Food in the Last Year: Never true   Transportation Needs: No Transportation Needs (8/9/2024)    TRANSPORTATION NEEDS     Transportation : No   Physical Activity: Inactive (8/9/2024)    Exercise Vital Sign     Days of Exercise per Week: 0 days     Minutes of Exercise per Session: 0 min   Stress: No Stress Concern Present (8/9/2024)    [employer] - Occupational Stress Questionnaire     Feeling of Stress : Not at all   Housing Stability: Low Risk  (8/9/2024)    Housing Stability Vital Sign     Unable to Pay for Housing in the Last Year: No     Homeless in the Last Year: No       Family History   Problem Relation Name Age of Onset    Cancer Father Junior     Hypertension Maternal Grandmother Jazzy     Colon cancer Neg Hx      Inflammatory bowel disease Neg Hx         ROS negative except listed above    PHYSICAL EXAMINATION:  Blood pressure (!) 161/77, pulse 76, weight 48 kg (105 lb 13.1 oz), SpO2 96%.  Constitutional - No acute distress  HEENT - Grossly normal  Neck - supple.   Cardiovascular - JVP normal  Respiratory - Clear  Musculoskeletal - Peripheral edema no  Dermatologic/Skin - Skin warm and dry.  No rashes.    Neurologic - No acute neurological deficit  Psychiatric - AAOx3    Assessment and Plan  1. 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 HTN: BP goal 130/80 mmHg  -Continue amlodipine 10 mg daily, olmesartan 40 mg daily  -Will switch hydralazine to carvedilol 6.25 mg BID    Follow up in 3 month

## 2024-10-16 ENCOUNTER — OFFICE VISIT (OUTPATIENT)
Dept: NEPHROLOGY | Facility: CLINIC | Age: 79
End: 2024-10-16
Payer: MEDICARE

## 2024-10-16 VITALS
BODY MASS INDEX: 18.75 KG/M2 | HEART RATE: 76 BPM | OXYGEN SATURATION: 96 % | DIASTOLIC BLOOD PRESSURE: 77 MMHG | SYSTOLIC BLOOD PRESSURE: 161 MMHG | WEIGHT: 105.81 LBS

## 2024-10-16 DIAGNOSIS — I10 HYPERTENSION, UNSPECIFIED TYPE: ICD-10-CM

## 2024-10-16 DIAGNOSIS — I10 HYPERTENSION, UNSPECIFIED TYPE: Primary | ICD-10-CM

## 2024-10-16 DIAGNOSIS — E87.1 HYPONATREMIA: Primary | ICD-10-CM

## 2024-10-16 PROCEDURE — 3078F DIAST BP <80 MM HG: CPT | Mod: CPTII,S$GLB,, | Performed by: INTERNAL MEDICINE

## 2024-10-16 PROCEDURE — 1101F PT FALLS ASSESS-DOCD LE1/YR: CPT | Mod: CPTII,S$GLB,, | Performed by: INTERNAL MEDICINE

## 2024-10-16 PROCEDURE — 1160F RVW MEDS BY RX/DR IN RCRD: CPT | Mod: CPTII,S$GLB,, | Performed by: INTERNAL MEDICINE

## 2024-10-16 PROCEDURE — 3077F SYST BP >= 140 MM HG: CPT | Mod: CPTII,S$GLB,, | Performed by: INTERNAL MEDICINE

## 2024-10-16 PROCEDURE — 1126F AMNT PAIN NOTED NONE PRSNT: CPT | Mod: CPTII,S$GLB,, | Performed by: INTERNAL MEDICINE

## 2024-10-16 PROCEDURE — 99214 OFFICE O/P EST MOD 30 MIN: CPT | Mod: S$GLB,,, | Performed by: INTERNAL MEDICINE

## 2024-10-16 PROCEDURE — 3288F FALL RISK ASSESSMENT DOCD: CPT | Mod: CPTII,S$GLB,, | Performed by: INTERNAL MEDICINE

## 2024-10-16 PROCEDURE — 1159F MED LIST DOCD IN RCRD: CPT | Mod: CPTII,S$GLB,, | Performed by: INTERNAL MEDICINE

## 2024-10-16 PROCEDURE — 99999 PR PBB SHADOW E&M-EST. PATIENT-LVL III: CPT | Mod: PBBFAC,,, | Performed by: INTERNAL MEDICINE

## 2024-10-16 RX ORDER — CARVEDILOL 6.25 MG/1
6.25 TABLET ORAL 2 TIMES DAILY WITH MEALS
Qty: 180 TABLET | Refills: 3 | Status: SHIPPED | OUTPATIENT
Start: 2024-10-16 | End: 2025-10-16

## 2024-10-16 NOTE — Clinical Note
Yogesh Kim,  I saw her in the renal office. She noticed a lot of blood pressure fluctuation at home which can be caused by hydralazine jai when dosed 2 times a day. I would like to switch the hydralazine to carvedilol 6.25 mg BID or olmesartan 20 mg daily and titrate. Just want to see if you have any preference. Thank you.  Rod

## 2024-10-23 ENCOUNTER — PROCEDURE VISIT (OUTPATIENT)
Dept: DERMATOLOGY | Facility: CLINIC | Age: 79
End: 2024-10-23
Payer: MEDICARE

## 2024-10-23 VITALS
SYSTOLIC BLOOD PRESSURE: 193 MMHG | HEIGHT: 63 IN | BODY MASS INDEX: 18.75 KG/M2 | HEART RATE: 71 BPM | WEIGHT: 105.81 LBS | DIASTOLIC BLOOD PRESSURE: 77 MMHG

## 2024-10-23 DIAGNOSIS — D48.5 NEOPLASM OF UNCERTAIN BEHAVIOR OF SKIN: ICD-10-CM

## 2024-10-23 DIAGNOSIS — D04.22 SQUAMOUS CELL CARCINOMA IN SITU OF SKIN OF EAR, LEFT: Primary | ICD-10-CM

## 2024-10-23 NOTE — PROGRESS NOTES
PROCEDURE: Mohs' Micrographic Surgery    INDICATION: Location in mask areas of face including central face, nose, eyelids, eyebrows, lips, chin, preauricular, temple, and ear. Biopsy-proven skin cancer of cosmetically and functionally important areas, including head, neck, genital, hand, foot, or areas known for having difficulty in healing, such as the lower anterior legs. Tumor with ill-defined borders.    REFERRING PROVIDER:  Gina Yañez MD    CASE NUMBER:     ANESTHETIC: 1.5 cc 0.5% Lidocaine with Epi 1:200,000 mixed 1:1 with 0.5% Bupivacaine    SURGICAL PREP: Hibiclens    SURGEON: Velvet Meeks MD    ASSISTANTS: Kath Roberts PA-C and Monica Balderrama MA    PREOPERATIVE DIAGNOSIS: squamous cell carcinoma in situ    POSTOPERATIVE DIAGNOSIS: squamous cell carcinoma in situ; squamous cell carcinoma- superficial    PATHOLOGIC DIAGNOSIS: squamous cell carcinoma in situ; squamous cell carcinoma- superficial    HISTOLOGY OF SPECIMENS IN FIRST STAGE:   Tumor Type: Tumor seen. Superficial squamous cell carcinoma: Proliferation of squamous cells exhibiting atypia and infiltrating within the superficial papillary dermis.   Depth of Invasion: epidermis and dermis  Perineural Invasion: No    HISTOLOGY OF SPECIMENS IN SUBSEQUENT STAGES:  Tumor Type:  No tumor seen.    STAGES OF MOHS' SURGERY PERFORMED: 2    TUMOR-FREE PLANE ACHIEVED: Yes    HEMOSTASIS: electrocoagulation     SPECIMENS: 3 (2 in stage A and 1 in stage B)    LOCATION: left antihelix (medial). Location verified with Dr. Yañez's clinical photograph. Patient also verified location by looking at photo taken prior to procedure.     INITIAL LESION SIZE: 0.4 x 0.4 cm    FINAL DEFECT SIZE: 0.9 x 1.0 cm    WOUND REPAIR/DISPOSITION: When the tumor was completely removed, repair options were discussed with the patient, and it was decided to let the wound heal by second intention. The patient tolerated the procedure well and will consider delayed reconstruction  "or repair if necessary. Residual underlying cartilage was removed with a 15 blade to facilitate wound healing.     The area was cleaned and dressed appropriately, and the patient was given wound care instructions, as well as an appointment for follow-up evaluation in one month.    Vitals:    10/23/24 0748 10/23/24 1004   BP: (!) 187/73 (!) 193/77   BP Location: Left arm    Patient Position: Sitting    Pulse: (P) 61 (P) 65   Weight: 48 kg (105 lb 13.1 oz)    Height: 5' 3" (1.6 m)      Pt here for Mohs SCCIS left antihelix.    Also with a lesion on left lateral antihelix. (+) crusting. Has not been biopsied yet. No prior tx. Not using any creams.    Physical Exam   HENT:   Ears:      L lateral antihelix with a 3 x 4 mm crusted papule located 2 cm laterally from the left superior ear attachment and 5.6 cm superiorly from the left inferior lobe attachment.    Rule out chondrodermatitis nodularis helicis, L lateral antihelix.  Shave bx after verbal consent.  Etoh, 1% lido with epi 1:100,000, 0.5 cc, shave, electrocoagulation, pressure bandage, no complications.  Specimen to pathology.  Disc wound care.  If (+) malignancy, schedule Mohs.    Pathology Orders:       Normal Orders This Visit    Specimen to Pathology, Dermatology     Comments:    Number of Specimens:->1  ------------------------->-------------------------  Spec 1 Procedure:->Biopsy  Spec 1 Clinical Impression:->rule out chondrodermatitis  nodularis helicis  Spec 1 Source:->left lateral anithelix    Questions:    Procedure Type: Dermatology and skin neoplasms    Number of Specimens: 1    ------------------------: -------------------------    Spec 1 Procedure: Biopsy    Spec 1 Clinical Impression: rule out chondrodermatitis nodularis helicis    Spec 1 Source: left lateral anithelix    Release to patient:             "

## 2024-11-03 DIAGNOSIS — I10 ESSENTIAL HYPERTENSION: ICD-10-CM

## 2024-11-04 ENCOUNTER — OFFICE VISIT (OUTPATIENT)
Dept: RHEUMATOLOGY | Facility: CLINIC | Age: 79
End: 2024-11-04
Payer: MEDICARE

## 2024-11-04 VITALS
SYSTOLIC BLOOD PRESSURE: 166 MMHG | BODY MASS INDEX: 19.34 KG/M2 | WEIGHT: 109.13 LBS | DIASTOLIC BLOOD PRESSURE: 65 MMHG | HEIGHT: 63 IN | HEART RATE: 67 BPM

## 2024-11-04 DIAGNOSIS — G89.29 CHRONIC PAIN OF BOTH SHOULDERS: ICD-10-CM

## 2024-11-04 DIAGNOSIS — Z79.899 IMMUNOCOMPROMISED STATE DUE TO DRUG THERAPY: ICD-10-CM

## 2024-11-04 DIAGNOSIS — D84.821 IMMUNOCOMPROMISED STATE DUE TO DRUG THERAPY: ICD-10-CM

## 2024-11-04 DIAGNOSIS — M81.0 OSTEOPOROSIS, UNSPECIFIED OSTEOPOROSIS TYPE, UNSPECIFIED PATHOLOGICAL FRACTURE PRESENCE: ICD-10-CM

## 2024-11-04 DIAGNOSIS — M25.511 CHRONIC PAIN OF BOTH SHOULDERS: ICD-10-CM

## 2024-11-04 DIAGNOSIS — M05.79 RHEUMATOID ARTHRITIS INVOLVING MULTIPLE SITES WITH POSITIVE RHEUMATOID FACTOR: Primary | ICD-10-CM

## 2024-11-04 DIAGNOSIS — M25.512 CHRONIC PAIN OF BOTH SHOULDERS: ICD-10-CM

## 2024-11-04 PROCEDURE — 99214 OFFICE O/P EST MOD 30 MIN: CPT | Mod: GC,S$GLB,, | Performed by: STUDENT IN AN ORGANIZED HEALTH CARE EDUCATION/TRAINING PROGRAM

## 2024-11-04 PROCEDURE — 3077F SYST BP >= 140 MM HG: CPT | Mod: CPTII,GC,S$GLB, | Performed by: STUDENT IN AN ORGANIZED HEALTH CARE EDUCATION/TRAINING PROGRAM

## 2024-11-04 PROCEDURE — 1125F AMNT PAIN NOTED PAIN PRSNT: CPT | Mod: CPTII,GC,S$GLB, | Performed by: STUDENT IN AN ORGANIZED HEALTH CARE EDUCATION/TRAINING PROGRAM

## 2024-11-04 PROCEDURE — 3078F DIAST BP <80 MM HG: CPT | Mod: CPTII,GC,S$GLB, | Performed by: STUDENT IN AN ORGANIZED HEALTH CARE EDUCATION/TRAINING PROGRAM

## 2024-11-04 PROCEDURE — 1101F PT FALLS ASSESS-DOCD LE1/YR: CPT | Mod: CPTII,GC,S$GLB, | Performed by: STUDENT IN AN ORGANIZED HEALTH CARE EDUCATION/TRAINING PROGRAM

## 2024-11-04 PROCEDURE — 1159F MED LIST DOCD IN RCRD: CPT | Mod: CPTII,GC,S$GLB, | Performed by: STUDENT IN AN ORGANIZED HEALTH CARE EDUCATION/TRAINING PROGRAM

## 2024-11-04 PROCEDURE — 3288F FALL RISK ASSESSMENT DOCD: CPT | Mod: CPTII,GC,S$GLB, | Performed by: STUDENT IN AN ORGANIZED HEALTH CARE EDUCATION/TRAINING PROGRAM

## 2024-11-04 PROCEDURE — 99999 PR PBB SHADOW E&M-EST. PATIENT-LVL IV: CPT | Mod: PBBFAC,GC,, | Performed by: STUDENT IN AN ORGANIZED HEALTH CARE EDUCATION/TRAINING PROGRAM

## 2024-11-04 RX ORDER — ALENDRONATE SODIUM 70 MG/1
70 TABLET ORAL
Qty: 12 TABLET | Refills: 3 | Status: SHIPPED | OUTPATIENT
Start: 2024-11-04 | End: 2025-11-04

## 2024-11-04 RX ORDER — AMLODIPINE BESYLATE 10 MG/1
10 TABLET ORAL
Qty: 90 TABLET | Refills: 3 | Status: SHIPPED | OUTPATIENT
Start: 2024-11-04

## 2024-11-04 RX ORDER — TRAMADOL HYDROCHLORIDE 50 MG/1
50 TABLET ORAL 2 TIMES DAILY PRN
Qty: 60 TABLET | Refills: 0 | Status: SHIPPED | OUTPATIENT
Start: 2024-11-04

## 2024-11-04 NOTE — TELEPHONE ENCOUNTER
Refill Routing Note   Medication(s) are not appropriate for processing by [facility] for the following reason(s):        Required vitals abnormal  ED/Hospital Visit since last OV with provider    [facility] action(s):  Defer               Appointments  past 12m or future 3m with PCP    Date Provider   Last Visit   7/24/2024 Odalis Kim MD   Next Visit   Visit date not found Odalis Kim MD   ED visits in past 90 days: 0        Note composed:9:08 AM 11/04/2024

## 2024-11-04 NOTE — PROGRESS NOTES
Subjective:      Patient ID: Buffy Dominique is a 79 y.o. female.    Chief Complaint: Disease Management    Initial Presentation  Buffy Dominique is a 79 y.o. F with a past medical history of HTN, aortic stenosis, HLD, CKD stage 3b, hyperparathyroidism, chronic hyponatremia, and seropositive RA who presents to clinic today for follow-up for her RA.     Rheumatologic history:   - Diagnosed with Seropositive RA in 2021 when she presented to clinic with   - Manifestations: polyarthralgia in the hands, wrists, shoulders, left ankle and left knee; also had swelling of the left ankle, hands, wrists and left knee  - Serologies: positive KAY 1:80 negative profile, positive RF (109), low positive CCP (7.7), elevated inflammatory markers  - Current treatment: Humira 40 mg q 14 days (02/22-present)   - Previous treatment: MTX (12/21-1/22; discontinued due to severe oral ulcers)    Interval History  Underwent extensive lysis of adhesions and robotic-assisted rectopexy on 6/7/2024 - this was complicated by pelvic hematoma requiring transfusion and subcutaneous emphysema.     Today, she is doing very well in terms of her RA. She held her Humira around the time of her surgery but did not experience a flare. She is having significant pain in the bilateral shoulders. She was seen by Ortho a year ago, MRI showed extensive OA, was told she needed bilateral replacements however she is not ready for surgery. She had a steroid injection to the R shoulder about a year ago, this lasted about 2 months.    Rheumatology ROS  (-) fevers, chills (-) weight loss, (-) fatigue, (-) morning stiffness,  (+) arthralgias (bilateral shoulders), (-) arthritis, (-) headaches, (-)  vision changes or loss of vision, (-) hx of red eyes including uveitis, iritis, scleritis or episcleritis, (-)  photophobia, (-) dry eyes, (-) dry mouth, (-) rash, (-) photosensitivity, (-) alopecia, (-) mucosal ulcers, (-) Raynaud's  phenomenon, (-) SQ nodules, (-) sense of skin  tightening in hands, face or torso, (-)  hx pleurisy, (-) sharp chest pains that increase with deep breath, (-) lung fibrosis, (-) hemoptysis, (-) hx of DVT or PE (-) chest pains, (-) shortness of breath, (-) hx pericarditis (-) abdominal pain, (-) nausea, (-) vomiting, (-) diarrhea, (-) constipation, (-) melena,  (-) bloody diarrhea, (-) UC/Crohns, (-) dysphagia, (-) GERD/Reflux, (-) hematuria, proteinuria, (-) renal failure, (-) focal weakness, (-) trouble combing hair or (-) getting out of chairs,  (-) hx of low WBC, low platelets, anemia, (-) hx of pregnancy losses/pre term deliveries/pregnancy complications, (-) genital ulcers    Answers submitted by the patient for this visit:  Rheumatology Questionnaire (Submitted on 11/1/2024)  fever: No  eye redness: No  mouth sores: No  headaches: No  shortness of breath: Yes  chest pain: No  trouble swallowing: No  diarrhea: No  constipation: No  unexpected weight change: No  genital sore: No  dysuria: No  During the last 3 days, have you had a skin rash?: No  Bruises or bleeds easily: No  cough: No    History  Medical:  Active Problem List with Overview Notes    Diagnosis Date Noted    Incisional hernia 07/26/2024    Aortic atherosclerosis 01/22/2024    Immunocompromised state due to drug therapy 01/08/2024    Stage 3b chronic kidney disease 01/08/2024    COVID-19 01/01/2024    Hypotension 12/31/2023    Bilateral low back pain 12/31/2023    SIADH (syndrome of inappropriate ADH production) 12/31/2023    Shoulder pain, right 05/31/2022    Weakness of shoulder 05/31/2022    Decreased ROM of right shoulder 05/31/2022    Right shoulder pain 05/06/2022    Acute cystitis without hematuria 05/03/2022    Hyponatremia 05/03/2022     POA Na 126  - trend      Acute kidney injury superimposed on chronic kidney disease 05/03/2022    CKD (chronic kidney disease) stage 3, GFR 30-59 ml/min     Pelvic floor dysfunction 02/24/2022    Seropositive rheumatoid arthritis 02/03/2022    Former  tobacco use 02/03/2022    Multiple pulmonary nodules 02/03/2022    Rectal prolapse 02/03/2022    Polyarthritis 12/06/2021    Nonrheumatic aortic valve insufficiency 11/23/2018    HLD (hyperlipidemia) 11/09/2018    Essential hypertension 08/14/2018    AS (aortic stenosis) 08/14/2018    History of thoracic aortic aneurysm repair 01/01/2011     Surgical:  Past Surgical History:   Procedure Laterality Date    CATARACT EXTRACTION, BILATERAL Bilateral 2014    cataract removal Right 2014    COLONOSCOPY N/A 4/30/2024    Procedure: COLONOSCOPY;  Surgeon: Sharath Myles MD;  Location: [facility] ENDO (4TH FLR);  Service: Endoscopy;  Laterality: N/A;  ref by / Miralax inst portal-RB  1/31/24- Pt r/s/ prep ins on portal - Miralax prep - ERW  4/25/24- precall complete - ERW  4/29-pt cannot come in earlier due to ride-KPvt    DV5 ROBOTIC RECTOPEXY N/A 6/7/2024    Procedure: DV5 ROBOTIC RECTOPEXY (eras low, lith); lysis of adhesions;  Surgeon: Sharath Myles MD;  Location: [facility] OR 2ND FLR;  Service: Colon and Rectal;  Laterality: N/A;    EXCISION, SMALL INTESTINE  8/7/2024    Procedure: EXCISION, SMALL INTESTINE;  Surgeon: Sharath Myles MD;  Location: [facility] OR 2ND FLR;  Service: Colon and Rectal;;    FLEXIBLE SIGMOIDOSCOPY  6/7/2024    Procedure: SIGMOIDOSCOPY, FLEXIBLE;  Surgeon: Sharath Myles MD;  Location: [facility] OR 2ND FLR;  Service: Colon and Rectal;;    hiatial hernia  2017    HYSTERECTOMY      LAPAROSCOPIC REPAIR OF INCISIONAL HERNIA N/A 8/7/2024    Procedure: REPAIR, HERNIA, INCISIONAL, LAPAROSCOPIC, SMALL BOWEL RESECTION;  Surgeon: Sharath Myles MD;  Location: [facility] OR 2ND FLR;  Service: Colon and Rectal;  Laterality: N/A;    melanoma      on face    OOPHORECTOMY      REPAIR, HERNIA, INCISIONAL  8/7/2024    Procedure: REPAIR, HERNIA, INCISIONAL;  Surgeon: Sharath Myles MD;  Location: [facility] OR 2ND FLR;  Service: Colon and Rectal;;    THORACIC AORTIC ANEURYSM REPAIR  2011      Medication:  Current Outpatient Medications   Medication Sig Dispense Refill    acetaminophen (TYLENOL) 500 MG tablet Take 2 tablets (1,000 mg total) by mouth every 8 (eight) hours.      adalimumab (HUMIRA PEN) PnKt injection Inject 1 pen  (40 mg total) into the skin every 14 (fourteen) days. 2 pen 11    amLODIPine (NORVASC) 10 MG tablet Take 1 tablet (10 mg total) by mouth once daily. 90 tablet 1    artificial tears (ISOPTO TEARS) 0.5 % ophthalmic solution Place 1 drop into both eyes as needed (for dry eyes). 15 mL 5    carvediloL (COREG) 6.25 MG tablet Take 1 tablet (6.25 mg total) by mouth 2 (two) times daily with meals. 180 tablet 3    cholecalciferol, vitamin D3, (VITAMIN D3) 25 mcg (1,000 unit) capsule Take 1 capsule (1,000 Units total) by mouth once daily. 90 capsule 3    hydrALAZINE (APRESOLINE) 100 MG tablet TAKE 1 TABLET BY MOUTH TWICE  DAILY 60 tablet 11    ibuprofen (ADVIL,MOTRIN) 800 MG tablet Take 1 tablet (800 mg total) by mouth every 8 (eight) hours.      olmesartan (BENICAR) 40 MG tablet Take 1 tablet (40 mg total) by mouth once daily. 90 tablet 1    pulse oximeter (PULSE OXIMETER) device by Apply Externally route 2 (two) times a day. Use twice daily at 8 AM and 3 PM and [facility] directed. 1 each 0    alendronate (FOSAMAX) 70 MG tablet Take 1 tablet (70 mg total) by mouth every 7 days. 12 tablet 3    traMADoL (ULTRAM) 50 mg tablet Take 1 tablet (50 mg total) by mouth 2 (two) times daily as needed for Pain. 60 tablet 0     No current facility-administered medications for this visit.       Imaging/Procedures  MRI R shoulder (5/6/22):  FINDINGS:  ROTATOR CUFF: There is supraspinatus and infraspinatus tendinosis with a large, high-grade partial-thickness articular surface tear of the footprint and critical zone with probable associated full-thickness microperforation and differential retraction to the level of the superomedial humeral head.  There is subscapularis tendinosis  with a high-grade partial-thickness articular surface tear of the superior footprint fibers.  Teres minor tendon is intact.  There is mild supraspinatus volume loss.     LABRUM: There is global complex degenerative tearing of the glenoid labrum.     BICEPS: Intra-articular biceps tendon demonstrates thickening and increased signal intensity in keeping with severe tendinosis with high-grade partial-thickness interstitial tearing.  There is prominent complex tendon sheath fluid at the level of the bicipital groove.  Biceps tendon appears perched against the lesser tuberosity, a finding that is consistent with an associated pulley injury.     BONES: There is extensive subchondral marrow edema at the superomedial humeral head with cortical flattening and thickening of the subchondral plate.  Osteophyte production noted throughout the glenoid rim and medial humeral head.  There is superior subluxation of the humeral head with resulting narrowing of the acromiohumeral interval.  No marrow infiltrative process.     AC JOINT: There is mild AC joint arthrosis.     CARTILAGE: There is extensive full-thickness chondral loss throughout the glenoid and humeral head with multifocal areas of subchondral edema and cystic change.     MISCELLANEOUS: There is a large complex joint effusion with severe synovitis and multiple low signal intensity bodies, likely cartilaginous in nature.  There is fluid within the subacromial/subdeltoid bursa.  No axillary lymphadenopathy.     Impression:  1. Extensive bone marrow edema within the superomedial humeral head with articular surface flattening and thickening of the subchondral plate, findings that are favored to represent sequela of a subchondral insufficiency fracture.  Early avascular necrosis can present with similar findings and is possible.  2. Severe glenohumeral osteoarthritis with global complex tearing of the glenoid labrum.  3. Supraspinatus and infraspinatus tendinosis with a large  high-grade partial-thickness articular surface tear with probable full-thickness microperforation and mild retraction.  4. Subscapularis tendinosis with high-grade partial-thickness tear of the superior fibers.  5. Severe biceps tendinosis/tenosynovitis with high-grade partial-thickness interstitial tearing.  6. Large joint effusion with extensive synovitis and scattered loose bodies, likely reactive or inflammatory in nature.  Clinical considerations will determine the need for further evaluation with arthrocentesis to rule out an underlying infectious process.  7. Mild AC joint arthrosis.  8. Subacromial/subdeltoid bursitis.  This report was flagged in Epic as abnormal.    Rheumatologic labs  Component      Latest Ref Rng 10/9/2024   WBC      3.90 - 12.70 K/uL 5.38    RBC      4.00 - 5.40 M/uL 3.73 (L)    Hemoglobin      12.0 - 16.0 g/dL 12.2    Hematocrit      37.0 - 48.5 % 36.6 (L)    MCV      82 - 98 fL 98    MCH      27.0 - 31.0 pg 32.7 (H)    MCHC      32.0 - 36.0 g/dL 33.3    RDW      11.5 - 14.5 % 13.0    Platelet Count      150 - 450 K/uL 267    MPV      9.2 - 12.9 fL 8.9 (L)    Immature Granulocytes      0.0 - 0.5 % 0.2    Gran # (ANC)      1.8 - 7.7 K/uL 1.8    Immature Grans (Abs)      0.00 - 0.04 K/uL 0.01    Lymph #      1.0 - 4.8 K/uL 2.5    Mono #      0.3 - 1.0 K/uL 0.6    Eos #      0.0 - 0.5 K/uL 0.5    Baso #      0.00 - 0.20 K/uL 0.05    nRBC      0 /100 WBC 0    Gran %      38.0 - 73.0 % 33.9 (L)    Lymph %      18.0 - 48.0 % 45.5    Mono %      4.0 - 15.0 % 10.6    Eos %      0.0 - 8.0 % 8.9 (H)    Basophil %      0.0 - 1.9 % 0.9    Differential Method Automated    Sodium      136 - 145 mmol/L 134 (L)    Potassium      3.5 - 5.1 mmol/L 4.6    Chloride      95 - 110 mmol/L 101    CO2      23 - 29 mmol/L 26    Glucose      70 - 110 mg/dL 72    BUN      8 - 23 mg/dL 14    Creatinine      0.5 - 1.4 mg/dL 0.8    Calcium      8.7 - 10.5 mg/dL 9.7    Anion Gap      8 - 16 mmol/L 7 (L)    eGFR       ">60 mL/min/1.73 m^2 >60.0       Rheumatologic medications history  Humira 40 mg q 14 days (2022-present)    Objective:   BP (!) 166/65   Pulse 67   Ht 5' 3" (1.6 m)   Wt 49.5 kg (109 lb 2 oz)   LMP  (LMP Unknown)   BMI 19.33 kg/m²   Physical Exam   Constitutional: No distress.   HENT:   Mouth/Throat: Mucous membranes are moist.   Eyes: Conjunctivae are normal.   Cardiovascular: Normal rate, regular rhythm, normal heart sounds and normal pulses.   Pulmonary/Chest: Effort normal and breath sounds normal.   Abdominal: Soft. Bowel sounds are normal.   Musculoskeletal:         General: No swelling, tenderness or deformity.      Cervical back: Normal range of motion. No rigidity or tenderness.      Comments: Decreased ROM of the L shoulder   Neurological: She is alert. She displays no weakness. Gait normal.   Skin: Skin is warm.      7/28/2022 11/3/2022 9/14/2023 11/4/2024   Tender (HENDRICKS-28) 1 / 28  0 / 28  2 / 28  2 / 28    Swollen (HENDRICKS-28) 0 / 28  0 / 28  0 / 28  0 / 28    Provider Global 20 / 100 10 / 100 5 / 100 10 / 100   Patient Global 30 / 100 15 / 100 0 / 100 20 / 100   ESR 4 mm/hr 23 mm/hr 0 mm/hr --   CRP 2.2 mg/L 4.1 mg/L 5 mg/L --   HENDRICKS-28 (ESR) 1.95 (Remission) 2.4 (Remission) -- --   HENDRICKS-28 (CRP) 2.36 (Remission) 1.76 (Remission) 2.4 (Remission) --   CDAI Score 6  2.5  2.5  5      Assessment:     1. Rheumatoid arthritis involving multiple sites with positive rheumatoid factor    2. Osteoporosis, unspecified osteoporosis type, unspecified pathological fracture presence    3. Chronic pain of both shoulders    4. Immunocompromised state due to drug therapy      Plan:     Problem List Items Addressed This Visit          Immunology/Multi System    Immunocompromised state due to drug therapy     Other Visit Diagnoses       Rheumatoid arthritis involving multiple sites with positive rheumatoid factor    -  Primary    Relevant Medications    traMADoL (ULTRAM) 50 mg tablet    Other Relevant Orders    " "Sedimentation rate    C-REACTIVE PROTEIN    Hepatitis B surface antigen    HBcAB    Hepatitis B surface antibody    Quantiferon Gold TB    Osteoporosis, unspecified osteoporosis type, unspecified pathological fracture presence        Relevant Medications    alendronate (FOSAMAX) 70 MG tablet    Other Relevant Orders    Vitamin D    DXA Bone Density Axial Skeleton 1 or more sites    Chronic pain of both shoulders        Relevant Medications    traMADoL (ULTRAM) 50 mg tablet          Buffy Dominique is a 79 y.o. F with a past medical history of HTN, aortic stenosis, HLD, CKD stage 3b, hyperparathyroidism, chronic hyponatremia, and seropositive RA who presents to clinic today for follow-up for her RA.     Seropositive RA- diagnosed in 2021, current regimen: Humira 40 mg q 14 days, CDAI 5 but mostly because of her shoulder pain which is related to OA not her RA    Osteoarthritis bilateral shoulders- with MRI from last year, explained to the patient that they have osteoarthritis, also synonymous with degenerative arthritis.  I explained to them, that this type of arthritis, the cartilage, which is a rubbery material that protect the joints, wears away, which can lead to joint pain, and bone spurs.  I told patient, this is the type of arthritis everyone gets, at some point in their life, and the severity can be mild to severe depending on the patient. "Wear and tear", previous joint/tendon/ligament injuries, extra weight, genetics, all play a role in osteoarthritis. I explained to the patient, that there are no medications that can reverse cartilage loss. I explained that maintaining a normal weight, exercise, joint and muscle strengthening, are the best things to help stabilize arthritis.      Plan:   - Continue Humira 40 mg q 14 days for RA  - Recent CBC and CMP reviewed, stable  - Check ESR, CRP, hep B serologies and TB with next scheduled labs   - Can try taking Tramadol 50 mg BID PRN and Tylenol for shoulder pain  - " Continue Alendronate 70 mg weekly, due for repeat DEXA     This patient was examined with Dr. Damon. Plan discussed with the patient. Return to clinic in 6 months.    Kennedi Aguirre MD  Rheumatology Fellow, PGY5

## 2024-11-04 NOTE — PROGRESS NOTES
11/1/2024     6:21 PM   Rapid3 Question Responses and Scores   MDHAQ Score 0.1   Psychologic Score 0   Pain Score 4.5   When you awakened in the morning OVER THE LAST WEEK, did you feel stiff? No   Fatigue Score 0.5   Global Health Score 2   RAPID3 Score 2.28     Answers submitted by the patient for this visit:  Rheumatology Questionnaire (Submitted on 11/1/2024)  fever: No  eye redness: No  mouth sores: No  headaches: No  shortness of breath: Yes  chest pain: No  trouble swallowing: No  diarrhea: No  constipation: No  unexpected weight change: No  genital sore: No  dysuria: No  During the last 3 days, have you had a skin rash?: No  Bruises or bleeds easily: No  cough: No

## 2024-11-20 ENCOUNTER — TELEPHONE (OUTPATIENT)
Dept: DERMATOLOGY | Facility: CLINIC | Age: 79
End: 2024-11-20
Payer: MEDICARE

## 2024-11-20 NOTE — TELEPHONE ENCOUNTER
----- Message from Velvet Meeks MD sent at 11/20/2024  3:48 PM CST -----  Pt missed her wound check appointment today for left antihelix of ear.  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  Please call to reschedule her wound check appointment at a time that is good for her in the next few weeks. Thanks.

## 2024-11-21 DIAGNOSIS — M05.79 RHEUMATOID ARTHRITIS INVOLVING MULTIPLE SITES WITH POSITIVE RHEUMATOID FACTOR: ICD-10-CM

## 2024-11-21 DIAGNOSIS — M25.512 CHRONIC PAIN OF BOTH SHOULDERS: ICD-10-CM

## 2024-11-21 DIAGNOSIS — G89.29 CHRONIC PAIN OF BOTH SHOULDERS: ICD-10-CM

## 2024-11-21 DIAGNOSIS — M25.511 CHRONIC PAIN OF BOTH SHOULDERS: ICD-10-CM

## 2024-11-21 RX ORDER — TRAMADOL HYDROCHLORIDE 50 MG/1
50 TABLET ORAL 2 TIMES DAILY PRN
Qty: 60 TABLET | Refills: 0 | Status: SHIPPED | OUTPATIENT
Start: 2024-11-21

## 2024-11-26 ENCOUNTER — HOSPITAL ENCOUNTER (OUTPATIENT)
Dept: RADIOLOGY | Facility: CLINIC | Age: 79
Discharge: HOME | End: 2024-11-26
Attending: STUDENT IN AN ORGANIZED HEALTH CARE EDUCATION/TRAINING PROGRAM
Payer: MEDICARE

## 2024-11-26 ENCOUNTER — OFFICE VISIT (OUTPATIENT)
Dept: ORTHOPEDICS | Facility: CLINIC | Age: 79
End: 2024-11-26
Payer: MEDICARE

## 2024-11-26 DIAGNOSIS — M25.511 CHRONIC PAIN OF BOTH SHOULDERS: ICD-10-CM

## 2024-11-26 DIAGNOSIS — G89.29 CHRONIC PAIN OF BOTH SHOULDERS: ICD-10-CM

## 2024-11-26 DIAGNOSIS — M19.012 PRIMARY OSTEOARTHRITIS OF BOTH SHOULDERS: Primary | ICD-10-CM

## 2024-11-26 DIAGNOSIS — M05.79 RHEUMATOID ARTHRITIS INVOLVING MULTIPLE SITES WITH POSITIVE RHEUMATOID FACTOR: ICD-10-CM

## 2024-11-26 DIAGNOSIS — M25.512 CHRONIC PAIN OF BOTH SHOULDERS: ICD-10-CM

## 2024-11-26 DIAGNOSIS — M19.011 PRIMARY OSTEOARTHRITIS OF BOTH SHOULDERS: Primary | ICD-10-CM

## 2024-11-26 DIAGNOSIS — M81.0 OSTEOPOROSIS, UNSPECIFIED OSTEOPOROSIS TYPE, UNSPECIFIED PATHOLOGICAL FRACTURE PRESENCE: ICD-10-CM

## 2024-11-26 PROCEDURE — 99999 PR PBB SHADOW E&M-EST. PATIENT-LVL III: CPT | Mod: PBBFAC,,, | Performed by: PHYSICIAN ASSISTANT

## 2024-11-26 PROCEDURE — 77080 DXA BONE DENSITY AXIAL: CPT | Mod: TC

## 2024-11-26 RX ORDER — BETAMETHASONE SODIUM PHOSPHATE AND BETAMETHASONE ACETATE 3; 3 MG/ML; MG/ML
12 INJECTION, SUSPENSION INTRA-ARTICULAR; INTRALESIONAL; INTRAMUSCULAR; SOFT TISSUE
Status: COMPLETED | OUTPATIENT
Start: 2024-11-26 | End: 2024-11-26

## 2024-11-26 RX ADMIN — BETAMETHASONE SODIUM PHOSPHATE AND BETAMETHASONE ACETATE 12 MG: 3; 3 INJECTION, SUSPENSION INTRA-ARTICULAR; INTRALESIONAL; INTRAMUSCULAR; SOFT TISSUE at 01:11

## 2024-11-26 NOTE — PROGRESS NOTES
SUBJECTIVE:     Chief Complaint & History of Present Illness:  Buffy Dominique is a  Established  patient 79 y.o. female who is seen here today with a complaint of  bilateral shoulder pain .  She has patient well-known to me was last seen treated the clinic for this condition 03/26/2024 at that time she had undergone cortisone injection of the right shoulder with very good results.  She has had slow insidious return of pain soreness in both shoulders requesting repeat cortisone injection bilateral shoulders today  On a scale of 1-10, with 10 being worst pain imaginable, he rates this pain as 4 on good days and 5 on bad days.  she describes the pain as sore and achy.    Review of patient's allergies indicates:   Allergen Reactions    Gabapentin Hallucinations    Methotrexate analogues      Mouth Ulcers     Thiazides Other (See Comments)     hyponatremia         Current Outpatient Medications   Medication Sig Dispense Refill    acetaminophen (TYLENOL) 500 MG tablet Take 2 tablets (1,000 mg total) by mouth every 8 (eight) hours.      adalimumab (HUMIRA PEN) PnKt injection Inject 1 pen  (40 mg total) into the skin every 14 (fourteen) days. 2 pen 11    alendronate (FOSAMAX) 70 MG tablet Take 1 tablet (70 mg total) by mouth every 7 days. 12 tablet 3    amLODIPine (NORVASC) 10 MG tablet TAKE 1 TABLET BY MOUTH ONCE  DAILY 90 tablet 3    artificial tears (ISOPTO TEARS) 0.5 % ophthalmic solution Place 1 drop into both eyes as needed (for dry eyes). 15 mL 5    carvediloL (COREG) 6.25 MG tablet Take 1 tablet (6.25 mg total) by mouth 2 (two) times daily with meals. 180 tablet 3    cholecalciferol, vitamin D3, (VITAMIN D3) 25 mcg (1,000 unit) capsule Take 1 capsule (1,000 Units total) by mouth once daily. 90 capsule 3    hydrALAZINE (APRESOLINE) 100 MG tablet TAKE 1 TABLET BY MOUTH TWICE  DAILY 60 tablet 11    ibuprofen (ADVIL,MOTRIN) 800 MG tablet Take 1 tablet (800 mg total) by mouth every 8 (eight) hours.      olmesartan  (BENICAR) 40 MG tablet TAKE 1 TABLET BY MOUTH ONCE  DAILY 90 tablet 3    pulse oximeter (PULSE OXIMETER) device by Apply Externally route 2 (two) times a day. Use twice daily at 8 AM and 3 PM and record the value in MyChart as directed. 1 each 0    traMADoL (ULTRAM) 50 mg tablet Take 1 tablet (50 mg total) by mouth 2 (two) times daily as needed for Pain. 60 tablet 0     No current facility-administered medications for this visit.       Past Medical History:   Diagnosis Date    Basal cell carcinoma (BCC) of right cheek 04/03/2024    R cheek    CKD (chronic kidney disease) stage 3, GFR 30-59 ml/min     HLD (hyperlipidemia)     HTN (hypertension)     Hyperparathyroidism     Melanoma        Past Surgical History:   Procedure Laterality Date    CATARACT EXTRACTION, BILATERAL Bilateral 2014    cataract removal Right 2014    COLONOSCOPY N/A 4/30/2024    Procedure: COLONOSCOPY;  Surgeon: Sharath Myles MD;  Location: [facility] F[facility]);  Service: Endoscopy;  Laterality: N/A;  ref by / Miralax inst portal-RB  1/31/24- Pt r/s/ prep ins on portal - Miralax prep - ERW  4/25/24- precall complete - ERW  4/29-pt cannot come in earlier due to ride-KPvt    DV5 ROBOTIC RECTOPEXY N/A 6/7/2024    Procedure: DV5 ROBOTIC RECTOPEXY (eras low, lith); lysis of adhesions;  Surgeon: Sharath Myles MD;  [address]  Service: Colon and Rectal;  Laterality: N/A;    EXCISION, SMALL INTESTINE  8/7/2024    Procedure: EXCISION, SMALL INTESTINE;  Surgeon: Sharath Myles MD;  [facility];  Service: Colon and Rectal;;    FLEXIBLE SIGMOIDOSCOPY  6/7/2024    Procedure: SIGMOIDOSCOPY, FLEXIBLE;  Surgeon: Sharath Myles MD;  Location: [facility] OR [address] [place] [address];  Service: Colon and Rectal;;    hiatial hernia  2017    HYSTERECTOMY      LAPAROSCOPIC REPAIR OF INCISIONAL HERNIA N/A 8/7/2024    Procedure: REPAIR, HERNIA, INCISIONAL, LAPAROSCOPIC, SMALL BOWEL RESECTION;  Surgeon: Sharath Myles MD;   Location: [facility] OR [address] [place] [address];  Service: Colon and Rectal;  Laterality: N/A;    melanoma      on face    OOPHORECTOMY      REPAIR, HERNIA, INCISIONAL  8/7/2024    Procedure: REPAIR, HERNIA, INCISIONAL;  Surgeon: Sharath Myles MD;  Location: [facility] OR [address] [place] [address];  Service: Colon and Rectal;;    THORACIC AORTIC ANEURYSM REPAIR  2011       Vital Signs (Most Recent)  There were no vitals filed for this visit.    Review of Systems:  ROS:  Constitutional: no fever or chills  Eyes: no visual changes  ENT: no nasal congestion or sore throat  Respiratory: no cough or shortness of breath, Positive multiple pulmonary nodules  Cardiovascular: no chest pain or palpitations, Positive aortic stenosis, nonrheumatic aortic valve insufficiency history of thoracic aortic aneurysm with repair  Gastrointestinal: no nausea or vomiting, tolerating diet  Genitourinary: no hematuria or dysuria, Positive pelvic floor dysfunction, CKD stage 3 hyponatremia  Integument/Breast: no rash or pruritis  Hematologic/Lymphatic: no easy bruising or lymphadenopathy  Musculoskeletal: no arthralgias or myalgias  Neurological: no seizures or tremors  Behavioral/Psych: no auditory or visual hallucinations  Endocrine: no heat or cold intolerance, Positive hyperparathyroidism      OBJECTIVE:     PHYSICAL EXAM:     , General Appearance: Well nourished, well developed, in no acute distress.  Neurological: Mood & affect are normal.  Shoulder exam:  right  Tenderness: globally  ROM: forward flexion 100/100, extension 40/40, full abduction 100/100, abduction-glenohumeral 30/30, external rotation 20/20  Shoulder Strength: biceps 5/5, triceps 5/5, abduction 5/5, adduction 5/5, external rotation 5/5 with shoulder at side, flexion 5/5, and extension 5/5  positive for tenderness about the glenohumeral joint, positive for tenderness over the acromioclavicular joint, and negative for impingement sign  Stability tests: anterior apprehension test positive for pain only and  posterior apprehension test positive for pain only        Shoulder exam:  left  Tenderness: globally  ROM: forward flexion 140/140, extension 40/40, full abduction 140/140, abduction-glenohumeral 80/840, external rotation 50/50  Shoulder Strength: biceps 5/5, triceps 5/5, abduction 5/5, adduction 5/5, external rotation 5/5 with shoulder at side, flexion 5/5, and extension 5/5  positive for tenderness about the glenohumeral joint, positive for tenderness over the acromioclavicular joint, and negative for impingement sign  Stability tests: anterior apprehension test positive for pain only and posterior apprehension test positive for pain only                RADIOGRAPHS:  X-rays of the shoulders from previous visit demonstrate moderate to severe arthritis changes throughout both shoulders with complete loss of glenohumeral joint space primarily at the superior aspect with some humeral head flattening osteophytic spurring and sclerotic changes noted throughout the glenohumeral spaces well as the AC region no evidence of fracture to    ASSESSMENT/PLAN:       ICD-10-CM ICD-9-CM   1. Primary osteoarthritis of both shoulders  M19.011 715.11    M19.012        Plan: We discussed with the patient at length all the different treatment options available for her bilateral shoulder including anti-inflammatories, acetaminophen, rest, ice, Physical therapy to include strengthening exercise, occasional cortisone injections for temporary relief, arthroscopic surgical repair, and finally shoulder arthroplasty.   Will proceed with cortisone injection of bilateral shoulders      The risks, benefits, pros, cons, and potential side effects of the procedure were discussed with the patient in detail all questions were answered.  The patient is comfortable and willing to proceed with the procedure. Verbal consent was obtained and the proper joint was identified by the patient and provider      The injection site was identified and the skin  was prepared with an ETOH solution. The    bilateral  shoulder was injected with 1 ml of Celestone and 5 ml Lidocaine under sterile technique. Buffy Dominique tolerated the procedure well, she was advised to rest the  shoulder  today, ice and support. she did receive immediate relief of the pain in and about her  shoulder  she was told this would be short lived and is secondary to the lidocaine. she may have an increase in her discomfort tonight followed by steady improvement over the next several days. It may take 1-3 weeks following the injection to get the full benefit of the medication.  I will see her back in 4-6 months. Sooner if she has any problems or concerns.

## 2024-11-27 RX ORDER — TRAMADOL HYDROCHLORIDE 50 MG/1
50 TABLET ORAL 2 TIMES DAILY PRN
Qty: 60 TABLET | Refills: 0 | Status: SHIPPED | OUTPATIENT
Start: 2024-11-27

## 2024-12-10 ENCOUNTER — OFFICE VISIT (OUTPATIENT)
Dept: DERMATOLOGY | Facility: CLINIC | Age: 79
End: 2024-12-10
Payer: MEDICARE

## 2024-12-10 DIAGNOSIS — D04.22 SQUAMOUS CELL CARCINOMA IN SITU OF SKIN OF EAR, LEFT: Primary | ICD-10-CM

## 2024-12-10 PROCEDURE — 1159F MED LIST DOCD IN RCRD: CPT | Mod: CPTII,S$GLB,, | Performed by: DERMATOLOGY

## 2024-12-10 PROCEDURE — 99212 OFFICE O/P EST SF 10 MIN: CPT | Mod: S$GLB,,, | Performed by: DERMATOLOGY

## 2024-12-10 PROCEDURE — 1101F PT FALLS ASSESS-DOCD LE1/YR: CPT | Mod: CPTII,S$GLB,, | Performed by: DERMATOLOGY

## 2024-12-10 PROCEDURE — 1126F AMNT PAIN NOTED NONE PRSNT: CPT | Mod: CPTII,S$GLB,, | Performed by: DERMATOLOGY

## 2024-12-10 PROCEDURE — 99999 PR PBB SHADOW E&M-EST. PATIENT-LVL III: CPT | Mod: PBBFAC,,, | Performed by: DERMATOLOGY

## 2024-12-10 PROCEDURE — 3288F FALL RISK ASSESSMENT DOCD: CPT | Mod: CPTII,S$GLB,, | Performed by: DERMATOLOGY

## 2024-12-10 NOTE — PROGRESS NOTES
79 y.o. female patient is here for wound check after surgery.    Patient reports no problems.    WOUND PE:  The left antihelix wound is well healed with 100% re-epithelization.       IMPRESSION:  Healing operative site from Mohs' surgery SCCIS left antihelix s/p Mohs with 2nd intention healing, postop week #7, now all healed in.    PLAN:  D/c wound care  Also with bx proven SCC adjacent to this site - need to do Mohs    Daily SPF.  Regular skin checks.    RTC:  In January for Mohs BCC R frontal scalp and SCC L lateral antihelix.

## 2025-01-06 ENCOUNTER — LAB VISIT (OUTPATIENT)
Dept: LAB | Facility: HOSPITAL | Age: 80
End: 2025-01-06
Attending: INTERNAL MEDICINE
Payer: MEDICARE

## 2025-01-06 DIAGNOSIS — M81.0 OSTEOPOROSIS, UNSPECIFIED OSTEOPOROSIS TYPE, UNSPECIFIED PATHOLOGICAL FRACTURE PRESENCE: ICD-10-CM

## 2025-01-06 DIAGNOSIS — E87.1 HYPONATREMIA: ICD-10-CM

## 2025-01-06 DIAGNOSIS — M05.79 RHEUMATOID ARTHRITIS INVOLVING MULTIPLE SITES WITH POSITIVE RHEUMATOID FACTOR: ICD-10-CM

## 2025-01-06 LAB
25(OH)D3+25(OH)D2 SERPL-MCNC: 8 NG/ML (ref 30–96)
ANION GAP SERPL CALC-SCNC: 9 MMOL/L (ref 8–16)
BUN SERPL-MCNC: 12 MG/DL (ref 8–23)
CALCIUM SERPL-MCNC: 9.7 MG/DL (ref 8.7–10.5)
CHLORIDE SERPL-SCNC: 101 MMOL/L (ref 95–110)
CO2 SERPL-SCNC: 25 MMOL/L (ref 23–29)
CREAT SERPL-MCNC: 0.8 MG/DL (ref 0.5–1.4)
CRP SERPL-MCNC: 2.9 MG/L (ref 0–8.2)
ERYTHROCYTE [SEDIMENTATION RATE] IN BLOOD BY PHOTOMETRIC METHOD: 13 MM/HR (ref 0–36)
EST. GFR  (NO RACE VARIABLE): >60 ML/MIN/1.73 M^2
GLUCOSE SERPL-MCNC: 75 MG/DL (ref 70–110)
HBV CORE AB SERPL QL IA: NORMAL
HBV SURFACE AB SER-ACNC: <3 MIU/ML
HBV SURFACE AB SER-ACNC: NORMAL M[IU]/ML
HBV SURFACE AG SERPL QL IA: NORMAL
POTASSIUM SERPL-SCNC: 4.6 MMOL/L (ref 3.5–5.1)
SODIUM SERPL-SCNC: 135 MMOL/L (ref 136–145)

## 2025-01-06 PROCEDURE — 85652 RBC SED RATE AUTOMATED: CPT | Performed by: STUDENT IN AN ORGANIZED HEALTH CARE EDUCATION/TRAINING PROGRAM

## 2025-01-06 PROCEDURE — 86704 HEP B CORE ANTIBODY TOTAL: CPT | Performed by: STUDENT IN AN ORGANIZED HEALTH CARE EDUCATION/TRAINING PROGRAM

## 2025-01-06 PROCEDURE — 80048 BASIC METABOLIC PNL TOTAL CA: CPT | Performed by: INTERNAL MEDICINE

## 2025-01-06 PROCEDURE — 82306 VITAMIN D 25 HYDROXY: CPT | Performed by: STUDENT IN AN ORGANIZED HEALTH CARE EDUCATION/TRAINING PROGRAM

## 2025-01-06 PROCEDURE — 86140 C-REACTIVE PROTEIN: CPT | Performed by: STUDENT IN AN ORGANIZED HEALTH CARE EDUCATION/TRAINING PROGRAM

## 2025-01-06 PROCEDURE — 86706 HEP B SURFACE ANTIBODY: CPT | Performed by: STUDENT IN AN ORGANIZED HEALTH CARE EDUCATION/TRAINING PROGRAM

## 2025-01-06 PROCEDURE — 87340 HEPATITIS B SURFACE AG IA: CPT | Performed by: STUDENT IN AN ORGANIZED HEALTH CARE EDUCATION/TRAINING PROGRAM

## 2025-01-06 PROCEDURE — 36415 COLL VENOUS BLD VENIPUNCTURE: CPT | Mod: PO | Performed by: STUDENT IN AN ORGANIZED HEALTH CARE EDUCATION/TRAINING PROGRAM

## 2025-01-08 ENCOUNTER — PROCEDURE VISIT (OUTPATIENT)
Dept: DERMATOLOGY | Facility: CLINIC | Age: 80
End: 2025-01-08
Payer: MEDICARE

## 2025-01-08 VITALS — DIASTOLIC BLOOD PRESSURE: 69 MMHG | SYSTOLIC BLOOD PRESSURE: 193 MMHG | HEART RATE: 89 BPM

## 2025-01-08 DIAGNOSIS — C44.41 BASAL CELL CARCINOMA OF SCALP: Primary | ICD-10-CM

## 2025-01-08 DIAGNOSIS — C44.229 SQUAMOUS CELL CARCINOMA OF LEFT EAR: ICD-10-CM

## 2025-01-08 PROCEDURE — 99499 UNLISTED E&M SERVICE: CPT | Mod: S$GLB,,, | Performed by: DERMATOLOGY

## 2025-01-08 PROCEDURE — 13121 CMPLX RPR S/A/L 2.6-7.5 CM: CPT | Mod: 51,S$GLB,, | Performed by: DERMATOLOGY

## 2025-01-08 PROCEDURE — 17312 MOHS ADDL STAGE: CPT | Mod: S$GLB,,, | Performed by: DERMATOLOGY

## 2025-01-08 PROCEDURE — 17311 MOHS 1 STAGE H/N/HF/G: CPT | Mod: S$GLB,,, | Performed by: DERMATOLOGY

## 2025-01-08 RX ORDER — CEPHALEXIN 500 MG/1
500 CAPSULE ORAL 3 TIMES DAILY
Qty: 30 CAPSULE | Refills: 0 | Status: SHIPPED | OUTPATIENT
Start: 2025-01-08 | End: 2025-01-18

## 2025-01-08 RX ORDER — HYDROCODONE BITARTRATE AND ACETAMINOPHEN 5; 325 MG/1; MG/1
1 TABLET ORAL EVERY 6 HOURS PRN
Qty: 10 TABLET | Refills: 0 | Status: SHIPPED | OUTPATIENT
Start: 2025-01-08

## 2025-01-08 NOTE — PROGRESS NOTES
PROCEDURE: Mohs' Micrographic Surgery    INDICATION: Location in mask areas of face including central face, nose, eyelids, eyebrows, lips, chin, preauricular, temple, and ear. Biopsy-proven skin cancer of cosmetically and functionally important areas, including head, neck, genital, hand, foot, or areas known for having difficulty in healing, such as the lower anterior legs. Tumor with ill-defined borders.    REFERRING PROVIDER:  Gina Yañez MD (This lesion was biopsied by Velvet Meeks MD)    CASE NUMBER:     ANESTHETIC: 0.5 cc 0.5% Lidocaine with Epi 1:200,000 mixed 1:1 with 0.5% Bupivacaine and 1 cc 1% Lidocaine with Epinephrine 1:100,000    SURGICAL PREP: Betadine    SURGEON: Velvet Meeks MD    ASSISTANTS: Kath Roberts PA-C and Monica Balderrama MA    PREOPERATIVE DIAGNOSIS: squamous cell carcinoma- invasive    POSTOPERATIVE DIAGNOSIS: squamous cell carcinoma    PATHOLOGIC DIAGNOSIS: squamous cell carcinoma- invasive    HISTOLOGY OF SPECIMENS IN FIRST STAGE:   Tumor Type:  No tumor seen.    STAGES OF MOHS' SURGERY PERFORMED: 1    TUMOR-FREE PLANE ACHIEVED: Yes    HEMOSTASIS: electrocoagulation     SPECIMENS: 2     LOCATION: left lateral antihelix. Location verified with my clinical photograph and measurements. Patient also verified location by looking at photo taken prior to procedure.     INITIAL LESION SIZE: 0.4 x 0.4 cm    FINAL DEFECT SIZE: 0.6 x 0.7 cm    WOUND REPAIR/DISPOSITION: When the tumor was completely removed, repair options were discussed with the patient, and it was decided to let the wound heal by second intention. The patient tolerated the procedure well and will consider delayed reconstruction or repair if necessary.    The area was cleaned and dressed appropriately, and the patient was given wound care instructions, as well as an appointment for follow-up evaluation in one month.    Vitals:    01/08/25 0910 01/08/25 1548   BP: (!) 198/64 (!) 193/69   BP Location: Left arm     Patient Position: Sitting    Pulse: 67 89       PROCEDURE: Mohs' Micrographic Surgery    INDICATION: Biopsy-proven skin cancer of cosmetically and functionally important areas, including head, neck, genital, hand, foot, or areas known for having difficulty in healing, such as the lower anterior legs. Tumors with aggressive clinical behavior (rapidly growing, greater than 1 cm in diameter). Tumor with ill-defined borders. Tumor with aggressive histopathology. Aggressive histopathology including sclerosing, morpheaform/infiltrating, micronodular, superficial multicentric, poorly differentiated, basosquamous, or perineural invasion.    REFERRING PROVIDER:  Gina Yañez MD    CASE NUMBER:     ANESTHETIC: 5.5 cc 0.5% Lidocaine with Epi 1:200,000 mixed 1:1 with 0.5% Bupivacaine and 4 cc 1% Lidocaine with Epinephrine 1:100,000    SURGICAL PREP: Hibiclens    SURGEON: Velvet Meeks MD    ASSISTANTS: Kath Roberts PA-C and Monica Balderrama MA    PREOPERATIVE DIAGNOSIS: basal cell carcinoma- nodular, infiltrating    POSTOPERATIVE DIAGNOSIS: basal cell carcinoma- nodular, infiltrating    PATHOLOGIC DIAGNOSIS: basal cell carcinoma- nodular, infiltrating    HISTOLOGY OF SPECIMENS IN FIRST STAGE:   Debulking tumor confirms nodular and infiltrating basal cell carcinoma.  Tumor Type: Tumor seen. Nodular basal cell carcinoma: Nodular tumor in dermis composed of basaloid cells exhibiting peripheral palisading and retraction artifact.  Infiltrative basal cell carcinoma: Basaloid tumor in dermis characterized by thin elongated strands of basaloid cells infiltrating between dermal collagen bundles.   Depth of Invasion: epidermis and dermis  Perineural Invasion: No    HISTOLOGY OF SPECIMENS IN SUBSEQUENT STAGES:  Tumor Type: Tumor seen. Same as in first stage.   Depth of Invasion: epidermis and dermis  Perineural Invasion: No    STAGES OF MOHS' SURGERY PERFORMED: 4    TUMOR-FREE PLANE ACHIEVED: Yes    HEMOSTASIS:  electrocoagulation     SPECIMENS: 6 (2 in stage A, 2 in stage B, 1 in stage C, and 1 in stage D)    LOCATION: right frontal scalp. Location verified with Dr. Yañez's clinical photograph. Patient also verified location by looking at photo taken prior to procedure.     INITIAL LESION SIZE: 1.2 x 1.6 cm    FINAL DEFECT SIZE: 2.0 x 2.4 cm    WOUND REPAIR/DISPOSITION: The patient tolerated Mohs' Micrographic Surgery for a basal cell carcinoma very well. When the tumor was completely removed, a repair of the surgical defect was undertaken.        PROCEDURE: Complex Linear Repair    INDICATION: Status post Mohs' Micrographic Surgery for basal cell carcinoma.    CASE NUMBER:     SURGEON: Velvet Meeks MD    ASSISTANTS: Kath Roberts PA-C, Monica Balderrama MA, and Marcie Grover MA    ANESTHETIC: 5 cc 1% Lidocaine with Epinephrine 1:100,000    SURGICAL PREP: Hibiclens, prepped by Monica Balderrama MA    LOCATION: right frontal scalp    DEFECT SIZE: 2.0 x 2.4 cm    WOUND REPAIR/DISPOSITION:  After the patient's carcinoma had been completely removed with Mohs' Micrographic Surgery, a repair of the surgical defect was undertaken. The patient was returned to the operating suite where the area of right frontal scalp was prepped, draped, and anesthetized in the usual sterile fashion. The wound was widely undermined in all directions. The wound was undermined to a distance at least the maximum width of the defect as measured perpendicular to the closure line along at least one entire edge of the defect, in this case 2.5 cm. Then, electrocoagulation was used to obtain meticulous hemostasis. 3-0 Vicryl and 4-0 Vicryl buried vertical mattress sutures were placed into the subcutaneous and dermal plane to close the wound and corey the cutaneous wound edge. Bilateral dog ears were identified and were removed by a standard Burow's triangle technique. The cutaneous wound edges were closed using interrupted 3-0 Prolene suture.    The  patient tolerated the procedure well.    The area was cleaned and dressed appropriately and the patient was given wound care instructions, as well as appointment for follow-up evaluation and suture removal in 14 days. Patient was placed on Norco 5-325 mg prn postop pain and Keflex 500 mg TID x 10 days.    LENGTH OF REPAIR: 6 cm    Vitals:    01/08/25 0910 01/08/25 1548   BP: (!) 198/64 (!) 193/69   BP Location: Left arm    Patient Position: Sitting    Pulse: 67 89

## 2025-01-09 NOTE — PROGRESS NOTES
HPI  This 79 y.o. y/o female with PMH of HTN, HLD, RA, hyperparathyroidism, chronic hyponatremia, and thoracic aortic aneurysm repair came in for hyponatremia.    HypoNa  Lab Results   Component Value Date     (L) 01/06/2025     (L) 10/09/2024     (L) 08/12/2024     (L) 08/11/2024     (L) 08/10/2024     (L) 08/09/2024     (L) 08/08/2024 12/31/2023 Urine sodium 156, Urine osmolality 525; TSH, cortisol WNL; low sodium likely 2/2 pain induced high ADH level  Likely pain induced excessive ADH secretion, chlorthalidone use  S/p holding chlorthalidone, got a course of tolvaptan  CT no new nodules  The patient doesn't add salt to the diet, the independent living facility prepared the lunch    HTN  BP this visit 180/75 mmHg (has been taking ibuprofen after scalp surgery last week)  BP at home 120s-160s/60s (noticed a lot of fluctuation)  Current medication: amlodipine 10 mg daily, olmesartan 40 mg, carvedilol 6.25 mg BID      Past Medical History:   Diagnosis Date    Basal cell carcinoma (BCC) of right cheek 04/03/2024    R cheek    BCC (basal cell carcinoma) 01/08/2025    R frontal scalp    CKD (chronic kidney disease) stage 3, GFR 30-59 ml/min     HLD (hyperlipidemia)     HTN (hypertension)     Hyperparathyroidism     Melanoma     SCC (squamous cell carcinoma) 01/08/2025    L lateral antihelix       Past Surgical History:   Procedure Laterality Date    CATARACT EXTRACTION, BILATERAL Bilateral 2014    cataract removal Right 2014    COLONOSCOPY N/A 4/30/2024    Procedure: COLONOSCOPY;  Surgeon: Sharath Myles MD;  Location: [facility] ([address] [place] [address]);  Service: Endoscopy;  Laterality: N/A;  ref by / Miralax inst portal-RB  1/31/24- Pt r/s/ prep ins on portal - Miralax prep - ERW  4/25/24- precall complete - ERW  4/29-pt cannot come in earlier due to ride-KPvt    DV5 ROBOTIC RECTOPEXY N/A 6/7/2024    Procedure: DV5 ROBOTIC RECTOPEXY (eras low, lith); lysis of adhesions;   Surgeon: Sharath Myles MD;  Location: [facility] OR 2ND FLR;  Service: Colon and Rectal;  Laterality: N/A;    EXCISION, SMALL INTESTINE  8/7/2024    Procedure: EXCISION, SMALL INTESTINE;  Surgeon: Sharath Myles MD;  Location: [facility] OR 2ND FLR;  Service: Colon and Rectal;;    FLEXIBLE SIGMOIDOSCOPY  6/7/2024    Procedure: SIGMOIDOSCOPY, FLEXIBLE;  Surgeon: Sharath Myles MD;  Location: [facility] OR 2ND FLR;  Service: Colon and Rectal;;    hiatial hernia  2017    HYSTERECTOMY      LAPAROSCOPIC REPAIR OF INCISIONAL HERNIA N/A 8/7/2024    Procedure: REPAIR, HERNIA, INCISIONAL, LAPAROSCOPIC, SMALL BOWEL RESECTION;  Surgeon: Sharath Myles MD;  Location: [facility] OR 2ND FLR;  Service: Colon and Rectal;  Laterality: N/A;    melanoma      on face    OOPHORECTOMY      REPAIR, HERNIA, INCISIONAL  8/7/2024    Procedure: REPAIR, HERNIA, INCISIONAL;  Surgeon: Sharath Myles MD;  Location: [facility] OR 2ND FLR;  Service: Colon and Rectal;;    THORACIC AORTIC ANEURYSM REPAIR  2011       Review of patient's allergies indicates:   Allergen Reactions    Gabapentin Hallucinations    Methotrexate analogues      Mouth Ulcers     Thiazides Other (See Comments)     hyponatremia         Social History     Socioeconomic History    Marital status:    Tobacco Use    Smoking status: Former     Current packs/day: 0.00     Average packs/day: 1 pack/day for 30.0 years (30.0 ttl pk-yrs)     Types: Cigarettes     Start date: 1/26/1979     Quit date: 1/26/2009     Years since quitting: 15.9    Smokeless tobacco: Never   Substance and Sexual Activity    Alcohol use: Never     Comment: glass of wine once or twice a year    Drug use: Never    Sexual activity: Not Currently     Partners: Male     Birth control/protection: None     Social Drivers of Health     Financial Resource Strain: Low Risk  (8/9/2024)    Overall Financial Resource Strain (CARDIA)     Difficulty of Paying Living Expenses: Not hard at all   Food Insecurity: No Food  Insecurity (8/9/2024)    Hunger Vital Sign     Worried About Running Out of Food in the Last Year: Never true     Ran Out of Food in the Last Year: Never true   Transportation Needs: No Transportation Needs (8/9/2024)    TRANSPORTATION NEEDS     Transportation : No   Physical Activity: Inactive (8/9/2024)    Exercise Vital Sign     Days of Exercise per Week: 0 days     Minutes of Exercise per Session: 0 min   Stress: No Stress Concern Present (8/9/2024)    [employer] [place] [employer] - Occupational Stress Questionnaire     Feeling of Stress : Not at all   Housing Stability: Low Risk  (8/9/2024)    Housing Stability Vital Sign     Unable to Pay for Housing in the Last Year: No     Homeless in the Last Year: No       Family History   Problem Relation Name Age of Onset    Cancer Father Junior     Hypertension Maternal Grandmother Jazzy     Colon cancer Neg Hx      Inflammatory bowel disease Neg Hx         ROS negative except listed above    PHYSICAL EXAMINATION:  Blood pressure (!) 180/75, pulse 72, weight 48 kg (105 lb 13.1 oz), SpO2 96%.  Constitutional - No acute distress  HEENT - Grossly normal  Neck - supple.   Cardiovascular - JVP normal  Respiratory - Clear  Musculoskeletal - Peripheral edema no  Dermatologic/Skin - Skin warm and dry.  No rashes.    Neurologic - No acute neurological deficit  Psychiatric - AAOx3    Assessment and Plan  1. 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 HTN: BP goal 130/80 mmHg  -Continue amlodipine 10 mg daily, olmesartan 40 mg daily, carvedilol 6.25 mg Q12H (counseled the patient to change all her afternoon meds to a 12 hours interval in the evening)  -Given the current BP is likely affected by pain and ibuprofen; will consider adding clonidine patch 0.1 mg weekly if the BP is still high after pain  subsides    Follow up in 2-3 month

## 2025-01-13 ENCOUNTER — OFFICE VISIT (OUTPATIENT)
Dept: NEPHROLOGY | Facility: CLINIC | Age: 80
End: 2025-01-13
Payer: MEDICARE

## 2025-01-13 VITALS
DIASTOLIC BLOOD PRESSURE: 75 MMHG | SYSTOLIC BLOOD PRESSURE: 180 MMHG | BODY MASS INDEX: 18.75 KG/M2 | WEIGHT: 105.81 LBS | OXYGEN SATURATION: 96 % | HEART RATE: 72 BPM

## 2025-01-13 DIAGNOSIS — E87.1 HYPONATREMIA: Primary | ICD-10-CM

## 2025-01-13 DIAGNOSIS — I10 ESSENTIAL HYPERTENSION: ICD-10-CM

## 2025-01-13 DIAGNOSIS — E22.2 SIADH (SYNDROME OF INAPPROPRIATE ADH PRODUCTION): ICD-10-CM

## 2025-01-13 PROBLEM — N18.32 STAGE 3B CHRONIC KIDNEY DISEASE: Status: RESOLVED | Noted: 2024-01-08 | Resolved: 2025-01-13

## 2025-01-13 PROCEDURE — 1101F PT FALLS ASSESS-DOCD LE1/YR: CPT | Mod: CPTII,S$GLB,, | Performed by: INTERNAL MEDICINE

## 2025-01-13 PROCEDURE — 1159F MED LIST DOCD IN RCRD: CPT | Mod: CPTII,S$GLB,, | Performed by: INTERNAL MEDICINE

## 2025-01-13 PROCEDURE — 99999 PR PBB SHADOW E&M-EST. PATIENT-LVL III: CPT | Mod: PBBFAC,,, | Performed by: INTERNAL MEDICINE

## 2025-01-13 PROCEDURE — 3288F FALL RISK ASSESSMENT DOCD: CPT | Mod: CPTII,S$GLB,, | Performed by: INTERNAL MEDICINE

## 2025-01-13 PROCEDURE — 3077F SYST BP >= 140 MM HG: CPT | Mod: CPTII,S$GLB,, | Performed by: INTERNAL MEDICINE

## 2025-01-13 PROCEDURE — 3078F DIAST BP <80 MM HG: CPT | Mod: CPTII,S$GLB,, | Performed by: INTERNAL MEDICINE

## 2025-01-13 PROCEDURE — 1126F AMNT PAIN NOTED NONE PRSNT: CPT | Mod: CPTII,S$GLB,, | Performed by: INTERNAL MEDICINE

## 2025-01-13 PROCEDURE — 99214 OFFICE O/P EST MOD 30 MIN: CPT | Mod: S$GLB,,, | Performed by: INTERNAL MEDICINE

## 2025-01-24 ENCOUNTER — OFFICE VISIT (OUTPATIENT)
Dept: DERMATOLOGY | Facility: CLINIC | Age: 80
End: 2025-01-24
Payer: MEDICARE

## 2025-01-24 DIAGNOSIS — Z09 POSTOP CHECK: Primary | ICD-10-CM

## 2025-01-24 PROCEDURE — 1159F MED LIST DOCD IN RCRD: CPT | Mod: CPTII,S$GLB,, | Performed by: DERMATOLOGY

## 2025-01-24 PROCEDURE — 1126F AMNT PAIN NOTED NONE PRSNT: CPT | Mod: CPTII,S$GLB,, | Performed by: DERMATOLOGY

## 2025-01-24 PROCEDURE — 99024 POSTOP FOLLOW-UP VISIT: CPT | Mod: S$GLB,,, | Performed by: DERMATOLOGY

## 2025-01-24 PROCEDURE — 99999 PR PBB SHADOW E&M-EST. PATIENT-LVL III: CPT | Mod: PBBFAC,,, | Performed by: DERMATOLOGY

## 2025-01-24 PROCEDURE — 1101F PT FALLS ASSESS-DOCD LE1/YR: CPT | Mod: CPTII,S$GLB,, | Performed by: DERMATOLOGY

## 2025-01-24 PROCEDURE — 3288F FALL RISK ASSESSMENT DOCD: CPT | Mod: CPTII,S$GLB,, | Performed by: DERMATOLOGY

## 2025-01-24 PROCEDURE — 1160F RVW MEDS BY RX/DR IN RCRD: CPT | Mod: CPTII,S$GLB,, | Performed by: DERMATOLOGY

## 2025-01-24 NOTE — PROGRESS NOTES
79 y.o. female patient is here for suture removal following Mohs' surgery.    Patient reports no problems.    WOUND PE:  The right frontal scalp sutures intact. Wound healing well. Good skin edges. No signs or symptoms of infection.    IMPRESSION:  Healing operative site from Mohs' surgery BCC right frontal scalp s/p Mohs with CLC, postop day #16.    PLAN:  Sutures removed today by  Marcie Grover MA . Steri-strips applied.  Continue wound care.  Keep moist with Aquaphor.  Left ear almost all healed in - keep moist with aquaphor x 1 more week  Call if any issues arise    RTC:  In 3-6 months with  Gnia Yañez MD  for skin check or sooner if new concern arises.

## 2025-02-05 DIAGNOSIS — M05.79 RHEUMATOID ARTHRITIS INVOLVING MULTIPLE SITES WITH POSITIVE RHEUMATOID FACTOR: ICD-10-CM

## 2025-02-06 DIAGNOSIS — G89.29 CHRONIC PAIN OF BOTH SHOULDERS: ICD-10-CM

## 2025-02-06 DIAGNOSIS — M25.512 CHRONIC PAIN OF BOTH SHOULDERS: ICD-10-CM

## 2025-02-06 DIAGNOSIS — M25.511 CHRONIC PAIN OF BOTH SHOULDERS: ICD-10-CM

## 2025-02-06 DIAGNOSIS — M05.79 RHEUMATOID ARTHRITIS INVOLVING MULTIPLE SITES WITH POSITIVE RHEUMATOID FACTOR: ICD-10-CM

## 2025-02-06 RX ORDER — ADALIMUMAB 40MG/0.8ML
40 KIT SUBCUTANEOUS
Qty: 2 PEN | Refills: 11 | Status: SHIPPED | OUTPATIENT
Start: 2025-02-06 | End: 2026-01-08

## 2025-02-10 RX ORDER — TRAMADOL HYDROCHLORIDE 50 MG/1
50 TABLET ORAL 2 TIMES DAILY PRN
Qty: 60 TABLET | Refills: 0 | Status: SHIPPED | OUTPATIENT
Start: 2025-02-10

## 2025-03-20 ENCOUNTER — PATIENT MESSAGE (OUTPATIENT)
Dept: ADMINISTRATIVE | Facility: OTHER | Age: 80
End: 2025-03-20
Payer: MEDICARE

## 2025-04-30 ENCOUNTER — NURSE TRIAGE (OUTPATIENT)
Dept: ADMINISTRATIVE | Facility: CLINIC | Age: 80
End: 2025-04-30
Payer: MEDICARE

## 2025-04-30 NOTE — TELEPHONE ENCOUNTER
"Patient c/o SOB with activity. Advised per protocol to be seen within 2-3 days. Patient states that she can not be seen within the advised timeframe due to transportation. Will send a message to her provider's office. Advised the patient to call back with any further questions or if symptoms worsen.    Best contact number is [phone]    Reason for Disposition   MODERATE longstanding difficulty breathing (e.g., speaks in phrases, SOB even at rest, pulse 100-120) and SAME as normal    Additional Information   Negative: SEVERE difficulty breathing (e.g., struggling for each breath, speaks in single words, pulse > 120)   Negative: Breathing stopped and hasn't returned   Negative: Choking on something   Negative: Bluish (or gray) lips or face   Negative: Difficult to awaken or acting confused (e.g., disoriented, slurred speech)   Negative: Passed out (e.g., fainted, lost consciousness, blacked out and was not responding)   Negative: Wheezing started suddenly after medicine, an allergic food, or bee sting   Negative: Stridor (harsh sound while breathing in)   Negative: Slow, shallow and weak breathing   Negative: Sounds like a life-threatening emergency to the triager   Negative: MODERATE difficulty breathing (e.g., speaks in phrases, SOB even at rest, pulse 100-120) of new-onset or worse than normal   Negative: Oxygen level (e.g., pulse oximetry) 90% or lower   Negative: Wheezing can be heard across the room   Negative: Drooling or spitting out saliva (because can't swallow)   Negative: Any history of prior "blood clot" in leg or lungs   Negative: Illness requiring prolonged bedrest in past month (e.g., immobilization, long hospital stay)   Negative: Hip or leg fracture (broken bone) in past month (or had cast on leg or ankle in past month)   Negative: Major surgery in the past month   Negative: Long-distance travel in past month (e.g., car, bus, train, plane; with trip lasting 6 or more hours)   Negative: Cancer " treatment in past six months (or has cancer now)   Negative: Extra heartbeats, irregular heart beating, or heart is beating very fast (i.e., 'palpitations')   Negative: Fever > 103 F (39.4 C)   Negative: Fever > 100 F (37.8 C) and bedridden (e.g., nursing home patient, stroke, chronic illness, recovering from surgery)   Negative: Fever > 101 F (38.3 C) and over 60 years of age   Negative: Fever > 100 F (37.8 C) and diabetes mellitus or weak immune system (e.g., HIV positive, cancer chemo, splenectomy, organ transplant, chronic steroids)   Negative: Periods where breathing stops and then resumes normally and bedridden (e.g., nursing home patient, CVA)   Negative: Pregnant or postpartum (from 0 to 6 weeks after delivery)   Negative: Patient sounds very sick or weak to the triager   Negative: MILD difficulty breathing (e.g., minimal/no SOB at rest, SOB with walking, pulse < 100) of new-onset or worse than normal   Negative: Longstanding difficulty breathing (e.g., CHF, COPD, emphysema) and worse than normal   Negative: Longstanding difficulty breathing and not responding to usual therapy   Negative: Continuous (nonstop) coughing   Negative: Oxygen level (e.g., pulse oximetry) 91 to 94%   Negative: Patient wants to be seen    Protocols used: Breathing Difficulty-A-[place]

## 2025-05-03 ENCOUNTER — OFFICE VISIT (OUTPATIENT)
Dept: URGENT CARE | Facility: CLINIC | Age: 80
End: 2025-05-03
Payer: MEDICARE

## 2025-05-03 VITALS
OXYGEN SATURATION: 95 % | DIASTOLIC BLOOD PRESSURE: 69 MMHG | HEIGHT: 63 IN | WEIGHT: 106 LBS | SYSTOLIC BLOOD PRESSURE: 129 MMHG | HEART RATE: 62 BPM | RESPIRATION RATE: 18 BRPM | TEMPERATURE: 98 F | BODY MASS INDEX: 18.78 KG/M2

## 2025-05-03 DIAGNOSIS — R06.02 SHORTNESS OF BREATH: ICD-10-CM

## 2025-05-03 DIAGNOSIS — S00.459A INFECTED EMBEDDED EARRING: Primary | ICD-10-CM

## 2025-05-03 DIAGNOSIS — L08.9 INFECTED EMBEDDED EARRING: Primary | ICD-10-CM

## 2025-05-03 PROCEDURE — 99204 OFFICE O/P NEW MOD 45 MIN: CPT | Mod: 25,S$GLB,,

## 2025-05-03 PROCEDURE — 69200 CLEAR OUTER EAR CANAL: CPT | Mod: LT,S$GLB,,

## 2025-05-03 RX ORDER — ALBUTEROL SULFATE 90 UG/1
2 INHALANT RESPIRATORY (INHALATION) EVERY 6 HOURS PRN
Qty: 8 G | Refills: 0 | Status: SHIPPED | OUTPATIENT
Start: 2025-05-03 | End: 2025-05-10

## 2025-05-03 RX ORDER — CIPROFLOXACIN 500 MG/1
500 TABLET ORAL EVERY 12 HOURS
Qty: 14 TABLET | Refills: 0 | Status: SHIPPED | OUTPATIENT
Start: 2025-05-03 | End: 2025-05-10

## 2025-05-03 RX ORDER — MUPIROCIN 20 MG/G
OINTMENT TOPICAL 3 TIMES DAILY
Qty: 15 G | Refills: 0 | Status: SHIPPED | OUTPATIENT
Start: 2025-05-03 | End: 2025-05-10

## 2025-05-03 NOTE — PATIENT INSTRUCTIONS
Ear Infection: Ciprofloxacin twice daily for 7 days. Mupirocin twice daily for 7 days   Shortness of Breath: Albuterol inhaler every 4-6 hours as needed  Please return here or go to the Emergency Department for any concerns or worsening of condition.  If you were prescribed antibiotics, please take them to completion.  If you were prescribed a narcotic medication, do not drive or operate heavy equipment or machinery while taking these medications.  Please follow up with your primary care doctor or specialist as needed.    If you  smoke, please stop smoking.

## 2025-05-03 NOTE — PROCEDURES
Foreign body removal    Date/Time: 5/3/2025 10:30 AM    Performed by: Bryce Rai PA-C  Authorized by: Bryce Rai PA-C  Body area: ear  Location details: right ear  Anesthesia: local infiltration    Anesthesia:  Local Anesthetic: lidocaine 1% without epinephrine  Anesthetic total: 2 mL  Post-procedure assessment: foreign body removed (Earlobes B/L)

## 2025-05-03 NOTE — PROGRESS NOTES
"Subjective:      Patient ID: Buffy Dominique is a 79 y.o. female.    Vitals:  height is 5' 3" (1.6 m) and weight is 48.1 kg (106 lb). Her oral temperature is 97.5 °F (36.4 °C). Her blood pressure is 129/69 and her pulse is 62. Her respiration is 18 and oxygen saturation is 95%.     Chief Complaint: Foreign Body in Ear    Pt is a 79 y.o. female with PMHx of CKD, HTN, HLD. She is presenting with earring embedded in earlobes.  Onset of symptoms was 3 days ago after her parrot nibbled her R ear. Piercing is from 1970s, but has not changed this specific earring in years. Unable to remove the back of B/L earring. Only having swelling, pain, discharge from R piercing. Pt reports using OTC peroxide. Also complains of SOB.     Foreign Body in Ear  The incident occurred 3 to 5 days ago. Suspected object: earring. The foreign body is suspected to be in the right ear and in the left ear. The incident was reported. Pertinent negatives include no abdominal pain, chest pain, congestion, cough, fever, sore throat, vomiting or wheezing.       Constitution: Negative for activity change, appetite change, chills and fever.   HENT:  Positive for foreign body in ear. Negative for ear pain, congestion, postnasal drip, sinus pain, sinus pressure and sore throat.    Neck: Negative for neck pain.   Cardiovascular:  Negative for chest pain and sob on exertion.   Eyes:  Negative for eye trauma, eye discharge, eye itching, eye redness, photophobia and blurred vision.   Respiratory:  Negative for cough, shortness of breath, wheezing and asthma.    Gastrointestinal:  Negative for abdominal pain, nausea, vomiting, constipation and diarrhea.   Genitourinary:  Negative for dysuria, frequency, urgency, urine decreased and hematuria.   Musculoskeletal:  Negative for pain and muscle ache.   Skin:  Negative for color change, rash and hives.   Allergic/Immunologic: Negative for seasonal allergies, asthma, hives and sneezing.   Neurological:  Negative for " dizziness, light-headedness, headaches and altered mental status.   Psychiatric/Behavioral:  Negative for altered mental status and confusion.       Objective:     Physical Exam   Constitutional: She is oriented to person, place, and time. She appears well-developed. She is cooperative.      Comments:Pt sitting erect on examination table. No acute respiratory distress, no use of accessory muscles, no notice of nasal flaring.        HENT:   Head: Normocephalic and atraumatic.   Ears:   Right Ear: Hearing and external ear normal.   Left Ear: Hearing and external ear normal.   Nose: Nose normal. No mucosal edema or nasal deformity. No epistaxis. Right sinus exhibits no maxillary sinus tenderness and no frontal sinus tenderness. Left sinus exhibits no maxillary sinus tenderness and no frontal sinus tenderness.   Mouth/Throat: Uvula is midline, oropharynx is clear and moist and mucous membranes are normal. No trismus in the jaw. Normal dentition. No uvula swelling.   Earrings stuck in earlobes of B/L ears  R ear: unable to see brenna of earring, but earring back is noticeable. Unable to pull off with hands.Swelling, discharge, and redness noticed on exam. Very tender to palpation  L ear: Unable to take earring back off with hands     R ear: small incision of posterior ear made superior to earring. Pushed earring towards back of ear to removed whole  L ear: soaked earring back in warm water, then able to remove with forceps      Comments: Earrings stuck in earlobes of B/L ears  R ear: unable to see brenna of earring, but earring back is noticeable. Unable to pull off with hands.Swelling, discharge, and redness noticed on exam. Very tender to palpation  L ear: Unable to take earring back off with hands     R ear: small incision of posterior ear made superior to earring. Pushed earring towards back of ear to removed whole  L ear: soaked earring back in warm water, then able to remove with forceps  Eyes: Conjunctivae and  lids are normal.   Neck: Trachea normal and phonation normal. Neck supple.   Cardiovascular: Normal rate, regular rhythm, normal heart sounds and normal pulses.   Pulmonary/Chest: Effort normal and breath sounds normal.   Abdominal: Normal appearance and bowel sounds are normal. Soft.   Musculoskeletal: Normal range of motion.         General: Normal range of motion.   Neurological: She is alert and oriented to person, place, and time. She exhibits normal muscle tone.   Skin: Skin is warm, dry and intact.   Psychiatric: Her speech is normal and behavior is normal. Judgment and thought content normal.   Nursing note and vitals reviewed.      Assessment:     1. Infected embedded earring    2. Shortness of breath        Plan:   I have reviewed the patient chart and pertinent past imaging/labs.      Infected embedded earring  -     mupirocin (BACTROBAN) 2 % ointment; Apply topically 3 (three) times daily. for 7 days  Dispense: 15 g; Refill: 0  -     ciprofloxacin HCl (CIPRO) 500 MG tablet; Take 1 tablet (500 mg total) by mouth every 12 (twelve) hours. for 7 days  Dispense: 14 tablet; Refill: 0  -     Foreign body removal    Shortness of breath  -     albuterol (PROVENTIL HFA) 90 mcg/actuation inhaler; Inhale 2 puffs into the lungs every 6 (six) hours as needed for Wheezing. Rescue  Dispense: 8 g; Refill: 0

## 2025-05-03 NOTE — TELEPHONE ENCOUNTER
Spoke with patient, informed her that I have received her message. I advised patient that Dr Gomez next available appointment date is on 7/11/22. Patient verbalized that she understand and states that she will keep her original appointment with Dr Gomez on 5/9/22. I verbalized to patient that I understand.   Patient is a 72y old  Female with CHF exacerbation      Chart reviewed     ALLERGIES:  No Known Allergies    MEDICATIONS  (STANDING):  acetaminophen     Tablet .. 975 milliGRAM(s) Oral every 8 hours  aspirin  chewable 81 milliGRAM(s) Oral daily  atorvastatin 80 milliGRAM(s) Oral at bedtime  buMETAnide 4 milliGRAM(s) Oral <User Schedule>  carvedilol 6.25 milliGRAM(s) Oral every 12 hours  dextrose 5%. 1000 milliLiter(s) (100 mL/Hr) IV Continuous <Continuous>  dextrose 5%. 1000 milliLiter(s) (50 mL/Hr) IV Continuous <Continuous>  dextrose 50% Injectable 25 Gram(s) IV Push once  dextrose 50% Injectable 12.5 Gram(s) IV Push once  dextrose 50% Injectable 25 Gram(s) IV Push once  epoetin kristina-epbx (RETACRIT) Injectable 53104 Unit(s) SubCutaneous once  gabapentin 400 milliGRAM(s) Oral daily  glucagon  Injectable 1 milliGRAM(s) IntraMuscular once  hydrALAZINE 25 milliGRAM(s) Oral every 8 hours  insulin glargine Injectable (LANTUS) 5 Unit(s) SubCutaneous at bedtime  insulin lispro (ADMELOG) corrective regimen sliding scale   SubCutaneous three times a day before meals  isosorbide   mononitrate ER Tablet (IMDUR) 60 milliGRAM(s) Oral daily  lidocaine   4% Patch 1 Patch Transdermal every 24 hours  melatonin 3 milliGRAM(s) Oral at bedtime  methocarbamol 500 milliGRAM(s) Oral two times a day  metolazone 2.5 milliGRAM(s) Oral <User Schedule>  pantoprazole    Tablet 40 milliGRAM(s) Oral before breakfast  sacubitril 97 mG/valsartan 103 mG 1 Tablet(s) Oral two times a day  sodium bicarbonate 1300 milliGRAM(s) Oral three times a day  sodium chloride 0.65% Nasal 1 Spray(s) Both Nostrils two times a day  ticagrelor 90 milliGRAM(s) Oral every 12 hours    MEDICATIONS  (PRN):  aluminum hydroxide/magnesium hydroxide/simethicone Suspension 30 milliLiter(s) Oral every 4 hours PRN Dyspepsia  benzocaine/menthol Lozenge 1 Lozenge Oral every 2 hours PRN Sore Throat  dextrose Oral Gel 15 Gram(s) Oral once PRN Blood Glucose LESS THAN 70 milliGRAM(s)/deciliter  loperamide 2 milliGRAM(s) Oral two times a day PRN Diarrhea  ondansetron Injectable 4 milliGRAM(s) IV Push every 6 hours PRN Nausea and/or Vomiting    Vital Signs Last 24 Hrs  T(F): 100.3 (03 May 2025 06:47), Max: 100.3 (03 May 2025 06:47)  HR: 78 (03 May 2025 06:47) (75 - 88)  BP: 112/64 (03 May 2025 06:47) (95/52 - 148/72)  RR: 19 (03 May 2025 06:47) (18 - 20)  SpO2: 93% (03 May 2025 04:07) (93% - 100%)  I&O's Summary    02 May 2025 07:01  -  03 May 2025 07:00  --------------------------------------------------------  IN: 580 mL / OUT: 1600 mL / NET: -1020 mL        PHYSICAL EXAM:  General:   ENT:   Neck:   Lungs:   Cardio: RRR, S1/S2, No murmur  Abdomen: Soft, Nontender, Nondistended; Bowel sounds present  Extremities: No calf tenderness, No pitting edema    LABS:    05-02    130  |  89  |  51.3  ----------------------------<  169  4.2   |  25.0  |  2.50    Ca    8.3      02 May 2025 05:00  Mg     2.0     05-01                      03-22 Chol 120 mg/dL LDL -- HDL 34 mg/dL Trig 154 mg/dL              POCT Blood Glucose.: 177 mg/dL (02 May 2025 21:19)  POCT Blood Glucose.: 219 mg/dL (02 May 2025 17:14)  POCT Blood Glucose.: 225 mg/dL (02 May 2025 12:25)  POCT Blood Glucose.: 168 mg/dL (02 May 2025 07:57)      Urinalysis Basic - ( 02 May 2025 05:00 )    Color: x / Appearance: x / SG: x / pH: x  Gluc: 169 mg/dL / Ketone: x  / Bili: x / Urobili: x   Blood: x / Protein: x / Nitrite: x   Leuk Esterase: x / RBC: x / WBC x   Sq Epi: x / Non Sq Epi: x / Bacteria: x          RADIOLOGY & ADDITIONAL TESTS:       Patient is a 72y old  Female with CHF exacerbation      Chart reviewed , pt is seen around 8:45 am , son is at the bedside   pt is c/o abdominal discomfort   also pain when urinates she says   d/w PA and nurse , UA , urine cx ordered     Pt is with ESBL in urine cx from 4/25 given zosyn however sens reviewed resistant         ALLERGIES:  No Known Allergies    MEDICATIONS  (STANDING):  acetaminophen     Tablet .. 975 milliGRAM(s) Oral every 8 hours  aspirin  chewable 81 milliGRAM(s) Oral daily  atorvastatin 80 milliGRAM(s) Oral at bedtime  buMETAnide 4 milliGRAM(s) Oral <User Schedule>  carvedilol 6.25 milliGRAM(s) Oral every 12 hours  dextrose 5%. 1000 milliLiter(s) (100 mL/Hr) IV Continuous <Continuous>  dextrose 5%. 1000 milliLiter(s) (50 mL/Hr) IV Continuous <Continuous>  dextrose 50% Injectable 25 Gram(s) IV Push once  dextrose 50% Injectable 12.5 Gram(s) IV Push once  dextrose 50% Injectable 25 Gram(s) IV Push once  epoetin kristina-epbx (RETACRIT) Injectable 93369 Unit(s) SubCutaneous once  gabapentin 400 milliGRAM(s) Oral daily  glucagon  Injectable 1 milliGRAM(s) IntraMuscular once  hydrALAZINE 25 milliGRAM(s) Oral every 8 hours  insulin glargine Injectable (LANTUS) 5 Unit(s) SubCutaneous at bedtime  insulin lispro (ADMELOG) corrective regimen sliding scale   SubCutaneous three times a day before meals  isosorbide   mononitrate ER Tablet (IMDUR) 60 milliGRAM(s) Oral daily  lidocaine   4% Patch 1 Patch Transdermal every 24 hours  melatonin 3 milliGRAM(s) Oral at bedtime  methocarbamol 500 milliGRAM(s) Oral two times a day  metolazone 2.5 milliGRAM(s) Oral <User Schedule>  pantoprazole    Tablet 40 milliGRAM(s) Oral before breakfast  sacubitril 97 mG/valsartan 103 mG 1 Tablet(s) Oral two times a day  sodium bicarbonate 1300 milliGRAM(s) Oral three times a day  sodium chloride 0.65% Nasal 1 Spray(s) Both Nostrils two times a day  ticagrelor 90 milliGRAM(s) Oral every 12 hours    MEDICATIONS  (PRN):  aluminum hydroxide/magnesium hydroxide/simethicone Suspension 30 milliLiter(s) Oral every 4 hours PRN Dyspepsia  benzocaine/menthol Lozenge 1 Lozenge Oral every 2 hours PRN Sore Throat  dextrose Oral Gel 15 Gram(s) Oral once PRN Blood Glucose LESS THAN 70 milliGRAM(s)/deciliter  loperamide 2 milliGRAM(s) Oral two times a day PRN Diarrhea  ondansetron Injectable 4 milliGRAM(s) IV Push every 6 hours PRN Nausea and/or Vomiting    Vital Signs Last 24 Hrs  T(F): 100.3 (03 May 2025 06:47), Max: 100.3 (03 May 2025 06:47)  HR: 78 (03 May 2025 06:47) (75 - 88)  BP: 112/64 (03 May 2025 06:47) (95/52 - 148/72)  RR: 19 (03 May 2025 06:47) (18 - 20)  SpO2: 93% (03 May 2025 04:07) (93% - 100%)  I&O's Summary    02 May 2025 07:01  -  03 May 2025 07:00  --------------------------------------------------------  IN: 580 mL / OUT: 1600 mL / NET: -1020 mL        PHYSICAL EXAM:  General: awake alert   Neck: supple   Lungs: CTA bilateral   Cardio: RRR, S1/S2, No murmur  Abdomen: Soft, lwoer abd tenderness  Nondistended; Bowel sounds present  Extremities: No calf tenderness, No pitting edema    LABS:    05-02    130  |  89  |  51.3  ----------------------------<  169  4.2   |  25.0  |  2.50    Ca    8.3      02 May 2025 05:00  Mg     2.0     05-01 03-22 Chol 120 mg/dL LDL -- HDL 34 mg/dL Trig 154 mg/dL              POCT Blood Glucose.: 177 mg/dL (02 May 2025 21:19)  POCT Blood Glucose.: 219 mg/dL (02 May 2025 17:14)  POCT Blood Glucose.: 225 mg/dL (02 May 2025 12:25)  POCT Blood Glucose.: 168 mg/dL (02 May 2025 07:57)      Urinalysis Basic - ( 02 May 2025 05:00 )    Color: x / Appearance: x / SG: x / pH: x  Gluc: 169 mg/dL / Ketone: x  / Bili: x / Urobili: x   Blood: x / Protein: x / Nitrite: x   Leuk Esterase: x / RBC: x / WBC x   Sq Epi: x / Non Sq Epi: x / Bacteria: x          RADIOLOGY & ADDITIONAL TESTS:

## 2025-05-06 ENCOUNTER — APPOINTMENT (OUTPATIENT)
Dept: LAB | Facility: HOSPITAL | Age: 80
End: 2025-05-06
Attending: INTERNAL MEDICINE
Payer: MEDICARE

## 2025-05-14 ENCOUNTER — OFFICE VISIT (OUTPATIENT)
Dept: ORTHOPEDICS | Facility: CLINIC | Age: 80
End: 2025-05-14
Payer: MEDICARE

## 2025-05-14 VITALS — WEIGHT: 118.5 LBS | HEIGHT: 63 IN | BODY MASS INDEX: 21 KG/M2

## 2025-05-14 DIAGNOSIS — M25.512 BILATERAL SHOULDER PAIN, UNSPECIFIED CHRONICITY: ICD-10-CM

## 2025-05-14 DIAGNOSIS — M25.511 BILATERAL SHOULDER PAIN, UNSPECIFIED CHRONICITY: ICD-10-CM

## 2025-05-14 DIAGNOSIS — M19.012 PRIMARY OSTEOARTHRITIS OF BOTH SHOULDERS: Primary | ICD-10-CM

## 2025-05-14 DIAGNOSIS — M19.011 PRIMARY OSTEOARTHRITIS OF BOTH SHOULDERS: Primary | ICD-10-CM

## 2025-05-14 DIAGNOSIS — M47.12 OSTEOARTHRITIS OF CERVICAL SPINE WITH MYELOPATHY: ICD-10-CM

## 2025-05-14 PROCEDURE — 1125F AMNT PAIN NOTED PAIN PRSNT: CPT | Mod: CPTII,S$GLB,, | Performed by: PHYSICIAN ASSISTANT

## 2025-05-14 PROCEDURE — 20610 DRAIN/INJ JOINT/BURSA W/O US: CPT | Mod: 50,S$GLB,, | Performed by: PHYSICIAN ASSISTANT

## 2025-05-14 PROCEDURE — 1160F RVW MEDS BY RX/DR IN RCRD: CPT | Mod: CPTII,S$GLB,, | Performed by: PHYSICIAN ASSISTANT

## 2025-05-14 PROCEDURE — 3288F FALL RISK ASSESSMENT DOCD: CPT | Mod: CPTII,S$GLB,, | Performed by: PHYSICIAN ASSISTANT

## 2025-05-14 PROCEDURE — 1101F PT FALLS ASSESS-DOCD LE1/YR: CPT | Mod: CPTII,S$GLB,, | Performed by: PHYSICIAN ASSISTANT

## 2025-05-14 PROCEDURE — 99999 PR PBB SHADOW E&M-EST. PATIENT-LVL III: CPT | Mod: PBBFAC,,, | Performed by: PHYSICIAN ASSISTANT

## 2025-05-14 PROCEDURE — 99214 OFFICE O/P EST MOD 30 MIN: CPT | Mod: 25,S$GLB,, | Performed by: PHYSICIAN ASSISTANT

## 2025-05-14 PROCEDURE — 1159F MED LIST DOCD IN RCRD: CPT | Mod: CPTII,S$GLB,, | Performed by: PHYSICIAN ASSISTANT

## 2025-05-14 RX ORDER — TRIAMCINOLONE ACETONIDE 40 MG/ML
80 INJECTION, SUSPENSION INTRA-ARTICULAR; INTRAMUSCULAR
Status: COMPLETED | OUTPATIENT
Start: 2025-05-14 | End: 2025-05-14

## 2025-05-14 RX ADMIN — TRIAMCINOLONE ACETONIDE 80 MG: 40 INJECTION, SUSPENSION INTRA-ARTICULAR; INTRAMUSCULAR at 01:05

## 2025-05-14 NOTE — PROGRESS NOTES
SUBJECTIVE:     Chief Complaint & History of Present Illness:  Buffy Dominique is a  Established  patient 79 y.o. female who is seen here today with a complaint of    Chief Complaint   Patient presents with    Left Shoulder - Pain    Right Shoulder - Pain    . History of Present Illness    - Bilateral shoulder pain    - Presents with recurrent bilateral shoulder pain  - Describes pain as crunching in shoulders  - Reports worsening arthritis throughout body, with increasing prevalence  - Last visit in November or December, received shoulder injection effective until approximately one month ago  - Expresses concern about extent of arthritis  - Indicates surgical intervention may not be sufficient to address all affected areas    - Corticosteroid injection in both shoulders: December, provided significant benefit lasting until early April (approximately 4 months).       On a scale of 1-10, with 10 being worst pain imaginable, he rates this pain as 4 on good days and 7 on bad days.  she describes the pain as or grinding.    Review of patient's allergies indicates:   Allergen Reactions    Gabapentin Hallucinations    Methotrexate analogues      Mouth Ulcers     Thiazides Other (See Comments)     hyponatremia         Current Medications[1]    Past Medical History:   Diagnosis Date    Basal cell carcinoma (BCC) of right cheek 04/03/2024    R cheek    BCC (basal cell carcinoma) 01/08/2025    R frontal scalp    CKD (chronic kidney disease) stage 3, GFR 30-59 ml/min     HLD (hyperlipidemia)     HTN (hypertension)     Hyperparathyroidism     Melanoma     SCC (squamous cell carcinoma) 01/08/2025    L lateral antihelix       Past Surgical History:   Procedure Laterality Date    CATARACT EXTRACTION, BILATERAL Bilateral 2014    cataract removal Right 2014    COLONOSCOPY N/A 4/30/2024    Procedure: COLONOSCOPY;  Surgeon: Sharath Myles MD;  Location: [address]);  Service: Endoscopy;  Laterality: N/A;  ref by /  Miralax inst portal-RB  1/31/24- Pt r/s/ prep ins on portal - Miralax prep - ERW  4/25/24- precall complete - ERW  4/29-pt cannot come in earlier due to ride-KPvt    DV5 ROBOTIC RECTOPEXY N/A 6/7/2024    Procedure: DV5 ROBOTIC RECTOPEXY (eras low, lith); lysis of adhesions;  Surgeon: Sharath Myles MD;  Location: [facility] OR 2ND FLR;  Service: Colon and Rectal;  Laterality: N/A;    EXCISION, SMALL INTESTINE  8/7/2024    Procedure: EXCISION, SMALL INTESTINE;  Surgeon: Sharath Myles MD;  Location: [facility] OR 2ND [facility];  Service: Colon and Rectal;;    FLEXIBLE SIGMOIDOSCOPY  6/7/2024    Procedure: SIGMOIDOSCOPY, FLEXIBLE;  Surgeon: Sharath Myles MD;  Location: [facility] OR 2ND [facility];  Service: Colon and Rectal;;    hiatial hernia  2017    HYSTERECTOMY      LAPAROSCOPIC REPAIR OF INCISIONAL HERNIA N/A 8/7/2024    Procedure: REPAIR, HERNIA, INCISIONAL, LAPAROSCOPIC, SMALL BOWEL RESECTION;  Surgeon: Sharath Myles MD;  Location: [facility] OR 2ND F[facility];  Service: Colon and Rectal;  Laterality: N/A;    melanoma      on face    OOPHORECTOMY      REPAIR, HERNIA, INCISIONAL  8/7/2024    Procedure: REPAIR, HERNIA, INCISIONAL;  Surgeon: Sharath Myles MD;  Location: [facility] OR 2ND FLR;  Service: Colon and Rectal;;    THORACIC AORTIC ANEURYSM REPAIR  2011       Vital Signs (Most Recent)  There were no vitals filed for this visit.    Review of Systems:  ROS:  Constitutional: no fever or chills  Eyes: no visual changes  ENT: no nasal congestion or sore throat  Respiratory: no cough or shortness of breath, Positive multiple pulmonary nodules  Cardiovascular: no chest pain or palpitations, Positive aortic stenosis, nonrheumatic aortic valve insufficiency history of thoracic aortic aneurysm with repair  Gastrointestinal: no nausea or vomiting, tolerating diet  Genitourinary: no hematuria or dysuria, Positive pelvic floor dysfunction, CKD stage 3 hyponatremia  Integument/Breast: no rash or pruritis  Hematologic/Lymphatic: no easy  "bruising or lymphadenopathy  Musculoskeletal: no arthralgias or myalgias  Neurological: no seizures or tremors  Behavioral/Psych: no auditory or visual hallucinations  Endocrine: no heat or cold intolerance, Positive hyperparathyroidism      OBJECTIVE:     PHYSICAL EXAM:  Height: 5' 3" (160 cm) Weight: 53.8 kg (118 lb 8 oz), General Appearance: Well nourished, well developed, in no acute distress.  Neurological: Mood & affect are normal.  Shoulder exam:  right  Tenderness: globally  ROM: forward flexion 120/120, extension 40/40, full abduction 110/110, abduction-glenohumeral 30/30, external rotation 20/20  Shoulder Strength: biceps 5/5, triceps 5/5, abduction 5/5, adduction 5/5, external rotation 5/5 with shoulder at side, flexion 5/5, and extension 5/5  positive for tenderness about the glenohumeral joint, positive for tenderness over the acromioclavicular joint, and negative for impingement sign  Stability tests: anterior apprehension test positive for pain only and posterior apprehension test positive for pain only        Shoulder exam:  left  Tenderness: globally  ROM: forward flexion 140/140, extension 40/40, full abduction 140/140, abduction-glenohumeral 80/840, external rotation 50/50  Shoulder Strength: biceps 5/5, triceps 5/5, abduction 5/5, adduction 5/5, external rotation 5/5 with shoulder at side, flexion 5/5, and extension 5/5  positive for tenderness about the glenohumeral joint, positive for tenderness over the acromioclavicular joint, and negative for impingement sign  Stability tests: anterior apprehension test positive for pain only and posterior apprehension test positive for pain only                   RADIOGRAPHS:  X-rays of the shoulders from previous visit demonstrate moderate to severe arthritis changes throughout both shoulders with complete loss of glenohumeral joint space primarily at the superior aspect with some humeral head flattening osteophytic spurring and sclerotic changes noted " throughout the glenohumeral spaces well as the AC region no evidence of fracture.     ASSESSMENT/PLAN:       ICD-10-CM ICD-9-CM   1. Primary osteoarthritis of both shoulders  M19.011 715.11    M19.012    2. Bilateral shoulder pain, unspecified chronicity  M25.511 719.41    M25.512    3. Osteoarthritis of cervical spine with myelopathy  M47.12 721.1       Plan: We discussed with the patient at length all the different treatment options available for her bilateral shoulder including anti-inflammatories, acetaminophen, rest, ice, Physical therapy to include strengthening exercise, occasional cortisone injections for temporary relief, arthroscopic surgical repair, and finally shoulder arthroplasty.   Assessment & Plan    M19.011 Primary osteoarthritis, right shoulder  M19.012 Primary osteoarthritis, left shoulder  Z79.52 Long term (current) use of systemic steroids    PROCEDURES:  - # Procedures  - Corticosteroid injection administered today in patient's right shoulder.  - Used numbing medication prior to injection.  - Soreness may occur later in the evening as numbing medication wears off.  - Steroids take 1-2 days to take effect.    FOLLOW UP:  - Follow up when pain recurs.    PATIENT INSTRUCTIONS:  - Use heating pad as needed for comfort.       The risks, benefits, pros, cons, and potential side effects of the procedure were discussed with the patient in detail all questions were answered.  The patient is comfortable and willing to proceed with the procedure. Verbal consent was obtained and the proper joint was identified by the patient and provider      The injection site was identified and the skin was prepared with an ETOH solution. The    bilateral  shoulder was injected with 1 ml of triamcinolone acetate and 5 ml Lidocaine under sterile technique. Buffy Dominique tolerated the procedure well, she was advised to rest the  shoulder  today, ice and support. she did receive immediate relief of the pain in and about her   shoulder  she was told this would be short lived and is secondary to the lidocaine. she may have an increase in her discomfort tonight followed by steady improvement over the next several days. It may take 1-3 weeks following the injection to get the full benefit of the medication.  I will see her back in 6 months. Sooner if she has any problems or concerns.    This note was generated with the assistance of ambient listening technology. Verbal consent was obtained by the patient and accompanying visitor(s) for the recording of patient appointment to facilitate this note. I attest to having reviewed and edited the generated note for accuracy, though some syntax or spelling errors may persist. Please contact the author of this note for any clarification.         [1]   Current Outpatient Medications   Medication Sig Dispense Refill    acetaminophen (TYLENOL) 500 MG tablet Take 2 tablets (1,000 mg total) by mouth every 8 (eight) hours.      adalimumab (HUMIRA PEN) PnKt injection Inject 1 pen  (40 mg total) into the skin every 14 (fourteen) days. 2 pen 11    albuterol (PROVENTIL HFA) 90 mcg/actuation inhaler Inhale 2 puffs into the lungs every 6 (six) hours as needed for Wheezing. Rescue 8 g 0    alendronate (FOSAMAX) 70 MG tablet Take 1 tablet (70 mg total) by mouth every 7 days. (Patient not taking: Reported on 5/3/2025) 12 tablet 3    amLODIPine (NORVASC) 10 MG tablet TAKE 1 TABLET BY MOUTH ONCE  DAILY 90 tablet 3    artificial tears (ISOPTO TEARS) 0.5 % ophthalmic solution Place 1 drop into both eyes as needed (for dry eyes). (Patient not taking: Reported on 5/3/2025) 15 mL 5    carvediloL (COREG) 6.25 MG tablet Take 1 tablet (6.25 mg total) by mouth 2 (two) times daily with meals. 180 tablet 3    cholecalciferol, vitamin D3, (VITAMIN D3) 25 mcg (1,000 unit) capsule Take 1 capsule (1,000 Units total) by mouth once daily. 90 capsule 3    ibuprofen (ADVIL,MOTRIN) 800 MG tablet Take 1 tablet (800 mg total) by mouth  every 8 (eight) hours. (Patient not taking: Reported on 5/3/2025)      olmesartan (BENICAR) 40 MG tablet TAKE 1 TABLET BY MOUTH ONCE  DAILY 90 tablet 3    pulse oximeter (PULSE OXIMETER) device by Apply Externally route 2 (two) times a day. Use twice daily at 8 AM and 3 PM and record the value in MyChart as directed. (Patient not taking: Reported on 5/3/2025) 1 each 0    traMADoL (ULTRAM) 50 mg tablet Take 1 tablet (50 mg total) by mouth 2 (two) times daily as needed for Pain. (Patient not taking: Reported on 5/3/2025) 60 tablet 0     Current Facility-Administered Medications   Medication Dose Route Frequency Provider Last Rate Last Admin    triamcinolone acetonide injection 80 mg  80 mg Intra-articular 1 time in Clinic/HOD Wiley Benavides PA-C

## 2025-05-14 NOTE — PROGRESS NOTES
HPI  This 79 y.o. y/o female with PMH of HTN, HLD, RA, hyperparathyroidism, chronic hyponatremia, and thoracic aortic aneurysm repair came in for hyponatremia.    HypoNa  Lab Results   Component Value Date     (L) 05/06/2025     (L) 01/06/2025     (L) 10/09/2024     (L) 08/12/2024     (L) 08/11/2024     (L) 08/10/2024     (L) 08/09/2024 12/31/2023 Urine sodium 156, Urine osmolality 525; TSH, cortisol WNL; low sodium likely 2/2 pain induced high ADH level  Likely pain induced excessive ADH secretion, chlorthalidone use  S/p holding chlorthalidone, got a course of tolvaptan  CT no new nodules  The patient doesn't add salt to the diet, the independent living facility prepared the lunch    HTN  BP this visit 145/78 mmHg   BP at home 120s-160s/60s (noticed a lot of fluctuation)  Current medication: amlodipine 10 mg daily, olmesartan 40 mg, carvedilol 6.25 mg BID      Past Medical History:   Diagnosis Date    Basal cell carcinoma (BCC) of right cheek 04/03/2024    R cheek    BCC (basal cell carcinoma) 01/08/2025    R frontal scalp    CKD (chronic kidney disease) stage 3, GFR 30-59 ml/min     HLD (hyperlipidemia)     HTN (hypertension)     Hyperparathyroidism     Melanoma     SCC (squamous cell carcinoma) 01/08/2025    L lateral antihelix       Past Surgical History:   Procedure Laterality Date    CATARACT EXTRACTION, BILATERAL Bilateral 2014    cataract removal Right 2014    COLONOSCOPY N/A 4/30/2024    Procedure: COLONOSCOPY;  Surgeon: Sharath Myles MD;  Location: [facility] ([address] [place] [address]);  Service: Endoscopy;  Laterality: N/A;  ref by / Miralax inst portal-RB  1/31/24- Pt r/s/ prep ins on portal - Miralax prep - ERW  4/25/24- precall complete - ERW  4/29-pt cannot come in earlier due to ride-KPvt    DV5 ROBOTIC RECTOPEXY N/A 6/7/2024    Procedure: DV5 ROBOTIC RECTOPEXY (eras low, lith); lysis of adhesions;  Surgeon: Sharath Myles MD;  [facility];  Service: Colon and Rectal;  Laterality: N/A;    EXCISION, SMALL INTESTINE  2024    Procedure: EXCISION, SMALL INTESTINE;  Surgeon: Sharath Myles MD;  Location: [facility] OR 2ND FLR;  Service: Colon and Rectal;;    FLEXIBLE SIGMOIDOSCOPY  2024    Procedure: SIGMOIDOSCOPY, FLEXIBLE;  Surgeon: Sharath Myles MD;  Location: [facility] OR 2ND FLR;  Service: Colon and Rectal;;    hiatial hernia  2017    HYSTERECTOMY      LAPAROSCOPIC REPAIR OF INCISIONAL HERNIA N/A 2024    Procedure: REPAIR, HERNIA, INCISIONAL, LAPAROSCOPIC, SMALL BOWEL RESECTION;  Surgeon: Sharath Myles MD;  Location: [facility] OR 2ND FLR;  Service: Colon and Rectal;  Laterality: N/A;    melanoma      on face    OOPHORECTOMY      REPAIR, HERNIA, INCISIONAL  2024    Procedure: REPAIR, HERNIA, INCISIONAL;  Surgeon: Sharath Myles MD;  Location: [facility] OR 2ND FLR;  Service: Colon and Rectal;;    THORACIC AORTIC ANEURYSM REPAIR         Review of patient's allergies indicates:   Allergen Reactions    Gabapentin Hallucinations    Methotrexate analogues      Mouth Ulcers     Thiazides Other (See Comments)     hyponatremia         Social History     Socioeconomic History    Marital status:    Tobacco Use    Smoking status: Former     Current packs/day: 0.00     Average packs/day: 1 pack/day for 30.0 years (30.0 ttl pk-yrs)     Types: Cigarettes     Start date: 1979     Quit date: 2009     Years since quittin.3    Smokeless tobacco: Never   Substance and Sexual Activity    Alcohol use: Never     Comment: glass of wine once or twice a year    Drug use: Never    Sexual activity: Not Currently     Partners: Male     Birth control/protection: None     Social Drivers of Health     Financial Resource Strain: Low Risk  (2024)    Overall Financial Resource Strain (CARDIA)     Difficulty of Paying Living Expenses: Not hard at all   Food Insecurity: No Food Insecurity (2024)    Hunger Vital Sign     Worried  About Running Out of Food in the Last Year: Never true     Ran Out of Food in the Last Year: Never true   Transportation Needs: No Transportation Needs (8/9/2024)    TRANSPORTATION NEEDS     Transportation : No   Physical Activity: Inactive (8/9/2024)    Exercise Vital Sign     Days of Exercise per Week: 0 days     Minutes of Exercise per Session: 0 min   Stress: No Stress Concern Present (8/9/2024)    [employer] [place] [employer] - Occupational Stress Questionnaire     Feeling of Stress : Not at all   Housing Stability: Unknown (8/9/2024)    Housing Stability Vital Sign     Unable to Pay for Housing in the Last Year: No     Homeless in the Last Year: No       Family History   Problem Relation Name Age of Onset    Cancer Father Junior     Hypertension Maternal Grandmother Jazzy     Colon cancer Neg Hx      Inflammatory bowel disease Neg Hx         ROS negative except listed above    PHYSICAL EXAMINATION:  Blood pressure (!) 145/68, pulse 72, weight 54.3 kg (119 lb 11.4 oz), SpO2 98%.  Constitutional - No acute distress  HEENT - Grossly normal  Neck - supple.   Cardiovascular - JVP normal  Respiratory - Clear  Musculoskeletal - Peripheral edema no  Dermatologic/Skin - Skin warm and dry.  No rashes.    Neurologic - No acute neurological deficit  Psychiatric - AAOx3    Assessment and Plan  1. 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 The pt declined Ure-Na powder, protein boost; lasix is not an option given her fluid intake is not much)    2. HTN: BP goal 130/80 mmHg  -Continue amlodipine 10 mg daily, olmesartan 40 mg daily, carvedilol 6.25 mg Q12H     Follow up in 3 month

## 2025-05-15 ENCOUNTER — OFFICE VISIT (OUTPATIENT)
Dept: NEPHROLOGY | Facility: CLINIC | Age: 80
End: 2025-05-15
Payer: MEDICARE

## 2025-05-15 VITALS
DIASTOLIC BLOOD PRESSURE: 68 MMHG | OXYGEN SATURATION: 98 % | WEIGHT: 119.69 LBS | HEART RATE: 72 BPM | BODY MASS INDEX: 21.21 KG/M2 | SYSTOLIC BLOOD PRESSURE: 145 MMHG

## 2025-05-15 DIAGNOSIS — E87.1 HYPONATREMIA: Primary | ICD-10-CM

## 2025-05-15 DIAGNOSIS — I10 ESSENTIAL HYPERTENSION: ICD-10-CM

## 2025-05-15 PROCEDURE — 1160F RVW MEDS BY RX/DR IN RCRD: CPT | Mod: CPTII,S$GLB,, | Performed by: INTERNAL MEDICINE

## 2025-05-15 PROCEDURE — 3078F DIAST BP <80 MM HG: CPT | Mod: CPTII,S$GLB,, | Performed by: INTERNAL MEDICINE

## 2025-05-15 PROCEDURE — 1126F AMNT PAIN NOTED NONE PRSNT: CPT | Mod: CPTII,S$GLB,, | Performed by: INTERNAL MEDICINE

## 2025-05-15 PROCEDURE — 3077F SYST BP >= 140 MM HG: CPT | Mod: CPTII,S$GLB,, | Performed by: INTERNAL MEDICINE

## 2025-05-15 PROCEDURE — 1101F PT FALLS ASSESS-DOCD LE1/YR: CPT | Mod: CPTII,S$GLB,, | Performed by: INTERNAL MEDICINE

## 2025-05-15 PROCEDURE — 99214 OFFICE O/P EST MOD 30 MIN: CPT | Mod: S$GLB,,, | Performed by: INTERNAL MEDICINE

## 2025-05-15 PROCEDURE — 99999 PR PBB SHADOW E&M-EST. PATIENT-LVL III: CPT | Mod: PBBFAC,,, | Performed by: INTERNAL MEDICINE

## 2025-05-15 PROCEDURE — 1159F MED LIST DOCD IN RCRD: CPT | Mod: CPTII,S$GLB,, | Performed by: INTERNAL MEDICINE

## 2025-05-15 PROCEDURE — 3288F FALL RISK ASSESSMENT DOCD: CPT | Mod: CPTII,S$GLB,, | Performed by: INTERNAL MEDICINE

## 2025-05-29 ENCOUNTER — LAB VISIT (OUTPATIENT)
Dept: LAB | Facility: HOSPITAL | Age: 80
End: 2025-05-29
Attending: INTERNAL MEDICINE
Payer: MEDICARE

## 2025-05-29 DIAGNOSIS — E87.1 HYPONATREMIA: ICD-10-CM

## 2025-05-29 LAB
CORTIS SERPL-MCNC: 10.4 UG/DL (ref 4.3–22.4)
OSMOLALITY SERPL: 284 MOSM/KG (ref 275–295)
TSH SERPL-ACNC: 1.8 UIU/ML (ref 0.4–4)

## 2025-05-29 PROCEDURE — 36415 COLL VENOUS BLD VENIPUNCTURE: CPT | Mod: PO

## 2025-05-29 PROCEDURE — 83930 ASSAY OF BLOOD OSMOLALITY: CPT

## 2025-05-29 PROCEDURE — 84443 ASSAY THYROID STIM HORMONE: CPT

## 2025-05-29 PROCEDURE — 82533 TOTAL CORTISOL: CPT

## 2025-06-23 ENCOUNTER — ON-DEMAND VIRTUAL (OUTPATIENT)
Dept: URGENT CARE | Facility: CLINIC | Age: 80
End: 2025-06-23
Payer: MEDICARE

## 2025-06-23 DIAGNOSIS — N30.01 ACUTE CYSTITIS WITH HEMATURIA: Primary | ICD-10-CM

## 2025-06-23 PROCEDURE — 98005 SYNCH AUDIO-VIDEO EST LOW 20: CPT | Mod: 95,,, | Performed by: NURSE PRACTITIONER

## 2025-06-23 RX ORDER — NITROFURANTOIN 25; 75 MG/1; MG/1
100 CAPSULE ORAL 2 TIMES DAILY
Qty: 14 CAPSULE | Refills: 0 | Status: SHIPPED | OUTPATIENT
Start: 2025-06-23 | End: 2025-06-30

## 2025-06-23 NOTE — PATIENT INSTRUCTIONS

## 2025-06-23 NOTE — PROGRESS NOTES
Subjective:      Patient ID: Buffy Dominique is a 79 y.o. female.    Vitals:  vitals were not taken for this visit.     Chief Complaint: Urinary Tract Infection      Visit Type: TELE AUDIOVISUAL    Patient Location: [place], [place]     Present with the patient at the time of consultation: TELEMED PRESENT WITH PATIENT: None    Past Medical History:   Diagnosis Date    Basal cell carcinoma (BCC) of right cheek 04/03/2024    R cheek    BCC (basal cell carcinoma) 01/08/2025    R frontal scalp    CKD (chronic kidney disease) stage 3, GFR 30-59 ml/min     HLD (hyperlipidemia)     HTN (hypertension)     Hyperparathyroidism     Melanoma     SCC (squamous cell carcinoma) 01/08/2025    L lateral antihelix     Past Surgical History:   Procedure Laterality Date    CATARACT EXTRACTION, BILATERAL Bilateral 2014    cataract removal Right 2014    COLONOSCOPY N/A 4/30/2024    Procedure: COLONOSCOPY;  Surgeon: Sharath Myles MD;  Location: [facility] (4TH FLR);  Service: Endoscopy;  Laterality: N/A;  ref by / Miralax inst portal-RB  1/31/24- Pt r/s/ prep ins on portal - Miralax prep - ERW  4/25/24- precall complete - ERW  4/29-pt cannot come in earlier due to ride-KPvt    DV5 ROBOTIC RECTOPEXY N/A 6/7/2024    Procedure: DV5 ROBOTIC RECTOPEXY (eras low, lith); lysis of adhesions;  Surgeon: Sharath Myles MD;  Location: [facility] OR 2ND FLR;  Service: Colon and Rectal;  Laterality: N/A;    EXCISION, SMALL INTESTINE  8/7/2024    Procedure: EXCISION, SMALL INTESTINE;  Surgeon: Sharath Myles MD;  Location: [facility] OR 2ND FLR;  Service: Colon and Rectal;;    FLEXIBLE SIGMOIDOSCOPY  6/7/2024    Procedure: SIGMOIDOSCOPY, FLEXIBLE;  Surgeon: Sharath Myles MD;  [facility];  Service: Colon and Rectal;;    hiatial hernia  2017    HYSTERECTOMY      LAPAROSCOPIC REPAIR OF INCISIONAL HERNIA N/A 8/7/2024    Procedure: REPAIR, HERNIA, INCISIONAL, LAPAROSCOPIC, SMALL BOWEL RESECTION;  Surgeon: Sharath Myles  MD;  Location: [facility] OR [address] [place] [address];  Service: Colon and Rectal;  Laterality: N/A;    melanoma      on face    OOPHORECTOMY      REPAIR, HERNIA, INCISIONAL  8/7/2024    Procedure: REPAIR, HERNIA, INCISIONAL;  Surgeon: Sharath Myles MD;  Location: [facility] OR [address] [place] [address];  Service: Colon and Rectal;;    THORACIC AORTIC ANEURYSM REPAIR  2011     Review of patient's allergies indicates:   Allergen Reactions    Gabapentin Hallucinations    Methotrexate analogues      Mouth Ulcers     Thiazides Other (See Comments)     hyponatremia     Medications Ordered Prior to Encounter[1]  Family History   Problem Relation Name Age of Onset    Cancer Father Junior     Hypertension Maternal Grandmother Jazzy     Colon cancer Neg Hx      Inflammatory bowel disease Neg Hx         Medications Ordered                [id number] - [place] - [address]   [address] [place] [address]    Telephone: [phone]   Fax: [phone]   Hours: Not open 24 hours                         E-Prescribed (1 of 1)              nitrofurantoin, macrocrystal-monohydrate, (MACROBID) 100 MG capsule    Sig: Take 1 capsule (100 mg total) by mouth 2 (two) times daily. for 7 days       Start: 6/23/25     Quantity: 14 capsule Refills: 0                           No questionnaires on file.    Pt with UTI symptoms x 2 days with frequency and urgency with noted light in urine.  Denies fever, back, ha or dizziness.     Urinary Tract Infection   This is a new problem. The current episode started in the past 7 days. The problem has been unchanged. The quality of the pain is described as aching. The pain is mild. Associated symptoms include frequency, hematuria, nausea and urgency. Pertinent negatives include no flank pain, possible pregnancy or vomiting. She has tried acetaminophen for the symptoms.       Constitution: Negative for fever.   Respiratory:  Negative for shortness of breath.    Gastrointestinal:  Positive for abdominal pain  and nausea. Negative for vomiting.   Genitourinary:  Positive for frequency, urgency, hematuria and pelvic pain. Negative for flank pain.   Neurological:  Negative for dizziness and headaches.        Objective:   The physical exam was conducted virtually.  Physical Exam   Constitutional: She is oriented to person, place, and time. No distress.   HENT:   Head: Normocephalic.   Ears:   Right Ear: External ear normal.   Left Ear: External ear normal.   Nose: No rhinorrhea or congestion.   Eyes: Conjunctivae are normal.   Neck: Neck supple.   Pulmonary/Chest: Effort normal. No respiratory distress.   Neurological: She is alert and oriented to person, place, and time.   Skin: Skin is no rash.       Assessment:     1. Acute cystitis with hematuria        Plan:   Increase fluids and rest,  Continue medications as prescribed  Take all antibiotics as directed    Wipe from front to back, limit bubble bath, limit use of thong under ware, void after intercourse  F/u with PCP in 2-3 days,     Go to Urgent Care or ER if symptoms worsening or not improved      Acute cystitis with hematuria  -     nitrofurantoin, macrocrystal-monohydrate, (MACROBID) 100 MG capsule; Take 1 capsule (100 mg total) by mouth 2 (two) times daily. for 7 days  Dispense: 14 capsule; Refill: 0      We appreciate you trusting us with your medical care. We hope you feel better soon. We will be happy to take care of you for all of your future medical needs.     You must understand that you've received Virtual treatment only and that you may be released before all your medical problems are known or treated. You, the patient, will arrange for follow up care as instructed.     Follow up with your PCP or specialty clinic as directed in the next 1-2 weeks if not improved or as needed. You can call [phone] to schedule an appointment with the appropriate provider.     If your condition worsens we recommend that you receive another evaluation in person, with  your primary care provider, urgent care or at the emergency room immediately or contact your primary medical clinics after hours call service to discuss your concerns.                   [1]   Current Outpatient Medications on File Prior to Visit   Medication Sig Dispense Refill    acetaminophen (TYLENOL) 500 MG tablet Take 2 tablets (1,000 mg total) by mouth every 8 (eight) hours.      adalimumab (HUMIRA PEN) PnKt injection Inject 1 pen  (40 mg total) into the skin every 14 (fourteen) days. 2 pen 11    albuterol (PROVENTIL HFA) 90 mcg/actuation inhaler Inhale 2 puffs into the lungs every 6 (six) hours as needed for Wheezing. Rescue 8 g 0    alendronate (FOSAMAX) 70 MG tablet Take 1 tablet (70 mg total) by mouth every 7 days. (Patient not taking: Reported on 5/15/2025) 12 tablet 3    amLODIPine (NORVASC) 10 MG tablet TAKE 1 TABLET BY MOUTH ONCE  DAILY 90 tablet 3    artificial tears (ISOPTO TEARS) 0.5 % ophthalmic solution Place 1 drop into both eyes as needed (for dry eyes). 15 mL 5    carvediloL (COREG) 6.25 MG tablet Take 1 tablet (6.25 mg total) by mouth 2 (two) times daily with meals. 180 tablet 3    cholecalciferol, vitamin D3, (VITAMIN D3) 25 mcg (1,000 unit) capsule Take 1 capsule (1,000 Units total) by mouth once daily. 90 capsule 3    ibuprofen (ADVIL,MOTRIN) 800 MG tablet Take 1 tablet (800 mg total) by mouth every 8 (eight) hours. (Patient not taking: Reported on 5/15/2025)      olmesartan (BENICAR) 40 MG tablet TAKE 1 TABLET BY MOUTH ONCE  DAILY 90 tablet 3    pulse oximeter (PULSE OXIMETER) device by Apply Externally route 2 (two) times a day. Use twice daily at 8 AM and 3 PM and [facility]. 1 each 0    traMADoL (ULTRAM) 50 mg tablet Take 1 tablet (50 mg total) by mouth 2 (two) times daily as needed for Pain. 60 tablet 0    [DISCONTINUED] spironolactone (ALDACTONE) 25 MG tablet Take 1 tablet (25 mg total) by mouth once daily. 90 tablet 0     No current facility-administered  medications on file prior to visit.

## 2025-08-11 ENCOUNTER — OFFICE VISIT (OUTPATIENT)
Dept: NEPHROLOGY | Facility: CLINIC | Age: 80
End: 2025-08-11
Payer: MEDICARE

## 2025-08-11 VITALS
SYSTOLIC BLOOD PRESSURE: 155 MMHG | WEIGHT: 115.31 LBS | OXYGEN SATURATION: 97 % | HEART RATE: 64 BPM | BODY MASS INDEX: 20.42 KG/M2 | DIASTOLIC BLOOD PRESSURE: 73 MMHG

## 2025-08-11 DIAGNOSIS — R06.02 SHORTNESS OF BREATH: ICD-10-CM

## 2025-08-11 DIAGNOSIS — I10 HYPERTENSION, UNSPECIFIED TYPE: ICD-10-CM

## 2025-08-11 DIAGNOSIS — I10 ESSENTIAL HYPERTENSION: ICD-10-CM

## 2025-08-11 DIAGNOSIS — E87.1 HYPONATREMIA: Primary | ICD-10-CM

## 2025-08-11 PROCEDURE — 99213 OFFICE O/P EST LOW 20 MIN: CPT | Mod: S$GLB,,, | Performed by: INTERNAL MEDICINE

## 2025-08-11 PROCEDURE — 3078F DIAST BP <80 MM HG: CPT | Mod: CPTII,S$GLB,, | Performed by: INTERNAL MEDICINE

## 2025-08-11 PROCEDURE — 3077F SYST BP >= 140 MM HG: CPT | Mod: CPTII,S$GLB,, | Performed by: INTERNAL MEDICINE

## 2025-08-11 PROCEDURE — 1101F PT FALLS ASSESS-DOCD LE1/YR: CPT | Mod: CPTII,S$GLB,, | Performed by: INTERNAL MEDICINE

## 2025-08-11 PROCEDURE — 1125F AMNT PAIN NOTED PAIN PRSNT: CPT | Mod: CPTII,S$GLB,, | Performed by: INTERNAL MEDICINE

## 2025-08-11 PROCEDURE — 1159F MED LIST DOCD IN RCRD: CPT | Mod: CPTII,S$GLB,, | Performed by: INTERNAL MEDICINE

## 2025-08-11 PROCEDURE — 99999 PR PBB SHADOW E&M-EST. PATIENT-LVL III: CPT | Mod: PBBFAC,,, | Performed by: INTERNAL MEDICINE

## 2025-08-11 PROCEDURE — 3288F FALL RISK ASSESSMENT DOCD: CPT | Mod: CPTII,S$GLB,, | Performed by: INTERNAL MEDICINE

## 2025-08-12 RX ORDER — CARVEDILOL 6.25 MG/1
6.25 TABLET ORAL 2 TIMES DAILY WITH MEALS
Qty: 180 TABLET | Refills: 1 | Status: SHIPPED | OUTPATIENT
Start: 2025-08-12 | End: 2026-08-12

## 2025-08-12 RX ORDER — AMLODIPINE BESYLATE 10 MG/1
10 TABLET ORAL DAILY
Qty: 90 TABLET | Refills: 3 | Status: SHIPPED | OUTPATIENT
Start: 2025-08-12 | End: 2025-08-12 | Stop reason: SDUPTHER

## 2025-08-12 RX ORDER — OLMESARTAN MEDOXOMIL 40 MG/1
40 TABLET ORAL DAILY
Qty: 90 TABLET | Refills: 3 | Status: SHIPPED | OUTPATIENT
Start: 2025-08-12 | End: 2025-08-12 | Stop reason: SDUPTHER

## 2025-08-13 RX ORDER — ALBUTEROL SULFATE 90 UG/1
2 INHALANT RESPIRATORY (INHALATION) EVERY 6 HOURS PRN
Qty: 8 G | Refills: 0 | Status: SHIPPED | OUTPATIENT
Start: 2025-08-13 | End: 2025-08-20

## 2025-08-14 ENCOUNTER — LAB VISIT (OUTPATIENT)
Dept: LAB | Facility: HOSPITAL | Age: 80
End: 2025-08-14
Attending: INTERNAL MEDICINE
Payer: MEDICARE

## 2025-08-14 DIAGNOSIS — M05.79 RHEUMATOID ARTHRITIS INVOLVING MULTIPLE SITES WITH POSITIVE RHEUMATOID FACTOR: ICD-10-CM

## 2025-08-14 DIAGNOSIS — E87.1 HYPONATREMIA: ICD-10-CM

## 2025-08-14 LAB
ABSOLUTE EOSINOPHIL (OHS): 0.6 K/UL
ABSOLUTE MONOCYTE (OHS): 0.76 K/UL (ref 0.3–1)
ABSOLUTE NEUTROPHIL COUNT (OHS): 2.78 K/UL (ref 1.8–7.7)
ANION GAP (OHS): 10 MMOL/L (ref 8–16)
BASOPHILS # BLD AUTO: 0.06 K/UL
BASOPHILS NFR BLD AUTO: 0.8 %
BUN SERPL-MCNC: 17 MG/DL (ref 8–23)
CALCIUM SERPL-MCNC: 9.4 MG/DL (ref 8.7–10.5)
CHLORIDE SERPL-SCNC: 95 MMOL/L (ref 95–110)
CO2 SERPL-SCNC: 22 MMOL/L (ref 23–29)
CREAT SERPL-MCNC: 0.8 MG/DL (ref 0.5–1.4)
ERYTHROCYTE [DISTWIDTH] IN BLOOD BY AUTOMATED COUNT: 12.7 % (ref 11.5–14.5)
GFR SERPLBLD CREATININE-BSD FMLA CKD-EPI: >60 ML/MIN/1.73/M2
GLUCOSE SERPL-MCNC: 84 MG/DL (ref 70–110)
HCT VFR BLD AUTO: 35.6 % (ref 37–48.5)
HGB BLD-MCNC: 11.9 GM/DL (ref 12–16)
IMM GRANULOCYTES # BLD AUTO: 0.01 K/UL (ref 0–0.04)
IMM GRANULOCYTES NFR BLD AUTO: 0.1 % (ref 0–0.5)
LYMPHOCYTES # BLD AUTO: 2.87 K/UL (ref 1–4.8)
MCH RBC QN AUTO: 32.3 PG (ref 27–31)
MCHC RBC AUTO-ENTMCNC: 33.4 G/DL (ref 32–36)
MCV RBC AUTO: 97 FL (ref 82–98)
NUCLEATED RBC (/100WBC) (OHS): 0 /100 WBC
OSMOLALITY UR: 436 MOSM/KG (ref 50–1200)
PLATELET # BLD AUTO: 276 K/UL (ref 150–450)
PMV BLD AUTO: 8.8 FL (ref 9.2–12.9)
POTASSIUM SERPL-SCNC: 5 MMOL/L (ref 3.5–5.1)
RBC # BLD AUTO: 3.68 M/UL (ref 4–5.4)
RELATIVE EOSINOPHIL (OHS): 8.5 %
RELATIVE LYMPHOCYTE (OHS): 40.5 % (ref 18–48)
RELATIVE MONOCYTE (OHS): 10.7 % (ref 4–15)
RELATIVE NEUTROPHIL (OHS): 39.4 % (ref 38–73)
SODIUM SERPL-SCNC: 127 MMOL/L (ref 136–145)
SODIUM UR-SCNC: 68 MMOL/L (ref 20–250)
WBC # BLD AUTO: 7.08 K/UL (ref 3.9–12.7)

## 2025-08-14 PROCEDURE — 80048 BASIC METABOLIC PNL TOTAL CA: CPT

## 2025-08-14 PROCEDURE — 85025 COMPLETE CBC W/AUTO DIFF WBC: CPT

## 2025-08-14 PROCEDURE — 84300 ASSAY OF URINE SODIUM: CPT

## 2025-08-14 PROCEDURE — 36415 COLL VENOUS BLD VENIPUNCTURE: CPT | Mod: PO

## 2025-08-14 PROCEDURE — 83935 ASSAY OF URINE OSMOLALITY: CPT

## 2025-08-14 RX ORDER — ADALIMUMAB 40MG/0.8ML
40 KIT SUBCUTANEOUS
Qty: 2 PEN | Refills: 11 | Status: ACTIVE | OUTPATIENT
Start: 2025-08-14 | End: 2026-07-16

## 2025-08-15 ENCOUNTER — PATIENT MESSAGE (OUTPATIENT)
Dept: NEPHROLOGY | Facility: CLINIC | Age: 80
End: 2025-08-15
Payer: MEDICARE

## 2025-08-15 RX ORDER — SODIUM BICARBONATE 650 MG/1
650 TABLET ORAL 2 TIMES DAILY
Qty: 180 TABLET | Refills: 0 | Status: SHIPPED | OUTPATIENT
Start: 2025-08-15 | End: 2025-11-13

## 2025-08-20 DIAGNOSIS — M05.79 RHEUMATOID ARTHRITIS INVOLVING MULTIPLE SITES WITH POSITIVE RHEUMATOID FACTOR: ICD-10-CM

## 2025-08-20 RX ORDER — ADALIMUMAB 40MG/0.8ML
40 KIT SUBCUTANEOUS
Qty: 2 PEN | Refills: 11 | Status: SHIPPED | OUTPATIENT
Start: 2025-08-20 | End: 2026-07-22

## 2025-08-28 ENCOUNTER — PATIENT MESSAGE (OUTPATIENT)
Dept: RHEUMATOLOGY | Facility: CLINIC | Age: 80
End: 2025-08-28
Payer: MEDICARE

## 2025-08-29 ENCOUNTER — TELEPHONE (OUTPATIENT)
Dept: INTERNAL MEDICINE | Facility: CLINIC | Age: 80
End: 2025-08-29
Payer: MEDICARE

## (undated) DEVICE — COVER LIGHT HANDLE 80/CA

## (undated) DEVICE — KIT PROCEDURE STER INLET CLOSU

## (undated) DEVICE — TRAY CATH 1-LYR URIMTR 16FR

## (undated) DEVICE — DRAPE ABDOMINAL TIBURON 14X11

## (undated) DEVICE — SYR ONLY LUER LOCK 20CC

## (undated) DEVICE — DRAPE CORETEMP FLD WRM 56X62IN

## (undated) DEVICE — LUBRICANT SURGILUBE 2 OZ

## (undated) DEVICE — SUT MCRYL PLUS 4-0 PS2 27IN

## (undated) DEVICE — TROCAR ENDOPATH XCEL 5X75MM

## (undated) DEVICE — SEALER LIGASURE LAP 37CM 5MM

## (undated) DEVICE — SUT 1 36IN PDS II VIO MONO

## (undated) DEVICE — CLIPPER BLADE MOD 4406 (CAREF)

## (undated) DEVICE — KIT VUETIP TROCAR SWAB

## (undated) DEVICE — COVER MAYO STND XL 30X57IN

## (undated) DEVICE — TRAY MINOR GEN SURG OMC

## (undated) DEVICE — NDL INSUF ULTRA VERESS 120MM

## (undated) DEVICE — STAPLER INT PROX TX 60X4.8MM

## (undated) DEVICE — ELECTRODE REM PLYHSV RETURN 9

## (undated) DEVICE — TIP YANKAUERS BULB NO VENT

## (undated) DEVICE — SUT 3-0 VICRYL SH CR/8 18

## (undated) DEVICE — PAD PINK TRENDELENBURG POS XL

## (undated) DEVICE — ADHESIVE DERMABOND ADVANCED

## (undated) DEVICE — BLADE SURG CARBON STEEL SZ11

## (undated) DEVICE — KIT ANTIFOG W/SPONG & FLUID

## (undated) DEVICE — SET TUBE DAVINCI 5 HI FLOW INS

## (undated) DEVICE — NDL 22GA X1 1/2 REG BEVEL

## (undated) DEVICE — DRAPE SCOPE PILLOW WARMER

## (undated) DEVICE — MARKER SKIN RULER STERILE

## (undated) DEVICE — NDL BOX COUNTER

## (undated) DEVICE — DRESSING ADH ISLAND 3.6 X 14

## (undated) DEVICE — OBTURATOR BLADELESS 8MM XI

## (undated) DEVICE — DRAPE COLUMN DAVINCI XI

## (undated) DEVICE — APPLICATOR CHLORAPREP ORN 26ML

## (undated) DEVICE — RELOAD PROXIMATE GREEN 75MM

## (undated) DEVICE — PORT ACCESS 8MM W/120MM LOW

## (undated) DEVICE — SUT COATED VICRYL 4/0 27IN

## (undated) DEVICE — TROCAR KII FIOS 5MM X 100MM

## (undated) DEVICE — TUBING HF INSUFFLATION W/ FLTR

## (undated) DEVICE — BOWL STERILE LARGE 32OZ

## (undated) DEVICE — COVER LIGHT HANDLE

## (undated) DEVICE — SUT ETHIBOND EXCEL 0 CT2 30

## (undated) DEVICE — SEAL UNIVERSAL 5MM-8MM XI

## (undated) DEVICE — DRAPE UINDERBUT GRAD PCH

## (undated) DEVICE — LEGGING CLEAR POLY 2/PACK

## (undated) DEVICE — GOWN POLY REINF BRTH SLV XL

## (undated) DEVICE — Device

## (undated) DEVICE — DRESSING ABSRBNT ISLAND 3.6X8

## (undated) DEVICE — TOWEL OR DISP STRL BLUE 4/PK

## (undated) DEVICE — TRAY SKIN SCRUB WET PREMIUM

## (undated) DEVICE — DRAPE ARM DAVINCI XI

## (undated) DEVICE — SYS SEE SHARP SCP ANTIFG LNG

## (undated) DEVICE — SCISSOR 5MMX35CM DIRECT DRIVE

## (undated) DEVICE — SUT COATED VICRYL 3-0 1X27

## (undated) DEVICE — TROCAR ENDOPATH XCEL 5MM 7.5CM

## (undated) DEVICE — SUT 0 VICRYL / UR6 (J603)

## (undated) DEVICE — SOL ELECTROLUBE ANTI-STIC

## (undated) DEVICE — TRAY GENERAL SURGERY OMC